# Patient Record
Sex: MALE | Race: WHITE | Employment: UNEMPLOYED | URBAN - METROPOLITAN AREA
[De-identification: names, ages, dates, MRNs, and addresses within clinical notes are randomized per-mention and may not be internally consistent; named-entity substitution may affect disease eponyms.]

---

## 2017-09-30 PROBLEM — F98.8 ATTENTION DEFICIT DISORDER: Status: ACTIVE | Noted: 2017-09-30

## 2017-09-30 PROBLEM — L01.00 IMPETIGO: Status: ACTIVE | Noted: 2017-09-30

## 2017-09-30 PROBLEM — J18.9 PNEUMONIA IN INFECTIOUS DISEASE: Status: ACTIVE | Noted: 2017-09-30

## 2017-11-21 PROBLEM — F10.931 ALCOHOL WITHDRAWAL DELIRIUM (HCC): Status: ACTIVE | Noted: 2017-11-21

## 2017-12-01 PROBLEM — R45.1 AGITATION: Status: ACTIVE | Noted: 2017-12-01

## 2017-12-01 PROBLEM — F33.3 SEVERE RECURRENT MAJOR DEPRESSIVE DISORDER WITH PSYCHOTIC FEATURES (HCC): Status: ACTIVE | Noted: 2017-12-01

## 2017-12-01 PROBLEM — F32.A DEPRESSIVE DISORDER: Status: ACTIVE | Noted: 2017-12-01

## 2017-12-02 PROBLEM — F10.931 ALCOHOL WITHDRAWAL DELIRIUM (HCC): Status: RESOLVED | Noted: 2017-11-21 | Resolved: 2017-12-02

## 2017-12-02 PROBLEM — F33.3 SEVERE RECURRENT MAJOR DEPRESSIVE DISORDER WITH PSYCHOTIC FEATURES (HCC): Status: RESOLVED | Noted: 2017-12-01 | Resolved: 2017-12-02

## 2017-12-02 PROBLEM — R45.1 AGITATION: Status: RESOLVED | Noted: 2017-12-01 | Resolved: 2017-12-02

## 2017-12-02 PROBLEM — F32.A DEPRESSIVE DISORDER: Status: RESOLVED | Noted: 2017-12-01 | Resolved: 2017-12-02

## 2017-12-02 PROBLEM — F98.8 ATTENTION DEFICIT DISORDER: Status: RESOLVED | Noted: 2017-09-30 | Resolved: 2017-12-02

## 2017-12-02 PROBLEM — J18.9 PNEUMONIA IN INFECTIOUS DISEASE: Status: RESOLVED | Noted: 2017-09-30 | Resolved: 2017-12-02

## 2017-12-02 PROBLEM — L01.00 IMPETIGO: Status: RESOLVED | Noted: 2017-09-30 | Resolved: 2017-12-02

## 2017-12-21 ENCOUNTER — TELEPHONE (OUTPATIENT)
Dept: PRIMARY CARE CLINIC | Age: 27
End: 2017-12-21

## 2018-01-07 PROBLEM — R44.3 HALLUCINATIONS: Status: ACTIVE | Noted: 2018-01-07

## 2018-01-07 PROBLEM — F10.931 DELIRIUM TREMENS (HCC): Status: ACTIVE | Noted: 2018-01-07

## 2018-01-07 PROBLEM — F10.10 ALCOHOL ABUSE, CONTINUOUS: Status: ACTIVE | Noted: 2018-01-07

## 2018-01-10 PROBLEM — F10.931 ALCOHOL WITHDRAWAL DELIRIUM (HCC): Status: ACTIVE | Noted: 2018-01-10

## 2018-01-14 PROBLEM — F10.932 ALCOHOL WITHDRAWAL HALLUCINOSIS (HCC): Status: ACTIVE | Noted: 2018-01-14

## 2018-01-15 PROBLEM — Z72.0 TOBACCO ABUSE: Status: ACTIVE | Noted: 2018-01-15

## 2018-01-28 PROBLEM — K85.20 ALCOHOL-INDUCED ACUTE PANCREATITIS: Status: ACTIVE | Noted: 2018-01-28

## 2018-01-28 PROBLEM — F10.930 ALCOHOL WITHDRAWAL WITHOUT PERCEPTUAL DISTURBANCES, UNCOMPLICATED (HCC): Status: ACTIVE | Noted: 2018-01-28

## 2018-01-28 PROBLEM — F10.10 ALCOHOL ABUSE: Status: ACTIVE | Noted: 2018-01-28

## 2018-01-30 PROBLEM — K85.20 ALCOHOL-INDUCED ACUTE PANCREATITIS WITHOUT INFECTION OR NECROSIS: Status: ACTIVE | Noted: 2018-01-30

## 2018-02-08 PROBLEM — F10.239 ALCOHOL-INDUCED DEPRESSIVE DISORDER WITH MODERATE OR SEVERE USE DISORDER WITH ONSET DURING WITHDRAWAL (HCC): Status: ACTIVE | Noted: 2017-12-20

## 2018-02-08 PROBLEM — F13.939 BENZODIAZEPINE WITHDRAWAL (HCC): Status: ACTIVE | Noted: 2017-12-21

## 2018-02-08 PROBLEM — F91.9 DISTURBANCE OF CONDUCT: Status: ACTIVE | Noted: 2018-02-08

## 2018-02-08 PROBLEM — J05.0 CROUP: Status: ACTIVE | Noted: 2018-02-08

## 2018-02-08 PROBLEM — Z00.129 ROUTINE INFANT OR CHILD HEALTH CHECK: Status: ACTIVE | Noted: 2018-02-08

## 2018-02-08 PROBLEM — F10.24 ALCOHOL-INDUCED DEPRESSIVE DISORDER WITH MODERATE OR SEVERE USE DISORDER WITH ONSET DURING WITHDRAWAL (HCC): Status: ACTIVE | Noted: 2017-12-20

## 2018-02-08 PROBLEM — F41.0 PANIC ATTACKS: Status: ACTIVE | Noted: 2017-09-21

## 2018-02-08 PROBLEM — F33.9 MAJOR DEPRESSION, RECURRENT (HCC): Status: ACTIVE | Noted: 2018-02-08

## 2018-02-08 PROBLEM — F13.10 BENZODIAZEPINE ABUSE (HCC): Status: ACTIVE | Noted: 2017-12-21

## 2018-02-08 PROBLEM — J16.8 PNEUMONIA DUE TO OTHER SPECIFIED ORGANISM(483.8): Status: ACTIVE | Noted: 2018-02-08

## 2018-02-12 PROBLEM — F33.0 MDD (MAJOR DEPRESSIVE DISORDER), RECURRENT EPISODE, MILD (HCC): Status: ACTIVE | Noted: 2018-02-12

## 2018-02-23 PROBLEM — F10.239 ALCOHOL DEPENDENCE WITH WITHDRAWAL (HCC): Status: ACTIVE | Noted: 2018-02-23

## 2018-03-06 ENCOUNTER — TELEPHONE (OUTPATIENT)
Dept: INPATIENT UNIT | Age: 28
End: 2018-03-06

## 2018-03-13 ENCOUNTER — TELEPHONE (OUTPATIENT)
Dept: INPATIENT UNIT | Age: 28
End: 2018-03-13

## 2018-03-15 PROBLEM — K85.20 ALCOHOL-INDUCED ACUTE PANCREATITIS WITHOUT INFECTION OR NECROSIS: Status: ACTIVE | Noted: 2018-03-15

## 2018-03-22 PROBLEM — K85.90 ACUTE ON CHRONIC PANCREATITIS (HCC): Status: ACTIVE | Noted: 2018-03-22

## 2018-03-22 PROBLEM — K86.1 ACUTE ON CHRONIC PANCREATITIS (HCC): Status: ACTIVE | Noted: 2018-03-22

## 2018-03-22 PROBLEM — K92.2 UPPER GI BLEED: Status: ACTIVE | Noted: 2018-03-22

## 2018-03-23 ENCOUNTER — TELEPHONE (OUTPATIENT)
Dept: PSYCHIATRY | Age: 28
End: 2018-03-23

## 2018-03-29 PROBLEM — F39 UNSPECIFIED MOOD (AFFECTIVE) DISORDER (HCC): Status: ACTIVE | Noted: 2018-03-29

## 2018-03-30 ENCOUNTER — TELEPHONE (OUTPATIENT)
Dept: PRIMARY CARE CLINIC | Age: 28
End: 2018-03-30

## 2018-03-30 PROBLEM — F32.A DEPRESSIVE DISORDER: Status: ACTIVE | Noted: 2018-03-30

## 2018-04-02 PROBLEM — F10.24 ALCOHOL DEPENDENCE WITH ALCOHOL-INDUCED MOOD DISORDER (HCC): Status: ACTIVE | Noted: 2018-02-23

## 2018-04-06 PROBLEM — F10.930 ALCOHOL WITHDRAWAL SYNDROME, UNCOMPLICATED (HCC): Status: ACTIVE | Noted: 2018-04-06

## 2018-04-11 PROBLEM — F10.932 ALCOHOL WITHDRAWAL WITH PERCEPTUAL DISTURBANCES (HCC): Status: ACTIVE | Noted: 2018-04-11

## 2018-04-11 PROBLEM — Z00.129 ROUTINE INFANT OR CHILD HEALTH CHECK: Status: RESOLVED | Noted: 2018-02-08 | Resolved: 2018-04-11

## 2018-04-21 PROBLEM — F10.10 ALCOHOL ABUSE: Status: RESOLVED | Noted: 2018-01-28 | Resolved: 2018-04-21

## 2018-04-21 PROBLEM — F13.939 BENZODIAZEPINE WITHDRAWAL (HCC): Status: RESOLVED | Noted: 2017-12-21 | Resolved: 2018-04-21

## 2018-04-21 PROBLEM — J16.8 PNEUMONIA DUE TO OTHER SPECIFIED ORGANISM(483.8): Status: RESOLVED | Noted: 2018-02-08 | Resolved: 2018-04-21

## 2018-04-21 PROBLEM — F10.24 ALCOHOL-INDUCED DEPRESSIVE DISORDER WITH MODERATE OR SEVERE USE DISORDER WITH ONSET DURING WITHDRAWAL (HCC): Status: RESOLVED | Noted: 2017-12-20 | Resolved: 2018-04-21

## 2018-04-21 PROBLEM — J05.0 CROUP: Status: RESOLVED | Noted: 2018-02-08 | Resolved: 2018-04-21

## 2018-04-21 PROBLEM — F10.931 DELIRIUM TREMENS (HCC): Status: RESOLVED | Noted: 2018-01-07 | Resolved: 2018-04-21

## 2018-04-21 PROBLEM — F32.A DEPRESSIVE DISORDER: Status: RESOLVED | Noted: 2018-03-30 | Resolved: 2018-04-21

## 2018-04-21 PROBLEM — F10.932 ALCOHOL WITHDRAWAL WITH PERCEPTUAL DISTURBANCES (HCC): Status: RESOLVED | Noted: 2018-04-11 | Resolved: 2018-04-21

## 2018-04-21 PROBLEM — F39 UNSPECIFIED MOOD (AFFECTIVE) DISORDER (HCC): Status: RESOLVED | Noted: 2018-03-29 | Resolved: 2018-04-21

## 2018-04-21 PROBLEM — R44.3 HALLUCINATIONS: Status: RESOLVED | Noted: 2018-01-07 | Resolved: 2018-04-21

## 2018-04-21 PROBLEM — K85.90 ACUTE ON CHRONIC PANCREATITIS (HCC): Status: RESOLVED | Noted: 2018-03-22 | Resolved: 2018-04-21

## 2018-04-21 PROBLEM — K85.20 ALCOHOL-INDUCED ACUTE PANCREATITIS WITHOUT INFECTION OR NECROSIS: Status: RESOLVED | Noted: 2018-03-15 | Resolved: 2018-04-21

## 2018-04-21 PROBLEM — F10.931 ALCOHOL WITHDRAWAL DELIRIUM (HCC): Status: RESOLVED | Noted: 2018-01-10 | Resolved: 2018-04-21

## 2018-04-21 PROBLEM — K86.1 ACUTE ON CHRONIC PANCREATITIS (HCC): Status: RESOLVED | Noted: 2018-03-22 | Resolved: 2018-04-21

## 2018-04-21 PROBLEM — F10.932 ALCOHOL WITHDRAWAL HALLUCINOSIS (HCC): Status: RESOLVED | Noted: 2018-01-14 | Resolved: 2018-04-21

## 2018-04-21 PROBLEM — F10.24 ALCOHOL DEPENDENCE WITH ALCOHOL-INDUCED MOOD DISORDER (HCC): Status: RESOLVED | Noted: 2018-02-23 | Resolved: 2018-04-21

## 2018-04-21 PROBLEM — F33.9 MAJOR DEPRESSION, RECURRENT (HCC): Status: RESOLVED | Noted: 2018-02-08 | Resolved: 2018-04-21

## 2018-04-21 PROBLEM — K85.20 ALCOHOL-INDUCED ACUTE PANCREATITIS WITHOUT INFECTION OR NECROSIS: Status: RESOLVED | Noted: 2018-01-30 | Resolved: 2018-04-21

## 2018-04-21 PROBLEM — F10.239 ALCOHOL-INDUCED DEPRESSIVE DISORDER WITH MODERATE OR SEVERE USE DISORDER WITH ONSET DURING WITHDRAWAL (HCC): Status: RESOLVED | Noted: 2017-12-20 | Resolved: 2018-04-21

## 2018-04-21 PROBLEM — F91.9 DISTURBANCE OF CONDUCT: Status: RESOLVED | Noted: 2018-02-08 | Resolved: 2018-04-21

## 2018-04-21 PROBLEM — F10.930 ALCOHOL WITHDRAWAL SYNDROME, UNCOMPLICATED (HCC): Status: RESOLVED | Noted: 2018-04-06 | Resolved: 2018-04-21

## 2019-01-19 ENCOUNTER — HOSPITAL ENCOUNTER (EMERGENCY)
Age: 29
Discharge: HOME | End: 2019-01-19
Payer: COMMERCIAL

## 2019-01-19 ENCOUNTER — APPOINTMENT (OUTPATIENT)
Dept: CT IMAGING | Age: 29
End: 2019-01-19
Payer: COMMERCIAL

## 2019-01-19 VITALS
DIASTOLIC BLOOD PRESSURE: 77 MMHG | HEIGHT: 68 IN | HEART RATE: 89 BPM | TEMPERATURE: 96.9 F | RESPIRATION RATE: 24 BRPM | SYSTOLIC BLOOD PRESSURE: 150 MMHG | OXYGEN SATURATION: 100 % | BODY MASS INDEX: 23.66 KG/M2 | WEIGHT: 156.09 LBS

## 2019-01-19 DIAGNOSIS — M62.838 SPASM OF MUSCLE: Primary | ICD-10-CM

## 2019-01-19 DIAGNOSIS — T50.905A ADVERSE EFFECT OF DRUG, INITIAL ENCOUNTER: ICD-10-CM

## 2019-01-19 LAB
AMPHETAMINE SCREEN, URINE: NORMAL
ANION GAP SERPL CALCULATED.3IONS-SCNC: 16 MMOL/L (ref 3–16)
BARBITURATE SCREEN URINE: NORMAL
BASOPHILS ABSOLUTE: 0 K/UL (ref 0–0.2)
BASOPHILS RELATIVE PERCENT: 0.5 %
BENZODIAZEPINE SCREEN, URINE: NORMAL
BUN BLDV-MCNC: 11 MG/DL (ref 7–20)
CALCIUM SERPL-MCNC: 9.7 MG/DL (ref 8.3–10.6)
CANNABINOID SCREEN URINE: NORMAL
CHLORIDE BLD-SCNC: 104 MMOL/L (ref 99–110)
CO2: 20 MMOL/L (ref 21–32)
COCAINE METABOLITE SCREEN URINE: NORMAL
CREAT SERPL-MCNC: 0.8 MG/DL (ref 0.9–1.3)
EOSINOPHILS ABSOLUTE: 0.1 K/UL (ref 0–0.6)
EOSINOPHILS RELATIVE PERCENT: 1.8 %
GFR AFRICAN AMERICAN: >60
GFR NON-AFRICAN AMERICAN: >60
GLUCOSE BLD-MCNC: 168 MG/DL (ref 70–99)
HCT VFR BLD CALC: 42.5 % (ref 40.5–52.5)
HEMOGLOBIN: 15.1 G/DL (ref 13.5–17.5)
LYMPHOCYTES ABSOLUTE: 2 K/UL (ref 1–5.1)
LYMPHOCYTES RELATIVE PERCENT: 28.9 %
Lab: NORMAL
MCH RBC QN AUTO: 31.9 PG (ref 26–34)
MCHC RBC AUTO-ENTMCNC: 35.5 G/DL (ref 31–36)
MCV RBC AUTO: 90.1 FL (ref 80–100)
METHADONE SCREEN, URINE: NORMAL
MONOCYTES ABSOLUTE: 0.4 K/UL (ref 0–1.3)
MONOCYTES RELATIVE PERCENT: 6 %
NEUTROPHILS ABSOLUTE: 4.4 K/UL (ref 1.7–7.7)
NEUTROPHILS RELATIVE PERCENT: 62.8 %
OPIATE SCREEN URINE: NORMAL
OXYCODONE URINE: NORMAL
PDW BLD-RTO: 11.8 % (ref 12.4–15.4)
PH UA: 7
PHENCYCLIDINE SCREEN URINE: NORMAL
PLATELET # BLD: 204 K/UL (ref 135–450)
PMV BLD AUTO: 8.9 FL (ref 5–10.5)
POTASSIUM SERPL-SCNC: 3.8 MMOL/L (ref 3.5–5.1)
PROPOXYPHENE SCREEN: NORMAL
RBC # BLD: 4.72 M/UL (ref 4.2–5.9)
SODIUM BLD-SCNC: 140 MMOL/L (ref 136–145)
WBC # BLD: 7 K/UL (ref 4–11)

## 2019-01-19 PROCEDURE — 70450 CT HEAD/BRAIN W/O DYE: CPT

## 2019-01-19 PROCEDURE — 6360000002 HC RX W HCPCS: Performed by: NURSE PRACTITIONER

## 2019-01-19 PROCEDURE — 99284 EMERGENCY DEPT VISIT MOD MDM: CPT

## 2019-01-19 PROCEDURE — 96376 TX/PRO/DX INJ SAME DRUG ADON: CPT

## 2019-01-19 PROCEDURE — 6370000000 HC RX 637 (ALT 250 FOR IP): Performed by: NURSE PRACTITIONER

## 2019-01-19 PROCEDURE — 85025 COMPLETE CBC W/AUTO DIFF WBC: CPT

## 2019-01-19 PROCEDURE — 96374 THER/PROPH/DIAG INJ IV PUSH: CPT

## 2019-01-19 PROCEDURE — 80048 BASIC METABOLIC PNL TOTAL CA: CPT

## 2019-01-19 PROCEDURE — 80307 DRUG TEST PRSMV CHEM ANLYZR: CPT

## 2019-01-19 PROCEDURE — 96375 TX/PRO/DX INJ NEW DRUG ADDON: CPT

## 2019-01-19 RX ORDER — DIPHENHYDRAMINE HCL 25 MG
25 TABLET ORAL ONCE
Status: COMPLETED | OUTPATIENT
Start: 2019-01-19 | End: 2019-01-19

## 2019-01-19 RX ORDER — ACETAMINOPHEN 500 MG
1000 TABLET ORAL ONCE
Status: COMPLETED | OUTPATIENT
Start: 2019-01-19 | End: 2019-01-19

## 2019-01-19 RX ORDER — LORAZEPAM 2 MG/ML
1 INJECTION INTRAMUSCULAR ONCE
Status: COMPLETED | OUTPATIENT
Start: 2019-01-19 | End: 2019-01-19

## 2019-01-19 RX ORDER — LORAZEPAM 2 MG/ML
0.5 INJECTION INTRAMUSCULAR ONCE
Status: COMPLETED | OUTPATIENT
Start: 2019-01-19 | End: 2019-01-19

## 2019-01-19 RX ORDER — BENZTROPINE MESYLATE 1 MG/ML
0.5 INJECTION INTRAMUSCULAR; INTRAVENOUS ONCE
Status: COMPLETED | OUTPATIENT
Start: 2019-01-19 | End: 2019-01-19

## 2019-01-19 RX ADMIN — LORAZEPAM 0.5 MG: 2 INJECTION INTRAMUSCULAR; INTRAVENOUS at 20:31

## 2019-01-19 RX ADMIN — DIPHENHYDRAMINE HCL 25 MG: 25 TABLET ORAL at 21:36

## 2019-01-19 RX ADMIN — LORAZEPAM 1 MG: 2 INJECTION, SOLUTION INTRAMUSCULAR; INTRAVENOUS at 19:26

## 2019-01-19 RX ADMIN — BENZTROPINE MESYLATE 0.5 MG: 1 INJECTION, SOLUTION INTRAMUSCULAR; INTRAVENOUS at 20:30

## 2019-01-19 RX ADMIN — ACETAMINOPHEN 1000 MG: 500 TABLET ORAL at 21:35

## 2019-01-19 ASSESSMENT — PAIN DESCRIPTION - LOCATION: LOCATION: GENERALIZED

## 2019-01-19 ASSESSMENT — PAIN SCALES - GENERAL
PAINLEVEL_OUTOF10: 2
PAINLEVEL_OUTOF10: 2

## 2019-01-19 ASSESSMENT — PAIN DESCRIPTION - DESCRIPTORS: DESCRIPTORS: SPASM

## 2019-01-19 ASSESSMENT — ENCOUNTER SYMPTOMS: RESPIRATORY NEGATIVE: 1

## 2019-01-19 ASSESSMENT — PAIN DESCRIPTION - PAIN TYPE: TYPE: ACUTE PAIN

## 2019-01-23 ENCOUNTER — HOSPITAL ENCOUNTER (EMERGENCY)
Age: 29
Discharge: HOME | End: 2019-01-23
Payer: COMMERCIAL

## 2019-01-23 VITALS
HEIGHT: 68 IN | HEART RATE: 99 BPM | TEMPERATURE: 97.6 F | OXYGEN SATURATION: 100 % | RESPIRATION RATE: 16 BRPM | SYSTOLIC BLOOD PRESSURE: 144 MMHG | DIASTOLIC BLOOD PRESSURE: 89 MMHG | WEIGHT: 150 LBS | BODY MASS INDEX: 22.73 KG/M2

## 2019-01-23 DIAGNOSIS — F41.0 PANIC ATTACKS: ICD-10-CM

## 2019-01-23 DIAGNOSIS — Z86.59 HISTORY OF BIPOLAR DISORDER: Primary | ICD-10-CM

## 2019-01-23 DIAGNOSIS — F41.1 ANXIETY STATE: ICD-10-CM

## 2019-01-23 DIAGNOSIS — R45.1 RESTLESS: ICD-10-CM

## 2019-01-23 PROCEDURE — 99283 EMERGENCY DEPT VISIT LOW MDM: CPT

## 2019-01-23 PROCEDURE — 96372 THER/PROPH/DIAG INJ SC/IM: CPT

## 2019-01-23 PROCEDURE — 6360000002 HC RX W HCPCS: Performed by: NURSE PRACTITIONER

## 2019-01-23 RX ORDER — BENZTROPINE MESYLATE 1 MG/ML
1 INJECTION INTRAMUSCULAR; INTRAVENOUS ONCE
Status: COMPLETED | OUTPATIENT
Start: 2019-01-23 | End: 2019-01-23

## 2019-01-23 RX ORDER — LORAZEPAM 0.5 MG/1
0.5 TABLET ORAL 2 TIMES DAILY PRN
Qty: 6 TABLET | Refills: 0 | Status: SHIPPED | OUTPATIENT
Start: 2019-01-23 | End: 2019-01-26

## 2019-01-23 RX ORDER — LORAZEPAM 2 MG/ML
1 INJECTION INTRAMUSCULAR ONCE
Status: COMPLETED | OUTPATIENT
Start: 2019-01-23 | End: 2019-01-23

## 2019-01-23 RX ADMIN — BENZTROPINE MESYLATE 1 MG: 1 INJECTION INTRAMUSCULAR; INTRAVENOUS at 17:14

## 2019-01-23 RX ADMIN — LORAZEPAM 1 MG: 2 INJECTION INTRAMUSCULAR; INTRAVENOUS at 17:14

## 2019-01-23 ASSESSMENT — PAIN SCALES - GENERAL: PAINLEVEL_OUTOF10: 2

## 2019-01-24 ASSESSMENT — ENCOUNTER SYMPTOMS
RESPIRATORY NEGATIVE: 1
GASTROINTESTINAL NEGATIVE: 1

## 2019-01-27 ENCOUNTER — HOSPITAL ENCOUNTER (EMERGENCY)
Age: 29
Discharge: HOME | End: 2019-01-27
Attending: EMERGENCY MEDICINE
Payer: COMMERCIAL

## 2019-01-27 ENCOUNTER — HOSPITAL ENCOUNTER (EMERGENCY)
Age: 29
Discharge: OTHER INSTITUTION | End: 2019-01-27
Attending: EMERGENCY MEDICINE
Payer: COMMERCIAL

## 2019-01-27 VITALS
RESPIRATION RATE: 16 BRPM | DIASTOLIC BLOOD PRESSURE: 79 MMHG | HEART RATE: 79 BPM | HEIGHT: 68 IN | WEIGHT: 150 LBS | BODY MASS INDEX: 22.73 KG/M2 | TEMPERATURE: 98 F | OXYGEN SATURATION: 98 % | SYSTOLIC BLOOD PRESSURE: 121 MMHG

## 2019-01-27 VITALS
SYSTOLIC BLOOD PRESSURE: 104 MMHG | DIASTOLIC BLOOD PRESSURE: 69 MMHG | TEMPERATURE: 97 F | RESPIRATION RATE: 14 BRPM | HEART RATE: 75 BPM | OXYGEN SATURATION: 98 %

## 2019-01-27 DIAGNOSIS — F31.9 BIPOLAR DISEASE, CHRONIC (HCC): Primary | ICD-10-CM

## 2019-01-27 DIAGNOSIS — F31.12 MODERATE BIPOLAR I DISORDER WITH MANIA AS CURRENT EPISODE (HCC): Primary | ICD-10-CM

## 2019-01-27 DIAGNOSIS — F33.0 MDD (MAJOR DEPRESSIVE DISORDER), RECURRENT EPISODE, MILD (HCC): ICD-10-CM

## 2019-01-27 LAB
A/G RATIO: 1.8 (ref 1.1–2.2)
ACETAMINOPHEN LEVEL: <5 UG/ML (ref 10–30)
ALBUMIN SERPL-MCNC: 4.8 G/DL (ref 3.4–5)
ALP BLD-CCNC: 52 U/L (ref 40–129)
ALT SERPL-CCNC: 8 U/L (ref 10–40)
ANION GAP SERPL CALCULATED.3IONS-SCNC: 13 MMOL/L (ref 3–16)
AST SERPL-CCNC: 14 U/L (ref 15–37)
BASOPHILS ABSOLUTE: 0 K/UL (ref 0–0.2)
BASOPHILS RELATIVE PERCENT: 0.4 %
BILIRUB SERPL-MCNC: 0.5 MG/DL (ref 0–1)
BUN BLDV-MCNC: 9 MG/DL (ref 7–20)
CALCIUM SERPL-MCNC: 9.3 MG/DL (ref 8.3–10.6)
CHLORIDE BLD-SCNC: 107 MMOL/L (ref 99–110)
CO2: 21 MMOL/L (ref 21–32)
CREAT SERPL-MCNC: 0.6 MG/DL (ref 0.9–1.3)
EOSINOPHILS ABSOLUTE: 0.2 K/UL (ref 0–0.6)
EOSINOPHILS RELATIVE PERCENT: 3.2 %
ETHANOL: NORMAL MG/DL (ref 0–0.08)
GFR AFRICAN AMERICAN: >60
GFR NON-AFRICAN AMERICAN: >60
GLOBULIN: 2.6 G/DL
GLUCOSE BLD-MCNC: 89 MG/DL (ref 70–99)
HCT VFR BLD CALC: 40.5 % (ref 40.5–52.5)
HEMOGLOBIN: 14.2 G/DL (ref 13.5–17.5)
LYMPHOCYTES ABSOLUTE: 1.8 K/UL (ref 1–5.1)
LYMPHOCYTES RELATIVE PERCENT: 32.3 %
MAGNESIUM: 2.3 MG/DL (ref 1.8–2.4)
MCH RBC QN AUTO: 31.6 PG (ref 26–34)
MCHC RBC AUTO-ENTMCNC: 35.1 G/DL (ref 31–36)
MCV RBC AUTO: 90 FL (ref 80–100)
MONOCYTES ABSOLUTE: 0.4 K/UL (ref 0–1.3)
MONOCYTES RELATIVE PERCENT: 7.7 %
NEUTROPHILS ABSOLUTE: 3.2 K/UL (ref 1.7–7.7)
NEUTROPHILS RELATIVE PERCENT: 56.4 %
PDW BLD-RTO: 12.1 % (ref 12.4–15.4)
PLATELET # BLD: 195 K/UL (ref 135–450)
PMV BLD AUTO: 8.6 FL (ref 5–10.5)
POTASSIUM SERPL-SCNC: 4.2 MMOL/L (ref 3.5–5.1)
RBC # BLD: 4.5 M/UL (ref 4.2–5.9)
SALICYLATE, SERUM: <0.3 MG/DL (ref 15–30)
SODIUM BLD-SCNC: 141 MMOL/L (ref 136–145)
TOTAL PROTEIN: 7.4 G/DL (ref 6.4–8.2)
WBC # BLD: 5.7 K/UL (ref 4–11)

## 2019-01-27 PROCEDURE — 6370000000 HC RX 637 (ALT 250 FOR IP): Performed by: EMERGENCY MEDICINE

## 2019-01-27 PROCEDURE — 80053 COMPREHEN METABOLIC PANEL: CPT

## 2019-01-27 PROCEDURE — 99285 EMERGENCY DEPT VISIT HI MDM: CPT

## 2019-01-27 PROCEDURE — 83735 ASSAY OF MAGNESIUM: CPT

## 2019-01-27 PROCEDURE — 96372 THER/PROPH/DIAG INJ SC/IM: CPT

## 2019-01-27 PROCEDURE — 2580000003 HC RX 258: Performed by: EMERGENCY MEDICINE

## 2019-01-27 PROCEDURE — G0480 DRUG TEST DEF 1-7 CLASSES: HCPCS

## 2019-01-27 PROCEDURE — 6360000002 HC RX W HCPCS: Performed by: EMERGENCY MEDICINE

## 2019-01-27 PROCEDURE — 36415 COLL VENOUS BLD VENIPUNCTURE: CPT

## 2019-01-27 PROCEDURE — 85025 COMPLETE CBC W/AUTO DIFF WBC: CPT

## 2019-01-27 RX ORDER — ZIPRASIDONE MESYLATE 20 MG/ML
20 INJECTION, POWDER, LYOPHILIZED, FOR SOLUTION INTRAMUSCULAR ONCE
Status: COMPLETED | OUTPATIENT
Start: 2019-01-27 | End: 2019-01-27

## 2019-01-27 RX ORDER — LORAZEPAM 1 MG/1
2 TABLET ORAL ONCE
Status: COMPLETED | OUTPATIENT
Start: 2019-01-27 | End: 2019-01-27

## 2019-01-27 RX ORDER — DIPHENHYDRAMINE HCL 25 MG
50 TABLET ORAL ONCE
Status: COMPLETED | OUTPATIENT
Start: 2019-01-27 | End: 2019-01-27

## 2019-01-27 RX ADMIN — ZIPRASIDONE MESYLATE 20 MG: 20 INJECTION, POWDER, LYOPHILIZED, FOR SOLUTION INTRAMUSCULAR at 02:44

## 2019-01-27 RX ADMIN — DIPHENHYDRAMINE HCL 50 MG: 25 TABLET ORAL at 01:02

## 2019-01-27 RX ADMIN — WATER 1.2 ML: 1 INJECTION INTRAMUSCULAR; INTRAVENOUS; SUBCUTANEOUS at 02:44

## 2019-01-27 RX ADMIN — LORAZEPAM 2 MG: 1 TABLET ORAL at 01:02

## 2019-01-27 ASSESSMENT — ENCOUNTER SYMPTOMS
ABDOMINAL PAIN: 0
SORE THROAT: 0
COUGH: 0
VOMITING: 0
EYE DISCHARGE: 0
DIARRHEA: 0
EYE PAIN: 0
NAUSEA: 0
SHORTNESS OF BREATH: 0
EYE REDNESS: 0
WHEEZING: 0
RHINORRHEA: 0
BACK PAIN: 0

## 2019-01-29 ENCOUNTER — HOSPITAL ENCOUNTER (EMERGENCY)
Age: 29
Discharge: HOME | End: 2019-01-29
Attending: EMERGENCY MEDICINE
Payer: COMMERCIAL

## 2019-01-29 ENCOUNTER — APPOINTMENT (OUTPATIENT)
Dept: CT IMAGING | Age: 29
End: 2019-01-29
Payer: COMMERCIAL

## 2019-01-29 VITALS
WEIGHT: 152.56 LBS | OXYGEN SATURATION: 99 % | HEIGHT: 68 IN | HEART RATE: 73 BPM | SYSTOLIC BLOOD PRESSURE: 120 MMHG | DIASTOLIC BLOOD PRESSURE: 68 MMHG | RESPIRATION RATE: 16 BRPM | BODY MASS INDEX: 23.12 KG/M2 | TEMPERATURE: 98 F

## 2019-01-29 DIAGNOSIS — R45.1 AGITATION: Primary | ICD-10-CM

## 2019-01-29 DIAGNOSIS — F41.1 ANXIETY STATE: ICD-10-CM

## 2019-01-29 LAB
ACETAMINOPHEN LEVEL: <5 UG/ML (ref 10–30)
ALBUMIN SERPL-MCNC: 4.5 G/DL (ref 3.4–5)
ALP BLD-CCNC: 53 U/L (ref 40–129)
ALT SERPL-CCNC: 9 U/L (ref 10–40)
AMPHETAMINE SCREEN, URINE: NORMAL
ANION GAP SERPL CALCULATED.3IONS-SCNC: 11 MMOL/L (ref 3–16)
AST SERPL-CCNC: 16 U/L (ref 15–37)
BARBITURATE SCREEN URINE: NORMAL
BASOPHILS ABSOLUTE: 0 K/UL (ref 0–0.2)
BASOPHILS RELATIVE PERCENT: 0.5 %
BENZODIAZEPINE SCREEN, URINE: NORMAL
BILIRUB SERPL-MCNC: 0.7 MG/DL (ref 0–1)
BILIRUBIN DIRECT: <0.2 MG/DL (ref 0–0.3)
BILIRUBIN, INDIRECT: ABNORMAL MG/DL (ref 0–1)
BUN BLDV-MCNC: 8 MG/DL (ref 7–20)
CALCIUM SERPL-MCNC: 9.1 MG/DL (ref 8.3–10.6)
CANNABINOID SCREEN URINE: NORMAL
CHLORIDE BLD-SCNC: 108 MMOL/L (ref 99–110)
CO2: 21 MMOL/L (ref 21–32)
COCAINE METABOLITE SCREEN URINE: NORMAL
CREAT SERPL-MCNC: 0.7 MG/DL (ref 0.9–1.3)
EOSINOPHILS ABSOLUTE: 0.2 K/UL (ref 0–0.6)
EOSINOPHILS RELATIVE PERCENT: 4.1 %
ETHANOL: NORMAL MG/DL (ref 0–0.08)
GFR AFRICAN AMERICAN: >60
GFR NON-AFRICAN AMERICAN: >60
GLUCOSE BLD-MCNC: 73 MG/DL (ref 70–99)
HCT VFR BLD CALC: 40.7 % (ref 40.5–52.5)
HEMOGLOBIN: 14.4 G/DL (ref 13.5–17.5)
LYMPHOCYTES ABSOLUTE: 1.3 K/UL (ref 1–5.1)
LYMPHOCYTES RELATIVE PERCENT: 27.8 %
Lab: NORMAL
MCH RBC QN AUTO: 31.6 PG (ref 26–34)
MCHC RBC AUTO-ENTMCNC: 35.3 G/DL (ref 31–36)
MCV RBC AUTO: 89.5 FL (ref 80–100)
METHADONE SCREEN, URINE: NORMAL
MONOCYTES ABSOLUTE: 0.4 K/UL (ref 0–1.3)
MONOCYTES RELATIVE PERCENT: 9.7 %
NEUTROPHILS ABSOLUTE: 2.6 K/UL (ref 1.7–7.7)
NEUTROPHILS RELATIVE PERCENT: 57.9 %
OPIATE SCREEN URINE: NORMAL
OXYCODONE URINE: NORMAL
PDW BLD-RTO: 12.2 % (ref 12.4–15.4)
PH UA: 5
PHENCYCLIDINE SCREEN URINE: NORMAL
PLATELET # BLD: 206 K/UL (ref 135–450)
PMV BLD AUTO: 8.3 FL (ref 5–10.5)
POTASSIUM REFLEX MAGNESIUM: 3.9 MMOL/L (ref 3.5–5.1)
PROPOXYPHENE SCREEN: NORMAL
RBC # BLD: 4.55 M/UL (ref 4.2–5.9)
SALICYLATE, SERUM: <0.3 MG/DL (ref 15–30)
SODIUM BLD-SCNC: 140 MMOL/L (ref 136–145)
TOTAL PROTEIN: 7.1 G/DL (ref 6.4–8.2)
WBC # BLD: 4.5 K/UL (ref 4–11)

## 2019-01-29 PROCEDURE — 85025 COMPLETE CBC W/AUTO DIFF WBC: CPT

## 2019-01-29 PROCEDURE — G0480 DRUG TEST DEF 1-7 CLASSES: HCPCS

## 2019-01-29 PROCEDURE — 80076 HEPATIC FUNCTION PANEL: CPT

## 2019-01-29 PROCEDURE — 80048 BASIC METABOLIC PNL TOTAL CA: CPT

## 2019-01-29 PROCEDURE — 6370000000 HC RX 637 (ALT 250 FOR IP): Performed by: EMERGENCY MEDICINE

## 2019-01-29 PROCEDURE — 99284 EMERGENCY DEPT VISIT MOD MDM: CPT

## 2019-01-29 PROCEDURE — 70450 CT HEAD/BRAIN W/O DYE: CPT

## 2019-01-29 PROCEDURE — 80307 DRUG TEST PRSMV CHEM ANLYZR: CPT

## 2019-01-29 RX ORDER — LORAZEPAM 0.5 MG/1
0.5 TABLET ORAL ONCE
Status: COMPLETED | OUTPATIENT
Start: 2019-01-29 | End: 2019-01-29

## 2019-01-29 RX ORDER — LORAZEPAM 1 MG/1
1 TABLET ORAL ONCE
Status: COMPLETED | OUTPATIENT
Start: 2019-01-29 | End: 2019-01-29

## 2019-01-29 RX ORDER — ONDANSETRON 4 MG/1
4 TABLET, ORALLY DISINTEGRATING ORAL ONCE
Status: COMPLETED | OUTPATIENT
Start: 2019-01-29 | End: 2019-01-29

## 2019-01-29 RX ADMIN — ONDANSETRON 4 MG: 4 TABLET, ORALLY DISINTEGRATING ORAL at 12:10

## 2019-01-29 RX ADMIN — LORAZEPAM 0.5 MG: 0.5 TABLET ORAL at 12:45

## 2019-01-29 RX ADMIN — LORAZEPAM 1 MG: 1 TABLET ORAL at 11:21

## 2019-01-29 ASSESSMENT — ENCOUNTER SYMPTOMS
CONSTIPATION: 0
SHORTNESS OF BREATH: 0
BACK PAIN: 0
COLOR CHANGE: 0
WHEEZING: 0
DIARRHEA: 0
PHOTOPHOBIA: 0
EYE DISCHARGE: 0
COUGH: 0
EYE ITCHING: 0
RECTAL PAIN: 0
ABDOMINAL DISTENTION: 0
NAUSEA: 0
CHOKING: 0
BLOOD IN STOOL: 0
EYE PAIN: 0
ANAL BLEEDING: 0
STRIDOR: 0
APNEA: 0
CHEST TIGHTNESS: 0
EYE REDNESS: 0
ABDOMINAL PAIN: 0
VOMITING: 0

## 2019-02-02 ENCOUNTER — HOSPITAL ENCOUNTER (EMERGENCY)
Age: 29
Discharge: OTHER INSTITUTION | End: 2019-02-03
Attending: EMERGENCY MEDICINE
Payer: COMMERCIAL

## 2019-02-02 DIAGNOSIS — R45.1 AGITATION: ICD-10-CM

## 2019-02-02 DIAGNOSIS — R45.851 SUICIDAL IDEATIONS: Primary | ICD-10-CM

## 2019-02-02 LAB
A/G RATIO: 1.8 (ref 1.1–2.2)
ACETAMINOPHEN LEVEL: <5 UG/ML (ref 10–30)
ALBUMIN SERPL-MCNC: 4.9 G/DL (ref 3.4–5)
ALP BLD-CCNC: 59 U/L (ref 40–129)
ALT SERPL-CCNC: 9 U/L (ref 10–40)
AMPHETAMINE SCREEN, URINE: NORMAL
ANION GAP SERPL CALCULATED.3IONS-SCNC: 14 MMOL/L (ref 3–16)
AST SERPL-CCNC: 15 U/L (ref 15–37)
BARBITURATE SCREEN URINE: NORMAL
BENZODIAZEPINE SCREEN, URINE: NORMAL
BILIRUB SERPL-MCNC: 0.6 MG/DL (ref 0–1)
BILIRUBIN URINE: NEGATIVE
BLOOD, URINE: NEGATIVE
BUN BLDV-MCNC: 9 MG/DL (ref 7–20)
CALCIUM SERPL-MCNC: 9.6 MG/DL (ref 8.3–10.6)
CANNABINOID SCREEN URINE: NORMAL
CHLORIDE BLD-SCNC: 106 MMOL/L (ref 99–110)
CLARITY: CLEAR
CO2: 21 MMOL/L (ref 21–32)
COCAINE METABOLITE SCREEN URINE: NORMAL
COLOR: YELLOW
CREAT SERPL-MCNC: 0.7 MG/DL (ref 0.9–1.3)
ETHANOL: NORMAL MG/DL (ref 0–0.08)
GFR AFRICAN AMERICAN: >60
GFR NON-AFRICAN AMERICAN: >60
GLOBULIN: 2.8 G/DL
GLUCOSE BLD-MCNC: 100 MG/DL (ref 70–99)
GLUCOSE URINE: 100 MG/DL
HCT VFR BLD CALC: 42.7 % (ref 40.5–52.5)
HEMOGLOBIN: 14.8 G/DL (ref 13.5–17.5)
KETONES, URINE: NEGATIVE MG/DL
LEUKOCYTE ESTERASE, URINE: NEGATIVE
Lab: NORMAL
MCH RBC QN AUTO: 31.4 PG (ref 26–34)
MCHC RBC AUTO-ENTMCNC: 34.8 G/DL (ref 31–36)
MCV RBC AUTO: 90.4 FL (ref 80–100)
METHADONE SCREEN, URINE: NORMAL
MICROSCOPIC EXAMINATION: ABNORMAL
NITRITE, URINE: NEGATIVE
OPIATE SCREEN URINE: NORMAL
OXYCODONE URINE: NORMAL
PDW BLD-RTO: 12.4 % (ref 12.4–15.4)
PH UA: 7
PH UA: 7
PHENCYCLIDINE SCREEN URINE: NORMAL
PLATELET # BLD: 215 K/UL (ref 135–450)
PMV BLD AUTO: 8.4 FL (ref 5–10.5)
POTASSIUM SERPL-SCNC: 3.6 MMOL/L (ref 3.5–5.1)
PROPOXYPHENE SCREEN: NORMAL
PROTEIN UA: NEGATIVE MG/DL
RBC # BLD: 4.72 M/UL (ref 4.2–5.9)
SALICYLATE, SERUM: <0.3 MG/DL (ref 15–30)
SODIUM BLD-SCNC: 141 MMOL/L (ref 136–145)
SPECIFIC GRAVITY UA: 1.02
TOTAL PROTEIN: 7.7 G/DL (ref 6.4–8.2)
URINE REFLEX TO CULTURE: ABNORMAL
URINE TYPE: ABNORMAL
UROBILINOGEN, URINE: 0.2 E.U./DL
WBC # BLD: 6.7 K/UL (ref 4–11)

## 2019-02-02 PROCEDURE — 80053 COMPREHEN METABOLIC PANEL: CPT

## 2019-02-02 PROCEDURE — G0480 DRUG TEST DEF 1-7 CLASSES: HCPCS

## 2019-02-02 PROCEDURE — 80307 DRUG TEST PRSMV CHEM ANLYZR: CPT

## 2019-02-02 PROCEDURE — 85027 COMPLETE CBC AUTOMATED: CPT

## 2019-02-02 PROCEDURE — 81003 URINALYSIS AUTO W/O SCOPE: CPT

## 2019-02-02 PROCEDURE — 99285 EMERGENCY DEPT VISIT HI MDM: CPT

## 2019-02-02 PROCEDURE — 6370000000 HC RX 637 (ALT 250 FOR IP): Performed by: PHYSICIAN ASSISTANT

## 2019-02-02 RX ORDER — PROPRANOLOL HYDROCHLORIDE 10 MG/1
10 TABLET ORAL ONCE
Status: DISCONTINUED | OUTPATIENT
Start: 2019-02-02 | End: 2019-02-03 | Stop reason: HOSPADM

## 2019-02-02 RX ORDER — BUSPIRONE HYDROCHLORIDE 10 MG/1
10 TABLET ORAL 3 TIMES DAILY
COMMUNITY
End: 2019-02-07 | Stop reason: ALTCHOICE

## 2019-02-02 RX ORDER — AMITRIPTYLINE HYDROCHLORIDE 10 MG/1
10 TABLET, FILM COATED ORAL NIGHTLY
COMMUNITY
End: 2019-02-07 | Stop reason: ALTCHOICE

## 2019-02-02 RX ORDER — ARIPIPRAZOLE 10 MG/1
10 TABLET ORAL DAILY
Status: ON HOLD | COMMUNITY
End: 2019-04-25 | Stop reason: HOSPADM

## 2019-02-02 RX ORDER — BENZTROPINE MESYLATE 0.5 MG/1
0.5 TABLET ORAL 2 TIMES DAILY
Status: ON HOLD | COMMUNITY
End: 2019-04-25 | Stop reason: HOSPADM

## 2019-02-02 RX ORDER — LORAZEPAM 1 MG/1
1 TABLET ORAL ONCE
Status: COMPLETED | OUTPATIENT
Start: 2019-02-02 | End: 2019-02-02

## 2019-02-02 RX ADMIN — LORAZEPAM 1 MG: 1 TABLET ORAL at 20:31

## 2019-02-02 ASSESSMENT — ENCOUNTER SYMPTOMS
COLOR CHANGE: 0
VOMITING: 0
SHORTNESS OF BREATH: 0

## 2019-02-03 ENCOUNTER — HOSPITAL ENCOUNTER (EMERGENCY)
Age: 29
Discharge: HOME | End: 2019-02-03
Attending: EMERGENCY MEDICINE
Payer: COMMERCIAL

## 2019-02-03 VITALS
HEART RATE: 60 BPM | RESPIRATION RATE: 16 BRPM | DIASTOLIC BLOOD PRESSURE: 70 MMHG | OXYGEN SATURATION: 97 % | SYSTOLIC BLOOD PRESSURE: 121 MMHG | TEMPERATURE: 98.2 F

## 2019-02-03 VITALS
WEIGHT: 150 LBS | HEART RATE: 69 BPM | BODY MASS INDEX: 22.73 KG/M2 | RESPIRATION RATE: 17 BRPM | OXYGEN SATURATION: 98 % | TEMPERATURE: 97.6 F | HEIGHT: 68 IN

## 2019-02-03 DIAGNOSIS — F41.1 ANXIETY STATE: Primary | ICD-10-CM

## 2019-02-03 PROCEDURE — 99285 EMERGENCY DEPT VISIT HI MDM: CPT

## 2019-02-03 ASSESSMENT — PAIN DESCRIPTION - PROGRESSION: CLINICAL_PROGRESSION: NOT CHANGED

## 2019-02-03 ASSESSMENT — PAIN DESCRIPTION - FREQUENCY: FREQUENCY: CONTINUOUS

## 2019-02-03 ASSESSMENT — PAIN DESCRIPTION - DESCRIPTORS: DESCRIPTORS: ACHING

## 2019-02-03 ASSESSMENT — PAIN SCALES - GENERAL: PAINLEVEL_OUTOF10: 3

## 2019-02-03 ASSESSMENT — PAIN DESCRIPTION - LOCATION: LOCATION: HEAD

## 2019-02-03 ASSESSMENT — PAIN DESCRIPTION - PAIN TYPE: TYPE: SUPERFICIAL SOMATIC PAIN

## 2019-02-03 ASSESSMENT — PAIN DESCRIPTION - ONSET: ONSET: GRADUAL

## 2019-02-04 ENCOUNTER — HOSPITAL ENCOUNTER (EMERGENCY)
Age: 29
Discharge: HOME | End: 2019-02-04
Attending: EMERGENCY MEDICINE
Payer: COMMERCIAL

## 2019-02-04 VITALS
HEART RATE: 85 BPM | SYSTOLIC BLOOD PRESSURE: 130 MMHG | WEIGHT: 152.12 LBS | RESPIRATION RATE: 16 BRPM | BODY MASS INDEX: 23.13 KG/M2 | TEMPERATURE: 96.7 F | OXYGEN SATURATION: 100 % | DIASTOLIC BLOOD PRESSURE: 78 MMHG

## 2019-02-04 DIAGNOSIS — R11.2 NAUSEA AND VOMITING, INTRACTABILITY OF VOMITING NOT SPECIFIED, UNSPECIFIED VOMITING TYPE: Primary | ICD-10-CM

## 2019-02-04 LAB
A/G RATIO: 2 (ref 1.1–2.2)
ALBUMIN SERPL-MCNC: 5.1 G/DL (ref 3.4–5)
ALP BLD-CCNC: 59 U/L (ref 40–129)
ALT SERPL-CCNC: 10 U/L (ref 10–40)
ANION GAP SERPL CALCULATED.3IONS-SCNC: 13 MMOL/L (ref 3–16)
AST SERPL-CCNC: 20 U/L (ref 15–37)
BASOPHILS ABSOLUTE: 0 K/UL (ref 0–0.2)
BASOPHILS RELATIVE PERCENT: 0.7 %
BILIRUB SERPL-MCNC: 0.6 MG/DL (ref 0–1)
BILIRUBIN URINE: NEGATIVE
BLOOD, URINE: NEGATIVE
BUN BLDV-MCNC: 10 MG/DL (ref 7–20)
CALCIUM SERPL-MCNC: 9.8 MG/DL (ref 8.3–10.6)
CHLORIDE BLD-SCNC: 106 MMOL/L (ref 99–110)
CLARITY: ABNORMAL
CO2: 24 MMOL/L (ref 21–32)
COLOR: YELLOW
CREAT SERPL-MCNC: 0.7 MG/DL (ref 0.9–1.3)
EOSINOPHILS ABSOLUTE: 0.1 K/UL (ref 0–0.6)
EOSINOPHILS RELATIVE PERCENT: 2.2 %
EPITHELIAL CELLS, UA: 0 /HPF (ref 0–5)
GFR AFRICAN AMERICAN: >60
GFR NON-AFRICAN AMERICAN: >60
GLOBULIN: 2.5 G/DL
GLUCOSE BLD-MCNC: 84 MG/DL (ref 70–99)
GLUCOSE URINE: NEGATIVE MG/DL
HCT VFR BLD CALC: 40.5 % (ref 40.5–52.5)
HEMOGLOBIN: 14.4 G/DL (ref 13.5–17.5)
HYALINE CASTS: 1 /LPF (ref 0–8)
KETONES, URINE: NEGATIVE MG/DL
LEUKOCYTE ESTERASE, URINE: NEGATIVE
LIPASE: 136 U/L (ref 13–60)
LYMPHOCYTES ABSOLUTE: 1.4 K/UL (ref 1–5.1)
LYMPHOCYTES RELATIVE PERCENT: 27.1 %
MCH RBC QN AUTO: 31.8 PG (ref 26–34)
MCHC RBC AUTO-ENTMCNC: 35.6 G/DL (ref 31–36)
MCV RBC AUTO: 89.2 FL (ref 80–100)
MICROSCOPIC EXAMINATION: YES
MONOCYTES ABSOLUTE: 0.4 K/UL (ref 0–1.3)
MONOCYTES RELATIVE PERCENT: 7.3 %
NEUTROPHILS ABSOLUTE: 3.2 K/UL (ref 1.7–7.7)
NEUTROPHILS RELATIVE PERCENT: 62.7 %
NITRITE, URINE: NEGATIVE
PDW BLD-RTO: 12.3 % (ref 12.4–15.4)
PH UA: 6.5
PLATELET # BLD: 197 K/UL (ref 135–450)
PMV BLD AUTO: 8.6 FL (ref 5–10.5)
POTASSIUM REFLEX MAGNESIUM: 3.9 MMOL/L (ref 3.5–5.1)
PROTEIN UA: NEGATIVE MG/DL
RBC # BLD: 4.54 M/UL (ref 4.2–5.9)
RBC UA: 0 /HPF (ref 0–4)
SODIUM BLD-SCNC: 143 MMOL/L (ref 136–145)
SPECIFIC GRAVITY UA: 1.02
TOTAL PROTEIN: 7.6 G/DL (ref 6.4–8.2)
URINE REFLEX TO CULTURE: ABNORMAL
URINE TYPE: ABNORMAL
UROBILINOGEN, URINE: 0.2 E.U./DL
WBC # BLD: 5.2 K/UL (ref 4–11)
WBC UA: 1 /HPF (ref 0–5)

## 2019-02-04 PROCEDURE — 96374 THER/PROPH/DIAG INJ IV PUSH: CPT

## 2019-02-04 PROCEDURE — 81001 URINALYSIS AUTO W/SCOPE: CPT

## 2019-02-04 PROCEDURE — 85025 COMPLETE CBC W/AUTO DIFF WBC: CPT

## 2019-02-04 PROCEDURE — 80053 COMPREHEN METABOLIC PANEL: CPT

## 2019-02-04 PROCEDURE — 2500000003 HC RX 250 WO HCPCS: Performed by: EMERGENCY MEDICINE

## 2019-02-04 PROCEDURE — 96361 HYDRATE IV INFUSION ADD-ON: CPT

## 2019-02-04 PROCEDURE — 83690 ASSAY OF LIPASE: CPT

## 2019-02-04 PROCEDURE — 96375 TX/PRO/DX INJ NEW DRUG ADDON: CPT

## 2019-02-04 PROCEDURE — 99283 EMERGENCY DEPT VISIT LOW MDM: CPT

## 2019-02-04 PROCEDURE — 2580000003 HC RX 258: Performed by: EMERGENCY MEDICINE

## 2019-02-04 PROCEDURE — 6360000002 HC RX W HCPCS: Performed by: EMERGENCY MEDICINE

## 2019-02-04 RX ORDER — ONDANSETRON 2 MG/ML
4 INJECTION INTRAMUSCULAR; INTRAVENOUS ONCE
Status: COMPLETED | OUTPATIENT
Start: 2019-02-04 | End: 2019-02-04

## 2019-02-04 RX ORDER — 0.9 % SODIUM CHLORIDE 0.9 %
1000 INTRAVENOUS SOLUTION INTRAVENOUS ONCE
Status: COMPLETED | OUTPATIENT
Start: 2019-02-04 | End: 2019-02-04

## 2019-02-04 RX ORDER — LORAZEPAM 2 MG/ML
1 INJECTION INTRAMUSCULAR ONCE
Status: COMPLETED | OUTPATIENT
Start: 2019-02-04 | End: 2019-02-04

## 2019-02-04 RX ORDER — FAMOTIDINE 20 MG/1
20 TABLET, FILM COATED ORAL 2 TIMES DAILY
Qty: 60 TABLET | Refills: 0 | Status: SHIPPED | OUTPATIENT
Start: 2019-02-04 | End: 2019-04-03 | Stop reason: ALTCHOICE

## 2019-02-04 RX ORDER — ONDANSETRON 4 MG/1
4 TABLET, ORALLY DISINTEGRATING ORAL EVERY 8 HOURS PRN
Qty: 20 TABLET | Refills: 0 | Status: SHIPPED | OUTPATIENT
Start: 2019-02-04 | End: 2019-02-07 | Stop reason: ALTCHOICE

## 2019-02-04 RX ADMIN — SODIUM CHLORIDE 1000 ML: 9 INJECTION, SOLUTION INTRAVENOUS at 20:10

## 2019-02-04 RX ADMIN — ONDANSETRON 4 MG: 2 INJECTION INTRAMUSCULAR; INTRAVENOUS at 20:10

## 2019-02-04 RX ADMIN — FAMOTIDINE 20 MG: 10 INJECTION, SOLUTION INTRAVENOUS at 20:10

## 2019-02-04 RX ADMIN — LORAZEPAM 1 MG: 2 INJECTION INTRAMUSCULAR; INTRAVENOUS at 21:39

## 2019-02-04 ASSESSMENT — PAIN SCALES - GENERAL
PAINLEVEL_OUTOF10: 5
PAINLEVEL_OUTOF10: 3

## 2019-02-04 ASSESSMENT — PAIN DESCRIPTION - LOCATION: LOCATION: ABDOMEN

## 2019-02-06 ENCOUNTER — HOSPITAL ENCOUNTER (EMERGENCY)
Age: 29
Discharge: HOME | DRG: 282 | End: 2019-02-06
Attending: EMERGENCY MEDICINE
Payer: COMMERCIAL

## 2019-02-06 VITALS
SYSTOLIC BLOOD PRESSURE: 134 MMHG | HEIGHT: 68 IN | OXYGEN SATURATION: 100 % | HEART RATE: 98 BPM | BODY MASS INDEX: 22.99 KG/M2 | RESPIRATION RATE: 19 BRPM | DIASTOLIC BLOOD PRESSURE: 80 MMHG | TEMPERATURE: 98 F | WEIGHT: 151.68 LBS

## 2019-02-06 DIAGNOSIS — R10.84 GENERALIZED ABDOMINAL PAIN: Primary | ICD-10-CM

## 2019-02-06 DIAGNOSIS — R11.2 NON-INTRACTABLE VOMITING WITH NAUSEA, UNSPECIFIED VOMITING TYPE: ICD-10-CM

## 2019-02-06 LAB
A/G RATIO: 1.8 (ref 1.1–2.2)
ALBUMIN SERPL-MCNC: 5.1 G/DL (ref 3.4–5)
ALP BLD-CCNC: 61 U/L (ref 40–129)
ALT SERPL-CCNC: 11 U/L (ref 10–40)
ANION GAP SERPL CALCULATED.3IONS-SCNC: 15 MMOL/L (ref 3–16)
AST SERPL-CCNC: 16 U/L (ref 15–37)
BASOPHILS ABSOLUTE: 0 K/UL (ref 0–0.2)
BASOPHILS RELATIVE PERCENT: 0.5 %
BILIRUB SERPL-MCNC: 0.8 MG/DL (ref 0–1)
BUN BLDV-MCNC: 9 MG/DL (ref 7–20)
CALCIUM SERPL-MCNC: 10.1 MG/DL (ref 8.3–10.6)
CHLORIDE BLD-SCNC: 107 MMOL/L (ref 99–110)
CO2: 21 MMOL/L (ref 21–32)
CREAT SERPL-MCNC: 0.8 MG/DL (ref 0.9–1.3)
EOSINOPHILS ABSOLUTE: 0.1 K/UL (ref 0–0.6)
EOSINOPHILS RELATIVE PERCENT: 2.5 %
GFR AFRICAN AMERICAN: >60
GFR NON-AFRICAN AMERICAN: >60
GLOBULIN: 2.9 G/DL
GLUCOSE BLD-MCNC: 77 MG/DL (ref 70–99)
HCT VFR BLD CALC: 45.7 % (ref 40.5–52.5)
HEMOGLOBIN: 15.8 G/DL (ref 13.5–17.5)
LYMPHOCYTES ABSOLUTE: 1.3 K/UL (ref 1–5.1)
LYMPHOCYTES RELATIVE PERCENT: 24.1 %
MCH RBC QN AUTO: 31.6 PG (ref 26–34)
MCHC RBC AUTO-ENTMCNC: 34.7 G/DL (ref 31–36)
MCV RBC AUTO: 91.1 FL (ref 80–100)
MONOCYTES ABSOLUTE: 0.4 K/UL (ref 0–1.3)
MONOCYTES RELATIVE PERCENT: 7.5 %
NEUTROPHILS ABSOLUTE: 3.6 K/UL (ref 1.7–7.7)
NEUTROPHILS RELATIVE PERCENT: 65.4 %
PDW BLD-RTO: 12.4 % (ref 12.4–15.4)
PLATELET # BLD: 232 K/UL (ref 135–450)
PMV BLD AUTO: 8.9 FL (ref 5–10.5)
POTASSIUM SERPL-SCNC: 4.1 MMOL/L (ref 3.5–5.1)
RBC # BLD: 5.01 M/UL (ref 4.2–5.9)
SODIUM BLD-SCNC: 143 MMOL/L (ref 136–145)
TOTAL CK: 123 U/L (ref 39–308)
TOTAL PROTEIN: 8 G/DL (ref 6.4–8.2)
WBC # BLD: 5.5 K/UL (ref 4–11)

## 2019-02-06 PROCEDURE — 80053 COMPREHEN METABOLIC PANEL: CPT

## 2019-02-06 PROCEDURE — 6370000000 HC RX 637 (ALT 250 FOR IP): Performed by: EMERGENCY MEDICINE

## 2019-02-06 PROCEDURE — 82550 ASSAY OF CK (CPK): CPT

## 2019-02-06 PROCEDURE — 99284 EMERGENCY DEPT VISIT MOD MDM: CPT

## 2019-02-06 PROCEDURE — 85025 COMPLETE CBC W/AUTO DIFF WBC: CPT

## 2019-02-06 RX ORDER — 0.9 % SODIUM CHLORIDE 0.9 %
1000 INTRAVENOUS SOLUTION INTRAVENOUS ONCE
Status: DISCONTINUED | OUTPATIENT
Start: 2019-02-06 | End: 2019-02-06

## 2019-02-06 RX ORDER — ONDANSETRON 4 MG/1
4 TABLET, ORALLY DISINTEGRATING ORAL ONCE
Status: COMPLETED | OUTPATIENT
Start: 2019-02-06 | End: 2019-02-06

## 2019-02-06 RX ORDER — ONDANSETRON 2 MG/ML
4 INJECTION INTRAMUSCULAR; INTRAVENOUS ONCE
Status: DISCONTINUED | OUTPATIENT
Start: 2019-02-06 | End: 2019-02-06

## 2019-02-06 RX ORDER — LORAZEPAM 1 MG/1
1 TABLET ORAL ONCE
Status: COMPLETED | OUTPATIENT
Start: 2019-02-06 | End: 2019-02-06

## 2019-02-06 RX ADMIN — ONDANSETRON 4 MG: 4 TABLET, ORALLY DISINTEGRATING ORAL at 16:55

## 2019-02-06 RX ADMIN — LORAZEPAM 1 MG: 1 TABLET ORAL at 16:55

## 2019-02-06 ASSESSMENT — ENCOUNTER SYMPTOMS
EYE DISCHARGE: 0
CHOKING: 0
COLOR CHANGE: 0
DIARRHEA: 0
APNEA: 0
VOMITING: 1
EYE REDNESS: 0
SHORTNESS OF BREATH: 0
NAUSEA: 1
STRIDOR: 0
CONSTIPATION: 0
RECTAL PAIN: 0
WHEEZING: 0
ABDOMINAL DISTENTION: 0
PHOTOPHOBIA: 0
CHEST TIGHTNESS: 0
ABDOMINAL PAIN: 0
BACK PAIN: 0
ANAL BLEEDING: 0
BLOOD IN STOOL: 0
EYE ITCHING: 0
COUGH: 0
EYE PAIN: 0

## 2019-02-06 ASSESSMENT — PAIN DESCRIPTION - LOCATION: LOCATION: ABDOMEN;GENERALIZED

## 2019-02-06 ASSESSMENT — PAIN SCALES - GENERAL
PAINLEVEL_OUTOF10: 5
PAINLEVEL_OUTOF10: 4

## 2019-02-06 ASSESSMENT — PAIN DESCRIPTION - PAIN TYPE: TYPE: ACUTE PAIN

## 2019-02-06 ASSESSMENT — PAIN DESCRIPTION - DESCRIPTORS: DESCRIPTORS: CRAMPING;TINGLING

## 2019-02-07 ENCOUNTER — HOSPITAL ENCOUNTER (INPATIENT)
Age: 29
LOS: 1 days | Discharge: OTHER INSTITUTION | DRG: 282 | End: 2019-02-09
Attending: INTERNAL MEDICINE | Admitting: INTERNAL MEDICINE
Payer: COMMERCIAL

## 2019-02-07 ENCOUNTER — APPOINTMENT (OUTPATIENT)
Dept: CT IMAGING | Age: 29
DRG: 282 | End: 2019-02-07
Payer: COMMERCIAL

## 2019-02-07 DIAGNOSIS — K85.90 ACUTE ON CHRONIC PANCREATITIS (HCC): Primary | ICD-10-CM

## 2019-02-07 DIAGNOSIS — K86.1 ACUTE ON CHRONIC PANCREATITIS (HCC): Primary | ICD-10-CM

## 2019-02-07 LAB
A/G RATIO: 2 (ref 1.1–2.2)
ALBUMIN SERPL-MCNC: 5.1 G/DL (ref 3.4–5)
ALP BLD-CCNC: 63 U/L (ref 40–129)
ALT SERPL-CCNC: 10 U/L (ref 10–40)
ANION GAP SERPL CALCULATED.3IONS-SCNC: 14 MMOL/L (ref 3–16)
AST SERPL-CCNC: 15 U/L (ref 15–37)
BASOPHILS ABSOLUTE: 0 K/UL (ref 0–0.2)
BASOPHILS RELATIVE PERCENT: 0.8 %
BILIRUB SERPL-MCNC: 0.6 MG/DL (ref 0–1)
BILIRUBIN URINE: NEGATIVE
BLOOD, URINE: NEGATIVE
BUN BLDV-MCNC: 13 MG/DL (ref 7–20)
CALCIUM SERPL-MCNC: 9.7 MG/DL (ref 8.3–10.6)
CHLORIDE BLD-SCNC: 105 MMOL/L (ref 99–110)
CLARITY: CLEAR
CO2: 21 MMOL/L (ref 21–32)
COLOR: YELLOW
CREAT SERPL-MCNC: 0.9 MG/DL (ref 0.9–1.3)
EOSINOPHILS ABSOLUTE: 0.1 K/UL (ref 0–0.6)
EOSINOPHILS RELATIVE PERCENT: 1.9 %
GFR AFRICAN AMERICAN: >60
GFR NON-AFRICAN AMERICAN: >60
GLOBULIN: 2.5 G/DL
GLUCOSE BLD-MCNC: 109 MG/DL (ref 70–99)
GLUCOSE URINE: NEGATIVE MG/DL
HCT VFR BLD CALC: 43.4 % (ref 40.5–52.5)
HEMOGLOBIN: 15 G/DL (ref 13.5–17.5)
KETONES, URINE: NEGATIVE MG/DL
LEUKOCYTE ESTERASE, URINE: NEGATIVE
LIPASE: 175 U/L (ref 13–60)
LYMPHOCYTES ABSOLUTE: 1.5 K/UL (ref 1–5.1)
LYMPHOCYTES RELATIVE PERCENT: 26.1 %
MCH RBC QN AUTO: 31.4 PG (ref 26–34)
MCHC RBC AUTO-ENTMCNC: 34.6 G/DL (ref 31–36)
MCV RBC AUTO: 90.8 FL (ref 80–100)
MICROSCOPIC EXAMINATION: NORMAL
MONOCYTES ABSOLUTE: 0.5 K/UL (ref 0–1.3)
MONOCYTES RELATIVE PERCENT: 9.3 %
NEUTROPHILS ABSOLUTE: 3.6 K/UL (ref 1.7–7.7)
NEUTROPHILS RELATIVE PERCENT: 61.9 %
NITRITE, URINE: NEGATIVE
PDW BLD-RTO: 12.5 % (ref 12.4–15.4)
PH UA: 7
PLATELET # BLD: 217 K/UL (ref 135–450)
PMV BLD AUTO: 8.5 FL (ref 5–10.5)
POTASSIUM REFLEX MAGNESIUM: 3.8 MMOL/L (ref 3.5–5.1)
PROTEIN UA: NEGATIVE MG/DL
RBC # BLD: 4.78 M/UL (ref 4.2–5.9)
SODIUM BLD-SCNC: 140 MMOL/L (ref 136–145)
SPECIFIC GRAVITY UA: 1.02
TOTAL PROTEIN: 7.6 G/DL (ref 6.4–8.2)
URINE REFLEX TO CULTURE: NORMAL
URINE TYPE: NORMAL
UROBILINOGEN, URINE: 0.2 E.U./DL
WBC # BLD: 5.8 K/UL (ref 4–11)

## 2019-02-07 PROCEDURE — 96361 HYDRATE IV INFUSION ADD-ON: CPT

## 2019-02-07 PROCEDURE — 83690 ASSAY OF LIPASE: CPT

## 2019-02-07 PROCEDURE — G0378 HOSPITAL OBSERVATION PER HR: HCPCS

## 2019-02-07 PROCEDURE — 2580000003 HC RX 258: Performed by: PHYSICIAN ASSISTANT

## 2019-02-07 PROCEDURE — 74177 CT ABD & PELVIS W/CONTRAST: CPT

## 2019-02-07 PROCEDURE — 36415 COLL VENOUS BLD VENIPUNCTURE: CPT

## 2019-02-07 PROCEDURE — 80053 COMPREHEN METABOLIC PANEL: CPT

## 2019-02-07 PROCEDURE — 85025 COMPLETE CBC W/AUTO DIFF WBC: CPT

## 2019-02-07 PROCEDURE — 6360000004 HC RX CONTRAST MEDICATION: Performed by: PHYSICIAN ASSISTANT

## 2019-02-07 PROCEDURE — 6360000002 HC RX W HCPCS: Performed by: INTERNAL MEDICINE

## 2019-02-07 PROCEDURE — 6370000000 HC RX 637 (ALT 250 FOR IP): Performed by: PHYSICIAN ASSISTANT

## 2019-02-07 PROCEDURE — 96375 TX/PRO/DX INJ NEW DRUG ADDON: CPT

## 2019-02-07 PROCEDURE — 96374 THER/PROPH/DIAG INJ IV PUSH: CPT

## 2019-02-07 PROCEDURE — 6360000002 HC RX W HCPCS: Performed by: PHYSICIAN ASSISTANT

## 2019-02-07 PROCEDURE — 2500000003 HC RX 250 WO HCPCS: Performed by: PHYSICIAN ASSISTANT

## 2019-02-07 PROCEDURE — 2580000003 HC RX 258: Performed by: INTERNAL MEDICINE

## 2019-02-07 PROCEDURE — 81003 URINALYSIS AUTO W/O SCOPE: CPT

## 2019-02-07 PROCEDURE — 99284 EMERGENCY DEPT VISIT MOD MDM: CPT

## 2019-02-07 RX ORDER — SODIUM CHLORIDE 9 MG/ML
INJECTION, SOLUTION INTRAVENOUS CONTINUOUS
Status: DISCONTINUED | OUTPATIENT
Start: 2019-02-07 | End: 2019-02-08

## 2019-02-07 RX ORDER — DICYCLOMINE HYDROCHLORIDE 10 MG/1
20 CAPSULE ORAL ONCE
Status: COMPLETED | OUTPATIENT
Start: 2019-02-07 | End: 2019-02-07

## 2019-02-07 RX ORDER — ARIPIPRAZOLE 10 MG/1
10 TABLET ORAL DAILY
Status: DISCONTINUED | OUTPATIENT
Start: 2019-02-08 | End: 2019-02-09 | Stop reason: HOSPADM

## 2019-02-07 RX ORDER — ONDANSETRON 2 MG/ML
4 INJECTION INTRAMUSCULAR; INTRAVENOUS EVERY 6 HOURS PRN
Status: DISCONTINUED | OUTPATIENT
Start: 2019-02-07 | End: 2019-02-09 | Stop reason: HOSPADM

## 2019-02-07 RX ORDER — LORAZEPAM 1 MG/1
1 TABLET ORAL ONCE
Status: COMPLETED | OUTPATIENT
Start: 2019-02-07 | End: 2019-02-07

## 2019-02-07 RX ORDER — ONDANSETRON 2 MG/ML
4 INJECTION INTRAMUSCULAR; INTRAVENOUS EVERY 30 MIN PRN
Status: DISCONTINUED | OUTPATIENT
Start: 2019-02-07 | End: 2019-02-07

## 2019-02-07 RX ORDER — SODIUM CHLORIDE 0.9 % (FLUSH) 0.9 %
10 SYRINGE (ML) INJECTION EVERY 12 HOURS SCHEDULED
Status: DISCONTINUED | OUTPATIENT
Start: 2019-02-07 | End: 2019-02-09 | Stop reason: HOSPADM

## 2019-02-07 RX ORDER — SODIUM CHLORIDE 0.9 % (FLUSH) 0.9 %
10 SYRINGE (ML) INJECTION PRN
Status: DISCONTINUED | OUTPATIENT
Start: 2019-02-07 | End: 2019-02-09 | Stop reason: HOSPADM

## 2019-02-07 RX ORDER — KETOROLAC TROMETHAMINE 30 MG/ML
15 INJECTION, SOLUTION INTRAMUSCULAR; INTRAVENOUS ONCE
Status: COMPLETED | OUTPATIENT
Start: 2019-02-07 | End: 2019-02-07

## 2019-02-07 RX ORDER — FAMOTIDINE 20 MG/1
20 TABLET, FILM COATED ORAL 2 TIMES DAILY
Status: DISCONTINUED | OUTPATIENT
Start: 2019-02-07 | End: 2019-02-09 | Stop reason: HOSPADM

## 2019-02-07 RX ORDER — SODIUM CHLORIDE, SODIUM LACTATE, POTASSIUM CHLORIDE, CALCIUM CHLORIDE 600; 310; 30; 20 MG/100ML; MG/100ML; MG/100ML; MG/100ML
1000 INJECTION, SOLUTION INTRAVENOUS ONCE
Status: COMPLETED | OUTPATIENT
Start: 2019-02-07 | End: 2019-02-07

## 2019-02-07 RX ORDER — BENZTROPINE MESYLATE 0.5 MG/1
0.5 TABLET ORAL 2 TIMES DAILY
Status: DISCONTINUED | OUTPATIENT
Start: 2019-02-08 | End: 2019-02-09 | Stop reason: HOSPADM

## 2019-02-07 RX ORDER — NICOTINE 21 MG/24HR
1 PATCH, TRANSDERMAL 24 HOURS TRANSDERMAL DAILY
Status: DISCONTINUED | OUTPATIENT
Start: 2019-02-08 | End: 2019-02-09 | Stop reason: HOSPADM

## 2019-02-07 RX ADMIN — HYDROMORPHONE HYDROCHLORIDE 1 MG: 1 INJECTION, SOLUTION INTRAMUSCULAR; INTRAVENOUS; SUBCUTANEOUS at 23:49

## 2019-02-07 RX ADMIN — IOPAMIDOL 75 ML: 755 INJECTION, SOLUTION INTRAVENOUS at 21:15

## 2019-02-07 RX ADMIN — SODIUM CHLORIDE: 9 INJECTION, SOLUTION INTRAVENOUS at 23:49

## 2019-02-07 RX ADMIN — SODIUM CHLORIDE, SODIUM LACTATE, POTASSIUM CHLORIDE, AND CALCIUM CHLORIDE 1000 ML: .6; .31; .03; .02 INJECTION, SOLUTION INTRAVENOUS at 20:35

## 2019-02-07 RX ADMIN — DICYCLOMINE HYDROCHLORIDE 20 MG: 10 CAPSULE ORAL at 20:31

## 2019-02-07 RX ADMIN — ONDANSETRON 4 MG: 2 INJECTION INTRAMUSCULAR; INTRAVENOUS at 20:30

## 2019-02-07 RX ADMIN — FAMOTIDINE 20 MG: 10 INJECTION, SOLUTION INTRAVENOUS at 20:31

## 2019-02-07 RX ADMIN — KETOROLAC TROMETHAMINE 15 MG: 30 INJECTION INTRAMUSCULAR; INTRAVENOUS at 20:31

## 2019-02-07 RX ADMIN — LORAZEPAM 1 MG: 1 TABLET ORAL at 20:31

## 2019-02-07 RX ADMIN — Medication 10 ML: at 23:50

## 2019-02-07 ASSESSMENT — PAIN DESCRIPTION - PAIN TYPE
TYPE: ACUTE PAIN

## 2019-02-07 ASSESSMENT — PAIN DESCRIPTION - LOCATION
LOCATION: ABDOMEN

## 2019-02-07 ASSESSMENT — PAIN SCALES - GENERAL
PAINLEVEL_OUTOF10: 4
PAINLEVEL_OUTOF10: 5
PAINLEVEL_OUTOF10: 5
PAINLEVEL_OUTOF10: 9
PAINLEVEL_OUTOF10: 9

## 2019-02-07 ASSESSMENT — PAIN DESCRIPTION - DESCRIPTORS: DESCRIPTORS: CONSTANT;SHARP

## 2019-02-07 ASSESSMENT — PAIN DESCRIPTION - ONSET: ONSET: ON-GOING

## 2019-02-07 ASSESSMENT — PAIN DESCRIPTION - FREQUENCY: FREQUENCY: CONTINUOUS

## 2019-02-07 ASSESSMENT — PAIN DESCRIPTION - DIRECTION: RADIATING_TOWARDS: ABD TO BACK

## 2019-02-08 LAB
A/G RATIO: 2.1 (ref 1.1–2.2)
ALBUMIN SERPL-MCNC: 4.4 G/DL (ref 3.4–5)
ALP BLD-CCNC: 50 U/L (ref 40–129)
ALT SERPL-CCNC: 8 U/L (ref 10–40)
AMPHETAMINE SCREEN, URINE: NORMAL
ANION GAP SERPL CALCULATED.3IONS-SCNC: 12 MMOL/L (ref 3–16)
AST SERPL-CCNC: 13 U/L (ref 15–37)
BARBITURATE SCREEN URINE: NORMAL
BENZODIAZEPINE SCREEN, URINE: NORMAL
BILIRUB SERPL-MCNC: 0.9 MG/DL (ref 0–1)
BUN BLDV-MCNC: 11 MG/DL (ref 7–20)
CALCIUM SERPL-MCNC: 9 MG/DL (ref 8.3–10.6)
CANNABINOID SCREEN URINE: NORMAL
CHLORIDE BLD-SCNC: 109 MMOL/L (ref 99–110)
CO2: 23 MMOL/L (ref 21–32)
COCAINE METABOLITE SCREEN URINE: NORMAL
CREAT SERPL-MCNC: 0.8 MG/DL (ref 0.9–1.3)
GFR AFRICAN AMERICAN: >60
GFR NON-AFRICAN AMERICAN: >60
GLOBULIN: 2.1 G/DL
GLUCOSE BLD-MCNC: 93 MG/DL (ref 70–99)
HCT VFR BLD CALC: 37.3 % (ref 40.5–52.5)
HEMOGLOBIN: 13.3 G/DL (ref 13.5–17.5)
LIPASE: 141 U/L (ref 13–60)
LIPASE: 56 U/L (ref 13–60)
Lab: NORMAL
MCH RBC QN AUTO: 31.9 PG (ref 26–34)
MCHC RBC AUTO-ENTMCNC: 35.6 G/DL (ref 31–36)
MCV RBC AUTO: 89.8 FL (ref 80–100)
METHADONE SCREEN, URINE: NORMAL
OPIATE SCREEN URINE: NORMAL
OXYCODONE URINE: NORMAL
PDW BLD-RTO: 12.4 % (ref 12.4–15.4)
PH UA: 6.5
PHENCYCLIDINE SCREEN URINE: NORMAL
PLATELET # BLD: 182 K/UL (ref 135–450)
PMV BLD AUTO: 8.7 FL (ref 5–10.5)
POTASSIUM REFLEX MAGNESIUM: 3.7 MMOL/L (ref 3.5–5.1)
PROPOXYPHENE SCREEN: NORMAL
RBC # BLD: 4.16 M/UL (ref 4.2–5.9)
SODIUM BLD-SCNC: 144 MMOL/L (ref 136–145)
TOTAL PROTEIN: 6.5 G/DL (ref 6.4–8.2)
TRIGL SERPL-MCNC: 110 MG/DL (ref 0–150)
WBC # BLD: 6.3 K/UL (ref 4–11)

## 2019-02-08 PROCEDURE — 84478 ASSAY OF TRIGLYCERIDES: CPT

## 2019-02-08 PROCEDURE — 6360000002 HC RX W HCPCS: Performed by: HOSPITALIST

## 2019-02-08 PROCEDURE — 36415 COLL VENOUS BLD VENIPUNCTURE: CPT

## 2019-02-08 PROCEDURE — 6370000000 HC RX 637 (ALT 250 FOR IP): Performed by: NURSE PRACTITIONER

## 2019-02-08 PROCEDURE — 2580000003 HC RX 258: Performed by: HOSPITALIST

## 2019-02-08 PROCEDURE — 80053 COMPREHEN METABOLIC PANEL: CPT

## 2019-02-08 PROCEDURE — 1200000000 HC SEMI PRIVATE

## 2019-02-08 PROCEDURE — 6370000000 HC RX 637 (ALT 250 FOR IP): Performed by: INTERNAL MEDICINE

## 2019-02-08 PROCEDURE — 6360000002 HC RX W HCPCS: Performed by: INTERNAL MEDICINE

## 2019-02-08 PROCEDURE — 85027 COMPLETE CBC AUTOMATED: CPT

## 2019-02-08 PROCEDURE — 96372 THER/PROPH/DIAG INJ SC/IM: CPT

## 2019-02-08 PROCEDURE — 96376 TX/PRO/DX INJ SAME DRUG ADON: CPT

## 2019-02-08 PROCEDURE — 6370000000 HC RX 637 (ALT 250 FOR IP): Performed by: HOSPITALIST

## 2019-02-08 PROCEDURE — G0378 HOSPITAL OBSERVATION PER HR: HCPCS

## 2019-02-08 PROCEDURE — 80307 DRUG TEST PRSMV CHEM ANLYZR: CPT

## 2019-02-08 PROCEDURE — 99221 1ST HOSP IP/OBS SF/LOW 40: CPT | Performed by: PSYCHIATRY & NEUROLOGY

## 2019-02-08 PROCEDURE — 83690 ASSAY OF LIPASE: CPT

## 2019-02-08 PROCEDURE — 94760 N-INVAS EAR/PLS OXIMETRY 1: CPT

## 2019-02-08 RX ORDER — HYDROXYZINE PAMOATE 25 MG/1
50 CAPSULE ORAL 3 TIMES DAILY PRN
Status: DISCONTINUED | OUTPATIENT
Start: 2019-02-08 | End: 2019-02-08

## 2019-02-08 RX ORDER — TRAMADOL HYDROCHLORIDE 50 MG/1
50 TABLET ORAL EVERY 4 HOURS PRN
Status: DISCONTINUED | OUTPATIENT
Start: 2019-02-08 | End: 2019-02-09 | Stop reason: HOSPADM

## 2019-02-08 RX ORDER — SODIUM CHLORIDE, SODIUM LACTATE, POTASSIUM CHLORIDE, CALCIUM CHLORIDE 600; 310; 30; 20 MG/100ML; MG/100ML; MG/100ML; MG/100ML
INJECTION, SOLUTION INTRAVENOUS CONTINUOUS
Status: DISCONTINUED | OUTPATIENT
Start: 2019-02-08 | End: 2019-02-09 | Stop reason: HOSPADM

## 2019-02-08 RX ORDER — LORAZEPAM 1 MG/1
1 TABLET ORAL ONCE
Status: COMPLETED | OUTPATIENT
Start: 2019-02-08 | End: 2019-02-08

## 2019-02-08 RX ORDER — ALPRAZOLAM 0.5 MG/1
0.5 TABLET ORAL ONCE
Status: COMPLETED | OUTPATIENT
Start: 2019-02-08 | End: 2019-02-08

## 2019-02-08 RX ORDER — KETOROLAC TROMETHAMINE 15 MG/ML
15 INJECTION, SOLUTION INTRAMUSCULAR; INTRAVENOUS EVERY 6 HOURS PRN
Status: DISCONTINUED | OUTPATIENT
Start: 2019-02-08 | End: 2019-02-09 | Stop reason: HOSPADM

## 2019-02-08 RX ADMIN — ENOXAPARIN SODIUM 40 MG: 40 INJECTION SUBCUTANEOUS at 08:18

## 2019-02-08 RX ADMIN — HYDROXYZINE PAMOATE 50 MG: 25 CAPSULE ORAL at 03:32

## 2019-02-08 RX ADMIN — HYDROXYZINE PAMOATE 50 MG: 25 CAPSULE ORAL at 13:19

## 2019-02-08 RX ADMIN — HYDROMORPHONE HYDROCHLORIDE 0.5 MG: 1 INJECTION, SOLUTION INTRAMUSCULAR; INTRAVENOUS; SUBCUTANEOUS at 13:19

## 2019-02-08 RX ADMIN — BENZTROPINE MESYLATE 0.5 MG: 0.5 TABLET ORAL at 08:17

## 2019-02-08 RX ADMIN — ARIPIPRAZOLE 10 MG: 10 TABLET ORAL at 08:17

## 2019-02-08 RX ADMIN — FAMOTIDINE 20 MG: 20 TABLET, FILM COATED ORAL at 08:17

## 2019-02-08 RX ADMIN — LORAZEPAM 1 MG: 1 TABLET ORAL at 17:53

## 2019-02-08 RX ADMIN — SODIUM CHLORIDE, POTASSIUM CHLORIDE, SODIUM LACTATE AND CALCIUM CHLORIDE: 600; 310; 30; 20 INJECTION, SOLUTION INTRAVENOUS at 16:08

## 2019-02-08 RX ADMIN — ALPRAZOLAM 0.5 MG: 0.5 TABLET ORAL at 09:23

## 2019-02-08 RX ADMIN — HYDROMORPHONE HYDROCHLORIDE 0.5 MG: 1 INJECTION, SOLUTION INTRAMUSCULAR; INTRAVENOUS; SUBCUTANEOUS at 08:18

## 2019-02-08 RX ADMIN — HYDROMORPHONE HYDROCHLORIDE 1 MG: 1 INJECTION, SOLUTION INTRAMUSCULAR; INTRAVENOUS; SUBCUTANEOUS at 03:31

## 2019-02-08 ASSESSMENT — PAIN SCALES - GENERAL
PAINLEVEL_OUTOF10: 8
PAINLEVEL_OUTOF10: 4
PAINLEVEL_OUTOF10: 8
PAINLEVEL_OUTOF10: 6
PAINLEVEL_OUTOF10: 8
PAINLEVEL_OUTOF10: 5
PAINLEVEL_OUTOF10: 6

## 2019-02-09 ENCOUNTER — HOSPITAL ENCOUNTER (EMERGENCY)
Age: 29
Discharge: HOME | End: 2019-02-10
Attending: EMERGENCY MEDICINE
Payer: COMMERCIAL

## 2019-02-09 VITALS
OXYGEN SATURATION: 97 % | HEIGHT: 68 IN | TEMPERATURE: 97.9 F | BODY MASS INDEX: 23.36 KG/M2 | WEIGHT: 154.1 LBS | DIASTOLIC BLOOD PRESSURE: 70 MMHG | HEART RATE: 77 BPM | SYSTOLIC BLOOD PRESSURE: 120 MMHG | RESPIRATION RATE: 18 BRPM

## 2019-02-09 DIAGNOSIS — R25.1 TREMORS OF NERVOUS SYSTEM: ICD-10-CM

## 2019-02-09 DIAGNOSIS — R51.9 ACUTE NONINTRACTABLE HEADACHE, UNSPECIFIED HEADACHE TYPE: ICD-10-CM

## 2019-02-09 DIAGNOSIS — K86.1 CHRONIC PANCREATITIS, UNSPECIFIED PANCREATITIS TYPE (HCC): ICD-10-CM

## 2019-02-09 DIAGNOSIS — F41.1 ANXIETY STATE: ICD-10-CM

## 2019-02-09 DIAGNOSIS — R10.13 ABDOMINAL PAIN, EPIGASTRIC: Primary | ICD-10-CM

## 2019-02-09 DIAGNOSIS — R11.2 NON-INTRACTABLE VOMITING WITH NAUSEA, UNSPECIFIED VOMITING TYPE: ICD-10-CM

## 2019-02-09 PROBLEM — K85.90 ACUTE ON CHRONIC PANCREATITIS (HCC): Status: RESOLVED | Noted: 2019-02-07 | Resolved: 2019-02-09

## 2019-02-09 LAB
A/G RATIO: 1.8 (ref 1.1–2.2)
A/G RATIO: 2 (ref 1.1–2.2)
ALBUMIN SERPL-MCNC: 4.4 G/DL (ref 3.4–5)
ALBUMIN SERPL-MCNC: 5 G/DL (ref 3.4–5)
ALP BLD-CCNC: 55 U/L (ref 40–129)
ALP BLD-CCNC: 65 U/L (ref 40–129)
ALT SERPL-CCNC: 8 U/L (ref 10–40)
ALT SERPL-CCNC: 9 U/L (ref 10–40)
AMPHETAMINE SCREEN, URINE: NORMAL
ANION GAP SERPL CALCULATED.3IONS-SCNC: 12 MMOL/L (ref 3–16)
ANION GAP SERPL CALCULATED.3IONS-SCNC: 14 MMOL/L (ref 3–16)
AST SERPL-CCNC: 14 U/L (ref 15–37)
AST SERPL-CCNC: 14 U/L (ref 15–37)
BARBITURATE SCREEN URINE: NORMAL
BASOPHILS ABSOLUTE: 0 K/UL (ref 0–0.2)
BASOPHILS ABSOLUTE: 0 K/UL (ref 0–0.2)
BASOPHILS RELATIVE PERCENT: 0.3 %
BASOPHILS RELATIVE PERCENT: 0.4 %
BENZODIAZEPINE SCREEN, URINE: NORMAL
BILIRUB SERPL-MCNC: 0.6 MG/DL (ref 0–1)
BILIRUB SERPL-MCNC: 1 MG/DL (ref 0–1)
BILIRUBIN URINE: NEGATIVE
BLOOD, URINE: NEGATIVE
BUN BLDV-MCNC: 10 MG/DL (ref 7–20)
BUN BLDV-MCNC: 8 MG/DL (ref 7–20)
CALCIUM SERPL-MCNC: 9.4 MG/DL (ref 8.3–10.6)
CALCIUM SERPL-MCNC: 9.9 MG/DL (ref 8.3–10.6)
CANNABINOID SCREEN URINE: NORMAL
CHLORIDE BLD-SCNC: 104 MMOL/L (ref 99–110)
CHLORIDE BLD-SCNC: 107 MMOL/L (ref 99–110)
CLARITY: ABNORMAL
CO2: 21 MMOL/L (ref 21–32)
CO2: 23 MMOL/L (ref 21–32)
COCAINE METABOLITE SCREEN URINE: NORMAL
COLOR: YELLOW
CREAT SERPL-MCNC: 0.6 MG/DL (ref 0.9–1.3)
CREAT SERPL-MCNC: 0.7 MG/DL (ref 0.9–1.3)
EOSINOPHILS ABSOLUTE: 0.1 K/UL (ref 0–0.6)
EOSINOPHILS ABSOLUTE: 0.2 K/UL (ref 0–0.6)
EOSINOPHILS RELATIVE PERCENT: 2.1 %
EOSINOPHILS RELATIVE PERCENT: 4.7 %
EPITHELIAL CELLS, UA: 1 /HPF (ref 0–5)
ETHANOL: NORMAL MG/DL (ref 0–0.08)
GFR AFRICAN AMERICAN: >60
GFR AFRICAN AMERICAN: >60
GFR NON-AFRICAN AMERICAN: >60
GFR NON-AFRICAN AMERICAN: >60
GLOBULIN: 2.2 G/DL
GLOBULIN: 2.8 G/DL
GLUCOSE BLD-MCNC: 110 MG/DL (ref 70–99)
GLUCOSE BLD-MCNC: 94 MG/DL (ref 70–99)
GLUCOSE URINE: NEGATIVE MG/DL
HCT VFR BLD CALC: 41.2 % (ref 40.5–52.5)
HCT VFR BLD CALC: 43.5 % (ref 40.5–52.5)
HEMOGLOBIN: 14.3 G/DL (ref 13.5–17.5)
HEMOGLOBIN: 15.1 G/DL (ref 13.5–17.5)
HYALINE CASTS: 2 /LPF (ref 0–8)
KETONES, URINE: NEGATIVE MG/DL
LEUKOCYTE ESTERASE, URINE: NEGATIVE
LIPASE: 184 U/L (ref 13–60)
LYMPHOCYTES ABSOLUTE: 1.5 K/UL (ref 1–5.1)
LYMPHOCYTES ABSOLUTE: 1.8 K/UL (ref 1–5.1)
LYMPHOCYTES RELATIVE PERCENT: 25.4 %
LYMPHOCYTES RELATIVE PERCENT: 35.4 %
Lab: NORMAL
MCH RBC QN AUTO: 31.2 PG (ref 26–34)
MCH RBC QN AUTO: 31.6 PG (ref 26–34)
MCHC RBC AUTO-ENTMCNC: 34.7 G/DL (ref 31–36)
MCHC RBC AUTO-ENTMCNC: 34.7 G/DL (ref 31–36)
MCV RBC AUTO: 89.8 FL (ref 80–100)
MCV RBC AUTO: 91 FL (ref 80–100)
METHADONE SCREEN, URINE: NORMAL
MICROSCOPIC EXAMINATION: YES
MONOCYTES ABSOLUTE: 0.5 K/UL (ref 0–1.3)
MONOCYTES ABSOLUTE: 0.5 K/UL (ref 0–1.3)
MONOCYTES RELATIVE PERCENT: 10 %
MONOCYTES RELATIVE PERCENT: 8.3 %
NEUTROPHILS ABSOLUTE: 2.5 K/UL (ref 1.7–7.7)
NEUTROPHILS ABSOLUTE: 3.8 K/UL (ref 1.7–7.7)
NEUTROPHILS RELATIVE PERCENT: 49.6 %
NEUTROPHILS RELATIVE PERCENT: 63.8 %
NITRITE, URINE: NEGATIVE
OPIATE SCREEN URINE: NORMAL
OXYCODONE URINE: NORMAL
PDW BLD-RTO: 12.2 % (ref 12.4–15.4)
PDW BLD-RTO: 12.5 % (ref 12.4–15.4)
PH UA: 6.5
PH UA: 6.5
PHENCYCLIDINE SCREEN URINE: NORMAL
PLATELET # BLD: 187 K/UL (ref 135–450)
PLATELET # BLD: 211 K/UL (ref 135–450)
PMV BLD AUTO: 8.8 FL (ref 5–10.5)
PMV BLD AUTO: 9.1 FL (ref 5–10.5)
POTASSIUM SERPL-SCNC: 4 MMOL/L (ref 3.5–5.1)
POTASSIUM SERPL-SCNC: 4.1 MMOL/L (ref 3.5–5.1)
PROPOXYPHENE SCREEN: NORMAL
PROTEIN UA: NEGATIVE MG/DL
RBC # BLD: 4.59 M/UL (ref 4.2–5.9)
RBC # BLD: 4.78 M/UL (ref 4.2–5.9)
RBC UA: 1 /HPF (ref 0–4)
SODIUM BLD-SCNC: 140 MMOL/L (ref 136–145)
SODIUM BLD-SCNC: 141 MMOL/L (ref 136–145)
SPECIFIC GRAVITY UA: 1.02
TOTAL PROTEIN: 6.6 G/DL (ref 6.4–8.2)
TOTAL PROTEIN: 7.8 G/DL (ref 6.4–8.2)
URINE REFLEX TO CULTURE: ABNORMAL
URINE TYPE: ABNORMAL
UROBILINOGEN, URINE: 1 E.U./DL
WBC # BLD: 5 K/UL (ref 4–11)
WBC # BLD: 5.9 K/UL (ref 4–11)
WBC UA: 1 /HPF (ref 0–5)

## 2019-02-09 PROCEDURE — 36415 COLL VENOUS BLD VENIPUNCTURE: CPT

## 2019-02-09 PROCEDURE — 80053 COMPREHEN METABOLIC PANEL: CPT

## 2019-02-09 PROCEDURE — 2580000003 HC RX 258: Performed by: PHYSICIAN ASSISTANT

## 2019-02-09 PROCEDURE — 96361 HYDRATE IV INFUSION ADD-ON: CPT

## 2019-02-09 PROCEDURE — 6360000002 HC RX W HCPCS: Performed by: PHYSICIAN ASSISTANT

## 2019-02-09 PROCEDURE — 2580000003 HC RX 258: Performed by: INTERNAL MEDICINE

## 2019-02-09 PROCEDURE — 96374 THER/PROPH/DIAG INJ IV PUSH: CPT

## 2019-02-09 PROCEDURE — 96376 TX/PRO/DX INJ SAME DRUG ADON: CPT

## 2019-02-09 PROCEDURE — 81001 URINALYSIS AUTO W/SCOPE: CPT

## 2019-02-09 PROCEDURE — 85025 COMPLETE CBC W/AUTO DIFF WBC: CPT

## 2019-02-09 PROCEDURE — 83690 ASSAY OF LIPASE: CPT

## 2019-02-09 PROCEDURE — 94760 N-INVAS EAR/PLS OXIMETRY 1: CPT

## 2019-02-09 PROCEDURE — 96375 TX/PRO/DX INJ NEW DRUG ADDON: CPT

## 2019-02-09 PROCEDURE — 6370000000 HC RX 637 (ALT 250 FOR IP): Performed by: INTERNAL MEDICINE

## 2019-02-09 PROCEDURE — 2500000003 HC RX 250 WO HCPCS: Performed by: PHYSICIAN ASSISTANT

## 2019-02-09 PROCEDURE — 99284 EMERGENCY DEPT VISIT MOD MDM: CPT

## 2019-02-09 PROCEDURE — 80307 DRUG TEST PRSMV CHEM ANLYZR: CPT

## 2019-02-09 PROCEDURE — 6360000002 HC RX W HCPCS: Performed by: INTERNAL MEDICINE

## 2019-02-09 PROCEDURE — G0480 DRUG TEST DEF 1-7 CLASSES: HCPCS

## 2019-02-09 RX ORDER — ONDANSETRON 4 MG/1
4 TABLET, FILM COATED ORAL EVERY 8 HOURS PRN
Qty: 20 TABLET | Refills: 0 | Status: SHIPPED | OUTPATIENT
Start: 2019-02-09 | End: 2019-03-25

## 2019-02-09 RX ORDER — 0.9 % SODIUM CHLORIDE 0.9 %
1000 INTRAVENOUS SOLUTION INTRAVENOUS ONCE
Status: COMPLETED | OUTPATIENT
Start: 2019-02-09 | End: 2019-02-09

## 2019-02-09 RX ORDER — LORAZEPAM 2 MG/ML
1 INJECTION INTRAMUSCULAR ONCE
Status: COMPLETED | OUTPATIENT
Start: 2019-02-09 | End: 2019-02-09

## 2019-02-09 RX ORDER — HYDROXYZINE PAMOATE 25 MG/1
25-50 CAPSULE ORAL 3 TIMES DAILY PRN
Qty: 30 CAPSULE | Refills: 0 | Status: SHIPPED | OUTPATIENT
Start: 2019-02-09 | End: 2019-02-23

## 2019-02-09 RX ORDER — ONDANSETRON 2 MG/ML
4 INJECTION INTRAMUSCULAR; INTRAVENOUS ONCE
Status: COMPLETED | OUTPATIENT
Start: 2019-02-09 | End: 2019-02-09

## 2019-02-09 RX ADMIN — ONDANSETRON 4 MG: 2 INJECTION INTRAMUSCULAR; INTRAVENOUS at 22:34

## 2019-02-09 RX ADMIN — LORAZEPAM 1 MG: 2 INJECTION, SOLUTION INTRAMUSCULAR; INTRAVENOUS at 22:34

## 2019-02-09 RX ADMIN — FAMOTIDINE 20 MG: 20 TABLET, FILM COATED ORAL at 08:53

## 2019-02-09 RX ADMIN — BENZTROPINE MESYLATE 0.5 MG: 0.5 TABLET ORAL at 08:52

## 2019-02-09 RX ADMIN — ARIPIPRAZOLE 10 MG: 10 TABLET ORAL at 08:52

## 2019-02-09 RX ADMIN — FAMOTIDINE 20 MG: 10 INJECTION, SOLUTION INTRAVENOUS at 22:34

## 2019-02-09 RX ADMIN — Medication 10 ML: at 08:53

## 2019-02-09 RX ADMIN — LORAZEPAM 1 MG: 2 INJECTION, SOLUTION INTRAMUSCULAR; INTRAVENOUS at 23:24

## 2019-02-09 RX ADMIN — ENOXAPARIN SODIUM 40 MG: 40 INJECTION SUBCUTANEOUS at 08:53

## 2019-02-09 RX ADMIN — SODIUM CHLORIDE 1000 ML: 9 INJECTION, SOLUTION INTRAVENOUS at 22:35

## 2019-02-09 ASSESSMENT — ENCOUNTER SYMPTOMS
ALLERGIC/IMMUNOLOGIC NEGATIVE: 1
ANAL BLEEDING: 0
COLOR CHANGE: 0
VOMITING: 1
WHEEZING: 0
RECTAL PAIN: 0
COUGH: 0
NAUSEA: 1
ABDOMINAL PAIN: 1
BLOOD IN STOOL: 0
CONSTIPATION: 0
ABDOMINAL DISTENTION: 0
BACK PAIN: 0
DIARRHEA: 0
STRIDOR: 0
SHORTNESS OF BREATH: 0

## 2019-02-09 ASSESSMENT — PAIN SCALES - GENERAL: PAINLEVEL_OUTOF10: 4

## 2019-02-10 VITALS
OXYGEN SATURATION: 96 % | SYSTOLIC BLOOD PRESSURE: 119 MMHG | HEART RATE: 75 BPM | WEIGHT: 150 LBS | TEMPERATURE: 96.8 F | DIASTOLIC BLOOD PRESSURE: 89 MMHG | RESPIRATION RATE: 21 BRPM | BODY MASS INDEX: 22.81 KG/M2

## 2019-02-10 VITALS
RESPIRATION RATE: 17 BRPM | HEIGHT: 68 IN | DIASTOLIC BLOOD PRESSURE: 86 MMHG | OXYGEN SATURATION: 99 % | HEART RATE: 96 BPM | BODY MASS INDEX: 23.49 KG/M2 | SYSTOLIC BLOOD PRESSURE: 132 MMHG | TEMPERATURE: 97.9 F | WEIGHT: 155 LBS

## 2019-02-10 DIAGNOSIS — R11.2 NAUSEA AND VOMITING IN ADULT: ICD-10-CM

## 2019-02-10 DIAGNOSIS — F41.1 ANXIETY STATE: Primary | ICD-10-CM

## 2019-02-10 LAB
A/G RATIO: 1.8 (ref 1.1–2.2)
ALBUMIN SERPL-MCNC: 4.8 G/DL (ref 3.4–5)
ALP BLD-CCNC: 61 U/L (ref 40–129)
ALT SERPL-CCNC: 10 U/L (ref 10–40)
ANION GAP SERPL CALCULATED.3IONS-SCNC: 14 MMOL/L (ref 3–16)
AST SERPL-CCNC: 15 U/L (ref 15–37)
BASOPHILS ABSOLUTE: 0 K/UL (ref 0–0.2)
BASOPHILS RELATIVE PERCENT: 0.5 %
BILIRUB SERPL-MCNC: 0.4 MG/DL (ref 0–1)
BUN BLDV-MCNC: 6 MG/DL (ref 7–20)
CALCIUM SERPL-MCNC: 9.3 MG/DL (ref 8.3–10.6)
CHLORIDE BLD-SCNC: 107 MMOL/L (ref 99–110)
CO2: 23 MMOL/L (ref 21–32)
CREAT SERPL-MCNC: 0.6 MG/DL (ref 0.9–1.3)
EOSINOPHILS ABSOLUTE: 0.1 K/UL (ref 0–0.6)
EOSINOPHILS RELATIVE PERCENT: 1.7 %
GFR AFRICAN AMERICAN: >60
GFR NON-AFRICAN AMERICAN: >60
GLOBULIN: 2.7 G/DL
GLUCOSE BLD-MCNC: 95 MG/DL (ref 70–99)
HCT VFR BLD CALC: 42.5 % (ref 40.5–52.5)
HEMOGLOBIN: 14.9 G/DL (ref 13.5–17.5)
LIPASE: 110 U/L (ref 13–60)
LYMPHOCYTES ABSOLUTE: 2 K/UL (ref 1–5.1)
LYMPHOCYTES RELATIVE PERCENT: 29.3 %
MCH RBC QN AUTO: 31.7 PG (ref 26–34)
MCHC RBC AUTO-ENTMCNC: 35.1 G/DL (ref 31–36)
MCV RBC AUTO: 90.3 FL (ref 80–100)
MONOCYTES ABSOLUTE: 0.6 K/UL (ref 0–1.3)
MONOCYTES RELATIVE PERCENT: 8.6 %
NEUTROPHILS ABSOLUTE: 4.1 K/UL (ref 1.7–7.7)
NEUTROPHILS RELATIVE PERCENT: 59.9 %
PDW BLD-RTO: 12.8 % (ref 12.4–15.4)
PLATELET # BLD: 201 K/UL (ref 135–450)
PLATELET SLIDE REVIEW: ADEQUATE
PMV BLD AUTO: 9.6 FL (ref 5–10.5)
POTASSIUM REFLEX MAGNESIUM: 4 MMOL/L (ref 3.5–5.1)
RBC # BLD: 4.7 M/UL (ref 4.2–5.9)
SLIDE REVIEW: NORMAL
SODIUM BLD-SCNC: 144 MMOL/L (ref 136–145)
TOTAL CK: 96 U/L (ref 39–308)
TOTAL PROTEIN: 7.5 G/DL (ref 6.4–8.2)
WBC # BLD: 6.8 K/UL (ref 4–11)

## 2019-02-10 PROCEDURE — 6360000002 HC RX W HCPCS: Performed by: PHYSICIAN ASSISTANT

## 2019-02-10 PROCEDURE — 85025 COMPLETE CBC W/AUTO DIFF WBC: CPT

## 2019-02-10 PROCEDURE — 96374 THER/PROPH/DIAG INJ IV PUSH: CPT

## 2019-02-10 PROCEDURE — 80053 COMPREHEN METABOLIC PANEL: CPT

## 2019-02-10 PROCEDURE — 99284 EMERGENCY DEPT VISIT MOD MDM: CPT

## 2019-02-10 PROCEDURE — 83690 ASSAY OF LIPASE: CPT

## 2019-02-10 PROCEDURE — 82550 ASSAY OF CK (CPK): CPT

## 2019-02-10 RX ORDER — LORAZEPAM 2 MG/ML
1 INJECTION INTRAMUSCULAR ONCE
Status: DISCONTINUED | OUTPATIENT
Start: 2019-02-10 | End: 2019-02-10

## 2019-02-10 RX ORDER — HYDROXYZINE HYDROCHLORIDE 50 MG/ML
50 INJECTION, SOLUTION INTRAMUSCULAR ONCE
Status: DISCONTINUED | OUTPATIENT
Start: 2019-02-10 | End: 2019-02-10 | Stop reason: HOSPADM

## 2019-02-10 RX ORDER — ONDANSETRON 2 MG/ML
4 INJECTION INTRAMUSCULAR; INTRAVENOUS ONCE
Status: COMPLETED | OUTPATIENT
Start: 2019-02-10 | End: 2019-02-10

## 2019-02-10 RX ADMIN — ONDANSETRON 4 MG: 2 INJECTION INTRAMUSCULAR; INTRAVENOUS at 18:09

## 2019-02-10 ASSESSMENT — ENCOUNTER SYMPTOMS
ABDOMINAL PAIN: 0
CONSTIPATION: 0
NAUSEA: 0
VOMITING: 0
SHORTNESS OF BREATH: 0
DIARRHEA: 0
COUGH: 0
BACK PAIN: 0

## 2019-02-18 ENCOUNTER — HOSPITAL ENCOUNTER (EMERGENCY)
Age: 29
Discharge: HOME | End: 2019-02-18
Payer: COMMERCIAL

## 2019-02-18 VITALS
TEMPERATURE: 97.5 F | WEIGHT: 153 LBS | DIASTOLIC BLOOD PRESSURE: 73 MMHG | SYSTOLIC BLOOD PRESSURE: 129 MMHG | HEIGHT: 68 IN | BODY MASS INDEX: 23.19 KG/M2 | RESPIRATION RATE: 16 BRPM | OXYGEN SATURATION: 99 % | HEART RATE: 97 BPM

## 2019-02-18 DIAGNOSIS — R11.0 NAUSEA: ICD-10-CM

## 2019-02-18 DIAGNOSIS — R10.13 EPIGASTRIC PAIN: Primary | ICD-10-CM

## 2019-02-18 LAB
A/G RATIO: 1.8 (ref 1.1–2.2)
ALBUMIN SERPL-MCNC: 4.6 G/DL (ref 3.4–5)
ALP BLD-CCNC: 58 U/L (ref 40–129)
ALT SERPL-CCNC: 10 U/L (ref 10–40)
ANION GAP SERPL CALCULATED.3IONS-SCNC: 12 MMOL/L (ref 3–16)
AST SERPL-CCNC: 17 U/L (ref 15–37)
BASOPHILS ABSOLUTE: 0 K/UL (ref 0–0.2)
BASOPHILS RELATIVE PERCENT: 0.5 %
BILIRUB SERPL-MCNC: 1 MG/DL (ref 0–1)
BUN BLDV-MCNC: 11 MG/DL (ref 7–20)
CALCIUM SERPL-MCNC: 9.3 MG/DL (ref 8.3–10.6)
CHLORIDE BLD-SCNC: 107 MMOL/L (ref 99–110)
CO2: 22 MMOL/L (ref 21–32)
CREAT SERPL-MCNC: 0.7 MG/DL (ref 0.9–1.3)
EKG ATRIAL RATE: 80 BPM
EKG DIAGNOSIS: NORMAL
EKG P AXIS: 67 DEGREES
EKG P-R INTERVAL: 164 MS
EKG Q-T INTERVAL: 362 MS
EKG QRS DURATION: 98 MS
EKG QTC CALCULATION (BAZETT): 417 MS
EKG R AXIS: 86 DEGREES
EKG T AXIS: 62 DEGREES
EKG VENTRICULAR RATE: 80 BPM
EOSINOPHILS ABSOLUTE: 0.1 K/UL (ref 0–0.6)
EOSINOPHILS RELATIVE PERCENT: 2.5 %
GFR AFRICAN AMERICAN: >60
GFR NON-AFRICAN AMERICAN: >60
GLOBULIN: 2.6 G/DL
GLUCOSE BLD-MCNC: 89 MG/DL (ref 70–99)
HCT VFR BLD CALC: 43 % (ref 40.5–52.5)
HEMOGLOBIN: 14.9 G/DL (ref 13.5–17.5)
LIPASE: 154 U/L (ref 13–60)
LYMPHOCYTES ABSOLUTE: 0.9 K/UL (ref 1–5.1)
LYMPHOCYTES RELATIVE PERCENT: 16.6 %
MCH RBC QN AUTO: 31.9 PG (ref 26–34)
MCHC RBC AUTO-ENTMCNC: 34.7 G/DL (ref 31–36)
MCV RBC AUTO: 92 FL (ref 80–100)
MONOCYTES ABSOLUTE: 0.4 K/UL (ref 0–1.3)
MONOCYTES RELATIVE PERCENT: 6.8 %
NEUTROPHILS ABSOLUTE: 4.1 K/UL (ref 1.7–7.7)
NEUTROPHILS RELATIVE PERCENT: 73.6 %
PDW BLD-RTO: 12.6 % (ref 12.4–15.4)
PLATELET # BLD: 185 K/UL (ref 135–450)
PMV BLD AUTO: 8.4 FL (ref 5–10.5)
POTASSIUM SERPL-SCNC: 3.7 MMOL/L (ref 3.5–5.1)
RBC # BLD: 4.68 M/UL (ref 4.2–5.9)
SODIUM BLD-SCNC: 141 MMOL/L (ref 136–145)
TOTAL PROTEIN: 7.2 G/DL (ref 6.4–8.2)
WBC # BLD: 5.5 K/UL (ref 4–11)

## 2019-02-18 PROCEDURE — 93010 ELECTROCARDIOGRAM REPORT: CPT | Performed by: INTERNAL MEDICINE

## 2019-02-18 PROCEDURE — 83690 ASSAY OF LIPASE: CPT

## 2019-02-18 PROCEDURE — 80053 COMPREHEN METABOLIC PANEL: CPT

## 2019-02-18 PROCEDURE — 93005 ELECTROCARDIOGRAM TRACING: CPT | Performed by: EMERGENCY MEDICINE

## 2019-02-18 PROCEDURE — 99284 EMERGENCY DEPT VISIT MOD MDM: CPT

## 2019-02-18 PROCEDURE — 85025 COMPLETE CBC W/AUTO DIFF WBC: CPT

## 2019-02-18 PROCEDURE — 6370000000 HC RX 637 (ALT 250 FOR IP): Performed by: PHYSICIAN ASSISTANT

## 2019-02-18 RX ORDER — HYDROCODONE BITARTRATE AND ACETAMINOPHEN 5; 325 MG/1; MG/1
1 TABLET ORAL ONCE
Status: COMPLETED | OUTPATIENT
Start: 2019-02-18 | End: 2019-02-18

## 2019-02-18 RX ORDER — ONDANSETRON 4 MG/1
4 TABLET, ORALLY DISINTEGRATING ORAL ONCE
Status: COMPLETED | OUTPATIENT
Start: 2019-02-18 | End: 2019-02-18

## 2019-02-18 RX ADMIN — ONDANSETRON 4 MG: 4 TABLET, ORALLY DISINTEGRATING ORAL at 15:38

## 2019-02-18 RX ADMIN — HYDROCODONE BITARTRATE AND ACETAMINOPHEN 1 TABLET: 5; 325 TABLET ORAL at 15:38

## 2019-02-18 ASSESSMENT — ENCOUNTER SYMPTOMS
VOMITING: 0
SHORTNESS OF BREATH: 0
ABDOMINAL PAIN: 1
NAUSEA: 1

## 2019-02-18 ASSESSMENT — PAIN DESCRIPTION - ORIENTATION: ORIENTATION: UPPER

## 2019-02-18 ASSESSMENT — PAIN DESCRIPTION - DESCRIPTORS: DESCRIPTORS: BURNING;PRESSURE;STABBING

## 2019-02-18 ASSESSMENT — PAIN SCALES - GENERAL
PAINLEVEL_OUTOF10: 8
PAINLEVEL_OUTOF10: 8

## 2019-02-18 ASSESSMENT — PAIN DESCRIPTION - PAIN TYPE: TYPE: ACUTE PAIN

## 2019-02-18 ASSESSMENT — PAIN DESCRIPTION - DIRECTION: RADIATING_TOWARDS: BACK

## 2019-02-18 ASSESSMENT — PAIN DESCRIPTION - LOCATION: LOCATION: ABDOMEN

## 2019-02-28 ENCOUNTER — TELEPHONE (OUTPATIENT)
Dept: OTHER | Age: 29
End: 2019-02-28

## 2019-03-23 ENCOUNTER — HOSPITAL ENCOUNTER (INPATIENT)
Age: 29
LOS: 2 days | Discharge: LEFT BEFORE DISCHARGE | DRG: 770 | End: 2019-03-25
Attending: EMERGENCY MEDICINE | Admitting: FAMILY MEDICINE
Payer: COMMERCIAL

## 2019-03-23 DIAGNOSIS — F10.930 ALCOHOL WITHDRAWAL SYNDROME WITHOUT COMPLICATION (HCC): Primary | ICD-10-CM

## 2019-03-23 PROBLEM — F10.931 ALCOHOL WITHDRAWAL DELIRIUM (HCC): Status: ACTIVE | Noted: 2019-03-23

## 2019-03-23 LAB
A/G RATIO: 1.6 (ref 1.1–2.2)
ACETAMINOPHEN LEVEL: <5 UG/ML (ref 10–30)
ALBUMIN SERPL-MCNC: 5.5 G/DL (ref 3.4–5)
ALP BLD-CCNC: 67 U/L (ref 40–129)
ALT SERPL-CCNC: 10 U/L (ref 10–40)
AMPHETAMINE SCREEN, URINE: ABNORMAL
ANION GAP SERPL CALCULATED.3IONS-SCNC: 15 MMOL/L (ref 3–16)
AST SERPL-CCNC: 17 U/L (ref 15–37)
BARBITURATE SCREEN URINE: ABNORMAL
BASOPHILS ABSOLUTE: 0 K/UL (ref 0–0.2)
BASOPHILS RELATIVE PERCENT: 0.6 %
BENZODIAZEPINE SCREEN, URINE: POSITIVE
BILIRUB SERPL-MCNC: 0.7 MG/DL (ref 0–1)
BILIRUBIN URINE: NEGATIVE
BLOOD, URINE: NEGATIVE
BUN BLDV-MCNC: 12 MG/DL (ref 7–20)
CALCIUM SERPL-MCNC: 9.9 MG/DL (ref 8.3–10.6)
CANNABINOID SCREEN URINE: ABNORMAL
CHLORIDE BLD-SCNC: 101 MMOL/L (ref 99–110)
CLARITY: CLEAR
CO2: 24 MMOL/L (ref 21–32)
COCAINE METABOLITE SCREEN URINE: ABNORMAL
COLOR: YELLOW
CREAT SERPL-MCNC: 0.7 MG/DL (ref 0.9–1.3)
EOSINOPHILS ABSOLUTE: 0 K/UL (ref 0–0.6)
EOSINOPHILS RELATIVE PERCENT: 0.6 %
ETHANOL: NORMAL MG/DL (ref 0–0.08)
GFR AFRICAN AMERICAN: >60
GFR NON-AFRICAN AMERICAN: >60
GLOBULIN: 3.4 G/DL
GLUCOSE BLD-MCNC: 103 MG/DL (ref 70–99)
GLUCOSE URINE: NEGATIVE MG/DL
HCT VFR BLD CALC: 47.8 % (ref 40.5–52.5)
HEMOGLOBIN: 16.7 G/DL (ref 13.5–17.5)
KETONES, URINE: NEGATIVE MG/DL
LEUKOCYTE ESTERASE, URINE: NEGATIVE
LIPASE: 177 U/L (ref 13–60)
LYMPHOCYTES ABSOLUTE: 1 K/UL (ref 1–5.1)
LYMPHOCYTES RELATIVE PERCENT: 14.9 %
Lab: ABNORMAL
MCH RBC QN AUTO: 31.5 PG (ref 26–34)
MCHC RBC AUTO-ENTMCNC: 34.9 G/DL (ref 31–36)
MCV RBC AUTO: 90.5 FL (ref 80–100)
METHADONE SCREEN, URINE: ABNORMAL
MICROSCOPIC EXAMINATION: NORMAL
MONOCYTES ABSOLUTE: 0.5 K/UL (ref 0–1.3)
MONOCYTES RELATIVE PERCENT: 6.7 %
NEUTROPHILS ABSOLUTE: 5.4 K/UL (ref 1.7–7.7)
NEUTROPHILS RELATIVE PERCENT: 77.2 %
NITRITE, URINE: NEGATIVE
OPIATE SCREEN URINE: ABNORMAL
OXYCODONE URINE: ABNORMAL
PDW BLD-RTO: 12.4 % (ref 12.4–15.4)
PH UA: 6.5
PH UA: 6.5 (ref 5–8)
PHENCYCLIDINE SCREEN URINE: ABNORMAL
PLATELET # BLD: 180 K/UL (ref 135–450)
PMV BLD AUTO: 9.1 FL (ref 5–10.5)
POTASSIUM SERPL-SCNC: 3.6 MMOL/L (ref 3.5–5.1)
PROPOXYPHENE SCREEN: ABNORMAL
PROTEIN UA: NEGATIVE MG/DL
RBC # BLD: 5.28 M/UL (ref 4.2–5.9)
SALICYLATE, SERUM: <0.3 MG/DL (ref 15–30)
SODIUM BLD-SCNC: 140 MMOL/L (ref 136–145)
SPECIFIC GRAVITY UA: 1.02 (ref 1–1.03)
TOTAL PROTEIN: 8.9 G/DL (ref 6.4–8.2)
URINE REFLEX TO CULTURE: NORMAL
URINE TYPE: NORMAL
UROBILINOGEN, URINE: 0.2 E.U./DL
WBC # BLD: 7 K/UL (ref 4–11)

## 2019-03-23 PROCEDURE — 6360000002 HC RX W HCPCS: Performed by: FAMILY MEDICINE

## 2019-03-23 PROCEDURE — 96376 TX/PRO/DX INJ SAME DRUG ADON: CPT

## 2019-03-23 PROCEDURE — 2580000003 HC RX 258: Performed by: PHYSICIAN ASSISTANT

## 2019-03-23 PROCEDURE — 81003 URINALYSIS AUTO W/O SCOPE: CPT

## 2019-03-23 PROCEDURE — 80307 DRUG TEST PRSMV CHEM ANLYZR: CPT

## 2019-03-23 PROCEDURE — 96361 HYDRATE IV INFUSION ADD-ON: CPT

## 2019-03-23 PROCEDURE — 96375 TX/PRO/DX INJ NEW DRUG ADDON: CPT

## 2019-03-23 PROCEDURE — G0480 DRUG TEST DEF 1-7 CLASSES: HCPCS

## 2019-03-23 PROCEDURE — 2580000003 HC RX 258: Performed by: FAMILY MEDICINE

## 2019-03-23 PROCEDURE — 1200000000 HC SEMI PRIVATE

## 2019-03-23 PROCEDURE — 85025 COMPLETE CBC W/AUTO DIFF WBC: CPT

## 2019-03-23 PROCEDURE — 99284 EMERGENCY DEPT VISIT MOD MDM: CPT

## 2019-03-23 PROCEDURE — 83690 ASSAY OF LIPASE: CPT

## 2019-03-23 PROCEDURE — 6360000002 HC RX W HCPCS: Performed by: PHYSICIAN ASSISTANT

## 2019-03-23 PROCEDURE — 96374 THER/PROPH/DIAG INJ IV PUSH: CPT

## 2019-03-23 PROCEDURE — 80053 COMPREHEN METABOLIC PANEL: CPT

## 2019-03-23 RX ORDER — LORAZEPAM 1 MG/1
2 TABLET ORAL
Status: DISCONTINUED | OUTPATIENT
Start: 2019-03-23 | End: 2019-03-24

## 2019-03-23 RX ORDER — ACETAMINOPHEN 325 MG/1
650 TABLET ORAL EVERY 4 HOURS PRN
Status: DISCONTINUED | OUTPATIENT
Start: 2019-03-23 | End: 2019-03-25 | Stop reason: HOSPADM

## 2019-03-23 RX ORDER — ONDANSETRON 2 MG/ML
4 INJECTION INTRAMUSCULAR; INTRAVENOUS ONCE
Status: COMPLETED | OUTPATIENT
Start: 2019-03-23 | End: 2019-03-23

## 2019-03-23 RX ORDER — KETOROLAC TROMETHAMINE 30 MG/ML
15 INJECTION, SOLUTION INTRAMUSCULAR; INTRAVENOUS EVERY 6 HOURS PRN
Status: DISCONTINUED | OUTPATIENT
Start: 2019-03-23 | End: 2019-03-25 | Stop reason: HOSPADM

## 2019-03-23 RX ORDER — LORAZEPAM 1 MG/1
2 TABLET ORAL
Status: DISCONTINUED | OUTPATIENT
Start: 2019-03-23 | End: 2019-03-23 | Stop reason: SDUPTHER

## 2019-03-23 RX ORDER — SODIUM CHLORIDE 0.9 % (FLUSH) 0.9 %
10 SYRINGE (ML) INJECTION PRN
Status: DISCONTINUED | OUTPATIENT
Start: 2019-03-23 | End: 2019-03-23 | Stop reason: SDUPTHER

## 2019-03-23 RX ORDER — LORAZEPAM 1 MG/1
3 TABLET ORAL
Status: DISCONTINUED | OUTPATIENT
Start: 2019-03-23 | End: 2019-03-23 | Stop reason: SDUPTHER

## 2019-03-23 RX ORDER — LORAZEPAM 2 MG/ML
4 INJECTION INTRAMUSCULAR
Status: DISCONTINUED | OUTPATIENT
Start: 2019-03-23 | End: 2019-03-24

## 2019-03-23 RX ORDER — LORAZEPAM 2 MG/ML
1 INJECTION INTRAMUSCULAR
Status: DISCONTINUED | OUTPATIENT
Start: 2019-03-23 | End: 2019-03-24

## 2019-03-23 RX ORDER — LORAZEPAM 2 MG/ML
1 INJECTION INTRAMUSCULAR
Status: DISCONTINUED | OUTPATIENT
Start: 2019-03-23 | End: 2019-03-23 | Stop reason: SDUPTHER

## 2019-03-23 RX ORDER — LORAZEPAM 1 MG/1
3 TABLET ORAL
Status: DISCONTINUED | OUTPATIENT
Start: 2019-03-23 | End: 2019-03-24

## 2019-03-23 RX ORDER — LORAZEPAM 2 MG/ML
2 INJECTION INTRAMUSCULAR
Status: DISCONTINUED | OUTPATIENT
Start: 2019-03-23 | End: 2019-03-24

## 2019-03-23 RX ORDER — SODIUM CHLORIDE 0.9 % (FLUSH) 0.9 %
10 SYRINGE (ML) INJECTION PRN
Status: DISCONTINUED | OUTPATIENT
Start: 2019-03-23 | End: 2019-03-24 | Stop reason: SDUPTHER

## 2019-03-23 RX ORDER — LORAZEPAM 1 MG/1
4 TABLET ORAL
Status: DISCONTINUED | OUTPATIENT
Start: 2019-03-23 | End: 2019-03-23 | Stop reason: SDUPTHER

## 2019-03-23 RX ORDER — LORAZEPAM 1 MG/1
1 TABLET ORAL
Status: DISCONTINUED | OUTPATIENT
Start: 2019-03-23 | End: 2019-03-24

## 2019-03-23 RX ORDER — LORAZEPAM 2 MG/ML
3 INJECTION INTRAMUSCULAR
Status: DISCONTINUED | OUTPATIENT
Start: 2019-03-23 | End: 2019-03-23 | Stop reason: SDUPTHER

## 2019-03-23 RX ORDER — LORAZEPAM 1 MG/1
1 TABLET ORAL
Status: DISCONTINUED | OUTPATIENT
Start: 2019-03-23 | End: 2019-03-23 | Stop reason: SDUPTHER

## 2019-03-23 RX ORDER — ONDANSETRON 2 MG/ML
4 INJECTION INTRAMUSCULAR; INTRAVENOUS EVERY 6 HOURS PRN
Status: DISCONTINUED | OUTPATIENT
Start: 2019-03-23 | End: 2019-03-25 | Stop reason: HOSPADM

## 2019-03-23 RX ORDER — SODIUM CHLORIDE 9 MG/ML
INJECTION, SOLUTION INTRAVENOUS CONTINUOUS
Status: DISCONTINUED | OUTPATIENT
Start: 2019-03-23 | End: 2019-03-25 | Stop reason: HOSPADM

## 2019-03-23 RX ORDER — LORAZEPAM 2 MG/ML
4 INJECTION INTRAMUSCULAR
Status: DISCONTINUED | OUTPATIENT
Start: 2019-03-23 | End: 2019-03-23 | Stop reason: SDUPTHER

## 2019-03-23 RX ORDER — SODIUM CHLORIDE 0.9 % (FLUSH) 0.9 %
10 SYRINGE (ML) INJECTION EVERY 12 HOURS SCHEDULED
Status: DISCONTINUED | OUTPATIENT
Start: 2019-03-23 | End: 2019-03-23 | Stop reason: SDUPTHER

## 2019-03-23 RX ORDER — LORAZEPAM 2 MG/ML
3 INJECTION INTRAMUSCULAR
Status: DISCONTINUED | OUTPATIENT
Start: 2019-03-23 | End: 2019-03-24

## 2019-03-23 RX ORDER — 0.9 % SODIUM CHLORIDE 0.9 %
1000 INTRAVENOUS SOLUTION INTRAVENOUS ONCE
Status: COMPLETED | OUTPATIENT
Start: 2019-03-23 | End: 2019-03-23

## 2019-03-23 RX ORDER — LORAZEPAM 1 MG/1
4 TABLET ORAL
Status: DISCONTINUED | OUTPATIENT
Start: 2019-03-23 | End: 2019-03-24

## 2019-03-23 RX ORDER — KETOROLAC TROMETHAMINE 30 MG/ML
15 INJECTION, SOLUTION INTRAMUSCULAR; INTRAVENOUS ONCE
Status: COMPLETED | OUTPATIENT
Start: 2019-03-23 | End: 2019-03-23

## 2019-03-23 RX ORDER — LORAZEPAM 2 MG/ML
2 INJECTION INTRAMUSCULAR
Status: DISCONTINUED | OUTPATIENT
Start: 2019-03-23 | End: 2019-03-23 | Stop reason: SDUPTHER

## 2019-03-23 RX ORDER — SODIUM CHLORIDE 0.9 % (FLUSH) 0.9 %
10 SYRINGE (ML) INJECTION EVERY 12 HOURS SCHEDULED
Status: DISCONTINUED | OUTPATIENT
Start: 2019-03-23 | End: 2019-03-24 | Stop reason: SDUPTHER

## 2019-03-23 RX ADMIN — ONDANSETRON 4 MG: 2 INJECTION INTRAMUSCULAR; INTRAVENOUS at 18:56

## 2019-03-23 RX ADMIN — SODIUM CHLORIDE: 9 INJECTION, SOLUTION INTRAVENOUS at 23:23

## 2019-03-23 RX ADMIN — SODIUM CHLORIDE 1000 ML: 9 INJECTION, SOLUTION INTRAVENOUS at 18:48

## 2019-03-23 RX ADMIN — LORAZEPAM 4 MG: 2 INJECTION INTRAMUSCULAR; INTRAVENOUS at 22:49

## 2019-03-23 RX ADMIN — LORAZEPAM 2 MG: 2 INJECTION INTRAMUSCULAR; INTRAVENOUS at 18:49

## 2019-03-23 RX ADMIN — LORAZEPAM 1 MG: 2 INJECTION INTRAMUSCULAR; INTRAVENOUS at 19:59

## 2019-03-23 RX ADMIN — KETOROLAC TROMETHAMINE 15 MG: 30 INJECTION, SOLUTION INTRAMUSCULAR at 22:49

## 2019-03-23 RX ADMIN — Medication 10 ML: at 22:53

## 2019-03-23 RX ADMIN — KETOROLAC TROMETHAMINE 15 MG: 30 INJECTION, SOLUTION INTRAMUSCULAR at 21:14

## 2019-03-23 ASSESSMENT — PAIN DESCRIPTION - PAIN TYPE: TYPE: ACUTE PAIN

## 2019-03-23 ASSESSMENT — PAIN DESCRIPTION - ONSET: ONSET: ON-GOING

## 2019-03-23 ASSESSMENT — PAIN - FUNCTIONAL ASSESSMENT: PAIN_FUNCTIONAL_ASSESSMENT: PREVENTS OR INTERFERES SOME ACTIVE ACTIVITIES AND ADLS

## 2019-03-23 ASSESSMENT — PAIN SCALES - GENERAL
PAINLEVEL_OUTOF10: 1
PAINLEVEL_OUTOF10: 6
PAINLEVEL_OUTOF10: 6

## 2019-03-23 ASSESSMENT — PAIN DESCRIPTION - DESCRIPTORS: DESCRIPTORS: SHARP

## 2019-03-23 ASSESSMENT — PAIN DESCRIPTION - PROGRESSION
CLINICAL_PROGRESSION: NOT CHANGED
CLINICAL_PROGRESSION: RAPIDLY IMPROVING

## 2019-03-23 ASSESSMENT — PAIN DESCRIPTION - ORIENTATION: ORIENTATION: ANTERIOR

## 2019-03-23 ASSESSMENT — PAIN DESCRIPTION - LOCATION: LOCATION: HEAD

## 2019-03-23 ASSESSMENT — PAIN DESCRIPTION - FREQUENCY: FREQUENCY: CONTINUOUS

## 2019-03-24 PROCEDURE — 6360000002 HC RX W HCPCS: Performed by: INTERNAL MEDICINE

## 2019-03-24 PROCEDURE — 2580000003 HC RX 258: Performed by: FAMILY MEDICINE

## 2019-03-24 PROCEDURE — 6360000002 HC RX W HCPCS: Performed by: HOSPITALIST

## 2019-03-24 PROCEDURE — 6370000000 HC RX 637 (ALT 250 FOR IP): Performed by: HOSPITALIST

## 2019-03-24 PROCEDURE — 6360000002 HC RX W HCPCS: Performed by: FAMILY MEDICINE

## 2019-03-24 PROCEDURE — 2500000003 HC RX 250 WO HCPCS: Performed by: FAMILY MEDICINE

## 2019-03-24 PROCEDURE — 2580000003 HC RX 258: Performed by: HOSPITALIST

## 2019-03-24 PROCEDURE — 1200000000 HC SEMI PRIVATE

## 2019-03-24 PROCEDURE — 94760 N-INVAS EAR/PLS OXIMETRY 1: CPT

## 2019-03-24 RX ORDER — NICOTINE 21 MG/24HR
1 PATCH, TRANSDERMAL 24 HOURS TRANSDERMAL DAILY
Status: DISCONTINUED | OUTPATIENT
Start: 2019-03-24 | End: 2019-03-25 | Stop reason: HOSPADM

## 2019-03-24 RX ORDER — MORPHINE SULFATE 10 MG/ML
8 INJECTION, SOLUTION INTRAMUSCULAR; INTRAVENOUS ONCE
Status: COMPLETED | OUTPATIENT
Start: 2019-03-24 | End: 2019-03-24

## 2019-03-24 RX ORDER — KETOROLAC TROMETHAMINE 30 MG/ML
30 INJECTION, SOLUTION INTRAMUSCULAR; INTRAVENOUS ONCE
Status: DISCONTINUED | OUTPATIENT
Start: 2019-03-24 | End: 2019-03-25 | Stop reason: HOSPADM

## 2019-03-24 RX ORDER — LORAZEPAM 1 MG/1
3 TABLET ORAL
Status: DISCONTINUED | OUTPATIENT
Start: 2019-03-24 | End: 2019-03-25 | Stop reason: HOSPADM

## 2019-03-24 RX ORDER — SODIUM CHLORIDE 0.9 % (FLUSH) 0.9 %
10 SYRINGE (ML) INJECTION EVERY 12 HOURS SCHEDULED
Status: DISCONTINUED | OUTPATIENT
Start: 2019-03-24 | End: 2019-03-25 | Stop reason: HOSPADM

## 2019-03-24 RX ORDER — LORAZEPAM 2 MG/ML
4 INJECTION INTRAMUSCULAR
Status: DISCONTINUED | OUTPATIENT
Start: 2019-03-24 | End: 2019-03-25 | Stop reason: HOSPADM

## 2019-03-24 RX ORDER — MORPHINE SULFATE 2 MG/ML
2 INJECTION, SOLUTION INTRAMUSCULAR; INTRAVENOUS
Status: DISCONTINUED | OUTPATIENT
Start: 2019-03-24 | End: 2019-03-25 | Stop reason: HOSPADM

## 2019-03-24 RX ORDER — LORAZEPAM 2 MG/ML
3 INJECTION INTRAMUSCULAR
Status: DISCONTINUED | OUTPATIENT
Start: 2019-03-24 | End: 2019-03-25 | Stop reason: HOSPADM

## 2019-03-24 RX ORDER — LORAZEPAM 2 MG/ML
1 INJECTION INTRAMUSCULAR
Status: DISCONTINUED | OUTPATIENT
Start: 2019-03-24 | End: 2019-03-25 | Stop reason: HOSPADM

## 2019-03-24 RX ORDER — SODIUM CHLORIDE 0.9 % (FLUSH) 0.9 %
10 SYRINGE (ML) INJECTION PRN
Status: DISCONTINUED | OUTPATIENT
Start: 2019-03-24 | End: 2019-03-25 | Stop reason: HOSPADM

## 2019-03-24 RX ORDER — LORAZEPAM 2 MG/ML
2 INJECTION INTRAMUSCULAR
Status: DISCONTINUED | OUTPATIENT
Start: 2019-03-24 | End: 2019-03-25 | Stop reason: HOSPADM

## 2019-03-24 RX ORDER — LORAZEPAM 1 MG/1
2 TABLET ORAL
Status: DISCONTINUED | OUTPATIENT
Start: 2019-03-24 | End: 2019-03-25 | Stop reason: HOSPADM

## 2019-03-24 RX ORDER — LORAZEPAM 1 MG/1
1 TABLET ORAL
Status: DISCONTINUED | OUTPATIENT
Start: 2019-03-24 | End: 2019-03-25 | Stop reason: HOSPADM

## 2019-03-24 RX ORDER — LORAZEPAM 1 MG/1
4 TABLET ORAL
Status: DISCONTINUED | OUTPATIENT
Start: 2019-03-24 | End: 2019-03-25 | Stop reason: HOSPADM

## 2019-03-24 RX ORDER — MORPHINE SULFATE 4 MG/ML
INJECTION, SOLUTION INTRAMUSCULAR; INTRAVENOUS
Status: DISPENSED
Start: 2019-03-24 | End: 2019-03-24

## 2019-03-24 RX ADMIN — LORAZEPAM 2 MG: 1 TABLET ORAL at 18:30

## 2019-03-24 RX ADMIN — THIAMINE HYDROCHLORIDE: 100 INJECTION, SOLUTION INTRAMUSCULAR; INTRAVENOUS at 10:50

## 2019-03-24 RX ADMIN — LORAZEPAM 3 MG: 2 INJECTION INTRAMUSCULAR; INTRAVENOUS at 08:17

## 2019-03-24 RX ADMIN — MORPHINE SULFATE 8 MG: 10 INJECTION, SOLUTION INTRAMUSCULAR; INTRAVENOUS at 00:51

## 2019-03-24 RX ADMIN — Medication 10 ML: at 19:55

## 2019-03-24 RX ADMIN — LORAZEPAM 4 MG: 2 INJECTION INTRAMUSCULAR; INTRAVENOUS at 19:55

## 2019-03-24 RX ADMIN — MORPHINE SULFATE 2 MG: 2 INJECTION, SOLUTION INTRAMUSCULAR; INTRAVENOUS at 18:29

## 2019-03-24 RX ADMIN — ONDANSETRON 4 MG: 2 INJECTION INTRAMUSCULAR; INTRAVENOUS at 05:59

## 2019-03-24 RX ADMIN — MORPHINE SULFATE 2 MG: 2 INJECTION, SOLUTION INTRAMUSCULAR; INTRAVENOUS at 12:04

## 2019-03-24 RX ADMIN — LORAZEPAM 2 MG: 1 TABLET ORAL at 14:59

## 2019-03-24 RX ADMIN — LORAZEPAM 4 MG: 2 INJECTION INTRAMUSCULAR; INTRAVENOUS at 23:16

## 2019-03-24 RX ADMIN — ONDANSETRON 4 MG: 2 INJECTION INTRAMUSCULAR; INTRAVENOUS at 00:01

## 2019-03-24 RX ADMIN — LORAZEPAM 3 MG: 1 TABLET ORAL at 09:30

## 2019-03-24 RX ADMIN — MORPHINE SULFATE 2 MG: 2 INJECTION, SOLUTION INTRAMUSCULAR; INTRAVENOUS at 15:01

## 2019-03-24 RX ADMIN — LORAZEPAM 2 MG: 1 TABLET ORAL at 12:04

## 2019-03-24 RX ADMIN — Medication 10 ML: at 08:20

## 2019-03-24 RX ADMIN — LORAZEPAM 4 MG: 2 INJECTION INTRAMUSCULAR; INTRAVENOUS at 00:01

## 2019-03-24 RX ADMIN — MORPHINE SULFATE 2 MG: 2 INJECTION, SOLUTION INTRAMUSCULAR; INTRAVENOUS at 09:04

## 2019-03-24 RX ADMIN — MORPHINE SULFATE 2 MG: 2 INJECTION, SOLUTION INTRAMUSCULAR; INTRAVENOUS at 03:08

## 2019-03-24 RX ADMIN — MORPHINE SULFATE 2 MG: 2 INJECTION, SOLUTION INTRAMUSCULAR; INTRAVENOUS at 21:29

## 2019-03-24 RX ADMIN — MORPHINE SULFATE 2 MG: 2 INJECTION, SOLUTION INTRAMUSCULAR; INTRAVENOUS at 05:59

## 2019-03-24 RX ADMIN — LORAZEPAM 2 MG: 2 INJECTION INTRAMUSCULAR; INTRAVENOUS at 16:51

## 2019-03-24 ASSESSMENT — PAIN SCALES - GENERAL
PAINLEVEL_OUTOF10: 8
PAINLEVEL_OUTOF10: 0
PAINLEVEL_OUTOF10: 0
PAINLEVEL_OUTOF10: 7
PAINLEVEL_OUTOF10: 8
PAINLEVEL_OUTOF10: 8
PAINLEVEL_OUTOF10: 0
PAINLEVEL_OUTOF10: 8
PAINLEVEL_OUTOF10: 10
PAINLEVEL_OUTOF10: 8
PAINLEVEL_OUTOF10: 8
PAINLEVEL_OUTOF10: 1
PAINLEVEL_OUTOF10: 0
PAINLEVEL_OUTOF10: 10
PAINLEVEL_OUTOF10: 0
PAINLEVEL_OUTOF10: 1

## 2019-03-24 ASSESSMENT — PAIN DESCRIPTION - PAIN TYPE
TYPE: ACUTE PAIN

## 2019-03-24 ASSESSMENT — PAIN DESCRIPTION - LOCATION
LOCATION: ABDOMEN
LOCATION: HEAD
LOCATION: ABDOMEN

## 2019-03-24 ASSESSMENT — PAIN DESCRIPTION - ORIENTATION
ORIENTATION: MID
ORIENTATION: ANTERIOR

## 2019-03-24 ASSESSMENT — PAIN DESCRIPTION - FREQUENCY
FREQUENCY: INTERMITTENT

## 2019-03-24 ASSESSMENT — PAIN DESCRIPTION - ONSET
ONSET: PROGRESSIVE

## 2019-03-24 ASSESSMENT — PAIN DESCRIPTION - PROGRESSION
CLINICAL_PROGRESSION: GRADUALLY WORSENING
CLINICAL_PROGRESSION: RAPIDLY IMPROVING
CLINICAL_PROGRESSION: GRADUALLY WORSENING
CLINICAL_PROGRESSION: RAPIDLY IMPROVING

## 2019-03-24 ASSESSMENT — PAIN DESCRIPTION - DESCRIPTORS
DESCRIPTORS: SHARP

## 2019-03-24 NOTE — PLAN OF CARE
Problem: Falls - Risk of:  Goal: Will remain free from falls  Description  Will remain free from falls  Note:   Fall precautions in place, bed alarm on, nonskid foot wear applied, bed in lowest position, and call light within reach. Camera in place for pt safety. Will continue to monitor. Problem: Pain:  Description  Pain management should include both nonpharmacologic and pharmacologic interventions. Goal: Control of acute pain  Description  Control of acute pain  Note:   Pt continues to c/o abdominal pain. Pain controlled on current regimen. Pt kept NPO while he is c/o abdominal pain. Problem: Discharge Planning:  Intervention: Assess knowledge level of healthcare  Note:   Pt wanting to detox off alcohol and get help. After much counseling, pt more compliant with care. Pt educated regarding seizure precautions and CIWA scale.

## 2019-03-24 NOTE — ED PROVIDER NOTES
I have personally performed a face to face diagnostic evaluation on this patient. I have fully participated in the care of this patient. I have reviewed and agree with all pertinent clinical informationincluding history, physical exam, diagnostic tests, and the plan. History and Physical as follows:  HPI    Patient presents with alcohol withdrawal symptoms. States he last drank 24 hours ago. He is planning 4 to go to a treatment center. Physical Exam    GENERAL: Patient appeared well-developed, mildly tremulous,  well-nourished, vital signs reviewed. MENTAL STATUS: Patient is awake and alert   HEAD AND FACE :Head is normalcephalic. Face normal appearance  EYES: eyes lids and conjunctiva appear normal  CV: Heart regular rate and rhythm without murmur,  LUNGS: Lungs are clear to auscultation   CHEST WALL: normal appearance. normal motion  ABDOMEN: Abdomen is soft to palpation. No tenderness to palpation. Bowel sounds arepresent in all 4 quadrants No masses palpable. No CV tenderness present. No Bruits present. NEUROLOGIC: no weakness or numbness noted   This is a computerized dictation.  I have made every effort to proofread this chart, however, transcription errors may exist.        Twilla Kanner, DO  03/23/19 559 Donna Mueller, DO  03/23/19 2023

## 2019-03-24 NOTE — PROGRESS NOTES
Reassessment complete. Patient resting in bed quietly. VS stable. See body assessment flowsheet. Received PRN medications for pain and nausea. Requires frequent overview of plan of care.  Call light within reach    Eh

## 2019-03-24 NOTE — ED NOTES
Report given to floor but pt unable to go to room at this time d/t a nurse not being available.       December MARINA Chavez  03/23/19 [id number]

## 2019-03-24 NOTE — PROGRESS NOTES
Pt noncompliant with rules of being in hospital gown and not leaving his room without assistance. Advised pt that for his safety he must be in hospital gown and camera placed in room. Pt also in seizure precautions,  Bed alarm engaged d/t noncompliance and for pt safety.

## 2019-03-24 NOTE — H&P
HOSPITALISTS HISTORY AND PHYSICAL    3/23/2019 10:14 PM    Patient Information:  Ho St is a 33 y.o. male [id number]  PCP:  No primary care provider on file. (Tel: None )    Chief complaint:    Chief Complaint   Patient presents with    Alcohol Problem     pt states he is waiting for a bed at the center for addiction and treatment, pt had 1 beer yesterday evening states he is an alcoholic who is trying to quit drink        History of Present Illness:  Chloé Pagan is a 33 y.o. male  Presents with abdominal pain nausea, tremors , and anxiety. He is an alocholic and now wishes to quit. His last drink was yesterday. He went to [facility] for rehab who advised to him to detox first. He has been drinking alcohol for over 10 years. Admits to drinking equivalent of 20 beers and hard liquor every day. Blood alcohol level is undetectable   Smokes cigarettes denies drug use  He allso has chronic pancreatitis    REVIEW OF SYSTEMS:   Constitutional: Negative for fever,chills or night sweats  ENT: Negative for rhinorrhea, epistaxis, hoarseness, sore throat. Respiratory: Negative for shortness of breath,wheezing  Cardiovascular: Negative for chest pain, palpitations   Gastrointestinal: Negative for nausea, vomiting, diarrhea  Genitourinary: Negative for polyuria, dysuria   Hematologic/Lymphatic: Negative for bleeding tendency, easy bruising  Musculoskeletal: Negative for myalgias and arthralgias  Neurologic: Negative for confusion,dysarthria. Skin: Negative for itching,rash  Psychiatric: Negative for depression,anxiety, agitation. Endocrine: Negative for polydipsia,polyuria,heat /cold intolerance. Past Medical History:   has a past medical history of Alcohol abuse, Anxiety, Herpes genitalis in men, Pancreatitis, Pneumonia, Portal vein thrombosis, Seizures ([facility] [address] and Tobacco abuse.      Past Surgical History:   has a past surgical history that includes ERCP equal  ENT: Moist mucus membranes, no thrush. Trachea midline. Cardiovascular: Regular rhythm, normal S1, S2. No murmur, gallop, rub. No edema in lower extremities  Respiratory: Clear to auscultation bilaterally, no wheeze, good inspiratory effort  Gastrointestinal: Abdomen soft, non-tender, not distended, normal bowel sounds  Musculoskeletal: No cyanosis in digits, neck supple  Neurology: Cranial nerves grossly intact. Alert and oriented in time, place and person. No speech or motor deficits  Psychiatry: Appropriate affect. Not agitated  Skin: Warm, dry, normal turgor, no rash    Labs:  CBC:   Lab Results   Component Value Date    WBC 7.0 03/23/2019    RBC 5.28 03/23/2019    HGB 16.7 03/23/2019    HCT 47.8 03/23/2019    MCV 90.5 03/23/2019    MCH 31.5 03/23/2019    MCHC 34.9 03/23/2019    RDW 12.4 03/23/2019     03/23/2019    MPV 9.1 03/23/2019     BMP:    Lab Results   Component Value Date     03/23/2019    K 3.6 03/23/2019    K 4.0 02/10/2019     03/23/2019    CO2 24 03/23/2019    BUN 12 03/23/2019    CREATININE 0.7 03/23/2019    CALCIUM 9.9 03/23/2019    GFRAA >60 03/23/2019    LABGLOM >60 03/23/2019    GLUCOSE 103 03/23/2019       Chest Xray:   EKG:         Problem List  Active Problems:    Alcohol withdrawal delirium (HCC)  Resolved Problems:    * No resolved hospital problems. *        Assessment/Plan:     Alcohol with drawal   Ativan per CIWA protocol  Multivitamin infusion  IV fluids  Clear liquid diet  Pain control with toradol    chronic pancreatitis mildly elevated  Lipase  Consider GI consult    Admit as inpatient. I anticipate hospitalization spanni more than two midnights for investigation and treatment of the above medically necessary diagnoses.       Ever Romero MD    3/23/2019 10:14 PM

## 2019-03-24 NOTE — PROGRESS NOTES
Pt very anxious, getting dressed and wanting to shower. Pt steady on feet. He has the hiccups and itching all over. Pt anxious about not receiving ativan and wants to smoke. Message sent to dr. Huyen Joseph. Waiting for response. Advised pt that he can ambulate in room but if he would like to walk outside of his room he needs to call me for safety. Pt advised that he needs to get out of his stress clothes and remain in a gown while in the hospital.  Pt verbalized understanding of all the above.

## 2019-03-24 NOTE — PROGRESS NOTES
Admission assessment complete. Patient resting in bed quietly. VS stable. See body assessment flowsheet. Abrasion noted to back of head; patient denies any falls and states he has \"been scratching a lot because I feel like I have things crawling on me. \" Oriented to room. Call light within reach. Bed alarm on. See [facility] flowsheet.      Albino Pennington

## 2019-03-24 NOTE — PROGRESS NOTES
[facility] PROGRESS NOTE    3/24/2019 11:49 AM        Name: Marybel Brown . Admitted: 3/23/2019  Primary Care Provider: No primary care provider on file. (Tel: None)                        Hospital course:    Presents with abdominal pain nausea, tremors , and anxiety. He is an alocholic and now wishes to quit. His last drink was yesterday. He went to [facility] for rehab who advised to him to detox first    Subjective:  . No acute events overnight. Resting well. Pain control. Diet ok. Labs reviewed  Denies any chest pain sob.      Reviewed interval ancillary notes    Current Medications    morphine (PF) injection 2 mg Q3H PRN   morphine 4 MG/ML injection    sodium chloride flush 0.9 % injection 10 mL 2 times per day   sodium chloride flush 0.9 % injection 10 mL PRN   nicotine (NICODERM CQ) 21 MG/24HR 1 patch Daily   LORazepam (ATIVAN) tablet 1 mg Q1H PRN   Or    LORazepam (ATIVAN) injection 1 mg Q1H PRN   Or    LORazepam (ATIVAN) tablet 2 mg Q1H PRN   Or    LORazepam (ATIVAN) injection 2 mg Q1H PRN   Or    LORazepam (ATIVAN) tablet 3 mg Q1H PRN   Or    LORazepam (ATIVAN) injection 3 mg Q1H PRN   Or    LORazepam (ATIVAN) tablet 4 mg Q1H PRN   Or    LORazepam (ATIVAN) injection 4 mg Q1H PRN   ondansetron (ZOFRAN) injection 4 mg Q6H PRN   enoxaparin (LOVENOX) injection 40 mg Daily   sodium chloride 0.9 % 0,743 mL with folic acid 1 mg, adult multi-vitamin with vitamin k 10 mL, thiamine 100 mg Daily   acetaminophen (TYLENOL) tablet 650 mg Q4H PRN   0.9 % sodium chloride infusion Continuous   ketorolac (TORADOL) injection 15 mg Q6H PRN       Objective:  /69   Pulse 75   Temp 97.2 °F (36.2 °C) (Temporal)   Resp 20   Ht 5' 8\" (1.727 m)   Wt 148 lb (67.1 kg)   SpO2 95%   BMI 22.50 kg/m²     Intake/Output Summary (Last 24 hours) at 3/24/2019 1149  Last data filed at 3/24/2019 0932  Gross per 24 hour   Intake 1770.8 ml   Output 600 ml   Net 1170.8 ml    Wt Readings from Last 3 Encounters:   03/23/19 148 lb (67.1 kg)   02/18/19 153 lb (69.4 kg)   02/10/19 150 lb (68 kg)       General appearance:  Appears comfortable  Eyes: Sclera clear. Pupils equal.  ENT: Moist oral mucosa. Trachea midline, no adenopathy. Cardiovascular: Regular rhythm, normal S1, S2. No murmur. No edema in lower extremities  Respiratory: Not using accessory muscles. Good inspiratory effort. Clear to auscultation bilaterally, no wheeze or crackles. GI: Abdomen soft, no tenderness, not distended, normal bowel sounds  Musculoskeletal: No cyanosis in digits, neck supple  Neurology: CN 2-12 grossly intact. No speech or motor deficits  Psych: Normal affect. Alert and oriented in time, place and person  Skin: Warm, dry, normal turgor    Labs and Tests:  CBC:   Recent Labs     03/23/19  1815   WBC 7.0   HGB 16.7        BMP:  Recent Labs     03/23/19  1815      K 3.6      CO2 24   BUN 12   CREATININE 0.7*   GLUCOSE 103*     Hepatic: Recent Labs     03/23/19  1815   AST 17   ALT 10   BILITOT 0.7   ALKPHOS 67         Problem List  Active Problems:    Alcohol withdrawal delirium ([facility] [address]  Resolved Problems:    * No resolved hospital problems. *       Assessment & Plan: 1. Admits to drinking equivalent of 20 beers and hard liquor every day, c/w WA protocol  2.elevated lipase, hx of chronic pancreatitis, repeat lipase tomorrow      Diet: DIET CLEAR LIQUID;  Code:Full Code  DVT PPX lovenox       Cyndy Lake MD   3/24/2019 11:49 AM

## 2019-03-24 NOTE — ED PROVIDER NOTES
Triage Chief Complaint:   Alcohol Problem (pt states he is waiting for a bed at the center for addiction and treatment, pt had 1 beer yesterday evening states he is an alcoholic who is trying to quit drink)    ALISHA:  Zee Bustos is a 33 y.o. male that presents for alcohol withdrawal.  Patient reports that he has been drinking alcohol for approximately 10 years. He reports that he drinks 6-824 ounce beers daily. He reports his last drink was last night. He does not drink any alcohol today. He reports that he is waiting to get into [facility] for alcohol detox. He reports that there are no beds available and he started having withdrawal symptoms today. Reports they've never gone through any type of alcohol withdrawal before. He reports that he's very anxious, jittery, feels like his skin is crawling, has been having nausea and vomiting and tremors. He reports that he did take an Ativan that a friend gave him earlier today but no other illicit drug use. Denies any suicidal or homicidal ideation at this time. Denies any pain at this time. Denies fever, chills, cough, shortness of breath, chest pain, abdominal pain, joint pain, numbness, tingling, focal weakness or other associated signs or symptoms. ROS:  At least 10 systems reviewed and otherwise negative except as in the [address]     PAST MEDICAL HISTORY/SURGICAL HISTORY    Past Medical History:   Diagnosis Date    Alcohol abuse     Anxiety     Herpes genitalis in men     Pancreatitis     Pneumonia     Portal vein thrombosis     pt denied    Seizures ([facility] [address]     per pt    Tobacco abuse      Past Surgical History:   Procedure Laterality Date    ENDOSCOPY, COLON, DIAGNOSTIC  10/28/2013    Endoscopic retrograde cholangiopancreatography with pancreatic stent placement    ENDOSCOPY, COLON, DIAGNOSTIC  03/23/2018    Esophagogastroduodenoscopy with biopsy    ERCP  1/10/14    with stent removal       CURRENT MEDICATIONS    Current Outpatient Rx Medication Sig Dispense Refill    ondansetron (ZOFRAN) 4 MG tablet Take 1 tablet by mouth every 8 hours as needed for Nausea 20 tablet 0    famotidine (PEPCID) 20 MG tablet Take 1 tablet by mouth 2 times daily 60 tablet 0    ARIPiprazole (ABILIFY) 10 MG tablet Take 10 mg by mouth daily      benztropine (COGENTIN) 0.5 MG tablet Take 0.5 mg by mouth 2 times daily         ALLERGIES    Allergies   Allergen Reactions    Amoxicillin Hives    Haldol [Haloperidol Lactate] Other (See Comments)     Muscle spasms    Phenergan [Promethazine Hcl] Other (See Comments)     Pt believes it caused muscle spasms    Glucosamine Itching      Itching of throat when eating shrimp. Pt states has had IV contrast for CT's without problem before.  Shellfish-Derived Products      Patient gets anxious around seafood       FAMILY HISTORY/SOCIAL HISTORY    Family History   Problem Relation Age of Onset    Hypertension Father     High Blood Pressure Father     Alcohol Abuse Father     Depression Mother     High Blood Pressure Paternal Grandfather     Alcohol Abuse Paternal Grandfather     Heart Disease Paternal Grandmother     Anemia Paternal Grandmother     Depression Maternal Aunt     Alcohol Abuse Paternal Aunt     Alcohol Abuse Paternal Uncle        Social History     Socioeconomic History    Marital status: Single     Spouse name: None    Number of children: 1    Years of education: 15    Highest education level: None   Occupational History    Occupation:    Social Needs    Financial resource strain: None    Food insecurity:     Worry: None     Inability: None    Transportation needs:     Medical: None     Non-medical: None   Tobacco Use    Smoking status: Current Every Day Smoker     Packs/day: 0.50     Years: 10.00     Pack years: 5.00     Types: Cigarettes     Start date: 11/21/2007    Smokeless tobacco: Never Used   Substance and Sexual Activity    Alcohol use:  Yes     Alcohol/week: 12.0 oz Types: 20 Cans of beer per week    Drug use: No    Sexual activity: Never   Lifestyle    Physical activity:     Days per week: None     Minutes per session: None    Stress: None   Relationships    Social connections:     Talks on phone: None     Gets together: None     Attends Jewish service: None     Active member of club or organization: None     Attends meetings of clubs or organizations: None     Relationship status: None    Intimate partner violence:     Fear of current or ex partner: None     Emotionally abused: None     Physically abused: None     Forced sexual activity: None   Other Topics Concern    None   Social History Narrative    None       Nursing Notes Reviewed    Physical Exam:  ED Triage Vitals [03/23/19 1706]   Enc Vitals Group      BP (!) 147/83      Pulse 104      Resp 16      Temp 98.6 °F (37 °C)      Temp src       SpO2 100 %      Weight 150 lb (68 kg)      Height 5' 8\" (1.727 m)      Head Circumference       Peak Flow       Pain Score       Pain Loc       Pain Edu? Excl. in [address]? GENERAL APPEARANCE: Awake and alert. Cooperative. Very anxious and fidgety, tremulous  HEAD: Normocephalic. Atraumatic. EYES: EOM's grossly intact. Sclera anicteric. PERRLA  ENT: Mucous membranes are moist. Tolerates saliva. No trismus. Normal nose, patent airway, nonerythematous TMs and EACs  NECK: Supple. No meningismus. Trachea midline. HEART: Tachycardic. Radial pulses 2+. LUNGS: Respirations unlabored. CTAB  ABDOMEN: Soft. Non-tender. No guarding or rebound. NBS  EXTREMITIES: No acute deformities. Full range of motion of upper and lower extremities  SKIN: Warm and dry. NEUROLOGICAL: No gross facial drooping. Moves all 4 extremities spontaneously.     I have reviewed and interpreted all of the currently available lab results from this visit (if applicable):  Results for orders placed or performed during the hospital encounter of 03/23/19   CBC Auto Differential   Result Value Ref Range Ref Range    Color, UA YELLOW Straw/Yellow    Clarity, UA Clear Clear    Glucose, Ur Negative Negative mg/dL    Bilirubin Urine Negative Negative    Ketones, Urine Negative Negative mg/dL    Specific Gravity, UA 1.025 1.005 - 1.030    Blood, Urine Negative Negative    pH, UA 6.5 5.0 - 8.0    Protein, UA Negative Negative mg/dL    Urobilinogen, Urine 0.2 <2.0 E.U./dL    Nitrite, Urine Negative Negative    Leukocyte Esterase, Urine Negative Negative    Microscopic Examination Not Indicated     Urine Reflex to Culture Not Indicated     Urine Type Not Specified    Acetaminophen Level   Result Value Ref Range    Acetaminophen Level <5 (L) 10 - 30 ug/mL   Salicylate   Result Value Ref Range    Salicylate, Serum <8.5 (L) 15.0 - 30.0 mg/dL          MDM/ED COURSE:    Supervising physician: Dr. Erickson Charles    My initial CIWA score was 19. Ativan orders were initiated as well as IV fluids and zofran. CBC and CMP are remarkable. Ethanol level negative. Salicylate and Tylenol levels are negative. UA is negative for infection or stone. Urine drug screen is positive for benzodiazepines. Given his high CIWA score and concern for worsening withdrawal symptoms, I believe patient shoulder be admitted for further evaluation and management. Discussed results with patient who is in agreement. Consulted the hospitalist. Patient's history, symptoms, and ED course were discussed in detail. They agree to accept the patient for admission. Clinical Impression:  1.  Alcohol withdrawal syndrome without complication ([facility] [address]        DISCHARGE MEDICATIONS:  New Prescriptions    No medications on file       (Please note that portions of this note may have been completed with a voice recognition program. Efforts were made to edit the dictations but occasionally words are mis-transcribed.)         Keisha Guardado PA-C  03/23/19 2097

## 2019-03-25 ENCOUNTER — APPOINTMENT (OUTPATIENT)
Dept: CT IMAGING | Age: 29
End: 2019-03-25
Payer: COMMERCIAL

## 2019-03-25 ENCOUNTER — HOSPITAL ENCOUNTER (EMERGENCY)
Age: 29
Discharge: HOME | End: 2019-03-25
Attending: EMERGENCY MEDICINE
Payer: COMMERCIAL

## 2019-03-25 VITALS
TEMPERATURE: 97.8 F | WEIGHT: 140 LBS | SYSTOLIC BLOOD PRESSURE: 117 MMHG | OXYGEN SATURATION: 95 % | RESPIRATION RATE: 16 BRPM | DIASTOLIC BLOOD PRESSURE: 74 MMHG | HEART RATE: 97 BPM | BODY MASS INDEX: 21.22 KG/M2 | HEIGHT: 68 IN

## 2019-03-25 VITALS
HEIGHT: 68 IN | TEMPERATURE: 97.6 F | OXYGEN SATURATION: 96 % | SYSTOLIC BLOOD PRESSURE: 105 MMHG | HEART RATE: 58 BPM | RESPIRATION RATE: 16 BRPM | BODY MASS INDEX: 20.85 KG/M2 | DIASTOLIC BLOOD PRESSURE: 70 MMHG | WEIGHT: 137.6 LBS

## 2019-03-25 DIAGNOSIS — R10.9 ABDOMINAL PAIN, UNSPECIFIED ABDOMINAL LOCATION: Primary | ICD-10-CM

## 2019-03-25 DIAGNOSIS — R11.2 NAUSEA AND VOMITING, INTRACTABILITY OF VOMITING NOT SPECIFIED, UNSPECIFIED VOMITING TYPE: ICD-10-CM

## 2019-03-25 LAB
A/G RATIO: 1.7 (ref 1.1–2.2)
ALBUMIN SERPL-MCNC: 4.8 G/DL (ref 3.4–5)
ALP BLD-CCNC: 57 U/L (ref 40–129)
ALT SERPL-CCNC: 9 U/L (ref 10–40)
ANION GAP SERPL CALCULATED.3IONS-SCNC: 11 MMOL/L (ref 3–16)
ANION GAP SERPL CALCULATED.3IONS-SCNC: 13 MMOL/L (ref 3–16)
AST SERPL-CCNC: 20 U/L (ref 15–37)
BASOPHILS ABSOLUTE: 0 K/UL (ref 0–0.2)
BASOPHILS RELATIVE PERCENT: 0.3 %
BILIRUB SERPL-MCNC: 1.1 MG/DL (ref 0–1)
BILIRUBIN URINE: NEGATIVE
BLOOD, URINE: NEGATIVE
BUN BLDV-MCNC: 7 MG/DL (ref 7–20)
BUN BLDV-MCNC: 8 MG/DL (ref 7–20)
CALCIUM SERPL-MCNC: 9 MG/DL (ref 8.3–10.6)
CALCIUM SERPL-MCNC: 9.3 MG/DL (ref 8.3–10.6)
CHLORIDE BLD-SCNC: 107 MMOL/L (ref 99–110)
CHLORIDE BLD-SCNC: 99 MMOL/L (ref 99–110)
CLARITY: CLEAR
CO2: 21 MMOL/L (ref 21–32)
CO2: 21 MMOL/L (ref 21–32)
COLOR: YELLOW
CREAT SERPL-MCNC: 0.7 MG/DL (ref 0.9–1.3)
CREAT SERPL-MCNC: 0.7 MG/DL (ref 0.9–1.3)
EOSINOPHILS ABSOLUTE: 0.1 K/UL (ref 0–0.6)
EOSINOPHILS RELATIVE PERCENT: 2.1 %
ETHANOL: NORMAL MG/DL (ref 0–0.08)
GFR AFRICAN AMERICAN: >60
GFR AFRICAN AMERICAN: >60
GFR NON-AFRICAN AMERICAN: >60
GFR NON-AFRICAN AMERICAN: >60
GLOBULIN: 2.9 G/DL
GLUCOSE BLD-MCNC: 106 MG/DL (ref 70–99)
GLUCOSE BLD-MCNC: 85 MG/DL (ref 70–99)
GLUCOSE URINE: NEGATIVE MG/DL
HCT VFR BLD CALC: 42.7 % (ref 40.5–52.5)
HCT VFR BLD CALC: 43.1 % (ref 40.5–52.5)
HEMOGLOBIN: 14.9 G/DL (ref 13.5–17.5)
HEMOGLOBIN: 15 G/DL (ref 13.5–17.5)
KETONES, URINE: NEGATIVE MG/DL
LEUKOCYTE ESTERASE, URINE: NEGATIVE
LIPASE: 18 U/L (ref 13–60)
LIPASE: 26 U/L (ref 13–60)
LYMPHOCYTES ABSOLUTE: 0.8 K/UL (ref 1–5.1)
LYMPHOCYTES RELATIVE PERCENT: 11.8 %
MAGNESIUM: 2.2 MG/DL (ref 1.8–2.4)
MCH RBC QN AUTO: 31.7 PG (ref 26–34)
MCH RBC QN AUTO: 32.1 PG (ref 26–34)
MCHC RBC AUTO-ENTMCNC: 34.5 G/DL (ref 31–36)
MCHC RBC AUTO-ENTMCNC: 35.1 G/DL (ref 31–36)
MCV RBC AUTO: 91.5 FL (ref 80–100)
MCV RBC AUTO: 91.8 FL (ref 80–100)
MICROSCOPIC EXAMINATION: NORMAL
MONOCYTES ABSOLUTE: 0.5 K/UL (ref 0–1.3)
MONOCYTES RELATIVE PERCENT: 7.2 %
NEUTROPHILS ABSOLUTE: 5.4 K/UL (ref 1.7–7.7)
NEUTROPHILS RELATIVE PERCENT: 78.6 %
NITRITE, URINE: NEGATIVE
PDW BLD-RTO: 12.3 % (ref 12.4–15.4)
PDW BLD-RTO: 12.3 % (ref 12.4–15.4)
PH UA: 5.5 (ref 5–8)
PLATELET # BLD: 161 K/UL (ref 135–450)
PLATELET # BLD: 188 K/UL (ref 135–450)
PMV BLD AUTO: 8.6 FL (ref 5–10.5)
PMV BLD AUTO: 8.9 FL (ref 5–10.5)
POTASSIUM REFLEX MAGNESIUM: 4.4 MMOL/L (ref 3.5–5.1)
POTASSIUM SERPL-SCNC: 4.2 MMOL/L (ref 3.5–5.1)
PROTEIN UA: NEGATIVE MG/DL
RBC # BLD: 4.66 M/UL (ref 4.2–5.9)
RBC # BLD: 4.7 M/UL (ref 4.2–5.9)
SODIUM BLD-SCNC: 133 MMOL/L (ref 136–145)
SODIUM BLD-SCNC: 139 MMOL/L (ref 136–145)
SPECIFIC GRAVITY UA: 1.01 (ref 1–1.03)
TOTAL PROTEIN: 7.7 G/DL (ref 6.4–8.2)
URINE REFLEX TO CULTURE: NORMAL
URINE TYPE: NORMAL
UROBILINOGEN, URINE: 0.2 E.U./DL
WBC # BLD: 4.5 K/UL (ref 4–11)
WBC # BLD: 6.9 K/UL (ref 4–11)

## 2019-03-25 PROCEDURE — 6360000002 HC RX W HCPCS: Performed by: HOSPITALIST

## 2019-03-25 PROCEDURE — 85025 COMPLETE CBC W/AUTO DIFF WBC: CPT

## 2019-03-25 PROCEDURE — 85027 COMPLETE CBC AUTOMATED: CPT

## 2019-03-25 PROCEDURE — 96361 HYDRATE IV INFUSION ADD-ON: CPT

## 2019-03-25 PROCEDURE — 96375 TX/PRO/DX INJ NEW DRUG ADDON: CPT

## 2019-03-25 PROCEDURE — G0480 DRUG TEST DEF 1-7 CLASSES: HCPCS

## 2019-03-25 PROCEDURE — 6360000002 HC RX W HCPCS: Performed by: INTERNAL MEDICINE

## 2019-03-25 PROCEDURE — 80053 COMPREHEN METABOLIC PANEL: CPT

## 2019-03-25 PROCEDURE — 6370000000 HC RX 637 (ALT 250 FOR IP): Performed by: FAMILY MEDICINE

## 2019-03-25 PROCEDURE — 6360000004 HC RX CONTRAST MEDICATION: Performed by: EMERGENCY MEDICINE

## 2019-03-25 PROCEDURE — 99284 EMERGENCY DEPT VISIT MOD MDM: CPT

## 2019-03-25 PROCEDURE — 83690 ASSAY OF LIPASE: CPT

## 2019-03-25 PROCEDURE — 83735 ASSAY OF MAGNESIUM: CPT

## 2019-03-25 PROCEDURE — 36415 COLL VENOUS BLD VENIPUNCTURE: CPT

## 2019-03-25 PROCEDURE — 74177 CT ABD & PELVIS W/CONTRAST: CPT

## 2019-03-25 PROCEDURE — 96374 THER/PROPH/DIAG INJ IV PUSH: CPT

## 2019-03-25 PROCEDURE — 6370000000 HC RX 637 (ALT 250 FOR IP): Performed by: HOSPITALIST

## 2019-03-25 PROCEDURE — 2580000003 HC RX 258: Performed by: EMERGENCY MEDICINE

## 2019-03-25 PROCEDURE — 6360000002 HC RX W HCPCS: Performed by: EMERGENCY MEDICINE

## 2019-03-25 PROCEDURE — 2580000003 HC RX 258: Performed by: FAMILY MEDICINE

## 2019-03-25 PROCEDURE — 81003 URINALYSIS AUTO W/O SCOPE: CPT

## 2019-03-25 RX ORDER — ONDANSETRON 4 MG/1
4 TABLET, FILM COATED ORAL EVERY 8 HOURS PRN
Qty: 10 TABLET | Refills: 0 | Status: SHIPPED | OUTPATIENT
Start: 2019-03-25 | End: 2019-04-03 | Stop reason: ALTCHOICE

## 2019-03-25 RX ORDER — ONDANSETRON 2 MG/ML
4 INJECTION INTRAMUSCULAR; INTRAVENOUS ONCE
Status: COMPLETED | OUTPATIENT
Start: 2019-03-25 | End: 2019-03-25

## 2019-03-25 RX ORDER — 0.9 % SODIUM CHLORIDE 0.9 %
1000 INTRAVENOUS SOLUTION INTRAVENOUS ONCE
Status: COMPLETED | OUTPATIENT
Start: 2019-03-25 | End: 2019-03-25

## 2019-03-25 RX ORDER — CHLORDIAZEPOXIDE HYDROCHLORIDE 25 MG/1
25 CAPSULE, GELATIN COATED ORAL 4 TIMES DAILY
Status: DISCONTINUED | OUTPATIENT
Start: 2019-03-25 | End: 2019-03-25 | Stop reason: HOSPADM

## 2019-03-25 RX ORDER — DICYCLOMINE HYDROCHLORIDE 10 MG/1
20 CAPSULE ORAL EVERY 6 HOURS PRN
Qty: 20 CAPSULE | Refills: 0 | Status: SHIPPED | OUTPATIENT
Start: 2019-03-25 | End: 2019-04-03 | Stop reason: ALTCHOICE

## 2019-03-25 RX ORDER — MORPHINE SULFATE 10 MG/ML
6 INJECTION, SOLUTION INTRAMUSCULAR; INTRAVENOUS ONCE
Status: COMPLETED | OUTPATIENT
Start: 2019-03-25 | End: 2019-03-25

## 2019-03-25 RX ORDER — 0.9 % SODIUM CHLORIDE 0.9 %
1000 INTRAVENOUS SOLUTION INTRAVENOUS ONCE
Status: DISCONTINUED | OUTPATIENT
Start: 2019-03-25 | End: 2019-03-25

## 2019-03-25 RX ADMIN — MORPHINE SULFATE 6 MG: 10 INJECTION INTRAVENOUS at 15:30

## 2019-03-25 RX ADMIN — LORAZEPAM 3 MG: 2 INJECTION INTRAMUSCULAR; INTRAVENOUS at 05:18

## 2019-03-25 RX ADMIN — MORPHINE SULFATE 2 MG: 2 INJECTION, SOLUTION INTRAMUSCULAR; INTRAVENOUS at 00:27

## 2019-03-25 RX ADMIN — ONDANSETRON 4 MG: 2 INJECTION INTRAMUSCULAR; INTRAVENOUS at 15:30

## 2019-03-25 RX ADMIN — CHLORDIAZEPOXIDE HYDROCHLORIDE 25 MG: 25 CAPSULE ORAL at 11:16

## 2019-03-25 RX ADMIN — SODIUM CHLORIDE: 9 INJECTION, SOLUTION INTRAVENOUS at 05:18

## 2019-03-25 RX ADMIN — LORAZEPAM 4 MG: 2 INJECTION INTRAMUSCULAR; INTRAVENOUS at 07:36

## 2019-03-25 RX ADMIN — MORPHINE SULFATE 2 MG: 2 INJECTION, SOLUTION INTRAMUSCULAR; INTRAVENOUS at 06:29

## 2019-03-25 RX ADMIN — IOPAMIDOL 75 ML: 755 INJECTION, SOLUTION INTRAVENOUS at 16:03

## 2019-03-25 RX ADMIN — LORAZEPAM 3 MG: 1 TABLET ORAL at 02:13

## 2019-03-25 RX ADMIN — SODIUM CHLORIDE 1000 ML: 9 INJECTION, SOLUTION INTRAVENOUS at 15:30

## 2019-03-25 RX ADMIN — MORPHINE SULFATE 2 MG: 2 INJECTION, SOLUTION INTRAMUSCULAR; INTRAVENOUS at 09:58

## 2019-03-25 RX ADMIN — MORPHINE SULFATE 2 MG: 2 INJECTION, SOLUTION INTRAMUSCULAR; INTRAVENOUS at 03:37

## 2019-03-25 RX ADMIN — LORAZEPAM 4 MG: 2 INJECTION INTRAMUSCULAR; INTRAVENOUS at 10:15

## 2019-03-25 ASSESSMENT — PAIN SCALES - GENERAL
PAINLEVEL_OUTOF10: 7
PAINLEVEL_OUTOF10: 8
PAINLEVEL_OUTOF10: 10
PAINLEVEL_OUTOF10: 10
PAINLEVEL_OUTOF10: 8
PAINLEVEL_OUTOF10: 7
PAINLEVEL_OUTOF10: 10

## 2019-03-25 ASSESSMENT — PAIN DESCRIPTION - LOCATION: LOCATION: ABDOMEN

## 2019-03-25 NOTE — PROGRESS NOTES
Message sent to Dr Jaimie Neri to come evaluate patient. CIWA scale not calming patient down.  4mg atican given Per CIWA

## 2019-03-25 NOTE — PROGRESS NOTES
Pt wandering halls, assisted back to room. Pt refusing to stay in his room, after he had been advised for safety as he had been given Ativan that he needed to stay in his bed. Pt advised he is under seizure precautions, due to the withdrawal from alcohol. Pt c/o pain in his abdomen, advised Pt that he could have toradol but his Morphine was recently given and isn't due again until 2130. Pt refusing to stay in bed, Pt not listening to this RN about why it is important he follow my instructions. Charge RN notifying MD for this RN as someone had to stay with Pt as he continued to try to wander and was refusing to get into bed.

## 2019-03-25 NOTE — PROGRESS NOTES
8:22 AM: Patient very anxious, putting on clothes. Stating he wants to eat or he will leave. Attempted to educate patient on the need to stay for medical care. Patient understands but does not agree with care. Offered AMA patient declined at this time. PRN ativan given per CIWA scale.

## 2019-03-25 NOTE — PROGRESS NOTES
Pt was previously told if he left the floor/room he would need to sign AMA papers. Pt verbalized understanding. Pt standing at doorway all morning, attempting to leave. Dr. Loi Bird in room states he will start librium and see how patient is feeling. Pt received dose. Notified by MT that he may have left room, went in to check and patient was gone. Notified Loi Bird, agrees with AMA. No code brown called. This RN and pts RN walked to [facility] d/t pt having IV in still. Pt came back to room during this time. IV removed, AMA papers signed. Pt escorted to [facility] with security and Ronen Underwood RN.

## 2019-03-27 ENCOUNTER — HOSPITAL ENCOUNTER (EMERGENCY)
Age: 29
Discharge: HOME | DRG: 770 | End: 2019-03-28
Attending: EMERGENCY MEDICINE
Payer: COMMERCIAL

## 2019-03-27 DIAGNOSIS — F10.930 ALCOHOL WITHDRAWAL SYNDROME WITHOUT COMPLICATION (HCC): Primary | ICD-10-CM

## 2019-03-27 LAB
A/G RATIO: 1.9 (ref 1.1–2.2)
ACETAMINOPHEN LEVEL: <5 UG/ML (ref 10–30)
ALBUMIN SERPL-MCNC: 4.5 G/DL (ref 3.4–5)
ALP BLD-CCNC: 52 U/L (ref 40–129)
ALT SERPL-CCNC: 9 U/L (ref 10–40)
ANION GAP SERPL CALCULATED.3IONS-SCNC: 11 MMOL/L (ref 3–16)
APTT: 31 SEC (ref 26–36)
AST SERPL-CCNC: 15 U/L (ref 15–37)
BASOPHILS ABSOLUTE: 0 K/UL (ref 0–0.2)
BASOPHILS RELATIVE PERCENT: 0.4 %
BILIRUB SERPL-MCNC: 0.4 MG/DL (ref 0–1)
BUN BLDV-MCNC: 12 MG/DL (ref 7–20)
CALCIUM SERPL-MCNC: 8.8 MG/DL (ref 8.3–10.6)
CHLORIDE BLD-SCNC: 106 MMOL/L (ref 99–110)
CO2: 22 MMOL/L (ref 21–32)
CREAT SERPL-MCNC: 0.6 MG/DL (ref 0.9–1.3)
EOSINOPHILS ABSOLUTE: 0.2 K/UL (ref 0–0.6)
EOSINOPHILS RELATIVE PERCENT: 4 %
ETHANOL: NORMAL MG/DL (ref 0–0.08)
GFR AFRICAN AMERICAN: >60
GFR NON-AFRICAN AMERICAN: >60
GLOBULIN: 2.4 G/DL
GLUCOSE BLD-MCNC: 96 MG/DL (ref 70–99)
HCT VFR BLD CALC: 39.3 % (ref 40.5–52.5)
HEMOGLOBIN: 13.8 G/DL (ref 13.5–17.5)
INR BLD: 1.1 (ref 0.86–1.14)
LIPASE: 31 U/L (ref 13–60)
LYMPHOCYTES ABSOLUTE: 1.4 K/UL (ref 1–5.1)
LYMPHOCYTES RELATIVE PERCENT: 27 %
MCH RBC QN AUTO: 31.8 PG (ref 26–34)
MCHC RBC AUTO-ENTMCNC: 35.2 G/DL (ref 31–36)
MCV RBC AUTO: 90.3 FL (ref 80–100)
MONOCYTES ABSOLUTE: 0.5 K/UL (ref 0–1.3)
MONOCYTES RELATIVE PERCENT: 10.1 %
NEUTROPHILS ABSOLUTE: 3 K/UL (ref 1.7–7.7)
NEUTROPHILS RELATIVE PERCENT: 58.5 %
PDW BLD-RTO: 12.3 % (ref 12.4–15.4)
PLATELET # BLD: 182 K/UL (ref 135–450)
PMV BLD AUTO: 8.2 FL (ref 5–10.5)
POTASSIUM SERPL-SCNC: 3.8 MMOL/L (ref 3.5–5.1)
PROTHROMBIN TIME: 12.5 SEC (ref 9.8–13)
RBC # BLD: 4.35 M/UL (ref 4.2–5.9)
SALICYLATE, SERUM: <0.3 MG/DL (ref 15–30)
SODIUM BLD-SCNC: 139 MMOL/L (ref 136–145)
TOTAL PROTEIN: 6.9 G/DL (ref 6.4–8.2)
WBC # BLD: 5.1 K/UL (ref 4–11)

## 2019-03-27 PROCEDURE — 85025 COMPLETE CBC W/AUTO DIFF WBC: CPT

## 2019-03-27 PROCEDURE — 6360000002 HC RX W HCPCS: Performed by: PHYSICIAN ASSISTANT

## 2019-03-27 PROCEDURE — 99285 EMERGENCY DEPT VISIT HI MDM: CPT

## 2019-03-27 PROCEDURE — 2500000003 HC RX 250 WO HCPCS: Performed by: PHYSICIAN ASSISTANT

## 2019-03-27 PROCEDURE — 85610 PROTHROMBIN TIME: CPT

## 2019-03-27 PROCEDURE — 96375 TX/PRO/DX INJ NEW DRUG ADDON: CPT

## 2019-03-27 PROCEDURE — 2580000003 HC RX 258: Performed by: PHYSICIAN ASSISTANT

## 2019-03-27 PROCEDURE — 85730 THROMBOPLASTIN TIME PARTIAL: CPT

## 2019-03-27 PROCEDURE — 83690 ASSAY OF LIPASE: CPT

## 2019-03-27 PROCEDURE — 96365 THER/PROPH/DIAG IV INF INIT: CPT

## 2019-03-27 PROCEDURE — G0480 DRUG TEST DEF 1-7 CLASSES: HCPCS

## 2019-03-27 PROCEDURE — 80053 COMPREHEN METABOLIC PANEL: CPT

## 2019-03-27 RX ORDER — SODIUM CHLORIDE 0.9 % (FLUSH) 0.9 %
10 SYRINGE (ML) INJECTION EVERY 12 HOURS SCHEDULED
Status: DISCONTINUED | OUTPATIENT
Start: 2019-03-27 | End: 2019-03-28 | Stop reason: HOSPADM

## 2019-03-27 RX ORDER — LORAZEPAM 1 MG/1
1 TABLET ORAL
Status: DISCONTINUED | OUTPATIENT
Start: 2019-03-27 | End: 2019-03-28 | Stop reason: HOSPADM

## 2019-03-27 RX ORDER — LORAZEPAM 2 MG/ML
1 INJECTION INTRAMUSCULAR
Status: DISCONTINUED | OUTPATIENT
Start: 2019-03-27 | End: 2019-03-28 | Stop reason: HOSPADM

## 2019-03-27 RX ORDER — LORAZEPAM 2 MG/ML
1 INJECTION INTRAMUSCULAR ONCE
Status: COMPLETED | OUTPATIENT
Start: 2019-03-27 | End: 2019-03-27

## 2019-03-27 RX ORDER — LORAZEPAM 1 MG/1
3 TABLET ORAL
Status: DISCONTINUED | OUTPATIENT
Start: 2019-03-27 | End: 2019-03-28 | Stop reason: HOSPADM

## 2019-03-27 RX ORDER — LORAZEPAM 1 MG/1
4 TABLET ORAL
Status: DISCONTINUED | OUTPATIENT
Start: 2019-03-27 | End: 2019-03-28 | Stop reason: HOSPADM

## 2019-03-27 RX ORDER — LORAZEPAM 1 MG/1
2 TABLET ORAL
Status: DISCONTINUED | OUTPATIENT
Start: 2019-03-27 | End: 2019-03-28 | Stop reason: HOSPADM

## 2019-03-27 RX ORDER — ONDANSETRON 2 MG/ML
4 INJECTION INTRAMUSCULAR; INTRAVENOUS ONCE
Status: COMPLETED | OUTPATIENT
Start: 2019-03-27 | End: 2019-03-27

## 2019-03-27 RX ORDER — LORAZEPAM 2 MG/ML
3 INJECTION INTRAMUSCULAR
Status: DISCONTINUED | OUTPATIENT
Start: 2019-03-27 | End: 2019-03-28 | Stop reason: HOSPADM

## 2019-03-27 RX ORDER — SODIUM CHLORIDE 0.9 % (FLUSH) 0.9 %
10 SYRINGE (ML) INJECTION PRN
Status: DISCONTINUED | OUTPATIENT
Start: 2019-03-27 | End: 2019-03-28 | Stop reason: HOSPADM

## 2019-03-27 RX ORDER — LORAZEPAM 2 MG/ML
2 INJECTION INTRAMUSCULAR
Status: DISCONTINUED | OUTPATIENT
Start: 2019-03-27 | End: 2019-03-28 | Stop reason: HOSPADM

## 2019-03-27 RX ORDER — LORAZEPAM 2 MG/ML
4 INJECTION INTRAMUSCULAR
Status: DISCONTINUED | OUTPATIENT
Start: 2019-03-27 | End: 2019-03-28 | Stop reason: HOSPADM

## 2019-03-27 RX ADMIN — FOLIC ACID: 5 INJECTION, SOLUTION INTRAMUSCULAR; INTRAVENOUS; SUBCUTANEOUS at 23:47

## 2019-03-27 RX ADMIN — LORAZEPAM 1 MG: 2 INJECTION INTRAMUSCULAR; INTRAVENOUS at 23:46

## 2019-03-27 RX ADMIN — ONDANSETRON 4 MG: 2 INJECTION INTRAMUSCULAR; INTRAVENOUS at 23:43

## 2019-03-27 ASSESSMENT — PAIN DESCRIPTION - ORIENTATION: ORIENTATION: MID

## 2019-03-27 ASSESSMENT — PAIN SCALES - GENERAL: PAINLEVEL_OUTOF10: 9

## 2019-03-27 ASSESSMENT — PAIN DESCRIPTION - PAIN TYPE: TYPE: ACUTE PAIN

## 2019-03-27 ASSESSMENT — PAIN DESCRIPTION - LOCATION: LOCATION: ABDOMEN

## 2019-03-28 VITALS
HEIGHT: 68 IN | HEART RATE: 60 BPM | SYSTOLIC BLOOD PRESSURE: 100 MMHG | WEIGHT: 150 LBS | DIASTOLIC BLOOD PRESSURE: 74 MMHG | BODY MASS INDEX: 22.73 KG/M2 | RESPIRATION RATE: 22 BRPM | OXYGEN SATURATION: 97 % | TEMPERATURE: 98 F

## 2019-03-28 LAB
EKG ATRIAL RATE: 60 BPM
EKG DIAGNOSIS: NORMAL
EKG P AXIS: 59 DEGREES
EKG P-R INTERVAL: 180 MS
EKG Q-T INTERVAL: 398 MS
EKG QRS DURATION: 96 MS
EKG QTC CALCULATION (BAZETT): 398 MS
EKG R AXIS: 84 DEGREES
EKG T AXIS: 59 DEGREES
EKG VENTRICULAR RATE: 60 BPM

## 2019-03-28 PROCEDURE — 93005 ELECTROCARDIOGRAM TRACING: CPT | Performed by: PHYSICIAN ASSISTANT

## 2019-03-28 PROCEDURE — 93010 ELECTROCARDIOGRAM REPORT: CPT | Performed by: INTERNAL MEDICINE

## 2019-03-28 PROCEDURE — 96366 THER/PROPH/DIAG IV INF ADDON: CPT

## 2019-03-28 PROCEDURE — 6370000000 HC RX 637 (ALT 250 FOR IP): Performed by: PHYSICIAN ASSISTANT

## 2019-03-28 RX ORDER — CHLORDIAZEPOXIDE HYDROCHLORIDE 25 MG/1
25 CAPSULE, GELATIN COATED ORAL 3 TIMES DAILY PRN
Qty: 21 CAPSULE | Refills: 0 | Status: ON HOLD | OUTPATIENT
Start: 2019-03-28 | End: 2019-04-05 | Stop reason: HOSPADM

## 2019-03-28 RX ADMIN — LORAZEPAM 2 MG: 1 TABLET ORAL at 01:03

## 2019-03-28 ASSESSMENT — ENCOUNTER SYMPTOMS
COUGH: 0
NAUSEA: 1
VOMITING: 0
ABDOMINAL PAIN: 0
RHINORRHEA: 0
SHORTNESS OF BREATH: 0
DIARRHEA: 0

## 2019-03-28 NOTE — ED PROVIDER NOTES
[address] [facility]  eMERGENCY dEPARTMENT eNCOUnter        Pt Name: Robbin Baeza  MRN: [id number]  [place] 1990  Date of evaluation: 3/27/2019  Provider: Fabiola De Leon PA-C  PCP: No primary care provider on file. This patient was seen and evaluated by the attending physician Rocio Napoles MD      [address] [facility]       Chief Complaint   Patient presents with    Delirium Tremens (DTS)     pt states he drinks up 20 beer a day and sometimes liquor, pt states a friend said he had a seizure today, pt is hallunciating and having tremors, palms sweaty. HISTORY OF PRESENT ILLNESS   (Location/Symptom, Timing/Onset, Context/Setting, Quality, Duration, Modifying Factors, Severity)  Note limiting factors. Robbin Baeza is a 33 y.o. male resents to the emergency room today for concerns of alcohol withdrawal.  The patient was actually seen in the emergency room on 3/23/2019 and was admitted for alcohol withdrawal.  The patient states that he left AMA on 3/25/2019 as \"I didn't think they could do it\". Patient states that he drinks 12-20 beers a day and he states that he will do shots of liquor. He tells me that he is last drink was yesterday. Patient states that he has had a tremor, and he states that his roommate today noticed that he was lying in bed and \"shaking all over\". Patient was awake and alert but his roommate was concerned that he may have had a seizure. Patient denies any history of seizures. No postictal state. No bladder incontinence. He did not bite his tongue. Patient is complaining of generalized abdominal pain which she has had in the past.  He states it is associated nausea but no vomiting or diarrhea. No fever or chills. No chest or shortness of breath. No urinary symptoms. He denies any illicit drug use.   He has no other complaints at this time    Nursing Notes were all reviewed and agreed with or any disagreements were addressed  in the ALLERGIES     Amoxicillin; Haldol [haloperidol lactate]; Phenergan [promethazine hcl]; Glucosamine; and Shellfish-derived products    FAMILYHISTORY       Family History   Problem Relation Age of Onset    Hypertension Father     High Blood Pressure Father     Alcohol Abuse Father     Depression Mother     High Blood Pressure Paternal Grandfather     Alcohol Abuse Paternal Grandfather     Heart Disease Paternal Grandmother     Anemia Paternal Grandmother     Depression Maternal Aunt     Alcohol Abuse Paternal Aunt     Alcohol Abuse Paternal Uncle           SOCIAL HISTORY       Social History     Socioeconomic History    Marital status: Single     Spouse name: None    Number of children: 1    Years of education: 15    Highest education level: None   Occupational History    Occupation:    Social Needs    Financial resource strain: None    Food insecurity:     Worry: None     Inability: None    Transportation needs:     Medical: None     Non-medical: None   Tobacco Use    Smoking status: Current Every Day Smoker     Packs/day: 0.50     Years: 10.00     Pack years: 5.00     Types: Cigarettes     Start date: 11/21/2007    Smokeless tobacco: Never Used   Substance and Sexual Activity    Alcohol use:  Yes     Alcohol/week: 12.0 oz     Types: 20 Cans of beer per week     Comment: every day    Drug use: No    Sexual activity: Never   Lifestyle    Physical activity:     Days per week: None     Minutes per session: None    Stress: None   Relationships    Social connections:     Talks on phone: None     Gets together: None     Attends Yazidi service: None     Active member of club or organization: None     Attends meetings of clubs or organizations: None     Relationship status: None    Intimate partner violence:     Fear of current or ex partner: None     Emotionally abused: None     Physically abused: None     Forced sexual activity: None   Other Topics Concern    None   Social History Narrative    None       SCREENINGS             PHYSICAL EXAM    (up to 7 for level 4, 8 or more for level 5)     ED Triage Vitals [03/27/19 2041]   BP Temp Temp Source Pulse Resp SpO2 Height Weight   (!) 134/55 98 °F (36.7 °C) Oral 99 14 99 % 5' 8\" (1.727 m) 150 lb (68 kg)       Physical Exam   Constitutional: He is oriented to person, place, and time. He appears well-developed and well-nourished. HENT:   Head: Normocephalic and atraumatic. Right Ear: External ear normal.   Left Ear: External ear normal.   Nose: Nose normal.   Eyes: Right eye exhibits no discharge. Left eye exhibits no discharge. Neck: Normal range of motion. Neck supple. No tracheal deviation present. Cardiovascular: Normal rate, regular rhythm and normal heart sounds. No murmur heard. Pulmonary/Chest: Effort normal and breath sounds normal. No stridor. No respiratory distress. He has no wheezes. Abdominal: Soft. Bowel sounds are normal. He exhibits no distension. There is no tenderness. There is no guarding. Musculoskeletal: Normal range of motion. Neurological: He is alert and oriented to person, place, and time. He displays tremor. GCS eye subscore is 4. GCS verbal subscore is 5. GCS motor subscore is 6. The lateral tremor noted throughout body however when I asked the patient  an object a tremor resolves. Skin: Skin is warm and dry. He is not diaphoretic. Psychiatric: He has a normal mood and affect. His behavior is normal.   Nursing note and vitals reviewed.       DIAGNOSTIC RESULTS   LABS:    Labs Reviewed   CBC WITH AUTO DIFFERENTIAL - Abnormal; Notable for the following components:       Result Value    Hematocrit 39.3 (*)     RDW 12.3 (*)     All other components within normal limits    Narrative:     Performed at:  [facility] [address], [facility], [address]   Phone [phone]   COMPREHENSIVE METABOLIC PANEL - Abnormal; Notable for the following components: CREATININE 0.6 (*)     ALT 9 (*)     All other components within normal limits    Narrative:     Performed at:  [facility] [address] [place]   Phone [phone]   ACETAMINOPHEN LEVEL - Abnormal; Notable for the following components:    Acetaminophen Level <5 (*)     All other components within normal limits    Narrative:     Performed at:  [facility] [address], [place]   Phone [phone]   SALICYLATE LEVEL - Abnormal; Notable for the following components:    Salicylate, Serum <9.2 (*)     All other components within normal limits    Narrative:     Performed at:  [facility] [address], [place]   Phone [phone]   LIPASE    Narrative:     Performed at:  [facility] [address]   Phone [phone]   ETHANOL    Narrative:     Performed at:  [facility] [address] [place]   Phone [phone]   APTT    Narrative:     Performed at:  [facility] [address]   Phone [phone]   PROTIME-INR    Narrative:     Performed at:  [facility] [address]   Phone [phone]   URINE RT REFLEX TO CULTURE   URINE DRUG SCREEN       All other labs were within normal range or not returned as of this dictation. EKG: All EKG's are interpreted by the Emergency Department Physician who either signs orCo-signs this chart in the absence of a cardiologist.  Please see their note for interpretation of EKG.       RADIOLOGY:   Non-plain film images such as CT, Ultrasound and MRI are read by the radiologist. Plain radiographic images are visualized andpreliminarily interpreted by the  ED Provider with the below findings:        Interpretation perthe Radiologist below, if available at the time of this note:    No orders to display     No results found.       PROCEDURES   Unless otherwise noted below, none     Procedures    CRITICAL CARE TIME   N/A    CONSULTS:  None      EMERGENCY DEPARTMENT COURSE and DIFFERENTIALDIAGNOSIS/MDM:   Vitals:    Vitals:    03/28/19 0115 03/28/19 0130 03/28/19 0145 03/28/19 0158   BP:  127/85 100/74 100/74   Pulse: 56 67 61 60   Resp: 22 19 22    Temp:       TempSrc:       SpO2:   97%    Weight:       Height:           Patient was given thefollowing medications:  Medications   sodium chloride flush 0.9 % injection 10 mL (has no administration in time range)   sodium chloride flush 0.9 % injection 10 mL (has no administration in time range)   LORazepam (ATIVAN) tablet 1 mg ( Oral See Alternative 3/28/19 0103)     Or   LORazepam (ATIVAN) injection 1 mg ( Intravenous See Alternative 3/28/19 0103)     Or   LORazepam (ATIVAN) tablet 2 mg (2 mg Oral Given 3/28/19 0103)     Or   LORazepam (ATIVAN) injection 2 mg ( Intravenous See Alternative 3/28/19 0103)     Or   LORazepam (ATIVAN) tablet 3 mg ( Oral See Alternative 3/28/19 0103)     Or   LORazepam (ATIVAN) injection 3 mg ( Intravenous See Alternative 3/28/19 0103)     Or   LORazepam (ATIVAN) tablet 4 mg ( Oral See Alternative 3/28/19 0103)     Or   LORazepam (ATIVAN) injection 4 mg ( Intravenous See Alternative 3/28/19 0103)   sodium chloride 0.9 % 9,481 mL with folic acid 1 mg, adult multi-vitamin with vitamin k 10 mL, thiamine 100 mg ( Intravenous Stopped 3/28/19 0203)   LORazepam (ATIVAN) injection 1 mg (1 mg Intravenous Given 3/27/19 2346)   ondansetron (ZOFRAN) injection 4 mg (4 mg Intravenous Given 3/27/19 2343)       The patient  resents to the emergency room today for concerns of alcohol withdrawal.  The patient was actually seen in the emergency room on 3/23/2019 and was admitted for alcohol withdrawal.  The patient states that he left AMA on

## 2019-03-28 NOTE — ED NOTES
Pt discharged in stable condition, VSS, no signs of distress, discharge instructions and meds reviewed. Pt verbalizes understanding or concerns unaddressed. Ride set up with SHIRLEY grissom 4 hours. Pt to wait in waiting room.      Jatinder Gomez RN  03/28/19 8638

## 2019-03-28 NOTE — ED PROVIDER NOTES
I independently performed a history and physical on Britta Hidalgo. All diagnostic, treatment, and disposition decisions were made by myself in conjunction with the advanced practice provider. Briefly, this is a 33 y.o. male here for possible alcohol withdrawal.  Patient states that he drinks about 20 beers a day and also drinks hard liquor. He has not had any alcohol since last night. Patient states that throughout the day he is been having tremors and he believes that he may have had a seizure. .    On exam, nontoxic-appearing male in no acute distress. No diaphoresis. Vital stable. Pupils equally round and reactive to light, no nystagmus. Moist mucous membranes heart and lungs are normal.  Nonlabored respirations. Patient has resting tremors however the tremors dissipates with intention. No signs of tongue biting no signs of incontinence of urine or stool. EKG  The Ekg interpreted by me in the absence of a cardiologist shows: Normal sinus rhythm at a rate of 60 bpm, VA interval QRS and QTC normal.  Normal axis. No acute ST segment changes. No significant change from prior EKG dated         Screenings            Critical Time  None       MDM  Patient with possible alcohol withdrawal symptoms initial CIWA score was 15 but this was reassessed and has improved after 1 mg of Ativan. Patient has stable vital signs. I do not believe that admission is necessary at this time. Patient will be discharged with medications for his symptoms. Counseling is provided and alcohol cessation. Patient states that he is actively seeking help with quitting alcohol.     Patient Referrals:  Kimberly Busby  [phone]  Schedule an appointment as soon as possible for a visit in 2 days      [facility] Emergency Department  [address] [facility]  [phone]    As needed, If symptoms worsen      Discharge Medications:  Discharge Medication List as of 3/28/2019  2:07 AM      START taking these medications    Details   chlordiazePOXIDE (LIBRIUM) 25 MG capsule Take 1 capsule by mouth 3 times daily as needed for Anxiety for up to 7 days. , Disp-21 capsule, R-0Print             FINAL IMPRESSION  1. Alcohol withdrawal syndrome without complication (HCC)        Blood pressure 100/74, pulse 60, temperature 98 °F (36.7 °C), temperature source Oral, resp. rate 22, height 5' 8\" (1.727 m), weight 150 lb (68 kg), SpO2 97 %. For further details of [facility] emergency department encounter, please see documentation by advanced practice provider.      Nelsy Juares MD  03/28/19 0563       Nelsy Juares MD  03/28/19 0702

## 2019-03-28 NOTE — ED NOTES
Manuela Moreno RN in box reports pt was fine in the waiting. Reported CIWA 18 to Dr. Waldemar Mccain, Per Dr. Whit Mesa bring pt back if he starts seizing.       Maximo Mcnally, [facility]  03/27/19 2049

## 2019-03-30 ENCOUNTER — HOSPITAL ENCOUNTER (INPATIENT)
Age: 29
LOS: 1 days | Discharge: LEFT BEFORE DISCHARGE | DRG: 770 | End: 2019-03-31
Attending: EMERGENCY MEDICINE | Admitting: HOSPITALIST
Payer: COMMERCIAL

## 2019-03-30 DIAGNOSIS — F10.939 ALCOHOL WITHDRAWAL SYNDROME WITH COMPLICATION (HCC): Primary | ICD-10-CM

## 2019-03-30 LAB
A/G RATIO: 1.7 (ref 1.1–2.2)
ACETAMINOPHEN LEVEL: <5 UG/ML (ref 10–30)
ALBUMIN SERPL-MCNC: 5 G/DL (ref 3.4–5)
ALP BLD-CCNC: 62 U/L (ref 40–129)
ALT SERPL-CCNC: 11 U/L (ref 10–40)
AMMONIA: 52 UMOL/L (ref 16–60)
ANION GAP SERPL CALCULATED.3IONS-SCNC: 16 MMOL/L (ref 3–16)
AST SERPL-CCNC: 15 U/L (ref 15–37)
BASOPHILS ABSOLUTE: 0.1 K/UL (ref 0–0.2)
BASOPHILS RELATIVE PERCENT: 0.9 %
BILIRUB SERPL-MCNC: 0.6 MG/DL (ref 0–1)
BUN BLDV-MCNC: 9 MG/DL (ref 7–20)
CALCIUM SERPL-MCNC: 9.3 MG/DL (ref 8.3–10.6)
CHLORIDE BLD-SCNC: 104 MMOL/L (ref 99–110)
CO2: 21 MMOL/L (ref 21–32)
CREAT SERPL-MCNC: 0.7 MG/DL (ref 0.9–1.3)
EOSINOPHILS ABSOLUTE: 0.1 K/UL (ref 0–0.6)
EOSINOPHILS RELATIVE PERCENT: 2.2 %
ETHANOL: 21 MG/DL (ref 0–0.08)
GFR AFRICAN AMERICAN: >60
GFR NON-AFRICAN AMERICAN: >60
GLOBULIN: 3 G/DL
GLUCOSE BLD-MCNC: 99 MG/DL (ref 70–99)
HCT VFR BLD CALC: 44.3 % (ref 40.5–52.5)
HEMOGLOBIN: 15.5 G/DL (ref 13.5–17.5)
INR BLD: 1.11 (ref 0.86–1.14)
LIPASE: 70 U/L (ref 13–60)
LYMPHOCYTES ABSOLUTE: 0.9 K/UL (ref 1–5.1)
LYMPHOCYTES RELATIVE PERCENT: 15.2 %
MCH RBC QN AUTO: 31.5 PG (ref 26–34)
MCHC RBC AUTO-ENTMCNC: 35.1 G/DL (ref 31–36)
MCV RBC AUTO: 89.7 FL (ref 80–100)
MONOCYTES ABSOLUTE: 0.3 K/UL (ref 0–1.3)
MONOCYTES RELATIVE PERCENT: 5.5 %
NEUTROPHILS ABSOLUTE: 4.6 K/UL (ref 1.7–7.7)
NEUTROPHILS RELATIVE PERCENT: 76.2 %
PDW BLD-RTO: 12.5 % (ref 12.4–15.4)
PLATELET # BLD: 227 K/UL (ref 135–450)
PMV BLD AUTO: 8.7 FL (ref 5–10.5)
POTASSIUM SERPL-SCNC: 3.8 MMOL/L (ref 3.5–5.1)
PROTHROMBIN TIME: 12.6 SEC (ref 9.8–13)
RBC # BLD: 4.93 M/UL (ref 4.2–5.9)
SALICYLATE, SERUM: <0.3 MG/DL (ref 15–30)
SODIUM BLD-SCNC: 141 MMOL/L (ref 136–145)
TOTAL PROTEIN: 8 G/DL (ref 6.4–8.2)
WBC # BLD: 6 K/UL (ref 4–11)

## 2019-03-30 PROCEDURE — 82140 ASSAY OF AMMONIA: CPT

## 2019-03-30 PROCEDURE — 2580000003 HC RX 258: Performed by: PHYSICIAN ASSISTANT

## 2019-03-30 PROCEDURE — 80053 COMPREHEN METABOLIC PANEL: CPT

## 2019-03-30 PROCEDURE — G0480 DRUG TEST DEF 1-7 CLASSES: HCPCS

## 2019-03-30 PROCEDURE — 6370000000 HC RX 637 (ALT 250 FOR IP): Performed by: HOSPITALIST

## 2019-03-30 PROCEDURE — 93005 ELECTROCARDIOGRAM TRACING: CPT | Performed by: PHYSICIAN ASSISTANT

## 2019-03-30 PROCEDURE — 96374 THER/PROPH/DIAG INJ IV PUSH: CPT

## 2019-03-30 PROCEDURE — 2500000003 HC RX 250 WO HCPCS: Performed by: PHYSICIAN ASSISTANT

## 2019-03-30 PROCEDURE — 1200000000 HC SEMI PRIVATE

## 2019-03-30 PROCEDURE — 83690 ASSAY OF LIPASE: CPT

## 2019-03-30 PROCEDURE — 6360000002 HC RX W HCPCS: Performed by: HOSPITALIST

## 2019-03-30 PROCEDURE — 6360000002 HC RX W HCPCS: Performed by: NURSE PRACTITIONER

## 2019-03-30 PROCEDURE — 6370000000 HC RX 637 (ALT 250 FOR IP): Performed by: PHYSICIAN ASSISTANT

## 2019-03-30 PROCEDURE — 85610 PROTHROMBIN TIME: CPT

## 2019-03-30 PROCEDURE — 85025 COMPLETE CBC W/AUTO DIFF WBC: CPT

## 2019-03-30 PROCEDURE — 2580000003 HC RX 258: Performed by: HOSPITALIST

## 2019-03-30 PROCEDURE — 99284 EMERGENCY DEPT VISIT MOD MDM: CPT

## 2019-03-30 PROCEDURE — 96375 TX/PRO/DX INJ NEW DRUG ADDON: CPT

## 2019-03-30 PROCEDURE — 6360000002 HC RX W HCPCS: Performed by: PHYSICIAN ASSISTANT

## 2019-03-30 RX ORDER — POTASSIUM CHLORIDE 20 MEQ/1
40 TABLET, EXTENDED RELEASE ORAL PRN
Status: DISCONTINUED | OUTPATIENT
Start: 2019-03-30 | End: 2019-04-01 | Stop reason: HOSPADM

## 2019-03-30 RX ORDER — LORAZEPAM 2 MG/ML
1 INJECTION INTRAMUSCULAR
Status: DISCONTINUED | OUTPATIENT
Start: 2019-03-30 | End: 2019-03-30 | Stop reason: SDUPTHER

## 2019-03-30 RX ORDER — SODIUM CHLORIDE 0.9 % (FLUSH) 0.9 %
10 SYRINGE (ML) INJECTION EVERY 12 HOURS SCHEDULED
Status: DISCONTINUED | OUTPATIENT
Start: 2019-03-30 | End: 2019-03-30 | Stop reason: SDUPTHER

## 2019-03-30 RX ORDER — LORAZEPAM 1 MG/1
4 TABLET ORAL
Status: DISCONTINUED | OUTPATIENT
Start: 2019-03-30 | End: 2019-03-31

## 2019-03-30 RX ORDER — THIAMINE MONONITRATE (VIT B1) 100 MG
100 TABLET ORAL DAILY
Status: DISCONTINUED | OUTPATIENT
Start: 2019-03-31 | End: 2019-04-01 | Stop reason: HOSPADM

## 2019-03-30 RX ORDER — LORAZEPAM 2 MG/ML
3 INJECTION INTRAMUSCULAR
Status: DISCONTINUED | OUTPATIENT
Start: 2019-03-30 | End: 2019-03-31

## 2019-03-30 RX ORDER — NICOTINE 21 MG/24HR
1 PATCH, TRANSDERMAL 24 HOURS TRANSDERMAL DAILY
Status: DISCONTINUED | OUTPATIENT
Start: 2019-03-30 | End: 2019-04-01 | Stop reason: HOSPADM

## 2019-03-30 RX ORDER — SODIUM CHLORIDE 0.9 % (FLUSH) 0.9 %
10 SYRINGE (ML) INJECTION PRN
Status: DISCONTINUED | OUTPATIENT
Start: 2019-03-30 | End: 2019-03-30 | Stop reason: SDUPTHER

## 2019-03-30 RX ORDER — LORAZEPAM 1 MG/1
2 TABLET ORAL
Status: DISCONTINUED | OUTPATIENT
Start: 2019-03-30 | End: 2019-03-30 | Stop reason: SDUPTHER

## 2019-03-30 RX ORDER — ARIPIPRAZOLE 5 MG/1
10 TABLET ORAL DAILY
Status: DISCONTINUED | OUTPATIENT
Start: 2019-03-31 | End: 2019-04-01 | Stop reason: HOSPADM

## 2019-03-30 RX ORDER — SODIUM CHLORIDE 0.9 % (FLUSH) 0.9 %
10 SYRINGE (ML) INJECTION PRN
Status: DISCONTINUED | OUTPATIENT
Start: 2019-03-30 | End: 2019-04-01 | Stop reason: HOSPADM

## 2019-03-30 RX ORDER — ONDANSETRON 2 MG/ML
4 INJECTION INTRAMUSCULAR; INTRAVENOUS EVERY 6 HOURS PRN
Status: DISCONTINUED | OUTPATIENT
Start: 2019-03-30 | End: 2019-04-01 | Stop reason: HOSPADM

## 2019-03-30 RX ORDER — SODIUM CHLORIDE 9 MG/ML
INJECTION, SOLUTION INTRAVENOUS CONTINUOUS
Status: DISCONTINUED | OUTPATIENT
Start: 2019-03-30 | End: 2019-03-31

## 2019-03-30 RX ORDER — ACETAMINOPHEN 325 MG/1
650 TABLET ORAL EVERY 4 HOURS PRN
Status: DISCONTINUED | OUTPATIENT
Start: 2019-03-30 | End: 2019-03-31 | Stop reason: SDUPTHER

## 2019-03-30 RX ORDER — ONDANSETRON 2 MG/ML
4 INJECTION INTRAMUSCULAR; INTRAVENOUS ONCE
Status: COMPLETED | OUTPATIENT
Start: 2019-03-30 | End: 2019-03-30

## 2019-03-30 RX ORDER — LORAZEPAM 2 MG/ML
4 INJECTION INTRAMUSCULAR
Status: DISCONTINUED | OUTPATIENT
Start: 2019-03-30 | End: 2019-03-30 | Stop reason: SDUPTHER

## 2019-03-30 RX ORDER — LORAZEPAM 1 MG/1
4 TABLET ORAL
Status: DISCONTINUED | OUTPATIENT
Start: 2019-03-30 | End: 2019-03-30 | Stop reason: SDUPTHER

## 2019-03-30 RX ORDER — LORAZEPAM 2 MG/ML
1 INJECTION INTRAMUSCULAR
Status: DISCONTINUED | OUTPATIENT
Start: 2019-03-30 | End: 2019-03-31

## 2019-03-30 RX ORDER — MULTIVITAMIN WITH FOLIC ACID 400 MCG
1 TABLET ORAL DAILY
Status: DISCONTINUED | OUTPATIENT
Start: 2019-03-31 | End: 2019-03-31

## 2019-03-30 RX ORDER — LORAZEPAM 2 MG/ML
2 INJECTION INTRAMUSCULAR
Status: DISCONTINUED | OUTPATIENT
Start: 2019-03-30 | End: 2019-03-30 | Stop reason: SDUPTHER

## 2019-03-30 RX ORDER — SODIUM CHLORIDE 0.9 % (FLUSH) 0.9 %
10 SYRINGE (ML) INJECTION EVERY 12 HOURS SCHEDULED
Status: DISCONTINUED | OUTPATIENT
Start: 2019-03-30 | End: 2019-04-01 | Stop reason: HOSPADM

## 2019-03-30 RX ORDER — LORAZEPAM 1 MG/1
3 TABLET ORAL
Status: DISCONTINUED | OUTPATIENT
Start: 2019-03-30 | End: 2019-03-31

## 2019-03-30 RX ORDER — POTASSIUM CHLORIDE 7.45 MG/ML
10 INJECTION INTRAVENOUS PRN
Status: DISCONTINUED | OUTPATIENT
Start: 2019-03-30 | End: 2019-04-01 | Stop reason: HOSPADM

## 2019-03-30 RX ORDER — HEPARIN SODIUM 5000 [USP'U]/ML
5000 INJECTION, SOLUTION INTRAVENOUS; SUBCUTANEOUS EVERY 8 HOURS SCHEDULED
Status: DISCONTINUED | OUTPATIENT
Start: 2019-03-30 | End: 2019-04-01 | Stop reason: HOSPADM

## 2019-03-30 RX ORDER — LORAZEPAM 1 MG/1
3 TABLET ORAL
Status: DISCONTINUED | OUTPATIENT
Start: 2019-03-30 | End: 2019-03-30 | Stop reason: SDUPTHER

## 2019-03-30 RX ORDER — LORAZEPAM 1 MG/1
2 TABLET ORAL
Status: DISCONTINUED | OUTPATIENT
Start: 2019-03-30 | End: 2019-03-31

## 2019-03-30 RX ORDER — KETOROLAC TROMETHAMINE 30 MG/ML
30 INJECTION, SOLUTION INTRAMUSCULAR; INTRAVENOUS ONCE
Status: COMPLETED | OUTPATIENT
Start: 2019-03-30 | End: 2019-03-30

## 2019-03-30 RX ORDER — LORAZEPAM 2 MG/ML
2 INJECTION INTRAMUSCULAR
Status: DISCONTINUED | OUTPATIENT
Start: 2019-03-30 | End: 2019-03-31

## 2019-03-30 RX ORDER — MAGNESIUM SULFATE 1 G/100ML
1 INJECTION INTRAVENOUS PRN
Status: DISCONTINUED | OUTPATIENT
Start: 2019-03-30 | End: 2019-04-01 | Stop reason: HOSPADM

## 2019-03-30 RX ORDER — FAMOTIDINE 20 MG/1
20 TABLET, FILM COATED ORAL 2 TIMES DAILY
Status: DISCONTINUED | OUTPATIENT
Start: 2019-03-30 | End: 2019-03-31

## 2019-03-30 RX ORDER — BENZTROPINE MESYLATE 1 MG/1
0.5 TABLET ORAL 2 TIMES DAILY
Status: DISCONTINUED | OUTPATIENT
Start: 2019-03-30 | End: 2019-04-01 | Stop reason: HOSPADM

## 2019-03-30 RX ORDER — LORAZEPAM 1 MG/1
1 TABLET ORAL
Status: DISCONTINUED | OUTPATIENT
Start: 2019-03-30 | End: 2019-03-30 | Stop reason: SDUPTHER

## 2019-03-30 RX ORDER — LORAZEPAM 2 MG/ML
4 INJECTION INTRAMUSCULAR
Status: DISCONTINUED | OUTPATIENT
Start: 2019-03-30 | End: 2019-03-31

## 2019-03-30 RX ORDER — LORAZEPAM 2 MG/ML
3 INJECTION INTRAMUSCULAR
Status: DISCONTINUED | OUTPATIENT
Start: 2019-03-30 | End: 2019-03-30 | Stop reason: SDUPTHER

## 2019-03-30 RX ORDER — FOLIC ACID 1 MG/1
1 TABLET ORAL DAILY
Status: DISCONTINUED | OUTPATIENT
Start: 2019-03-31 | End: 2019-04-01 | Stop reason: HOSPADM

## 2019-03-30 RX ORDER — LORAZEPAM 1 MG/1
1 TABLET ORAL
Status: DISCONTINUED | OUTPATIENT
Start: 2019-03-30 | End: 2019-03-31

## 2019-03-30 RX ADMIN — KETOROLAC TROMETHAMINE 30 MG: 30 INJECTION, SOLUTION INTRAMUSCULAR at 23:33

## 2019-03-30 RX ADMIN — ONDANSETRON 4 MG: 2 INJECTION INTRAMUSCULAR; INTRAVENOUS at 19:43

## 2019-03-30 RX ADMIN — LORAZEPAM 3 MG: 1 TABLET ORAL at 21:11

## 2019-03-30 RX ADMIN — SODIUM CHLORIDE: 9 INJECTION, SOLUTION INTRAVENOUS at 22:19

## 2019-03-30 RX ADMIN — LORAZEPAM 4 MG: 2 INJECTION INTRAMUSCULAR; INTRAVENOUS at 19:44

## 2019-03-30 RX ADMIN — LORAZEPAM 3 MG: 2 INJECTION INTRAMUSCULAR; INTRAVENOUS at 22:22

## 2019-03-30 RX ADMIN — LORAZEPAM 2 MG: 2 INJECTION INTRAMUSCULAR; INTRAVENOUS at 23:29

## 2019-03-30 RX ADMIN — FOLIC ACID: 5 INJECTION, SOLUTION INTRAMUSCULAR; INTRAVENOUS; SUBCUTANEOUS at 19:48

## 2019-03-30 RX ADMIN — Medication 10 ML: at 22:15

## 2019-03-30 ASSESSMENT — ENCOUNTER SYMPTOMS
COLOR CHANGE: 0
WHEEZING: 0
ABDOMINAL PAIN: 1
CONSTIPATION: 0
ABDOMINAL DISTENTION: 0
SHORTNESS OF BREATH: 0
ALLERGIC/IMMUNOLOGIC NEGATIVE: 1
VOMITING: 1
DIARRHEA: 0
STRIDOR: 0
BACK PAIN: 0
COUGH: 0
NAUSEA: 1

## 2019-03-30 ASSESSMENT — PAIN DESCRIPTION - ORIENTATION: ORIENTATION: LEFT

## 2019-03-30 ASSESSMENT — PAIN SCALES - GENERAL
PAINLEVEL_OUTOF10: 8
PAINLEVEL_OUTOF10: 6

## 2019-03-30 ASSESSMENT — PAIN DESCRIPTION - PAIN TYPE: TYPE: ACUTE PAIN

## 2019-03-30 ASSESSMENT — PAIN DESCRIPTION - LOCATION: LOCATION: ABDOMEN

## 2019-03-30 NOTE — ED NOTES
Bed: 07  Expected date:   Expected time:   Means of arrival:   Comments:  MARINA Adams  03/30/19 9115

## 2019-03-30 NOTE — ED PROVIDER NOTES
[address] [facility]  eMERGENCY dEPARTMENT eNCOUnter        Pt Name: Aruna Espinoza  MRN: [id number]  [place] 1990  Date of evaluation: 3/30/2019  Provider: Joel Gonzalez PA-C  PCP: No primary care provider on file. This patient was seen and evaluated by the attending physician Dr. Jay Siddiqui   Patient presents with    Alcohol Intoxication     patient is an alcoholic and is waiting to get into treatment center, patient is shaky at triage, patient has severe anxiety as well        HISTORY OF PRESENT ILLNESS   (Location/Symptom, Timing/Onset, Context/Setting, Quality, Duration, Modifying Factors, Severity)  Note limiting factors. Aruna Espinoza is a 33 y.o. male with history of pancreatitis, pneumonia, anxiety, alcohol abuse who presents to the emergency department complaining of nausea, vomiting, upper abdominal discomfort when he drinks alcohol,  tactile hallucinations, high anxiety, headache, tremors. He states that he had one beer this afternoon around noon to get rid of his anxiety and tremors. However, this made his stomach pain worse. He hasn't drank heavily since yesterday. He drinks beer and liquor daily. He has gone through alcohol withdrawal syndrome in the past.  He was seen here a few days ago for the same issue but signed out against medical advice because became too anxious. A rehab facility by the name of [address] [place] [address] has accepted him to the facility but they do not have a bed available for him yet according to the patient. Nursing Notes were all reviewed and agreed with or any disagreements were addressed  in the HPI. REVIEW OF SYSTEMS    (2-9 systems for level 4, 10 or more for level 5)     Review of Systems   Constitutional: Negative for chills and fever. HENT: Negative. Eyes: Negative for visual disturbance. Respiratory: Negative for cough, shortness of breath, wheezing and stridor. Cardiovascular: Negative for chest pain, palpitations and leg swelling. Gastrointestinal: Positive for abdominal pain, nausea and vomiting. Negative for abdominal distention, constipation and diarrhea. Endocrine: Negative. Genitourinary: Negative. Musculoskeletal: Negative for back pain, neck pain and neck stiffness. Skin: Negative for color change, pallor, rash and wound. Allergic/Immunologic: Negative. Neurological: Positive for headaches. Negative for dizziness, tremors, seizures, syncope, facial asymmetry, speech difficulty, weakness, light-headedness and numbness. Hematological: Negative. Psychiatric/Behavioral: Positive for agitation, hallucinations and sleep disturbance. Negative for confusion, self-injury and suicidal ideas. The patient is nervous/anxious and is hyperactive. All other systems reviewed and are negative. Positives and Pertinent negatives as per HPI. Except as noted abovein the ROS, all other systems were reviewed and negative. PAST MEDICAL HISTORY     Past Medical History:   Diagnosis Date    Alcohol abuse     Anxiety     Herpes genitalis in men     Pancreatitis     Pneumonia     Portal vein thrombosis     pt denied    Seizures ([facility] [address]     per pt    Tobacco abuse          SURGICAL HISTORY     Past Surgical History:   Procedure Laterality Date    ENDOSCOPY, COLON, DIAGNOSTIC  10/28/2013    Endoscopic retrograde cholangiopancreatography with pancreatic stent placement    ENDOSCOPY, COLON, DIAGNOSTIC  03/23/2018    Esophagogastroduodenoscopy with biopsy    ERCP  1/10/14    with stent removal         CURRENTMEDICATIONS       Previous Medications    ARIPIPRAZOLE (ABILIFY) 10 MG TABLET    Take 10 mg by mouth daily    BENZTROPINE (COGENTIN) 0.5 MG TABLET    Take 0.5 mg by mouth 2 times daily    CHLORDIAZEPOXIDE (LIBRIUM) 25 MG CAPSULE    Take 1 capsule by mouth 3 times daily as needed for Anxiety for up to 7 days.     DICYCLOMINE (BENTYL) 10 MG CAPSULE Take 2 capsules by mouth every 6 hours as needed (cramps)    FAMOTIDINE (PEPCID) 20 MG TABLET    Take 1 tablet by mouth 2 times daily    ONDANSETRON (ZOFRAN) 4 MG TABLET    Take 1 tablet by mouth every 8 hours as needed for Nausea         ALLERGIES     Amoxicillin; Haldol [haloperidol lactate]; Phenergan [promethazine hcl]; Glucosamine; and Shellfish-derived products    FAMILYHISTORY       Family History   Problem Relation Age of Onset    Hypertension Father     High Blood Pressure Father     Alcohol Abuse Father     Depression Mother     High Blood Pressure Paternal Grandfather     Alcohol Abuse Paternal Grandfather     Heart Disease Paternal Grandmother     Anemia Paternal Grandmother     Depression Maternal Aunt     Alcohol Abuse Paternal Aunt     Alcohol Abuse Paternal Uncle           SOCIAL HISTORY       Social History     Socioeconomic History    Marital status: Single     Spouse name: None    Number of children: 1    Years of education: 15    Highest education level: None   Occupational History    Occupation:    Social Needs    Financial resource strain: None    Food insecurity:     Worry: None     Inability: None    Transportation needs:     Medical: None     Non-medical: None   Tobacco Use    Smoking status: Current Every Day Smoker     Packs/day: 0.50     Years: 10.00     Pack years: 5.00     Types: Cigarettes     Start date: 11/21/2007    Smokeless tobacco: Never Used   Substance and Sexual Activity    Alcohol use:  Yes     Alcohol/week: 12.0 oz     Types: 20 Cans of beer per week     Comment: every day    Drug use: No    Sexual activity: Never   Lifestyle    Physical activity:     Days per week: None     Minutes per session: None    Stress: None   Relationships    Social connections:     Talks on phone: None     Gets together: None     Attends Yarsanism service: None     Active member of club or organization: None     Attends meetings of clubs or organizations: None Relationship status: None    Intimate partner violence:     Fear of current or ex partner: None     Emotionally abused: None     Physically abused: None     Forced sexual activity: None   Other Topics Concern    None   Social History Narrative    None       SCREENINGS             PHYSICAL EXAM    (up to 7 for level 4, 8 or more for level 5)     ED Triage Vitals [03/30/19 1718]   BP Temp Temp Source Pulse Resp SpO2 Height Weight   114/88 98.7 °F (37.1 °C) Oral 123 20 100 % -- --       Physical Exam   Constitutional: He is oriented to person, place, and time. He appears well-developed and well-nourished. No distress. HENT:   Head: Normocephalic and atraumatic. Right Ear: External ear normal.   Left Ear: External ear normal.   Nose: Nose normal.   Mouth/Throat: Oropharynx is clear and moist.   Eyes: Pupils are equal, round, and reactive to light. Conjunctivae and EOM are normal. Right eye exhibits no discharge. Left eye exhibits no discharge. No scleral icterus. Neck: Normal range of motion. No JVD present. Cardiovascular: Regular rhythm. Tachycardia present. Exam reveals no gallop and no friction rub. No murmur heard. Pulmonary/Chest: Effort normal and breath sounds normal.   Abdominal: Soft. Bowel sounds are normal. He exhibits no distension, no abdominal bruit and no pulsatile midline mass. There is tenderness in the epigastric area. There is no rigidity, no rebound, no guarding, no CVA tenderness, no tenderness at McBurney's point and negative Miller's sign. Musculoskeletal: Normal range of motion. Lymphadenopathy:     He has no cervical adenopathy. Neurological: He is alert and oriented to person, place, and time. He has normal strength. He displays tremor. He displays normal reflexes. No cranial nerve deficit or sensory deficit. GCS eye subscore is 4. GCS verbal subscore is 5. GCS motor subscore is 6. Skin: Skin is warm and dry. Capillary refill takes less than 2 seconds. No rash noted. He is not diaphoretic. No erythema. No pallor. Psychiatric: His behavior is normal. Judgment and thought content normal. His mood appears anxious. His speech is rapid and/or pressured. He is actively hallucinating. Cognition and memory are normal.   Nursing note and vitals reviewed.       DIAGNOSTIC RESULTS   LABS:    Labs Reviewed   CBC WITH AUTO DIFFERENTIAL - Abnormal; Notable for the following components:       Result Value    Lymphocytes # 0.9 (*)     All other components within normal limits    Narrative:     Performed at:  [facility] [address] [place]   Phone [phone]   COMPREHENSIVE METABOLIC PANEL - Abnormal; Notable for the following components:    CREATININE 0.7 (*)     All other components within normal limits    Narrative:     Performed at:  [facility] [address] [facility]   Phone [phone]   LIPASE - Abnormal; Notable for the following components:    Lipase 70.0 (*)     All other components within normal limits    Narrative:     Performed at:  [facility] [address] [place]   Phone [phone]   ACETAMINOPHEN LEVEL - Abnormal; Notable for the following components:    Acetaminophen Level <5 (*)     All other components within normal limits    Narrative:     Performed at:  [facility] [address] [facility] [place]   Phone [phone]   SALICYLATE LEVEL - Abnormal; Notable for the following components:    Salicylate, Serum <3.7 (*)     All other components within normal limits    Narrative:     Performed at:  [facility] [address] [place]   Phone [phone]   PROTIME-INR    Narrative:     Performed at:  [facility] [address] [place]   Phone [phone]   AMMONIA    Narrative: sodium chloride flush 0.9 % injection 10 mL (has no administration in time range)   sodium chloride flush 0.9 % injection 10 mL (has no administration in time range)   LORazepam (ATIVAN) tablet 1 mg ( Oral See Alternative 3/30/19 1944)     Or   LORazepam (ATIVAN) injection 1 mg ( Intravenous See Alternative 3/30/19 1944)     Or   LORazepam (ATIVAN) tablet 2 mg ( Oral See Alternative 3/30/19 1944)     Or   LORazepam (ATIVAN) injection 2 mg ( Intravenous See Alternative 3/30/19 1944)     Or   LORazepam (ATIVAN) tablet 3 mg ( Oral See Alternative 3/30/19 1944)     Or   LORazepam (ATIVAN) injection 3 mg ( Intravenous See Alternative 3/30/19 1944)     Or   LORazepam (ATIVAN) tablet 4 mg ( Oral See Alternative 3/30/19 1944)     Or   LORazepam (ATIVAN) injection 4 mg (4 mg Intravenous Given 3/30/19 1944)   sodium chloride 0.9 % 5,394 mL with folic acid 1 mg, adult multi-vitamin with vitamin k 10 mL, thiamine 100 mg ( Intravenous New Bag 3/30/19 1948)   ondansetron (ZOFRAN) injection 4 mg (4 mg Intravenous Given 3/30/19 1943)     This patient presents to the emergency department with alcohol withdrawal syndrome. We did initiate CIWA protocol. Did not sustain any seizures throughout stay here. Lab work stable. abdomen is soft and nontender at this time. He is starting to feel much better. Hospitalist will admit for further management. Vital stable at this time. My suspicion is low for acute surgical abdomen, obstruction, perforation, abscess, mesenteric ischemia, AAA, dissection, cholecystitis, cholangitis, pancreatitis, appendicitis, C. diff colitis, diverticulitis, volvulus, incarcerated hernia, necrotizing fasciitis,  testicular torsion, epididymitis, varicocele, hydrocele, orchitis, incarcerated hernia, Ashley gangrene, pyelonephritis, perinephric abscess, kidney stone, urosepsis, fistula or other concerning pathology. FINAL IMPRESSION      1.  Alcohol withdrawal syndrome with complication ([facility] [address] DISPOSITION/PLAN   DISPOSITION Decision To Admit 03/30/2019 08:07:05 PM      PATIENT REFERREDTO:  No follow-up provider specified.     DISCHARGE MEDICATIONS:  New Prescriptions    No medications on file       DISCONTINUED MEDICATIONS:  Discontinued Medications    No medications on file              (Please note that portions ofthis note were completed with a voice recognition program.  Efforts were made to edit the dictations but occasionally words are mis-transcribed.)    Jo Ann Bacon PA-C (electronically signed)           Jo Ann Bacon PA-C  03/30/19 [id number]

## 2019-03-31 VITALS
SYSTOLIC BLOOD PRESSURE: 125 MMHG | HEART RATE: 90 BPM | DIASTOLIC BLOOD PRESSURE: 73 MMHG | HEIGHT: 68 IN | RESPIRATION RATE: 20 BRPM | TEMPERATURE: 97.9 F | BODY MASS INDEX: 23.45 KG/M2 | WEIGHT: 154.7 LBS | OXYGEN SATURATION: 97 %

## 2019-03-31 LAB
A/G RATIO: 1.8 (ref 1.1–2.2)
ALBUMIN SERPL-MCNC: 4 G/DL (ref 3.4–5)
ALP BLD-CCNC: 52 U/L (ref 40–129)
ALT SERPL-CCNC: 9 U/L (ref 10–40)
AMYLASE: 37 U/L (ref 25–115)
ANION GAP SERPL CALCULATED.3IONS-SCNC: 12 MMOL/L (ref 3–16)
APTT: 21.4 SEC (ref 26–36)
AST SERPL-CCNC: 13 U/L (ref 15–37)
BASOPHILS ABSOLUTE: 0 K/UL (ref 0–0.2)
BASOPHILS RELATIVE PERCENT: 0.7 %
BILIRUB SERPL-MCNC: 0.9 MG/DL (ref 0–1)
BUN BLDV-MCNC: 10 MG/DL (ref 7–20)
CALCIUM IONIZED: 1.11 MMOL/L (ref 1.12–1.32)
CALCIUM SERPL-MCNC: 8.5 MG/DL (ref 8.3–10.6)
CHLORIDE BLD-SCNC: 108 MMOL/L (ref 99–110)
CO2: 22 MMOL/L (ref 21–32)
CREAT SERPL-MCNC: 0.6 MG/DL (ref 0.9–1.3)
EKG ATRIAL RATE: 92 BPM
EKG DIAGNOSIS: NORMAL
EKG P AXIS: 72 DEGREES
EKG P-R INTERVAL: 160 MS
EKG Q-T INTERVAL: 342 MS
EKG QRS DURATION: 86 MS
EKG QTC CALCULATION (BAZETT): 422 MS
EKG R AXIS: 92 DEGREES
EKG T AXIS: 63 DEGREES
EKG VENTRICULAR RATE: 92 BPM
EOSINOPHILS ABSOLUTE: 0.3 K/UL (ref 0–0.6)
EOSINOPHILS RELATIVE PERCENT: 6.2 %
GFR AFRICAN AMERICAN: >60
GFR NON-AFRICAN AMERICAN: >60
GLOBULIN: 2.2 G/DL
GLUCOSE BLD-MCNC: 89 MG/DL (ref 70–99)
HCT VFR BLD CALC: 39.9 % (ref 40.5–52.5)
HEMOGLOBIN: 14.1 G/DL (ref 13.5–17.5)
INR BLD: 1.1 (ref 0.86–1.14)
LIPASE: 27 U/L (ref 13–60)
LYMPHOCYTES ABSOLUTE: 1.9 K/UL (ref 1–5.1)
LYMPHOCYTES RELATIVE PERCENT: 38 %
MAGNESIUM: 2 MG/DL (ref 1.8–2.4)
MCH RBC QN AUTO: 31.9 PG (ref 26–34)
MCHC RBC AUTO-ENTMCNC: 35.5 G/DL (ref 31–36)
MCV RBC AUTO: 89.8 FL (ref 80–100)
MONOCYTES ABSOLUTE: 0.5 K/UL (ref 0–1.3)
MONOCYTES RELATIVE PERCENT: 9 %
NEUTROPHILS ABSOLUTE: 2.4 K/UL (ref 1.7–7.7)
NEUTROPHILS RELATIVE PERCENT: 46.1 %
PDW BLD-RTO: 12.2 % (ref 12.4–15.4)
PH VENOUS: 7.39 (ref 7.35–7.45)
PHOSPHORUS: 3.7 MG/DL (ref 2.5–4.9)
PLATELET # BLD: 162 K/UL (ref 135–450)
PMV BLD AUTO: 8.6 FL (ref 5–10.5)
POTASSIUM REFLEX MAGNESIUM: 3.6 MMOL/L (ref 3.5–5.1)
PROTHROMBIN TIME: 12.5 SEC (ref 9.8–13)
RBC # BLD: 4.44 M/UL (ref 4.2–5.9)
SODIUM BLD-SCNC: 142 MMOL/L (ref 136–145)
TOTAL PROTEIN: 6.2 G/DL (ref 6.4–8.2)
VITAMIN B-12: 258 PG/ML (ref 211–911)
WBC # BLD: 5.1 K/UL (ref 4–11)

## 2019-03-31 PROCEDURE — 84443 ASSAY THYROID STIM HORMONE: CPT

## 2019-03-31 PROCEDURE — 85025 COMPLETE CBC W/AUTO DIFF WBC: CPT

## 2019-03-31 PROCEDURE — 6370000000 HC RX 637 (ALT 250 FOR IP): Performed by: HOSPITALIST

## 2019-03-31 PROCEDURE — 93010 ELECTROCARDIOGRAM REPORT: CPT | Performed by: INTERNAL MEDICINE

## 2019-03-31 PROCEDURE — 6370000000 HC RX 637 (ALT 250 FOR IP): Performed by: INTERNAL MEDICINE

## 2019-03-31 PROCEDURE — 83735 ASSAY OF MAGNESIUM: CPT

## 2019-03-31 PROCEDURE — 82150 ASSAY OF AMYLASE: CPT

## 2019-03-31 PROCEDURE — 36415 COLL VENOUS BLD VENIPUNCTURE: CPT

## 2019-03-31 PROCEDURE — 85730 THROMBOPLASTIN TIME PARTIAL: CPT

## 2019-03-31 PROCEDURE — 85610 PROTHROMBIN TIME: CPT

## 2019-03-31 PROCEDURE — 80053 COMPREHEN METABOLIC PANEL: CPT

## 2019-03-31 PROCEDURE — C9113 INJ PANTOPRAZOLE SODIUM, VIA: HCPCS | Performed by: INTERNAL MEDICINE

## 2019-03-31 PROCEDURE — 82330 ASSAY OF CALCIUM: CPT

## 2019-03-31 PROCEDURE — 2500000003 HC RX 250 WO HCPCS: Performed by: HOSPITALIST

## 2019-03-31 PROCEDURE — 6360000002 HC RX W HCPCS: Performed by: INTERNAL MEDICINE

## 2019-03-31 PROCEDURE — 82607 VITAMIN B-12: CPT

## 2019-03-31 PROCEDURE — 6360000002 HC RX W HCPCS: Performed by: HOSPITALIST

## 2019-03-31 PROCEDURE — 83690 ASSAY OF LIPASE: CPT

## 2019-03-31 PROCEDURE — 2580000003 HC RX 258: Performed by: HOSPITALIST

## 2019-03-31 PROCEDURE — 84100 ASSAY OF PHOSPHORUS: CPT

## 2019-03-31 RX ORDER — DIAZEPAM 5 MG/1
5 TABLET ORAL EVERY 6 HOURS
Status: DISCONTINUED | OUTPATIENT
Start: 2019-03-31 | End: 2019-03-31

## 2019-03-31 RX ORDER — PANTOPRAZOLE SODIUM 40 MG/10ML
40 INJECTION, POWDER, LYOPHILIZED, FOR SOLUTION INTRAVENOUS DAILY
Status: DISCONTINUED | OUTPATIENT
Start: 2019-03-31 | End: 2019-04-01 | Stop reason: HOSPADM

## 2019-03-31 RX ORDER — ACETAMINOPHEN 325 MG/1
650 TABLET ORAL EVERY 4 HOURS PRN
Status: DISCONTINUED | OUTPATIENT
Start: 2019-03-31 | End: 2019-04-01 | Stop reason: HOSPADM

## 2019-03-31 RX ORDER — IBUPROFEN 400 MG/1
400 TABLET ORAL EVERY 6 HOURS PRN
Status: DISCONTINUED | OUTPATIENT
Start: 2019-03-31 | End: 2019-04-01 | Stop reason: HOSPADM

## 2019-03-31 RX ORDER — MULTIVITAMIN WITH FOLIC ACID 400 MCG
1 TABLET ORAL DAILY
Status: DISCONTINUED | OUTPATIENT
Start: 2019-04-01 | End: 2019-04-01 | Stop reason: HOSPADM

## 2019-03-31 RX ADMIN — Medication 10 ML: at 10:20

## 2019-03-31 RX ADMIN — Medication 10 ML: at 09:23

## 2019-03-31 RX ADMIN — DIAZEPAM 5 MG: 5 TABLET ORAL at 14:49

## 2019-03-31 RX ADMIN — LORAZEPAM 3 MG: 2 INJECTION INTRAMUSCULAR; INTRAVENOUS at 18:59

## 2019-03-31 RX ADMIN — PANTOPRAZOLE SODIUM 40 MG: 40 INJECTION, POWDER, FOR SOLUTION INTRAVENOUS at 14:48

## 2019-03-31 RX ADMIN — ACETAMINOPHEN 650 MG: 325 TABLET ORAL at 01:30

## 2019-03-31 RX ADMIN — BENZTROPINE MESYLATE 0.5 MG: 1 TABLET ORAL at 09:22

## 2019-03-31 RX ADMIN — SODIUM CHLORIDE: 9 INJECTION, SOLUTION INTRAVENOUS at 06:50

## 2019-03-31 RX ADMIN — THIAMINE HCL TAB 100 MG 100 MG: 100 TAB at 09:28

## 2019-03-31 RX ADMIN — Medication 10 ML: at 08:11

## 2019-03-31 RX ADMIN — THERA TABS 1 TABLET: TAB at 09:22

## 2019-03-31 RX ADMIN — LORAZEPAM 3 MG: 2 INJECTION INTRAMUSCULAR; INTRAVENOUS at 11:58

## 2019-03-31 RX ADMIN — ACETAMINOPHEN 650 MG: 325 TABLET, FILM COATED ORAL at 14:49

## 2019-03-31 RX ADMIN — LORAZEPAM 3 MG: 2 INJECTION INTRAMUSCULAR; INTRAVENOUS at 02:00

## 2019-03-31 RX ADMIN — LORAZEPAM 4 MG: 2 INJECTION INTRAMUSCULAR; INTRAVENOUS at 13:28

## 2019-03-31 RX ADMIN — LORAZEPAM 3 MG: 2 INJECTION INTRAMUSCULAR; INTRAVENOUS at 07:01

## 2019-03-31 RX ADMIN — HEPARIN SODIUM 5000 UNITS: 5000 INJECTION INTRAVENOUS; SUBCUTANEOUS at 06:50

## 2019-03-31 RX ADMIN — ARIPIPRAZOLE 10 MG: 5 TABLET ORAL at 09:22

## 2019-03-31 RX ADMIN — LORAZEPAM 4 MG: 2 INJECTION INTRAMUSCULAR; INTRAVENOUS at 10:20

## 2019-03-31 RX ADMIN — LORAZEPAM 4 MG: 2 INJECTION INTRAMUSCULAR; INTRAVENOUS at 14:48

## 2019-03-31 RX ADMIN — LORAZEPAM 4 MG: 2 INJECTION INTRAMUSCULAR; INTRAVENOUS at 20:10

## 2019-03-31 RX ADMIN — LORAZEPAM 3 MG: 2 INJECTION INTRAMUSCULAR; INTRAVENOUS at 00:42

## 2019-03-31 RX ADMIN — LORAZEPAM 3 MG: 2 INJECTION INTRAMUSCULAR; INTRAVENOUS at 09:22

## 2019-03-31 RX ADMIN — FAMOTIDINE 20 MG: 20 TABLET, FILM COATED ORAL at 09:22

## 2019-03-31 RX ADMIN — FOLIC ACID: 5 INJECTION, SOLUTION INTRAMUSCULAR; INTRAVENOUS; SUBCUTANEOUS at 10:28

## 2019-03-31 RX ADMIN — FOLIC ACID 1 MG: 1 TABLET ORAL at 09:22

## 2019-03-31 RX ADMIN — LORAZEPAM 3 MG: 1 TABLET ORAL at 17:53

## 2019-03-31 RX ADMIN — HEPARIN SODIUM 5000 UNITS: 5000 INJECTION INTRAVENOUS; SUBCUTANEOUS at 14:48

## 2019-03-31 RX ADMIN — LORAZEPAM 3 MG: 2 INJECTION INTRAMUSCULAR; INTRAVENOUS at 08:11

## 2019-03-31 ASSESSMENT — PAIN SCALES - GENERAL
PAINLEVEL_OUTOF10: 0
PAINLEVEL_OUTOF10: 7
PAINLEVEL_OUTOF10: 9
PAINLEVEL_OUTOF10: 0

## 2019-03-31 ASSESSMENT — PAIN DESCRIPTION - LOCATION: LOCATION: ABDOMEN

## 2019-03-31 ASSESSMENT — PAIN DESCRIPTION - PAIN TYPE: TYPE: ACUTE PAIN

## 2019-03-31 NOTE — PROGRESS NOTES
Dr Army Ambriz paged about this patient earlier and never heard back. Patient now yelling, will not stay in bed, has been given multiple doses of IV Ativan and will not calm down. Pt becoming very verbal. MD paged regarding transferring patient.

## 2019-03-31 NOTE — PROGRESS NOTES
Hospitalist Progress Note      PCP: No primary care provider on file. Date of Admission: 3/30/2019    Chief Complaint: anxiety and alcohol withdrawal     Hospital Course: admitted with alcohol withdrawal tremors and anxiety     Subjective: anxious ; stomach hurting - burning pain. Medications:  Reviewed    Infusion Medications    sodium chloride 100 mL/hr at 03/31/19 2890     Scheduled Medications    ARIPiprazole  10 mg Oral Daily    benztropine  0.5 mg Oral BID    sodium chloride flush  10 mL Intravenous 2 times per day    folic acid, thiamine, multi-vitamin with vitamin K infusion   Intravenous Daily    thiamine  100 mg Oral Daily    folic acid  1 mg Oral Daily    multivitamin  1 tablet Oral Daily    famotidine  20 mg Oral BID    heparin (porcine)  5,000 Units Subcutaneous 3 times per day    nicotine  1 patch Transdermal Daily     PRN Meds: sodium chloride flush, ondansetron, potassium chloride **OR** potassium alternative oral replacement **OR** potassium chloride, magnesium sulfate, acetaminophen, LORazepam **OR** LORazepam **OR** LORazepam **OR** LORazepam **OR** LORazepam **OR** LORazepam **OR** LORazepam **OR** LORazepam      Intake/Output Summary (Last 24 hours) at 3/31/2019 1410  Last data filed at 3/31/2019 0015  Gross per 24 hour   Intake --   Output 525 ml   Net -525 ml       Physical Exam Performed:    /69   Pulse 89   Temp 98 °F (36.7 °C) (Temporal)   Resp 16   Ht 5' 8\" (1.727 m)   Wt 154 lb 11.2 oz (70.2 kg) Comment: Bed with seizure pads  SpO2 99%   BMI 23.52 kg/m²     General appearance: anxious  HEENT: Pupils equal, round, and reactive to light. Conjunctivae/corneas clear. Neck: Supple, with full range of motion. No jugular venous distention. Trachea midline. Respiratory:  Normal respiratory effort. Clear to auscultation, bilaterally without Rales/Wheezes/Rhonchi.   Cardiovascular: Regular rate and rhythm with normal S1/S2 without murmurs, rubs or gallops. Abdomen: Soft, mild to moderate epigastric tenderness,  non-distended with normal bowel sounds. Musculoskeletal: No clubbing, cyanosis or edema bilaterally. Full range of motion without deformity. Skin: Skin color, texture, turgor normal.  No rashes or lesions. Neurologic:  Neurovascularly intact without any focal sensory/motor deficits. Cranial nerves: II-XII intact, grossly non-focal.  Psychiatric: Alert and oriented, anxious with tremors  Capillary Refill: Brisk,< 3 seconds   Peripheral Pulses: +2 palpable, equal bilaterally       Labs:   Recent Labs     03/30/19 1931 03/31/19  0650   WBC 6.0 5.1   HGB 15.5 14.1   HCT 44.3 39.9*    162     Recent Labs     03/30/19 1931 03/31/19  0650    142   K 3.8 3.6    108   CO2 21 22   BUN 9 10   CREATININE 0.7* 0.6*   CALCIUM 9.3 8.5   PHOS  --  3.7     Recent Labs     03/30/19 1931 03/31/19  0650   AST 15 13*   ALT 11 9*   BILITOT 0.6 0.9   ALKPHOS 62 52     Recent Labs     03/30/19 1931 03/31/19  0650   INR 1.11 1.10     No results for input(s): Jaylon Relic in the last 72 hours.     Urinalysis:      Lab Results   Component Value Date    NITRU Negative 03/25/2019    WBCUA 1 02/09/2019    RBCUA 1 02/09/2019    BLOODU Negative 03/25/2019    SPECGRAV 1.010 03/25/2019    GLUCOSEU Negative 03/25/2019       Radiology:  No orders to display           Assessment/Plan:    Active Hospital Problems    Diagnosis Date Noted    [facility] [F10.231] 03/23/2019    Bipolar disease, chronic ([facility] [address] [F31.9] 01/27/2019    MDD (major depressive disorder), recurrent episode, mild ([facility] [address] [F33.0] 02/12/2018    Alcohol withdrawal, uncomplicated ([facility] [address] [Y75.880] 01/28/2018    Tobacco abuse [Z72.0] 01/15/2018    Alcohol abuse, continuous [F10.10] 01/07/2018    Alcohol use disorder, severe, dependence ([facility] [F10.20]     Panic attacks [F41.0] 09/21/2017     · Alcohol abuse and  withdrawal   : [facility] hospital visits and non compliance. Start on valium . Cont ciwa with ativan. Stop ivf. Start on mvi. Needs to f/u with CAT  House on discharge   · Gastritis: PPI iv  · Chronic smoker : smoking cessation counseling done.  Nicotine patch provided             DVT Prophylaxis: heparin   Diet: DIET GENERAL;  Code Status: Full Code    PT/OT Eval Status: not needed    Dispo - inpt 2 days     Pilo Villalobos MD

## 2019-03-31 NOTE — H&P
_    [facility] HOSPITALIST HISTORY AND PHYSICAL    3/30/2019 8:24 PM        Patient Information:  Sanjiv Rashid is a 33 y.o. male [id number]    PCP:  No primary care provider on file. (Tel: None )        Date of Service:   Pt seen/examined on 3/30/2019   Admitted to Inpatient with expected LOS greater than two midnights due to medical therapy. Chief complaint:    Chief Complaint   Patient presents with    Alcohol Intoxication     patient is an alcoholic and is waiting to get into treatment center, patient is shaky at triage, patient has severe anxiety as well        History of Present Illness:  Leandra Lopez is a 33 y.o. male who presented with   · Tremulousness reported + Shakiness + reported at home . He endorses that he is trying to cut down on his alcohol intake @ home. Last drink today AM . He normally drinks 12-24 beers each day . · Alcohol abuser   · Recently d/gloria @ 3/25/19 for similar problems . He never filled up the scripts . · Endorses that he has been accepted into [facility] but they have no beds and also they wish that he detoxifies from Alcohol first . He has not heard or followed up with them in last 2-4 weeks. History obtained from   · Patient      · Prior chart  As well       So far, ED Findings/Workup/Labs shows :   · BP. Stable     · HR. Tachycardia    · Saturations. Stable    · Temperature. Afebrile       · Pertinent Abnormal Labs and assessment as below. While in ED, patient care and medications given :    · Banana bag +   · zofran       Currently, on my evaluation, patient is :   · Since arrival, post above Rx, patient reports being the same   · Tremulous   · AO X 3   · No hallucinations   · No anxiety issues   · Can have a normal conversation   · Having belching occ       REVIEW OF SYSTEMS:       Constitutional:  Negative for fever, chills or night sweats  ENT:   Negative for rhinorrhea, epistaxis, hoarseness, sore throat.   Respiratory:   Negative for shortness of breath, wheezing  Cardiovascular:   Negative for  chest pain, palpitations   Gastrointestinal:   Negative for nausea, vomiting, diarrhea  Genitourinary:   Negative for polyuria, dysuria   Hematologic/Lymphatic:   Negative for  bleeding tendency, easy bruising  Musculoskeletal:   Negative for myalgias and arthralgias  Neurologic: as above   Skin:   Negative for itching,rash  Psychiatric:  Negative for depression, anxiety, agitation. Endocrine:  Negative for polydipsia, polyuria,heat /cold intolerance. Past Medical History:         has a past medical history of Alcohol abuse, Anxiety, Herpes genitalis in men, Pancreatitis, Pneumonia, Portal vein thrombosis, Seizures ([facility] [address] and Tobacco abuse. Past Surgical History:         has a past surgical history that includes ERCP (1/10/14); Endoscopy, colon, diagnostic (10/28/2013); and Endoscopy, colon, diagnostic (03/23/2018). Medications:          Current Outpatient Medications on File Prior to Encounter   Medication Sig Dispense Refill    chlordiazePOXIDE (LIBRIUM) 25 MG capsule Take 1 capsule by mouth 3 times daily as needed for Anxiety for up to 7 days. 21 capsule 0    dicyclomine (BENTYL) 10 MG capsule Take 2 capsules by mouth every 6 hours as needed (cramps) 20 capsule 0    ondansetron (ZOFRAN) 4 MG tablet Take 1 tablet by mouth every 8 hours as needed for Nausea 10 tablet 0    famotidine (PEPCID) 20 MG tablet Take 1 tablet by mouth 2 times daily 60 tablet 0    ARIPiprazole (ABILIFY) 10 MG tablet Take 10 mg by mouth daily      benztropine (COGENTIN) 0.5 MG tablet Take 0.5 mg by mouth 2 times daily           Allergies: Allergies   Allergen Reactions    Amoxicillin Hives    Haldol [Haloperidol Lactate] Other (See Comments)     Muscle spasms    Phenergan [Promethazine Hcl] Other (See Comments)     Pt believes it caused muscle spasms    Glucosamine Itching      Itching of throat when eating shrimp.   Pt states has had IV contrast for CT's without problem before.  Shellfish-Derived Products      Patient gets anxious around seafood          Social History:   reports that he has been smoking cigarettes. He started smoking about 11 years ago. He has a 5.00 pack-year smoking history. He has never used smokeless tobacco. He reports that he drinks about 12.0 oz of alcohol per week. He reports that he does not use drugs. Family History:            Problem Relation Age of Onset    Hypertension Father     High Blood Pressure Father     Alcohol Abuse Father     Depression Mother     High Blood Pressure Paternal Grandfather     Alcohol Abuse Paternal Grandfather     Heart Disease Paternal Grandmother     Anemia Paternal Grandmother     Depression Maternal Aunt     Alcohol Abuse Paternal Aunt     Alcohol Abuse Paternal Uncle            Physical Exam:  /74   Pulse 86   Temp 98.7 °F (37.1 °C) (Oral)   Resp 21   Ht 5' 8\" (1.727 m)   Wt 145 lb (65.8 kg)   SpO2 97%   BMI 22.05 kg/m²     General appearance: Appears comfortable. Smells of alcohol    Eyes:  Sclera clear, pupils equal  ENT:  Dry   mucus membranes, Trachea midline. Cardiovascular: Regular rhythm, normal S1, S2. No murmur, gallop, rub. No edema in lower extremities   Respiratory:   Noted Clear to auscultation bilaterally,  No wheeze, good inspiratory effort   Gastrointestinal:  Abdomen soft,  non-tender,  not distended,  normal bowel sounds   Musculoskeletal:  No cyanosis in digits, neck supple  Neurology:  Cranial nerves grossly intact. Alert and oriented in time, place and person. No speech or motor deficits   Psychiatry:  Appropriate affect.  Not agitated  Skin:  Warm, dry, normal turgor, no rash       Labs:     Recent Labs     03/27/19 2329 03/30/19 1931   WBC 5.1 6.0   HGB 13.8 15.5   HCT 39.3* 44.3    227     Recent Labs     03/27/19 2329 03/30/19 1931    141   K 3.8 3.8    104   CO2 22 21   BUN 12 9   CREATININE 0.6* 0.7*   CALCIUM 8.8 9.3     Recent Labs     03/27/19 2329 03/30/19 1931   AST 15 15   ALT 9* 11   BILITOT 0.4 0.6   ALKPHOS 52 62     Recent Labs     03/27/19 2329 03/30/19 1931   INR 1.10 1.11     No results for input(s): Gamaliel Viera in the last 72 hours. Urinalysis:      Lab Results   Component Value Date    NITRU Negative 03/25/2019    WBCUA 1 02/09/2019    RBCUA 1 02/09/2019    BLOODU Negative 03/25/2019    SPECGRAV 1.010 03/25/2019    GLUCOSEU Negative 03/25/2019         Radiology:       EKG/Telemonitor :    I have reviewed the EKG  NSR     IMAGING :     No orders to display         ASSESSMENT / Dwight Cabrera 169 Problems    Diagnosis Date Noted    Alcohol withdrawal delirium ([facility] [address] [F10.231] 03/23/2019    Bipolar disease, chronic ([address] [F31.9] 01/27/2019    MDD (major depressive disorder), recurrent episode, mild ([address] [F33.0] 02/12/2018    Alcohol withdrawal, uncomplicated ([address] [S29.895] 01/28/2018    Tobacco abuse [Z72.0] 01/15/2018    Alcohol abuse, continuous [F10.10] 01/07/2018    Alcohol use disorder, severe, dependence ([address] [F10.20]     Panic attacks [F41.0] 09/21/2017         · Alcohol abuse and ? mild withdrawal ?? [ Presenting complaint/Reason for admission ] : Counseled. Alcohol level 21 on admission >> Monitor CIWAs. BDZ Prn. Banana bag and thiamine and FA and MVI per orders. Monitor lytes while IP . SS/ICM to see in AM regarding getting into Rehab program ??   · Chronic smoker : smoking cessation counseling done. Nicotine patch provided   · Mild lipase elevation ? D/t chronic alcohol pancreatitis : Will f/u trend during inpatient stay and monitor closely. · Home medications for Chronic medical problems reviewed. · Home medications Held/Resumed, and Pertinent changes made in home medications on admission, as needed, according to current medical status, as mentioned above.  Please see EPIC orders for detailed recommendations of plan and medications       · DVT Prophylaxis :

## 2019-03-31 NOTE — ED PROVIDER NOTES
[facility] Emergency Department      Pt Name: Seven Gibbs  MRN: [id number]  [place] 1990  Date of evaluation: 3/30/2019  Provider: Danilo Perdomo MD  I independently performed a history and physical on Seven Gibbs. All diagnostic, treatment, and disposition decisions were made by myself in conjunction with the advanced practice provider. HPI: Seven Gibbs presented with   Chief Complaint   Patient presents with    Alcohol Intoxication     patient is an alcoholic and is waiting to get into treatment center, patient is shaky at triage, patient has severe anxiety as well      Seven Gibbs has a past medical history of Alcohol abuse, Anxiety, Herpes genitalis in men, Pancreatitis, Pneumonia, Portal vein thrombosis, Seizures ([facility] [address] and Tobacco abuse. He has a past surgical history that includes ERCP (1/10/14); Endoscopy, colon, diagnostic (10/28/2013); and Endoscopy, colon, diagnostic (03/23/2018). No current facility-administered medications on file prior to encounter. Current Outpatient Medications on File Prior to Encounter   Medication Sig Dispense Refill    chlordiazePOXIDE (LIBRIUM) 25 MG capsule Take 1 capsule by mouth 3 times daily as needed for Anxiety for up to 7 days.  21 capsule 0    dicyclomine (BENTYL) 10 MG capsule Take 2 capsules by mouth every 6 hours as needed (cramps) 20 capsule 0    ondansetron (ZOFRAN) 4 MG tablet Take 1 tablet by mouth every 8 hours as needed for Nausea 10 tablet 0    famotidine (PEPCID) 20 MG tablet Take 1 tablet by mouth 2 times daily 60 tablet 0    ARIPiprazole (ABILIFY) 10 MG tablet Take 10 mg by mouth daily      benztropine (COGENTIN) 0.5 MG tablet Take 0.5 mg by mouth 2 times daily       PHYSICAL EXAM  Vitals: /87   Pulse 87   Temp 98.8 °F (37.1 °C) (Temporal)   Resp 21   Ht 5' 8\" (1.727 m)   Wt 144 lb 11.2 oz (65.6 kg)   SpO2 96%   BMI 22.00 kg/m²   Constitutional:  33 y.o. male alert, disheveled  HENT:  Atraumatic, oral mucosa moist  Neck:  No visible JVD, supple  Chest/Lungs:  Respiratory effort normal  Abdomen:  Non-distended  Back:  No gross deformity  Extremities:  Normal tone  Neurologic:  Alert, speech normal, mentation normal, pupils equal, normal coordination of extremities, no facial asymmetry     Medical Decision Making and Plan: Briefly, this is an 33 y.o.male who presented with alcohol withdrawal.  Similar episodes in the past.  Benzodiazepine treatment initiated. Plan is to admit for further care. For further details of [facility] encounter, please see documentation by advanced practice provider TITO Davidson.      Labs Reviewed   CBC WITH AUTO DIFFERENTIAL - Abnormal; Notable for the following components:       Result Value    Lymphocytes # 0.9 (*)     All other components within normal limits    Narrative:     Performed at:  [facility] [address]   Phone [phone]   COMPREHENSIVE METABOLIC PANEL - Abnormal; Notable for the following components:    CREATININE 0.7 (*)     All other components within normal limits    Narrative:     Performed at:  [facility] [address]   Phone [phone]   LIPASE - Abnormal; Notable for the following components:    Lipase 70.0 (*)     All other components within normal limits    Narrative:     Performed at:  [facility] [address]   Phone [phone]   ACETAMINOPHEN LEVEL - Abnormal; Notable for the following components:    Acetaminophen Level <5 (*)     All other components within normal limits    Narrative:     Performed at:  [facility] [address]   Phone [phone]   SALICYLATE LEVEL - Abnormal; Notable for the following components:    Salicylate, Serum <8.1 (*)     All other components within normal limits Narrative:     Performed at:  [facility]  [address] [place],  [place], [address]   Phone [phone]   PROTIME-INR    Narrative:     Performed at:  [facility]  [address] [place],  [place], [address]   Phone [phone]   AMMONIA    Narrative:     Performed at:  [facility]  [address],  [place], [address]   Phone [phone]   ETHANOL    Narrative:     Performed at:  [facility]  [address] [facility],  [place], [address]   Phone [phone]   URINE DRUG SCREEN   URINE RT REFLEX TO CULTURE   COMPREHENSIVE METABOLIC PANEL W/ REFLEX TO MG FOR LOW K   MAGNESIUM   CALCIUM, IONIZED   CBC WITH AUTO DIFFERENTIAL   PROTIME-INR   APTT   AMYLASE   LIPASE   TSH WITHOUT REFLEX   VITAMIN B12   PHOSPHORUS     EKG:  Read by me in the absence of a cardiologist shows Sinus rhythm, normal rate, normal conduction intervals, normal axis, no acute injury pattern, no major change from prior study      Medications administered:  Medications   ARIPiprazole (ABILIFY) tablet 10 mg (has no administration in time range)   benztropine (COGENTIN) tablet 0.5 mg (has no administration in time range)   sodium chloride flush 0.9 % injection 10 mL (10 mLs Intravenous Given 3/30/19 2215)   sodium chloride flush 0.9 % injection 10 mL (has no administration in time range)   ondansetron (ZOFRAN) injection 4 mg (has no administration in time range)   sodium chloride 0.9 % 9,879 mL with folic acid 1 mg, adult multi-vitamin with vitamin k 10 mL, thiamine 100 mg (has no administration in time range)   vitamin B-1 (THIAMINE) tablet 100 mg (has no administration in time range)   folic acid (FOLVITE) tablet 1 mg (has no administration in time range)   multivitamin 1 tablet (has no administration in time range)   potassium chloride (KLOR-CON M) extended release tablet 40 mEq (has no administration in time range)     Or potassium bicarb-citric acid (EFFER-K) effervescent tablet 40 mEq (has no administration in time range)     Or   potassium chloride 10 mEq/100 mL IVPB (Peripheral Line) (has no administration in time range)   magnesium sulfate 1 g in dextrose 5% 100 mL IVPB (has no administration in time range)   famotidine (PEPCID) tablet 20 mg (has no administration in time range)   heparin (porcine) injection 5,000 Units (has no administration in time range)   acetaminophen (TYLENOL) tablet 650 mg (has no administration in time range)   LORazepam (ATIVAN) tablet 1 mg ( Oral See Alternative 3/30/19 2222)     Or   LORazepam (ATIVAN) injection 1 mg ( Intravenous See Alternative 3/30/19 2222)     Or   LORazepam (ATIVAN) tablet 2 mg ( Oral See Alternative 3/30/19 2222)     Or   LORazepam (ATIVAN) injection 2 mg ( Intravenous See Alternative 3/30/19 2222)     Or   LORazepam (ATIVAN) tablet 3 mg ( Oral See Alternative 3/30/19 2222)     Or   LORazepam (ATIVAN) injection 3 mg (3 mg Intravenous Given 3/30/19 2222)     Or   LORazepam (ATIVAN) tablet 4 mg ( Oral See Alternative 3/30/19 2222)     Or   LORazepam (ATIVAN) injection 4 mg ( Intravenous See Alternative 3/30/19 2222)   0.9 % sodium chloride infusion ( Intravenous New Bag 3/30/19 2219)   nicotine (NICODERM CQ) 14 MG/24HR 1 patch (has no administration in time range)   sodium chloride 0.9 % 5,778 mL with folic acid 1 mg, adult multi-vitamin with vitamin k 10 mL, thiamine 100 mg ( Intravenous New Bag 3/30/19 1948)   ondansetron (ZOFRAN) injection 4 mg (4 mg Intravenous Given 3/30/19 1943)     FINAL IMPRESSION:    1.  Alcohol withdrawal syndrome with complication [facility])       DISPOSITION Admitted 03/30/2019 08:21:11 PM        Areli Walters MD  03/30/19 7942

## 2019-03-31 NOTE — ED NOTES
Report given to [facility] over phone. Pt alert and oriented and shows no signs of distress at time of transfer to  579. Pt taken to room by ED Tech in bed. Fluids infusing at time of transfer.        Blanco Expose, RN  03/30/19 [id number]

## 2019-03-31 NOTE — PROGRESS NOTES
Message sent to hospitalist:      Thanks for the toradol order. Gave to pt and he is still c/o pain 8-9/10. He said it helped his headache a little. He is moaning and rocking in bed with pain. He is basically asking for 1 time dose of 2mg morphine (yes, he asked for this). I told him I would ask (I understand this make not be possible). But he is not settling down and he claims it is the pain. Thanks    . Electronically signed by Nimco Jauregui RN on 3/31/2019 at 12:07 AM

## 2019-03-31 NOTE — PROGRESS NOTES
Patient out at nurses station and pacing halls. Patient states \"I can't just wait for the doctor to come by. I need to know what's going on\". Patient worried about if MD will discharge patient due to hoarding medications or if they will allow patient to stay. Patient has reported twice that he will leave AMA, but when presented with AMA form, patient refuses to leave and states he will \"stay for my meds\". Patient informed that if he chooses to stay at hospital, he needs to respect other patients on the floor and remain in his room due to his outbursts and behavior. Patient calm at this time and is agreeable to behave. CIWA scored and Ativan given. No response from MD about patient hoarding Ativan.

## 2019-03-31 NOTE — PROGRESS NOTES
Primary nurse called this RN to desk. Primary RN concerned that patient did not swallow oral medication that was given for CIWA (3mg PO Ativan). Patient on camera for seizure precautions. Patient witnessed by this RN take pills out from underneath tissue box. This RN at bedside and removed medications from patient. This RN asked patient why he did not take medication when it was given. Patient states \"I need my withdraw meds\". MD paged about situation.

## 2019-03-31 NOTE — PROGRESS NOTES
New pt admitted 3/30 @ 2300 for alcohol withdrawal.  Pt was recently admitted for the same thing 3/23. Pt at Ed 3/25 and 3/27 also for Etoh w/d. Pt in seizure precautions. Pt 33 year's old and [facility]. Camera in room. Pt with c/o pain early in shift and asking for one time dose of morphine; received one time dose Toradol and prn tylenol. Pt slept remainder of the night. CIWA scores as high as 20. Pt has been sleeping comfortably since 3am. .For Patient Safety Visual Surveillance in place, reviewed with patient/family . Electronically signed by Kasandra Carmichael RN on 3/31/2019 at 6:48 AM

## 2019-03-31 NOTE — PROGRESS NOTES
Pt c/o pain 8/10 to left upper quadrant and says it is due to his pancreatitis. Pt requesting prn pain med. Pt nauseas and dry heaving; unable to take po meds.  Message sent to hospitalist.  .Electronically signed by Vidal Gerardo RN on 3/30/2019 at 11:00 PM

## 2019-04-01 LAB — TSH SERPL DL<=0.05 MIU/L-ACNC: 1.74 UIU/ML (ref 0.27–4.2)

## 2019-04-01 NOTE — PROGRESS NOTES
Patient has been talking loudly to self in room during shift change. Holding head in hands, moaning and crying out. States no one is doing anything for him, he doesn't know what to do. Gets up out of bed, takes off tele monitor, sets off bed alarm. Walks/paces in room. At present in bed and medicated with iv Ativan per CIWA score. Call light in reach.

## 2019-04-03 ENCOUNTER — HOSPITAL ENCOUNTER (EMERGENCY)
Age: 29
Discharge: HOME | End: 2019-04-03
Attending: EMERGENCY MEDICINE
Payer: COMMERCIAL

## 2019-04-03 ENCOUNTER — HOSPITAL ENCOUNTER (INPATIENT)
Age: 29
LOS: 2 days | Discharge: HOME | End: 2019-04-05
Attending: EMERGENCY MEDICINE | Admitting: HOSPITALIST
Payer: COMMERCIAL

## 2019-04-03 VITALS
WEIGHT: 151.01 LBS | BODY MASS INDEX: 22.89 KG/M2 | HEART RATE: 78 BPM | RESPIRATION RATE: 14 BRPM | TEMPERATURE: 97.2 F | DIASTOLIC BLOOD PRESSURE: 77 MMHG | OXYGEN SATURATION: 100 % | SYSTOLIC BLOOD PRESSURE: 116 MMHG | HEIGHT: 68 IN

## 2019-04-03 DIAGNOSIS — R11.0 NAUSEA: Primary | ICD-10-CM

## 2019-04-03 DIAGNOSIS — R74.8 ELEVATED LIPASE: ICD-10-CM

## 2019-04-03 DIAGNOSIS — F10.939 ALCOHOL WITHDRAWAL SYNDROME WITH COMPLICATION (HCC): Primary | ICD-10-CM

## 2019-04-03 LAB
A/G RATIO: 1.8 (ref 1.1–2.2)
ALBUMIN SERPL-MCNC: 4.3 G/DL (ref 3.4–5)
ALBUMIN SERPL-MCNC: 4.6 G/DL (ref 3.4–5)
ALP BLD-CCNC: 56 U/L (ref 40–129)
ALP BLD-CCNC: 58 U/L (ref 40–129)
ALT SERPL-CCNC: 11 U/L (ref 10–40)
ALT SERPL-CCNC: 12 U/L (ref 10–40)
ANION GAP SERPL CALCULATED.3IONS-SCNC: 10 MMOL/L (ref 3–16)
ANION GAP SERPL CALCULATED.3IONS-SCNC: 12 MMOL/L (ref 3–16)
AST SERPL-CCNC: 17 U/L (ref 15–37)
AST SERPL-CCNC: 19 U/L (ref 15–37)
BASOPHILS ABSOLUTE: 0 K/UL (ref 0–0.2)
BASOPHILS ABSOLUTE: 0 K/UL (ref 0–0.2)
BASOPHILS RELATIVE PERCENT: 0.5 %
BASOPHILS RELATIVE PERCENT: 0.7 %
BILIRUB SERPL-MCNC: 0.4 MG/DL (ref 0–1)
BILIRUB SERPL-MCNC: 0.6 MG/DL (ref 0–1)
BILIRUBIN DIRECT: <0.2 MG/DL (ref 0–0.3)
BILIRUBIN, INDIRECT: NORMAL MG/DL (ref 0–1)
BUN BLDV-MCNC: 10 MG/DL (ref 7–20)
BUN BLDV-MCNC: 9 MG/DL (ref 7–20)
CALCIUM SERPL-MCNC: 9.1 MG/DL (ref 8.3–10.6)
CALCIUM SERPL-MCNC: 9.2 MG/DL (ref 8.3–10.6)
CHLORIDE BLD-SCNC: 105 MMOL/L (ref 99–110)
CHLORIDE BLD-SCNC: 106 MMOL/L (ref 99–110)
CO2: 24 MMOL/L (ref 21–32)
CO2: 24 MMOL/L (ref 21–32)
CREAT SERPL-MCNC: 0.6 MG/DL (ref 0.9–1.3)
CREAT SERPL-MCNC: 0.7 MG/DL (ref 0.9–1.3)
EOSINOPHILS ABSOLUTE: 0.2 K/UL (ref 0–0.6)
EOSINOPHILS ABSOLUTE: 0.3 K/UL (ref 0–0.6)
EOSINOPHILS RELATIVE PERCENT: 3.7 %
EOSINOPHILS RELATIVE PERCENT: 4.1 %
GFR AFRICAN AMERICAN: >60
GFR AFRICAN AMERICAN: >60
GFR NON-AFRICAN AMERICAN: >60
GFR NON-AFRICAN AMERICAN: >60
GLOBULIN: 2.6 G/DL
GLUCOSE BLD-MCNC: 139 MG/DL (ref 70–99)
GLUCOSE BLD-MCNC: 93 MG/DL (ref 70–99)
HCT VFR BLD CALC: 39.1 % (ref 40.5–52.5)
HCT VFR BLD CALC: 41.4 % (ref 40.5–52.5)
HEMOGLOBIN: 13.9 G/DL (ref 13.5–17.5)
HEMOGLOBIN: 14.8 G/DL (ref 13.5–17.5)
LIPASE: 114 U/L (ref 13–60)
LIPASE: 144 U/L (ref 13–60)
LYMPHOCYTES ABSOLUTE: 1.3 K/UL (ref 1–5.1)
LYMPHOCYTES ABSOLUTE: 1.8 K/UL (ref 1–5.1)
LYMPHOCYTES RELATIVE PERCENT: 21.2 %
LYMPHOCYTES RELATIVE PERCENT: 27.6 %
MAGNESIUM: 1.8 MG/DL (ref 1.8–2.4)
MCH RBC QN AUTO: 31.9 PG (ref 26–34)
MCH RBC QN AUTO: 32 PG (ref 26–34)
MCHC RBC AUTO-ENTMCNC: 35.6 G/DL (ref 31–36)
MCHC RBC AUTO-ENTMCNC: 35.6 G/DL (ref 31–36)
MCV RBC AUTO: 89.4 FL (ref 80–100)
MCV RBC AUTO: 89.9 FL (ref 80–100)
MONOCYTES ABSOLUTE: 0.4 K/UL (ref 0–1.3)
MONOCYTES ABSOLUTE: 0.5 K/UL (ref 0–1.3)
MONOCYTES RELATIVE PERCENT: 6.6 %
MONOCYTES RELATIVE PERCENT: 7.8 %
NEUTROPHILS ABSOLUTE: 4.1 K/UL (ref 1.7–7.7)
NEUTROPHILS ABSOLUTE: 4.1 K/UL (ref 1.7–7.7)
NEUTROPHILS RELATIVE PERCENT: 61.2 %
NEUTROPHILS RELATIVE PERCENT: 66.6 %
OCCULT BLOOD DIAGNOSTIC: NORMAL
PDW BLD-RTO: 12.1 % (ref 12.4–15.4)
PDW BLD-RTO: 12.2 % (ref 12.4–15.4)
PLATELET # BLD: 193 K/UL (ref 135–450)
PLATELET # BLD: 199 K/UL (ref 135–450)
PMV BLD AUTO: 7.7 FL (ref 5–10.5)
PMV BLD AUTO: 8.1 FL (ref 5–10.5)
POTASSIUM REFLEX MAGNESIUM: 3.5 MMOL/L (ref 3.5–5.1)
POTASSIUM REFLEX MAGNESIUM: 3.9 MMOL/L (ref 3.5–5.1)
RBC # BLD: 4.35 M/UL (ref 4.2–5.9)
RBC # BLD: 4.63 M/UL (ref 4.2–5.9)
SODIUM BLD-SCNC: 140 MMOL/L (ref 136–145)
SODIUM BLD-SCNC: 141 MMOL/L (ref 136–145)
TOTAL PROTEIN: 7.1 G/DL (ref 6.4–8.2)
TOTAL PROTEIN: 7.2 G/DL (ref 6.4–8.2)
WBC # BLD: 6.1 K/UL (ref 4–11)
WBC # BLD: 6.7 K/UL (ref 4–11)

## 2019-04-03 PROCEDURE — 99285 EMERGENCY DEPT VISIT HI MDM: CPT

## 2019-04-03 PROCEDURE — 83735 ASSAY OF MAGNESIUM: CPT

## 2019-04-03 PROCEDURE — 2580000003 HC RX 258: Performed by: EMERGENCY MEDICINE

## 2019-04-03 PROCEDURE — G0328 FECAL BLOOD SCRN IMMUNOASSAY: HCPCS

## 2019-04-03 PROCEDURE — 6370000000 HC RX 637 (ALT 250 FOR IP): Performed by: EMERGENCY MEDICINE

## 2019-04-03 PROCEDURE — 96375 TX/PRO/DX INJ NEW DRUG ADDON: CPT

## 2019-04-03 PROCEDURE — 85025 COMPLETE CBC W/AUTO DIFF WBC: CPT

## 2019-04-03 PROCEDURE — 99284 EMERGENCY DEPT VISIT MOD MDM: CPT

## 2019-04-03 PROCEDURE — 2580000003 HC RX 258: Performed by: PHYSICIAN ASSISTANT

## 2019-04-03 PROCEDURE — 96360 HYDRATION IV INFUSION INIT: CPT

## 2019-04-03 PROCEDURE — 83690 ASSAY OF LIPASE: CPT

## 2019-04-03 PROCEDURE — 80053 COMPREHEN METABOLIC PANEL: CPT

## 2019-04-03 PROCEDURE — 96376 TX/PRO/DX INJ SAME DRUG ADON: CPT

## 2019-04-03 PROCEDURE — 6370000000 HC RX 637 (ALT 250 FOR IP): Performed by: PHYSICIAN ASSISTANT

## 2019-04-03 PROCEDURE — 1200000000 HC SEMI PRIVATE

## 2019-04-03 PROCEDURE — 6360000002 HC RX W HCPCS: Performed by: PHYSICIAN ASSISTANT

## 2019-04-03 PROCEDURE — 96365 THER/PROPH/DIAG IV INF INIT: CPT

## 2019-04-03 RX ORDER — LORAZEPAM 2 MG/ML
1 INJECTION INTRAMUSCULAR ONCE
Status: COMPLETED | OUTPATIENT
Start: 2019-04-03 | End: 2019-04-03

## 2019-04-03 RX ORDER — OMEPRAZOLE 40 MG/1
40 CAPSULE, DELAYED RELEASE ORAL DAILY
COMMUNITY
Start: 2019-04-01 | End: 2019-04-14

## 2019-04-03 RX ORDER — FOLIC ACID 1 MG/1
1 TABLET ORAL ONCE
Status: COMPLETED | OUTPATIENT
Start: 2019-04-03 | End: 2019-04-03

## 2019-04-03 RX ORDER — 0.9 % SODIUM CHLORIDE 0.9 %
1000 INTRAVENOUS SOLUTION INTRAVENOUS ONCE
Status: COMPLETED | OUTPATIENT
Start: 2019-04-03 | End: 2019-04-03

## 2019-04-03 RX ORDER — SODIUM CHLORIDE, SODIUM LACTATE, POTASSIUM CHLORIDE, AND CALCIUM CHLORIDE .6; .31; .03; .02 G/100ML; G/100ML; G/100ML; G/100ML
1000 INJECTION, SOLUTION INTRAVENOUS ONCE
Status: COMPLETED | OUTPATIENT
Start: 2019-04-03 | End: 2019-04-03

## 2019-04-03 RX ORDER — ONDANSETRON 2 MG/ML
4 INJECTION INTRAMUSCULAR; INTRAVENOUS EVERY 30 MIN PRN
Status: DISCONTINUED | OUTPATIENT
Start: 2019-04-03 | End: 2019-04-04

## 2019-04-03 RX ORDER — ONDANSETRON 4 MG/1
4 TABLET, ORALLY DISINTEGRATING ORAL ONCE
Status: COMPLETED | OUTPATIENT
Start: 2019-04-03 | End: 2019-04-03

## 2019-04-03 RX ORDER — LORAZEPAM 1 MG/1
1 TABLET ORAL ONCE
Status: COMPLETED | OUTPATIENT
Start: 2019-04-03 | End: 2019-04-03

## 2019-04-03 RX ORDER — DICYCLOMINE HYDROCHLORIDE 10 MG/1
10 CAPSULE ORAL ONCE
Status: COMPLETED | OUTPATIENT
Start: 2019-04-03 | End: 2019-04-03

## 2019-04-03 RX ORDER — NICOTINE POLACRILEX 4 MG/1
20 GUM, CHEWING ORAL DAILY
COMMUNITY
Start: 2019-03-13 | End: 2019-04-03

## 2019-04-03 RX ORDER — ONDANSETRON 4 MG/1
4 TABLET, ORALLY DISINTEGRATING ORAL EVERY 12 HOURS PRN
Qty: 6 TABLET | Refills: 0 | Status: SHIPPED | OUTPATIENT
Start: 2019-04-03 | End: 2019-04-14

## 2019-04-03 RX ORDER — THIAMINE MONONITRATE (VIT B1) 100 MG
100 TABLET ORAL DAILY
Status: DISCONTINUED | OUTPATIENT
Start: 2019-04-03 | End: 2019-04-04

## 2019-04-03 RX ORDER — DICYCLOMINE HYDROCHLORIDE 10 MG/1
10 CAPSULE ORAL 2 TIMES DAILY PRN
Qty: 10 CAPSULE | Refills: 0 | Status: SHIPPED | OUTPATIENT
Start: 2019-04-03 | End: 2019-04-14

## 2019-04-03 RX ADMIN — DICYCLOMINE HYDROCHLORIDE 10 MG: 10 CAPSULE ORAL at 16:36

## 2019-04-03 RX ADMIN — LORAZEPAM 1 MG: 2 INJECTION INTRAMUSCULAR; INTRAVENOUS at 20:52

## 2019-04-03 RX ADMIN — SODIUM CHLORIDE 1000 ML: 9 INJECTION, SOLUTION INTRAVENOUS at 16:39

## 2019-04-03 RX ADMIN — Medication 100 MG: at 22:47

## 2019-04-03 RX ADMIN — ONDANSETRON 4 MG: 4 TABLET, ORALLY DISINTEGRATING ORAL at 16:37

## 2019-04-03 RX ADMIN — SODIUM CHLORIDE, POTASSIUM CHLORIDE, SODIUM LACTATE AND CALCIUM CHLORIDE 1000 ML: 600; 310; 30; 20 INJECTION, SOLUTION INTRAVENOUS at 21:04

## 2019-04-03 RX ADMIN — ONDANSETRON 4 MG: 2 INJECTION INTRAMUSCULAR; INTRAVENOUS at 20:50

## 2019-04-03 RX ADMIN — LORAZEPAM 1 MG: 1 TABLET ORAL at 15:56

## 2019-04-03 RX ADMIN — LORAZEPAM 1 MG: 2 INJECTION INTRAMUSCULAR; INTRAVENOUS at 22:48

## 2019-04-03 RX ADMIN — FOLIC ACID 1 MG: 1 TABLET ORAL at 22:48

## 2019-04-03 ASSESSMENT — ENCOUNTER SYMPTOMS
COUGH: 0
EYE ITCHING: 0
RECTAL PAIN: 0
WHEEZING: 0
CHOKING: 0
EYE REDNESS: 0
PHOTOPHOBIA: 0
ABDOMINAL DISTENTION: 0
STRIDOR: 0
BLOOD IN STOOL: 0
ABDOMINAL PAIN: 0
APNEA: 0
NAUSEA: 1
DIARRHEA: 0
CONSTIPATION: 0
EYE DISCHARGE: 0
ANAL BLEEDING: 0
SHORTNESS OF BREATH: 0
EYE PAIN: 0
VOMITING: 1
BACK PAIN: 0
CHEST TIGHTNESS: 0
COLOR CHANGE: 0

## 2019-04-03 ASSESSMENT — PAIN DESCRIPTION - DESCRIPTORS
DESCRIPTORS: ACHING

## 2019-04-03 ASSESSMENT — PAIN DESCRIPTION - PAIN TYPE
TYPE: ACUTE PAIN

## 2019-04-03 ASSESSMENT — PAIN DESCRIPTION - LOCATION
LOCATION: HEAD
LOCATION: ABDOMEN
LOCATION: HEAD
LOCATION: ABDOMEN

## 2019-04-03 ASSESSMENT — PAIN DESCRIPTION - PROGRESSION
CLINICAL_PROGRESSION: GRADUALLY WORSENING
CLINICAL_PROGRESSION: GRADUALLY IMPROVING

## 2019-04-03 ASSESSMENT — PAIN SCALES - GENERAL
PAINLEVEL_OUTOF10: 2
PAINLEVEL_OUTOF10: 8
PAINLEVEL_OUTOF10: 2
PAINLEVEL_OUTOF10: 2
PAINLEVEL_OUTOF10: 8
PAINLEVEL_OUTOF10: 5
PAINLEVEL_OUTOF10: 2

## 2019-04-03 ASSESSMENT — PAIN DESCRIPTION - ONSET
ONSET: GRADUAL
ONSET: PROGRESSIVE
ONSET: ON-GOING

## 2019-04-03 ASSESSMENT — PAIN DESCRIPTION - FREQUENCY
FREQUENCY: INTERMITTENT
FREQUENCY: CONTINUOUS
FREQUENCY: CONTINUOUS
FREQUENCY: INTERMITTENT

## 2019-04-03 ASSESSMENT — PAIN - FUNCTIONAL ASSESSMENT
PAIN_FUNCTIONAL_ASSESSMENT: PREVENTS OR INTERFERES SOME ACTIVE ACTIVITIES AND ADLS
PAIN_FUNCTIONAL_ASSESSMENT: PREVENTS OR INTERFERES WITH MANY ACTIVE NOT PASSIVE ACTIVITIES

## 2019-04-03 NOTE — ED PROVIDER NOTES
[address]  [address] [facility] [address]  Dept: [phone]  Loc: [address] [facility] COMPLAINT   Chief Complaint   Patient presents with    Withdrawal     Patient dizzy, tingly and shaky, states feels short of breath. Believes he is in withdrawal. States last drink was yesterday. ADALBERTO Blair is a 33 y.o. male who presents with concern for alcohol withdrawal with associated tremors and dizziness. Onset was this AM.  Duration has been constant since the onset. Patient usually drinks 15-20 beers a day. States last drink was last night. No aggravating or alleviating factors. REVIEW OF SYSTEMS   Cardiac: No chest pain  Pulmonary: No shortness of breath  Neurological: No headache or focal extremity weakness  Psychiatric: No suicidal ideations, No homicidal ideations, No hallucinations  All other systems reviewed and are negative. PAST MEDICAL & SURGICAL HISTORY   Past Medical History:   Diagnosis Date    Alcohol abuse     PATIENT HAS EXTREME MANIPULITIVE & DRUG SEEKING BEHAVIOR. HE POCKETS HIS BENZODIZAPINES.  Anxiety     Drug-seeking behavior     Herpes genitalis in men     Manipulative behavior     Pancreatitis     Pneumonia     Portal vein thrombosis     pt denied    Seizures (HCC)     PER PATIENT ONLY.  DURING MULTIPLE HOSPITALIZATIONS HE HAS NEVER ONCE    Tobacco abuse      Past Surgical History:   Procedure Laterality Date    ENDOSCOPY, COLON, DIAGNOSTIC  10/28/2013    Endoscopic retrograde cholangiopancreatography with pancreatic stent placement    ENDOSCOPY, COLON, DIAGNOSTIC  03/23/2018    Esophagogastroduodenoscopy with biopsy    ERCP  1/10/14    with stent removal        CURRENT MEDICATIONS   Current Outpatient Rx   Medication Sig Dispense Refill    dicyclomine (BENTYL) 10 MG capsule Take 1 capsule by mouth 2 times daily as needed (Pain) 10 capsule 0    ondansetron (ZOFRAN ODT) 4 MG disintegrating tablet Take 1 tablet by mouth every 12 hours as needed for Nausea May Sub regular tablet (non-ODT) if insurance does not cover ODT. 6 tablet 0    chlordiazePOXIDE (LIBRIUM) 25 MG capsule Take 1 capsule by mouth 3 times daily as needed for Anxiety for up to 7 days. 21 capsule 0    ARIPiprazole (ABILIFY) 10 MG tablet Take 10 mg by mouth daily      benztropine (COGENTIN) 0.5 MG tablet Take 0.5 mg by mouth 2 times daily          ALLERGIES   Allergies   Allergen Reactions    Amoxicillin Hives    Haldol [Haloperidol Lactate] Other (See Comments)     Muscle spasms    Phenergan [Promethazine Hcl] Other (See Comments)     Pt believes it caused muscle spasms    Glucosamine Itching      Itching of throat when eating shrimp. Pt states has had IV contrast for CT's without problem before.  Shellfish-Derived Products      Patient gets anxious around seafood        SOCIAL & FAMILY HISTORY   Social History     Socioeconomic History    Marital status: Single     Spouse name: Not on file    Number of children: 1    Years of education: 15    Highest education level: Not on file   Occupational History    Occupation:    Social Needs    Financial resource strain: Not on file    Food insecurity:     Worry: Not on file     Inability: Not on file    Transportation needs:     Medical: Not on file     Non-medical: Not on file   Tobacco Use    Smoking status: Current Every Day Smoker     Packs/day: 0.50     Years: 10.00     Pack years: 5.00     Types: Cigarettes     Start date: 11/21/2007    Smokeless tobacco: Never Used   Substance and Sexual Activity    Alcohol use:  Yes     Alcohol/week: 12.0 oz     Types: 20 Cans of beer per week     Comment: every day    Drug use: No    Sexual activity: Never   Lifestyle    Physical activity:     Days per week: Not on file     Minutes per session: Not on file    Stress: Not on file   Relationships    Social connections:     Talks on phone: Not on file     Gets together: Not on file     Attends Hindu service: Not on file     Active member of club or organization: Not on file     Attends meetings of clubs or organizations: Not on file     Relationship status: Not on file    Intimate partner violence:     Fear of current or ex partner: Not on file     Emotionally abused: Not on file     Physically abused: Not on file     Forced sexual activity: Not on file   Other Topics Concern    Not on file   Social History Narrative    Not on file     Family History   Problem Relation Age of Onset    Hypertension Father     High Blood Pressure Father     Alcohol Abuse Father     Depression Mother     High Blood Pressure Paternal Grandfather     Alcohol Abuse Paternal Grandfather     Heart Disease Paternal Grandmother     Anemia Paternal Grandmother     Depression Maternal Aunt     Alcohol Abuse Paternal Aunt     Alcohol Abuse Paternal Uncle         PHYSICAL EXAM   VITAL SIGNS: /63   Pulse 89   Temp 97.4 °F (36.3 °C) (Oral)   Resp 26   Wt 150 lb 12.7 oz (68.4 kg)   SpO2 100%   BMI 22.93 kg/m²    Constitutional: Well developed, well nourished  EYES:  PERRL, nonicteric  HENT: Atraumatic, moist mucus membranes  Neck: No massess, no JVD   Respiratory: Lungs clear to auscultation bilaterally, no retractions   Cardiovascular: regular rate, no murmurs  GI: Soft, nontender, normal bowel sounds  Musculoskeletal: No edema, no acute deformities  Integument: Warm and dry skin  Neurologic: Awake, alert, oriented, no aphasia, no slurred speech, CN II-XII intact, normal finger to nose test bilaterally, 5/5 strength in all 4 extremities, sensation to light touch intact bilaterally, patellar reflexes 2+ and equal bilaterally  Vascular: Radial pulses 2+ equal bilaterally   Psychiatric: Anxious appearing, cooperative, has good insight, has an appropriate affect     ED COURSE & MEDICAL DECISION MAKING   Pertinent Labs & Imaging studies reviewed and interpreted. (See chart for details)     Vitals:    04/03/19 2202   BP: 121/63   Pulse: 89   Resp: 26   Temp:    SpO2: 100%       Differential Diagnosis: Aspiration pneumonia, concurrent traumatic brain injury, Sepsis or other infection, Encephalopathy, Seizure, Metabolic derangement, Drug-Induced cardiac arrhythmia, other     CRITICAL CARE NOTE:  There was a high probability of clinically significant life-threatening deterioration of the patient's condition requiring my urgent intervention. Total critical care time was at least 15 minutes. This includes vital sign monitoring, pulse oximetry monitoring, telemetry monitoring, clinical response to the IV medications, reviewing the nursing notes, consultation time, dictation/documentation time, and interpretation of the labwork. This excludes any separately billable procedures performed. Patient is afebrile and nontoxic in appearance. Labs reveal no leukocytosis or anemia. Metabolic panel unremarkable. Lipase 114, elevated. Urinalysis pending. CIWA score 12. Ativan was ordered. Thiamine and folate were ordered. Reevaluation at 10:15 PM: Patient still has tremor and is agitated, another milligram of Ativan was ordered. Consultation with hospitalist at 10:25 PM: I contacted Dr. Danni Dougherty via [employer], and the patient was accepted for admission. The patient was also seen and evaluated by the ED attending, Dr. Cornelious Dance. Please see the attending note for further details. FINAL IMPRESSION   1.  Alcohol withdrawal syndrome with complication [facility])        PLAN  Admission to the hospital    (Please note that this note was completed with a voice recognition program.  Every attempt was made to edit the dictations, but inevitably there remain words that are mis-transcribed.)        Duglas Johansenma  04/03/19 6369

## 2019-04-03 NOTE — ED TRIAGE NOTES
Patient vomiting at triage. States he is in withdrawal. Usually drinks about 15 beers/day. Yesterday he didn't have enough and drank mouthwash. Nothing since then. C/O body numbness and tingling, headache, visual disturbances and shaking.

## 2019-04-03 NOTE — ED PROVIDER NOTES
[facility]  eMERGENCY dEPARTMENT eNCOUnter      Pt Name: Amaris Denny  MRN: [id number]  [place] 1990  Date of evaluation: 4/3/2019  Provider: Fidencio Henson MD    [address] [place] [address]       Chief Complaint   Patient presents with    Dizziness     feeling shaking, no feeling right     Nausea     started 1 hr ago       [facility]    mAaris Denny is a 33 y.o. male Hx pancreatitis, PNA, Anxiety, depression, ETOH abuse who presents to the emergency department with dizziness/N/V/crampy belly pain. States he drank ETOH last night and since drinking has had N/V. States he is throwing up blood, red color in trash can he brought (tested for blood in ER heme negative). A rehab facility by the name of [address] [place] [address] has accepted him to the facility but they do not have a bed available for him yet according to the patient. Requesting ativan for anxiety. Denies SI, HI, Hallucinations. No abdominal pain (+ for cramps). No C/D. Denies hallucinations or seizures or tremors. Been to [facility] ER 13 times in last 2 months. Nursing Notes were reviewed. REVIEW OF SYSTEMS       Review of Systems   Constitutional: Negative for activity change, appetite change, chills, diaphoresis, fatigue, fever and unexpected weight change. HENT: Negative for congestion, dental problem, drooling, ear discharge and ear pain. Eyes: Negative for photophobia, pain, discharge, redness, itching and visual disturbance. Respiratory: Negative for apnea, cough, choking, chest tightness, shortness of breath, wheezing and stridor. Cardiovascular: Negative for chest pain, palpitations and leg swelling. Gastrointestinal: Positive for nausea and vomiting. Negative for abdominal distention, abdominal pain, anal bleeding, blood in stool, constipation, diarrhea and rectal pain. Endocrine: Negative for cold intolerance and heat intolerance.    Genitourinary: Negative for decreased urine volume and Shellfish-derived products    FAMILY HISTORY        Family History   Problem Relation Age of Onset    Hypertension Father     High Blood Pressure Father     Alcohol Abuse Father     Depression Mother     High Blood Pressure Paternal Grandfather     Alcohol Abuse Paternal Grandfather     Heart Disease Paternal Grandmother     Anemia Paternal Grandmother     Depression Maternal Aunt     Alcohol Abuse Paternal Aunt     Alcohol Abuse Paternal Uncle        SOCIAL HISTORY       Social History     Socioeconomic History    Marital status: Single     Spouse name: Not on file    Number of children: 1    Years of education: 15    Highest education level: Not on file   Occupational History    Occupation:    Social Needs    Financial resource strain: Not on file    Food insecurity:     Worry: Not on file     Inability: Not on file    Transportation needs:     Medical: Not on file     Non-medical: Not on file   Tobacco Use    Smoking status: Current Every Day Smoker     Packs/day: 0.50     Years: 10.00     Pack years: 5.00     Types: Cigarettes     Start date: 11/21/2007    Smokeless tobacco: Never Used   Substance and Sexual Activity    Alcohol use:  Yes     Alcohol/week: 12.0 oz     Types: 20 Cans of beer per week     Comment: every day    Drug use: No    Sexual activity: Never   Lifestyle    Physical activity:     Days per week: Not on file     Minutes per session: Not on file    Stress: Not on file   Relationships    Social connections:     Talks on phone: Not on file     Gets together: Not on file     Attends Quaker service: Not on file     Active member of club or organization: Not on file     Attends meetings of clubs or organizations: Not on file     Relationship status: Not on file    Intimate partner violence:     Fear of current or ex partner: Not on file     Emotionally abused: Not on file     Physically abused: Not on file     Forced sexual activity: Not on file   Other Topics Concern    Not on file   Social History Narrative    Not on file       PHYSICAL EXAM       ED Triage Vitals [04/03/19 1342]   BP Temp Temp Source Pulse Resp SpO2 Height Weight   (!) 146/84 96.9 °F (36.1 °C) Oral 92 16 99 % 5' 8\" (1.727 m) 151 lb 0.2 oz (68.5 kg)       Physical Exam   Constitutional: He appears well-developed. No distress. HENT:   Head: Normocephalic and atraumatic. Eyes: Pupils are equal, round, and reactive to light. Right eye exhibits no discharge. Left eye exhibits no discharge. Neck: Normal range of motion. No tracheal deviation present. No thyromegaly present. Cardiovascular: Normal rate and regular rhythm. No murmur heard. Pulmonary/Chest: He has no wheezes. He has no rales. He exhibits no tenderness. Abdominal: Soft. He exhibits no distension and no mass. There is no tenderness. There is no rebound and no guarding. Musculoskeletal: Normal range of motion. He exhibits no edema, tenderness or deformity. Neurological: He is alert. He has normal strength. He is not disoriented. He displays no atrophy and no tremor. No cranial nerve deficit. He exhibits normal muscle tone. He displays seizure activity. Coordination and gait normal. GCS eye subscore is 4. GCS verbal subscore is 5. GCS motor subscore is 6. Skin: Skin is warm. No rash noted. He is not diaphoretic. No erythema. No pallor.        DIAGNOSTIC RESULTS     EKG: All EKG's are interpreted by the Emergency Department Physician who either signs or Co-signs this chart in the absence of acardiologist.    None    RADIOLOGY:   Non-plain film images such as CT, Ultrasoundand MRI are read by the radiologist. Plain radiographic images are visualized and preliminarily interpreted by the emergency physician with the below findings:    None    ED BEDSIDE ULTRASOUND:   Performed by ED Physician - none    LABS:  Labs Reviewed   CBC WITH AUTO DIFFERENTIAL - Abnormal; Notable for the following components:       Result Value    Hematocrit patient the reasons which may require a return visit and the importance of follow-up care. The patient is well-appearing, nontoxic, and improved at the time of discharge. Patient agrees to call to arrange follow-up care as directed. Patient understands to return immediately for worsening/change in symptoms. CRITICAL CARE TIME   Total Critical Caretime was 0 minutes, excluding separately reportable procedures. There was a high probability of clinically significant/life threatening deterioration in the patient's condition which required my urgent intervention. PROCEDURES:  Unlessotherwise noted below, none    FINAL IMPRESSION      1. Nausea    2.  Elevated lipase          DISPOSITION/PLAN   DISPOSITION      PATIENT REFERRED TO:  [facility]) Pre-Services  [phone]            (Please note that portions ofthis note were completed with a voice recognition program.  Efforts were made to edit the dictations but occasionally words are mis-transcribed.)    Kiet Montilla MD(electronically signed)  Attending Emergency Physician        Kiet Montilla MD  04/03/19 2920

## 2019-04-04 LAB
ANION GAP SERPL CALCULATED.3IONS-SCNC: 10 MMOL/L (ref 3–16)
BASOPHILS ABSOLUTE: 0 K/UL (ref 0–0.2)
BASOPHILS RELATIVE PERCENT: 0.5 %
BILIRUBIN URINE: NEGATIVE
BLOOD, URINE: NEGATIVE
BUN BLDV-MCNC: 8 MG/DL (ref 7–20)
CALCIUM SERPL-MCNC: 8.2 MG/DL (ref 8.3–10.6)
CHLORIDE BLD-SCNC: 109 MMOL/L (ref 99–110)
CLARITY: CLEAR
CO2: 23 MMOL/L (ref 21–32)
COLOR: YELLOW
CREAT SERPL-MCNC: 0.6 MG/DL (ref 0.9–1.3)
EOSINOPHILS ABSOLUTE: 0.3 K/UL (ref 0–0.6)
EOSINOPHILS RELATIVE PERCENT: 6.2 %
GFR AFRICAN AMERICAN: >60
GFR NON-AFRICAN AMERICAN: >60
GLUCOSE BLD-MCNC: 102 MG/DL (ref 70–99)
GLUCOSE BLD-MCNC: 90 MG/DL (ref 70–99)
GLUCOSE BLD-MCNC: 93 MG/DL (ref 70–99)
GLUCOSE BLD-MCNC: 93 MG/DL (ref 70–99)
GLUCOSE URINE: NEGATIVE MG/DL
HCT VFR BLD CALC: 35 % (ref 40.5–52.5)
HEMOGLOBIN: 12.6 G/DL (ref 13.5–17.5)
KETONES, URINE: NEGATIVE MG/DL
LEUKOCYTE ESTERASE, URINE: NEGATIVE
LIPASE: 58 U/L (ref 13–60)
LYMPHOCYTES ABSOLUTE: 1.8 K/UL (ref 1–5.1)
LYMPHOCYTES RELATIVE PERCENT: 36.6 %
MCH RBC QN AUTO: 32.5 PG (ref 26–34)
MCHC RBC AUTO-ENTMCNC: 36 G/DL (ref 31–36)
MCV RBC AUTO: 90.5 FL (ref 80–100)
MICROSCOPIC EXAMINATION: NORMAL
MONOCYTES ABSOLUTE: 0.4 K/UL (ref 0–1.3)
MONOCYTES RELATIVE PERCENT: 8.2 %
NEUTROPHILS ABSOLUTE: 2.4 K/UL (ref 1.7–7.7)
NEUTROPHILS RELATIVE PERCENT: 48.5 %
NITRITE, URINE: NEGATIVE
PDW BLD-RTO: 11.9 % (ref 12.4–15.4)
PERFORMED ON: ABNORMAL
PERFORMED ON: NORMAL
PERFORMED ON: NORMAL
PH UA: 6.5 (ref 5–8)
PHOSPHORUS: 3.8 MG/DL (ref 2.5–4.9)
PLATELET # BLD: 154 K/UL (ref 135–450)
PMV BLD AUTO: 7.9 FL (ref 5–10.5)
POTASSIUM REFLEX MAGNESIUM: 3.6 MMOL/L (ref 3.5–5.1)
PROTEIN UA: NEGATIVE MG/DL
RBC # BLD: 3.87 M/UL (ref 4.2–5.9)
SODIUM BLD-SCNC: 142 MMOL/L (ref 136–145)
SPECIFIC GRAVITY UA: 1.01 (ref 1–1.03)
URINE REFLEX TO CULTURE: NORMAL
URINE TYPE: NORMAL
UROBILINOGEN, URINE: 0.2 E.U./DL
WBC # BLD: 5 K/UL (ref 4–11)

## 2019-04-04 PROCEDURE — 80048 BASIC METABOLIC PNL TOTAL CA: CPT

## 2019-04-04 PROCEDURE — 6360000002 HC RX W HCPCS: Performed by: HOSPITALIST

## 2019-04-04 PROCEDURE — 36415 COLL VENOUS BLD VENIPUNCTURE: CPT

## 2019-04-04 PROCEDURE — 84100 ASSAY OF PHOSPHORUS: CPT

## 2019-04-04 PROCEDURE — 2500000003 HC RX 250 WO HCPCS: Performed by: HOSPITALIST

## 2019-04-04 PROCEDURE — 6370000000 HC RX 637 (ALT 250 FOR IP): Performed by: HOSPITALIST

## 2019-04-04 PROCEDURE — 6370000000 HC RX 637 (ALT 250 FOR IP): Performed by: INTERNAL MEDICINE

## 2019-04-04 PROCEDURE — 81003 URINALYSIS AUTO W/O SCOPE: CPT

## 2019-04-04 PROCEDURE — 85025 COMPLETE CBC W/AUTO DIFF WBC: CPT

## 2019-04-04 PROCEDURE — 94760 N-INVAS EAR/PLS OXIMETRY 1: CPT

## 2019-04-04 PROCEDURE — C9113 INJ PANTOPRAZOLE SODIUM, VIA: HCPCS | Performed by: HOSPITALIST

## 2019-04-04 PROCEDURE — 6360000002 HC RX W HCPCS: Performed by: INTERNAL MEDICINE

## 2019-04-04 PROCEDURE — 1200000000 HC SEMI PRIVATE

## 2019-04-04 PROCEDURE — 2580000003 HC RX 258: Performed by: HOSPITALIST

## 2019-04-04 PROCEDURE — 83690 ASSAY OF LIPASE: CPT

## 2019-04-04 RX ORDER — MORPHINE SULFATE 2 MG/ML
2 INJECTION, SOLUTION INTRAMUSCULAR; INTRAVENOUS EVERY 4 HOURS PRN
Status: DISCONTINUED | OUTPATIENT
Start: 2019-04-04 | End: 2019-04-04

## 2019-04-04 RX ORDER — ONDANSETRON 2 MG/ML
4 INJECTION INTRAMUSCULAR; INTRAVENOUS EVERY 6 HOURS PRN
Status: DISCONTINUED | OUTPATIENT
Start: 2019-04-04 | End: 2019-04-05 | Stop reason: HOSPADM

## 2019-04-04 RX ORDER — SODIUM CHLORIDE 0.9 % (FLUSH) 0.9 %
10 SYRINGE (ML) INJECTION PRN
Status: DISCONTINUED | OUTPATIENT
Start: 2019-04-04 | End: 2019-04-04

## 2019-04-04 RX ORDER — MAGNESIUM SULFATE 1 G/100ML
1 INJECTION INTRAVENOUS PRN
Status: DISCONTINUED | OUTPATIENT
Start: 2019-04-04 | End: 2019-04-05 | Stop reason: HOSPADM

## 2019-04-04 RX ORDER — POTASSIUM CHLORIDE 7.45 MG/ML
10 INJECTION INTRAVENOUS PRN
Status: DISCONTINUED | OUTPATIENT
Start: 2019-04-04 | End: 2019-04-05 | Stop reason: HOSPADM

## 2019-04-04 RX ORDER — LORAZEPAM 2 MG/ML
1 INJECTION INTRAMUSCULAR
Status: DISCONTINUED | OUTPATIENT
Start: 2019-04-04 | End: 2019-04-05 | Stop reason: HOSPADM

## 2019-04-04 RX ORDER — LORAZEPAM 2 MG/ML
3 INJECTION INTRAMUSCULAR
Status: DISCONTINUED | OUTPATIENT
Start: 2019-04-04 | End: 2019-04-05 | Stop reason: HOSPADM

## 2019-04-04 RX ORDER — NICOTINE 21 MG/24HR
1 PATCH, TRANSDERMAL 24 HOURS TRANSDERMAL DAILY
Status: DISCONTINUED | OUTPATIENT
Start: 2019-04-04 | End: 2019-04-05 | Stop reason: HOSPADM

## 2019-04-04 RX ORDER — LORAZEPAM 1 MG/1
1 TABLET ORAL
Status: DISCONTINUED | OUTPATIENT
Start: 2019-04-04 | End: 2019-04-05 | Stop reason: HOSPADM

## 2019-04-04 RX ORDER — DIAZEPAM 5 MG/1
10 TABLET ORAL EVERY 6 HOURS SCHEDULED
Status: DISCONTINUED | OUTPATIENT
Start: 2019-04-04 | End: 2019-04-05

## 2019-04-04 RX ORDER — POTASSIUM CHLORIDE 20 MEQ/1
40 TABLET, EXTENDED RELEASE ORAL PRN
Status: DISCONTINUED | OUTPATIENT
Start: 2019-04-04 | End: 2019-04-05 | Stop reason: HOSPADM

## 2019-04-04 RX ORDER — LORAZEPAM 2 MG/ML
2 INJECTION INTRAMUSCULAR
Status: DISCONTINUED | OUTPATIENT
Start: 2019-04-04 | End: 2019-04-05 | Stop reason: HOSPADM

## 2019-04-04 RX ORDER — SODIUM CHLORIDE 9 MG/ML
INJECTION, SOLUTION INTRAVENOUS CONTINUOUS
Status: DISCONTINUED | OUTPATIENT
Start: 2019-04-04 | End: 2019-04-05

## 2019-04-04 RX ORDER — ACETAMINOPHEN 325 MG/1
650 TABLET ORAL EVERY 4 HOURS PRN
Status: DISCONTINUED | OUTPATIENT
Start: 2019-04-04 | End: 2019-04-05 | Stop reason: HOSPADM

## 2019-04-04 RX ORDER — MORPHINE SULFATE 4 MG/ML
4 INJECTION, SOLUTION INTRAMUSCULAR; INTRAVENOUS EVERY 4 HOURS PRN
Status: DISCONTINUED | OUTPATIENT
Start: 2019-04-04 | End: 2019-04-05 | Stop reason: HOSPADM

## 2019-04-04 RX ORDER — LORAZEPAM 2 MG/ML
4 INJECTION INTRAMUSCULAR
Status: DISCONTINUED | OUTPATIENT
Start: 2019-04-04 | End: 2019-04-05 | Stop reason: HOSPADM

## 2019-04-04 RX ORDER — SODIUM CHLORIDE 0.9 % (FLUSH) 0.9 %
10 SYRINGE (ML) INJECTION EVERY 12 HOURS SCHEDULED
Status: DISCONTINUED | OUTPATIENT
Start: 2019-04-04 | End: 2019-04-05 | Stop reason: HOSPADM

## 2019-04-04 RX ORDER — LORAZEPAM 1 MG/1
3 TABLET ORAL
Status: DISCONTINUED | OUTPATIENT
Start: 2019-04-04 | End: 2019-04-05 | Stop reason: HOSPADM

## 2019-04-04 RX ORDER — LORAZEPAM 1 MG/1
4 TABLET ORAL
Status: DISCONTINUED | OUTPATIENT
Start: 2019-04-04 | End: 2019-04-05 | Stop reason: HOSPADM

## 2019-04-04 RX ORDER — PANTOPRAZOLE SODIUM 40 MG/10ML
40 INJECTION, POWDER, LYOPHILIZED, FOR SOLUTION INTRAVENOUS DAILY
Status: DISCONTINUED | OUTPATIENT
Start: 2019-04-04 | End: 2019-04-05 | Stop reason: HOSPADM

## 2019-04-04 RX ORDER — SODIUM CHLORIDE 0.9 % (FLUSH) 0.9 %
10 SYRINGE (ML) INJECTION EVERY 12 HOURS SCHEDULED
Status: DISCONTINUED | OUTPATIENT
Start: 2019-04-04 | End: 2019-04-04

## 2019-04-04 RX ORDER — SODIUM CHLORIDE 0.9 % (FLUSH) 0.9 %
10 SYRINGE (ML) INJECTION PRN
Status: DISCONTINUED | OUTPATIENT
Start: 2019-04-04 | End: 2019-04-05 | Stop reason: HOSPADM

## 2019-04-04 RX ORDER — LORAZEPAM 1 MG/1
2 TABLET ORAL
Status: DISCONTINUED | OUTPATIENT
Start: 2019-04-04 | End: 2019-04-05 | Stop reason: HOSPADM

## 2019-04-04 RX ADMIN — ONDANSETRON 4 MG: 2 INJECTION INTRAMUSCULAR; INTRAVENOUS at 01:30

## 2019-04-04 RX ADMIN — DIAZEPAM 10 MG: 5 TABLET ORAL at 23:35

## 2019-04-04 RX ADMIN — MORPHINE SULFATE 4 MG: 4 INJECTION INTRAVENOUS at 23:37

## 2019-04-04 RX ADMIN — SODIUM CHLORIDE: 9 INJECTION, SOLUTION INTRAVENOUS at 01:27

## 2019-04-04 RX ADMIN — MORPHINE SULFATE 2 MG: 2 INJECTION, SOLUTION INTRAMUSCULAR; INTRAVENOUS at 01:31

## 2019-04-04 RX ADMIN — MORPHINE SULFATE 2 MG: 2 INJECTION, SOLUTION INTRAMUSCULAR; INTRAVENOUS at 06:03

## 2019-04-04 RX ADMIN — MORPHINE SULFATE 4 MG: 4 INJECTION INTRAVENOUS at 12:33

## 2019-04-04 RX ADMIN — MORPHINE SULFATE 4 MG: 4 INJECTION INTRAVENOUS at 20:32

## 2019-04-04 RX ADMIN — PANTOPRAZOLE SODIUM 40 MG: 40 INJECTION, POWDER, FOR SOLUTION INTRAVENOUS at 01:30

## 2019-04-04 RX ADMIN — LORAZEPAM 4 MG: 2 INJECTION INTRAMUSCULAR; INTRAVENOUS at 08:14

## 2019-04-04 RX ADMIN — LORAZEPAM 3 MG: 1 TABLET ORAL at 21:56

## 2019-04-04 RX ADMIN — LORAZEPAM 3 MG: 1 TABLET ORAL at 18:41

## 2019-04-04 RX ADMIN — DIAZEPAM 10 MG: 5 TABLET ORAL at 12:33

## 2019-04-04 RX ADMIN — MORPHINE SULFATE 2 MG: 2 INJECTION, SOLUTION INTRAMUSCULAR; INTRAVENOUS at 09:57

## 2019-04-04 RX ADMIN — LORAZEPAM 2 MG: 2 INJECTION INTRAMUSCULAR; INTRAVENOUS at 04:55

## 2019-04-04 RX ADMIN — LORAZEPAM 4 MG: 2 INJECTION INTRAMUSCULAR; INTRAVENOUS at 01:31

## 2019-04-04 RX ADMIN — Medication 10 ML: at 08:14

## 2019-04-04 RX ADMIN — ENOXAPARIN SODIUM 40 MG: 40 INJECTION SUBCUTANEOUS at 08:14

## 2019-04-04 RX ADMIN — DIAZEPAM 10 MG: 5 TABLET ORAL at 17:18

## 2019-04-04 RX ADMIN — SODIUM CHLORIDE: 9 INJECTION, SOLUTION INTRAVENOUS at 23:37

## 2019-04-04 RX ADMIN — MORPHINE SULFATE 4 MG: 4 INJECTION INTRAVENOUS at 16:41

## 2019-04-04 RX ADMIN — FOLIC ACID: 5 INJECTION, SOLUTION INTRAMUSCULAR; INTRAVENOUS; SUBCUTANEOUS at 09:10

## 2019-04-04 RX ADMIN — ONDANSETRON 4 MG: 2 INJECTION INTRAMUSCULAR; INTRAVENOUS at 13:27

## 2019-04-04 RX ADMIN — ONDANSETRON 4 MG: 2 INJECTION INTRAMUSCULAR; INTRAVENOUS at 20:32

## 2019-04-04 RX ADMIN — LORAZEPAM 3 MG: 1 TABLET ORAL at 20:32

## 2019-04-04 RX ADMIN — LORAZEPAM 3 MG: 1 TABLET ORAL at 14:47

## 2019-04-04 ASSESSMENT — PAIN DESCRIPTION - DESCRIPTORS
DESCRIPTORS: ACHING;STABBING
DESCRIPTORS: CONSTANT;STABBING
DESCRIPTORS: CONSTANT;DISCOMFORT
DESCRIPTORS: ACHING;STABBING
DESCRIPTORS: CONSTANT;STABBING
DESCRIPTORS: ACHING
DESCRIPTORS: DISCOMFORT

## 2019-04-04 ASSESSMENT — PAIN SCALES - GENERAL
PAINLEVEL_OUTOF10: 8
PAINLEVEL_OUTOF10: 5
PAINLEVEL_OUTOF10: 9
PAINLEVEL_OUTOF10: 9
PAINLEVEL_OUTOF10: 10
PAINLEVEL_OUTOF10: 8
PAINLEVEL_OUTOF10: 5
PAINLEVEL_OUTOF10: 2
PAINLEVEL_OUTOF10: 10
PAINLEVEL_OUTOF10: 8
PAINLEVEL_OUTOF10: 8

## 2019-04-04 ASSESSMENT — PAIN DESCRIPTION - LOCATION
LOCATION: ABDOMEN
LOCATION: ABDOMEN;HEAD
LOCATION: ABDOMEN
LOCATION: HEAD
LOCATION: ABDOMEN;HEAD

## 2019-04-04 ASSESSMENT — PAIN DESCRIPTION - PROGRESSION
CLINICAL_PROGRESSION: NOT CHANGED
CLINICAL_PROGRESSION: NOT CHANGED
CLINICAL_PROGRESSION: GRADUALLY IMPROVING

## 2019-04-04 ASSESSMENT — PAIN DESCRIPTION - ONSET
ONSET: GRADUAL
ONSET: ON-GOING
ONSET: ON-GOING

## 2019-04-04 ASSESSMENT — PAIN DESCRIPTION - PAIN TYPE
TYPE: ACUTE PAIN
TYPE: CHRONIC PAIN
TYPE: ACUTE PAIN

## 2019-04-04 ASSESSMENT — PAIN DESCRIPTION - FREQUENCY
FREQUENCY: CONTINUOUS
FREQUENCY: INTERMITTENT
FREQUENCY: INTERMITTENT

## 2019-04-04 ASSESSMENT — PAIN DESCRIPTION - ORIENTATION
ORIENTATION: RIGHT
ORIENTATION: RIGHT;UPPER

## 2019-04-04 ASSESSMENT — PAIN - FUNCTIONAL ASSESSMENT
PAIN_FUNCTIONAL_ASSESSMENT: ACTIVITIES ARE NOT PREVENTED

## 2019-04-04 NOTE — CARE COORDINATION
INITIAL CASE MANAGEMENT ASSESSMENT    Reviewed chart, met with patient to assess possible discharge needs. Explained Case Management role/services. Living Situation: lives with a friend    ADLs: independent     DME: none    PT/OT Recs: none     Active Services: none- Pt is seeking an inpatient alcohol rehab program. Has used the [facility] in recent past. And is in the list to be re-entered into the program.       Transportation: friends     Medications:     PCP: does not have one- will need PCP list      HD/PD: none     PLAN/COMMENTS: Pt is planning on going to the [facility] at d/c for a 28 day program. Is on the wait list. Pt understands he may be d/c prior to getting into the program.     SW/CM provided contact information for patient or family to call with any questions. SW/CM will follow and assist as needed.

## 2019-04-04 NOTE — PROGRESS NOTES
Patient resting at this present time. Patient disconnected PIV from tubing stating that he was getting to many vitamins. Patient encouraged not to operate equipment and to call the RN with concerns. Patient administered pain medications as needed. Assessment completed. Will continue to monitor.

## 2019-04-04 NOTE — ED NOTES
Pt requesting more ativan to help with the shakes.    EDPA updated      Tatiana Amato RN  04/03/19 5321

## 2019-04-04 NOTE — FLOWSHEET NOTE
Admitted to room 4130 from ED for ETOH withdraw. Oriented to room and call light. Assessment completed. Medicated for pain and withdraw symptoms. NPO instructed. Instructed to call for assist to BR due to medications. Instructed that urine needed. Patient stated that he is not taking any medications at home.

## 2019-04-04 NOTE — PLAN OF CARE
Problem: Falls - Risk of:  Goal: Will remain free from falls  Description  Will remain free from falls  Outcome: Ongoing  Goal: Absence of physical injury  Description  Absence of physical injury  Outcome: Ongoing     Problem: Pain:  Goal: Pain level will decrease  Description  Pain level will decrease  Outcome: Ongoing  Goal: Control of acute pain  Description  Control of acute pain  Outcome: Ongoing  Goal: Control of chronic pain  Description  Control of chronic pain  Outcome: Ongoing     Problem: Cardiovascular  Goal: No DVT, peripheral vascular complications  Outcome: Ongoing     Problem: Fluid Volume - Deficit:  Goal: Absence of fluid volume deficit signs and symptoms  Description  Absence of fluid volume deficit signs and symptoms  Outcome: Ongoing     Problem: Sleep Pattern Disturbance:  Goal: Appears well-rested  Description  Appears well-rested  Outcome: Ongoing

## 2019-04-04 NOTE — H&P
Hospital Medicine History & Physical      PCP: No primary care provider on file. Date of Admission: 4/3/2019    Date of Service: Pt seen/examined on 4/3/2019 and Admitted to Inpatient with expected LOS greater than two midnights due to medical therapy. Chief Complaint:   Tremulousness, nausea, vomiting and abdominal pain      History Of Present Illness:      33 y.o. male with chronic alcoholism complicated by chronic calcific pancreatitis presented to emergency room with 1 day's history of tremulousness, nausea, vomiting and abdominal pain. .      The patient has been drinking alcohol heavily for the last 10 years, drinks up to 20 beers per day. Geovani Reyna He has been accepted to rehab facility but is still awaiting on a bed. .. He was just recently admitted to this facility from 3/23-3/25/19 alcohol detox before he could get into the rehab program but left AMA after 2 days. Geovani Reyna His last alcoholic drink was about 24 hours ago, over the last 24 hours is experienced increased shakiness, visual hallucinations as well as acute onset of epigastric pain that radiates to the back, associated with nausea and vomiting. . No fevers, chills or rigors. serum lipase is 114. . Prior CTs and been reviewed and show evidence of chronic calcific pancreatitis    Past Medical History:          Diagnosis Date    Alcohol abuse     PATIENT HAS EXTREME MANIPULITIVE & DRUG SEEKING BEHAVIOR. HE POCKETS HIS BENZODIZAPINES.  Anxiety     Drug-seeking behavior     Herpes genitalis in men     Manipulative behavior     Pancreatitis     Pneumonia     Portal vein thrombosis     pt denied    Seizures (HCC)     PER PATIENT ONLY.  DURING MULTIPLE HOSPITALIZATIONS HE HAS NEVER ONCE    Tobacco abuse        Past Surgical History:          Procedure Laterality Date    ENDOSCOPY, COLON, DIAGNOSTIC  10/28/2013    Endoscopic retrograde cholangiopancreatography with pancreatic stent placement    ENDOSCOPY, COLON, DIAGNOSTIC  03/23/2018 Esophagogastroduodenoscopy with biopsy    ERCP  1/10/14    with stent removal       Medications Prior to Admission:      Prior to Admission medications    Medication Sig Start Date End Date Taking? Authorizing Provider   omeprazole (PRILOSEC) 40 MG delayed release capsule Take 40 mg by mouth daily 4/1/19  Yes Historical Provider, MD   dicyclomine (BENTYL) 10 MG capsule Take 1 capsule by mouth 2 times daily as needed (Pain) 4/3/19 4/2/20  Manny Barnes MD   ondansetron (ZOFRAN ODT) 4 MG disintegrating tablet Take 1 tablet by mouth every 12 hours as needed for Nausea May Sub regular tablet (non-ODT) if insurance does not cover ODT. 4/3/19   Manny Barnes MD   chlordiazePOXIDE (LIBRIUM) 25 MG capsule Take 1 capsule by mouth 3 times daily as needed for Anxiety for up to 7 days. 3/28/19 4/4/19  Frankey Pica, PA-C   ARIPiprazole (ABILIFY) 10 MG tablet Take 10 mg by mouth daily    Historical Provider, MD   benztropine (COGENTIN) 0.5 MG tablet Take 0.5 mg by mouth 2 times daily    Historical Provider, MD       Allergies:  Amoxicillin; Haldol [haloperidol lactate]; Phenergan [promethazine hcl]; Glucosamine; and Shellfish-derived products    Social History:      The patient currently lives     TOBACCO:   reports that he has been smoking cigarettes. He started smoking about 11 years ago. He has a 5.00 pack-year smoking history. He has never used smokeless tobacco.  ETOH:   reports that he drinks about 12.0 oz of alcohol per week. Family History:      Reviewed in detail and negative for DM, CAD, Cancer, CVA.  Positive as follows:        Problem Relation Age of Onset    Hypertension Father     High Blood Pressure Father     Alcohol Abuse Father     Depression Mother     High Blood Pressure Paternal Grandfather     Alcohol Abuse Paternal Grandfather     Heart Disease Paternal Grandmother     Anemia Paternal Grandmother     Depression Maternal Aunt     Alcohol Abuse Paternal Aunt     Alcohol Abuse Paternal Uncle results for input(s): Evern [place] in the last 72 hours. Urinalysis:      Lab Results   Component Value Date    NITRU Negative 03/25/2019    WBCUA 1 02/09/2019    RBCUA 1 02/09/2019    BLOODU Negative 03/25/2019    SPECGRAV 1.010 03/25/2019    GLUCOSEU Negative 03/25/2019         ASSESSMENT:     -alcohol withdrawal syndrome with hallucinosis   -chronic alcoholic pancreatitis with acute exacerbation   - tobacco abuse    PLAN:    alcohol withdrawal protocol  seizure precautions  IV fluids/ banana bag  Analgesics  IV PPI empricially  Alcohol rehab after detox  Start creol prior to dc  provide nicotine patches, smoking cessation counseling    DVT Prophylaxis:  Lovenox  Diet:  NPO  Code Status:  Full code         Pippa Temple MD    Thank you No primary care provider on file. for the opportunity to be involved in this patient's care. If you have any questions or concerns please feel free to contact me at [phone].

## 2019-04-04 NOTE — PROGRESS NOTES
Hospitalist Progress Note      PCP: No primary care provider on file. Date of Admission: 4/3/2019    Chief Complaint: tremulousness, nausea, vomiting, abdominal pain    Subjective: Pt seen and examined. Still having lots of tremors today. Denies fever, chills, chest pain, shortness of breath, abdominal pain, nausea, vomiting, constipation, diarrhea, and dysuria. Medications:  Reviewed    Infusion Medications    sodium chloride 150 mL/hr at 04/04/19 0127     Scheduled Medications    sodium chloride flush  10 mL Intravenous 2 times per day    enoxaparin  40 mg Subcutaneous Daily    folic acid, thiamine, multi-vitamin with vitamin K infusion   Intravenous Daily    nicotine  1 patch Transdermal Daily    pantoprazole  40 mg Intravenous Daily    diazepam  10 mg Oral 4 times per day     PRN Meds: sodium chloride flush, acetaminophen, ondansetron, potassium chloride **OR** potassium alternative oral replacement **OR** potassium chloride, magnesium sulfate, LORazepam **OR** LORazepam **OR** LORazepam **OR** LORazepam **OR** LORazepam **OR** LORazepam **OR** LORazepam **OR** LORazepam, morphine      Intake/Output Summary (Last 24 hours) at 4/4/2019 1333  Last data filed at 4/4/2019 1259  Gross per 24 hour   Intake 860 ml   Output 1400 ml   Net -540 ml       Exam:    /69   Pulse 78   Temp 97.8 °F (36.6 °C) (Oral)   Resp 16   Ht 5' 8\" (1.727 m)   Wt 147 lb 0.8 oz (66.7 kg)   SpO2 98%   BMI 22.36 kg/m²     General appearance: No apparent distress, appears stated age and cooperative. HEENT: Pupils equal, round, and reactive to light. Conjunctivae/corneas clear. Neck: Supple, with full range of motion. No jugular venous distention. Trachea midline. Respiratory:  Normal respiratory effort. Clear to auscultation, bilaterally without Rales/Wheezes/Rhonchi. Cardiovascular: Regular rate and rhythm with normal S1/S2 without murmurs, rubs or gallops.   Abdomen: Soft, non-tender, non-distended with abuse  -nicotine patches  -smoking cessation counseling      DVT Prophylaxis: Lovenox  Diet: DIET CLEAR LIQUID;  Code Status: Full Code    PT/OT Eval Status: defer    Dispo - continue care, discharge in 1-2 days    Shukri Cee MD

## 2019-04-04 NOTE — PLAN OF CARE
Problem: Falls - Risk of:  Goal: Will remain free from falls  Description  Will remain free from falls  Outcome: Ongoing   Patient uses call light appropriately to express needs. Bed to lowest position with door open and call light in reach. All fall precautions implemented at this time. Bed alarm on for safety. Siderails up x2. Non skid footwear in place. Patient has had no falls this shift. Will continue to monitor. Problem: Pain:  Goal: Pain level will decrease  Description  Pain level will decrease  Outcome: Ongoing   Pain/discomfort being managed with PRN analgesics per MD orders. Pt able to express presence and absence of pain and rate pain appropriately using numerical scale. Problem: Cardiovascular  Goal: No DVT, peripheral vascular complications  Outcome: Ongoing  Lovenox as ordered  Problem: Fluid Volume - Deficit:  Goal: Absence of fluid volume deficit signs and symptoms  Description  Absence of fluid volume deficit signs and symptoms  Outcome: Ongoing   IVF's as ordered. Zofran PRN. Problem: Sleep Pattern Disturbance:  Goal: Appears well-rested  Description  Appears well-rested  Outcome: Ongoing   CIWA as ordered.

## 2019-04-05 VITALS
RESPIRATION RATE: 16 BRPM | DIASTOLIC BLOOD PRESSURE: 81 MMHG | HEIGHT: 68 IN | OXYGEN SATURATION: 96 % | BODY MASS INDEX: 22.42 KG/M2 | HEART RATE: 81 BPM | WEIGHT: 147.93 LBS | SYSTOLIC BLOOD PRESSURE: 127 MMHG | TEMPERATURE: 97.8 F

## 2019-04-05 PROBLEM — F10.939 ALCOHOL WITHDRAWAL SYNDROME WITH COMPLICATION (HCC): Status: RESOLVED | Noted: 2019-04-03 | Resolved: 2019-04-05

## 2019-04-05 LAB
ANION GAP SERPL CALCULATED.3IONS-SCNC: 12 MMOL/L (ref 3–16)
BUN BLDV-MCNC: 5 MG/DL (ref 7–20)
CALCIUM SERPL-MCNC: 9.3 MG/DL (ref 8.3–10.6)
CHLORIDE BLD-SCNC: 103 MMOL/L (ref 99–110)
CO2: 25 MMOL/L (ref 21–32)
CREAT SERPL-MCNC: 0.7 MG/DL (ref 0.9–1.3)
GFR AFRICAN AMERICAN: >60
GFR NON-AFRICAN AMERICAN: >60
GLUCOSE BLD-MCNC: 115 MG/DL (ref 70–99)
MAGNESIUM: 2 MG/DL (ref 1.8–2.4)
PHOSPHORUS: 3.4 MG/DL (ref 2.5–4.9)
POTASSIUM SERPL-SCNC: 3.7 MMOL/L (ref 3.5–5.1)
SODIUM BLD-SCNC: 140 MMOL/L (ref 136–145)

## 2019-04-05 PROCEDURE — 6360000002 HC RX W HCPCS: Performed by: INTERNAL MEDICINE

## 2019-04-05 PROCEDURE — 2580000003 HC RX 258: Performed by: HOSPITALIST

## 2019-04-05 PROCEDURE — 80048 BASIC METABOLIC PNL TOTAL CA: CPT

## 2019-04-05 PROCEDURE — 83735 ASSAY OF MAGNESIUM: CPT

## 2019-04-05 PROCEDURE — 6370000000 HC RX 637 (ALT 250 FOR IP): Performed by: HOSPITALIST

## 2019-04-05 PROCEDURE — 84100 ASSAY OF PHOSPHORUS: CPT

## 2019-04-05 PROCEDURE — 94760 N-INVAS EAR/PLS OXIMETRY 1: CPT

## 2019-04-05 PROCEDURE — 6370000000 HC RX 637 (ALT 250 FOR IP): Performed by: INTERNAL MEDICINE

## 2019-04-05 PROCEDURE — 36415 COLL VENOUS BLD VENIPUNCTURE: CPT

## 2019-04-05 RX ORDER — DIAZEPAM 5 MG/1
5 TABLET ORAL EVERY 8 HOURS SCHEDULED
Status: DISCONTINUED | OUTPATIENT
Start: 2019-04-05 | End: 2019-04-05 | Stop reason: HOSPADM

## 2019-04-05 RX ADMIN — LORAZEPAM 2 MG: 1 TABLET ORAL at 11:40

## 2019-04-05 RX ADMIN — DIAZEPAM 5 MG: 5 TABLET ORAL at 13:43

## 2019-04-05 RX ADMIN — LORAZEPAM 3 MG: 1 TABLET ORAL at 07:24

## 2019-04-05 RX ADMIN — SODIUM CHLORIDE: 9 INJECTION, SOLUTION INTRAVENOUS at 07:24

## 2019-04-05 RX ADMIN — MORPHINE SULFATE 4 MG: 4 INJECTION INTRAVENOUS at 07:24

## 2019-04-05 RX ADMIN — LORAZEPAM 3 MG: 1 TABLET ORAL at 01:47

## 2019-04-05 RX ADMIN — DIAZEPAM 10 MG: 5 TABLET ORAL at 05:41

## 2019-04-05 RX ADMIN — LORAZEPAM 3 MG: 1 TABLET ORAL at 03:34

## 2019-04-05 RX ADMIN — MORPHINE SULFATE 4 MG: 4 INJECTION INTRAVENOUS at 03:34

## 2019-04-05 ASSESSMENT — PAIN DESCRIPTION - DESCRIPTORS: DESCRIPTORS: TENDER

## 2019-04-05 ASSESSMENT — PAIN DESCRIPTION - ORIENTATION: ORIENTATION: RIGHT

## 2019-04-05 ASSESSMENT — PAIN SCALES - GENERAL
PAINLEVEL_OUTOF10: 8
PAINLEVEL_OUTOF10: 6
PAINLEVEL_OUTOF10: 7
PAINLEVEL_OUTOF10: 8

## 2019-04-05 ASSESSMENT — PAIN DESCRIPTION - PROGRESSION: CLINICAL_PROGRESSION: NOT CHANGED

## 2019-04-05 ASSESSMENT — PAIN DESCRIPTION - PAIN TYPE
TYPE: ACUTE PAIN
TYPE: ACUTE PAIN

## 2019-04-05 ASSESSMENT — PAIN DESCRIPTION - ONSET: ONSET: PROGRESSIVE

## 2019-04-05 ASSESSMENT — PAIN DESCRIPTION - LOCATION
LOCATION: ABDOMEN
LOCATION: ABDOMEN

## 2019-04-05 ASSESSMENT — PAIN DESCRIPTION - FREQUENCY: FREQUENCY: INTERMITTENT

## 2019-04-05 ASSESSMENT — PAIN - FUNCTIONAL ASSESSMENT: PAIN_FUNCTIONAL_ASSESSMENT: ACTIVITIES ARE NOT PREVENTED

## 2019-04-05 NOTE — FLOWSHEET NOTE
Requesting IV ativan. Instructed that PO should be given unless unable to tolerate PO. Tolerating clear liquid diet and other PO medications. Stated that the IV works \"faster\". Instructed that the PO would last longer. Will reevaluate in 1 hour.

## 2019-04-05 NOTE — CARE COORDINATION
Pt is back in his room. Informed pt that [facility] will be calling him in his hospital room to finish part 3 assessment and to be available to answer his phone. Pt is aware and agreeable for [facility] in pt treatement.     Electronically signed by SHAUNA Eli on 4/5/2019 at 10:29 AM

## 2019-04-05 NOTE — DISCHARGE SUMMARY
Hospital Medicine Discharge Summary    Patient ID: Leandra Lopez      Patient's PCP: No primary care provider on file. Admit Date: 4/3/2019     Discharge Date:   4/5/2019    Admitting Physician: Steven Marrero MD     Discharge Physician: Katherine Colbert MD     Discharge Diagnoses: There are no active hospital problems to display for this patient. The patient was seen and examined on day of discharge and this discharge summary is in conjunction with any daily progress note from day of discharge. Hospital Course: 33 y.o. male with chronic alcoholism complicated by chronic calcific pancreatitis presented to emergency room with 1 day's history of tremulousness, nausea, vomiting and abdominal pain. .      The patient has been drinking alcohol heavily for the last 10 years, drinks up to 20 beers per day. [place] He has been accepted to rehab facility but is still awaiting on a bed. .. He was just recently admitted to this facility from 3/23-3/25/19 alcohol detox before he could get into the rehab program but left AMA after 2 days. Paddy Fernandez His last alcoholic drink was about 24 hours ago, over the last 24 hours is experienced increased shakiness, visual hallucinations as well as acute onset of epigastric pain that radiates to the back, associated with nausea and vomiting. . No fevers, chills or rigors.      serum lipase is 114. . Prior CTs and been reviewed and show evidence of chronic calcific pancreatitis    Alcohol withdrawal syndrome without complication  -start scheduled Valium x 2 days with good response; given frequent relapses will not prescribe Valium at discharge  -Ativan PRN  -CIWA protocol  -Rally Pack x 3 days  -IVF  -folic acid   -SW consulted, he has been accepted to [facility] on Monday and will be discharged home with instructions to go there on Monday  -seizure precautions     Chronic alcoholic pancreatitis  -lipase WNL  -CT without any acute changes  -continue to monitor  -offered Creon, but HOSPITALIST    Disposition:  home     Condition at Discharge: Stable    [facility]    Code Status:  Full Code     Activity: activity as tolerated    Diet: regular diet      Discharge Medications:     Current Discharge Medication List           Details   omeprazole (PRILOSEC) 40 MG delayed release capsule Take 40 mg by mouth daily      dicyclomine (BENTYL) 10 MG capsule Take 1 capsule by mouth 2 times daily as needed (Pain)  Qty: 10 capsule, Refills: 0      ondansetron (ZOFRAN ODT) 4 MG disintegrating tablet Take 1 tablet by mouth every 12 hours as needed for Nausea May Sub regular tablet (non-ODT) if insurance does not cover ODT. Qty: 6 tablet, Refills: 0      ARIPiprazole (ABILIFY) 10 MG tablet Take 10 mg by mouth daily      benztropine (COGENTIN) 0.5 MG tablet Take 0.5 mg by mouth 2 times daily             Time Spent on discharge is more than 30 minutes in the examination, evaluation, counseling and review of medications and discharge plan. Signed:    Marin Bonds MD   4/5/2019      Thank you No primary care provider on file. for the opportunity to be involved in this patient's care. If you have any questions or concerns please feel free to contact me at [phone].

## 2019-04-05 NOTE — CARE COORDINATION
Received call from [facility] bed is available for pt on Monday 4/8/2019 at 12:15 pt is aware and agreeable to above. [facility] to transport pt home today to [address] [place] [address]. Confirmation number is [id number]. Pt states he is willing to go to [facility] on Monday. He is aware that he needs to  Bring ID, $20.00 and mail with address on it. [facility] to provide transportation home and will call bedside rn at [phone] with pickup time. Pt denies any other dc needs.      Electronically signed by SHAUNA Dempsey on 4/5/2019 at 2:13 PM

## 2019-04-05 NOTE — PROGRESS NOTES
Patient alert and oriented. No complaints at this present time. Patient states that his stomach feels way better and that he is prepared to have a general diet. Patient states that he does not want morning medications. Patient ambulating in the hallway. Will continue to monitor.

## 2019-04-05 NOTE — CARE COORDINATION
All info has been faxed to [facility] 21 pages total,   md notes, labs, meds, History and Physical to . Will await [facility] decision.    Electronically signed by SHAUNA Mcadams on 4/5/2019 at 12:45 PM

## 2019-04-05 NOTE — CARE COORDINATION
Received notice of dc today. I contacted [facility] at [phone]. They have pt info from many prior admissions and they state that pt needs a level 3 evaluation and they will contact pt in his room to finish level 3 for admission. Went to see pt in his room , pt currently not in room and gown is on bed. rn not aware where pt is at this time.     Electronically signed by SHAUNA Maurice on 4/5/2019 at 10:19 AM

## 2019-04-05 NOTE — PROGRESS NOTES
Upon entering patient's room, RN noticed that patient was completely dressed and ready to leave without discharge information. PIV removed, patient states that he was waiting to speak with the . Patient left the floor, and came back and was requesting to see the MD and the . He states that he needs discharge information and mail. Patient states that he needs a ride and some information.  states that she will speak to the patient. Discharge information initiated.

## 2019-04-05 NOTE — ED PROVIDER NOTES
Attending Supervising Physicians Attestation Statement  I was present with the Mid Level Provider during the history and exam. I discussed the findings and plans with the Mid Level Provider and agree as documented in his note     Vitals:    04/04/19 0444 04/04/19 0838 04/04/19 1410 04/04/19 2050   BP: 110/69  110/70 125/85   Pulse: 78  69 76   Resp: 16  18 16   Temp: 97.8 °F (36.6 °C)  98.1 °F (36.7 °C) 98.2 °F (36.8 °C)   TempSrc: Oral  Oral Oral   SpO2: 97% 98% 98% 95%   Weight: 147 lb 0.8 oz (66.7 kg)      Height:         Impression  Alcohol withdrawal    Total Critical Care time was 38 minutes, excluding separately reportable procedures. There was a high probability of clinically significant/life threatening deterioration in the patient's condition which required my urgent intervention.         Electronically signed by Wes Sanders MD on 4/4/19 at 9:49 PM            Wes Sanders MD  04/04/19 6169

## 2019-04-05 NOTE — PROGRESS NOTES
Patient alert and oriented. Walking around the unit and agitated. Patient administered medications as available. Discharge information completed. [facility] Patient states that he will go home at this present time. Patient transportation arrived, patient has no complaints. Understands plan of care.

## 2019-04-12 ENCOUNTER — HOSPITAL ENCOUNTER (INPATIENT)
Age: 29
LOS: 2 days | Discharge: LEFT BEFORE DISCHARGE | DRG: 282 | End: 2019-04-14
Attending: EMERGENCY MEDICINE | Admitting: INTERNAL MEDICINE
Payer: COMMERCIAL

## 2019-04-12 ENCOUNTER — APPOINTMENT (OUTPATIENT)
Dept: CT IMAGING | Age: 29
DRG: 282 | End: 2019-04-12
Payer: COMMERCIAL

## 2019-04-12 DIAGNOSIS — F10.930 ALCOHOL WITHDRAWAL SYNDROME WITHOUT COMPLICATION (HCC): ICD-10-CM

## 2019-04-12 DIAGNOSIS — K85.20 ALCOHOL-INDUCED ACUTE PANCREATITIS WITHOUT INFECTION OR NECROSIS: Primary | ICD-10-CM

## 2019-04-12 PROBLEM — K85.90 ACUTE ON CHRONIC PANCREATITIS (HCC): Status: ACTIVE | Noted: 2019-04-12

## 2019-04-12 PROBLEM — K86.1 ACUTE ON CHRONIC PANCREATITIS (HCC): Status: ACTIVE | Noted: 2019-04-12

## 2019-04-12 LAB
ALBUMIN SERPL-MCNC: 5 G/DL (ref 3.4–5)
ALP BLD-CCNC: 64 U/L (ref 40–129)
ALT SERPL-CCNC: 25 U/L (ref 10–40)
AMYLASE: 120 U/L (ref 25–115)
ANION GAP SERPL CALCULATED.3IONS-SCNC: 19 MMOL/L (ref 3–16)
AST SERPL-CCNC: 51 U/L (ref 15–37)
BASOPHILS ABSOLUTE: 0 K/UL (ref 0–0.2)
BASOPHILS RELATIVE PERCENT: 0.3 %
BILIRUB SERPL-MCNC: 0.5 MG/DL (ref 0–1)
BILIRUBIN DIRECT: <0.2 MG/DL (ref 0–0.3)
BILIRUBIN URINE: NEGATIVE
BILIRUBIN, INDIRECT: ABNORMAL MG/DL (ref 0–1)
BLOOD, URINE: NEGATIVE
BUN BLDV-MCNC: 12 MG/DL (ref 7–20)
CALCIUM SERPL-MCNC: 9.7 MG/DL (ref 8.3–10.6)
CHLORIDE BLD-SCNC: 104 MMOL/L (ref 99–110)
CLARITY: CLEAR
CO2: 20 MMOL/L (ref 21–32)
COLOR: YELLOW
CREAT SERPL-MCNC: 0.7 MG/DL (ref 0.9–1.3)
EOSINOPHILS ABSOLUTE: 0.2 K/UL (ref 0–0.6)
EOSINOPHILS RELATIVE PERCENT: 2.3 %
ETHANOL: NORMAL MG/DL (ref 0–0.08)
GFR AFRICAN AMERICAN: >60
GFR NON-AFRICAN AMERICAN: >60
GLUCOSE BLD-MCNC: 107 MG/DL (ref 70–99)
GLUCOSE URINE: NEGATIVE MG/DL
HCT VFR BLD CALC: 44.8 % (ref 40.5–52.5)
HEMOGLOBIN: 15.7 G/DL (ref 13.5–17.5)
KETONES, URINE: NEGATIVE MG/DL
LEUKOCYTE ESTERASE, URINE: NEGATIVE
LIPASE: 255 U/L (ref 13–60)
LYMPHOCYTES ABSOLUTE: 1.5 K/UL (ref 1–5.1)
LYMPHOCYTES RELATIVE PERCENT: 18.2 %
MCH RBC QN AUTO: 31.6 PG (ref 26–34)
MCHC RBC AUTO-ENTMCNC: 35 G/DL (ref 31–36)
MCV RBC AUTO: 90.1 FL (ref 80–100)
MICROSCOPIC EXAMINATION: NORMAL
MONOCYTES ABSOLUTE: 0.5 K/UL (ref 0–1.3)
MONOCYTES RELATIVE PERCENT: 6 %
NEUTROPHILS ABSOLUTE: 6 K/UL (ref 1.7–7.7)
NEUTROPHILS RELATIVE PERCENT: 73.2 %
NITRITE, URINE: NEGATIVE
PDW BLD-RTO: 12.2 % (ref 12.4–15.4)
PH UA: 6.5 (ref 5–8)
PLATELET # BLD: 245 K/UL (ref 135–450)
PMV BLD AUTO: 8.4 FL (ref 5–10.5)
POTASSIUM SERPL-SCNC: 3.9 MMOL/L (ref 3.5–5.1)
PROTEIN UA: NEGATIVE MG/DL
RBC # BLD: 4.98 M/UL (ref 4.2–5.9)
SODIUM BLD-SCNC: 143 MMOL/L (ref 136–145)
SPECIFIC GRAVITY UA: 1.02 (ref 1–1.03)
TOTAL PROTEIN: 8.3 G/DL (ref 6.4–8.2)
URINE TYPE: NORMAL
UROBILINOGEN, URINE: 0.2 E.U./DL
WBC # BLD: 8.2 K/UL (ref 4–11)

## 2019-04-12 PROCEDURE — 6360000002 HC RX W HCPCS: Performed by: INTERNAL MEDICINE

## 2019-04-12 PROCEDURE — 96374 THER/PROPH/DIAG INJ IV PUSH: CPT

## 2019-04-12 PROCEDURE — 96375 TX/PRO/DX INJ NEW DRUG ADDON: CPT

## 2019-04-12 PROCEDURE — 82150 ASSAY OF AMYLASE: CPT

## 2019-04-12 PROCEDURE — 81003 URINALYSIS AUTO W/O SCOPE: CPT

## 2019-04-12 PROCEDURE — HZ2ZZZZ DETOXIFICATION SERVICES FOR SUBSTANCE ABUSE TREATMENT: ICD-10-PCS | Performed by: INTERNAL MEDICINE

## 2019-04-12 PROCEDURE — 2060000000 HC ICU INTERMEDIATE R&B

## 2019-04-12 PROCEDURE — 2580000003 HC RX 258: Performed by: EMERGENCY MEDICINE

## 2019-04-12 PROCEDURE — 99285 EMERGENCY DEPT VISIT HI MDM: CPT

## 2019-04-12 PROCEDURE — 74176 CT ABD & PELVIS W/O CONTRAST: CPT

## 2019-04-12 PROCEDURE — 80048 BASIC METABOLIC PNL TOTAL CA: CPT

## 2019-04-12 PROCEDURE — 83690 ASSAY OF LIPASE: CPT

## 2019-04-12 PROCEDURE — G0480 DRUG TEST DEF 1-7 CLASSES: HCPCS

## 2019-04-12 PROCEDURE — 85025 COMPLETE CBC W/AUTO DIFF WBC: CPT

## 2019-04-12 PROCEDURE — 96361 HYDRATE IV INFUSION ADD-ON: CPT

## 2019-04-12 PROCEDURE — 96376 TX/PRO/DX INJ SAME DRUG ADON: CPT

## 2019-04-12 PROCEDURE — 80076 HEPATIC FUNCTION PANEL: CPT

## 2019-04-12 PROCEDURE — 6360000002 HC RX W HCPCS: Performed by: EMERGENCY MEDICINE

## 2019-04-12 RX ORDER — HYDROMORPHONE HYDROCHLORIDE 1 MG/ML
1 INJECTION, SOLUTION INTRAMUSCULAR; INTRAVENOUS; SUBCUTANEOUS
Status: CANCELLED | OUTPATIENT
Start: 2019-04-12

## 2019-04-12 RX ORDER — MORPHINE SULFATE 4 MG/ML
4 INJECTION, SOLUTION INTRAMUSCULAR; INTRAVENOUS ONCE
Status: COMPLETED | OUTPATIENT
Start: 2019-04-12 | End: 2019-04-12

## 2019-04-12 RX ORDER — LORAZEPAM 2 MG/ML
1 INJECTION INTRAMUSCULAR EVERY 6 HOURS PRN
Status: DISCONTINUED | OUTPATIENT
Start: 2019-04-12 | End: 2019-04-13

## 2019-04-12 RX ORDER — 0.9 % SODIUM CHLORIDE 0.9 %
1000 INTRAVENOUS SOLUTION INTRAVENOUS ONCE
Status: COMPLETED | OUTPATIENT
Start: 2019-04-12 | End: 2019-04-12

## 2019-04-12 RX ORDER — ONDANSETRON 2 MG/ML
4 INJECTION INTRAMUSCULAR; INTRAVENOUS EVERY 6 HOURS PRN
Status: DISCONTINUED | OUTPATIENT
Start: 2019-04-12 | End: 2019-04-14 | Stop reason: HOSPADM

## 2019-04-12 RX ORDER — MORPHINE SULFATE 2 MG/ML
2 INJECTION, SOLUTION INTRAMUSCULAR; INTRAVENOUS EVERY 4 HOURS PRN
Status: DISCONTINUED | OUTPATIENT
Start: 2019-04-12 | End: 2019-04-13

## 2019-04-12 RX ORDER — HYDROMORPHONE HYDROCHLORIDE 1 MG/ML
0.5 INJECTION, SOLUTION INTRAMUSCULAR; INTRAVENOUS; SUBCUTANEOUS
Status: CANCELLED | OUTPATIENT
Start: 2019-04-12

## 2019-04-12 RX ADMIN — SODIUM CHLORIDE 1000 ML: 9 INJECTION, SOLUTION INTRAVENOUS at 18:55

## 2019-04-12 RX ADMIN — LORAZEPAM 1 MG: 2 INJECTION INTRAMUSCULAR; INTRAVENOUS at 18:55

## 2019-04-12 RX ADMIN — MORPHINE SULFATE 4 MG: 4 INJECTION INTRAVENOUS at 18:55

## 2019-04-12 RX ADMIN — MORPHINE SULFATE 2 MG: 2 INJECTION, SOLUTION INTRAMUSCULAR; INTRAVENOUS at 23:44

## 2019-04-12 RX ADMIN — ONDANSETRON 4 MG: 2 INJECTION INTRAMUSCULAR; INTRAVENOUS at 23:42

## 2019-04-12 ASSESSMENT — PAIN DESCRIPTION - LOCATION: LOCATION: ABDOMEN

## 2019-04-12 ASSESSMENT — PAIN DESCRIPTION - PAIN TYPE: TYPE: ACUTE PAIN

## 2019-04-12 ASSESSMENT — PAIN SCALES - GENERAL
PAINLEVEL_OUTOF10: 8
PAINLEVEL_OUTOF10: 9
PAINLEVEL_OUTOF10: 9

## 2019-04-12 NOTE — ED NOTES
Pt not longer has hand tremors and rates his pain 4/10. Pt resting in bed with no further complains. Pt is on a continuous pulse oximetry and telemetry monitoring. Pt continued on cycling blood pressure. Fall risk precautions in place, call light in reach, bed side table within reach, , will continue to monitor.          Andreea Davey, [address] [place] [address]  04/12/19 1738

## 2019-04-12 NOTE — ED PROVIDER NOTES
[address] [facility]  eMERGENCY dEPARTMENTeNCOUnter      Pt Name: Madeline Duval  MRN: [id number]  [place] 1990  Date ofevaluation: 4/12/2019  Provider: Carol Clemente MD    CHIEF COMPLAINT       Chief Complaint   Patient presents with    Abdominal Pain     pt has had mid abd pain with n/v since this morning. denies fever. Pt states he is concerned it could be due to his daily drinking         HISTORY OF PRESENT ILLNESS   (Location/Symptom, Timing/Onset,Context/Setting, Quality, Duration, Modifying Factors, Severity)  Note limiting factors. Madeline Duval is a 33 y.o. male who presents to the emergency department with abdominal pain. The patient presents with abdominal pain has had for the last day. He points to his epigastric and left upper quadrant area. He agrees to his left shoulder. The patient states he is a heavy drinker and drinks over 20 beers or other alcoholic drinks a day. He has not drank since yesterday. He complains of nausea and vomiting. HPI    NursingNotes were reviewed. REVIEW OF SYSTEMS    (2-9 systems for level 4, 10 or more for level 5)     Review of Systems  Negative for GI  musculoskeletal neurological pulmonary and cardiac complaints all others reviewed and negative  General Appearance:  Alert, cooperative, tremulous, appears stated age. Head:  Normocephalic, without obvious abnormality, atraumatic. Eyes:  conjunctiva/corneas clear, EOM's intact. Sclera anicteric. ENT: Mucous membranes moist.   Neck: Supple, symmetrical, trachea midline, no adenopathy. No jugular venous distention. Lungs:   No Respiratory Distress. Chest Wall:     Heart:  Tachycardic. Regular rhythm. No murmurs rubs or gallops    Abdomen:   Pain in the mid abdomen. No guarding or rebound. Decreased bowel sounds. Extremities:  Full range of motion. Pulses: Good throughout    Skin:  No rashes or lesions to exposed skin.    Neurologic: Alert and oriented X 3.   Motor grossly normal.  Speech clear. Except as noted above the remainder of the review of systems was reviewed and negative. PAST MEDICAL HISTORY     Past Medical History:   Diagnosis Date    Alcohol abuse     PATIENT HAS EXTREME MANIPULITIVE & DRUG SEEKING BEHAVIOR. HE POCKETS HIS BENZODIZAPINES.  Anxiety     Drug-seeking behavior     Herpes genitalis in men     Manipulative behavior     Pancreatitis     Pneumonia     Portal vein thrombosis     pt denied    Seizures (HCC)     PER PATIENT ONLY. DURING MULTIPLE HOSPITALIZATIONS HE HAS NEVER ONCE    Tobacco abuse          SURGICALHISTORY       Past Surgical History:   Procedure Laterality Date    ENDOSCOPY, COLON, DIAGNOSTIC  10/28/2013    Endoscopic retrograde cholangiopancreatography with pancreatic stent placement    ENDOSCOPY, COLON, DIAGNOSTIC  03/23/2018    Esophagogastroduodenoscopy with biopsy    ERCP  1/10/14    with stent removal         CURRENT MEDICATIONS       Previous Medications    ARIPIPRAZOLE (ABILIFY) 10 MG TABLET    Take 10 mg by mouth daily    BENZTROPINE (COGENTIN) 0.5 MG TABLET    Take 0.5 mg by mouth 2 times daily    DICYCLOMINE (BENTYL) 10 MG CAPSULE    Take 1 capsule by mouth 2 times daily as needed (Pain)    OMEPRAZOLE (PRILOSEC) 40 MG DELAYED RELEASE CAPSULE    Take 40 mg by mouth daily    ONDANSETRON (ZOFRAN ODT) 4 MG DISINTEGRATING TABLET    Take 1 tablet by mouth every 12 hours as needed for Nausea May Sub regular tablet (non-ODT) if insurance does not cover ODT. ALLERGIES     Amoxicillin; Haldol [haloperidol lactate]; Phenergan [promethazine hcl];  Glucosamine; and Shellfish-derived products    FAMILY HISTORY       Family History   Problem Relation Age of Onset    Hypertension Father     High Blood Pressure Father     Alcohol Abuse Father     Depression Mother     High Blood Pressure Paternal Grandfather     Alcohol Abuse Paternal Grandfather     Heart Disease Paternal Grandmother    Yoel Anemia Paternal Grandmother     Depression Maternal Aunt     Alcohol Abuse Paternal Aunt     Alcohol Abuse Paternal Uncle           SOCIAL HISTORY       Social History     Socioeconomic History    Marital status: Single     Spouse name: Not on file    Number of children: 1    Years of education: 15    Highest education level: Not on file   Occupational History    Occupation:    Social Needs    Financial resource strain: Not on file    Food insecurity:     Worry: Not on file     Inability: Not on file    Transportation needs:     Medical: Not on file     Non-medical: Not on file   Tobacco Use    Smoking status: Current Every Day Smoker     Packs/day: 0.50     Years: 10.00     Pack years: 5.00     Types: Cigarettes     Start date: 11/21/2007    Smokeless tobacco: Never Used   Substance and Sexual Activity    Alcohol use:  Yes     Alcohol/week: 12.0 oz     Types: 20 Cans of beer per week     Comment: every day    Drug use: No    Sexual activity: Never   Lifestyle    Physical activity:     Days per week: Not on file     Minutes per session: Not on file    Stress: Not on file   Relationships    Social connections:     Talks on phone: Not on file     Gets together: Not on file     Attends Holiness service: Not on file     Active member of club or organization: Not on file     Attends meetings of clubs or organizations: Not on file     Relationship status: Not on file    Intimate partner violence:     Fear of current or ex partner: Not on file     Emotionally abused: Not on file     Physically abused: Not on file     Forced sexual activity: Not on file   Other Topics Concern    Not on file   Social History Narrative    Not on file       SCREENINGS             PHYSICAL EXAM    (up to 7 for level 4, 8 or more for level 5)     ED Triage Vitals [04/12/19 1702]   BP Temp Temp Source Pulse Resp SpO2 Height Weight   111/86 98 °F (36.7 °C) Oral 110 24 97 % 5' 8\" (1.727 m) 140 lb (63.5 kg) PANEL - Abnormal; Notable for the following components:    CO2 20 (*)     Anion Gap 19 (*)     Glucose 107 (*)     CREATININE 0.7 (*)     All other components within normal limits    Narrative:     Performed at:  [facility]  [address] [place]   Phone [phone]   URINALYSIS    Narrative:     Performed at:  [facility] [address],  [place], [place]   Phone [phone]   ETHANOL    Narrative:     Performed at:  [facility] [address],  [place], [place]   Phone [phone]       All other labs were within normal range or not returned as of this dictation. EMERGENCY DEPARTMENT COURSE and DIFFERENTIAL DIAGNOSIS/MDM:   Vitals:    Vitals:    04/12/19 2030 04/12/19 2045 04/12/19 2100 04/12/19 2115   BP: 119/73 122/78 117/80 122/75   Pulse: 78 68 77 86   Resp: 12 18 14 21   Temp:       TempSrc:       SpO2: 98% 98% 99% 99%   Weight:       Height:         MDM:    The patient has remained stable throughout his hospital course. He was initially tachycardic and tremulous and was given a dose of Ativan with good results. His workup revealed a CT that shows some chronic pancreatitis and calcifications. In addition, a lipase was obtained came back elevated at around 250. This is consistent with his pancreatitis. Given his pancreatitis and withdrawal symptoms I have recommended admission. He will be admitted to a general medical bed in stable condition. REASSESSMENT              CONSULTS:  IP CONSULT TO HOSPITALIST    PROCEDURES:  Unless otherwise noted below, none     Procedures    FINAL IMPRESSION      1. Alcohol-induced acute pancreatitis without infection or necrosis    2.  Alcohol withdrawal syndrome without complication [facility])          DISPOSITION/PLAN   DISPOSITION Decision To Admit 04/12/2019 10:41:18 PM      PATIENT REFERREDTO:  No follow-up provider specified. DISCHARGEMEDICATIONS:  New Prescriptions    No medications on file     Controlled Substances Monitoring:     RX Monitoring 2/18/2019   Attestation The Prescription Monitoring Report for this patient was reviewed today. Chronic Pain Routine Monitoring No signs of potential drug abuse or diversion identified.        (Please note that portions of this note were completed with a voice recognition program.  Efforts were made to edit the dictations but occasionally words are mis-transcribed.)    Becca Powell MD (electronically signed)  Attending Emergency Physician         Becca Powell MD  04/12/19 2623

## 2019-04-13 LAB
A/G RATIO: 1.7 (ref 1.1–2.2)
ALBUMIN SERPL-MCNC: 4.2 G/DL (ref 3.4–5)
ALP BLD-CCNC: 54 U/L (ref 40–129)
ALT SERPL-CCNC: 21 U/L (ref 10–40)
ANION GAP SERPL CALCULATED.3IONS-SCNC: 11 MMOL/L (ref 3–16)
AST SERPL-CCNC: 39 U/L (ref 15–37)
BASOPHILS ABSOLUTE: 0 K/UL (ref 0–0.2)
BASOPHILS RELATIVE PERCENT: 0.4 %
BILIRUB SERPL-MCNC: 0.5 MG/DL (ref 0–1)
BUN BLDV-MCNC: 12 MG/DL (ref 7–20)
CALCIUM SERPL-MCNC: 8.8 MG/DL (ref 8.3–10.6)
CHLORIDE BLD-SCNC: 105 MMOL/L (ref 99–110)
CO2: 23 MMOL/L (ref 21–32)
CREAT SERPL-MCNC: 0.7 MG/DL (ref 0.9–1.3)
EOSINOPHILS ABSOLUTE: 0.5 K/UL (ref 0–0.6)
EOSINOPHILS RELATIVE PERCENT: 6.9 %
GFR AFRICAN AMERICAN: >60
GFR NON-AFRICAN AMERICAN: >60
GLOBULIN: 2.5 G/DL
GLUCOSE BLD-MCNC: 91 MG/DL (ref 70–99)
HCT VFR BLD CALC: 39.4 % (ref 40.5–52.5)
HEMOGLOBIN: 13.8 G/DL (ref 13.5–17.5)
LIPASE: 84 U/L (ref 13–60)
LYMPHOCYTES ABSOLUTE: 2.7 K/UL (ref 1–5.1)
LYMPHOCYTES RELATIVE PERCENT: 36.4 %
MCH RBC QN AUTO: 32 PG (ref 26–34)
MCHC RBC AUTO-ENTMCNC: 35.1 G/DL (ref 31–36)
MCV RBC AUTO: 91.1 FL (ref 80–100)
MONOCYTES ABSOLUTE: 0.5 K/UL (ref 0–1.3)
MONOCYTES RELATIVE PERCENT: 6.8 %
NEUTROPHILS ABSOLUTE: 3.6 K/UL (ref 1.7–7.7)
NEUTROPHILS RELATIVE PERCENT: 49.5 %
PDW BLD-RTO: 12 % (ref 12.4–15.4)
PLATELET # BLD: 216 K/UL (ref 135–450)
PMV BLD AUTO: 8.6 FL (ref 5–10.5)
POTASSIUM REFLEX MAGNESIUM: 3.9 MMOL/L (ref 3.5–5.1)
RBC # BLD: 4.33 M/UL (ref 4.2–5.9)
SODIUM BLD-SCNC: 139 MMOL/L (ref 136–145)
TOTAL PROTEIN: 6.7 G/DL (ref 6.4–8.2)
WBC # BLD: 7.4 K/UL (ref 4–11)

## 2019-04-13 PROCEDURE — 83690 ASSAY OF LIPASE: CPT

## 2019-04-13 PROCEDURE — 6360000002 HC RX W HCPCS: Performed by: INTERNAL MEDICINE

## 2019-04-13 PROCEDURE — 2580000003 HC RX 258: Performed by: INTERNAL MEDICINE

## 2019-04-13 PROCEDURE — 6370000000 HC RX 637 (ALT 250 FOR IP): Performed by: INTERNAL MEDICINE

## 2019-04-13 PROCEDURE — 2060000000 HC ICU INTERMEDIATE R&B

## 2019-04-13 PROCEDURE — 36415 COLL VENOUS BLD VENIPUNCTURE: CPT

## 2019-04-13 PROCEDURE — 80053 COMPREHEN METABOLIC PANEL: CPT

## 2019-04-13 PROCEDURE — 85025 COMPLETE CBC W/AUTO DIFF WBC: CPT

## 2019-04-13 RX ORDER — LORAZEPAM 2 MG/ML
1 INJECTION INTRAMUSCULAR
Status: DISCONTINUED | OUTPATIENT
Start: 2019-04-13 | End: 2019-04-13

## 2019-04-13 RX ORDER — LORAZEPAM 1 MG/1
3 TABLET ORAL
Status: DISCONTINUED | OUTPATIENT
Start: 2019-04-13 | End: 2019-04-13

## 2019-04-13 RX ORDER — PANTOPRAZOLE SODIUM 40 MG/1
40 TABLET, DELAYED RELEASE ORAL
Status: DISCONTINUED | OUTPATIENT
Start: 2019-04-13 | End: 2019-04-14 | Stop reason: HOSPADM

## 2019-04-13 RX ORDER — LORAZEPAM 2 MG/ML
4 INJECTION INTRAMUSCULAR
Status: DISCONTINUED | OUTPATIENT
Start: 2019-04-13 | End: 2019-04-13

## 2019-04-13 RX ORDER — SODIUM CHLORIDE, SODIUM LACTATE, POTASSIUM CHLORIDE, CALCIUM CHLORIDE 600; 310; 30; 20 MG/100ML; MG/100ML; MG/100ML; MG/100ML
150 INJECTION, SOLUTION INTRAVENOUS CONTINUOUS
Status: DISCONTINUED | OUTPATIENT
Start: 2019-04-13 | End: 2019-04-14 | Stop reason: HOSPADM

## 2019-04-13 RX ORDER — LORAZEPAM 2 MG/ML
3 INJECTION INTRAMUSCULAR
Status: DISCONTINUED | OUTPATIENT
Start: 2019-04-13 | End: 2019-04-13

## 2019-04-13 RX ORDER — BENZTROPINE MESYLATE 1 MG/1
0.5 TABLET ORAL 2 TIMES DAILY
Status: DISCONTINUED | OUTPATIENT
Start: 2019-04-13 | End: 2019-04-14 | Stop reason: HOSPADM

## 2019-04-13 RX ORDER — SODIUM CHLORIDE 0.9 % (FLUSH) 0.9 %
10 SYRINGE (ML) INJECTION PRN
Status: DISCONTINUED | OUTPATIENT
Start: 2019-04-13 | End: 2019-04-14 | Stop reason: HOSPADM

## 2019-04-13 RX ORDER — NICOTINE 21 MG/24HR
1 PATCH, TRANSDERMAL 24 HOURS TRANSDERMAL DAILY
Status: DISCONTINUED | OUTPATIENT
Start: 2019-04-13 | End: 2019-04-14 | Stop reason: HOSPADM

## 2019-04-13 RX ORDER — LORAZEPAM 0.5 MG/1
2 TABLET ORAL
Status: DISCONTINUED | OUTPATIENT
Start: 2019-04-13 | End: 2019-04-13

## 2019-04-13 RX ORDER — ONDANSETRON 2 MG/ML
4 INJECTION INTRAMUSCULAR; INTRAVENOUS EVERY 6 HOURS PRN
Status: DISCONTINUED | OUTPATIENT
Start: 2019-04-13 | End: 2019-04-14 | Stop reason: HOSPADM

## 2019-04-13 RX ORDER — OXYCODONE AND ACETAMINOPHEN 7.5; 325 MG/1; MG/1
1 TABLET ORAL EVERY 4 HOURS PRN
Status: DISCONTINUED | OUTPATIENT
Start: 2019-04-13 | End: 2019-04-13

## 2019-04-13 RX ORDER — ARIPIPRAZOLE 5 MG/1
10 TABLET ORAL DAILY
Status: DISCONTINUED | OUTPATIENT
Start: 2019-04-13 | End: 2019-04-14 | Stop reason: HOSPADM

## 2019-04-13 RX ORDER — THIAMINE HCL 100 MG
100 TABLET ORAL DAILY
Status: DISCONTINUED | OUTPATIENT
Start: 2019-04-13 | End: 2019-04-14 | Stop reason: HOSPADM

## 2019-04-13 RX ORDER — LORAZEPAM 2 MG/ML
2 INJECTION INTRAMUSCULAR
Status: DISCONTINUED | OUTPATIENT
Start: 2019-04-13 | End: 2019-04-13

## 2019-04-13 RX ORDER — LORAZEPAM 0.5 MG/1
1 TABLET ORAL
Status: DISCONTINUED | OUTPATIENT
Start: 2019-04-13 | End: 2019-04-13

## 2019-04-13 RX ORDER — POTASSIUM CHLORIDE 20 MEQ/1
40 TABLET, EXTENDED RELEASE ORAL PRN
Status: DISCONTINUED | OUTPATIENT
Start: 2019-04-13 | End: 2019-04-14 | Stop reason: HOSPADM

## 2019-04-13 RX ORDER — ACETAMINOPHEN 80 MG
TABLET,CHEWABLE ORAL
Status: COMPLETED
Start: 2019-04-13 | End: 2019-04-13

## 2019-04-13 RX ORDER — OXYCODONE HYDROCHLORIDE AND ACETAMINOPHEN 5; 325 MG/1; MG/1
1 TABLET ORAL EVERY 6 HOURS PRN
Status: DISCONTINUED | OUTPATIENT
Start: 2019-04-13 | End: 2019-04-13

## 2019-04-13 RX ORDER — SODIUM CHLORIDE 0.9 % (FLUSH) 0.9 %
10 SYRINGE (ML) INJECTION EVERY 12 HOURS SCHEDULED
Status: DISCONTINUED | OUTPATIENT
Start: 2019-04-13 | End: 2019-04-14 | Stop reason: HOSPADM

## 2019-04-13 RX ORDER — OXYCODONE HYDROCHLORIDE AND ACETAMINOPHEN 5; 325 MG/1; MG/1
1 TABLET ORAL EVERY 4 HOURS PRN
Status: DISCONTINUED | OUTPATIENT
Start: 2019-04-13 | End: 2019-04-13

## 2019-04-13 RX ORDER — ACETAMINOPHEN 325 MG/1
650 TABLET ORAL EVERY 4 HOURS PRN
Status: DISCONTINUED | OUTPATIENT
Start: 2019-04-13 | End: 2019-04-14 | Stop reason: HOSPADM

## 2019-04-13 RX ORDER — LORAZEPAM 1 MG/1
4 TABLET ORAL
Status: DISCONTINUED | OUTPATIENT
Start: 2019-04-13 | End: 2019-04-13

## 2019-04-13 RX ORDER — OMEPRAZOLE 20 MG/1
40 CAPSULE, DELAYED RELEASE ORAL DAILY
Status: DISCONTINUED | OUTPATIENT
Start: 2019-04-13 | End: 2019-04-13 | Stop reason: CLARIF

## 2019-04-13 RX ORDER — KETOROLAC TROMETHAMINE 30 MG/ML
30 INJECTION, SOLUTION INTRAMUSCULAR; INTRAVENOUS EVERY 6 HOURS PRN
Status: DISCONTINUED | OUTPATIENT
Start: 2019-04-13 | End: 2019-04-14 | Stop reason: HOSPADM

## 2019-04-13 RX ORDER — THIAMINE MONONITRATE (VIT B1) 100 MG
100 TABLET ORAL DAILY
Status: DISCONTINUED | OUTPATIENT
Start: 2019-04-13 | End: 2019-04-13 | Stop reason: SDUPTHER

## 2019-04-13 RX ORDER — POTASSIUM CHLORIDE 7.45 MG/ML
10 INJECTION INTRAVENOUS PRN
Status: DISCONTINUED | OUTPATIENT
Start: 2019-04-13 | End: 2019-04-14 | Stop reason: HOSPADM

## 2019-04-13 RX ORDER — FOLIC ACID 1 MG/1
1 TABLET ORAL DAILY
Status: DISCONTINUED | OUTPATIENT
Start: 2019-04-13 | End: 2019-04-14 | Stop reason: HOSPADM

## 2019-04-13 RX ORDER — MULTIVITAMIN WITH FOLIC ACID 400 MCG
1 TABLET ORAL DAILY
Status: DISCONTINUED | OUTPATIENT
Start: 2019-04-13 | End: 2019-04-14 | Stop reason: HOSPADM

## 2019-04-13 RX ORDER — CHLORDIAZEPOXIDE HYDROCHLORIDE 10 MG/1
10 CAPSULE, GELATIN COATED ORAL 4 TIMES DAILY
Status: DISCONTINUED | OUTPATIENT
Start: 2019-04-13 | End: 2019-04-14 | Stop reason: HOSPADM

## 2019-04-13 RX ADMIN — Medication 10 ML: at 20:57

## 2019-04-13 RX ADMIN — LORAZEPAM 2 MG: 2 INJECTION INTRAMUSCULAR; INTRAVENOUS at 16:35

## 2019-04-13 RX ADMIN — OXYCODONE AND ACETAMINOPHEN 1 TABLET: 5; 325 TABLET ORAL at 17:41

## 2019-04-13 RX ADMIN — SODIUM CHLORIDE, POTASSIUM CHLORIDE, SODIUM LACTATE AND CALCIUM CHLORIDE 150 ML/HR: 600; 310; 30; 20 INJECTION, SOLUTION INTRAVENOUS at 17:44

## 2019-04-13 RX ADMIN — LORAZEPAM 3 MG: 2 INJECTION INTRAMUSCULAR; INTRAVENOUS at 08:42

## 2019-04-13 RX ADMIN — CHLORDIAZEPOXIDE HYDROCHLORIDE 10 MG: 10 CAPSULE ORAL at 16:27

## 2019-04-13 RX ADMIN — Medication 10 ML: at 03:32

## 2019-04-13 RX ADMIN — BENZTROPINE MESYLATE 0.5 MG: 1 TABLET ORAL at 00:55

## 2019-04-13 RX ADMIN — OXYCODONE HYDROCHLORIDE AND ACETAMINOPHEN 1 TABLET: 7.5; 325 TABLET ORAL at 03:31

## 2019-04-13 RX ADMIN — BENZTROPINE MESYLATE 0.5 MG: 1 TABLET ORAL at 21:49

## 2019-04-13 RX ADMIN — THERA TABS 1 TABLET: TAB at 08:31

## 2019-04-13 RX ADMIN — ARIPIPRAZOLE 10 MG: 5 TABLET ORAL at 09:49

## 2019-04-13 RX ADMIN — ONDANSETRON 4 MG: 2 INJECTION INTRAMUSCULAR; INTRAVENOUS at 14:57

## 2019-04-13 RX ADMIN — Medication 100 MG: at 09:49

## 2019-04-13 RX ADMIN — OXYCODONE HYDROCHLORIDE AND ACETAMINOPHEN 1 TABLET: 7.5; 325 TABLET ORAL at 08:31

## 2019-04-13 RX ADMIN — BENZTROPINE MESYLATE 0.5 MG: 1 TABLET ORAL at 08:31

## 2019-04-13 RX ADMIN — OXYCODONE AND ACETAMINOPHEN 1 TABLET: 5; 325 TABLET ORAL at 21:49

## 2019-04-13 RX ADMIN — ENOXAPARIN SODIUM 40 MG: 40 INJECTION SUBCUTANEOUS at 08:31

## 2019-04-13 RX ADMIN — PANTOPRAZOLE SODIUM 40 MG: 40 TABLET, DELAYED RELEASE ORAL at 05:45

## 2019-04-13 RX ADMIN — FOLIC ACID 1 MG: 1 TABLET ORAL at 08:31

## 2019-04-13 RX ADMIN — Medication 10 ML: at 08:42

## 2019-04-13 RX ADMIN — CHLORDIAZEPOXIDE HYDROCHLORIDE 10 MG: 10 CAPSULE ORAL at 08:31

## 2019-04-13 RX ADMIN — KETOROLAC TROMETHAMINE 30 MG: 30 INJECTION, SOLUTION INTRAMUSCULAR at 03:41

## 2019-04-13 RX ADMIN — MORPHINE SULFATE 2 MG: 2 INJECTION, SOLUTION INTRAMUSCULAR; INTRAVENOUS at 05:45

## 2019-04-13 RX ADMIN — LORAZEPAM 4 MG: 2 INJECTION INTRAMUSCULAR; INTRAVENOUS at 14:57

## 2019-04-13 RX ADMIN — LORAZEPAM 4 MG: 2 INJECTION INTRAMUSCULAR; INTRAVENOUS at 11:47

## 2019-04-13 RX ADMIN — CHLORDIAZEPOXIDE HYDROCHLORIDE 10 MG: 10 CAPSULE ORAL at 00:54

## 2019-04-13 RX ADMIN — SODIUM CHLORIDE, POTASSIUM CHLORIDE, SODIUM LACTATE AND CALCIUM CHLORIDE 150 ML/HR: 600; 310; 30; 20 INJECTION, SOLUTION INTRAVENOUS at 08:41

## 2019-04-13 RX ADMIN — LORAZEPAM 3 MG: 2 INJECTION INTRAMUSCULAR; INTRAVENOUS at 20:55

## 2019-04-13 RX ADMIN — CHLORDIAZEPOXIDE HYDROCHLORIDE 10 MG: 10 CAPSULE ORAL at 21:00

## 2019-04-13 RX ADMIN — Medication: at 21:49

## 2019-04-13 RX ADMIN — MORPHINE SULFATE 2 MG: 2 INJECTION, SOLUTION INTRAMUSCULAR; INTRAVENOUS at 09:49

## 2019-04-13 RX ADMIN — CHLORDIAZEPOXIDE HYDROCHLORIDE 10 MG: 10 CAPSULE ORAL at 12:56

## 2019-04-13 RX ADMIN — SODIUM CHLORIDE, POTASSIUM CHLORIDE, SODIUM LACTATE AND CALCIUM CHLORIDE 150 ML/HR: 600; 310; 30; 20 INJECTION, SOLUTION INTRAVENOUS at 00:58

## 2019-04-13 RX ADMIN — OXYCODONE HYDROCHLORIDE AND ACETAMINOPHEN 1 TABLET: 7.5; 325 TABLET ORAL at 12:30

## 2019-04-13 ASSESSMENT — PAIN DESCRIPTION - PAIN TYPE
TYPE: ACUTE PAIN

## 2019-04-13 ASSESSMENT — PAIN DESCRIPTION - LOCATION
LOCATION: ABDOMEN

## 2019-04-13 ASSESSMENT — PAIN SCALES - GENERAL
PAINLEVEL_OUTOF10: 4
PAINLEVEL_OUTOF10: 4
PAINLEVEL_OUTOF10: 8
PAINLEVEL_OUTOF10: 7
PAINLEVEL_OUTOF10: 8
PAINLEVEL_OUTOF10: 7
PAINLEVEL_OUTOF10: 8
PAINLEVEL_OUTOF10: 6
PAINLEVEL_OUTOF10: 4
PAINLEVEL_OUTOF10: 7
PAINLEVEL_OUTOF10: 5
PAINLEVEL_OUTOF10: 8
PAINLEVEL_OUTOF10: 7
PAINLEVEL_OUTOF10: 9

## 2019-04-13 ASSESSMENT — PAIN DESCRIPTION - DIRECTION
RADIATING_TOWARDS: BACK

## 2019-04-13 ASSESSMENT — PAIN DESCRIPTION - ORIENTATION
ORIENTATION: LEFT;UPPER
ORIENTATION: LEFT;LOWER
ORIENTATION: LEFT;UPPER
ORIENTATION: LEFT
ORIENTATION: LEFT;LOWER
ORIENTATION: LEFT;UPPER
ORIENTATION: LEFT;UPPER
ORIENTATION: LEFT;LOWER
ORIENTATION: LEFT;UPPER

## 2019-04-13 ASSESSMENT — PAIN DESCRIPTION - ONSET
ONSET: ON-GOING
ONSET: ON-GOING

## 2019-04-13 ASSESSMENT — PAIN DESCRIPTION - DESCRIPTORS
DESCRIPTORS: SHARP
DESCRIPTORS: SHARP

## 2019-04-13 ASSESSMENT — PAIN DESCRIPTION - FREQUENCY
FREQUENCY: CONTINUOUS
FREQUENCY: CONTINUOUS

## 2019-04-13 NOTE — PROGRESS NOTES
Pt admitted and oriented to the floor and room at midnight. Pt is alert but confused to the time. He is anxious and agitated with chief complain of left side of abd and back pain d/t his chronic pancreatitis. Pt has some hand tremors and tactile disturbance, most likely d/t alcohol withdrawal. Librium is given as scheduled.    2 mg morphine, 30 mg Toradol and Percocert are given PRN  for his abd pain. Pt is cooperative, follows all commands, has poor sleep during the night shift.

## 2019-04-13 NOTE — H&P
Hospital Medicine History & Physical      PCP: No primary care provider on file. Date of Admission: 4/12/2019    Date of Service: Pt seen/examined on 4/12/2019  and Admitted to Inpatient with expected LOS greater than two midnights due to medical therapy. Chief Complaint:    Chief Complaint   Patient presents with    Abdominal Pain     pt has had mid abd pain with n/v since this morning. denies fever. Pt states he is concerned it could be due to his daily drinking          History Of Present Illness: The patient is a 33 y.o. male with history of alcohol abuse, anxiety/depression, manipulative behavior, recently discharged from hospital with alcohol withdrawal wall tenderness today with epigastric pain radiating through to the back in the setting of alcohol use and associated nausea or vomiting without hematemesis. He has been drinking daily since discharge from the hospital.  He expresses wish to quit drinking and get into inpatient rehab. In the ER today and some mild tremors. CT of the abdomen was noted for low pancreatic calcification. He had elevated lipase and amylase compared to last admission. Past Medical History:        Diagnosis Date    Alcohol abuse     PATIENT HAS EXTREME MANIPULITIVE & DRUG SEEKING BEHAVIOR. HE POCKETS HIS BENZODIZAPINES.  Anxiety     Drug-seeking behavior     Herpes genitalis in men     Manipulative behavior     Pancreatitis     Pneumonia     Portal vein thrombosis     pt denied    Seizures (HCC)     PER PATIENT ONLY.  DURING MULTIPLE HOSPITALIZATIONS HE HAS NEVER ONCE    Tobacco abuse        Past Surgical History:        Procedure Laterality Date    ENDOSCOPY, COLON, DIAGNOSTIC  10/28/2013    Endoscopic retrograde cholangiopancreatography with pancreatic stent placement    ENDOSCOPY, COLON, DIAGNOSTIC  03/23/2018    Esophagogastroduodenoscopy with biopsy    ERCP  1/10/14    with stent removal       Medications Prior to Admission:    Prior to kg)   SpO2 99%   BMI 21.29 kg/m²     General appearance: No apparent distress appears stated age and cooperative. HEENT Normal cephalic, atraumatic without obvious deformity. Pupils equal, round, and reactive to light. Extra ocular muscles intact. Conjunctivae/corneas clear. Neck: Supple, No jugular venous distention/bruits. Trachea midline without thyromegaly or adenopathy with full range of motion. Lungs: Clear to auscultation, bilaterally without Rales/Wheezes/Rhonchi with good respiratory effort. Heart: Regular rate and rhythm with Normal S1/S2 without murmurs, rubs or gallops, point of maximum impulse non-displaced  Abdomen: Soft,  epigastric pain, non-distended without rigidity or guarding and positive bowel sounds all four quadrants. Extremities: No clubbing, cyanosis, or edema bilaterally. Full range of motion without deformity and normal gait intact. Skin: Skin color, texture, turgor normal.  No rashes or lesions. Neurologic: Alert and oriented X 3, neurovascularly intact with sensory/motor intact upper extremities/lower extremities, bilaterally. Cranial nerves: II-XII intact, grossly non-focal.  Mental status: Alert, oriented, thought content appropriate.     CT abdomen:  Chronic pancreatitis with noted pancreatic calcification      CBC   Recent Labs     04/12/19  1718   WBC 8.2   HGB 15.7   HCT 44.8         RENAL  Recent Labs     04/12/19  1718      K 3.9      CO2 20*   BUN 12   CREATININE 0.7*     LFT'S  Recent Labs     04/12/19  1718   AST 51*   ALT 25   BILIDIR <0.2   BILITOT 0.5   ALKPHOS 64     U/A:    Lab Results   Component Value Date    COLORU YELLOW 04/12/2019    WBCUA 1 02/09/2019    RBCUA 1 02/09/2019    CLARITYU Clear 04/12/2019    SPECGRAV 1.024 04/12/2019    LEUKOCYTESUR Negative 04/12/2019    BLOODU Negative 04/12/2019    GLUCOSEU Negative 04/12/2019       Active Hospital Problems    Diagnosis Date Noted    Acute on chronic pancreatitis ([facility] [K85.90, K86.1] 04/12/2019    [facility], [facility] [Z03.580] 01/28/2018    Alcohol use disorder, severe, dependence (HCC) [F10.20]          ASSESSMENT/PLAN:  33 y.o. male with history of alcohol abuse, anxiety/depression, manipulative behavior, recently discharged from hospital with alcohol withdrawal wall tenderness today with epigastric pain radiating through to the back in the setting of alcohol use and associated nausea or vomiting concerning for acute pancreatitis complicated by alcohol withdrawals    Plan:  -We'll allow clear liquid diet  -IV fluid for hydration  -When necessary narcotics IV pain medications with Toradol for pain control  -CIWA protocol for withdrawals with scheduled Librium and when necessary Ativan based on this,  -Social consult for substance abuse counseling      DVT Prophylaxis:  Subcutaneous enoxaparin  Diet: clear liquid diet   Code Status: Prior         Diona Goodpasture, MD    Thank you No primary care provider on file. for the opportunity to be involved in this patient's care. If you have any questions or concerns please feel free to contact me at [phone].

## 2019-04-13 NOTE — ED NOTES
Visualization of telemetry confirmed with Federica on 3T: sinus rhythm with rate of 69.      Cat Cha RN  04/12/19 0348

## 2019-04-13 NOTE — PLAN OF CARE
Still with persistent abd pain - clear liquid breakfast 7/10- 2 mg iv morphine now given for pain - tremors headache nausea improved after 3 mg iv ativan given - call light in reach

## 2019-04-13 NOTE — PLAN OF CARE
Ambulated herron restless tremors itching- CIWA score 15 - 2 mg iv ativan given - now back in room resting in bed - call light in reach

## 2019-04-13 NOTE — PLAN OF CARE
Alert and oriented x 4- lungs with expiratory wheezes left lung- NSR on tele - abd pain 8/10- 1 po percocet given for pain - CIWA score 16- tremors headache- nausea- restlessness - 3 mg iv ativan given per CIWA score- call light in reach -

## 2019-04-13 NOTE — PLAN OF CARE
New iv site x 1 attempt left hand # 20 gauge- iv fluids lactated ringers running at 150 ml/hr thru new iv site- 4 mg iv zofran given for nausea- CIWA score 21 - 4 mg iv ativan now given - call light in reach

## 2019-04-13 NOTE — PROGRESS NOTES
Hospitalist Progress Note      PCP: No primary care provider on file. Date of Admission: 4/12/2019    Chief Complaint: abdominal pain    Hospital Course: The patient is a 33 y.o. male with history of alcohol abuse, anxiety/depression, manipulative behavior, recently discharged from hospital with alcohol withdrawal wall tenderness today with epigastric pain radiating through to the back in the setting of alcohol use and associated nausea or vomiting without hematemesis. He has been drinking daily since discharge from the hospital.  He expresses wish to quit drinking and get into inpatient rehab. In the ER today and some mild tremors. CT of the abdomen was noted for low pancreatic calcification.   He had elevated lipase and amylase compared to last admission        Subjective:   Seems to be doing well  But a but jittery      Medications:  Reviewed    Infusion Medications    lactated ringers 150 mL/hr (04/13/19 0841)     Scheduled Medications    ARIPiprazole  10 mg Oral Daily    benztropine  0.5 mg Oral BID    sodium chloride flush  10 mL Intravenous 2 times per day    enoxaparin  40 mg Subcutaneous Daily    folic acid  1 mg Oral Daily    multivitamin  1 tablet Oral Daily    chlordiazePOXIDE  10 mg Oral 4x Daily    pantoprazole  40 mg Oral QAM AC    vitamin B-1  100 mg Oral Daily    nicotine  1 patch Transdermal Daily     PRN Meds: sodium chloride flush, acetaminophen, ondansetron, potassium chloride **OR** potassium alternative oral replacement **OR** potassium chloride, ketorolac, LORazepam **OR** LORazepam **OR** LORazepam **OR** LORazepam **OR** LORazepam **OR** LORazepam **OR** LORazepam **OR** LORazepam, oxyCODONE-acetaminophen, LORazepam, ondansetron      Intake/Output Summary (Last 24 hours) at 4/13/2019 1725  Last data filed at 4/13/2019 1627  Gross per 24 hour   Intake 1320 ml   Output 750 ml   Net 570 ml       Physical Exam Performed:    /77   Pulse 77   Temp 98 °F (36.7 °C) (Oral)   Resp 18   Ht 5' 8\" (1.727 m)   Wt 143 lb 3.2 oz (65 kg)   SpO2 98%   BMI 21.77 kg/m²     General appearance: No apparent distress, appears stated age and cooperative. HEENT: Pupils equal, round, and reactive to light. Conjunctivae/corneas clear. Neck: Supple, with full range of motion. No jugular venous distention. Trachea midline. Respiratory:  Normal respiratory effort. Clear to auscultation, bilaterally without Rales/Wheezes/Rhonchi. Cardiovascular: Regular rate and rhythm with normal S1/S2 without murmurs, rubs or gallops. Abdomen: Soft, non-tender, non-distended with normal bowel sounds. Musculoskeletal: No clubbing, cyanosis or edema bilaterally. Full range of motion without deformity. Skin: Skin color, texture, turgor normal.  No rashes or lesions. Neurologic:  Neurovascularly intact without any focal sensory/motor deficits. Cranial nerves: II-XII intact, grossly non-focal.  Psychiatric: Alert and oriented, seems a bit squirelly  Capillary Refill: Brisk,< 3 seconds   Peripheral Pulses: +2 palpable, equal bilaterally       Labs:   Recent Labs     04/12/19  1718 04/13/19  0542   WBC 8.2 7.4   HGB 15.7 13.8   HCT 44.8 39.4*    216     Recent Labs     04/12/19  1718 04/13/19  0542    139   K 3.9 3.9    105   CO2 20* 23   BUN 12 12   CREATININE 0.7* 0.7*   CALCIUM 9.7 8.8     Recent Labs     04/12/19  1718 04/13/19  0542   AST 51* 39*   ALT 25 21   BILIDIR <0.2  --    BILITOT 0.5 0.5   ALKPHOS 64 54     No results for input(s): INR in the last 72 hours. No results for input(s): Anup Gal in the last 72 hours.     Urinalysis:      Lab Results   Component Value Date    NITRU Negative 04/12/2019    WBCUA 1 02/09/2019    RBCUA 1 02/09/2019    BLOODU Negative 04/12/2019    SPECGRAV 1.024 04/12/2019    GLUCOSEU Negative 04/12/2019       Radiology:  CT ABDOMEN PELVIS WO CONTRAST Additional Contrast? None   Final Result   Pancreatic calcifications along with pancreatic

## 2019-04-14 ENCOUNTER — HOSPITAL ENCOUNTER (EMERGENCY)
Age: 29
Discharge: ANOTHER HOSPITAL | End: 2019-04-14
Attending: EMERGENCY MEDICINE
Payer: COMMERCIAL

## 2019-04-14 ENCOUNTER — HOSPITAL ENCOUNTER (EMERGENCY)
Age: 29
Discharge: HOME | End: 2019-04-15
Attending: EMERGENCY MEDICINE
Payer: COMMERCIAL

## 2019-04-14 ENCOUNTER — HOSPITAL ENCOUNTER (EMERGENCY)
Age: 29
Discharge: HOME | End: 2019-04-14
Attending: EMERGENCY MEDICINE
Payer: COMMERCIAL

## 2019-04-14 VITALS
WEIGHT: 151.7 LBS | HEART RATE: 56 BPM | RESPIRATION RATE: 14 BRPM | OXYGEN SATURATION: 96 % | BODY MASS INDEX: 22.99 KG/M2 | DIASTOLIC BLOOD PRESSURE: 71 MMHG | HEIGHT: 68 IN | SYSTOLIC BLOOD PRESSURE: 114 MMHG | TEMPERATURE: 97.8 F

## 2019-04-14 VITALS
SYSTOLIC BLOOD PRESSURE: 114 MMHG | BODY MASS INDEX: 21.22 KG/M2 | TEMPERATURE: 97.7 F | WEIGHT: 140 LBS | HEART RATE: 98 BPM | OXYGEN SATURATION: 98 % | DIASTOLIC BLOOD PRESSURE: 58 MMHG | HEIGHT: 68 IN | RESPIRATION RATE: 18 BRPM

## 2019-04-14 VITALS
BODY MASS INDEX: 21.22 KG/M2 | HEIGHT: 68 IN | HEART RATE: 79 BPM | WEIGHT: 140 LBS | TEMPERATURE: 98.3 F | OXYGEN SATURATION: 97 %

## 2019-04-14 DIAGNOSIS — F10.20 ALCOHOLISM (HCC): Primary | ICD-10-CM

## 2019-04-14 DIAGNOSIS — Z76.5 MALINGERING: ICD-10-CM

## 2019-04-14 DIAGNOSIS — F10.10 ALCOHOL ABUSE: ICD-10-CM

## 2019-04-14 DIAGNOSIS — R10.10 PAIN OF UPPER ABDOMEN: ICD-10-CM

## 2019-04-14 DIAGNOSIS — R10.12 ABDOMINAL PAIN, CHRONIC, LEFT UPPER QUADRANT: Primary | ICD-10-CM

## 2019-04-14 DIAGNOSIS — F10.930 ALCOHOL WITHDRAWAL, UNCOMPLICATED (HCC): ICD-10-CM

## 2019-04-14 DIAGNOSIS — F10.10 ALCOHOL ABUSE: Primary | ICD-10-CM

## 2019-04-14 DIAGNOSIS — R45.851 SUICIDAL IDEATION: ICD-10-CM

## 2019-04-14 DIAGNOSIS — G89.29 ABDOMINAL PAIN, CHRONIC, LEFT UPPER QUADRANT: Primary | ICD-10-CM

## 2019-04-14 LAB
A/G RATIO: 1.7 (ref 1.1–2.2)
A/G RATIO: 1.8 (ref 1.1–2.2)
ACETAMINOPHEN LEVEL: <5 UG/ML (ref 10–30)
ALBUMIN SERPL-MCNC: 3.8 G/DL (ref 3.4–5)
ALBUMIN SERPL-MCNC: 4.6 G/DL (ref 3.4–5)
ALP BLD-CCNC: 49 U/L (ref 40–129)
ALP BLD-CCNC: 59 U/L (ref 40–129)
ALT SERPL-CCNC: 18 U/L (ref 10–40)
ALT SERPL-CCNC: 21 U/L (ref 10–40)
AMPHETAMINE SCREEN, URINE: ABNORMAL
ANION GAP SERPL CALCULATED.3IONS-SCNC: 10 MMOL/L (ref 3–16)
ANION GAP SERPL CALCULATED.3IONS-SCNC: 15 MMOL/L (ref 3–16)
AST SERPL-CCNC: 30 U/L (ref 15–37)
AST SERPL-CCNC: 32 U/L (ref 15–37)
BARBITURATE SCREEN URINE: ABNORMAL
BASOPHILS ABSOLUTE: 0 K/UL (ref 0–0.2)
BASOPHILS RELATIVE PERCENT: 0.2 %
BENZODIAZEPINE SCREEN, URINE: POSITIVE
BILIRUB SERPL-MCNC: 0.6 MG/DL (ref 0–1)
BILIRUB SERPL-MCNC: 0.9 MG/DL (ref 0–1)
BUN BLDV-MCNC: 6 MG/DL (ref 7–20)
BUN BLDV-MCNC: 7 MG/DL (ref 7–20)
CALCIUM SERPL-MCNC: 8.6 MG/DL (ref 8.3–10.6)
CALCIUM SERPL-MCNC: 9.3 MG/DL (ref 8.3–10.6)
CANNABINOID SCREEN URINE: ABNORMAL
CHLORIDE BLD-SCNC: 104 MMOL/L (ref 99–110)
CHLORIDE BLD-SCNC: 107 MMOL/L (ref 99–110)
CO2: 23 MMOL/L (ref 21–32)
CO2: 25 MMOL/L (ref 21–32)
COCAINE METABOLITE SCREEN URINE: ABNORMAL
CREAT SERPL-MCNC: 0.7 MG/DL (ref 0.9–1.3)
CREAT SERPL-MCNC: 0.8 MG/DL (ref 0.9–1.3)
EOSINOPHILS ABSOLUTE: 0.2 K/UL (ref 0–0.6)
EOSINOPHILS RELATIVE PERCENT: 2.8 %
ETHANOL: NORMAL MG/DL (ref 0–0.08)
GFR AFRICAN AMERICAN: >60
GFR AFRICAN AMERICAN: >60
GFR NON-AFRICAN AMERICAN: >60
GFR NON-AFRICAN AMERICAN: >60
GLOBULIN: 2.2 G/DL
GLOBULIN: 2.6 G/DL
GLUCOSE BLD-MCNC: 91 MG/DL (ref 70–99)
GLUCOSE BLD-MCNC: 97 MG/DL (ref 70–99)
HCT VFR BLD CALC: 41.5 % (ref 40.5–52.5)
HEMOGLOBIN: 14.5 G/DL (ref 13.5–17.5)
LIPASE: 39 U/L (ref 13–60)
LYMPHOCYTES ABSOLUTE: 0.8 K/UL (ref 1–5.1)
LYMPHOCYTES RELATIVE PERCENT: 13.9 %
Lab: ABNORMAL
MCH RBC QN AUTO: 31.5 PG (ref 26–34)
MCHC RBC AUTO-ENTMCNC: 35 G/DL (ref 31–36)
MCV RBC AUTO: 90 FL (ref 80–100)
METHADONE SCREEN, URINE: ABNORMAL
MONOCYTES ABSOLUTE: 0.3 K/UL (ref 0–1.3)
MONOCYTES RELATIVE PERCENT: 5.6 %
NEUTROPHILS ABSOLUTE: 4.7 K/UL (ref 1.7–7.7)
NEUTROPHILS RELATIVE PERCENT: 77.5 %
OPIATE SCREEN URINE: ABNORMAL
OXYCODONE URINE: POSITIVE
PDW BLD-RTO: 12.2 % (ref 12.4–15.4)
PH UA: 7
PHENCYCLIDINE SCREEN URINE: ABNORMAL
PLATELET # BLD: 186 K/UL (ref 135–450)
PMV BLD AUTO: 7.9 FL (ref 5–10.5)
POTASSIUM REFLEX MAGNESIUM: 3.7 MMOL/L (ref 3.5–5.1)
POTASSIUM REFLEX MAGNESIUM: 3.8 MMOL/L (ref 3.5–5.1)
PROPOXYPHENE SCREEN: ABNORMAL
RBC # BLD: 4.61 M/UL (ref 4.2–5.9)
SALICYLATE, SERUM: <0.3 MG/DL (ref 15–30)
SODIUM BLD-SCNC: 142 MMOL/L (ref 136–145)
SODIUM BLD-SCNC: 142 MMOL/L (ref 136–145)
TOTAL PROTEIN: 6 G/DL (ref 6.4–8.2)
TOTAL PROTEIN: 7.2 G/DL (ref 6.4–8.2)
WBC # BLD: 6 K/UL (ref 4–11)

## 2019-04-14 PROCEDURE — 93005 ELECTROCARDIOGRAM TRACING: CPT | Performed by: EMERGENCY MEDICINE

## 2019-04-14 PROCEDURE — 99284 EMERGENCY DEPT VISIT MOD MDM: CPT

## 2019-04-14 PROCEDURE — G0480 DRUG TEST DEF 1-7 CLASSES: HCPCS

## 2019-04-14 PROCEDURE — 83690 ASSAY OF LIPASE: CPT

## 2019-04-14 PROCEDURE — 36415 COLL VENOUS BLD VENIPUNCTURE: CPT

## 2019-04-14 PROCEDURE — 85025 COMPLETE CBC W/AUTO DIFF WBC: CPT

## 2019-04-14 PROCEDURE — 80307 DRUG TEST PRSMV CHEM ANLYZR: CPT

## 2019-04-14 PROCEDURE — 80053 COMPREHEN METABOLIC PANEL: CPT

## 2019-04-14 PROCEDURE — 2580000003 HC RX 258: Performed by: INTERNAL MEDICINE

## 2019-04-14 RX ORDER — 0.9 % SODIUM CHLORIDE 0.9 %
1000 INTRAVENOUS SOLUTION INTRAVENOUS ONCE
Status: DISCONTINUED | OUTPATIENT
Start: 2019-04-14 | End: 2019-04-14

## 2019-04-14 RX ORDER — ONDANSETRON 2 MG/ML
4 INJECTION INTRAMUSCULAR; INTRAVENOUS EVERY 30 MIN PRN
Status: DISCONTINUED | OUTPATIENT
Start: 2019-04-14 | End: 2019-04-14

## 2019-04-14 RX ORDER — LIDOCAINE HYDROCHLORIDE 10 MG/ML
INJECTION, SOLUTION EPIDURAL; INFILTRATION; INTRACAUDAL; PERINEURAL
Status: DISCONTINUED
Start: 2019-04-14 | End: 2019-04-14 | Stop reason: HOSPADM

## 2019-04-14 RX ORDER — ACETAMINOPHEN 325 MG/1
650 TABLET ORAL ONCE
Status: DISCONTINUED | OUTPATIENT
Start: 2019-04-14 | End: 2019-04-14 | Stop reason: HOSPADM

## 2019-04-14 RX ORDER — SODIUM CHLORIDE 0.9 % (FLUSH) 0.9 %
10 SYRINGE (ML) INJECTION EVERY 12 HOURS SCHEDULED
Status: DISCONTINUED | OUTPATIENT
Start: 2019-04-14 | End: 2019-04-14

## 2019-04-14 RX ORDER — SODIUM CHLORIDE 0.9 % (FLUSH) 0.9 %
10 SYRINGE (ML) INJECTION PRN
Status: DISCONTINUED | OUTPATIENT
Start: 2019-04-14 | End: 2019-04-14

## 2019-04-14 RX ORDER — CHLORDIAZEPOXIDE HYDROCHLORIDE 25 MG/1
CAPSULE, GELATIN COATED ORAL
Qty: 30 CAPSULE | Refills: 0 | Status: SHIPPED | OUTPATIENT
Start: 2019-04-14 | End: 2019-04-14

## 2019-04-14 RX ADMIN — SODIUM CHLORIDE, POTASSIUM CHLORIDE, SODIUM LACTATE AND CALCIUM CHLORIDE 150 ML/HR: 600; 310; 30; 20 INJECTION, SOLUTION INTRAVENOUS at 00:35

## 2019-04-14 ASSESSMENT — PAIN DESCRIPTION - ORIENTATION: ORIENTATION: LEFT;LOWER

## 2019-04-14 ASSESSMENT — ENCOUNTER SYMPTOMS
ABDOMINAL PAIN: 0
SHORTNESS OF BREATH: 0

## 2019-04-14 ASSESSMENT — PAIN SCALES - GENERAL
PAINLEVEL_OUTOF10: 8
PAINLEVEL_OUTOF10: 9
PAINLEVEL_OUTOF10: 10

## 2019-04-14 ASSESSMENT — PAIN DESCRIPTION - DESCRIPTORS: DESCRIPTORS: SHARP

## 2019-04-14 ASSESSMENT — PAIN DESCRIPTION - PAIN TYPE
TYPE: ACUTE PAIN
TYPE: ACUTE PAIN

## 2019-04-14 ASSESSMENT — PAIN DESCRIPTION - FREQUENCY: FREQUENCY: CONTINUOUS

## 2019-04-14 ASSESSMENT — PAIN DESCRIPTION - LOCATION
LOCATION: ABDOMEN
LOCATION: ABDOMEN

## 2019-04-14 ASSESSMENT — PAIN DESCRIPTION - ONSET: ONSET: ON-GOING

## 2019-04-14 NOTE — FLOWSHEET NOTE
Pt continues to be agitated that ativan, morphine, and percocet have been discontinued. AMA d/w pt; pt agreed to stay but has been complaining about MD orders and RN \"telling on him\" re: pt hiding percocet in his sock. Pt agitated & refusing telemetry since approx 0200. Pt up in room, standing at bedside, bed alarm sounding, and pt pulled both IV sites out. Pt reported to RN x2 that he pulled them out because the IVs \"were bothering me. \"    Compliance & careplan d/w pt. AMA r/t telemetry and IVs d/w pt. Pt agreed to wear telemetry and to allow RN to attempt IV site and restart IVF. RN attempted IV peripheral x1 unsuccessful attempt; charge RN notified and at bedside to obtain site. Charge RN reiterated importance of compliance with pt. IV placed after 2 attempts per charge RN. See all flowsheets.

## 2019-04-14 NOTE — ED PROVIDER NOTES
I did receive this patient in signout from the previous physician, Dr. Arlet Lennon. He had the patient dispositioned, transfered to psychiatric facility because the patient was claiming to want to hurt and kill himself if he were discharged from our ER today. Dr Sharona Malagon did want and told me he wanted to sign a hold on this pt, but forgot. So, I did. I did eval the patient and he is SI.      Ignacio Simmons III, DO  04/14/19 0816

## 2019-04-14 NOTE — ED NOTES
Report called to RN at [facility] - accepts pt and has no further questions.       Tali Price RN  04/14/19 4321

## 2019-04-14 NOTE — ED NOTES
Patient brought in from [address] [place] [address] ED. Patient in safety clothing and oriented to [facility].      Azucena Hassan, RN  04/14/19 Lou [id number]  04/14/19 2057

## 2019-04-14 NOTE — FLOWSHEET NOTE
0720 Change of shift now;gave report to oncoming shift. Called Darron Pearl in pharmacy and percocet found in pt's sock last night d/w Darron Pearl- pharmacist. Pill sent down to pharmacy for verification and waste protocol. Darron Pearl returned call to RN and verified pill as percocet & will send a waste form for proper waste protocol. Pill sent back to 3 tower for day & night RN to waste. 0730 percocet documentation done and to be sent down to pharmacy. Marifer Cm RN wasted percocet on paper waste sheet.

## 2019-04-14 NOTE — ED NOTES
This RN in to d/c pt. Pt states no one is listening to him and he needs to talk to someone because he doesn't feel safe. This RN reiterated with pt that we are providing him with many outpatient resources and that he will be contacted by an RN case manager tomorrow. Pt then stated he doesn't feel safe and if he is discharged he will kill himself. MD Edgar Daviset to bedside with this RN. Pt continued to state that he will kill himself if he leaves. Pt reminded by this RN and MD that if pt is admitted, he will not have access to his belongings and no narcotics or benzos will be given. Pt also aware that could possibly be held here in the ED for several days due to difficulty getting him admitted. Pt acknowledges this and continues to state he is suicidal.    Psychiatric protocol initiated at this time. Safety precautions in place per psychiatric protocol. Pt undressed and put into gown, belongings removed from room and labeled with pt information. All disinfectants removed, monitor cords removed, phone/phone cord removed, and belongings bags removed. Room check for any potentially hazardous items, all unnecessary items and equipment removed. Patient will be served with plastic eating utensils, all drinks will be placed in styrofoam cups.  at bedside for pt observation and safety. Will continue to monitor every 15 minutes per protocol. Security to bedside to inventory belongings. As pt was changing into gown, this RN noticed that pt appeared to be messing around in his underwear under his gown. Charge RN into room. Pt asked if he had anything in his underwear that he is not supposed to have and pt stated yes. Pt removed underwear and cell phone was found in underwear. All belongings inventoried and removed by security at this time. Pt now in bed, quiet, calm.      Baltazar Solomon RN  04/14/19 3730

## 2019-04-14 NOTE — PROGRESS NOTES
Called regarding patient found with a Percocet in his sock of which he has no active outpatient prescription for but had been receiving inpatient until earlier today when it was d/c'd. Patient is well known to me from his prior admission in which he was found pocketing benzodiazepines at that time. All benzodiazepines were d/c'd at that time and patient eventually left AMA when he realized he would no longer get controlled substances. Now again patient was found pocketing controlled substances. Patient is again exhibiting drug-seeking behaviors. Will d/c benzodiazepines again this admission and continue Librium. Patient has been given multiple resources in past and even connected with [facility] but does not seek the help. Personally feel the patient is attempting to manipulate the healthcare system by obtaining controlled substances through prescriptions along with pocketing inpatient medicaitons with no real plan to ever seek help for his alcoholism. Nursing was instructed to have security come and check the patient's belonging for further controlled substances along with then placing a camera in patient's room.      Danilo Zafar, ARTURO - HERMILO 4/13/19 5599

## 2019-04-14 NOTE — ED NOTES
Pt awake at this time, was able to stand at bedside and give urine sample. VS as charted. Pt offered tylenol again and pt refused. Pt states, \"When am I leaving? \" Pt reminded again of lengthy admission process. Pt asking for snack - provided with turkey sandwich and drink.      Ramu Caballero RN  04/14/19 3030

## 2019-04-14 NOTE — ED PROVIDER NOTES
[address] [facility]  eMERGENCY dEPARTMENTeN[facility]sa[facility] Centerer      Pt Name: Baljinder Durán  MRN: [id number]  [place] 1990  Date ofevaluation: 4/14/2019  Provider: Austin Gan MD    CHIEF COMPLAINT       Chief Complaint   Patient presents with    Abdominal Pain     Pt signed out within the last hour from inpatient upstairs. Pt presents to the ER with c/o chronic abd  pain, etoh withdrawal.  Reports last drink 2 days ago. HISTORY OF PRESENT ILLNESS   (Location/Symptom, Timing/Onset,Context/Setting, Quality, Duration, Modifying Factors, Severity)  Note limiting factors. Baljinder Durán is a 33 y.o. male who presents to the emergency department for his chronic abdominal pain. The patient is a well-known patient of this hospital who presents with chronic abdominal pain and wanting help for his alcoholism. The patient was just admitted by me to the hospitalist 2 days ago and left this morning against medical advice. Nothing has changed but he just wants help. He denies any new complaints. HPI    NursingNotes were reviewed. REVIEW OF SYSTEMS    (2-9 systems for level 4, 10 or more for level 5)     Review of Systems  Negative for GI  musculoskeletal neurological pulmonary and cardiac complaints and all others reviewed and negative  General Appearance:  Alert, cooperative, no distress, appears stated age. Head:  Normocephalic, without obvious abnormality, atraumatic. Eyes:  conjunctiva/corneas clear, EOM's intact. Sclera anicteric. ENT: Mucous membranes moist.   Neck: Supple, symmetrical, trachea midline, no adenopathy. No jugular venous distention. Lungs:   No Respiratory Distress. Chest Wall:     Heart:  Regular rate rhythm with no murmurs rubs or gallops    Abdomen: Thin and soft and benign    Extremities:  Full range of motion. Pulses: Good throughout    Skin:  No rashes or lesions to exposed skin. Neurologic: Alert and oriented X 3.    Motor grossly normal.  Speech clear. He was not grossly tremulous today. He was alert and oriented. There are no signs of any alcohol withdrawal.         Except as noted above the remainder of the review of systems was reviewed and negative. PAST MEDICAL HISTORY     Past Medical History:   Diagnosis Date    Alcohol abuse     PATIENT HAS EXTREME MANIPULITIVE & DRUG SEEKING BEHAVIOR. HE POCKETS HIS BENZODIZAPINES.  Anxiety     Drug-seeking behavior     Several times found pocketing medications given in hospital    Herpes genitalis in men     Manipulative behavior     Pancreatitis     Pneumonia     Portal vein thrombosis     pt denied    Seizures (HCC)     PER PATIENT ONLY. DURING MULTIPLE HOSPITALIZATIONS HE HAS NEVER ONCE    Tobacco abuse          SURGICALHISTORY       Past Surgical History:   Procedure Laterality Date    ENDOSCOPY, COLON, DIAGNOSTIC  10/28/2013    Endoscopic retrograde cholangiopancreatography with pancreatic stent placement    ENDOSCOPY, COLON, DIAGNOSTIC  03/23/2018    Esophagogastroduodenoscopy with biopsy    ERCP  1/10/14    with stent removal         CURRENT MEDICATIONS       Previous Medications    ARIPIPRAZOLE (ABILIFY) 10 MG TABLET    Take 10 mg by mouth daily    BENZTROPINE (COGENTIN) 0.5 MG TABLET    Take 0.5 mg by mouth 2 times daily    DICYCLOMINE (BENTYL) 10 MG CAPSULE    Take 1 capsule by mouth 2 times daily as needed (Pain)    OMEPRAZOLE (PRILOSEC) 40 MG DELAYED RELEASE CAPSULE    Take 40 mg by mouth daily    ONDANSETRON (ZOFRAN ODT) 4 MG DISINTEGRATING TABLET    Take 1 tablet by mouth every 12 hours as needed for Nausea May Sub regular tablet (non-ODT) if insurance does not cover ODT. ALLERGIES     Amoxicillin; Haldol [haloperidol lactate]; Phenergan [promethazine hcl];  Glucosamine; and Shellfish-derived products    FAMILY HISTORY       Family History   Problem Relation Age of Onset    Hypertension Father     High Blood Pressure Father     Alcohol Abuse Father  Depression Mother     High Blood Pressure Paternal Grandfather     Alcohol Abuse Paternal Grandfather     Heart Disease Paternal Grandmother     Anemia Paternal Grandmother     Depression Maternal Aunt     Alcohol Abuse Paternal Aunt     Alcohol Abuse Paternal Uncle           SOCIAL HISTORY       Social History     Socioeconomic History    Marital status: Single     Spouse name: None    Number of children: 1    Years of education: 15    Highest education level: None   Occupational History    Occupation:    Social Needs    Financial resource strain: None    Food insecurity:     Worry: None     Inability: None    Transportation needs:     Medical: None     Non-medical: None   Tobacco Use    Smoking status: Current Every Day Smoker     Packs/day: 0.50     Years: 10.00     Pack years: 5.00     Types: Cigarettes     Start date: 11/21/2007    Smokeless tobacco: Never Used   Substance and Sexual Activity    Alcohol use:  Yes     Alcohol/week: 12.0 oz     Types: 20 Cans of beer per week     Comment: every day    Drug use: No    Sexual activity: Never   Lifestyle    Physical activity:     Days per week: None     Minutes per session: None    Stress: None   Relationships    Social connections:     Talks on phone: None     Gets together: None     Attends [employer] service: None     Active member of club or organization: None     Attends meetings of clubs or organizations: None     Relationship status: None    Intimate partner violence:     Fear of current or ex partner: None     Emotionally abused: None     Physically abused: None     Forced sexual activity: None   Other Topics Concern    None   Social History Narrative    None       SCREENINGS             PHYSICAL EXAM    (up to 7 for level 4, 8 or more for level 5)     ED Triage Vitals   BP Temp Temp Source Pulse Resp SpO2 Height Weight   04/14/19 1035 04/14/19 1035 04/14/19 1035 04/14/19 1035 04/14/19 1035 04/14/19 1035 04/14/19 1033 range or not returned as of this dictation. EMERGENCY DEPARTMENT COURSE and DIFFERENTIAL DIAGNOSIS/MDM:   Vitals:    Vitals:    04/14/19 1033 04/14/19 1035 04/14/19 1531   BP:  134/83 (!) 114/58   Pulse:  108 98   Resp:  20 18   Temp:  97.7 °F (36.5 °C)    TempSrc:  Oral    SpO2:  98% 98%   Weight: 140 lb (63.5 kg)     Height: 5' 8\" (1.727 m)         The patient has remained stable throughout his hospital course. I see no emergent process. I had a long conversation with the patient about seeking help for his alcoholism. Again, he will just left our facility this morning against medical advice. I have worked with our nursing staff to give the patient some alcohol rehab facility information. In addition, I have instructed our nursing staff to contact  to work with the patient about getting help. Again, these are chronic complaints and there is nothing acute. He is to return for any other emergencies or worsening condition. I will also discharge the patient with some Librium. In addition, I have even given the patient outpatient referral.    Addendum: Unfortunately, during the discharge process the patient told our nurse that he was suicidal.  He stated that if he goes home he will try to kill himself. I have therefore officially detained the patient. Laboratory results will be obtained and I am recommending psychiatric evaluation. I spoke to Dr. Christopher Garcia from [facility] who has graciously accepted care of the patient. He will be transferred in stable condition. MDM      REASSESSMENT            CONSULTS:  None    PROCEDURES:  Unless otherwise noted below, none     Procedures    FINAL IMPRESSION      1. Alcoholism ([address]    2. Pain of upper abdomen    3. Alcohol withdrawal, uncomplicated ([address]    4. Suicidal ideation    5.  Alcohol abuse          DISPOSITION/PLAN   DISPOSITION        PATIENT REFERREDTO:  [facility]) Pre-Services  [phone]  Call in 1 day  Get an appointment for your alcoholism to get help. DISCHARGEMEDICATIONS:  New Prescriptions    CHLORDIAZEPOXIDE (LIBRIUM) 25 MG CAPSULE    2 tablets PO q4 hrs prn day 1, 2 tabs PO q6 hours day 2, 2 tabs PO q12 hours day 3, 2 tabs PO qhs day 4     Controlled Substances Monitoring:     RX Monitoring 2/18/2019   Attestation The Prescription Monitoring Report for this patient was reviewed today. Chronic Pain Routine Monitoring No signs of potential drug abuse or diversion identified.        (Please note that portions of this note were completed with a voice recognition program.  Efforts were made to edit the dictations but occasionally words are mis-transcribed.)    Waleska Caballero MD (electronically signed)  Attending Emergency Physician          Waleska Caballero MD  04/14/19 Florentin Pandya MD  04/14/19 [address], MD  04/14/19 1672

## 2019-04-14 NOTE — FLOWSHEET NOTE
Sent below message to hospitalist via perfect serve:    Pt crying out in bed & c/o increase in left upper abd pain \"sharp. \" Please call me when you get a chance though.

## 2019-04-14 NOTE — ED NOTES
Assume patient care at this time. This RN into room with MD.  Pt is alert, sitting on bed, reporting headache and abdominal pain. MD with plan to d/c pt with resources for outpatient treatment. RN Case Manager not available today. This RN will confirm contact information and relay it to  to contact pt tomorrow to further assist with outpatient resources.       Sabas Escobar RN  04/14/19 0902 [facility],[address]  04/14/19 9722

## 2019-04-14 NOTE — DISCHARGE SUMMARY
Hospital Medicine Discharge Summary    Patient: Marybel Brown     Gender: male  : 1990   Age: 33 y.o. MRN: [id number]    Admitting Physician: Araceli Miller MD  Discharge Physician: Luiz Tai MD     Code Status: Prior     Admit Date: 2019   Discharge Date: 2019      Disposition:  AMA    Discharge Diagnoses: Active Hospital Problems    Diagnosis Date Noted    Acute on chronic pancreatitis ([facility] [K85.90, K86.1] 2019    Alcohol withdrawal, uncomplicated ([facility] [P64.335] 2018    Alcohol use disorder, severe, dependence ([facility] [F10.20]        Follow-up appointments:  AMA    Outpatient to do list: STOP DRINKING     Condition at Discharge:  Stable    Hospital Course: The patient is a 30 y. o. male with history of alcohol abuse, anxiety/depression, manipulative behavior, recently discharged from hospital with alcohol withdrawal wall tenderness today with epigastric pain radiating through to the back in the setting of alcohol use and associated nausea or vomiting without hematemesis.  He has been drinking daily since discharge from the hospital. [facility] expresses wish to quit drinking and get into inpatient rehab. In the ER today and some mild tremors.    CT of the abdomen was noted for low pancreatic calcification. He had elevated lipase and amylase compared to last admission. He was admitted and asked to detox. His enzymes corrected rapidly and he remained on the CIWA protocol. He was caught with percocet in his sock. Unclear if this was medicine that was given to him here that he \"pocketed\" or if this was something he brought from outside. During his last stay he pulled something similar with benzodiazepines.    He meds were stopped by the night NP and this morning the patient signed out [place]  He is a known drug seeker.           Discharge Medications:   Discharge Medication List as of 2019  9:59 AM        Discharge Medication List as of 2019  9:59 AM Discharge Medication List as of 4/14/2019  9:59 AM      CONTINUE these medications which have NOT CHANGED    Details   dicyclomine (BENTYL) 10 MG capsule Take 1 capsule by mouth 2 times daily as needed (Pain), Disp-10 capsule, R-0Print      ondansetron (ZOFRAN ODT) 4 MG disintegrating tablet Take 1 tablet by mouth every 12 hours as needed for Nausea May Sub regular tablet (non-ODT) if insurance does not cover ODT., Disp-6 tablet, R-0Print      omeprazole (PRILOSEC) 40 MG delayed release capsule Take 40 mg by mouth dailyHistorical Med      ARIPiprazole (ABILIFY) 10 MG tablet Take 10 mg by mouth dailyHistorical Med      benztropine (COGENTIN) 0.5 MG tablet Take 0.5 mg by mouth 2 times dailyHistorical Med           Discharge Medication List as of 4/14/2019  9:59 AM          Discharge ROS:  A complete review of systems was asked and negative except for wanting pain medications. Discharge Exam:    /71 Comment: pt anxious   Pulse 56   Temp 97.8 °F (36.6 °C) (Oral)   Resp 14   Ht 5' 8\" (1.727 m)   Wt 151 lb 11.2 oz (68.8 kg)   SpO2 96%   BMI 23.07 kg/m²   He left AMA and I was not able to examine him. Labs:  For convenience and continuity at follow-up the following most recent labs are provided:    Lab Results   Component Value Date    WBC 7.4 04/13/2019    HGB 13.8 04/13/2019    HCT 39.4 04/13/2019    MCV 91.1 04/13/2019     04/13/2019     04/14/2019    K 3.8 04/14/2019     04/14/2019    CO2 25 04/14/2019    BUN 7 04/14/2019    CREATININE 0.7 04/14/2019    CALCIUM 8.6 04/14/2019    PHOS 3.4 04/05/2019    ALKPHOS 49 04/14/2019    ALT 18 04/14/2019    AST 30 04/14/2019    BILITOT 0.6 04/14/2019    BILIDIR <0.2 04/12/2019    LABALBU 3.8 04/14/2019    LDLCALC 30 03/17/2018    TRIG 110 02/08/2019     Lab Results   Component Value Date    INR 1.10 03/31/2019    INR 1.11 03/30/2019    INR 1.10 03/27/2019       Radiology:  Ct Abdomen Pelvis Wo Contrast Additional Contrast? None    Result pancreatitis. No focal peripancreatic fluid collections are identified. Ct Abdomen Pelvis W Iv Contrast Additional Contrast? None    Result Date: 3/26/2019  EXAMINATION: CT OF THE ABDOMEN AND PELVIS WITH CONTRAST, 3/25/2019 3:59 pm TECHNIQUE: CT of the abdomen and pelvis was performed with the administration of intravenous contrast. Multiplanar reformatted images are provided for review. Dose modulation, iterative reconstruction, and/or weight based adjustment of the mA/kV was utilized to reduce the radiation dose to as low as reasonably achievable. COMPARISON: 02/07/2019, 10/26/2013 HISTORY: ORDERING SYSTEM PROVIDED HISTORY: Abdominal pain TECHNOLOGIST PROVIDED HISTORY: Additional Contrast?->None FINDINGS: Lower Chest: There is minimal bibasilar atelectasis. Organs: There are findings consistent with chronic calcific pancreatitis, including multiple pancreatic parenchymal calcifications as well as chronic pancreatic ductal dilatation. There is somewhat nodular low-attenuation in the region of the pancreatic head, which is similar to prior studies and likely reflects the sequela of prior pancreatitis with partial chronic necrosis. There is no acute inflammatory change of the pancreas. No peripancreatic fluid collection or inflammation is seen. The liver, spleen, gallbladder, adrenal glands, and kidneys are stable and unremarkable. GI/Bowel: Evaluation of the hollow GI tract demonstrates no evidence of abnormal bowel wall thickening, dilatation, or obstruction. The appendix is normal. Pelvis: The urinary bladder and prostate are unremarkable. There is no free pelvic fluid or pathologic pelvic lymphadenopathy. Peritoneum/Retroperitoneum: No intraperitoneal free air or free fluid is identified. No pathologic lymphadenopathy is seen. The abdominal aorta is unremarkable. No significant abdominal hernia is identified. Bones/Soft Tissues: No acute findings.      1. No acute process within the abdomen or

## 2019-04-14 NOTE — FLOWSHEET NOTE
NP called RN & notified of finding percocet in pt's sock. NP reports that pt's chart was flagged and that he was not to receive narcotics. NP reports to have room searched per security, place camera in room for patient safety, & to notify pt that [address] have been discontinued. Security called and coming to do a room search. Camera in room now & d/w pt with Candie Taylor at bedside. Security here now x2 and performed room search with charge RN. No medications or illegal substances found. Will continue to monitor.

## 2019-04-14 NOTE — ED NOTES
Security brought pt belongings from storage - transferred to transport crew. Report to transport crew - pt transferred at this time.       Nabil Mcmanus RN  04/14/19 2301

## 2019-04-15 VITALS
OXYGEN SATURATION: 96 % | BODY MASS INDEX: 21.98 KG/M2 | WEIGHT: 145 LBS | HEART RATE: 78 BPM | RESPIRATION RATE: 16 BRPM | SYSTOLIC BLOOD PRESSURE: 116 MMHG | DIASTOLIC BLOOD PRESSURE: 81 MMHG | HEIGHT: 68 IN | TEMPERATURE: 97.6 F

## 2019-04-15 LAB
EKG ATRIAL RATE: 72 BPM
EKG DIAGNOSIS: NORMAL
EKG P AXIS: 67 DEGREES
EKG P-R INTERVAL: 182 MS
EKG Q-T INTERVAL: 376 MS
EKG QRS DURATION: 92 MS
EKG QTC CALCULATION (BAZETT): 411 MS
EKG R AXIS: 78 DEGREES
EKG T AXIS: 46 DEGREES
EKG VENTRICULAR RATE: 72 BPM

## 2019-04-15 PROCEDURE — 6370000000 HC RX 637 (ALT 250 FOR IP): Performed by: EMERGENCY MEDICINE

## 2019-04-15 PROCEDURE — 93010 ELECTROCARDIOGRAM REPORT: CPT | Performed by: INTERNAL MEDICINE

## 2019-04-15 RX ORDER — ONDANSETRON 4 MG/1
4 TABLET, ORALLY DISINTEGRATING ORAL ONCE
Status: COMPLETED | OUTPATIENT
Start: 2019-04-15 | End: 2019-04-15

## 2019-04-15 RX ORDER — ACETAMINOPHEN 500 MG
1000 TABLET ORAL ONCE
Status: COMPLETED | OUTPATIENT
Start: 2019-04-15 | End: 2019-04-15

## 2019-04-15 RX ADMIN — ONDANSETRON 4 MG: 4 TABLET, ORALLY DISINTEGRATING ORAL at 01:13

## 2019-04-15 RX ADMIN — ACETAMINOPHEN 1000 MG: 500 TABLET ORAL at 01:13

## 2019-04-15 NOTE — ED NOTES
Level of Care Disposition: Discharge    Patient was seen by ED provider and [facility] AN AFFILI[facility] OF [facility] staff. The case was presented to psychiatric provider on-call, Dr. Piotr Echols. Based on the ED evaluation and information presented to the provider by writer, the decision was made to discharge patient with the following referrals: Outpatient referrals. RATIONALE FOR NON-ADMISSION:  The patient does not meet criteria for an involuntary psychiatric admission because he is not homicidal nor suicidal.  Patient has demonstrated a reasonable safety plan to follow up with [facility].               Ewa James RN  04/14/19 7327

## 2019-04-15 NOTE — ED NOTES
Presenting Problem: \"I want to stop drinking. \". Appearance/Hygiene:  hospital attire, in chair, poor grooming and poor hygiene   Motor Behavior: WNL   Attitude: cooperative  Affect: normal affect   Speech: normal pitch and normal volume  Mood: within normal limits  Thought Processes: Goal directed  Perceptions: Absent   Thought content: States that he is unsure why he was sent to this hospital as he had told the staff at [place] he was seeking treatment for his alcohol addiction. Denies SI, HI. States that he at times becomes anxious in regards to his drinking, but he has no thoughts of harming self. Suicidal ideation:  no specific plan to harm self   Homicidal ideation:  none  Orientation: A&Ox4 Able to state needs, make requests, express details of events. Memory: intact  Concentration: Good    Insight/ judgement: impaired insight, impaired judgement      Psychosocial and contextual factors: Currently lives with parents, however, he is in the process of moving in with a friend. States he works full time as a . Has 2 sisters that he does not have contact with. States his parents, \"fuss\" at him routinely due to his alcoholism. States father is a recovering alcoholic and so he catches the most, \"heat\" from his father. C-SSRS Summary (including current and past suicidal ideation, plan, intent, and attempts) : Denies SI    Psychiatric History: States he has had multiple inpatient stays for depression. Patient reported diagnosis Depression. Outpatient services/ Provider: None    Previous Inpatient Admissions( including location and dates if known): Has been in [facility] in the past.  He is unable to state how many times or when.     Self-injurious/ Self-harm behavior: Denies    History of violence: Denies    Current Substance use: Alcohol    Trauma identified: Denies    Access to Firearms: Denies    ASSESSMENT FOR IMMINENT FUTURE DANGER:      RISK FACTORS:    []  Age <25 or >49   [x]  Male

## 2019-04-15 NOTE — ED NOTES
When attempting to discharge patient, patient states \"Arent they going to give me something for my withdrawals? \" pt. Informed that his alcohol assessment did not score high enough to get medication. Pt. States \"Well you must have missed something then\". Patient informed that writer did not miss anything. Patient informed that his subjective assessment that he answers does not necessarily score patient high enough on the withdrawal scale. Patient informed that he scores nothing for his vitals signs because they are within normal limits. Patient informed that he is not sweating, and not vomiting and has no tremors. Pt. States \"I just threw up in this bag\". Pt. Informed that writer visualized patient spitting into emesis bag however no emesis was present. Patient states \"Well I have to go to work tomorrow and I don't want to feel like shit\". Pt. Informed that he can take more tylenol for his pain and writer would ask the physician for hydroxyzine and Zofran for the patient to go home with. Patient states \"Hydroxizine doesn't work. \" Writer offered patient to speak with provider about other non-narcotic anxiety medications. Patient states \"I don't feel comfortable taking stuff I haven't taken before\". Patient requesting Ativan at this time. Patient informed that he has a long history of substance abuse problems and the provider will not prescribe him Ativan nor does the patient score high enough on the alcohol withdrawal assessment to get medication here. Patient states to writer again \"Well you missed something then. \" patient informed that writer did not miss anything and he has been assessed by multiple nurses today for withdrawal and scored 0 during his visit a few hours prior. Patient states \"She didn't do it, she didn't even ask me questions! \" patient informed that writer was not present for the patients assessment and can not speak to that.  However, patient is not in acute alcohol withdrawal and will not be

## 2019-04-15 NOTE — ED PROVIDER NOTES
Emergency Physician Note    Chief Complaint  Abdominal Pain (with N/V x 1 day Last drink was yesterday )       History of Present Illness  Aruna Espinoza is a 33 y.o. male who presents to the ED for chronic abdominal pain and concerns about acute alcohol withdrawal.  Patient was admitted at [facility] 2 days ago, yesterday he signed out against medical advice. Patient was admitted for acute alcohol intoxication and alcohol withdrawal.  2 hours later the patient return to the ER for the same chronic pain that he's been dealing with for the past several months. He states the pain is left upper quadrant and radiates around his left back and up to his chest.  Patient had a full workup for the abdominal pain including labs and a CAT scan during his admission. He states the pain has not changed since his admission. When the patient was in the ER after leaving AMA when he was informed that he was given a be discharged home patient expressed suicidal ideation and therefore ended up being transferred to [address] [place] [address] for psychiatric evaluation. Just an hour and a half prior to checking himself in for this current visit patient was discharged from the [address] after complete psychological evaluation and was found to not warrant inpatient admission. he reports that he does drink daily and he understands that the drinking is what causing his chronic abdominal pain due to chronic pancreatitis. Denies fever, chills, malaise, chest pain, shortness of breath, cough, diarrhea, headache, sore throat, dysuria, back pain, rash. No palliative/provocative factors. Positives and pertinent negatives as per HPI. All other of the 10 systems were reviewed and are negative. I have reviewed the following from the nursing documentation:      Prior to Admission medications    Medication Sig Start Date End Date Taking?  Authorizing Provider   ARIPiprazole (ABILIFY) 10 MG tablet Take 10 mg by mouth daily   Yes Historical Provider, MD   benztropine (COGENTIN) 0.5 MG tablet Take 0.5 mg by mouth 2 times daily   Yes Historical Provider, MD       Allergies as of 04/14/2019 - Review Complete 04/14/2019   Allergen Reaction Noted    Amoxicillin Hives 04/15/2013    Haldol [haloperidol lactate] Other (See Comments) 08/16/2016    Phenergan [promethazine hcl] Other (See Comments) 06/04/2015    Glucosamine Itching 08/26/2017    Shellfish-derived products         Past Medical History:   Diagnosis Date    Alcohol abuse     PATIENT HAS EXTREME MANIPULITIVE & DRUG SEEKING BEHAVIOR. HE POCKETS HIS BENZODIZAPINES.  Anxiety     Drug-seeking behavior     Several times found pocketing medications given in hospital    Herpes genitalis in men     Manipulative behavior     Pancreatitis     Pneumonia     Portal vein thrombosis     pt denied    Seizures (HCC)     PER PATIENT ONLY.  DURING MULTIPLE HOSPITALIZATIONS HE HAS NEVER ONCE    Tobacco abuse         Surgical History:   Past Surgical History:   Procedure Laterality Date    ENDOSCOPY, COLON, DIAGNOSTIC  10/28/2013    Endoscopic retrograde cholangiopancreatography with pancreatic stent placement    ENDOSCOPY, COLON, DIAGNOSTIC  03/23/2018    Esophagogastroduodenoscopy with biopsy    ERCP  1/10/14    with stent removal        Family History:    Family History   Problem Relation Age of Onset    Hypertension Father     High Blood Pressure Father     Alcohol Abuse Father     Depression Mother     High Blood Pressure Paternal Grandfather     Alcohol Abuse Paternal Grandfather     Heart Disease Paternal Grandmother     Anemia Paternal Grandmother     Depression Maternal Aunt     Alcohol Abuse Paternal Aunt     Alcohol Abuse Paternal Uncle        Social History     Socioeconomic History    Marital status: Single     Spouse name: Not on file    Number of children: 1    Years of education: 12    Highest education level: Not on file   Occupational History    Occupation:    Social Needs    Financial resource strain: Not on file    Food insecurity:     Worry: Not on file     Inability: Not on file    Transportation needs:     Medical: Not on file     Non-medical: Not on file   Tobacco Use    Smoking status: Current Every Day Smoker     Packs/day: 0.50     Years: 10.00     Pack years: 5.00     Types: Cigarettes     Start date: 11/21/2007    Smokeless tobacco: Never Used   Substance and Sexual Activity    Alcohol use: Yes     Alcohol/week: 3.6 oz     Types: 6 Cans of beer per week     Comment: every day    Drug use: No    Sexual activity: Never   Lifestyle    Physical activity:     Days per week: Not on file     Minutes per session: Not on file    Stress: Not on file   Relationships    Social connections:     Talks on phone: Not on file     Gets together: Not on file     Attends Restorationism service: Not on file     Active member of club or organization: Not on file     Attends meetings of clubs or organizations: Not on file     Relationship status: Not on file    Intimate partner violence:     Fear of current or ex partner: Not on file     Emotionally abused: Not on file     Physically abused: Not on file     Forced sexual activity: Not on file   Other Topics Concern    Not on file   Social History Narrative    Not on file       Nursing notes reviewed. ED Triage Vitals [04/14/19 2232]   Enc Vitals Group      /84      Pulse 97      Resp 15      Temp 96.8 °F (36 °C)      Temp Source Oral      SpO2 97 %      Weight 145 lb (65.8 kg)      Height 5' 8\" (1.727 m)      Head Circumference       Peak Flow       Pain Score       Pain Loc       Pain Edu? Excl. in [address]? GENERAL:  Awake, alert. Well developed, well nourished with no apparent distress. HENT:  Normocephalic, Atraumatic, no lacerations.   Tongue extension did not produce tremors  EYES:  Conjunctiva normal, Pupils equal round and reactive to light, extraocular movements normal.  NECK:  Trachea is midline. No stridor. CHEST:  Regular rate and regular rhythm, no murmurs/rubs/gallops, normal S1/S2, chest wall non-tender. LUNGS:  No respiratory distress. No abdominal retractions, no sternal retractions. Clear to auscultation bilaterally, no wheezing, no rhochi, no rales  ABDOMEN:  Soft, non-tender, non-distended. No guarding and no rebound. No costovertebral angle tenderness to palpation. Normal BS, no organomegaly, no abdominal masses  EXTREMITIES:  Normal range of motion, no edema, no tenderness, no deformity, distal pulses present and equal bilaterally. Moves extremities x4 with purpose. SKIN: Warm, dry and intact. NEUROLOGIC: Normal mental status. Moving all extremities to command. Alert and oriented x4 without focal deficit or gross sensory deficit. Normal speech. Strength 5/5 in bilateral upper and lower extremities. PSYCHIATRIC: anxious, normal mood and affect, thoughts are linear and organized, without delusions/hallucinations, responds appropriately to questions    MARINA Fitzgerald was in the room with me during my entire history and physical exam of the patient. LABS and DIAGNOSTIC RESULTS    MEDICAL DECISION MAKING    Procedures/interventions/images ordered for this visit  No orders of the defined types were placed in this encounter. Medications ordered for this visit  Orders Placed This Encounter   Medications    DISCONTD: 0.9 % sodium chloride bolus    DISCONTD: famotidine (PEPCID) injection 20 mg    DISCONTD: ondansetron (ZOFRAN) injection 4 mg    ondansetron (ZOFRAN-ODT) disintegrating tablet 4 mg    acetaminophen (TYLENOL) tablet 1,000 mg       Given that the patient thought he had multiple workups for the same abdominal pain over the last several days and the patient has had close to 15 and admissions for his acute alcohol withdrawal symptoms but had never reported to be in delirium tremens and never reported to have a seizure.   I feel the

## 2019-04-15 NOTE — ED PROVIDER NOTES
[address] ED  eMERGENCY dEPARTMENT eNCOUnter        Pt Name: Robbin Baeza  MRN: [id number]  [place] 1990  Dateof evaluation: 4/14/2019  Provider: ARTURO Enriquez CNP  PCP: No primary care provider on file. ED Attending: No att. providers found    [facility]       Chief Complaint   Patient presents with   [address] Problem     wants to stop drinking, awaiting rehab, claimed when seen at [facility] he would kill himself if he was discharged home per hold       HISTORY OF PRESENTILLNESS   (Location/Symptom, Timing/Onset, Context/Setting, Quality, Duration, Modifying Factors, Severity)  Note limiting factors. Robbin Baeza is a 33 y.o. male for assistance in stopping drinking. Onset was today. Duration has been since the onset. Context includes pt states he would like help in stopping to drink. He denies any si/hi. Alleviating factors include nothing. Aggravating factors include nothing. Pain is 0/10. nothing has been used for pain today. Pt was transferred here for another facility to be evaluated for alcohol addiction. He reports he drinks 20-30 beers a day. His last drink was yesterday. Nursing Notes were all reviewed and agreed with or any disagreements were addressed  in the HPI. REVIEW OF SYSTEMS    (2-9 systems for level 4, 10 or more for level 5)     Review of Systems   Constitutional: Negative for fever. Needs help to stop drinking alcohol   Respiratory: Negative for shortness of breath. Cardiovascular: Negative for chest pain. Gastrointestinal: Negative for abdominal pain. Genitourinary: Negative for difficulty urinating. Psychiatric/Behavioral: Negative for suicidal ideas. All other systems reviewed and are negative. Positives and Pertinent negatives as per HPI. Except as noted above in the ROS, all other systems were reviewed and negative.        PAST MEDICAL HISTORY     Past Medical History:   Diagnosis Date    Alcohol  Financial resource strain: None    Food insecurity:     Worry: None     Inability: None    Transportation needs:     Medical: None     Non-medical: None   Tobacco Use    Smoking status: Current Every Day Smoker     Packs/day: 0.50     Years: 10.00     Pack years: 5.00     Types: Cigarettes     Start date: 11/21/2007    Smokeless tobacco: Never Used   Substance and Sexual Activity    Alcohol use: Yes     Alcohol/week: 3.6 oz     Types: 6 Cans of beer per week     Comment: every day    Drug use: No    Sexual activity: Never   Lifestyle    Physical activity:     Days per week: None     Minutes per session: None    Stress: None   Relationships    Social connections:     Talks on phone: None     Gets together: None     Attends Anabaptism service: None     Active member of club or organization: None     Attends meetings of clubs or organizations: None     Relationship status: None    Intimate partner violence:     Fear of current or ex partner: None     Emotionally abused: None     Physically abused: None     Forced sexual activity: None   Other Topics Concern    None   Social History Narrative    None       SCREENINGS             PHYSICAL EXAM  (up to 7 for level 4, 8 or more for level 5)     ED Triage Vitals   BP Temp Temp Source Pulse Resp SpO2 Height Weight   -- 04/14/19 1953 04/14/19 1953 04/14/19 1953 -- 04/14/19 1953 04/14/19 2013 04/14/19 2013    98.3 °F (36.8 °C) Oral 79  97 % 5' 8\" (1.727 m) 140 lb (63.5 kg)       Physical Exam   Constitutional: He is oriented to person, place, and time. He appears well-developed and well-nourished. HENT:   Head: Normocephalic and atraumatic. Neck: Normal range of motion. Cardiovascular: Normal rate. Pulmonary/Chest: Effort normal. No respiratory distress. Abdominal: Soft. He exhibits no distension. Musculoskeletal: Normal range of motion. Neurological: He is alert and oriented to person, place, and time. Skin: Skin is warm and dry.    Psychiatric:

## 2019-04-15 NOTE — ED NOTES
Pt initially brought to room 5 and charge nurse to bedside, pt was just released and is wanting re-evaluation states he is having alcohol withdrawal.  Is holding an emesis bag. Has not been observed actively vomiting. Charge nurse attempted to discuss needs with patient. MD aware of situation.      Sergio Edwards RN  04/15/19 9134 [facility] Heavenly Zimmerman RN  04/15/19 9862

## 2019-04-15 NOTE — ED PROVIDER NOTES
I independently evaluated and obtained a history and physical on [facility]. All diagnostic, treatment, and disposition assistants were made to myself in conjunction the advanced practice provider. For further details of this patient's emergency department encounter, please see the advanced practice provider's documentation. History: Pt presents with etoh abuse. Told docs at [facility] that he was going to kill himself if he wasn't admitted. Physician Exam: Pt pacing, anxious, not endorsing SI/HI right now    MDM: Discussed medical decision making with RANDA and agree with plan. Please see their note for further detail.          Syed De Los Santos MD  04/14/19 2484

## 2019-04-19 ENCOUNTER — HOSPITAL ENCOUNTER (EMERGENCY)
Age: 29
Discharge: HOME | End: 2019-04-19
Attending: EMERGENCY MEDICINE
Payer: COMMERCIAL

## 2019-04-19 VITALS
WEIGHT: 146.83 LBS | TEMPERATURE: 98.2 F | HEART RATE: 78 BPM | SYSTOLIC BLOOD PRESSURE: 118 MMHG | RESPIRATION RATE: 16 BRPM | OXYGEN SATURATION: 99 % | HEIGHT: 68 IN | BODY MASS INDEX: 22.25 KG/M2 | DIASTOLIC BLOOD PRESSURE: 72 MMHG

## 2019-04-19 DIAGNOSIS — K29.20 ACUTE ALCOHOLIC GASTRITIS WITHOUT HEMORRHAGE: Primary | ICD-10-CM

## 2019-04-19 DIAGNOSIS — R10.13 ABDOMINAL PAIN, EPIGASTRIC: ICD-10-CM

## 2019-04-19 LAB
A/G RATIO: 1.3 (ref 1.1–2.2)
ALBUMIN SERPL-MCNC: 4.8 G/DL (ref 3.4–5)
ALP BLD-CCNC: 61 U/L (ref 40–129)
ALT SERPL-CCNC: 14 U/L (ref 10–40)
ANION GAP SERPL CALCULATED.3IONS-SCNC: 13 MMOL/L (ref 3–16)
AST SERPL-CCNC: 18 U/L (ref 15–37)
BASOPHILS ABSOLUTE: 0 K/UL (ref 0–0.2)
BASOPHILS RELATIVE PERCENT: 0.3 %
BILIRUB SERPL-MCNC: 0.7 MG/DL (ref 0–1)
BILIRUBIN URINE: NEGATIVE
BLOOD, URINE: NEGATIVE
BUN BLDV-MCNC: 8 MG/DL (ref 7–20)
CALCIUM SERPL-MCNC: 9.8 MG/DL (ref 8.3–10.6)
CHLORIDE BLD-SCNC: 102 MMOL/L (ref 99–110)
CLARITY: CLEAR
CO2: 22 MMOL/L (ref 21–32)
COLOR: YELLOW
CREAT SERPL-MCNC: 0.7 MG/DL (ref 0.9–1.3)
EOSINOPHILS ABSOLUTE: 0.5 K/UL (ref 0–0.6)
EOSINOPHILS RELATIVE PERCENT: 5.8 %
GFR AFRICAN AMERICAN: >60
GFR NON-AFRICAN AMERICAN: >60
GLOBULIN: 3.6 G/DL
GLUCOSE BLD-MCNC: 96 MG/DL (ref 70–99)
GLUCOSE URINE: NEGATIVE MG/DL
HCT VFR BLD CALC: 40.5 % (ref 40.5–52.5)
HEMOGLOBIN: 14.4 G/DL (ref 13.5–17.5)
KETONES, URINE: NEGATIVE MG/DL
LEUKOCYTE ESTERASE, URINE: NEGATIVE
LIPASE: 14 U/L (ref 13–60)
LYMPHOCYTES ABSOLUTE: 0.8 K/UL (ref 1–5.1)
LYMPHOCYTES RELATIVE PERCENT: 9.8 %
MCH RBC QN AUTO: 31.7 PG (ref 26–34)
MCHC RBC AUTO-ENTMCNC: 35.5 G/DL (ref 31–36)
MCV RBC AUTO: 89.4 FL (ref 80–100)
MICROSCOPIC EXAMINATION: NORMAL
MONOCYTES ABSOLUTE: 0.4 K/UL (ref 0–1.3)
MONOCYTES RELATIVE PERCENT: 5.3 %
NEUTROPHILS ABSOLUTE: 6.6 K/UL (ref 1.7–7.7)
NEUTROPHILS RELATIVE PERCENT: 78.8 %
NITRITE, URINE: NEGATIVE
PDW BLD-RTO: 12 % (ref 12.4–15.4)
PH UA: 7.5 (ref 5–8)
PLATELET # BLD: 233 K/UL (ref 135–450)
PMV BLD AUTO: 8.2 FL (ref 5–10.5)
POTASSIUM SERPL-SCNC: 4 MMOL/L (ref 3.5–5.1)
PROTEIN UA: NEGATIVE MG/DL
RBC # BLD: 4.53 M/UL (ref 4.2–5.9)
SODIUM BLD-SCNC: 137 MMOL/L (ref 136–145)
SPECIFIC GRAVITY UA: 1.01 (ref 1–1.03)
TOTAL PROTEIN: 8.4 G/DL (ref 6.4–8.2)
URINE REFLEX TO CULTURE: NORMAL
URINE TYPE: NORMAL
UROBILINOGEN, URINE: 0.2 E.U./DL
WBC # BLD: 8.4 K/UL (ref 4–11)

## 2019-04-19 PROCEDURE — 6370000000 HC RX 637 (ALT 250 FOR IP): Performed by: EMERGENCY MEDICINE

## 2019-04-19 PROCEDURE — 85025 COMPLETE CBC W/AUTO DIFF WBC: CPT

## 2019-04-19 PROCEDURE — 99284 EMERGENCY DEPT VISIT MOD MDM: CPT

## 2019-04-19 PROCEDURE — 83690 ASSAY OF LIPASE: CPT

## 2019-04-19 PROCEDURE — 80053 COMPREHEN METABOLIC PANEL: CPT

## 2019-04-19 PROCEDURE — 81003 URINALYSIS AUTO W/O SCOPE: CPT

## 2019-04-19 RX ORDER — ONDANSETRON 4 MG/1
4 TABLET, ORALLY DISINTEGRATING ORAL 3 TIMES DAILY PRN
Qty: 21 TABLET | Refills: 0 | Status: SHIPPED | OUTPATIENT
Start: 2019-04-19 | End: 2019-04-20 | Stop reason: ALTCHOICE

## 2019-04-19 RX ORDER — 0.9 % SODIUM CHLORIDE 0.9 %
1000 INTRAVENOUS SOLUTION INTRAVENOUS ONCE
Status: DISCONTINUED | OUTPATIENT
Start: 2019-04-19 | End: 2019-04-19

## 2019-04-19 RX ORDER — FAMOTIDINE 40 MG/1
40 TABLET, FILM COATED ORAL DAILY
Qty: 30 TABLET | Refills: 0 | Status: SHIPPED | OUTPATIENT
Start: 2019-04-19 | End: 2019-04-20 | Stop reason: ALTCHOICE

## 2019-04-19 RX ORDER — METOCLOPRAMIDE HYDROCHLORIDE 5 MG/ML
10 INJECTION INTRAMUSCULAR; INTRAVENOUS ONCE
Status: DISCONTINUED | OUTPATIENT
Start: 2019-04-19 | End: 2019-04-19

## 2019-04-19 RX ORDER — ONDANSETRON 4 MG/1
8 TABLET, ORALLY DISINTEGRATING ORAL ONCE
Status: COMPLETED | OUTPATIENT
Start: 2019-04-19 | End: 2019-04-19

## 2019-04-19 RX ADMIN — ONDANSETRON 8 MG: 4 TABLET, ORALLY DISINTEGRATING ORAL at 18:28

## 2019-04-19 ASSESSMENT — PAIN DESCRIPTION - DESCRIPTORS
DESCRIPTORS: SHARP;DULL
DESCRIPTORS: BURNING;THROBBING;SHARP

## 2019-04-19 ASSESSMENT — PAIN DESCRIPTION - LOCATION
LOCATION: ABDOMEN

## 2019-04-19 ASSESSMENT — PAIN DESCRIPTION - PAIN TYPE
TYPE: ACUTE PAIN

## 2019-04-19 ASSESSMENT — PAIN SCALES - GENERAL
PAINLEVEL_OUTOF10: 9
PAINLEVEL_OUTOF10: 8
PAINLEVEL_OUTOF10: 8

## 2019-04-19 ASSESSMENT — PAIN DESCRIPTION - FREQUENCY
FREQUENCY: CONTINUOUS

## 2019-04-19 ASSESSMENT — PAIN DESCRIPTION - ORIENTATION
ORIENTATION: UPPER
ORIENTATION: MID;UPPER

## 2019-04-19 NOTE — ED NOTES
IV attempted x 3. Patient states he does not want an IV. Patient states he would like zofran and then would like to go home. Dr Chris Hill notified.      Geovanna Jose, RN  04/19/19 30 [facility], MARINA  04/19/19 6123

## 2019-04-19 NOTE — ED PROVIDER NOTES
[address] [facility]      Pt Name: Amilcar Garcia  MRN: [id number]  [place] 1990  Date of evaluation: 4/19/2019  Provider: Dariela Sen, [address] [place] [address]  Chief Complaint   Patient presents with    Abdominal Pain     epigastric abd pain radiating to back, emesis x5 in 24 hrs        HPI  Amilcar Garcia is a 33 y.o. male who presents with epigastric abdominal pain is present for 24-48 hours. Patient into multiple ERs for similar complaints. However, he states he stopped drinking alcohol but relapsed a couple days ago. He then started with epigastric abdominal pain is described as pancreatitis. However, on review of his charts he's been to multiple ED's on multiple occasions since January for similar complaints. He states he currently is binging off and on. He denies any other complaints at this time. He has been nauseated and vomiting however, there was no vomitus in his emesis bag. He describes his pain as severe. REVIEW OF SYSTEMS    All systems negative except as noted in the HPI. Reviewed Nurses' notes and concur. No LMP for male patient. PAST MEDICAL HISTORY  Past Medical History:   Diagnosis Date    Alcohol abuse     PATIENT HAS EXTREME MANIPULITIVE & DRUG SEEKING BEHAVIOR. HE POCKETS HIS BENZODIZAPINES.  Anxiety     Drug-seeking behavior     Several times found pocketing medications given in hospital    Herpes genitalis in men     Manipulative behavior     Pancreatitis     Pneumonia     Portal vein thrombosis     pt denied    Seizures (HCC)     PER PATIENT ONLY.  DURING MULTIPLE HOSPITALIZATIONS HE HAS NEVER ONCE    Tobacco abuse        FAMILY HISTORY  Family History   Problem Relation Age of Onset    Hypertension Father     High Blood Pressure Father     Alcohol Abuse Father     Depression Mother     High Blood Pressure Paternal Grandfather     Alcohol Abuse Paternal Grandfather     Heart Disease Paternal Grandmother     Anemia Paternal Grandmother     Depression Maternal Aunt     Alcohol Abuse Paternal Aunt     Alcohol Abuse Paternal Uncle        SOCIAL HISTORY   reports that he has been smoking cigarettes. He started smoking about 11 years ago. He has a 5.00 pack-year smoking history. He has never used smokeless tobacco. He reports that he drinks about 3.6 oz of alcohol per week. He reports that he does not use drugs. SURGICAL HISTORY  Past Surgical History:   Procedure Laterality Date    ENDOSCOPY, COLON, DIAGNOSTIC  10/28/2013    Endoscopic retrograde cholangiopancreatography with pancreatic stent placement    ENDOSCOPY, COLON, DIAGNOSTIC  03/23/2018    Esophagogastroduodenoscopy with biopsy    ERCP  1/10/14    with stent removal       CURRENT MEDICATIONS  Current Outpatient Rx   Medication Sig Dispense Refill    ARIPiprazole (ABILIFY) 10 MG tablet Take 10 mg by mouth daily      benztropine (COGENTIN) 0.5 MG tablet Take 0.5 mg by mouth 2 times daily      sucralfate (CARAFATE) 1 GM tablet Take 1 tablet by mouth 4 times daily for 10 days 40 tablet 0       ALLERGIES  Allergies   Allergen Reactions    Amoxicillin Hives    Haldol [Haloperidol Lactate] Other (See Comments)     Muscle spasms    Phenergan [Promethazine Hcl] Other (See Comments)     Pt believes it caused muscle spasms    Glucosamine Itching      Itching of throat when eating shrimp. Pt states has had IV contrast for CT's without problem before.  Shellfish-Derived Products      Patient gets anxious around seafood         PHYSICAL EXAM  VITAL SIGNS: /72   Pulse 78   Temp 98.2 °F (36.8 °C) (Oral)   Resp 16   Ht 5' 8\" (1.727 m)   Wt 146 lb 13.2 oz (66.6 kg)   SpO2 99%   BMI 22.32 kg/m²   Constitutional: Well-developed, well-nourished, appears in mild pain, doubled up on the cart, nontoxic, activity: Non-ill-appearing.   HEENT: Normocephalic, Atraumatic, Bilateral ears are normal, Oropharynx moist, No oral exudates, Nose 118/72   Pulse:  98 78   Resp:  18 16   Temp:  98.1 °F (36.7 °C) 98.2 °F (36.8 °C)   TempSrc:  Oral Oral   SpO2:  96% 99%   Weight: 146 lb 13.2 oz (66.6 kg)     Height: 5' 8\" (1.727 m)         [unfilled]    Medications   ondansetron (ZOFRAN-ODT) disintegrating tablet 8 mg (8 mg Oral Given 4/19/19 1828)       Discharge Medication List as of 4/19/2019  6:32 PM      START taking these medications    Details   ondansetron (ZOFRAN-ODT) 4 MG disintegrating tablet Take 1 tablet by mouth 3 times daily as needed for Nausea or Vomiting, Disp-21 tablet, R-0Print      famotidine (PEPCID) 40 MG tablet Take 1 tablet by mouth daily, Disp-30 tablet, R-0Print             Patient remained stable in the ED. he was given Reglan and IV fluids in the emergency department. He did not improve with this. I am not going to give him narcotics for his abdominal pain. Patient remained stable in the ED. his lipase and liver functions were normal. The remainder of his lab work was negative. 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 On review of his records is noted that he was at . [facility] numerous permanent today has been to multiple emergency departments throughout the area on multiple occasions approximately 15 times in the past 2 months for similar complaints indicating that he lied about not drinking and just starting to drink again. He states that multiple programs. I don't feel admission is necessary at this time. Patient was given instructions to follow-up with his doctor in 3-5 days and stop drinking alcohol. He was instructed to return to the emergency Department for any problems. The patient's blood pressure was not found to be elevated according to CMS/Medicare and the Affordable Care Act/ObamaCare criteria. See discharge instructions for specific medications, discharge information, and treatments. They were verbally instructed to return to emergency if any problems.     I reviewed old records (This chart has been completed using [address]. Although attempts have been made to ensure accuracy, words and/or phrases may not be transcribed as intended.)    Patient requested pain medicines at the time of his exam.    IMPRESSION(S):  1. Acute alcoholic gastritis without hemorrhage    2. Abdominal pain, epigastric      ?   Recheck Times: [facility],   04/21/19 3137

## 2019-04-20 ENCOUNTER — HOSPITAL ENCOUNTER (EMERGENCY)
Age: 29
Discharge: HOME | End: 2019-04-20
Attending: EMERGENCY MEDICINE
Payer: COMMERCIAL

## 2019-04-20 VITALS
DIASTOLIC BLOOD PRESSURE: 75 MMHG | SYSTOLIC BLOOD PRESSURE: 118 MMHG | OXYGEN SATURATION: 98 % | BODY MASS INDEX: 22.2 KG/M2 | HEART RATE: 65 BPM | TEMPERATURE: 97.7 F | WEIGHT: 146 LBS | RESPIRATION RATE: 16 BRPM

## 2019-04-20 DIAGNOSIS — R11.15 NON-INTRACTABLE CYCLICAL VOMITING WITH NAUSEA: ICD-10-CM

## 2019-04-20 DIAGNOSIS — R10.13 ABDOMINAL PAIN, EPIGASTRIC: Primary | ICD-10-CM

## 2019-04-20 LAB
A/G RATIO: 1.4 (ref 1.1–2.2)
ALBUMIN SERPL-MCNC: 4.7 G/DL (ref 3.4–5)
ALP BLD-CCNC: 65 U/L (ref 40–129)
ALT SERPL-CCNC: 15 U/L (ref 10–40)
ANION GAP SERPL CALCULATED.3IONS-SCNC: 14 MMOL/L (ref 3–16)
AST SERPL-CCNC: 17 U/L (ref 15–37)
BASOPHILS ABSOLUTE: 0.1 K/UL (ref 0–0.2)
BASOPHILS RELATIVE PERCENT: 0.6 %
BILIRUB SERPL-MCNC: 0.6 MG/DL (ref 0–1)
BUN BLDV-MCNC: 9 MG/DL (ref 7–20)
CALCIUM SERPL-MCNC: 9.9 MG/DL (ref 8.3–10.6)
CHLORIDE BLD-SCNC: 102 MMOL/L (ref 99–110)
CO2: 22 MMOL/L (ref 21–32)
CREAT SERPL-MCNC: 0.8 MG/DL (ref 0.9–1.3)
EOSINOPHILS ABSOLUTE: 1.1 K/UL (ref 0–0.6)
EOSINOPHILS RELATIVE PERCENT: 12.7 %
GFR AFRICAN AMERICAN: >60
GFR NON-AFRICAN AMERICAN: >60
GLOBULIN: 3.4 G/DL
GLUCOSE BLD-MCNC: 96 MG/DL (ref 70–99)
HCT VFR BLD CALC: 41.1 % (ref 40.5–52.5)
HEMOGLOBIN: 14.6 G/DL (ref 13.5–17.5)
LIPASE: 15 U/L (ref 13–60)
LYMPHOCYTES ABSOLUTE: 2.1 K/UL (ref 1–5.1)
LYMPHOCYTES RELATIVE PERCENT: 23.4 %
MCH RBC QN AUTO: 31.7 PG (ref 26–34)
MCHC RBC AUTO-ENTMCNC: 35.5 G/DL (ref 31–36)
MCV RBC AUTO: 89.4 FL (ref 80–100)
MONOCYTES ABSOLUTE: 0.6 K/UL (ref 0–1.3)
MONOCYTES RELATIVE PERCENT: 6.8 %
NEUTROPHILS ABSOLUTE: 5.1 K/UL (ref 1.7–7.7)
NEUTROPHILS RELATIVE PERCENT: 56.5 %
PDW BLD-RTO: 12.1 % (ref 12.4–15.4)
PLATELET # BLD: 239 K/UL (ref 135–450)
PMV BLD AUTO: 8.5 FL (ref 5–10.5)
POTASSIUM REFLEX MAGNESIUM: 3.8 MMOL/L (ref 3.5–5.1)
RBC # BLD: 4.59 M/UL (ref 4.2–5.9)
SODIUM BLD-SCNC: 138 MMOL/L (ref 136–145)
TOTAL PROTEIN: 8.1 G/DL (ref 6.4–8.2)
WBC # BLD: 9 K/UL (ref 4–11)

## 2019-04-20 PROCEDURE — 96375 TX/PRO/DX INJ NEW DRUG ADDON: CPT

## 2019-04-20 PROCEDURE — 2580000003 HC RX 258: Performed by: NURSE PRACTITIONER

## 2019-04-20 PROCEDURE — 85025 COMPLETE CBC W/AUTO DIFF WBC: CPT

## 2019-04-20 PROCEDURE — 99284 EMERGENCY DEPT VISIT MOD MDM: CPT

## 2019-04-20 PROCEDURE — 96374 THER/PROPH/DIAG INJ IV PUSH: CPT

## 2019-04-20 PROCEDURE — 80053 COMPREHEN METABOLIC PANEL: CPT

## 2019-04-20 PROCEDURE — 96361 HYDRATE IV INFUSION ADD-ON: CPT

## 2019-04-20 PROCEDURE — 83690 ASSAY OF LIPASE: CPT

## 2019-04-20 PROCEDURE — 6360000002 HC RX W HCPCS: Performed by: NURSE PRACTITIONER

## 2019-04-20 RX ORDER — METOCLOPRAMIDE HYDROCHLORIDE 5 MG/ML
10 INJECTION INTRAMUSCULAR; INTRAVENOUS ONCE
Status: COMPLETED | OUTPATIENT
Start: 2019-04-20 | End: 2019-04-20

## 2019-04-20 RX ORDER — ONDANSETRON 2 MG/ML
4 INJECTION INTRAMUSCULAR; INTRAVENOUS ONCE
Status: COMPLETED | OUTPATIENT
Start: 2019-04-20 | End: 2019-04-20

## 2019-04-20 RX ORDER — SUCRALFATE 1 G/1
1 TABLET ORAL ONCE
Status: DISCONTINUED | OUTPATIENT
Start: 2019-04-20 | End: 2019-04-20 | Stop reason: HOSPADM

## 2019-04-20 RX ORDER — 0.9 % SODIUM CHLORIDE 0.9 %
1000 INTRAVENOUS SOLUTION INTRAVENOUS ONCE
Status: COMPLETED | OUTPATIENT
Start: 2019-04-20 | End: 2019-04-20

## 2019-04-20 RX ORDER — SUCRALFATE 1 G/1
1 TABLET ORAL 4 TIMES DAILY
Qty: 40 TABLET | Refills: 0 | Status: ON HOLD | OUTPATIENT
Start: 2019-04-20 | End: 2019-04-25 | Stop reason: SDUPTHER

## 2019-04-20 RX ORDER — DIPHENHYDRAMINE HYDROCHLORIDE 50 MG/ML
50 INJECTION INTRAMUSCULAR; INTRAVENOUS ONCE
Status: COMPLETED | OUTPATIENT
Start: 2019-04-20 | End: 2019-04-20

## 2019-04-20 RX ADMIN — ONDANSETRON 4 MG: 2 INJECTION INTRAMUSCULAR; INTRAVENOUS at 02:21

## 2019-04-20 RX ADMIN — METOCLOPRAMIDE 10 MG: 5 INJECTION, SOLUTION INTRAMUSCULAR; INTRAVENOUS at 02:24

## 2019-04-20 RX ADMIN — DIPHENHYDRAMINE HYDROCHLORIDE 50 MG: 50 INJECTION, SOLUTION INTRAMUSCULAR; INTRAVENOUS at 02:22

## 2019-04-20 RX ADMIN — SODIUM CHLORIDE 1000 ML: 9 INJECTION, SOLUTION INTRAVENOUS at 02:19

## 2019-04-20 ASSESSMENT — PAIN DESCRIPTION - DESCRIPTORS
DESCRIPTORS: SHARP
DESCRIPTORS: SHARP

## 2019-04-20 ASSESSMENT — PAIN DESCRIPTION - LOCATION
LOCATION: ABDOMEN;HEAD
LOCATION: ABDOMEN;HEAD
LOCATION: ABDOMEN
LOCATION: HEAD
LOCATION: ABDOMEN;HEAD

## 2019-04-20 ASSESSMENT — PAIN DESCRIPTION - PROGRESSION
CLINICAL_PROGRESSION: GRADUALLY WORSENING
CLINICAL_PROGRESSION: NOT CHANGED
CLINICAL_PROGRESSION: NOT CHANGED

## 2019-04-20 ASSESSMENT — PAIN SCALES - GENERAL
PAINLEVEL_OUTOF10: 9
PAINLEVEL_OUTOF10: 9
PAINLEVEL_OUTOF10: 8
PAINLEVEL_OUTOF10: 7
PAINLEVEL_OUTOF10: 8

## 2019-04-20 ASSESSMENT — PAIN DESCRIPTION - ORIENTATION
ORIENTATION: UPPER
ORIENTATION: UPPER

## 2019-04-20 ASSESSMENT — PAIN DESCRIPTION - PAIN TYPE
TYPE: ACUTE PAIN
TYPE: ACUTE PAIN;CHRONIC PAIN
TYPE: ACUTE PAIN
TYPE: ACUTE PAIN

## 2019-04-20 ASSESSMENT — PAIN - FUNCTIONAL ASSESSMENT
PAIN_FUNCTIONAL_ASSESSMENT: ACTIVITIES ARE NOT PREVENTED
PAIN_FUNCTIONAL_ASSESSMENT: 0-10
PAIN_FUNCTIONAL_ASSESSMENT: ACTIVITIES ARE NOT PREVENTED
PAIN_FUNCTIONAL_ASSESSMENT: ACTIVITIES ARE NOT PREVENTED

## 2019-04-20 ASSESSMENT — PAIN DESCRIPTION - ONSET
ONSET: ON-GOING
ONSET: ON-GOING

## 2019-04-20 ASSESSMENT — PAIN DESCRIPTION - FREQUENCY: FREQUENCY: CONTINUOUS

## 2019-04-20 NOTE — ED PROVIDER NOTES
[address]  [address] [facility] [address]  Dept: [phone]  Loc: [address] [facility]        CHIEF COMPLAINT    Chief Complaint   Patient presents with    Abdominal Pain     was just d/c from this er, states no relief        HPI    Yasmany Salinas is a 33 y.o. male who presents with epigastric pain over the last couple of days with nausea, vomiting and belching. The patient has a history of alcoholic pancreatitis. He drinks almost on a daily basis. He states his last alcohol intake was on Thursday. He was seen and evaluated in this emergency department yesterday afternoon for the same symptoms. Patient states he vomited prior to ED arrival.  He denies body aches, fevers, chills, chest pain or shortness of breath. He denies marijuana use. REVIEW OF SYSTEMS    GI: see HPI, + vomiting, denies diarrhea, denies hematochezia  Cardiac: No chest pain, shortness of breath, palpitations or syncope  Pulmonary: No difficulty breathing or new cough  General: No fevers  : No dysuria, No hematuria  See HPI for further details. All other systems reviewed and are negative. PAST MEDICAL & SURGICAL HISTORY    Past Medical History:   Diagnosis Date    Alcohol abuse     PATIENT HAS EXTREME MANIPULITIVE & DRUG SEEKING BEHAVIOR. HE POCKETS HIS BENZODIZAPINES.  Anxiety     Drug-seeking behavior     Several times found pocketing medications given in hospital    Herpes genitalis in men     Manipulative behavior     Pancreatitis     Pneumonia     Portal vein thrombosis     pt denied    Seizures (HCC)     PER PATIENT ONLY.  DURING MULTIPLE HOSPITALIZATIONS HE HAS NEVER ONCE    Tobacco abuse      Past Surgical History:   Procedure Laterality Date    ENDOSCOPY, COLON, DIAGNOSTIC  10/28/2013    Endoscopic retrograde cholangiopancreatography with pancreatic stent placement    ENDOSCOPY, COLON, DIAGNOSTIC 03/23/2018    Esophagogastroduodenoscopy with biopsy    ERCP  1/10/14    with stent removal       CURRENT MEDICATIONS  (may include discharge medications prescribed in the ED)  Current Outpatient Rx   Medication Sig Dispense Refill    sucralfate (CARAFATE) 1 GM tablet Take 1 tablet by mouth 4 times daily for 10 days 40 tablet 0    ARIPiprazole (ABILIFY) 10 MG tablet Take 10 mg by mouth daily      benztropine (COGENTIN) 0.5 MG tablet Take 0.5 mg by mouth 2 times daily         ALLERGIES    Allergies   Allergen Reactions    Amoxicillin Hives    Haldol [Haloperidol Lactate] Other (See Comments)     Muscle spasms    Phenergan [Promethazine Hcl] Other (See Comments)     Pt believes it caused muscle spasms    Glucosamine Itching      Itching of throat when eating shrimp. Pt states has had IV contrast for CT's without problem before.  Shellfish-Derived Products      Patient gets anxious around seafood       SOCIAL AND FAMILY HISTORY    Social History     Socioeconomic History    Marital status: Single     Spouse name: None    Number of children: 1    Years of education: 15    Highest education level: None   Occupational History    Occupation:    Social Needs    Financial resource strain: None    Food insecurity:     Worry: None     Inability: None    Transportation needs:     Medical: None     Non-medical: None   Tobacco Use    Smoking status: Current Every Day Smoker     Packs/day: 0.50     Years: 10.00     Pack years: 5.00     Types: Cigarettes     Start date: 11/21/2007    Smokeless tobacco: Never Used   Substance and Sexual Activity    Alcohol use:  Yes     Alcohol/week: 3.6 oz     Types: 6 Cans of beer per week     Comment: every day    Drug use: No    Sexual activity: Never   Lifestyle    Physical activity:     Days per week: None     Minutes per session: None    Stress: None   Relationships    Social connections:     Talks on phone: None     Gets together: None     Attends [employer] service: None     Active member of club or organization: None     Attends meetings of clubs or organizations: None     Relationship status: None    Intimate partner violence:     Fear of current or ex partner: None     Emotionally abused: None     Physically abused: None     Forced sexual activity: None   Other Topics Concern    None   Social History Narrative    None     Family History   Problem Relation Age of Onset    Hypertension Father     High Blood Pressure Father     Alcohol Abuse Father     Depression Mother     High Blood Pressure Paternal Grandfather     Alcohol Abuse Paternal Grandfather     Heart Disease Paternal Grandmother     Anemia Paternal Grandmother     Depression Maternal Aunt     Alcohol Abuse Paternal Aunt     Alcohol Abuse Paternal Uncle        PHYSICAL EXAM    VITAL SIGNS: /84   Pulse 67   Temp 97.7 °F (36.5 °C) (Oral)   Resp 17   Wt 146 lb (66.2 kg)   SpO2 98%   BMI 22.20 kg/m²   Constitutional:  Well developed, well nourished, no acute distress, poor hygiene with body odor  Head: Atraumatic normocephalic  Eyes:  Sclera nonicteric without injection or exudates. Conjunctiva moist.  ENT:  Atraumatic, nose normal  Neck: Supple, no JVD  Respiratory:  No retractions, no accessory muscle use, normal breath sounds   Cardiovascular:  Regular rhythm and rate, S1 and S2. No murmurs  GI: Mild tenderness over the epigastrium area. No rebound or guarding. No distention. Abdomen is soft. Normoactive bowel sounds throughout auscultation. No audible bruits or palpable pulsatile masses. No hepatosplenomegaly.    Back: No CVA flank tenderness on exam  Musculoskeletal:  No edema, no acute deformities  Integument: No rashes, skin dry  Neurologic:  Alert & oriented x4, no slurred speech  Psychiatric: Cooperative, pleasant affect     RADIOLOGY/PROCEDURES    No orders to display     Labs Reviewed   CBC WITH AUTO DIFFERENTIAL - Abnormal; Notable for the following components:       Result Value    RDW 12.1 (*)     Eosinophils # 1.1 (*)     All other components within normal limits    Narrative:     Performed at:  [facility]  [address] [place], [place] [address]   Phone [phone]   COMPREHENSIVE METABOLIC PANEL W/ REFLEX TO MG FOR LOW K - Abnormal; Notable for the following components:    CREATININE 0.8 (*)     All other components within normal limits    Narrative:     Performed at:  [facility]  [address] [place], [place] [address]   Phone [phone]   LIPASE    Narrative:     Performed at:  [facility]  [address] [place], [place] [address]   Phone [phone]   URINE RT REFLEX TO CULTURE       ED COURSE & MEDICAL DECISION MAKING    Pertinent Labs & Imaging studies reviewed and interpreted. (See chart for details)     See chart for details of medications given during the ED stay. Vitals:    04/19/19 2020 04/20/19 0143 04/20/19 0152 04/20/19 0215   BP: 126/79 (!) 122/53 123/84 123/84   Pulse: 107 82 67 67   Resp: 16 16  17   Temp: 98.6 °F (37 °C) 97.7 °F (36.5 °C)     TempSrc: Oral Oral     SpO2: 97% 100%  98%   Weight: 146 lb (66.2 kg)        Medications   0.9 % sodium chloride bolus (1,000 mLs Intravenous New Bag 4/20/19 0219)   sucralfate (CARAFATE) tablet 1 g (has no administration in time range)   ondansetron (ZOFRAN) injection 4 mg (4 mg Intravenous Given 4/20/19 0221)   metoclopramide (REGLAN) injection 10 mg (10 mg Intravenous Given 4/20/19 0224)   diphenhydrAMINE (BENADRYL) injection 50 mg (50 mg Intravenous Given 4/20/19 0222)     This patient was seen and evaluated by myself and my attending physician Dr. Diamond Disla. Differential diagnosis includes but is not limited to peptic ulcer disease, GERD, acute cholecystitis, pancreatitis, hepatitis, acute liver failure, gastritis, gastric ulcer, other.   He is nontoxic in appearance and hemodynamically stable. Ongoing epigastric pain. States he's vomited several times today. The patient was monitored in the department for over 7 hours. He did not vomit. He was taking sips of water. On chart review the patient has had multiple ED visits for the same complaints. He was recently discharged from [facility] with valium for 3 days which he should be out on April 21 however the patient states he's been out since yesterday. Patient states he is an alcoholic but is slowing down. He states his last alcohol intake was Thursday. CIWA score is 2. He has no leukocytosis or anemia on CBC. CMP is unremarkable. Lipase is normal at 15. He was given fluids, Zofran, Reglan, Benadryl and Carafate. He was encouraged to stop drinking alcohol. Rehab resources were provided to the patient. He'll be discharged with instructions to return to the emergency department for worsening symptoms. Controlled Substances Monitoring:     RX Monitoring 2/18/2019   Attestation The Prescription Monitoring Report for this patient was reviewed today. Chronic Pain Routine Monitoring No signs of potential drug abuse or diversion identified. FINAL IMPRESSION    1. Abdominal pain, epigastric    2.  Non-intractable cyclical vomiting with nausea        PLAN  Discharge with outpatient follow-up    (Please note that this note was completed with a voice recognition program.  Every attempt was made to edit the dictations, but inevitably there remain words that are mis-transcribed.)       ARTURO Kauffman - CNP  04/20/19 0257

## 2019-04-21 ENCOUNTER — HOSPITAL ENCOUNTER (EMERGENCY)
Age: 29
Discharge: HOME | End: 2019-04-21
Attending: EMERGENCY MEDICINE
Payer: COMMERCIAL

## 2019-04-21 VITALS
HEART RATE: 103 BPM | RESPIRATION RATE: 20 BRPM | OXYGEN SATURATION: 97 % | HEIGHT: 68 IN | SYSTOLIC BLOOD PRESSURE: 121 MMHG | WEIGHT: 145 LBS | BODY MASS INDEX: 21.98 KG/M2 | DIASTOLIC BLOOD PRESSURE: 81 MMHG | TEMPERATURE: 98.9 F

## 2019-04-21 DIAGNOSIS — R10.10 PAIN OF UPPER ABDOMEN: Primary | ICD-10-CM

## 2019-04-21 LAB
A/G RATIO: 1.5 (ref 1.1–2.2)
ALBUMIN SERPL-MCNC: 4.6 G/DL (ref 3.4–5)
ALP BLD-CCNC: 56 U/L (ref 40–129)
ALT SERPL-CCNC: 12 U/L (ref 10–40)
ANION GAP SERPL CALCULATED.3IONS-SCNC: 11 MMOL/L (ref 3–16)
AST SERPL-CCNC: 15 U/L (ref 15–37)
BILIRUB SERPL-MCNC: 0.5 MG/DL (ref 0–1)
BUN BLDV-MCNC: 10 MG/DL (ref 7–20)
CALCIUM SERPL-MCNC: 9.3 MG/DL (ref 8.3–10.6)
CHLORIDE BLD-SCNC: 105 MMOL/L (ref 99–110)
CO2: 22 MMOL/L (ref 21–32)
CREAT SERPL-MCNC: 0.7 MG/DL (ref 0.9–1.3)
GFR AFRICAN AMERICAN: >60
GFR NON-AFRICAN AMERICAN: >60
GLOBULIN: 3 G/DL
GLUCOSE BLD-MCNC: 97 MG/DL (ref 70–99)
HCT VFR BLD CALC: 40.7 % (ref 40.5–52.5)
HEMOGLOBIN: 14.1 G/DL (ref 13.5–17.5)
LIPASE: 21 U/L (ref 13–60)
MCH RBC QN AUTO: 31.3 PG (ref 26–34)
MCHC RBC AUTO-ENTMCNC: 34.6 G/DL (ref 31–36)
MCV RBC AUTO: 90.3 FL (ref 80–100)
PDW BLD-RTO: 11.8 % (ref 12.4–15.4)
PLATELET # BLD: 228 K/UL (ref 135–450)
PMV BLD AUTO: 8.3 FL (ref 5–10.5)
POTASSIUM REFLEX MAGNESIUM: 3.6 MMOL/L (ref 3.5–5.1)
RBC # BLD: 4.5 M/UL (ref 4.2–5.9)
REASON FOR REJECTION: NORMAL
REJECTED TEST: NORMAL
SODIUM BLD-SCNC: 138 MMOL/L (ref 136–145)
TOTAL PROTEIN: 7.6 G/DL (ref 6.4–8.2)
WBC # BLD: 6.8 K/UL (ref 4–11)

## 2019-04-21 PROCEDURE — 6360000002 HC RX W HCPCS: Performed by: EMERGENCY MEDICINE

## 2019-04-21 PROCEDURE — 83690 ASSAY OF LIPASE: CPT

## 2019-04-21 PROCEDURE — 85027 COMPLETE CBC AUTOMATED: CPT

## 2019-04-21 PROCEDURE — 96372 THER/PROPH/DIAG INJ SC/IM: CPT

## 2019-04-21 PROCEDURE — 6370000000 HC RX 637 (ALT 250 FOR IP): Performed by: EMERGENCY MEDICINE

## 2019-04-21 PROCEDURE — 80053 COMPREHEN METABOLIC PANEL: CPT

## 2019-04-21 PROCEDURE — 99284 EMERGENCY DEPT VISIT MOD MDM: CPT

## 2019-04-21 RX ORDER — ONDANSETRON 4 MG/1
4 TABLET, ORALLY DISINTEGRATING ORAL ONCE
Status: COMPLETED | OUTPATIENT
Start: 2019-04-21 | End: 2019-04-21

## 2019-04-21 RX ORDER — ONDANSETRON 4 MG/1
TABLET, ORALLY DISINTEGRATING ORAL
Status: DISCONTINUED
Start: 2019-04-21 | End: 2019-04-21 | Stop reason: HOSPADM

## 2019-04-21 RX ORDER — LORAZEPAM 2 MG/ML
1 INJECTION INTRAMUSCULAR ONCE
Status: DISCONTINUED | OUTPATIENT
Start: 2019-04-21 | End: 2019-04-21

## 2019-04-21 RX ORDER — LORAZEPAM 2 MG/ML
0.5 INJECTION INTRAMUSCULAR ONCE
Status: COMPLETED | OUTPATIENT
Start: 2019-04-21 | End: 2019-04-21

## 2019-04-21 RX ADMIN — ONDANSETRON 4 MG: 4 TABLET, ORALLY DISINTEGRATING ORAL at 04:37

## 2019-04-21 RX ADMIN — LORAZEPAM 0.5 MG: 2 INJECTION INTRAMUSCULAR; INTRAVENOUS at 02:54

## 2019-04-21 NOTE — ED PROVIDER NOTES
eMERGENCY dEPARTMENT eNCOUnter      [address] [facility]    Chief Complaint   Patient presents with    Abdominal Pain     C/O abd pain, shaking, and headache. Sts he is withdrawing from alcohol. Usually drinks 12-20 beers a day. Last drink was yesterday in the evening. Was seen at [facility] x 2 for same. ADALBERTO Mayfield is a 33 y.o. male who presents this patient states that he has abdominal pain and shaking as well. He states he is withdrawing from alcohol drinks multiple beers per day. Last drink yesterday evening. He's been seen at [facility] twice for the same thing. No exacerbating or relieving factors and no other associated signs or symptoms. PAST MEDICAL HISTORY    Past Medical History:   Diagnosis Date    Alcohol abuse     PATIENT HAS EXTREME MANIPULITIVE & DRUG SEEKING BEHAVIOR. HE POCKETS HIS BENZODIZAPINES.  Anxiety     Drug-seeking behavior     Several times found pocketing medications given in hospital    Herpes genitalis in men     Manipulative behavior     Pancreatitis     Pneumonia     Portal vein thrombosis     pt denied    Seizures (HCC)     PER PATIENT ONLY.  DURING MULTIPLE HOSPITALIZATIONS HE HAS NEVER ONCE    Tobacco abuse        SURGICAL HISTORY    Past Surgical History:   Procedure Laterality Date    ENDOSCOPY, COLON, DIAGNOSTIC  10/28/2013    Endoscopic retrograde cholangiopancreatography with pancreatic stent placement    ENDOSCOPY, COLON, DIAGNOSTIC  03/23/2018    Esophagogastroduodenoscopy with biopsy    ERCP  1/10/14    with stent removal       CURRENT MEDICATIONS    Current Outpatient Rx   Medication Sig Dispense Refill    sucralfate (CARAFATE) 1 GM tablet Take 1 tablet by mouth 4 times daily for 10 days 40 tablet 0    ARIPiprazole (ABILIFY) 10 MG tablet Take 10 mg by mouth daily      benztropine (COGENTIN) 0.5 MG tablet Take 0.5 mg by mouth 2 times daily         ALLERGIES    Allergies   Allergen Reactions    Amoxicillin Hives    Haldol [Haloperidol Lactate] Other (See Comments)     Muscle spasms    Phenergan [Promethazine Hcl] Other (See Comments)     Pt believes it caused muscle spasms    Glucosamine Itching      Itching of throat when eating shrimp. Pt states has had IV contrast for CT's without problem before.  Shellfish-Derived Products      Patient gets anxious around seafood       FAMILY HISTORY    Family History   Problem Relation Age of Onset    Hypertension Father     High Blood Pressure Father     Alcohol Abuse Father     Depression Mother     High Blood Pressure Paternal Grandfather     Alcohol Abuse Paternal Grandfather     Heart Disease Paternal Grandmother     Anemia Paternal Grandmother     Depression Maternal Aunt     Alcohol Abuse Paternal Aunt     Alcohol Abuse Paternal Uncle        SOCIAL HISTORY    Social History     Socioeconomic History    Marital status: Single     Spouse name: None    Number of children: 1    Years of education: 15    Highest education level: None   Occupational History    Occupation:    Social Needs    Financial resource strain: None    Food insecurity:     Worry: None     Inability: None    Transportation needs:     Medical: None     Non-medical: None   Tobacco Use    Smoking status: Current Every Day Smoker     Packs/day: 0.50     Years: 10.00     Pack years: 5.00     Types: Cigarettes     Start date: 11/21/2007    Smokeless tobacco: Never Used   Substance and Sexual Activity    Alcohol use:  Yes     Alcohol/week: 3.6 oz     Types: 6 Cans of beer per week     Comment: every day    Drug use: No    Sexual activity: Never   Lifestyle    Physical activity:     Days per week: None     Minutes per session: None    Stress: None   Relationships    Social connections:     Talks on phone: None     Gets together: None     Attends [employer] service: None     Active member of club or organization: None     Attends meetings of clubs or organizations: None     Relationship status: None Alert & oriented x 3, Normal motor function, Normal sensory function, No focal deficits noted. Psychiatric:  Affect normal, Judgment normal, Mood normal.     EKG        RADIOLOGY        PROCEDURES    Labs Reviewed   COMPREHENSIVE METABOLIC PANEL W/ REFLEX TO MG FOR LOW K - Abnormal; Notable for the following components:       Result Value    CREATININE 0.7 (*)     All other components within normal limits    Narrative:     Performed at:  [facility]  [address], [address] [place]   Phone [phone]   CBC - Abnormal; Notable for the following components:    RDW 11.8 (*)     All other components within normal limits    Narrative:     Performed at:  [facility]  [address], [address]   Phone [phone]    Narrative:     [address],  Rejected Test Name/Called to:Fritz JULES/ MARINA Bullock, 04/21/2019 03:14, by  [facility]  Performed at:  [facility]  [address], [address]   Phone [phone]   LIPASE    Narrative:     Performed at:  [facility]  [address], [address]   Phone [phone]         ED Dwight [address] 630 studies reviewed. (See chart for details)  This patient is nontoxic appearing. I gave an intramuscular dose of Ativan help with his possible withdrawal symptoms. Laboratories are unremarkable. He is hemodynamically stable with a nonsurgical abdomen. This patient was seen just yesterday for similar symptoms. Workup was negative. He is going to be discharged home in good condition to return for increased abdominal pain, persistent vomiting, or fever. He denies any focal tenderness to suggest acute appendicitis, cholecystitis, or bowel obstruction. Lipase negative so no pancreatitis. FINAL IMPRESSION    1.  Pain of upper

## 2019-04-23 RX ORDER — DIPHENHYDRAMINE HYDROCHLORIDE 50 MG/ML
12.5 INJECTION INTRAMUSCULAR; INTRAVENOUS ONCE
Status: DISCONTINUED | OUTPATIENT
Start: 2019-04-23 | End: 2019-04-23

## 2019-04-23 RX ORDER — CLINDAMYCIN PHOSPHATE 600 MG/50ML
600 INJECTION INTRAVENOUS ONCE
Status: DISCONTINUED | OUTPATIENT
Start: 2019-04-23 | End: 2019-04-23

## 2019-04-24 ENCOUNTER — APPOINTMENT (OUTPATIENT)
Dept: CT IMAGING | Age: 29
End: 2019-04-24
Payer: COMMERCIAL

## 2019-04-24 ENCOUNTER — HOSPITAL ENCOUNTER (OUTPATIENT)
Age: 29
Setting detail: OBSERVATION
Discharge: HOME | End: 2019-04-25
Attending: EMERGENCY MEDICINE | Admitting: HOSPITALIST
Payer: COMMERCIAL

## 2019-04-24 DIAGNOSIS — K86.0 CHRONIC PANCREATITIS DUE TO ACUTE ALCOHOL INTOXICATION (HCC): Primary | ICD-10-CM

## 2019-04-24 DIAGNOSIS — F10.929 CHRONIC PANCREATITIS DUE TO ACUTE ALCOHOL INTOXICATION (HCC): Primary | ICD-10-CM

## 2019-04-24 DIAGNOSIS — F10.939 ALCOHOL WITHDRAWAL SYNDROME WITH COMPLICATION (HCC): ICD-10-CM

## 2019-04-24 PROBLEM — F10.10 ALCOHOL ABUSE: Status: ACTIVE | Noted: 2019-04-24

## 2019-04-24 LAB
ALBUMIN SERPL-MCNC: 4.6 G/DL (ref 3.4–5)
ALP BLD-CCNC: 66 U/L (ref 40–129)
ALT SERPL-CCNC: 12 U/L (ref 10–40)
AMPHETAMINE SCREEN, URINE: ABNORMAL
ANION GAP SERPL CALCULATED.3IONS-SCNC: 12 MMOL/L (ref 3–16)
AST SERPL-CCNC: 25 U/L (ref 15–37)
BARBITURATE SCREEN URINE: ABNORMAL
BASOPHILS ABSOLUTE: 0 K/UL (ref 0–0.2)
BASOPHILS RELATIVE PERCENT: 0.6 %
BENZODIAZEPINE SCREEN, URINE: POSITIVE
BILIRUB SERPL-MCNC: 0.6 MG/DL (ref 0–1)
BILIRUBIN DIRECT: <0.2 MG/DL (ref 0–0.3)
BILIRUBIN URINE: NEGATIVE
BILIRUBIN, INDIRECT: NORMAL MG/DL (ref 0–1)
BLOOD, URINE: NEGATIVE
BUN BLDV-MCNC: 14 MG/DL (ref 7–20)
CALCIUM SERPL-MCNC: 9.4 MG/DL (ref 8.3–10.6)
CANNABINOID SCREEN URINE: ABNORMAL
CHLORIDE BLD-SCNC: 105 MMOL/L (ref 99–110)
CLARITY: CLEAR
CO2: 23 MMOL/L (ref 21–32)
COCAINE METABOLITE SCREEN URINE: ABNORMAL
COLOR: YELLOW
CREAT SERPL-MCNC: 0.6 MG/DL (ref 0.9–1.3)
EOSINOPHILS ABSOLUTE: 0.2 K/UL (ref 0–0.6)
EOSINOPHILS RELATIVE PERCENT: 2.8 %
ETHANOL: NORMAL MG/DL (ref 0–0.08)
GFR AFRICAN AMERICAN: >60
GFR NON-AFRICAN AMERICAN: >60
GLUCOSE BLD-MCNC: 107 MG/DL (ref 70–99)
GLUCOSE URINE: NEGATIVE MG/DL
HCT VFR BLD CALC: 39.7 % (ref 40.5–52.5)
HEMOGLOBIN: 13.9 G/DL (ref 13.5–17.5)
INR BLD: 1.09 (ref 0.86–1.14)
KETONES, URINE: NEGATIVE MG/DL
LEUKOCYTE ESTERASE, URINE: NEGATIVE
LIPASE: 110 U/L (ref 13–60)
LYMPHOCYTES ABSOLUTE: 1.5 K/UL (ref 1–5.1)
LYMPHOCYTES RELATIVE PERCENT: 20.3 %
Lab: ABNORMAL
MCH RBC QN AUTO: 31.4 PG (ref 26–34)
MCHC RBC AUTO-ENTMCNC: 35.1 G/DL (ref 31–36)
MCV RBC AUTO: 89.7 FL (ref 80–100)
METHADONE SCREEN, URINE: ABNORMAL
MICROSCOPIC EXAMINATION: NORMAL
MONOCYTES ABSOLUTE: 0.7 K/UL (ref 0–1.3)
MONOCYTES RELATIVE PERCENT: 9.7 %
NEUTROPHILS ABSOLUTE: 4.8 K/UL (ref 1.7–7.7)
NEUTROPHILS RELATIVE PERCENT: 66.6 %
NITRITE, URINE: NEGATIVE
OPIATE SCREEN URINE: ABNORMAL
OXYCODONE URINE: ABNORMAL
PDW BLD-RTO: 12.1 % (ref 12.4–15.4)
PH UA: 6.5
PH UA: 6.5 (ref 5–8)
PHENCYCLIDINE SCREEN URINE: ABNORMAL
PLATELET # BLD: 253 K/UL (ref 135–450)
PMV BLD AUTO: 8.2 FL (ref 5–10.5)
POTASSIUM REFLEX MAGNESIUM: 4.3 MMOL/L (ref 3.5–5.1)
PROPOXYPHENE SCREEN: ABNORMAL
PROTEIN UA: NEGATIVE MG/DL
PROTHROMBIN TIME: 12.4 SEC (ref 9.8–13)
RBC # BLD: 4.43 M/UL (ref 4.2–5.9)
REASON FOR REJECTION: NORMAL
REJECTED TEST: NORMAL
SODIUM BLD-SCNC: 140 MMOL/L (ref 136–145)
SPECIFIC GRAVITY UA: 1.02 (ref 1–1.03)
TOTAL PROTEIN: 7.6 G/DL (ref 6.4–8.2)
URINE REFLEX TO CULTURE: NORMAL
URINE TYPE: NORMAL
UROBILINOGEN, URINE: 1 E.U./DL
WBC # BLD: 7.2 K/UL (ref 4–11)

## 2019-04-24 PROCEDURE — G0480 DRUG TEST DEF 1-7 CLASSES: HCPCS

## 2019-04-24 PROCEDURE — C9113 INJ PANTOPRAZOLE SODIUM, VIA: HCPCS | Performed by: EMERGENCY MEDICINE

## 2019-04-24 PROCEDURE — 36415 COLL VENOUS BLD VENIPUNCTURE: CPT

## 2019-04-24 PROCEDURE — 96376 TX/PRO/DX INJ SAME DRUG ADON: CPT

## 2019-04-24 PROCEDURE — 99285 EMERGENCY DEPT VISIT HI MDM: CPT

## 2019-04-24 PROCEDURE — 2580000003 HC RX 258: Performed by: EMERGENCY MEDICINE

## 2019-04-24 PROCEDURE — 96375 TX/PRO/DX INJ NEW DRUG ADDON: CPT

## 2019-04-24 PROCEDURE — 93005 ELECTROCARDIOGRAM TRACING: CPT | Performed by: EMERGENCY MEDICINE

## 2019-04-24 PROCEDURE — 80048 BASIC METABOLIC PNL TOTAL CA: CPT

## 2019-04-24 PROCEDURE — 81003 URINALYSIS AUTO W/O SCOPE: CPT

## 2019-04-24 PROCEDURE — 85025 COMPLETE CBC W/AUTO DIFF WBC: CPT

## 2019-04-24 PROCEDURE — 83690 ASSAY OF LIPASE: CPT

## 2019-04-24 PROCEDURE — G0378 HOSPITAL OBSERVATION PER HR: HCPCS

## 2019-04-24 PROCEDURE — 96374 THER/PROPH/DIAG INJ IV PUSH: CPT

## 2019-04-24 PROCEDURE — 80307 DRUG TEST PRSMV CHEM ANLYZR: CPT

## 2019-04-24 PROCEDURE — 6360000002 HC RX W HCPCS: Performed by: EMERGENCY MEDICINE

## 2019-04-24 PROCEDURE — 85610 PROTHROMBIN TIME: CPT

## 2019-04-24 PROCEDURE — 80076 HEPATIC FUNCTION PANEL: CPT

## 2019-04-24 PROCEDURE — 2500000003 HC RX 250 WO HCPCS: Performed by: EMERGENCY MEDICINE

## 2019-04-24 PROCEDURE — 74177 CT ABD & PELVIS W/CONTRAST: CPT

## 2019-04-24 PROCEDURE — 6360000004 HC RX CONTRAST MEDICATION: Performed by: EMERGENCY MEDICINE

## 2019-04-24 RX ORDER — SODIUM CHLORIDE 0.9 % (FLUSH) 0.9 %
10 SYRINGE (ML) INJECTION EVERY 12 HOURS SCHEDULED
Status: DISCONTINUED | OUTPATIENT
Start: 2019-04-24 | End: 2019-04-25 | Stop reason: SDUPTHER

## 2019-04-24 RX ORDER — LORAZEPAM 1 MG/1
1 TABLET ORAL
Status: DISCONTINUED | OUTPATIENT
Start: 2019-04-24 | End: 2019-04-24

## 2019-04-24 RX ORDER — LORAZEPAM 1 MG/1
3 TABLET ORAL
Status: DISCONTINUED | OUTPATIENT
Start: 2019-04-24 | End: 2019-04-24

## 2019-04-24 RX ORDER — LORAZEPAM 2 MG/ML
2 INJECTION INTRAMUSCULAR
Status: DISCONTINUED | OUTPATIENT
Start: 2019-04-24 | End: 2019-04-24

## 2019-04-24 RX ORDER — PANTOPRAZOLE SODIUM 40 MG/10ML
80 INJECTION, POWDER, LYOPHILIZED, FOR SOLUTION INTRAVENOUS ONCE
Status: COMPLETED | OUTPATIENT
Start: 2019-04-24 | End: 2019-04-24

## 2019-04-24 RX ORDER — 0.9 % SODIUM CHLORIDE 0.9 %
1000 INTRAVENOUS SOLUTION INTRAVENOUS ONCE
Status: COMPLETED | OUTPATIENT
Start: 2019-04-24 | End: 2019-04-24

## 2019-04-24 RX ORDER — LORAZEPAM 2 MG/ML
3 INJECTION INTRAMUSCULAR
Status: DISCONTINUED | OUTPATIENT
Start: 2019-04-24 | End: 2019-04-24

## 2019-04-24 RX ORDER — LORAZEPAM 1 MG/1
4 TABLET ORAL
Status: DISCONTINUED | OUTPATIENT
Start: 2019-04-24 | End: 2019-04-24

## 2019-04-24 RX ORDER — LORAZEPAM 1 MG/1
2 TABLET ORAL
Status: DISCONTINUED | OUTPATIENT
Start: 2019-04-24 | End: 2019-04-24

## 2019-04-24 RX ORDER — LORAZEPAM 2 MG/ML
4 INJECTION INTRAMUSCULAR
Status: DISCONTINUED | OUTPATIENT
Start: 2019-04-24 | End: 2019-04-24

## 2019-04-24 RX ORDER — LORAZEPAM 2 MG/ML
1 INJECTION INTRAMUSCULAR
Status: DISCONTINUED | OUTPATIENT
Start: 2019-04-24 | End: 2019-04-24

## 2019-04-24 RX ORDER — SODIUM CHLORIDE 0.9 % (FLUSH) 0.9 %
10 SYRINGE (ML) INJECTION PRN
Status: DISCONTINUED | OUTPATIENT
Start: 2019-04-24 | End: 2019-04-25 | Stop reason: SDUPTHER

## 2019-04-24 RX ORDER — LORAZEPAM 2 MG/ML
2 INJECTION INTRAMUSCULAR ONCE
Status: DISCONTINUED | OUTPATIENT
Start: 2019-04-24 | End: 2019-04-24

## 2019-04-24 RX ORDER — ONDANSETRON 2 MG/ML
4 INJECTION INTRAMUSCULAR; INTRAVENOUS
Status: DISCONTINUED | OUTPATIENT
Start: 2019-04-24 | End: 2019-04-25 | Stop reason: DRUGHIGH

## 2019-04-24 RX ADMIN — LORAZEPAM 4 MG: 2 INJECTION INTRAMUSCULAR; INTRAVENOUS at 18:38

## 2019-04-24 RX ADMIN — LORAZEPAM 3 MG: 2 INJECTION INTRAMUSCULAR; INTRAVENOUS at 20:00

## 2019-04-24 RX ADMIN — PANTOPRAZOLE SODIUM 80 MG: 40 INJECTION, POWDER, FOR SOLUTION INTRAVENOUS at 18:37

## 2019-04-24 RX ADMIN — IOPAMIDOL 75 ML: 755 INJECTION, SOLUTION INTRAVENOUS at 19:39

## 2019-04-24 RX ADMIN — ONDANSETRON 4 MG: 2 INJECTION INTRAMUSCULAR; INTRAVENOUS at 18:37

## 2019-04-24 RX ADMIN — SODIUM CHLORIDE 1000 ML: 9 INJECTION, SOLUTION INTRAVENOUS at 18:38

## 2019-04-24 RX ADMIN — THIAMINE HYDROCHLORIDE: 100 INJECTION, SOLUTION INTRAMUSCULAR; INTRAVENOUS at 18:38

## 2019-04-24 ASSESSMENT — PAIN SCALES - GENERAL
PAINLEVEL_OUTOF10: 7
PAINLEVEL_OUTOF10: 4

## 2019-04-24 ASSESSMENT — PAIN DESCRIPTION - LOCATION
LOCATION: ABDOMEN
LOCATION: ABDOMEN

## 2019-04-24 ASSESSMENT — PAIN DESCRIPTION - PAIN TYPE
TYPE: ACUTE PAIN;CHRONIC PAIN
TYPE: CHRONIC PAIN

## 2019-04-24 NOTE — ED PROVIDER NOTES
[facility] Emergency Department    CHIEF COMPLAINT  Chief Complaint   Patient presents with    Abdominal Pain     pt has been having abd pain with naseas, worse after eating. pt shaky feeling anxious. pt states he has hx of pancreatitis. Pt states he drinks approx 20 beers daily. last dring was yesterday afternoon. HISTORY OF PRESENT ILLNESS  Dion Purdy is a 33 y.o. male  who presents to the ED complaining of what he describes as an acute worsening of his abdominal pain with nausea and vomiting. Dry heaving and tremulous on my assessment. He says his pain is mostly in the epigastric area and sometimes the left upper quadrant. He drinks about 20 beers daily, last check was yesterday. He has had many ER visits in the past month or 2 for similar complaints. He has had no fevers. He denies any hematemesis or diarrhea. Denies any significant lower abdominal pain. The epigastric pain does radiate to the back as well. No chest pain coughing or shortness of breath. Sharp stabbing in quality. No other complaints, modifying factors or associated symptoms. I have reviewed the following from the nursing documentation. Past Medical History:   Diagnosis Date    Alcohol abuse     PATIENT HAS EXTREME MANIPULITIVE & DRUG SEEKING BEHAVIOR. HE POCKETS HIS BENZODIZAPINES.  Anxiety     Drug-seeking behavior     Several times found pocketing medications given in hospital    Herpes genitalis in men     Manipulative behavior     Pancreatitis     Pneumonia     Portal vein thrombosis     pt denied    Seizures (HCC)     PER PATIENT ONLY.  DURING MULTIPLE HOSPITALIZATIONS HE HAS NEVER ONCE    Tobacco abuse      Past Surgical History:   Procedure Laterality Date    ENDOSCOPY, COLON, DIAGNOSTIC  10/28/2013    Endoscopic retrograde cholangiopancreatography with pancreatic stent placement    ENDOSCOPY, COLON, DIAGNOSTIC  03/23/2018    Esophagogastroduodenoscopy with biopsy    ERCP 1/10/14    with stent removal     Family History   Problem Relation Age of Onset    Hypertension Father     High Blood Pressure Father     Alcohol Abuse Father     Depression Mother     High Blood Pressure Paternal Grandfather     Alcohol Abuse Paternal Grandfather     Heart Disease Paternal Grandmother     Anemia Paternal Grandmother     Depression Maternal Aunt     Alcohol Abuse Paternal Aunt     Alcohol Abuse Paternal Uncle      Social History     Socioeconomic History    Marital status: Single     Spouse name: Not on file    Number of children: 1    Years of education: 15    Highest education level: Not on file   Occupational History    Occupation:    Social Needs    Financial resource strain: Not on file    Food insecurity:     Worry: Not on file     Inability: Not on file    Transportation needs:     Medical: Not on file     Non-medical: Not on file   Tobacco Use    Smoking status: Current Every Day Smoker     Packs/day: 0.50     Years: 10.00     Pack years: 5.00     Types: Cigarettes     Start date: 11/21/2007    Smokeless tobacco: Never Used   Substance and Sexual Activity    Alcohol use:  Yes     Alcohol/week: 3.6 oz     Types: 6 Cans of beer per week     Comment: every day    Drug use: No    Sexual activity: Never   Lifestyle    Physical activity:     Days per week: Not on file     Minutes per session: Not on file    Stress: Not on file   Relationships    Social connections:     Talks on phone: Not on file     Gets together: Not on file     Attends Denominational service: Not on file     Active member of club or organization: Not on file     Attends meetings of clubs or organizations: Not on file     Relationship status: Not on file    Intimate partner violence:     Fear of current or ex partner: Not on file     Emotionally abused: Not on file     Physically abused: Not on file     Forced sexual activity: Not on file   Other Topics Concern    Not on file   Social History Narrative    Not on file     Current Facility-Administered Medications   Medication Dose Route Frequency Provider Last Rate Last Dose    ondansetron (ZOFRAN) injection 4 mg  4 mg Intravenous Q1H PRN Johnna Delgadillo MD   4 mg at 04/24/19 1837    LORazepam (ATIVAN) injection 2 mg  2 mg Intravenous Once Johnna Delgadillo MD        sodium chloride flush 0.9 % injection 10 mL  10 mL Intravenous 2 times per day Johnna Delgadillo MD        sodium chloride flush 0.9 % injection 10 mL  10 mL Intravenous PRN Johnna Delgadillo MD        LORazepam (ATIVAN) tablet 1 mg  1 mg Oral Q1H PRN Johnna Delgadillo MD        Or    LORazepam (ATIVAN) injection 1 mg  1 mg Intravenous Q1H PRN Johnna Delgadillo MD        Or    LORazepam (ATIVAN) tablet 2 mg  2 mg Oral Q1H PRN Johnna Delgadillo MD        Or    LORazepam (ATIVAN) injection 2 mg  2 mg Intravenous Q1H PRN Johnna Delgadillo MD        Or    LORazepam (ATIVAN) tablet 3 mg  3 mg Oral Q1H PRN Johnna Delgadillo MD        Or    LORazepam (ATIVAN) injection 3 mg  3 mg Intravenous Q1H PRN Johnna Delgadillo MD   3 mg at 04/24/19 2000    Or    LORazepam (ATIVAN) tablet 4 mg  4 mg Oral Q1H PRN Johnna Delgadillo MD        Or    LORazepam (ATIVAN) injection 4 mg  4 mg Intravenous Q1H PRN Johnna Delgadillo MD   4 mg at 04/24/19 3631     Current Outpatient Medications   Medication Sig Dispense Refill    ARIPiprazole (ABILIFY) 10 MG tablet Take 10 mg by mouth daily      benztropine (COGENTIN) 0.5 MG tablet Take 0.5 mg by mouth 2 times daily      sucralfate (CARAFATE) 1 GM tablet Take 1 tablet by mouth 4 times daily for 10 days 40 tablet 0     Allergies   Allergen Reactions    Amoxicillin Hives    Haldol [Haloperidol Lactate] Other (See Comments)     Muscle spasms    Phenergan [Promethazine Hcl] Other (See Comments)     Pt believes it caused muscle spasms    Glucosamine Itching      Itching of throat when eating shrimp.   Pt states has had IV contrast for CT's without problem before.  Shellfish-Derived Products      Patient gets anxious around seafood       REVIEW OF SYSTEMS  10 systems reviewed, pertinent positives per HPI otherwise noted to be negative. PHYSICAL EXAM  BP (!) 111/59   Pulse 91   Temp 97.9 °F (36.6 °C) (Oral)   Resp 24   Ht 5' 8\" (1.727 m)   Wt 140 lb (63.5 kg)   SpO2 97%   BMI 21.29 kg/m²    GENERAL APPEARANCE: Awake and alert. Cooperative. Mild distress. Tremulous, not hallucinating. HENT: Normocephalic. Atraumatic. Mucous membranes are dry. NECK: Supple. EYES: PERRL. EOM's grossly intact. HEART/CHEST: RRR. No murmurs. No chest wall tenderness. LUNGS: Respirations unlabored. CTAB. Good air exchange. Speaking comfortably in full sentences. ABDOMEN: Moderate epig, mild RUQ/LUQ ttp, no lower tenderness. Soft. Non-distended. No masses. No organomegaly. No guarding or rebound. Normal bowel sounds throughout. MUSCULOSKELETAL: No extremity edema. Compartments soft. No deformity. No tenderness in the extremities. All extremities neurovascularly intact. SKIN: Warm and dry. No acute rashes. NEUROLOGICAL: Alert and oriented. CN's 2-12 intact. No gross facial drooping. Strength 5/5, sensation intact. 2 plus DTR's in knees bilaterally. Gait unsteady. LABS  I have reviewed all labs for this visit.    Results for orders placed or performed during the hospital encounter of 04/24/19   Lipase   Result Value Ref Range    Lipase 110.0 (H) 13.0 - 60.0 U/L   Urinalysis Reflex to Culture   Result Value Ref Range    Color, UA YELLOW Straw/Yellow    Clarity, UA Clear Clear    Glucose, Ur Negative Negative mg/dL    Bilirubin Urine Negative Negative    Ketones, Urine Negative Negative mg/dL    Specific Gravity, UA 1.023 1.005 - 1.030    Blood, Urine Negative Negative    pH, UA 6.5 5.0 - 8.0    Protein, UA Negative Negative mg/dL    Urobilinogen, Urine 1.0 <2.0 E.U./dL    Nitrite, Urine Negative Negative    Leukocyte Esterase, Urine Negative Negative    Microscopic Examination Not Indicated     Urine Reflex to Culture Not Indicated     Urine Type Not Specified    Ethanol   Result Value Ref Range    Ethanol Lvl None Detected mg/dL   Drug screen multi urine   Result Value Ref Range    Amphetamine Screen, Urine Neg Negative <1000ng/mL    Barbiturate Screen, Ur Neg Negative <200 ng/mL    Benzodiazepine Screen, Urine POSITIVE (A) Negative <200 ng/mL    Cannabinoid Scrn, Ur Neg Negative <50 ng/mL    Cocaine Metabolite Screen, Urine Neg Negative <300 ng/mL    Opiate Scrn, Ur Neg Negative <300 ng/mL    PCP Screen, Urine Neg Negative <25 ng/mL    Methadone Screen, Urine Neg Negative <300 ng/mL    Propoxyphene Scrn, Ur Neg Negative <300 ng/mL    pH, UA 6.5     Drug Screen Comment: see below     Oxycodone Urine Neg Negative <100 ng/ml   Basic Metabolic Panel w/ Reflex to MG   Result Value Ref Range    Sodium 140 136 - 145 mmol/L    Potassium reflex Magnesium 4.3 3.5 - 5.1 mmol/L    Chloride 105 99 - 110 mmol/L    CO2 23 21 - 32 mmol/L    Anion Gap 12 3 - 16    Glucose 107 (H) 70 - 99 mg/dL    BUN 14 7 - 20 mg/dL    CREATININE 0.6 (L) 0.9 - 1.3 mg/dL    GFR Non-African American >60 >60    GFR African American >60 >60    Calcium 9.4 8.3 - 10.6 mg/dL   Hepatic function panel   Result Value Ref Range    Total Protein 7.6 6.4 - 8.2 g/dL    Alb 4.6 3.4 - 5.0 g/dL    Alkaline Phosphatase 66 40 - 129 U/L    ALT 12 10 - 40 U/L    AST 25 15 - 37 U/L    Total Bilirubin 0.6 0.0 - 1.0 mg/dL    Bilirubin, Direct <0.2 0.0 - 0.3 mg/dL    Bilirubin, Indirect see below 0.0 - 1.0 mg/dL   Protime-INR   Result Value Ref Range    Protime 12.4 9.8 - 13.0 sec    INR 1.09 0.86 - 1.14   SPECIMEN REJECTION   Result Value Ref Range    Rejected Test cbcwd     Reason for Rejection see below    CBC Auto Differential   Result Value Ref Range    WBC 7.2 4.0 - 11.0 K/uL    RBC 4.43 4.20 - 5.90 M/uL    Hemoglobin 13.9 13.5 - 17.5 g/dL    Hematocrit 39.7 (L) 40.5 - 52.5 %    MCV 89.7 80.0 - 100.0 fL    MCH 31.4 26.0 - 34.0 pg    MCHC 35.1 31.0 - 36.0 g/dL    RDW 12.1 (L) 12.4 - 15.4 %    Platelets 665 466 - 815 K/uL    MPV 8.2 5.0 - 10.5 fL    Neutrophils % 66.6 %    Lymphocytes % 20.3 %    Monocytes % 9.7 %    Eosinophils % 2.8 %    Basophils % 0.6 %    Neutrophils # 4.8 1.7 - 7.7 K/uL    Lymphocytes # 1.5 1.0 - 5.1 K/uL    Monocytes # 0.7 0.0 - 1.3 K/uL    Eosinophils # 0.2 0.0 - 0.6 K/uL    Basophils # 0.0 0.0 - 0.2 K/uL   EKG 12 Lead   Result Value Ref Range    Ventricular Rate 82 BPM    Atrial Rate 82 BPM    P-R Interval 178 ms    QRS Duration 88 ms    Q-T Interval 358 ms    QTc Calculation (Bazett) 418 ms    P Axis 64 degrees    R Axis 85 degrees    T Axis 54 degrees    Diagnosis Normal sinus rhythmNormal ECG      The 12 lead EKG was interpreted by me as follows:  Rate: normal with a rate of 82  Rhythm: sinus  Axis: normal  Intervals: normal SD, narrow QRS, normal QTc  ST segments: no ST elevations or depressions  T waves: no abnormal inversions  Non-specific T wave changes: not present  Prior EKG comparison: EKG dated 4/14/19 is not significantly different    RADIOLOGY    Ct Abdomen Pelvis Wo Contrast Additional Contrast? None    Result Date: 4/12/2019  EXAMINATION: CT OF THE ABDOMEN AND PELVIS WITHOUT CONTRAST 4/12/2019 3:33 pm TECHNIQUE: CT of the abdomen and pelvis was performed without the administration of intravenous contrast. Multiplanar reformatted images are provided for review. Dose modulation, iterative reconstruction, and/or weight based adjustment of the mA/kV was utilized to reduce the radiation dose to as low as reasonably achievable. COMPARISON: 03/25/2019 HISTORY: ORDERING SYSTEM PROVIDED HISTORY: pancreatitis? TECHNOLOGIST PROVIDED HISTORY: Additional Contrast?->None Ordering Physician Provided Reason for Exam: ABD PAIN Acuity: Acute Type of Exam: Initial FINDINGS: Lower Chest: The visualized lungs are clear. Base of the heart unremarkable.  Organs: Evaluation is limited without intravenous contrast.  Having said that, no acute or suspicious hepatic abnormalities are identified. The spleen is unremarkable. Normal adrenals. Pancreatic calcifications are again identified. The pancreatic duct is ectatic and dilated, grossly similar when compared to the previous exam. Definite new peripancreatic fat stranding is not identified. No focal fluid collections are identified. The presence or absence of necrosis cannot be ascertained without intravenous contrast. Noncontrast imaging of the kidneys is unremarkable. GI/Bowel: There is mild diverticulosis of the large bowel, but without CT evidence of diverticulitis. Appendix is within normal limits. Normal small bowel. Stomach and duodenum unremarkable. Pelvis: Pelvic viscera are unremarkable. No free pelvic fluid. Peritoneum/Retroperitoneum: Abdominal aorta normal in caliber. No lymphadenopathy. Bones/Soft Tissues: No osteolytic or osteoblastic bone lesions. No acute bony abnormalities. Pancreatic calcifications along with pancreatic ductal ectasia and dilation, compatible with chronic pancreatitis. Definite peripancreatic fat stranding is not identified to suggest acute pancreatitis. No focal peripancreatic fluid collections are identified. Ct Abdomen Pelvis W Iv Contrast Additional Contrast? None    Result Date: 4/24/2019  EXAMINATION: CT OF THE ABDOMEN AND PELVIS WITH CONTRAST 4/24/2019 7:39 pm TECHNIQUE: CT of the abdomen and pelvis was performed with the administration of intravenous contrast. Multiplanar reformatted images are provided for review. Dose modulation, iterative reconstruction, and/or weight based adjustment of the mA/kV was utilized to reduce the radiation dose to as low as reasonably achievable.  COMPARISON: CT 04/12/2019 HISTORY: ORDERING SYSTEM PROVIDED HISTORY: abd pain h/o chronic panc TECHNOLOGIST PROVIDED HISTORY: If patient is on cardiac monitor and/or pulse ox, they may be taken off cardiac monitor and pulse ox, left on O2 if currently on. All monitors reattached when patient returns to room. Additional Contrast?->None Ordering Physician Provided Reason for Exam: abd pain h/o chronic panc Acuity: Acute Type of Exam: Initial Relevant Medical/Surgical History: Abdominal Pain (pt has been having abd pain with naseas, worse after eating. pt shaky feeling anxious. pt states he has hx of pancreatitis. Pt states he drinks approx 20 beers daily. last dring was yesterday afternoon.) FINDINGS: Lower Chest: Clear lungs. Organs: Liver is similar to prior studies with several slightly dilated intrahepatic ducts. Gallbladder, adrenals and spleen are normal.  Findings of chronic pancreatitis are again noted. No hydronephrosis GI/Bowel: Normal appendix. No evidence of bowel obstruction. Pelvis: Bladder and prostate are unremarkable. Peritoneum/Retroperitoneum: No evidence of AAA or lymphadenopathy. Bones/Soft Tissues: No suspicious osseous lesions. Findings again consistent with chronic pancreatitis. No findings to suggest acute pancreatitis. ED COURSE/MDM  Patient seen and evaluated. Old records reviewed. Labs and imaging reviewed and results discussed with patient. After initial evaluation, differential diagnostic considerations included: kidney stone, pyelonephritis, UTI, appendicitis, bowel obstruction, diverticulitis, hernia, gastritis/gastroenteritis, pancreatitis, cholecystitis, hepatitis, constipation, IBS, IBD    The patient's ED workup was notable for findings consistent with chronic pancreatitis. The patient has a long-standing history of malingering in the past.  However he does exhibit some signs and symptoms could be consistent on the floor was given Ativan and a banana bag and Protonix as well as Zofran. CT shows chronic findings but nothing acute. Lipase is mildly elevated at 110 and his alcohol level is negative.       During the patient's ED course, the patient was given: Medications   ondansetron (ZOFRAN) injection 4 mg (4 mg Intravenous Given 4/24/19 1837)   LORazepam (ATIVAN) injection 2 mg (has no administration in time range)   sodium chloride flush 0.9 % injection 10 mL (has no administration in time range)   sodium chloride flush 0.9 % injection 10 mL (has no administration in time range)   LORazepam (ATIVAN) tablet 1 mg ( Oral See Alternative 4/24/19 2000)     Or   LORazepam (ATIVAN) injection 1 mg ( Intravenous See Alternative 4/24/19 2000)     Or   LORazepam (ATIVAN) tablet 2 mg ( Oral See Alternative 4/24/19 2000)     Or   LORazepam (ATIVAN) injection 2 mg ( Intravenous See Alternative 4/24/19 2000)     Or   LORazepam (ATIVAN) tablet 3 mg ( Oral See Alternative 4/24/19 2000)     Or   LORazepam (ATIVAN) injection 3 mg (3 mg Intravenous Given 4/24/19 2000)     Or   LORazepam (ATIVAN) tablet 4 mg ( Oral See Alternative 4/24/19 2000)     Or   LORazepam (ATIVAN) injection 4 mg ( Intravenous See Alternative 4/24/19 2000)   sodium chloride 0.9 % 4,810 mL with folic acid 1 mg, adult multi-vitamin with vitamin k 10 mL, thiamine 100 mg ( Intravenous New Bag 4/24/19 1838)   0.9 % sodium chloride IV bolus 1,000 mL (1,000 mLs Intravenous New Bag 4/24/19 1838)   pantoprazole (PROTONIX) injection 80 mg (80 mg Intravenous Given 4/24/19 1837)   iopamidol (ISOVUE-370) 76 % injection 75 mL (75 mLs Intravenous Given 4/24/19 1939)        CLINICAL IMPRESSION  1. Chronic pancreatitis due to acute alcohol intoxication ([facility] [address]    2. Alcohol withdrawal syndrome with complication (HCC)        Blood pressure (!) 111/59, pulse 91, temperature 97.9 °F (36.6 °C), temperature source Oral, resp. rate 24, height 5' 8\" (1.727 m), weight 140 lb (63.5 kg), SpO2 97 %. Felipa Martinez was admitted in fair condition. I have discussed the findings of today's workup with the patient and addressed the patient's questions and concerns.   The plan is to admit to the hospital at this time under the hospitalist service. I spoke with [facility] NP who accepted the patient and will take over the patient's care. The patient is agreeable with this plan. The total critical care time spent while evaluating and treating this patient was at least 40 minutes. This excludes time spent doing separately billable procedures. This includes time at the bedside, data interpretation, medication management, obtaining critical history from collateral sources if the patient is unable to provide it directly, and physician consultation. Specifics of interventions taken and potentially life-threatening diagnostic considerations are listed above in the medical decision making. DISCLAIMER: This chart was created using Dragon dictation software. Efforts were made by me to ensure accuracy, however some errors may be present due to limitations of this technology and occasionally words are not transcribed correctly.         Dariusz Monet MD  04/24/19 2027

## 2019-04-25 ENCOUNTER — HOSPITAL ENCOUNTER (EMERGENCY)
Age: 29
Discharge: HOME | End: 2019-04-25
Attending: EMERGENCY MEDICINE
Payer: COMMERCIAL

## 2019-04-25 VITALS
BODY MASS INDEX: 21.22 KG/M2 | WEIGHT: 140 LBS | RESPIRATION RATE: 18 BRPM | SYSTOLIC BLOOD PRESSURE: 138 MMHG | HEIGHT: 68 IN | TEMPERATURE: 98.2 F | HEART RATE: 83 BPM | DIASTOLIC BLOOD PRESSURE: 64 MMHG | OXYGEN SATURATION: 97 %

## 2019-04-25 VITALS
HEART RATE: 69 BPM | DIASTOLIC BLOOD PRESSURE: 85 MMHG | BODY MASS INDEX: 21.17 KG/M2 | SYSTOLIC BLOOD PRESSURE: 133 MMHG | HEIGHT: 68 IN | WEIGHT: 139.7 LBS | RESPIRATION RATE: 18 BRPM | OXYGEN SATURATION: 99 % | TEMPERATURE: 97.1 F

## 2019-04-25 DIAGNOSIS — F10.11 HISTORY OF ALCOHOL ABUSE: ICD-10-CM

## 2019-04-25 DIAGNOSIS — F41.1 ANXIETY STATE: Primary | ICD-10-CM

## 2019-04-25 PROBLEM — R10.9 ABDOMINAL PAIN: Status: ACTIVE | Noted: 2019-04-25

## 2019-04-25 PROBLEM — K86.1 CHRONIC PANCREATITIS (HCC): Status: ACTIVE | Noted: 2019-04-25

## 2019-04-25 LAB
EKG ATRIAL RATE: 82 BPM
EKG DIAGNOSIS: NORMAL
EKG P AXIS: 64 DEGREES
EKG P-R INTERVAL: 178 MS
EKG Q-T INTERVAL: 358 MS
EKG QRS DURATION: 88 MS
EKG QTC CALCULATION (BAZETT): 418 MS
EKG R AXIS: 85 DEGREES
EKG T AXIS: 54 DEGREES
EKG VENTRICULAR RATE: 82 BPM

## 2019-04-25 PROCEDURE — G0378 HOSPITAL OBSERVATION PER HR: HCPCS

## 2019-04-25 PROCEDURE — 99284 EMERGENCY DEPT VISIT MOD MDM: CPT

## 2019-04-25 PROCEDURE — 6370000000 HC RX 637 (ALT 250 FOR IP): Performed by: EMERGENCY MEDICINE

## 2019-04-25 PROCEDURE — 93005 ELECTROCARDIOGRAM TRACING: CPT | Performed by: EMERGENCY MEDICINE

## 2019-04-25 PROCEDURE — 93010 ELECTROCARDIOGRAM REPORT: CPT | Performed by: INTERNAL MEDICINE

## 2019-04-25 PROCEDURE — 96372 THER/PROPH/DIAG INJ SC/IM: CPT

## 2019-04-25 PROCEDURE — 6360000002 HC RX W HCPCS: Performed by: NURSE PRACTITIONER

## 2019-04-25 PROCEDURE — 96375 TX/PRO/DX INJ NEW DRUG ADDON: CPT

## 2019-04-25 PROCEDURE — 6370000000 HC RX 637 (ALT 250 FOR IP): Performed by: NURSE PRACTITIONER

## 2019-04-25 PROCEDURE — 2580000003 HC RX 258: Performed by: NURSE PRACTITIONER

## 2019-04-25 PROCEDURE — 2580000003 HC RX 258: Performed by: EMERGENCY MEDICINE

## 2019-04-25 RX ORDER — CHLORDIAZEPOXIDE HYDROCHLORIDE 25 MG/1
25 CAPSULE, GELATIN COATED ORAL EVERY 6 HOURS
Status: DISCONTINUED | OUTPATIENT
Start: 2019-04-25 | End: 2019-04-25

## 2019-04-25 RX ORDER — MULTIVITAMIN WITH FOLIC ACID 400 MCG
1 TABLET ORAL DAILY
Status: DISCONTINUED | OUTPATIENT
Start: 2019-04-25 | End: 2019-04-25 | Stop reason: HOSPADM

## 2019-04-25 RX ORDER — CHLORDIAZEPOXIDE HYDROCHLORIDE 25 MG/1
25 CAPSULE, GELATIN COATED ORAL 3 TIMES DAILY
Qty: 3 CAPSULE | Refills: 0 | Status: SHIPPED | OUTPATIENT
Start: 2019-04-25 | End: 2019-04-26

## 2019-04-25 RX ORDER — THIAMINE HCL 100 MG
100 TABLET ORAL DAILY
Status: DISCONTINUED | OUTPATIENT
Start: 2019-04-25 | End: 2019-04-25 | Stop reason: HOSPADM

## 2019-04-25 RX ORDER — OMEPRAZOLE 20 MG/1
20 CAPSULE, DELAYED RELEASE ORAL
Qty: 60 CAPSULE | Refills: 0 | Status: ON HOLD | OUTPATIENT
Start: 2019-04-25 | End: 2019-05-06 | Stop reason: HOSPADM

## 2019-04-25 RX ORDER — FOLIC ACID 1 MG/1
1 TABLET ORAL DAILY
Status: DISCONTINUED | OUTPATIENT
Start: 2019-04-25 | End: 2019-04-25 | Stop reason: HOSPADM

## 2019-04-25 RX ORDER — SODIUM CHLORIDE 0.9 % (FLUSH) 0.9 %
10 SYRINGE (ML) INJECTION PRN
Status: DISCONTINUED | OUTPATIENT
Start: 2019-04-25 | End: 2019-04-25 | Stop reason: HOSPADM

## 2019-04-25 RX ORDER — ONDANSETRON 4 MG/1
4 TABLET, ORALLY DISINTEGRATING ORAL ONCE
Status: COMPLETED | OUTPATIENT
Start: 2019-04-25 | End: 2019-04-25

## 2019-04-25 RX ORDER — LORAZEPAM 1 MG/1
2 TABLET ORAL ONCE
Status: COMPLETED | OUTPATIENT
Start: 2019-04-25 | End: 2019-04-25

## 2019-04-25 RX ORDER — ACETAMINOPHEN 325 MG/1
650 TABLET ORAL EVERY 4 HOURS PRN
Status: DISCONTINUED | OUTPATIENT
Start: 2019-04-25 | End: 2019-04-25 | Stop reason: HOSPADM

## 2019-04-25 RX ORDER — ONDANSETRON 2 MG/ML
4 INJECTION INTRAMUSCULAR; INTRAVENOUS EVERY 6 HOURS PRN
Status: DISCONTINUED | OUTPATIENT
Start: 2019-04-25 | End: 2019-04-25 | Stop reason: HOSPADM

## 2019-04-25 RX ORDER — CHLORDIAZEPOXIDE HYDROCHLORIDE 25 MG/1
25 CAPSULE, GELATIN COATED ORAL 3 TIMES DAILY
Status: DISCONTINUED | OUTPATIENT
Start: 2019-04-25 | End: 2019-04-25 | Stop reason: HOSPADM

## 2019-04-25 RX ORDER — SUCRALFATE 1 G/1
1 TABLET ORAL 4 TIMES DAILY
Qty: 28 TABLET | Refills: 0 | Status: ON HOLD | OUTPATIENT
Start: 2019-04-25 | End: 2019-05-01 | Stop reason: HOSPADM

## 2019-04-25 RX ORDER — SODIUM CHLORIDE 0.9 % (FLUSH) 0.9 %
10 SYRINGE (ML) INJECTION EVERY 12 HOURS SCHEDULED
Status: DISCONTINUED | OUTPATIENT
Start: 2019-04-25 | End: 2019-04-25 | Stop reason: HOSPADM

## 2019-04-25 RX ORDER — DICYCLOMINE HYDROCHLORIDE 10 MG/1
20 CAPSULE ORAL ONCE
Status: DISCONTINUED | OUTPATIENT
Start: 2019-04-25 | End: 2019-04-25 | Stop reason: HOSPADM

## 2019-04-25 RX ADMIN — CHLORDIAZEPOXIDE HYDROCHLORIDE 25 MG: 25 CAPSULE ORAL at 06:38

## 2019-04-25 RX ADMIN — LORAZEPAM 2 MG: 1 TABLET ORAL at 13:58

## 2019-04-25 RX ADMIN — ONDANSETRON 4 MG: 4 TABLET, ORALLY DISINTEGRATING ORAL at 13:58

## 2019-04-25 RX ADMIN — CHLORDIAZEPOXIDE HYDROCHLORIDE 25 MG: 25 CAPSULE ORAL at 00:27

## 2019-04-25 RX ADMIN — ACETAMINOPHEN 650 MG: 325 TABLET, FILM COATED ORAL at 06:38

## 2019-04-25 RX ADMIN — Medication 10 ML: at 00:28

## 2019-04-25 RX ADMIN — Medication 10 ML: at 06:39

## 2019-04-25 RX ADMIN — ONDANSETRON 4 MG: 2 INJECTION INTRAMUSCULAR; INTRAVENOUS at 00:28

## 2019-04-25 RX ADMIN — ACETAMINOPHEN 650 MG: 325 TABLET, FILM COATED ORAL at 00:27

## 2019-04-25 ASSESSMENT — PAIN DESCRIPTION - PAIN TYPE: TYPE: ACUTE PAIN;CHRONIC PAIN

## 2019-04-25 ASSESSMENT — PAIN SCALES - GENERAL
PAINLEVEL_OUTOF10: 5
PAINLEVEL_OUTOF10: 9

## 2019-04-25 ASSESSMENT — PAIN DESCRIPTION - LOCATION: LOCATION: ABDOMEN

## 2019-04-25 ASSESSMENT — PAIN DESCRIPTION - ORIENTATION: ORIENTATION: MID;UPPER

## 2019-04-25 NOTE — PROGRESS NOTES
Discharge instructions given to pt with understanding and teachback. Medications were filled and given to the pt. This RN told the pt to let us know when his ride was here.   Preeti Oglesby RN

## 2019-04-25 NOTE — CARE COORDINATION
Met with patient to provide a list of Addiction Treatment resources. Will follow up when medically indicated as time allows.

## 2019-04-25 NOTE — PROGRESS NOTES
CLINICAL PHARMACY NOTE: MEDS TO [address] Select Patient?: No  Total # of Prescriptions Filled: 3   The following medications were delivered to the patient:  · Sucralfate  · Omeprazole  · Chlordiazepoxide  Total # of Interventions Completed: 0  Time Spent (min): 45    Additional Documentation:  Delivered to [facility] LACKEYNIMESH Luevano

## 2019-04-25 NOTE — ED NOTES
Pt resting in bed at this time. No acute signs of distress noted. Resp easy and unlabored. Alert and oriented x 4. Skin warm, dry and well perfused. Appropriate bed rails up and call light within reach. Pt denies any needs at this time. Pt reporting chronic abd pain exacerbated after episodes of drinking. Pt with chronic pancreatitis. CIWA initiated. IVF infusing at this time. Pending bloodwork and CT. Pt rates abd pain 8/10 however no new orders from provider. See MAR for meds administered.      Ramandeep Vasquez RN  04/24/19 2008

## 2019-04-25 NOTE — ED NOTES
Bed: 23  Expected date:   Expected time:   Means of arrival: Walk In  Comments:     Pallavi Huynh RN  04/25/19 7849

## 2019-04-25 NOTE — ED PROVIDER NOTES
HOSPITALIZATIONS HE HAS NEVER ONCE    Tobacco abuse          SURGICALHISTORY       Past Surgical History:   Procedure Laterality Date    ENDOSCOPY, COLON, DIAGNOSTIC  10/28/2013    Endoscopic retrograde cholangiopancreatography with pancreatic stent placement    ENDOSCOPY, COLON, DIAGNOSTIC  03/23/2018    Esophagogastroduodenoscopy with biopsy    ERCP  1/10/14    with stent removal         CURRENT MEDICATIONS       Previous Medications    CHLORDIAZEPOXIDE (LIBRIUM) 25 MG CAPSULE    Take 1 capsule by mouth 3 times daily for 1 day. OMEPRAZOLE (PRILOSEC) 20 MG DELAYED RELEASE CAPSULE    Take 1 capsule by mouth 2 times daily (before meals)    SUCRALFATE (CARAFATE) 1 GM TABLET    Take 1 tablet by mouth 4 times daily for 7 days       ALLERGIES     Amoxicillin; Haldol [haloperidol lactate]; Phenergan [promethazine hcl];  Glucosamine; and Shellfish-derived products    FAMILY HISTORY       Family History   Problem Relation Age of Onset    Hypertension Father     High Blood Pressure Father     Alcohol Abuse Father     Depression Mother     High Blood Pressure Paternal Grandfather     Alcohol Abuse Paternal Grandfather     Heart Disease Paternal Grandmother     Anemia Paternal Grandmother     Depression Maternal Aunt     Alcohol Abuse Paternal Aunt     Alcohol Abuse Paternal Uncle           SOCIAL HISTORY       Social History     Socioeconomic History    Marital status: Single     Spouse name: None    Number of children: 1    Years of education: 15    Highest education level: None   Occupational History    Occupation:    Social Needs    Financial resource strain: None    Food insecurity:     Worry: None     Inability: None    Transportation needs:     Medical: None     Non-medical: None   Tobacco Use    Smoking status: Current Every Day Smoker     Packs/day: 0.50     Years: 10.00     Pack years: 5.00     Types: Cigarettes     Start date: 11/21/2007    Smokeless tobacco: Never Used Substance and Sexual Activity    Alcohol use: Yes     Alcohol/week: 3.6 oz     Types: 6 Cans of beer per week     Comment: every day    Drug use: No    Sexual activity: Never   Lifestyle    Physical activity:     Days per week: None     Minutes per session: None    Stress: None   Relationships    Social connections:     Talks on phone: None     Gets together: None     Attends Alevism service: None     Active member of club or organization: None     Attends meetings of clubs or organizations: None     Relationship status: None    Intimate partner violence:     Fear of current or ex partner: None     Emotionally abused: None     Physically abused: None     Forced sexual activity: None   Other Topics Concern    None   Social History Narrative    None       SCREENINGS      @FLOW([id number])@      PHYSICAL EXAM    (up to 7 for level 4, 8 or more for level 5)     ED Triage Vitals [04/25/19 1259]   BP Temp Temp Source Pulse Resp SpO2 Height Weight   139/81 98.2 °F (36.8 °C) Infrared 106 20 97 % 5' 8\" (1.727 m) 140 lb (63.5 kg)       Physical Exam      Constitutional:  Well developed, well nourished, non-toxic appearance   Eyes:  PERRL, conjunctiva normal   HENT:  Atraumatic, external ears normal, nosenormal, oropharynx moist,  Respiratory:  No respiratory distress, normal breath sounds, no rales, no wheezing   Cardiovascular:  Normal rate, normal rhythm, no murmurs,   GI:  Soft, nondistended, nontender, no obvious organomegaly, no mass, no rebound, no guarding   Musculoskeletal:  No tenderness, no deformities. Integument:  no rashes on exposed surfaces  Neurologic:  Alert & oriented x 3,  normalmotor function, normal sensory function, no focal deficits noted   Psychiatric:  Speech and behavior appropriate. Mildly anxious.     DIAGNOSTICRESULTS     EKG: All EKG's are interpreted by the Emergency Department Physician who either signs or Co-signs this chartin the absence of a cardiologist.    EKG 12 Lead (Preliminary result)    Component (Lab Inquiry)   Collection Time Result Time Ventricular Rate Atrial Rate P-R Interval QRS Duration Q-T Interval   04/25/19 13:08:34 04/25/19 13:26:03 103 103 158 88 328       Collection Time Result Time QTc Calculation (Bazett) P Axis R Axis T Axis Diagnosis   04/25/19 13:08:34 04/25/19 13:26:03 429 66 85 40 Sinus tachycardiaOtherwise normal ECG             RADIOLOGY:   Non-plain film images such as CT, Ultrasound and MRI are read by theradiologist. Plain radiographic images are visualized and preliminarily interpreted by the emergency physician with the below findings:                  LABS:  Labs Reviewed - No data to display    All other labs were within normal range or not returned as ofthis dictation. EMERGENCYDEPARTMENT COURSE and DIFFERENTIAL DIAGNOSIS/MDM:   Vitals:    Vitals:    04/25/19 1259   BP: 139/81   Pulse: 106   Resp: 20   Temp: 98.2 °F (36.8 °C)   TempSrc: Infrared   SpO2: 97%   Weight: 140 lb (63.5 kg)   Height: 5' 8\" (1.727 m)       MDM:   Patient's inpatient provider from earlier today Dr. Sylvia Lynch did consult with me regarding this case and the plan discussed with the patient. The patient was hemodynamically stable here in the ER today. We'll dose with medications now the discharge home. He really needs to follow his prior directions which are the oral benzos home. For which he has an rx already at home for. CONSULTS:  None    PROCEDURES:  Unless otherwise noted below, none       Procedures    FINAL IMPRESSION      1. Anxiety state    2.  History of alcohol abuse          DISPOSITION/PLAN   DISPOSITION Discharge - Pending Orders Complete 04/25/2019 01:11:27 PM      PATIENT REFERRED TO:  [facility]) Pre-Services  [phone]  Call in 1 day        DISCHARGE MEDICATIONS:  New Prescriptions    No medications on file          (Please note that portions of this note were completed with a voice recognition program.  Efforts weremade to edit the dictations but occasionally words are mis-transcribed.)    Dexter Corrales III, DO (electronically signed)  Attending Emergency Physician          Darylene Simmering III, DO  04/25/19 [id number]

## 2019-04-25 NOTE — H&P
diagnostic (03/23/2018). Medications Prior to Admission:  Prior to Admission medications    Medication Sig Start Date End Date Taking? Authorizing Provider   ARIPiprazole (ABILIFY) 10 MG tablet Take 10 mg by mouth daily   Yes Historical Provider, MD   benztropine (COGENTIN) 0.5 MG tablet Take 0.5 mg by mouth 2 times daily   Yes Historical Provider, MD   sucralfate (CARAFATE) 1 GM tablet Take 1 tablet by mouth 4 times daily for 10 days 4/20/19 4/30/19  Unk Service, APRN - CNP       Allergies: Allergies   Allergen Reactions    Amoxicillin Hives    Haldol [Haloperidol Lactate] Other (See Comments)     Muscle spasms    Phenergan [Promethazine Hcl] Other (See Comments)     Pt believes it caused muscle spasms    Glucosamine Itching      Itching of throat when eating shrimp. Pt states has had IV contrast for CT's without problem before.  Shellfish-Derived Products      Patient gets anxious around seafood        Social History:  The patient currently lives at home. Tobacco:  reports that he has been smoking cigarettes. He started smoking about 11 years ago. He has a 5.00 pack-year smoking history. He has never used smokeless tobacco.  ETOH:  reports that he drinks about 3.6 oz of alcohol per week. Family History: Reviewed in detail and positive as follows:  family history includes Alcohol Abuse in his father, paternal aunt, paternal grandfather, and paternal uncle; Anemia in his paternal grandmother; Depression in his maternal aunt and mother; Heart Disease in his paternal grandmother; High Blood Pressure in his father and paternal grandfather; Hypertension in his father. PHYSICAL EXAM:  /66   Pulse 74   Temp 97.9 °F (36.6 °C) (Oral)   Resp 24   Ht 5' 8\" (1.727 m)   Wt 140 lb (63.5 kg)   SpO2 97%   BMI 21.29 kg/m²   General appearance: No apparent distress appears stated age and cooperative. HEENT Normal cephalic, atraumatic without obvious deformity.   Pupils equal, round, and substances in his personal belongings after admission that were thought to be due to patient diverting inpatient medications. Patient has been given multiple outpatient resources and now reports that he has a bed opening up at [facility] on this coming Friday or Monday, however he has stated this in the past and did not go. Will admit overnight under observation on librium only along with multivitamin, folic acid and thiamine. Discussed with patient that he will have no ordered benzodiazepines or narcotics. Also discussed that patient will need to give permission to have his personal belonging checked by security, safety camera placed in his room and that after oral medications are administered while admitted will need to give permission to have nurses check that patient swallowed pills. Patient is agreeable to this plan and this was witnessed by ER nurse. Will consult  for discharge planning. Patient should be discharged from hospital in am.   · Chronic pancreatitis. Abdominal CT with no acute findings. Non-narcotic pain control. Antiemetics as needed. DVT Prophylaxis: Lovenox  Diet: General  Code Status: Full code     Admit as Observation. I anticipate hospitalization for less than two midnights based on the above assessment and plan.      ARTURO Hernandez - CNP    4/24/2019 9:07 PM

## 2019-04-25 NOTE — ED NOTES
This RN and Jason Alberts NP to bedside for patient assessment. Discussion with patient about staff and security examining pt belongings due to past hx of patient bringing unknown meds into hospital and hiding them in his belongings. Pt agreeable to this. Pt also agrees to have nurse present for all medication administration and to allow nurse to check mouth to assure pills were taken. Finally, pt agreeable to security camera in room on floor.  Pt understands that he will not be given narcs or benzos while admitted to the hospital.     Jaime Purvis RN  04/24/19 [id number]

## 2019-04-25 NOTE — DISCHARGE SUMMARY
Hospital Medicine Discharge Summary    Patient: Diana Scott     Gender: male  : 1990   Age: 33 y.o. MRN: [id number]    Admitting Physician: Cierra Elizondo MD  Discharge Physician: Janeth Melgar MD     Code Status: Full Code     Admit Date: 2019   Discharge Date:   19    Disposition:  Home    Discharge Diagnoses: Active Hospital Problems    Diagnosis Date Noted    Chronic pancreatitis ([address] [K86.1] 2019    Abdominal pain [R10.9] 2019    Alcohol abuse [F10.10] 2019    Drug-seeking behavior [Z76.5] 2019    MDD (major depressive disorder), recurrent episode, mild ([address] [F33.0] 2018    Benzodiazepine abuse ([address] [address] [F13.10] 2017    Panic attacks [F41.0] 2017       Follow-up appointments:  one week    Outpatient to do list: Stop drinking alcohol. Go to alcohol rehab. Condition at Discharge:  Stable    Hospital Course:   34 yo M with continuous alcohol abuse, chronic pancreatitis, well documented drug seeking and manipulative behavior who came to ER with abdominal pain. Patient had CT Ab/P with evidence of chronic (NOT acute) pancreatitis. Placed in observation. This patient is known to demand benzodiazepine and narcotics during hospital stays. He has no evidence of active alcohol withdrawal (no tremors (he voluntarily shakes, not present on distraction or when sleeping), no HTN, no tachycardia, no diaphoresis, no confusion, no hallucinations). He claims he has appointments at [facility]. He has been written for 1 day of PO Librium until he decides to go to alcohol rehab program of his choice (he has repeatedly been given resources). He is not a candidate for narcotic therapy due to drug seeking behavior. He can use Tylenol PRN abdominal pain. Discussed all of the above frankly with patient.       Discharge Medications:   Current Discharge Medication List      START taking these medications    Details   chlordiazePOXIDE (LIBRIUM) 25 MG capsule Take 1 capsule by mouth 3 times daily for 1 day. Qty: 3 capsule, Refills: 0    Associated Diagnoses: Alcohol withdrawal syndrome with complication (HCC)      omeprazole (PRILOSEC) 20 MG delayed release capsule Take 1 capsule by mouth 2 times daily (before meals)  Qty: 60 capsule, Refills: 0           Current Discharge Medication List      CONTINUE these medications which have CHANGED    Details   sucralfate (CARAFATE) 1 GM tablet Take 1 tablet by mouth 4 times daily for 7 days  Qty: 28 tablet, Refills: 0           Current Discharge Medication List        Current Discharge Medication List      STOP taking these medications       ARIPiprazole (ABILIFY) 10 MG tablet Comments:   Reason for Stopping:         benztropine (COGENTIN) 0.5 MG tablet Comments:   Reason for Stopping:             Discharge Exam:    /85   Pulse 69   Temp 97.1 °F (36.2 °C) (Temporal)   Resp 18   Ht 5' 8\" (1.727 m)   Wt 139 lb 11.2 oz (63.4 kg)   SpO2 99%   BMI 21.24 kg/m²   General appearance:  NAD, laying in bed  HEENT:   Normal cephalic, atraumatic, moist mucous membranes, no oropharyngeal erythema or exudate  Neck: Supple, trachea midline, no anterior cervical or SC LAD  Heart[de-identified] Normal s1/s2, RRR, no murmurs, gallops, or rubs. No leg edema  Lungs:  No use of accessory musclesNormal respiratory effort. Clear to auscultation, bilaterally without Rales/Wheezes/Rhonchi. Abdomen: Soft, non-tender, non-distended, bowel sounds present, no masses, VOLUNTARY guarding  Musculoskeletal:  No clubbing, no cyanosis, no edema  Skin: No lesion or masses  Neurologic:  Neurovascularly intact without any focal sensory/motor deficits. Grossly non-focal.  No tremors  Psychiatric:  A & O x3, no SI/HI/Hallucinations  Neuro: Grossly intact, moves all four extremities     Labs:  For convenience and continuity at follow-up the following most recent labs are provided:    Lab Results   Component Value Date    WBC 7.2 04/24/2019    HGB 13.9 04/24/2019 HCT 39.7 04/24/2019    MCV 89.7 04/24/2019     04/24/2019     04/24/2019    K 4.3 04/24/2019     04/24/2019    CO2 23 04/24/2019    BUN 14 04/24/2019    CREATININE 0.6 04/24/2019    CALCIUM 9.4 04/24/2019    PHOS 3.4 04/05/2019    ALKPHOS 66 04/24/2019    ALT 12 04/24/2019    AST 25 04/24/2019    BILITOT 0.6 04/24/2019    BILIDIR <0.2 04/24/2019    LABALBU 4.6 04/24/2019    LDLCALC 30 03/17/2018    TRIG 110 02/08/2019     Lab Results   Component Value Date    INR 1.09 04/24/2019    INR 1.10 03/31/2019    INR 1.11 03/30/2019       Radiology:  Ct Abdomen Pelvis Wo Contrast Additional Contrast? None    Result Date: 4/12/2019  EXAMINATION: CT OF THE ABDOMEN AND PELVIS WITHOUT CONTRAST 4/12/2019 3:33 pm TECHNIQUE: CT of the abdomen and pelvis was performed without the administration of intravenous contrast. Multiplanar reformatted images are provided for review. Dose modulation, iterative reconstruction, and/or weight based adjustment of the mA/kV was utilized to reduce the radiation dose to as low as reasonably achievable. COMPARISON: 03/25/2019 HISTORY: ORDERING SYSTEM PROVIDED HISTORY: pancreatitis? TECHNOLOGIST PROVIDED HISTORY: Additional Contrast?->None Ordering Physician Provided Reason for Exam: ABD PAIN Acuity: Acute Type of Exam: Initial FINDINGS: Lower Chest: The visualized lungs are clear. Base of the heart unremarkable. Organs: Evaluation is limited without intravenous contrast.  Having said that, no acute or suspicious hepatic abnormalities are identified. The spleen is unremarkable. Normal adrenals. Pancreatic calcifications are again identified. The pancreatic duct is ectatic and dilated, grossly similar when compared to the previous exam. Definite new peripancreatic fat stranding is not identified. No focal fluid collections are identified.   The presence or absence of necrosis cannot be ascertained without intravenous contrast. Noncontrast imaging of the kidneys is unremarkable. GI/Bowel: There is mild diverticulosis of the large bowel, but without CT evidence of diverticulitis. Appendix is within normal limits. Normal small bowel. Stomach and duodenum unremarkable. Pelvis: Pelvic viscera are unremarkable. No free pelvic fluid. Peritoneum/Retroperitoneum: Abdominal aorta normal in caliber. No lymphadenopathy. Bones/Soft Tissues: No osteolytic or osteoblastic bone lesions. No acute bony abnormalities. Pancreatic calcifications along with pancreatic ductal ectasia and dilation, compatible with chronic pancreatitis. Definite peripancreatic fat stranding is not identified to suggest acute pancreatitis. No focal peripancreatic fluid collections are identified. Ct Abdomen Pelvis W Iv Contrast Additional Contrast? None    Result Date: 4/24/2019  EXAMINATION: CT OF THE ABDOMEN AND PELVIS WITH CONTRAST 4/24/2019 7:39 pm TECHNIQUE: CT of the abdomen and pelvis was performed with the administration of intravenous contrast. Multiplanar reformatted images are provided for review. Dose modulation, iterative reconstruction, and/or weight based adjustment of the mA/kV was utilized to reduce the radiation dose to as low as reasonably achievable. COMPARISON: CT 04/12/2019 HISTORY: ORDERING SYSTEM PROVIDED HISTORY: abd pain h/o chronic panc TECHNOLOGIST PROVIDED HISTORY: If patient is on cardiac monitor and/or pulse ox, they may be taken off cardiac monitor and pulse ox, left on O2 if currently on. All monitors reattached when patient returns to room. Additional Contrast?->None Ordering Physician Provided Reason for Exam: abd pain h/o chronic panc Acuity: Acute Type of Exam: Initial Relevant Medical/Surgical History: Abdominal Pain (pt has been having abd pain with naseas, worse after eating. pt shaky feeling anxious. pt states he has hx of pancreatitis. Pt states he drinks approx 20 beers daily. last dring was yesterday afternoon.) FINDINGS: Lower Chest: Clear lungs.  Organs:

## 2019-04-25 NOTE — CARE COORDINATION
Discharge Planning:  SW met with pt and introduced self. Pt's hands and legs were shaking and pt reported has not had a drink in 2 days. SW asked if pt has gotten involved with an inpatient or outpatient alcohol rehab program yet. Pt stated no. Pt reported he had been to [facility] before, and his dad got help from there a year ago and has been sober since. SW encouraged pt to go directly to [facility] today after discharge from ER. SW stated they don't accept pt's medicaid for inpatient but could provide outpatient. SW informed if pt wants inpatient detox, he needs to go [facility]. SW provided address and phone number to [facility] per pt's request.  Pt reported his roommate is picking him up and could take him there. Pt also showed paperwork for [facility] he received yesterday. SW stated this was also a good outpatient program and encouraged to call. SW asked if there were any other barriers that hinder pt from receiving alcohol treatment. Pt reported he has anxiety and depression, fights with his ex-girlfriend a lot which causes stress. SW stated that these rehab facilities can help with mental health issues at the same time they treat the alcohol problem. SW provided pt with a lot of encouragement. Pt reported no other needs.     Electronically signed by SHANAE Lowery, JANW on 4/25/2019 at 1:58 PM

## 2019-04-26 LAB
EKG ATRIAL RATE: 103 BPM
EKG DIAGNOSIS: NORMAL
EKG P AXIS: 66 DEGREES
EKG P-R INTERVAL: 158 MS
EKG Q-T INTERVAL: 328 MS
EKG QRS DURATION: 88 MS
EKG QTC CALCULATION (BAZETT): 429 MS
EKG R AXIS: 85 DEGREES
EKG T AXIS: 40 DEGREES
EKG VENTRICULAR RATE: 103 BPM

## 2019-04-26 PROCEDURE — 93010 ELECTROCARDIOGRAM REPORT: CPT | Performed by: INTERNAL MEDICINE

## 2019-04-29 ENCOUNTER — APPOINTMENT (OUTPATIENT)
Dept: CT IMAGING | Age: 29
End: 2019-04-29
Payer: COMMERCIAL

## 2019-04-29 ENCOUNTER — HOSPITAL ENCOUNTER (OUTPATIENT)
Age: 29
Setting detail: OBSERVATION
Discharge: HOME | End: 2019-05-01
Attending: EMERGENCY MEDICINE | Admitting: INTERNAL MEDICINE
Payer: COMMERCIAL

## 2019-04-29 ENCOUNTER — APPOINTMENT (OUTPATIENT)
Dept: ULTRASOUND IMAGING | Age: 29
End: 2019-04-29
Payer: COMMERCIAL

## 2019-04-29 DIAGNOSIS — K86.1 CHRONIC PANCREATITIS, UNSPECIFIED PANCREATITIS TYPE (HCC): ICD-10-CM

## 2019-04-29 DIAGNOSIS — F10.939 ALCOHOL WITHDRAWAL SYNDROME WITH COMPLICATION (HCC): Primary | ICD-10-CM

## 2019-04-29 LAB
A/G RATIO: 1.6 (ref 1.1–2.2)
ACETAMINOPHEN LEVEL: <5 UG/ML (ref 10–30)
ALBUMIN SERPL-MCNC: 4.9 G/DL (ref 3.4–5)
ALP BLD-CCNC: 66 U/L (ref 40–129)
ALT SERPL-CCNC: 10 U/L (ref 10–40)
AMPHETAMINE SCREEN, URINE: ABNORMAL
ANION GAP SERPL CALCULATED.3IONS-SCNC: 13 MMOL/L (ref 3–16)
AST SERPL-CCNC: 18 U/L (ref 15–37)
BARBITURATE SCREEN URINE: ABNORMAL
BASOPHILS ABSOLUTE: 0 K/UL (ref 0–0.2)
BASOPHILS RELATIVE PERCENT: 0.4 %
BENZODIAZEPINE SCREEN, URINE: POSITIVE
BILIRUB SERPL-MCNC: 0.4 MG/DL (ref 0–1)
BUN BLDV-MCNC: 14 MG/DL (ref 7–20)
CALCIUM SERPL-MCNC: 9.6 MG/DL (ref 8.3–10.6)
CANNABINOID SCREEN URINE: ABNORMAL
CHLORIDE BLD-SCNC: 108 MMOL/L (ref 99–110)
CO2: 20 MMOL/L (ref 21–32)
COCAINE METABOLITE SCREEN URINE: ABNORMAL
CREAT SERPL-MCNC: 0.6 MG/DL (ref 0.9–1.3)
EOSINOPHILS ABSOLUTE: 0.4 K/UL (ref 0–0.6)
EOSINOPHILS RELATIVE PERCENT: 5.5 %
ETHANOL: NORMAL MG/DL (ref 0–0.08)
GFR AFRICAN AMERICAN: >60
GFR NON-AFRICAN AMERICAN: >60
GLOBULIN: 3 G/DL
GLUCOSE BLD-MCNC: 94 MG/DL (ref 70–99)
HCT VFR BLD CALC: 42.9 % (ref 40.5–52.5)
HEMOGLOBIN: 14.9 G/DL (ref 13.5–17.5)
LIPASE: 113 U/L (ref 13–60)
LYMPHOCYTES ABSOLUTE: 1.3 K/UL (ref 1–5.1)
LYMPHOCYTES RELATIVE PERCENT: 18.5 %
Lab: ABNORMAL
MCH RBC QN AUTO: 31.1 PG (ref 26–34)
MCHC RBC AUTO-ENTMCNC: 34.7 G/DL (ref 31–36)
MCV RBC AUTO: 89.8 FL (ref 80–100)
METHADONE SCREEN, URINE: ABNORMAL
MONOCYTES ABSOLUTE: 0.5 K/UL (ref 0–1.3)
MONOCYTES RELATIVE PERCENT: 7.8 %
NEUTROPHILS ABSOLUTE: 4.6 K/UL (ref 1.7–7.7)
NEUTROPHILS RELATIVE PERCENT: 67.8 %
OPIATE SCREEN URINE: POSITIVE
OXYCODONE URINE: ABNORMAL
PDW BLD-RTO: 11.8 % (ref 12.4–15.4)
PH UA: 5.5
PHENCYCLIDINE SCREEN URINE: ABNORMAL
PLATELET # BLD: 260 K/UL (ref 135–450)
PMV BLD AUTO: 8 FL (ref 5–10.5)
POTASSIUM SERPL-SCNC: 4.2 MMOL/L (ref 3.5–5.1)
PROPOXYPHENE SCREEN: ABNORMAL
RBC # BLD: 4.77 M/UL (ref 4.2–5.9)
SALICYLATE, SERUM: <0.3 MG/DL (ref 15–30)
SODIUM BLD-SCNC: 141 MMOL/L (ref 136–145)
TOTAL PROTEIN: 7.9 G/DL (ref 6.4–8.2)
WBC # BLD: 6.9 K/UL (ref 4–11)

## 2019-04-29 PROCEDURE — 6360000004 HC RX CONTRAST MEDICATION: Performed by: EMERGENCY MEDICINE

## 2019-04-29 PROCEDURE — 6360000002 HC RX W HCPCS: Performed by: EMERGENCY MEDICINE

## 2019-04-29 PROCEDURE — 2580000003 HC RX 258: Performed by: EMERGENCY MEDICINE

## 2019-04-29 PROCEDURE — 99285 EMERGENCY DEPT VISIT HI MDM: CPT

## 2019-04-29 PROCEDURE — 6370000000 HC RX 637 (ALT 250 FOR IP): Performed by: EMERGENCY MEDICINE

## 2019-04-29 PROCEDURE — G0480 DRUG TEST DEF 1-7 CLASSES: HCPCS

## 2019-04-29 PROCEDURE — 74177 CT ABD & PELVIS W/CONTRAST: CPT

## 2019-04-29 PROCEDURE — 96375 TX/PRO/DX INJ NEW DRUG ADDON: CPT

## 2019-04-29 PROCEDURE — 96376 TX/PRO/DX INJ SAME DRUG ADON: CPT

## 2019-04-29 PROCEDURE — 85025 COMPLETE CBC W/AUTO DIFF WBC: CPT

## 2019-04-29 PROCEDURE — 96374 THER/PROPH/DIAG INJ IV PUSH: CPT

## 2019-04-29 PROCEDURE — 80053 COMPREHEN METABOLIC PANEL: CPT

## 2019-04-29 PROCEDURE — 93005 ELECTROCARDIOGRAM TRACING: CPT | Performed by: EMERGENCY MEDICINE

## 2019-04-29 PROCEDURE — 83690 ASSAY OF LIPASE: CPT

## 2019-04-29 PROCEDURE — 96361 HYDRATE IV INFUSION ADD-ON: CPT

## 2019-04-29 PROCEDURE — 2500000003 HC RX 250 WO HCPCS: Performed by: EMERGENCY MEDICINE

## 2019-04-29 PROCEDURE — 80307 DRUG TEST PRSMV CHEM ANLYZR: CPT

## 2019-04-29 RX ORDER — LORAZEPAM 2 MG/ML
2 INJECTION INTRAMUSCULAR ONCE
Status: COMPLETED | OUTPATIENT
Start: 2019-04-29 | End: 2019-04-29

## 2019-04-29 RX ORDER — ONDANSETRON 2 MG/ML
8 INJECTION INTRAMUSCULAR; INTRAVENOUS ONCE
Status: COMPLETED | OUTPATIENT
Start: 2019-04-29 | End: 2019-04-29

## 2019-04-29 RX ORDER — METOCLOPRAMIDE HYDROCHLORIDE 5 MG/ML
10 INJECTION INTRAMUSCULAR; INTRAVENOUS ONCE
Status: COMPLETED | OUTPATIENT
Start: 2019-04-29 | End: 2019-04-29

## 2019-04-29 RX ORDER — 0.9 % SODIUM CHLORIDE 0.9 %
1000 INTRAVENOUS SOLUTION INTRAVENOUS ONCE
Status: COMPLETED | OUTPATIENT
Start: 2019-04-29 | End: 2019-04-29

## 2019-04-29 RX ORDER — MORPHINE SULFATE 4 MG/ML
4 INJECTION, SOLUTION INTRAMUSCULAR; INTRAVENOUS
Status: COMPLETED | OUTPATIENT
Start: 2019-04-29 | End: 2019-04-30

## 2019-04-29 RX ADMIN — LORAZEPAM 2 MG: 2 INJECTION INTRAMUSCULAR; INTRAVENOUS at 20:38

## 2019-04-29 RX ADMIN — SODIUM CHLORIDE 1000 ML: 9 INJECTION, SOLUTION INTRAVENOUS at 20:02

## 2019-04-29 RX ADMIN — LORAZEPAM 2 MG: 2 INJECTION INTRAMUSCULAR; INTRAVENOUS at 22:43

## 2019-04-29 RX ADMIN — IOPAMIDOL 75 ML: 755 INJECTION, SOLUTION INTRAVENOUS at 20:30

## 2019-04-29 RX ADMIN — MORPHINE SULFATE 4 MG: 4 INJECTION INTRAVENOUS at 20:02

## 2019-04-29 RX ADMIN — METOCLOPRAMIDE 10 MG: 5 INJECTION, SOLUTION INTRAMUSCULAR; INTRAVENOUS at 20:38

## 2019-04-29 RX ADMIN — FAMOTIDINE 20 MG: 10 INJECTION, SOLUTION INTRAVENOUS at 18:55

## 2019-04-29 RX ADMIN — MORPHINE SULFATE 4 MG: 4 INJECTION INTRAVENOUS at 21:59

## 2019-04-29 RX ADMIN — SODIUM CHLORIDE 1000 ML: 9 INJECTION, SOLUTION INTRAVENOUS at 18:50

## 2019-04-29 RX ADMIN — LIDOCAINE HYDROCHLORIDE: 20 SOLUTION ORAL; TOPICAL at 18:53

## 2019-04-29 RX ADMIN — LORAZEPAM 2 MG: 2 INJECTION INTRAMUSCULAR; INTRAVENOUS at 18:55

## 2019-04-29 RX ADMIN — ONDANSETRON 8 MG: 2 INJECTION INTRAMUSCULAR; INTRAVENOUS at 18:55

## 2019-04-29 ASSESSMENT — PAIN SCALES - GENERAL
PAINLEVEL_OUTOF10: 8
PAINLEVEL_OUTOF10: 7
PAINLEVEL_OUTOF10: 8
PAINLEVEL_OUTOF10: 8

## 2019-04-29 NOTE — ED NOTES
Pt in bed, respirations easy, even, and unlabored. No s/s of distress, alert and orientated. No tremors or shakes seen, pt is anxious.       Gena Brothers RN  04/29/19 0930

## 2019-04-30 ENCOUNTER — APPOINTMENT (OUTPATIENT)
Dept: MRI IMAGING | Age: 29
End: 2019-04-30
Payer: COMMERCIAL

## 2019-04-30 PROBLEM — R10.13 EPIGASTRIC PAIN: Status: ACTIVE | Noted: 2019-04-30

## 2019-04-30 PROCEDURE — 74181 MRI ABDOMEN W/O CONTRAST: CPT

## 2019-04-30 PROCEDURE — 2580000003 HC RX 258: Performed by: INTERNAL MEDICINE

## 2019-04-30 PROCEDURE — 6360000002 HC RX W HCPCS: Performed by: INTERNAL MEDICINE

## 2019-04-30 PROCEDURE — G0378 HOSPITAL OBSERVATION PER HR: HCPCS

## 2019-04-30 PROCEDURE — 6370000000 HC RX 637 (ALT 250 FOR IP): Performed by: INTERNAL MEDICINE

## 2019-04-30 PROCEDURE — 96376 TX/PRO/DX INJ SAME DRUG ADON: CPT

## 2019-04-30 PROCEDURE — 6360000002 HC RX W HCPCS: Performed by: EMERGENCY MEDICINE

## 2019-04-30 PROCEDURE — 6370000000 HC RX 637 (ALT 250 FOR IP): Performed by: PHYSICIAN ASSISTANT

## 2019-04-30 RX ORDER — SODIUM CHLORIDE 0.9 % (FLUSH) 0.9 %
10 SYRINGE (ML) INJECTION PRN
Status: DISCONTINUED | OUTPATIENT
Start: 2019-04-30 | End: 2019-05-01 | Stop reason: HOSPADM

## 2019-04-30 RX ORDER — CHLORDIAZEPOXIDE HYDROCHLORIDE 25 MG/1
25 CAPSULE, GELATIN COATED ORAL 4 TIMES DAILY
Status: DISCONTINUED | OUTPATIENT
Start: 2019-04-30 | End: 2019-05-01 | Stop reason: HOSPADM

## 2019-04-30 RX ORDER — ACETAMINOPHEN 325 MG/1
650 TABLET ORAL EVERY 4 HOURS PRN
Status: DISCONTINUED | OUTPATIENT
Start: 2019-04-30 | End: 2019-05-01 | Stop reason: HOSPADM

## 2019-04-30 RX ORDER — SODIUM CHLORIDE 9 MG/ML
INJECTION, SOLUTION INTRAVENOUS CONTINUOUS
Status: DISCONTINUED | OUTPATIENT
Start: 2019-04-30 | End: 2019-05-01 | Stop reason: HOSPADM

## 2019-04-30 RX ORDER — OXYCODONE HYDROCHLORIDE AND ACETAMINOPHEN 5; 325 MG/1; MG/1
2 TABLET ORAL EVERY 6 HOURS PRN
Status: DISCONTINUED | OUTPATIENT
Start: 2019-04-30 | End: 2019-04-30

## 2019-04-30 RX ORDER — KETOROLAC TROMETHAMINE 30 MG/ML
30 INJECTION, SOLUTION INTRAMUSCULAR; INTRAVENOUS EVERY 6 HOURS
Status: DISCONTINUED | OUTPATIENT
Start: 2019-04-30 | End: 2019-04-30

## 2019-04-30 RX ORDER — ONDANSETRON 2 MG/ML
4 INJECTION INTRAMUSCULAR; INTRAVENOUS EVERY 6 HOURS PRN
Status: DISCONTINUED | OUTPATIENT
Start: 2019-04-30 | End: 2019-05-01 | Stop reason: HOSPADM

## 2019-04-30 RX ORDER — SODIUM CHLORIDE 0.9 % (FLUSH) 0.9 %
10 SYRINGE (ML) INJECTION EVERY 12 HOURS SCHEDULED
Status: DISCONTINUED | OUTPATIENT
Start: 2019-04-30 | End: 2019-05-01 | Stop reason: HOSPADM

## 2019-04-30 RX ORDER — KETOROLAC TROMETHAMINE 30 MG/ML
30 INJECTION, SOLUTION INTRAMUSCULAR; INTRAVENOUS EVERY 6 HOURS PRN
Status: DISCONTINUED | OUTPATIENT
Start: 2019-04-30 | End: 2019-05-01 | Stop reason: HOSPADM

## 2019-04-30 RX ADMIN — CHLORDIAZEPOXIDE HYDROCHLORIDE 25 MG: 25 CAPSULE ORAL at 16:08

## 2019-04-30 RX ADMIN — PANCRELIPASE 2 CAPSULE: 24000; 76000; 120000 CAPSULE, DELAYED RELEASE PELLETS ORAL at 16:08

## 2019-04-30 RX ADMIN — CHLORDIAZEPOXIDE HYDROCHLORIDE 25 MG: 25 CAPSULE ORAL at 01:54

## 2019-04-30 RX ADMIN — Medication 10 ML: at 22:18

## 2019-04-30 RX ADMIN — CHLORDIAZEPOXIDE HYDROCHLORIDE 25 MG: 25 CAPSULE ORAL at 22:17

## 2019-04-30 RX ADMIN — ONDANSETRON 4 MG: 2 INJECTION INTRAMUSCULAR; INTRAVENOUS at 01:54

## 2019-04-30 RX ADMIN — MORPHINE SULFATE 4 MG: 4 INJECTION INTRAVENOUS at 00:24

## 2019-04-30 RX ADMIN — CHLORDIAZEPOXIDE HYDROCHLORIDE 25 MG: 25 CAPSULE ORAL at 08:08

## 2019-04-30 RX ADMIN — SODIUM CHLORIDE: 9 INJECTION, SOLUTION INTRAVENOUS at 01:53

## 2019-04-30 ASSESSMENT — PAIN DESCRIPTION - ORIENTATION: ORIENTATION: MID

## 2019-04-30 ASSESSMENT — PAIN DESCRIPTION - PAIN TYPE: TYPE: ACUTE PAIN

## 2019-04-30 ASSESSMENT — PAIN SCALES - GENERAL
PAINLEVEL_OUTOF10: 3
PAINLEVEL_OUTOF10: 4
PAINLEVEL_OUTOF10: 9
PAINLEVEL_OUTOF10: 3

## 2019-04-30 ASSESSMENT — PAIN DESCRIPTION - LOCATION: LOCATION: ABDOMEN

## 2019-04-30 ASSESSMENT — PAIN DESCRIPTION - PROGRESSION
CLINICAL_PROGRESSION: GRADUALLY IMPROVING
CLINICAL_PROGRESSION: GRADUALLY IMPROVING

## 2019-04-30 ASSESSMENT — PAIN DESCRIPTION - DESCRIPTORS: DESCRIPTORS: ACHING

## 2019-04-30 ASSESSMENT — PAIN DESCRIPTION - ONSET: ONSET: GRADUAL

## 2019-04-30 ASSESSMENT — PAIN DESCRIPTION - FREQUENCY: FREQUENCY: INTERMITTENT

## 2019-04-30 NOTE — ED NOTES
Pt in bed, respirations easy, even, and unlabored. No s/s of distress, alert and orientated. No tremors or shakes seen.       Justin Leader, MARINA  04/29/19 [id number]

## 2019-04-30 NOTE — H&P
DIAGNOSTIC  10/28/2013    Endoscopic retrograde cholangiopancreatography with pancreatic stent placement    ENDOSCOPY, COLON, DIAGNOSTIC  03/23/2018    Esophagogastroduodenoscopy with biopsy    ERCP  1/10/14    with stent removal       Medications Prior to Admission:    Prior to Admission medications    Medication Sig Start Date End Date Taking? Authorizing Provider   sucralfate (CARAFATE) 1 GM tablet Take 1 tablet by mouth 4 times daily for 7 days 4/25/19 5/2/19  Marcine Fleischer, MD   omeprazole (PRILOSEC) 20 MG delayed release capsule Take 1 capsule by mouth 2 times daily (before meals) 4/25/19   Marcine Fleischer, MD       Allergies:  Amoxicillin; Haldol [haloperidol lactate]; Phenergan [promethazine hcl]; Glucosamine; and Shellfish-derived products    Social History:  The patient currently lives with roommate    TOBACCO:   reports that he has been smoking cigarettes. He started smoking about 11 years ago. He has a 5.00 pack-year smoking history. He has never used smokeless tobacco.  ETOH:   reports that he drinks alcohol. Family History:  Reviewed in detail and negative for DM, Early CAD, Cancer, CVA. Positive as follows:        Problem Relation Age of Onset    Hypertension Father     High Blood Pressure Father     Alcohol Abuse Father     Depression Mother     High Blood Pressure Paternal Grandfather     Alcohol Abuse Paternal Grandfather     Heart Disease Paternal Grandmother     Anemia Paternal Grandmother     Depression Maternal Aunt     Alcohol Abuse Paternal Aunt     Alcohol Abuse Paternal Uncle        REVIEW OF SYSTEMS:   Positive for epigastric pain, nausea, tremors and as noted in the HPI. All other systems reviewed and negative. PHYSICAL EXAM:    /71   Pulse 74   Temp 97.7 °F (36.5 °C) (Oral)   Resp 18   Ht 5' 8\" (1.727 m)   Wt 140 lb (63.5 kg)   SpO2 98%   BMI 21.29 kg/m²     General appearance: No apparent distress appears stated age and cooperative.   HEENT Normal cephalic, atraumatic without obvious deformity. Pupils equal, round, and reactive to light. Extra ocular muscles intact. Conjunctivae/corneas clear. Neck: Supple, No jugular venous distention/bruits. Lungs: Clear to auscultation, bilaterally without Rales/Wheezes/Rhonchi with good respiratory effort. Heart: Regular rate and rhythm with Normal S1/S2 without murmurs, rubs or gallops  Abdomen: Soft, epigastric tenderness, non-distended without rigidity or guarding and positive bowel sounds all four quadrants. Extremities: No clubbing, cyanosis, or edema bilaterally. Skin: Skin color, texture, turgor normal.  No rashes or lesions. Neurologic: Alert and oriented X 3, neurovascularly intact with sensory/motor intact upper extremities/lower extremities, bilaterally. Cranial nerves: II-XII intact, grossly non-focal.  Mental status: Alert, oriented, thought content appropriate. CBC   Recent Labs     04/29/19  1848   WBC 6.9   HGB 14.9   HCT 42.9         RENAL  Recent Labs     04/29/19  1848      K 4.2      CO2 20*   BUN 14   CREATININE 0.6*     LFT'S  Recent Labs     04/29/19  1848   AST 18   ALT 10   BILITOT 0.4   ALKPHOS 66     U/A:    Lab Results   Component Value Date    COLORU YELLOW 04/24/2019    WBCUA 1 02/09/2019    RBCUA 1 02/09/2019    CLARITYU Clear 04/24/2019    SPECGRAV 1.023 04/24/2019    LEUKOCYTESUR Negative 04/24/2019    BLOODU Negative 04/24/2019    GLUCOSEU Negative 04/24/2019       Active Hospital Problems    Diagnosis Date Noted    Epigastric pain [R10.13] 04/30/2019    Chronic pancreatitis ([facility] [address] [K86.1] 04/25/2019    Drug-seeking behavior [Z76.5] 04/14/2019    Alcohol withdrawal, uncomplicated ([facility] [address] [O23.680] 01/28/2018         ASSESSMENT/PLAN:  33 y.o. male with a history of alcoholic abuse, chronic pancreatitis, drug-seeking behavior recently discharged from hospital and was due for rehab with continued drinking over the weekend admitted with epigastric discomfort. It's noted that the patient has drug-seeking behavior and especially for controlled substances. He does not have tongue fasciculation/tremor, no tachycardia, no hypertension no diaphoresis daily noted alcohol withdrawal and even though he has tremors they seem to be more of intentional tremors. Will observe overnight and treat with NSAID and Librium  Will arrange for discharge to substance abuse rehab facility during the day  IV fluids for hydration overnight      DVT Prophylaxis: Subcut enoxaparin  Diet: DIET CLEAR LIQUID;  Code Status: Full Code         Nafisa Kirby MD    Thank you No primary care provider on file. for the opportunity to be involved in this patient's care. If you have any questions or concerns please feel free to contact me at [phone].

## 2019-04-30 NOTE — PROGRESS NOTES
Pt up from ED around [address], admitted for chronic pancreatitis and alcohol withdraw/abuse, epigastric pain, nauseas. Pt recently d/c last week from 3T with same complaints, hx of alcohol and drug seeking behavior. GI consult The care plan and education has been reviewed and mutually agreed upon with the patient, call light within reach, will continue to monitor.

## 2019-04-30 NOTE — PROGRESS NOTES
MRCP completed. [place] [facility] for further order. Patient requesting diet order.

## 2019-04-30 NOTE — PROGRESS NOTES
[facility] PROGRESS NOTE    4/30/2019 7:32 AM        Name: Monica Simmons Admitted: 4/29/2019  Primary Care Provider: No primary care provider on file. (Tel: None)      Subjective: Dewane Newness Pt seen this am  He reports some abdominal pain and is requesting IV dilaudid. We had detailed discussion regarding his plan of care which will NOT include any narcotics or benzo's. We also had detailed conversation regarding the need for complete alcohol cessation. Pt reports he is planning on attending outpatient tx at [facility]     Reviewed interval ancillary notes    Current Medications    chlordiazePOXIDE (LIBRIUM) capsule 25 mg 4x Daily   sodium chloride flush 0.9 % injection 10 mL 2 times per day   sodium chloride flush 0.9 % injection 10 mL PRN   magnesium hydroxide (MILK OF MAGNESIA) 400 MG/5ML suspension 30 mL Daily PRN   ondansetron (ZOFRAN) injection 4 mg Q6H PRN   enoxaparin (LOVENOX) injection 40 mg Daily   0.9 % sodium chloride infusion Continuous   ketorolac (TORADOL) injection 30 mg Q6H PRN       Objective:  /71   Pulse 74   Temp 97.7 °F (36.5 °C) (Oral)   Resp 18   Ht 5' 8\" (1.727 m)   Wt 140 lb (63.5 kg)   SpO2 98%   BMI 21.29 kg/m²     Intake/Output Summary (Last 24 hours) at 4/30/2019 0732  Last data filed at 4/29/2019 2222  Gross per 24 hour   Intake 2000 ml   Output --   Net 2000 ml    Wt Readings from Last 3 Encounters:   04/29/19 140 lb (63.5 kg)   04/25/19 140 lb (63.5 kg)   04/25/19 139 lb 11.2 oz (63.4 kg)       General appearance:  Appears comfortable but becomes easily anxious. He is alert and oriented   Eyes: Sclera clear. Pupils equal.  ENT: Moist oral mucosa. Trachea midline, no adenopathy. Cardiovascular: Regular rhythm, normal S1, S2. No murmur. No edema in lower extremities  Respiratory: Not using accessory muscles. Good inspiratory effort.  Clear to auscultation bilaterally, no wheeze or crackles. GI: Abdomen soft with bowel sounds present. Reports pain over the epigastric region   Musculoskeletal: No cyanosis in digits, neck supple  Neurology: CN 2-12 grossly intact. No speech or motor deficits  Psych: Normal affect. Alert and oriented in time, place and person  Skin: Warm, dry, normal turgor    Labs and Tests:  CBC:   Recent Labs     04/29/19  1848   WBC 6.9   HGB 14.9        BMP:  Recent Labs     04/29/19  1848      K 4.2      CO2 20*   BUN 14   CREATININE 0.6*   GLUCOSE 94     Hepatic: Recent Labs     04/29/19  1848   AST 18   ALT 10   BILITOT 0.4   ALKPHOS 66     2 filling defects within the distal pancreatic duct which may represent   intraductal stones. 2. New nonspecific mild gallbladder wall and periportal edema which can be   seen in setting of hepatitis or pancreatitis           Problem List  Principal Problem:    Epigastric pain  Active Problems:    Alcohol withdrawal, uncomplicated (HCC)    Drug-seeking behavior    Chronic pancreatitis ([facility] [address]  Resolved Problems:    * No resolved hospital problems. *       Assessment & Plan:   1. Long standing history of ETOH abuse: On librium,  Was just d/c from the hospital yesterday, 4/29/19. He is aware of the need for complete cessation and has resources in the community  2. Chronic pancreatitis due to ETOH abuse with possible intra ductal stones:  He has been seen by GI and they recommend creon therapy.       Will start low fat diet and anticipate probable d/c home in the am.    No narcotics or benzo's,  Will treat his pain with tylenol or NSAIDS       Diet: DIET LOW FAT;  Code:Full Code  DVT PPX      ARTURO Kemp CNP   4/30/2019 7:32 AM

## 2019-04-30 NOTE — ED PROVIDER NOTES
[address] PROVIDER NOTE    Patient Identification  Pt Name: Rocío Atkins  MRN: [id number]  Beckigfspenser 1990  Date of evaluation: 4/29/2019  Provider: Juan Rudolph MD  PCP: No primary care provider on file. Chief Complaint  Withdrawal (Pt states pain in upper abdomen and is withdrawing from alcohol. Last drank last night. )      HPI  (History provided by patient)  This is a 33 y.o. male who was brought in by self for epigastric abdominal pain, vomiting, and possible alcohol withdrawal.  The patient states he has not had a drink since last night. He states he is having tremors. He also states he saw spots today when he woke up. He has a history of chronic pancreatitis and states this feels similar. He has not had blood in his vomit or stools. He is not having chest pain, shortness of breath, fever, chills, or other symptoms. His pain is severe and localized to the epigastrium with radiation to the right upper quadrant. It is rated as 10/10. ROS  10 systems reviewed, pertinent positives/negatives per HPI otherwise noted to be negative. I have reviewed the following nursing documentation:  Allergies: Amoxicillin; Haldol [haloperidol lactate]; Phenergan [promethazine hcl]; Glucosamine; and Shellfish-derived products    Past medical history:   Past Medical History:   Diagnosis Date    Alcohol abuse     PATIENT HAS EXTREME MANIPULITIVE & DRUG SEEKING BEHAVIOR. HE POCKETS HIS BENZODIZAPINES.  Anxiety     Drug-seeking behavior     Several times found pocketing medications given in hospital    Herpes genitalis in men     Manipulative behavior     Pancreatitis     Pneumonia     Portal vein thrombosis     pt denied    Seizures (HCC)     PER PATIENT ONLY.  DURING MULTIPLE HOSPITALIZATIONS HE HAS NEVER ONCE    Tobacco abuse      Past surgical history:   Past Surgical History:   Procedure Laterality Date    ENDOSCOPY, COLON, DIAGNOSTIC  10/28/2013    Endoscopic retrograde cholangiopancreatography with pancreatic stent placement    ENDOSCOPY, COLON, DIAGNOSTIC  03/23/2018    Esophagogastroduodenoscopy with biopsy    ERCP  1/10/14    with stent removal       Home medications:   Previous Medications    OMEPRAZOLE (PRILOSEC) 20 MG DELAYED RELEASE CAPSULE    Take 1 capsule by mouth 2 times daily (before meals)    SUCRALFATE (CARAFATE) 1 GM TABLET    Take 1 tablet by mouth 4 times daily for 7 days       Social history:  reports that he has been smoking cigarettes. He started smoking about 11 years ago. He has a 5.00 pack-year smoking history. He has never used smokeless tobacco. He reports that he drinks alcohol. He reports that he does not use drugs. Family history:    Family History   Problem Relation Age of Onset    Hypertension Father     High Blood Pressure Father     Alcohol Abuse Father     Depression Mother     High Blood Pressure Paternal Grandfather     Alcohol Abuse Paternal Grandfather     Heart Disease Paternal Grandmother     Anemia Paternal Grandmother     Depression Maternal Aunt     Alcohol Abuse Paternal Aunt     Alcohol Abuse Paternal Uncle        Exam  ED Triage Vitals [04/29/19 1714]   BP Temp Temp Source Pulse Resp SpO2 Height Weight   (!) 144/88 97.3 °F (36.3 °C) Infrared 116 18 95 % 5' 8\" (1.727 m) 140 lb (63.5 kg)     Nursing note and vitals reviewed. Constitutional: Ill-appearing and tremulous. HENT:      Head: Normocephalic and atraumatic. Ears: External ears normal.      Nose: Nose normal.     Mouth: Membrane mucosa moist and pink. Eyes: Anicteric sclera. No discharge. Neck: Supple. Trachea midline. Cardiovascular: Tachycardic; no murmurs, rubs, or gallops. Pulmonary/Chest: Effort normal. No respiratory distress. CTAB. No stridor. No wheezes. No rales. Abdominal: Soft. No distension. Tender in the epigastrium and RUQ with guarding, but no rebound. Musculoskeletal: Moves all extremities.  No gross deformity. Neurological: Alert and oriented. Face symmetric. Speech is clear. Restless and anxious. Tremulous  Skin: Warm and dry. No rash. Psychiatric:Anxious. EKG  The Ekg interpreted by me in the absence of a cardiologist shows. normal sinus rhythm with a rate of 80  Axis is   Right axis deviation  QTc is  normal  Intervals and Durations are unremarkable. No specific ST-T wave changes appreciated. No evidence of acute ischemia. No significant change from prior EKG dated 4/25/2019      Radiology  CT ABDOMEN PELVIS W IV CONTRAST Additional Contrast? None   Final Result   Findings are those of chronic calcific pancreatitis. No definite   superimposed acute pancreatitis. Pancreatic duct is dilated. Several calculi are suspected within the   pancreatic duct in the head of the pancreas.              Labs  Results for orders placed or performed during the hospital encounter of 04/29/19   CBC Auto Differential   Result Value Ref Range    WBC 6.9 4.0 - 11.0 K/uL    RBC 4.77 4.20 - 5.90 M/uL    Hemoglobin 14.9 13.5 - 17.5 g/dL    Hematocrit 42.9 40.5 - 52.5 %    MCV 89.8 80.0 - 100.0 fL    MCH 31.1 26.0 - 34.0 pg    MCHC 34.7 31.0 - 36.0 g/dL    RDW 11.8 (L) 12.4 - 15.4 %    Platelets 855 305 - 279 K/uL    MPV 8.0 5.0 - 10.5 fL    Neutrophils % 67.8 %    Lymphocytes % 18.5 %    Monocytes % 7.8 %    Eosinophils % 5.5 %    Basophils % 0.4 %    Neutrophils # 4.6 1.7 - 7.7 K/uL    Lymphocytes # 1.3 1.0 - 5.1 K/uL    Monocytes # 0.5 0.0 - 1.3 K/uL    Eosinophils # 0.4 0.0 - 0.6 K/uL    Basophils # 0.0 0.0 - 0.2 K/uL   Comprehensive Metabolic Panel   Result Value Ref Range    Sodium 141 136 - 145 mmol/L    Potassium 4.2 3.5 - 5.1 mmol/L    Chloride 108 99 - 110 mmol/L    CO2 20 (L) 21 - 32 mmol/L    Anion Gap 13 3 - 16    Glucose 94 70 - 99 mg/dL    BUN 14 7 - 20 mg/dL    CREATININE 0.6 (L) 0.9 - 1.3 mg/dL    GFR Non-African American >60 >60    GFR African American >60 >60    Calcium 9.6 8.3 - 10.6 mg/dL Total Protein 7.9 6.4 - 8.2 g/dL    Alb 4.9 3.4 - 5.0 g/dL    Albumin/Globulin Ratio 1.6 1.1 - 2.2    Total Bilirubin 0.4 0.0 - 1.0 mg/dL    Alkaline Phosphatase 66 40 - 129 U/L    ALT 10 10 - 40 U/L    AST 18 15 - 37 U/L    Globulin 3.0 g/dL   Lipase   Result Value Ref Range    Lipase 113.0 (H) 13.0 - 60.0 U/L   Acetaminophen Level   Result Value Ref Range    Acetaminophen Level <5 (L) 10 - 30 ug/mL   Salicylate   Result Value Ref Range    Salicylate, Serum <6.8 (L) 15.0 - 30.0 mg/dL   Urine Drug Screen   Result Value Ref Range    Amphetamine Screen, Urine Neg Negative <1000ng/mL    Barbiturate Screen, Ur Neg Negative <200 ng/mL    Benzodiazepine Screen, Urine POSITIVE (A) Negative <200 ng/mL    Cannabinoid Scrn, Ur Neg Negative <50 ng/mL    Cocaine Metabolite Screen, Urine Neg Negative <300 ng/mL    Opiate Scrn, Ur POSITIVE (A) Negative <300 ng/mL    PCP Screen, Urine Neg Negative <25 ng/mL    Methadone Screen, Urine Neg Negative <300 ng/mL    Propoxyphene Scrn, Ur Neg Negative <300 ng/mL    pH, UA 5.5     Drug Screen Comment: see below     Oxycodone Urine Neg Negative <100 ng/ml   Ethanol   Result Value Ref Range    Ethanol Lvl None Detected mg/dL      MDM and ED Course  I reviewed the patient's records. He has had episodes of drug-seeking behavior in the past.  However, the patient also has objective findings of both alcohol withdrawal and pancreatitis. His lipase is worse and was on his last admission here, but improved since his more recent admission to [facility]. His CT shows no evidence of acute inflammation around the pancreas, but this is not a high sensitivity finding. He does have calcifications, possible stones, and the pancreatic duct, which was not seen on his most recent CT scan. However, something similar was seen on CTs before that. The patient has required multiple doses of Ativan to prevent his tremors and tachycardia.   In addition to his alcohol withdrawal, patient would be

## 2019-04-30 NOTE — CONSULTS
Gastroenterology Consult Note      Patient: Baljinder Durán  : 1990  Acct#:      Date:  2019    Subjective:       History of Present Illness  Patient is a 33 y.o.  male admitted with Epigastric pain [R10.13]  Epigastric pain [R10.13] who is seen in consult for chronic pancreatitis. H/o alcohol abuse, chronic pancreatitis, PVT, oppositional defiant disorder, drug seeking behavior. S/p ERCP in  for pancreatic pseudocyst and PD disruption s/p stenting. Repeat ERCP  with resolution of duct disruption. He has had multiple admissions for abdominal pain and reports of alcohol withdrawal. He reports last using alcohol 2 days ago. Yesterday began having sharp, constant nonradiating epigastric pain along with nausea and vomiting. Reports diarrhea. Past Medical History:   Diagnosis Date    Alcohol abuse     PATIENT HAS EXTREME MANIPULITIVE & DRUG SEEKING BEHAVIOR. HE POCKETS HIS BENZODIZAPINES.  Anxiety     Drug-seeking behavior     Several times found pocketing medications given in hospital    Herpes genitalis in men     Manipulative behavior     Pancreatitis     Pneumonia     Portal vein thrombosis     pt denied    Seizures (HCC)     PER PATIENT ONLY. DURING MULTIPLE HOSPITALIZATIONS HE HAS NEVER ONCE    Tobacco abuse       Past Surgical History:   Procedure Laterality Date    ENDOSCOPY, COLON, DIAGNOSTIC  10/28/2013    Endoscopic retrograde cholangiopancreatography with pancreatic stent placement    ENDOSCOPY, COLON, DIAGNOSTIC  2018    Esophagogastroduodenoscopy with biopsy    ERCP  1/10/14    with stent removal      Past Endoscopic History:    Prior Endoscopic Evaluations:  EGD 3/23/18 with Dr. Lula Snow  1) The esophagus was normal without evidence of esophagitis, Albrecht's esophagus, strictures, rings, furrowing, masses, or nodules. No varices were noted.  2) No significant hiatal hernia was noted.  No gastric varices were noted.  3) Mild erythema of the antrum.  Biopsies were obtained from the antrum and body of the stomach to rule out active gastritis and H.pylori gastritis.  4) Moderate reactive duodenitis around the duodenal sweep.  There was mucosal edema in this location.  No ulcer was visualized.  This was likely secondary inflammation from recent pancreatitis.  5) The ampulla was normal.      ERCP 1/10/14 with Dr. Sydni Ramirez  1) Pancreatic duct disruption resolved. 2) [place] II chronic pancreatitis  3) Pancreatic stent removal     ERCP 10/23/13 with Dr. Sydni Ramirez  1) Normal cholangiogram  2) Minimal evidence of chronic pancreatitis. 3) Pancreatic duct disruption at body/tail junction. 4) Successful pancreatic stent placement        Admission Meds  No current facility-administered medications on file prior to encounter. Current Outpatient Medications on File Prior to Encounter   Medication Sig Dispense Refill    sucralfate (CARAFATE) 1 GM tablet Take 1 tablet by mouth 4 times daily for 7 days 28 tablet 0    omeprazole (PRILOSEC) 20 MG delayed release capsule Take 1 capsule by mouth 2 times daily (before meals) 60 capsule 0            Allergies  Allergies   Allergen Reactions    Amoxicillin Hives    Haldol [Haloperidol Lactate] Other (See Comments)     Muscle spasms    Phenergan [Promethazine Hcl] Other (See Comments)     Pt believes it caused muscle spasms    Glucosamine Itching      Itching of throat when eating shrimp. Pt states has had IV contrast for CT's without problem before.       Shellfish-Derived Products      Patient gets anxious around seafood      Social   Social History     Tobacco Use    Smoking status: Current Every Day Smoker     Packs/day: 0.50     Years: 10.00     Pack years: 5.00     Types: Cigarettes     Start date: 11/21/2007    Smokeless tobacco: Never Used   Substance Use Topics    Alcohol use: Yes     Comment: every day, up to 20 beers per day        Family History   Problem Relation Age of Onset    Hypertension 113.0*     No results for input(s): PROTIME, INR in the last 72 hours. No results for input(s): PTT in the last 72 hours. No results for input(s): OCCULTBLD in the last 72 hours. Imaging Studies:                 CT-scan of abdomen and pelvis w iv contrast: 4/29/19  Impression   Findings are those of chronic calcific pancreatitis.  No definite   superimposed acute pancreatitis.       Pancreatic duct is dilated.  Several calculi are suspected within the   pancreatic duct in the head of the pancreas.                        Assessment:     Principal Problem:    Epigastric pain  Active Problems:    Alcohol withdrawal, uncomplicated (HCC)    Drug-seeking behavior    Chronic pancreatitis ([facility] [address]  Resolved Problems:    * No resolved hospital problems. *    Chronic pancreatitis - due to alcohol abuse. lipase 113. No CT evidence of acute pancreatitis. He reports chronic diarrhea. Alcohol abuse    Recommendations:   - IVF  - Creon and tylenol for pain control for chronic pancreatitis    Discussed with Dr. Chen Congress, [address] [facility]    I have personally performed a face to face diagnostic evaluation on this patient. I have interviewed and examined the patient and I agree with the findings and recommended plan of care. In summary, my findings and plan are the following:  Pt with chronic pancreatitis with calcifications and PD stones with some dilation. Pt has pain seeking behavior. Continues to drink. Will try creon. etoh cessation. Pt can f/u in 6 wks in office. Will sign off.         Emiliano Burks MD  [address]

## 2019-04-30 NOTE — ED NOTES
Pt is in bed, respirations easy, even, and unlabored. No s/s of distress, alert and orientated, pt hands have slight tremors to them, pt is anxious.       Gabriel Garcia RN  04/29/19 [id number]

## 2019-05-01 ENCOUNTER — HOSPITAL ENCOUNTER (EMERGENCY)
Age: 29
Discharge: PSYCHIATRIC OR SUBSTANCE USE FACILITY | End: 2019-05-02
Attending: EMERGENCY MEDICINE
Payer: COMMERCIAL

## 2019-05-01 VITALS
TEMPERATURE: 97.7 F | HEART RATE: 73 BPM | SYSTOLIC BLOOD PRESSURE: 105 MMHG | WEIGHT: 140 LBS | RESPIRATION RATE: 16 BRPM | HEIGHT: 68 IN | DIASTOLIC BLOOD PRESSURE: 63 MMHG | BODY MASS INDEX: 21.22 KG/M2 | OXYGEN SATURATION: 98 %

## 2019-05-01 VITALS
WEIGHT: 140.2 LBS | DIASTOLIC BLOOD PRESSURE: 83 MMHG | RESPIRATION RATE: 16 BRPM | HEART RATE: 99 BPM | TEMPERATURE: 98.2 F | SYSTOLIC BLOOD PRESSURE: 127 MMHG | BODY MASS INDEX: 21.32 KG/M2 | OXYGEN SATURATION: 99 %

## 2019-05-01 DIAGNOSIS — R45.851 SUICIDAL IDEATION: Primary | ICD-10-CM

## 2019-05-01 PROBLEM — F39 MOOD DISORDER (HCC): Status: ACTIVE | Noted: 2019-05-01

## 2019-05-01 LAB
A/G RATIO: 1.6 (ref 1.1–2.2)
A/G RATIO: 1.7 (ref 1.1–2.2)
ACETAMINOPHEN LEVEL: <5 UG/ML (ref 10–30)
ALBUMIN SERPL-MCNC: 4.2 G/DL (ref 3.4–5)
ALBUMIN SERPL-MCNC: 4.6 G/DL (ref 3.4–5)
ALP BLD-CCNC: 59 U/L (ref 40–129)
ALP BLD-CCNC: 62 U/L (ref 40–129)
ALT SERPL-CCNC: 10 U/L (ref 10–40)
ALT SERPL-CCNC: 10 U/L (ref 10–40)
AMPHETAMINE SCREEN, URINE: ABNORMAL
ANION GAP SERPL CALCULATED.3IONS-SCNC: 11 MMOL/L (ref 3–16)
ANION GAP SERPL CALCULATED.3IONS-SCNC: 11 MMOL/L (ref 3–16)
AST SERPL-CCNC: 15 U/L (ref 15–37)
AST SERPL-CCNC: 16 U/L (ref 15–37)
BARBITURATE SCREEN URINE: ABNORMAL
BASOPHILS ABSOLUTE: 0 K/UL (ref 0–0.2)
BASOPHILS ABSOLUTE: 0 K/UL (ref 0–0.2)
BASOPHILS RELATIVE PERCENT: 0.4 %
BASOPHILS RELATIVE PERCENT: 0.6 %
BENZODIAZEPINE SCREEN, URINE: POSITIVE
BILIRUB SERPL-MCNC: 0.3 MG/DL (ref 0–1)
BILIRUB SERPL-MCNC: <0.2 MG/DL (ref 0–1)
BUN BLDV-MCNC: 9 MG/DL (ref 7–20)
BUN BLDV-MCNC: 9 MG/DL (ref 7–20)
CALCIUM SERPL-MCNC: 9 MG/DL (ref 8.3–10.6)
CALCIUM SERPL-MCNC: 9.4 MG/DL (ref 8.3–10.6)
CANNABINOID SCREEN URINE: ABNORMAL
CHLORIDE BLD-SCNC: 103 MMOL/L (ref 99–110)
CHLORIDE BLD-SCNC: 104 MMOL/L (ref 99–110)
CO2: 23 MMOL/L (ref 21–32)
CO2: 25 MMOL/L (ref 21–32)
COCAINE METABOLITE SCREEN URINE: ABNORMAL
CREAT SERPL-MCNC: 0.6 MG/DL (ref 0.9–1.3)
CREAT SERPL-MCNC: 0.7 MG/DL (ref 0.9–1.3)
EKG ATRIAL RATE: 80 BPM
EKG DIAGNOSIS: NORMAL
EKG P AXIS: 71 DEGREES
EKG P-R INTERVAL: 176 MS
EKG Q-T INTERVAL: 370 MS
EKG QRS DURATION: 92 MS
EKG QTC CALCULATION (BAZETT): 426 MS
EKG R AXIS: 96 DEGREES
EKG T AXIS: 56 DEGREES
EKG VENTRICULAR RATE: 80 BPM
EOSINOPHILS ABSOLUTE: 0.3 K/UL (ref 0–0.6)
EOSINOPHILS ABSOLUTE: 0.7 K/UL (ref 0–0.6)
EOSINOPHILS RELATIVE PERCENT: 11 %
EOSINOPHILS RELATIVE PERCENT: 6.9 %
ETHANOL: NORMAL MG/DL (ref 0–0.08)
GFR AFRICAN AMERICAN: >60
GFR AFRICAN AMERICAN: >60
GFR NON-AFRICAN AMERICAN: >60
GFR NON-AFRICAN AMERICAN: >60
GLOBULIN: 2.5 G/DL
GLOBULIN: 2.8 G/DL
GLUCOSE BLD-MCNC: 103 MG/DL (ref 70–99)
GLUCOSE BLD-MCNC: 98 MG/DL (ref 70–99)
HCT VFR BLD CALC: 41.9 % (ref 40.5–52.5)
HCT VFR BLD CALC: 45.7 % (ref 40.5–52.5)
HEMOGLOBIN: 14.6 G/DL (ref 13.5–17.5)
HEMOGLOBIN: 15.7 G/DL (ref 13.5–17.5)
LYMPHOCYTES ABSOLUTE: 1 K/UL (ref 1–5.1)
LYMPHOCYTES ABSOLUTE: 2.2 K/UL (ref 1–5.1)
LYMPHOCYTES RELATIVE PERCENT: 19.2 %
LYMPHOCYTES RELATIVE PERCENT: 34.6 %
Lab: ABNORMAL
MCH RBC QN AUTO: 31.2 PG (ref 26–34)
MCH RBC QN AUTO: 31.4 PG (ref 26–34)
MCHC RBC AUTO-ENTMCNC: 34.5 G/DL (ref 31–36)
MCHC RBC AUTO-ENTMCNC: 34.7 G/DL (ref 31–36)
MCV RBC AUTO: 90.4 FL (ref 80–100)
MCV RBC AUTO: 90.5 FL (ref 80–100)
METHADONE SCREEN, URINE: ABNORMAL
MONOCYTES ABSOLUTE: 0.3 K/UL (ref 0–1.3)
MONOCYTES ABSOLUTE: 0.4 K/UL (ref 0–1.3)
MONOCYTES RELATIVE PERCENT: 6.6 %
MONOCYTES RELATIVE PERCENT: 6.8 %
NEUTROPHILS ABSOLUTE: 3 K/UL (ref 1.7–7.7)
NEUTROPHILS ABSOLUTE: 3.3 K/UL (ref 1.7–7.7)
NEUTROPHILS RELATIVE PERCENT: 47.2 %
NEUTROPHILS RELATIVE PERCENT: 66.7 %
OPIATE SCREEN URINE: ABNORMAL
OXYCODONE URINE: ABNORMAL
PDW BLD-RTO: 12.2 % (ref 12.4–15.4)
PDW BLD-RTO: 12.2 % (ref 12.4–15.4)
PH UA: 6
PHENCYCLIDINE SCREEN URINE: ABNORMAL
PLATELET # BLD: 223 K/UL (ref 135–450)
PLATELET # BLD: 249 K/UL (ref 135–450)
PMV BLD AUTO: 8.4 FL (ref 5–10.5)
PMV BLD AUTO: 8.5 FL (ref 5–10.5)
POTASSIUM REFLEX MAGNESIUM: 4 MMOL/L (ref 3.5–5.1)
POTASSIUM REFLEX MAGNESIUM: 4.2 MMOL/L (ref 3.5–5.1)
PROPOXYPHENE SCREEN: ABNORMAL
RBC # BLD: 4.63 M/UL (ref 4.2–5.9)
RBC # BLD: 5.05 M/UL (ref 4.2–5.9)
SALICYLATE, SERUM: <0.3 MG/DL (ref 15–30)
SODIUM BLD-SCNC: 138 MMOL/L (ref 136–145)
SODIUM BLD-SCNC: 139 MMOL/L (ref 136–145)
TOTAL PROTEIN: 6.7 G/DL (ref 6.4–8.2)
TOTAL PROTEIN: 7.4 G/DL (ref 6.4–8.2)
WBC # BLD: 5 K/UL (ref 4–11)
WBC # BLD: 6.3 K/UL (ref 4–11)

## 2019-05-01 PROCEDURE — 80307 DRUG TEST PRSMV CHEM ANLYZR: CPT

## 2019-05-01 PROCEDURE — G0480 DRUG TEST DEF 1-7 CLASSES: HCPCS

## 2019-05-01 PROCEDURE — 6370000000 HC RX 637 (ALT 250 FOR IP): Performed by: PHYSICIAN ASSISTANT

## 2019-05-01 PROCEDURE — G0378 HOSPITAL OBSERVATION PER HR: HCPCS

## 2019-05-01 PROCEDURE — 99284 EMERGENCY DEPT VISIT MOD MDM: CPT

## 2019-05-01 PROCEDURE — 85025 COMPLETE CBC W/AUTO DIFF WBC: CPT

## 2019-05-01 PROCEDURE — 80053 COMPREHEN METABOLIC PANEL: CPT

## 2019-05-01 PROCEDURE — 93010 ELECTROCARDIOGRAM REPORT: CPT | Performed by: INTERNAL MEDICINE

## 2019-05-01 PROCEDURE — 6370000000 HC RX 637 (ALT 250 FOR IP): Performed by: INTERNAL MEDICINE

## 2019-05-01 PROCEDURE — 36415 COLL VENOUS BLD VENIPUNCTURE: CPT

## 2019-05-01 RX ADMIN — PANCRELIPASE 2 CAPSULE: 24000; 76000; 120000 CAPSULE, DELAYED RELEASE PELLETS ORAL at 08:20

## 2019-05-01 RX ADMIN — CHLORDIAZEPOXIDE HYDROCHLORIDE 25 MG: 25 CAPSULE ORAL at 13:48

## 2019-05-01 RX ADMIN — PANCRELIPASE 2 CAPSULE: 24000; 76000; 120000 CAPSULE, DELAYED RELEASE PELLETS ORAL at 11:32

## 2019-05-01 RX ADMIN — CHLORDIAZEPOXIDE HYDROCHLORIDE 25 MG: 25 CAPSULE ORAL at 08:20

## 2019-05-01 ASSESSMENT — PAIN DESCRIPTION - PAIN TYPE: TYPE: ACUTE PAIN

## 2019-05-01 ASSESSMENT — PAIN SCALES - GENERAL
PAINLEVEL_OUTOF10: 0
PAINLEVEL_OUTOF10: 5
PAINLEVEL_OUTOF10: 8

## 2019-05-01 ASSESSMENT — PAIN DESCRIPTION - FREQUENCY: FREQUENCY: INTERMITTENT

## 2019-05-01 ASSESSMENT — PAIN DESCRIPTION - LOCATION: LOCATION: ABDOMEN

## 2019-05-01 ASSESSMENT — PAIN DESCRIPTION - ORIENTATION: ORIENTATION: MID

## 2019-05-01 ASSESSMENT — PAIN DESCRIPTION - DESCRIPTORS: DESCRIPTORS: ACHING

## 2019-05-01 NOTE — ED NOTES
Bed: 07  Expected date:   Expected time:   Means of arrival: Walk In  Comments:     Ranulfo Clemente RN  05/01/19 [id number]

## 2019-05-01 NOTE — DISCHARGE SUMMARY
during his brief stay. He is aware of many resources in the community. He was give only tordol or tylenol for his epigastric discomfort during his stay. He was tolerating a diet. He has had MULTIPLE ED visits and hospitalizations throughout the city in the past few months. Consults. IP CONSULT TO HOSPITALIST  IP CONSULT TO GI    Physical examination on discharge day. /67   Pulse 61   Temp 98 °F (36.7 °C) (Oral)   Resp 16   Ht 5' 8\" (1.727 m)   Wt 140 lb (63.5 kg)   SpO2 100%   BMI 21.29 kg/m²   General appearance. Alert. Looks comfortable. HEENT. Sclera clear. Moist mucus membranes. Cardiovascular. Regular rate and rhythm, normal S1, S2. No murmur. Respiratory. Not using accessory muscles. Clear to auscultation bilaterally, no wheeze. Gastrointestinal. Abdomen soft, non-tender, not distended, normal bowel sounds  Neurology. Facial symmetry. No speech deficits. Moving all extremities equally. Extremities. No edema in lower extremities. Skin. Warm, dry, normal turgor    Condition at time of discharge stable     Medication instructions provided to patient at discharge. Medication List      ASK your doctor about these medications    omeprazole 20 MG delayed release capsule  Commonly known as:  PRILOSEC  Take 1 capsule by mouth 2 times daily (before meals)     sucralfate 1 GM tablet  Commonly known as:  CARAFATE  Take 1 tablet by mouth 4 times daily for 7 days            Discharge recommendations given to patient. Follow Up. in 1 week   Disposition. home  Activity. as tolerated   Diet: DIET LOW FAT;      Spent > 30 minutes in discharge process.     Signed:  ARTURO Foreman CNP     5/1/2019 6:53 AM

## 2019-05-01 NOTE — PLAN OF CARE
Problem: Pain:  Goal: Pain level will decrease  Description  Pain level will decrease  5/1/2019 0321 by Clementina Gottlieb RN  Outcome: Met This Shift  5/1/2019 0321 by Clementina Gottlieb RN  Outcome: Ongoing  4/30/2019 1735 by Saima Winters RN  Outcome: Ongoing  Goal: Control of acute pain  Description  Control of acute pain  5/1/2019 0321 by Clementina Gottlieb RN  Outcome: Met This Shift  5/1/2019 0321 by Clementina Gottlieb RN  Outcome: Ongoing  4/30/2019 1735 by Saima Winters RN  Outcome: Ongoing  Goal: Control of chronic pain  Description  Control of chronic pain  5/1/2019 0321 by Clementina Gottlieb RN  Outcome: Met This Shift  5/1/2019 0321 by Clementina Gottlieb RN  Outcome: Ongoing  4/30/2019 1735 by Saima Winters RN  Outcome: Ongoing

## 2019-05-01 NOTE — ED PROVIDER NOTES
[facility]  eMERGENCY dEPARTMENTeNCOUnter      Pt Name: Chloé Pagan  MRN: [id number]  Armsfrankogfspenser 1990  Date ofevaluation: 5/1/2019  Provider: Chiquita Hill MD    CHIEF COMPLAINT       Chief Complaint   Patient presents with    Suicidal     Pt reports that he was just discharged from [facility] and feels like he wants to harm himself but doesn't have a plan. States that this wasn't addressed with admission. Feels \"mentally unstable\" at this time         HISTORY OF PRESENT ILLNESS   (Location/Symptom, Timing/Onset,Context/Setting, Quality, Duration, Modifying Factors, Severity)  Note limiting factors. Chloé Pagan is a 33 y.o. male who presents to the emergency department stating that he needs help. The patient presents to the emergency department after leaving his inpatient bed at this hospital.  The patient presents here stating that his psychiatric issues were addressed on the floor. The patient states he has not drank in the last week and a half. He has chronic abdominal pain. He denies any actual plan to hurt himself but does state that he thinks about it some times. The patient is a very difficult patient with an unbelievable amount of admissions to various [facility] hospitals around the [facility]. I have personally taking care of the patient at least 3 times. The last time I saw the patient he was transferred to a psychiatric facility where he was discharged and deemed that low risk of hurting himself. In addition, the patient has a history of pocketing his medications from the hospital and \"malingering\". I have personally given the patient detailed referral for alcohol rehab in the past that he has disregarded. He denies any new complaints today. He denies actual attempt to hurt himself. HPI    NursingNotes were reviewed.     REVIEW OF SYSTEMS    (2-9 systems for level 4, 10 or more for level 5)     Review of Systems    General Appearance:  Alert, cooperative, no distress, appears stated age. Head:  Normocephalic, without obvious abnormality, atraumatic. Eyes:  conjunctiva/corneas clear, EOM's intact. Sclera anicteric. ENT: Mucous membranes moist.   Neck: Supple, symmetrical, trachea midline, no adenopathy. No jugular venous distention. Lungs:   No Respiratory Distress. Chest Wall:     Heart:  Regular rhythm with no murmurs rubs or gallops    Abdomen:   Soft and benign    Extremities:  Full range of motion. Pulses: Good throughout    Skin:  No rashes or lesions to exposed skin. Neurologic: Alert and oriented X 3. Motor grossly normal.  Speech clear. No signs of withdrawal.         Except as noted above the remainder of the review of systems was reviewed and negative. PAST MEDICAL HISTORY     Past Medical History:   Diagnosis Date    Alcohol abuse     PATIENT HAS EXTREME MANIPULITIVE & DRUG SEEKING BEHAVIOR. HE POCKETS HIS BENZODIZAPINES.  Anxiety     Drug-seeking behavior     Several times found pocketing medications given in hospital    Herpes genitalis in men     Manipulative behavior     Pancreatitis     Pneumonia     Portal vein thrombosis     pt denied    Seizures (HCC)     PER PATIENT ONLY.  DURING MULTIPLE HOSPITALIZATIONS HE HAS NEVER ONCE    Tobacco abuse          SURGICALHISTORY       Past Surgical History:   Procedure Laterality Date    ENDOSCOPY, COLON, DIAGNOSTIC  10/28/2013    Endoscopic retrograde cholangiopancreatography with pancreatic stent placement    ENDOSCOPY, COLON, DIAGNOSTIC  03/23/2018    Esophagogastroduodenoscopy with biopsy    ERCP  1/10/14    with stent removal         CURRENT MEDICATIONS       Previous Medications    LIPASE-PROTEASE-AMYLASE (CREON) 53658-81614 UNITS DELAYED RELEASE CAPSULE    Take 2 capsules by mouth 3 times daily (with meals) This medication was just filled and picked up on April 18 at [address]    OMEPRAZOLE (PRILOSEC) 20 MG DELAYED RELEASE CAPSULE    Take 1 capsule by  None   Social History Narrative    None       SCREENINGS             PHYSICAL EXAM    (up to 7 for level 4, 8 or more for level 5)     ED Triage Vitals [05/01/19 1637]   BP Temp Temp src Pulse Resp SpO2 Height Weight   128/82 98.5 °F (36.9 °C) -- 93 16 98 % -- 140 lb 3.2 oz (63.6 kg)       Physical Exam    DIAGNOSTIC RESULTS           Interpretation per the Radiologist below, if available at the time ofthis note:    No orders to display         ED BEDSIDE ULTRASOUND:   Performed by ED Physician - none    LABS:  Labs Reviewed   CBC WITH AUTO DIFFERENTIAL - Abnormal; Notable for the following components:       Result Value    RDW 12.2 (*)     All other components within normal limits    Narrative:     Performed at:  [facility] [address] [employer]   Phone [phone]   COMPREHENSIVE METABOLIC PANEL W/ REFLEX TO MG FOR LOW K - Abnormal; Notable for the following components:    Glucose 103 (*)     CREATININE 0.7 (*)     All other components within normal limits    Narrative:     Performed at:  [facility] [address],  [employer]   Phone [phone]   ACETAMINOPHEN LEVEL - Abnormal; Notable for the following components:    Acetaminophen Level <5 (*)     All other components within normal limits    Narrative:     Performed at:  [facility] [address],  [employer]   Phone [phone]   SALICYLATE LEVEL - Abnormal; Notable for the following components:    Salicylate, Serum <6.0 (*)     All other components within normal limits    Narrative:     Performed at:  [facility] [address],  [employer]   Phone [phone]   [address] - Abnormal; Notable for the following components:    Benzodiazepine Screen, Urine POSITIVE (*)     All other components within normal limits    Narrative:     Performed at:  [facility]) - Andrés Weber MD  05/01/19 2029       Uvaldo Mckee MD  05/05/19 6720

## 2019-05-01 NOTE — PROGRESS NOTES
Shift assessment completed and documented at this time. Pt resting quietly in bed this morning, resps easy and unlabored. Rates abdominal pain as a \"5\" this morning but denies need for pain medication. The care plan and education has been reviewed and mutually agreed upon with the patient. Call light in reach. Will continue to monitor.

## 2019-05-02 ENCOUNTER — HOSPITAL ENCOUNTER (INPATIENT)
Age: 29
LOS: 4 days | Discharge: HOME | End: 2019-05-06
Attending: PSYCHIATRY & NEUROLOGY | Admitting: PSYCHIATRY & NEUROLOGY
Payer: COMMERCIAL

## 2019-05-02 PROCEDURE — 1240000000 HC EMOTIONAL WELLNESS R&B

## 2019-05-02 PROCEDURE — 97535 SELF CARE MNGMENT TRAINING: CPT

## 2019-05-02 PROCEDURE — 6370000000 HC RX 637 (ALT 250 FOR IP): Performed by: PHYSICIAN ASSISTANT

## 2019-05-02 PROCEDURE — 97165 OT EVAL LOW COMPLEX 30 MIN: CPT

## 2019-05-02 PROCEDURE — 6370000000 HC RX 637 (ALT 250 FOR IP): Performed by: NURSE PRACTITIONER

## 2019-05-02 PROCEDURE — 99222 1ST HOSP IP/OBS MODERATE 55: CPT | Performed by: PHYSICIAN ASSISTANT

## 2019-05-02 PROCEDURE — 97530 THERAPEUTIC ACTIVITIES: CPT

## 2019-05-02 PROCEDURE — 6370000000 HC RX 637 (ALT 250 FOR IP): Performed by: PSYCHIATRY & NEUROLOGY

## 2019-05-02 RX ORDER — HYDROXYZINE PAMOATE 50 MG/1
50 CAPSULE ORAL 3 TIMES DAILY PRN
Status: DISCONTINUED | OUTPATIENT
Start: 2019-05-02 | End: 2019-05-04

## 2019-05-02 RX ORDER — TRAZODONE HYDROCHLORIDE 50 MG/1
50 TABLET ORAL NIGHTLY PRN
Status: DISCONTINUED | OUTPATIENT
Start: 2019-05-02 | End: 2019-05-04

## 2019-05-02 RX ORDER — PROPRANOLOL HYDROCHLORIDE 10 MG/1
10 TABLET ORAL 3 TIMES DAILY
Status: DISCONTINUED | OUTPATIENT
Start: 2019-05-02 | End: 2019-05-04

## 2019-05-02 RX ORDER — LAMOTRIGINE 25 MG/1
25 TABLET ORAL DAILY
Status: DISCONTINUED | OUTPATIENT
Start: 2019-05-02 | End: 2019-05-04

## 2019-05-02 RX ORDER — GABAPENTIN 300 MG/1
300 CAPSULE ORAL EVERY 4 HOURS PRN
Status: DISCONTINUED | OUTPATIENT
Start: 2019-05-02 | End: 2019-05-02

## 2019-05-02 RX ORDER — FOLIC ACID 1 MG/1
1 TABLET ORAL DAILY
Status: DISCONTINUED | OUTPATIENT
Start: 2019-05-02 | End: 2019-05-06 | Stop reason: HOSPADM

## 2019-05-02 RX ORDER — MAGNESIUM HYDROXIDE/ALUMINUM HYDROXICE/SIMETHICONE 120; 1200; 1200 MG/30ML; MG/30ML; MG/30ML
30 SUSPENSION ORAL EVERY 6 HOURS PRN
Status: DISCONTINUED | OUTPATIENT
Start: 2019-05-02 | End: 2019-05-04

## 2019-05-02 RX ORDER — ACAMPROSATE CALCIUM 333 MG/1
666 TABLET, DELAYED RELEASE ORAL EVERY 8 HOURS
Status: DISCONTINUED | OUTPATIENT
Start: 2019-05-02 | End: 2019-05-06

## 2019-05-02 RX ORDER — ACETAMINOPHEN 325 MG/1
650 TABLET ORAL EVERY 4 HOURS PRN
Status: DISCONTINUED | OUTPATIENT
Start: 2019-05-02 | End: 2019-05-06 | Stop reason: HOSPADM

## 2019-05-02 RX ORDER — THIAMINE MONONITRATE (VIT B1) 100 MG
100 TABLET ORAL DAILY
Status: DISCONTINUED | OUTPATIENT
Start: 2019-05-02 | End: 2019-05-06 | Stop reason: HOSPADM

## 2019-05-02 RX ORDER — GABAPENTIN 300 MG/1
300 CAPSULE ORAL
Status: DISCONTINUED | OUTPATIENT
Start: 2019-05-02 | End: 2019-05-03 | Stop reason: DRUGHIGH

## 2019-05-02 RX ORDER — OLANZAPINE 5 MG/1
5 TABLET ORAL 2 TIMES DAILY PRN
Status: DISCONTINUED | OUTPATIENT
Start: 2019-05-02 | End: 2019-05-04

## 2019-05-02 RX ORDER — NICOTINE 21 MG/24HR
1 PATCH, TRANSDERMAL 24 HOURS TRANSDERMAL DAILY
Status: DISCONTINUED | OUTPATIENT
Start: 2019-05-02 | End: 2019-05-06 | Stop reason: HOSPADM

## 2019-05-02 RX ADMIN — PANCRELIPASE 2 CAPSULE: 24000; 76000; 120000 CAPSULE, DELAYED RELEASE PELLETS ORAL at 16:57

## 2019-05-02 RX ADMIN — ACETAMINOPHEN 650 MG: 325 TABLET ORAL at 01:40

## 2019-05-02 RX ADMIN — PROPRANOLOL HYDROCHLORIDE 10 MG: 10 TABLET ORAL at 13:58

## 2019-05-02 RX ADMIN — LAMOTRIGINE 25 MG: 25 TABLET ORAL at 16:57

## 2019-05-02 RX ADMIN — GABAPENTIN 300 MG: 300 CAPSULE ORAL at 18:48

## 2019-05-02 RX ADMIN — PROPRANOLOL HYDROCHLORIDE 10 MG: 10 TABLET ORAL at 21:56

## 2019-05-02 RX ADMIN — GABAPENTIN 300 MG: 300 CAPSULE ORAL at 22:58

## 2019-05-02 RX ADMIN — FOLIC ACID 1 MG: 1 TABLET ORAL at 10:31

## 2019-05-02 RX ADMIN — ACETAMINOPHEN 650 MG: 325 TABLET ORAL at 16:30

## 2019-05-02 RX ADMIN — ACAMPROSATE CALCIUM 666 MG: 333 TABLET, DELAYED RELEASE ORAL at 12:05

## 2019-05-02 RX ADMIN — ACAMPROSATE CALCIUM 666 MG: 333 TABLET, DELAYED RELEASE ORAL at 18:48

## 2019-05-02 RX ADMIN — Medication 100 MG: at 10:31

## 2019-05-02 ASSESSMENT — SLEEP AND FATIGUE QUESTIONNAIRES
RESTFUL SLEEP: NO
DO YOU USE A SLEEP AID: NO
DIFFICULTY STAYING ASLEEP: YES
DIFFICULTY FALLING ASLEEP: NO
DO YOU HAVE DIFFICULTY SLEEPING: NO
RESTFUL SLEEP: YES
DIFFICULTY STAYING ASLEEP: NO
DO YOU HAVE DIFFICULTY SLEEPING: YES
DIFFICULTY FALLING ASLEEP: YES
SLEEP PATTERN: NORMAL
SLEEP PATTERN: DIFFICULTY FALLING ASLEEP;DISTURBED/INTERRUPTED SLEEP;EARLY AWAKENING;RESTLESSNESS
DIFFICULTY ARISING: NO
DO YOU USE A SLEEP AID: YES

## 2019-05-02 ASSESSMENT — PAIN DESCRIPTION - PROGRESSION
CLINICAL_PROGRESSION: GRADUALLY WORSENING
CLINICAL_PROGRESSION: GRADUALLY IMPROVING

## 2019-05-02 ASSESSMENT — PAIN DESCRIPTION - PAIN TYPE
TYPE: ACUTE PAIN
TYPE: ACUTE PAIN

## 2019-05-02 ASSESSMENT — PAIN DESCRIPTION - ONSET
ONSET: GRADUAL
ONSET: GRADUAL

## 2019-05-02 ASSESSMENT — PAIN SCALES - GENERAL
PAINLEVEL_OUTOF10: 4
PAINLEVEL_OUTOF10: 0
PAINLEVEL_OUTOF10: 4

## 2019-05-02 ASSESSMENT — PAIN DESCRIPTION - FREQUENCY
FREQUENCY: INTERMITTENT
FREQUENCY: INTERMITTENT

## 2019-05-02 ASSESSMENT — PAIN DESCRIPTION - LOCATION
LOCATION: HEAD
LOCATION: HEAD

## 2019-05-02 ASSESSMENT — PAIN DESCRIPTION - DESCRIPTORS
DESCRIPTORS: ACHING
DESCRIPTORS: ACHING

## 2019-05-02 ASSESSMENT — PAIN - FUNCTIONAL ASSESSMENT
PAIN_FUNCTIONAL_ASSESSMENT: ACTIVITIES ARE NOT PREVENTED
PAIN_FUNCTIONAL_ASSESSMENT: ACTIVITIES ARE NOT PREVENTED

## 2019-05-02 ASSESSMENT — LIFESTYLE VARIABLES
HISTORY_ALCOHOL_USE: YES
HISTORY_ALCOHOL_USE: YES

## 2019-05-02 ASSESSMENT — PATIENT HEALTH QUESTIONNAIRE - PHQ9: SUM OF ALL RESPONSES TO PHQ QUESTIONS 1-9: 0

## 2019-05-02 NOTE — PROGRESS NOTES
...   `[facility]  Admission Note     Admission Type:   Admission Type: Involuntary    Reason for admission:  Reason for Admission: \"I thought I was coming here to talk to someone\" patient said that he is having depression and that he wanted to get help for that as well,. \"(patient will see someone in the morning.)    PATIENT STRENGTHS:  Strengths: Communication, No significant Physical Illness    Patient Strengths and Limitations:  Limitations: Tendency to isolate self, Difficult relationships / poor social skills, External locus of control, Demonstrates discomfort with /lack of social skills, Lacks leisure interests, Inappropriate/potentially harmful leisure interests, Difficulty problem solving/relies on others to help solve problems    Addictive Behavior:   Addictive Behavior  In the past 3 months, have you felt or has someone told you that you have a problem with:  : None  Do you have a history of Chemical Use?: Yes  Do you have a history of Alcohol Use?: Yes  Do you have a history of Street Drug Abuse?: Yes  Histroy of Prescripton Drug Abuse?: Yes    Medical Problems:   Past Medical History:   Diagnosis Date    Alcohol abuse     PATIENT HAS EXTREME MANIPULITIVE & DRUG SEEKING BEHAVIOR. HE POCKETS HIS BENZODIZAPINES.  Anxiety     Drug-seeking behavior     Several times found pocketing medications given in hospital    Herpes genitalis in men     Manipulative behavior     Pancreatitis     Pneumonia     Portal vein thrombosis     pt denied    Seizures (HCC)     PER PATIENT ONLY.  DURING MULTIPLE HOSPITALIZATIONS HE HAS NEVER ONCE    Tobacco abuse        Status EXAM:  Status and Exam  Normal: No  Facial Expression: Flat, Expressionless  Affect: Blunt  Level of Consciousness: Alert  Mood:Normal: No  Mood: Anxious, Ambivalent, Helpless, Worthless, low self-esteem  Motor Activity:Normal: Yes  Interview Behavior: Evasive, Cooperative, Irritable  Preception: [place] to Person, [place] to Time, [place] to Place, [place] to Situation  Attention:Normal: No  Attention: Distractible  Thought Processes: Circumstantial  Thought Content:Normal: Yes  Hallucinations: None, Other (Comment)(patient denied all hallucinations.)  Delusions: No  Memory:Normal: No  Memory: Confabulation, Poor Recent  Insight and Judgment: No  Insight and Judgment: Poor Judgment, Poor Insight, Unmotivated  Present Suicidal Ideation: No  Present Homicidal Ideation: No    Tobacco Screening:  Practical Counseling, on admission, cecilia X, if applicable and completed (first 3 are required if patient doesn't refuse):            ( )  Recognizing danger situations (included triggers and roadblocks)                    ( )  Coping skills (new ways to manage stress, exercise, relaxation techniques, changing routine, distraction)                                                           (X )  Basic information about quitting (benefits of quitting, techniques in how to quit, available resources  ( ) Referral for counseling faxed to [facility]                                           ( ) Patient refused counseling  ( ) Patient has not smoked in the last 30 days    Metabolic Screening:    Lab Results   Component Value Date    LABA1C 4.6 11/22/2017       Lab Results   Component Value Date    CHOL 139 03/17/2018    CHOL 68 10/29/2013     Lab Results   Component Value Date    TRIG 110 02/08/2019    TRIG 242 (H) 03/17/2018    TRIG 47 10/29/2013     Lab Results   Component Value Date    HDL 61 (H) 03/17/2018    HDL 40 10/29/2013     No components found for: LDLCAL  Lab Results   Component Value Date    LABVLDL 48 03/17/2018    LABVLDL 9 10/29/2013         Body mass index is 20.72 kg/m².     BP Readings from Last 2 Encounters:   05/02/19 120/82   05/01/19 127/83           Pt admitted with followings belongings:  Dentures: None  Vision - Corrective Lenses: None  Hearing Aid: None  Jewelry: None  Body Piercings Removed: N/A  Clothing: Pants, Shirt  Were All Patient Medications Collected?: Not Applicable  Other Valuables: Wallet     No medications or valuables were brought with the patient to the hospital.  Patient oriented to surroundings and program expectations and copy of patient rights given. Received admission packet:  YES. Consents reviewed, signed YES. Refused did not sign in  voluntary . Patient verbalize understanding:  YES. Patient education on precautions: Joseph Davison RN       Patient is 33year old ,male with numerous hospitalizations to various hospitals in the [facility]. He is usually admitted for abdominal pain as a result of elevated lipase. He has a history of diverting medications and stockpiling them to use at a later time. He attempted to get his phone through and was wanded when he arrived at the unit. Phone is now in his locker. He said he is currently unemployed, He has an eleventh grade education and works in various jobs such as [employer] and [address] work. His goal for hospitalization is \"to talk to someone about his situation\" Patient cooperative with admission assessment.

## 2019-05-02 NOTE — BH NOTE
[facility]  Initial Interdisciplinary Treatment Plan NOTE    Review Date & Time: 5/2/19 6372    Patient was not in treatment team    Admission Type:   Admission Type: Involuntary    Reason for admission:  Reason for Admission: \"I thought I was coming here to talk to someone\" patient said that he is having depression and that he wanted to get help for that as well,. \"(patient will see someone in the morning.)      Estimated Length of Stay Update:  3 to 5 days  Estimated Discharge Date Update: 5/5/19 to 5/7/19    PATIENT STRENGTHS:  Patient Strengths Strengths: Communication, Employment, Motivated, Social Skills, Positive Support, No significant Physical Illness  Patient Strengths and Limitations:Limitations: Inappropriate/potentially harmful leisure interests, External locus of control(limited insight)  Addictive Behavior:Addictive Behavior  In the past 3 months, have you felt or has someone told you that you have a problem with:  : None  Do you have a history of Chemical Use?: No  Do you have a history of Alcohol Use?: Yes  Do you have a history of Street Drug Abuse?: No  Histroy of Prescripton Drug Abuse?: No  Medical Problems:  Past Medical History:   Diagnosis Date    Alcohol abuse     PATIENT HAS EXTREME MANIPULITIVE & DRUG SEEKING BEHAVIOR. HE POCKETS HIS BENZODIZAPINES.  Anxiety     Drug-seeking behavior     Several times found pocketing medications given in hospital    Herpes genitalis in men     Manipulative behavior     Pancreatitis     Pneumonia     Portal vein thrombosis     pt denied    Seizures (HCC)     PER PATIENT ONLY. DURING MULTIPLE HOSPITALIZATIONS HE HAS NEVER ONCE    Tobacco abuse        EDUCATION:   Learner Progress Toward Treatment Goals: Reviewed goals and plan of care    Method: Individual    Outcome: Verbalized understanding    PATIENT GOALS: To attend group    PLAN/TREATMENT RECOMMENDATIONS UPDATE:Evaluate for treatment and medication management.     GOALS UPDATE:   Time frame for Short-Term Goals: 2 days    Andres Charles RN

## 2019-05-02 NOTE — PLAN OF CARE
Problem: Anxiety:  Goal: Level of anxiety will decrease  Description  Level of anxiety will decrease  Outcome: Ongoing  Note:   Patient was invited to attend 10:00 am Art Therapy / Psycho-Education group by therapist and patient declined.

## 2019-05-02 NOTE — PLAN OF CARE
Problem: Pain:  Goal: Control of acute pain  Description  Control of acute pain  Outcome: Ongoing   Patient denies any pain at this time. No s/s of injury or distress. Will continue to monitor.

## 2019-05-02 NOTE — ED NOTES
Pt accepted to [facility]. Accepting physician Dr. Zainab Angulo.      Karo Atkins  05/01/19 6782

## 2019-05-02 NOTE — PROGRESS NOTES
`[facility]  Admission Note     Admission Type:   Admission Type: Involuntary    Reason for admission:      PATIENT STRENGTHS:  Strengths: Communication, No significant Physical Illness    Patient Strengths and Limitations:  Limitations: Tendency to isolate self, Difficult relationships / poor social skills, External locus of control, Demonstrates discomfort with /lack of social skills, Lacks leisure interests, Inappropriate/potentially harmful leisure interests, Difficulty problem solving/relies on others to help solve problems    Addictive Behavior:   Addictive Behavior  In the past 3 months, have you felt or has someone told you that you have a problem with:  : None  Do you have a history of Chemical Use?: Yes  Do you have a history of Alcohol Use?: Yes  Do you have a history of Street Drug Abuse?: Yes  Histroy of Prescripton Drug Abuse?: Yes    Medical Problems:   Past Medical History:   Diagnosis Date    Alcohol abuse     PATIENT HAS EXTREME MANIPULITIVE & DRUG SEEKING BEHAVIOR. HE POCKETS HIS BENZODIZAPINES.  Anxiety     Drug-seeking behavior     Several times found pocketing medications given in hospital    Herpes genitalis in men     Manipulative behavior     Pancreatitis     Pneumonia     Portal vein thrombosis     pt denied    Seizures (HCC)     PER PATIENT ONLY.  DURING MULTIPLE HOSPITALIZATIONS HE HAS NEVER ONCE    Tobacco abuse        Status EXAM:  Status and Exam  Normal: No  Facial Expression: Flat, Expressionless  Affect: Blunt  Level of Consciousness: Alert  Mood:Normal: No  Mood: Anxious, Ambivalent, Helpless, Worthless, low self-esteem  Motor Activity:Normal: Yes  Interview Behavior: Evasive, Cooperative, Irritable  Preception: [place] to Person, [place] to Time, [place] to Place, [place] to Situation  Attention:Normal: No  Attention: Distractible  Thought Processes: Circumstantial  Thought Content:Normal: Yes  Hallucinations: None, Other (Comment)(patient denied all expectations and copy of patient rights given. Received admission packet:  yes. Consents reviewed, signed YES. Refused NO. Patient verbalize understanding:  YES.     Patient education on precautions: Diana Robert RN

## 2019-05-02 NOTE — PROGRESS NOTES
Patient was one Creon [id number] units while at the hospital that he was discharged from April 30th. His last reyes of this medication was 4/30.

## 2019-05-02 NOTE — BH NOTE
Writer completed psychosocial assessment and C-SSRS. Pt was cooperative and pleasant during assessment. Pt did not present with any hallucinations/delusions. Pt did not present with any homicidal/ suicidal ideations.        Alfonso Zavala MSW,LSW

## 2019-05-02 NOTE — PRE-CERTIFICATION NOTE
Pt reported that has [facility]-insurance card in wallet. Form completed and faxed to [address] [place] [address] for precert. Nga Jin () informed.

## 2019-05-02 NOTE — H&P
Hospital Medicine History & Physical      PCP: No primary care provider on file. Date of Admission: 5/2/2019    Date of Service: Pt seen/examined on  5/2/2019     Chief Complaint:  SI    History Of Present Illness: The patient is a 33 y.o. male with alcohol abuse and chronic pancreatitis who presented to [facility] ED for SI. Patient was seen and evaluated in the ED by the ED medical provider, patient was medically cleared for admission to [facility]. This note serves as an admission medical H&P. Tobacco use: yes   ETOH use: 12 beers per day   Illicit drug use: denies. . UDS+ opiates and benzos     Patient denies any medical complaints     Past Medical History:        Diagnosis Date    Alcohol abuse     PATIENT HAS EXTREME MANIPULITIVE & DRUG SEEKING BEHAVIOR. HE POCKETS HIS BENZODIZAPINES.  Anxiety     Drug-seeking behavior     Several times found pocketing medications given in hospital    Herpes genitalis in men     Manipulative behavior     Pancreatitis     Pneumonia     Portal vein thrombosis     pt denied    Seizures (HCC)     PER PATIENT ONLY. DURING MULTIPLE HOSPITALIZATIONS HE HAS NEVER ONCE    Tobacco abuse        Past Surgical History:        Procedure Laterality Date    ENDOSCOPY, COLON, DIAGNOSTIC  10/28/2013    Endoscopic retrograde cholangiopancreatography with pancreatic stent placement    ENDOSCOPY, COLON, DIAGNOSTIC  03/23/2018    Esophagogastroduodenoscopy with biopsy    ERCP  1/10/14    with stent removal       Medications Prior to Admission:    Prior to Admission medications    Medication Sig Start Date End Date Taking?  Authorizing Provider   lipase-protease-amylase [id number] units delayed release capsule Take 2 capsules by mouth 3 times daily (with meals) This medication was just filled and picked up on April 18 at [address] 5/1/19  Yes ARTURO Dueñas - CNP   omeprazole (PRILOSEC) 20 MG delayed release capsule Take 1 capsule by mouth 2 times daily (before meals) 4/25/19  Yes Carly Lr MD       Allergies:  Amoxicillin; Haldol [haloperidol lactate]; Phenergan [promethazine hcl]; Shrimp (diagnostic); Glucosamine; and Shellfish-derived products    Social History:  The patient currently lives at home with a roommate     TOBACCO:   reports that he has been smoking cigarettes. He started smoking about 11 years ago. He has a 5.00 pack-year smoking history. He has never used smokeless tobacco.  ETOH:   reports that he drinks alcohol. Family History:   Positive as follows:        Problem Relation Age of Onset    Hypertension Father     High Blood Pressure Father     Alcohol Abuse Father     Depression Mother     High Blood Pressure Paternal Grandfather     Alcohol Abuse Paternal Grandfather     Heart Disease Paternal Grandmother     Anemia Paternal Grandmother     Depression Maternal Aunt     Alcohol Abuse Paternal Aunt     Alcohol Abuse Paternal Uncle        REVIEW OF SYSTEMS:       Constitutional: Negative for fever   HENT: Negative for sore throat   Eyes: Negative for redness   Respiratory: Negative  for dyspnea, cough   Cardiovascular: Negative for chest pain   Gastrointestinal: Negative for vomiting, diarrhea   Genitourinary: Negative for hematuria   Musculoskeletal: Negative for arthralgias   Skin: Negative for rash   Neurological: Negative for syncope    Hematological: Negative for easy bruising/bleeding   Psychiatric/Behavorial: Per psychiatry team evaluation     PHYSICAL EXAM:    /76   Pulse 65   Temp 97.8 °F (36.6 °C) (Oral)   Resp 15   Ht 5' 9\" (1.753 m)   Wt 140 lb 5.1 oz (63.6 kg)   SpO2 99%   BMI 20.72 kg/m²     Gen: No distress. Alert. Eyes: PERRL. No sclera icterus. No conjunctival injection. ENT: No discharge. Pharynx clear. Neck: No JVD. No Carotid Bruit. Trachea midline. Resp: No accessory muscle use. No crackles. No wheezes. No rhonchi. CV: Regular rate. Regular rhythm. No murmur. No rub.  No edema.   GI: Non-tender. Non-distended. Normal bowel sounds. Skin: Warm and dry. No nodule on exposed extremities. No rash on exposed extremities. M/S: No cyanosis. No joint deformity. No clubbing. Neuro: Awake. No focal neurologic deficit on exam.  Cranial nerves II through XII intact. Patient is able to ambulate without difficulty. Psych: Per psychiatry team evaluation     CBC:   Recent Labs     04/29/19 1848 05/01/19 0705 05/01/19  1721   WBC 6.9 6.3 5.0   HGB 14.9 14.6 15.7   HCT 42.9 41.9 45.7   MCV 89.8 90.5 90.4    249 223     BMP:   Recent Labs     04/29/19 1848 05/01/19 0705 05/01/19  1721    139 138   K 4.2 4.2 4.0    103 104   CO2 20* 25 23   BUN 14 9 9   CREATININE 0.6* 0.6* 0.7*     LIVER PROFILE:   Recent Labs     04/29/19 1848 05/01/19 0705 05/01/19  1721   AST 18 15 16   ALT 10 10 10   LIPASE 113.0*  --   --    BILITOT 0.4 <0.2 0.3   ALKPHOS 66 59 62     PT/INR: No results for input(s): PROTIME, INR in the last 72 hours. APTT: No results for input(s): APTT in the last 72 hours.   UA:  Recent Labs     05/01/19  1836   PHUR 6.0          U/A:    Lab Results   Component Value Date    COLORU YELLOW 04/24/2019    WBCUA 1 02/09/2019    RBCUA 1 02/09/2019    CLARITYU Clear 04/24/2019    SPECGRAV 1.023 04/24/2019    LEUKOCYTESUR Negative 04/24/2019    BLOODU Negative 04/24/2019    GLUCOSEU Negative 04/24/2019       ASSESSMENT/PLAN:  Mood DO  - per psychiatry team    Chronic pancreatitis  - creon rx written from [address] will be continued     Alcohol abuse  - without evidence of acute withdrawal- monitor   - oral vitamins ordered    Tobacco Dependence  -Recommended cessation  - nicotine patch ordered     Yary Clayton PA-C  5/2/2019 1:43 PM

## 2019-05-02 NOTE — BH NOTE
Writer invited pt to psychotherapy the morning, pt declined. Pt did not attend group.       Gordon Preston MSW,LSW

## 2019-05-02 NOTE — ED NOTES
Patient cooperative and calm, no signs of distress,  at the bedside      Ava Burris RN  05/01/19 [id number]

## 2019-05-02 NOTE — H&P
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 K/uL Final    RBC 05/01/2019 4.63  4.20 - 5.90 M/uL Final    Hemoglobin 05/01/2019 14.6  13.5 - 17.5 g/dL Final    Hematocrit 05/01/2019 41.9  40.5 - 52.5 % Final    MCV 05/01/2019 90.5  80.0 - 100.0 fL Final    MCH 05/01/2019 31.4  26.0 - 34.0 pg Final    MCHC 05/01/2019 34.7  31.0 - 36.0 g/dL Final    RDW 05/01/2019 12.2* 12.4 - 15.4 % Final    Platelets 03/82/2813 249  135 - 450 K/uL Final    MPV 05/01/2019 8.4  5.0 - 10.5 fL Final    Neutrophils % 05/01/2019 47.2  % Final    Lymphocytes % 05/01/2019 34.6  % Final    Monocytes % 05/01/2019 6.6  % Final    Eosinophils % 05/01/2019 11.0  % Final    Basophils % 05/01/2019 0.6  % Final    Neutrophils # 05/01/2019 3.0  1.7 - 7.7 K/uL Final    Lymphocytes # 05/01/2019 2.2  1.0 - 5.1 K/uL Final    Monocytes # 05/01/2019 0.4  0.0 - 1.3 K/uL Final    Eosinophils # 05/01/2019 0.7* 0.0 - 0.6 K/uL Final    Basophils # 05/01/2019 0.0  0.0 - 0.2 K/uL Final       PSYCHIATRIC EXAMINATION / MENTAL STATUS EXAM:   General appearance: disheveled, appears lost and frightened  Activity: restless, vs \"deer frozen in headlights\" look , withdrawn and wary   Relatedness: blank stare and says nothing more then absolutely required, , NO intiatiion of interaction  Speech: almost mute, then with reluctance answers questions   Mood: highly anxious  Affect: blunted  Thought process: concrete, blocking, not delusional however,   Thought content: anxiety, liquor, leaving already as phobic about being here  Delusions: none, but anxiety to that proportion almost  Hallucination reported: none  Observed RTIS: none     Attention and concentration: very poor, and blocking  Orientation: good   Memory: Remote selective,  Recent  fair    SAFETY ASSESSMENT  Suicidal ideation: none but severely self destructive and risking his life   Homicidal ideation: none  Non-suicidal self-injurious behaviors: passively so  Able to care for self: not able to fu with treatment due to massive social anxiety  Insight: none  Judgment: self defeating to an extreme degree. Dx: axis I:  Alcohol addiction       With extensive complication (see medical)  PTSD/complicated bereavement       With panic attacks       With Social anxiety       With Performance anxiety certainly       With Phobias-roller coasters and heights. With Claustrophobia                                                                                                                                                   With Psychophysiologic denied, other than headache.         With OCD       With trichotillomania       With possible metabolic factors,  E.g low magnesium  Bipolar and related disorder       Depressed, major recurrent episodes, moderate       With hypomanic episodes, brief       With dysthymia       With bereavement features       With rapid cycling       With possible metabolic factors e.g low vit D       ADD       With some possible MCI  Nicotine addiction 1/2  Ppd   Axis 2:        Avoidant/schizoid/self defeating features, severe Melissa 3:          Chronic and acute  Recurrent pancreatitis         Hx withdrawal seizures         Hx portal vein thrombosis      Tx plan: prevent self injury, stabilize affect, restore sleep, treat depression, establish/maintain aftercare. All conditions present on admission are being treated while pt is hospitalized. Discussed PHP after discharge as part of transition back to the community.      Medications  Will add lamotrigine now 25mg daily X 2 wk and double q other week to 200mg daily  Current Facility-Administered Medications   Medication Dose Route Frequency Provider Last Rate Last Dose    acetaminophen (TYLENOL) tablet 650 mg  650 mg Oral Q4H PRN [place] Dolphin, APRN - CNP   650 mg at 05/02/19 0140    hydrOXYzine (VISTARIL) capsule 50 mg  50 mg Oral TID PRN [place] Dolphin, APRN - CNP        OLANZapine (ZYPREXA) tablet 5 mg  5 mg Oral BID PRN [place] Dolphin, APRN - CNP        traZODone (DESYREL) tablet 50 mg  50 mg Oral Nightly PRN [place] Dolphin, APRN - CNP        magnesium hydroxide (MILK OF MAGNESIA) 400 MG/5ML suspension 30 mL  30 mL Oral Daily PRN [place] Dolphin, APRN - CNP        aluminum & magnesium hydroxide-simethicone (MAALOX) 200-200-20 MG/5ML suspension 30 mL  30 mL Oral Q6H PRN [place] Dolphin, APRN - CNP        nicotine polacrilex (NICORETTE) gum 2 mg  2 mg Oral Q2H PRN [place] Dolphin, APRN - CNP        nicotine (NICODERM CQ) 14 MG/24HR 1 patch  1 patch Transdermal Daily [place] Dolphin, APRN - CNP   1 patch at 05/02/19 1031    gabapentin (NEURONTIN) capsule 300 mg  300 mg Oral Q4H PRN Teresita Kraft MD        acamprosate (CAMPRAL) tablet 666 mg  666 mg Oral Q8H Teresita Kraft MD   666 mg at 05/02/19 1205    vitamin B-1 (THIAMINE) tablet 100 mg  100 mg Oral Daily Teresita Kraft MD   100 mg at 50/40/47 5599    folic acid (FOLVITE) tablet 1 mg  1 mg Oral Daily Teresita Kraft MD   1 mg at 05/02/19 1031    propranolol (INDERAL) tablet 10 mg  10 mg Oral TID Arnoldo LAW Tj Borja MD   10 mg at 05/02/19 1358      nicotine  1 patch Transdermal Daily    acamprosate  666 mg Oral Q8H    thiamine  100 mg Oral Daily    folic acid  1 mg Oral Daily    propranolol  10 mg Oral TID      PRN Meds: acetaminophen, hydrOXYzine, OLANZapine, traZODone, magnesium hydroxide, aluminum & magnesium hydroxide-simethicone, nicotine polacrilex, gabapentin   Estimated length of stay: 4 days  Prognosis:  fair  Criteria for Discharge: sleeping well, detoxed, less anxiety so as to be able to tolerate aftercare at AOD facility,  affect stable, depression improving, eating well, aftercare arranged.        Spent at least 190 minutes with evaluation process and more than 50% of the time was spent obtaining history and planning treatment with the patient

## 2019-05-02 NOTE — ED NOTES
Report called to Nina Blair RN verbalized understanding and denied any need for further information, report also given to [place] transport team, belongs retrieved from [facility]  05/02/19 0003

## 2019-05-02 NOTE — PROGRESS NOTES
Patient arrived to the [facility] via stretcher by ambulance transport. All belongings were transferred from the stretcher to behind the station immediately upon patient's arrival to the [facility]. He had two pair of socks on and I removed his socks, searched for contraband and replaced these socks with skid resistant [facility] issued socks. Patient was asked to change from blue scrub pants with string in them to unit paper blue pants. He was cooperative with this. Patient was noted to have blue boxer briefs on and I didn't detect any concealed items in his briefs, he then put the pants on. At this point I informed him that I would be wanding him and with the initiation of this procedure, a cell phone was detected. I informed patient that the phone would be kept behind the nurses station and that he would have access to it for phone numbers when the phones are available. Patient acknowledged this.

## 2019-05-02 NOTE — ED NOTES
Patient resting, no signs of distress, patient cooperative and calm at this time,  at the bedside      Som Rodriguez RN  05/01/19 4385

## 2019-05-03 PROCEDURE — 1240000000 HC EMOTIONAL WELLNESS R&B

## 2019-05-03 PROCEDURE — 97530 THERAPEUTIC ACTIVITIES: CPT

## 2019-05-03 PROCEDURE — 6370000000 HC RX 637 (ALT 250 FOR IP): Performed by: PSYCHIATRY & NEUROLOGY

## 2019-05-03 PROCEDURE — 6370000000 HC RX 637 (ALT 250 FOR IP): Performed by: PHYSICIAN ASSISTANT

## 2019-05-03 PROCEDURE — 6370000000 HC RX 637 (ALT 250 FOR IP): Performed by: NURSE PRACTITIONER

## 2019-05-03 RX ORDER — LORAZEPAM 2 MG/ML
1 CONCENTRATE ORAL ONCE
Status: COMPLETED | OUTPATIENT
Start: 2019-05-03 | End: 2019-05-03

## 2019-05-03 RX ORDER — GABAPENTIN 250 MG/5ML
500 SOLUTION ORAL
Status: DISCONTINUED | OUTPATIENT
Start: 2019-05-03 | End: 2019-05-04

## 2019-05-03 RX ADMIN — PROPRANOLOL HYDROCHLORIDE 10 MG: 10 TABLET ORAL at 22:27

## 2019-05-03 RX ADMIN — ACAMPROSATE CALCIUM 666 MG: 333 TABLET, DELAYED RELEASE ORAL at 22:27

## 2019-05-03 RX ADMIN — ACAMPROSATE CALCIUM 666 MG: 333 TABLET, DELAYED RELEASE ORAL at 00:35

## 2019-05-03 RX ADMIN — GABAPENTIN 300 MG: 300 CAPSULE ORAL at 06:33

## 2019-05-03 RX ADMIN — GABAPENTIN 300 MG: 300 CAPSULE ORAL at 10:25

## 2019-05-03 RX ADMIN — Medication 100 MG: at 10:13

## 2019-05-03 RX ADMIN — Medication 1 MG: at 11:23

## 2019-05-03 RX ADMIN — PROPRANOLOL HYDROCHLORIDE 10 MG: 10 TABLET ORAL at 15:32

## 2019-05-03 RX ADMIN — PANCRELIPASE 2 CAPSULE: 24000; 76000; 120000 CAPSULE, DELAYED RELEASE PELLETS ORAL at 17:22

## 2019-05-03 RX ADMIN — ACAMPROSATE CALCIUM 666 MG: 333 TABLET, DELAYED RELEASE ORAL at 06:33

## 2019-05-03 RX ADMIN — ACAMPROSATE CALCIUM 666 MG: 333 TABLET, DELAYED RELEASE ORAL at 15:32

## 2019-05-03 RX ADMIN — Medication 500 MG: at 18:59

## 2019-05-03 RX ADMIN — Medication 500 MG: at 15:33

## 2019-05-03 RX ADMIN — ACETAMINOPHEN 650 MG: 325 TABLET ORAL at 02:37

## 2019-05-03 RX ADMIN — FOLIC ACID 1 MG: 1 TABLET ORAL at 10:13

## 2019-05-03 RX ADMIN — Medication 500 MG: at 22:26

## 2019-05-03 RX ADMIN — LAMOTRIGINE 25 MG: 25 TABLET ORAL at 10:12

## 2019-05-03 RX ADMIN — PANCRELIPASE 2 CAPSULE: 24000; 76000; 120000 CAPSULE, DELAYED RELEASE PELLETS ORAL at 12:35

## 2019-05-03 ASSESSMENT — PAIN SCALES - GENERAL: PAINLEVEL_OUTOF10: 5

## 2019-05-03 NOTE — PLAN OF CARE
Problem: Anxiety:  Goal: Level of anxiety will decrease  Description  Level of anxiety will decrease  Outcome: Ongoing   Patient pacing unit, demanding to see the doctor. Patient refusing Zyprexa because he states, \" Antipsychotics make me restless. \" Dr. Donis Lennox ordered one time dose of liquid Ativan 1 mg. Medication was effective. Will continue to monitor.

## 2019-05-03 NOTE — GROUP NOTE
Group Therapy Note    Date: May 2    Group Start Time: 2000  Group End Time: 2015  Group Topic: [facility]        Group Therapy Note    Attendees: goals and importance of goal setting discussed.   Night time milieu activities discussed         Patient's Goal:  To get a plan started    Notes:  Talked to dr    Status After Intervention:  Improved    Participation Level: Minimal    Participation Quality: Attentive      Speech:  normal      Thought Process/Content: Logical      Affective Functioning: Congruent      Mood: depressed      Level of consciousness:  Alert and Oriented x4      Response to Learning: Progressing to goal      Endings: None Reported    Modes of Intervention: Support      Discipline Responsible: [address], [facility] [employer]      Signature:  Marques Winkler

## 2019-05-03 NOTE — BH NOTE
Discussed with Dr. Iron Mancuso that patient is asleep and refusing to wake up to take medication. BP low and propranolol held. Will continue to monitor.

## 2019-05-03 NOTE — GROUP NOTE
Group Therapy Note    Date: May 3    Group Start Time: 1600  Group End Time: 1700  Group Topic: Healthy Living/Wellness    [address] MARINA Parisi        Group Therapy Note    Attendees: 11         Patient's Goal:   Patient's will receive information, have an opportunity to ask question and learn about  [facility] and their services. Status After Intervention:  Improved    Participation Level:  Active Listener    Participation Quality: Appropriate      Speech:  normal      Thought Process/Content: Logical  Linear      Affective Functioning: Congruent      Mood: anxious and depressed      Level of consciousness:  Alert and Oriented x4      Response to Learning: Progressing to goal      Endings: None Reported    Modes of Intervention: Education and Support      Discipline Responsible: Registered Nurse      Signature:  Zeny Guallpa RN

## 2019-05-04 PROCEDURE — 6370000000 HC RX 637 (ALT 250 FOR IP): Performed by: PSYCHIATRY & NEUROLOGY

## 2019-05-04 PROCEDURE — 6370000000 HC RX 637 (ALT 250 FOR IP): Performed by: NURSE PRACTITIONER

## 2019-05-04 PROCEDURE — 6370000000 HC RX 637 (ALT 250 FOR IP): Performed by: PHYSICIAN ASSISTANT

## 2019-05-04 PROCEDURE — 1240000000 HC EMOTIONAL WELLNESS R&B

## 2019-05-04 RX ORDER — GABAPENTIN 250 MG/5ML
600 SOLUTION ORAL 3 TIMES DAILY
Status: DISCONTINUED | OUTPATIENT
Start: 2019-05-05 | End: 2019-05-05

## 2019-05-04 RX ORDER — ERGOCALCIFEROL 1.25 MG/1
50000 CAPSULE ORAL WEEKLY
Status: DISCONTINUED | OUTPATIENT
Start: 2019-05-06 | End: 2019-05-06 | Stop reason: HOSPADM

## 2019-05-04 RX ORDER — LAMOTRIGINE 25 MG/1
50 TABLET ORAL DAILY
Status: DISCONTINUED | OUTPATIENT
Start: 2019-05-05 | End: 2019-05-06

## 2019-05-04 RX ORDER — PROPRANOLOL HYDROCHLORIDE 10 MG/1
20 TABLET ORAL 3 TIMES DAILY
Status: DISCONTINUED | OUTPATIENT
Start: 2019-05-05 | End: 2019-05-06 | Stop reason: HOSPADM

## 2019-05-04 RX ADMIN — Medication 500 MG: at 14:20

## 2019-05-04 RX ADMIN — PANCRELIPASE 2 CAPSULE: 24000; 76000; 120000 CAPSULE, DELAYED RELEASE PELLETS ORAL at 11:03

## 2019-05-04 RX ADMIN — PANCRELIPASE 2 CAPSULE: 24000; 76000; 120000 CAPSULE, DELAYED RELEASE PELLETS ORAL at 17:32

## 2019-05-04 RX ADMIN — Medication 500 MG: at 07:38

## 2019-05-04 RX ADMIN — Medication 500 MG: at 11:02

## 2019-05-04 RX ADMIN — ACETAMINOPHEN 650 MG: 325 TABLET ORAL at 22:41

## 2019-05-04 RX ADMIN — PROPRANOLOL HYDROCHLORIDE 10 MG: 10 TABLET ORAL at 14:20

## 2019-05-04 RX ADMIN — LAMOTRIGINE 25 MG: 25 TABLET ORAL at 08:15

## 2019-05-04 RX ADMIN — Medication 500 MG: at 17:32

## 2019-05-04 RX ADMIN — NICOTINE POLACRILEX 2 MG: 2 GUM, CHEWING BUCCAL at 22:41

## 2019-05-04 RX ADMIN — Medication 100 MG: at 08:15

## 2019-05-04 RX ADMIN — MAGNESIUM OXIDE TAB 400 MG (241.3 MG ELEMENTAL MG) 400 MG: 400 (241.3 MG) TAB at 22:15

## 2019-05-04 RX ADMIN — FOLIC ACID 1 MG: 1 TABLET ORAL at 08:15

## 2019-05-04 RX ADMIN — ACAMPROSATE CALCIUM 666 MG: 333 TABLET, DELAYED RELEASE ORAL at 07:38

## 2019-05-04 RX ADMIN — PROPRANOLOL HYDROCHLORIDE 10 MG: 10 TABLET ORAL at 08:15

## 2019-05-04 RX ADMIN — ACAMPROSATE CALCIUM 666 MG: 333 TABLET, DELAYED RELEASE ORAL at 22:15

## 2019-05-04 RX ADMIN — ACAMPROSATE CALCIUM 666 MG: 333 TABLET, DELAYED RELEASE ORAL at 14:20

## 2019-05-04 RX ADMIN — PANCRELIPASE 2 CAPSULE: 24000; 76000; 120000 CAPSULE, DELAYED RELEASE PELLETS ORAL at 08:14

## 2019-05-04 ASSESSMENT — PAIN DESCRIPTION - LOCATION
LOCATION: HEAD
LOCATION: HEAD

## 2019-05-04 ASSESSMENT — PAIN DESCRIPTION - FREQUENCY
FREQUENCY: INTERMITTENT
FREQUENCY: INTERMITTENT

## 2019-05-04 ASSESSMENT — PAIN DESCRIPTION - PAIN TYPE
TYPE: ACUTE PAIN
TYPE: ACUTE PAIN

## 2019-05-04 ASSESSMENT — PAIN SCALES - GENERAL
PAINLEVEL_OUTOF10: 0
PAINLEVEL_OUTOF10: 4

## 2019-05-04 ASSESSMENT — PAIN DESCRIPTION - PROGRESSION
CLINICAL_PROGRESSION: GRADUALLY WORSENING
CLINICAL_PROGRESSION: GRADUALLY IMPROVING

## 2019-05-04 ASSESSMENT — PAIN DESCRIPTION - DESCRIPTORS
DESCRIPTORS: ACHING
DESCRIPTORS: ACHING

## 2019-05-04 ASSESSMENT — PAIN DESCRIPTION - ONSET
ONSET: ON-GOING
ONSET: ON-GOING

## 2019-05-04 NOTE — PLAN OF CARE
Problem: Anxiety:  Goal: Level of anxiety will decrease  Description  Level of anxiety will decrease  5/3/2019 2128 by Kandis Carrizales RN  Outcome: Ongoing     Client is irritable during assessment. Isolated to room. Sleeps. No group participation.  Denies SI/HI

## 2019-05-04 NOTE — BH NOTE
Writer invited and encouraged pt to attend group. Pt declined. Pot did not attend group.      Elroy Hatch MSW,LSW

## 2019-05-05 LAB
A/G RATIO: 1.7 (ref 1.1–2.2)
ALBUMIN SERPL-MCNC: 4.8 G/DL (ref 3.4–5)
ALP BLD-CCNC: 69 U/L (ref 40–129)
ALT SERPL-CCNC: 16 U/L (ref 10–40)
AMPHETAMINE SCREEN, URINE: ABNORMAL
AMYLASE: 88 U/L (ref 25–115)
ANION GAP SERPL CALCULATED.3IONS-SCNC: 9 MMOL/L (ref 3–16)
AST SERPL-CCNC: 18 U/L (ref 15–37)
BARBITURATE SCREEN URINE: ABNORMAL
BENZODIAZEPINE SCREEN, URINE: POSITIVE
BILIRUB SERPL-MCNC: 0.4 MG/DL (ref 0–1)
BUN BLDV-MCNC: 10 MG/DL (ref 7–20)
CALCIUM SERPL-MCNC: 9.4 MG/DL (ref 8.3–10.6)
CANNABINOID SCREEN URINE: ABNORMAL
CHLORIDE BLD-SCNC: 105 MMOL/L (ref 99–110)
CO2: 25 MMOL/L (ref 21–32)
COCAINE METABOLITE SCREEN URINE: ABNORMAL
CREAT SERPL-MCNC: 0.7 MG/DL (ref 0.9–1.3)
GFR AFRICAN AMERICAN: >60
GFR NON-AFRICAN AMERICAN: >60
GLOBULIN: 2.8 G/DL
GLUCOSE BLD-MCNC: 102 MG/DL (ref 70–99)
LIPASE: 82 U/L (ref 13–60)
Lab: ABNORMAL
METHADONE SCREEN, URINE: ABNORMAL
OPIATE SCREEN URINE: ABNORMAL
OXYCODONE URINE: ABNORMAL
PH UA: 7
PHENCYCLIDINE SCREEN URINE: ABNORMAL
POTASSIUM SERPL-SCNC: 4.2 MMOL/L (ref 3.5–5.1)
PROPOXYPHENE SCREEN: ABNORMAL
SODIUM BLD-SCNC: 139 MMOL/L (ref 136–145)
TOTAL PROTEIN: 7.6 G/DL (ref 6.4–8.2)

## 2019-05-05 PROCEDURE — 6370000000 HC RX 637 (ALT 250 FOR IP): Performed by: PSYCHIATRY & NEUROLOGY

## 2019-05-05 PROCEDURE — 36415 COLL VENOUS BLD VENIPUNCTURE: CPT

## 2019-05-05 PROCEDURE — 80053 COMPREHEN METABOLIC PANEL: CPT

## 2019-05-05 PROCEDURE — 80307 DRUG TEST PRSMV CHEM ANLYZR: CPT

## 2019-05-05 PROCEDURE — 83690 ASSAY OF LIPASE: CPT

## 2019-05-05 PROCEDURE — 1240000000 HC EMOTIONAL WELLNESS R&B

## 2019-05-05 PROCEDURE — 6370000000 HC RX 637 (ALT 250 FOR IP): Performed by: PHYSICIAN ASSISTANT

## 2019-05-05 PROCEDURE — 82150 ASSAY OF AMYLASE: CPT

## 2019-05-05 PROCEDURE — 6370000000 HC RX 637 (ALT 250 FOR IP): Performed by: NURSE PRACTITIONER

## 2019-05-05 RX ORDER — GABAPENTIN 250 MG/5ML
300 SOLUTION ORAL 3 TIMES DAILY
Status: DISCONTINUED | OUTPATIENT
Start: 2019-05-05 | End: 2019-05-06

## 2019-05-05 RX ORDER — LORAZEPAM 2 MG/ML
2 INJECTION INTRAMUSCULAR
Status: DISCONTINUED | OUTPATIENT
Start: 2019-05-05 | End: 2019-05-06 | Stop reason: HOSPADM

## 2019-05-05 RX ORDER — LORAZEPAM 2 MG/ML
4 INJECTION INTRAMUSCULAR
Status: DISCONTINUED | OUTPATIENT
Start: 2019-05-05 | End: 2019-05-06 | Stop reason: HOSPADM

## 2019-05-05 RX ORDER — LORAZEPAM 2 MG/1
4 TABLET ORAL
Status: DISCONTINUED | OUTPATIENT
Start: 2019-05-05 | End: 2019-05-06 | Stop reason: HOSPADM

## 2019-05-05 RX ORDER — LORAZEPAM 1 MG/1
1 TABLET ORAL
Status: DISCONTINUED | OUTPATIENT
Start: 2019-05-05 | End: 2019-05-06 | Stop reason: HOSPADM

## 2019-05-05 RX ORDER — LORAZEPAM 2 MG/1
2 TABLET ORAL
Status: DISCONTINUED | OUTPATIENT
Start: 2019-05-05 | End: 2019-05-06 | Stop reason: HOSPADM

## 2019-05-05 RX ORDER — LORAZEPAM 2 MG/ML
1 INJECTION INTRAMUSCULAR
Status: DISCONTINUED | OUTPATIENT
Start: 2019-05-05 | End: 2019-05-06 | Stop reason: HOSPADM

## 2019-05-05 RX ORDER — SODIUM CHLORIDE 0.9 % (FLUSH) 0.9 %
10 SYRINGE (ML) INJECTION EVERY 12 HOURS SCHEDULED
Status: DISCONTINUED | OUTPATIENT
Start: 2019-05-05 | End: 2019-05-06 | Stop reason: HOSPADM

## 2019-05-05 RX ORDER — LORAZEPAM 2 MG/ML
3 INJECTION INTRAMUSCULAR
Status: DISCONTINUED | OUTPATIENT
Start: 2019-05-05 | End: 2019-05-06 | Stop reason: HOSPADM

## 2019-05-05 RX ORDER — SODIUM CHLORIDE 0.9 % (FLUSH) 0.9 %
10 SYRINGE (ML) INJECTION PRN
Status: DISCONTINUED | OUTPATIENT
Start: 2019-05-05 | End: 2019-05-06 | Stop reason: HOSPADM

## 2019-05-05 RX ADMIN — PANCRELIPASE 2 CAPSULE: 24000; 76000; 120000 CAPSULE, DELAYED RELEASE PELLETS ORAL at 18:51

## 2019-05-05 RX ADMIN — ACAMPROSATE CALCIUM 666 MG: 333 TABLET, DELAYED RELEASE ORAL at 22:20

## 2019-05-05 RX ADMIN — LAMOTRIGINE 50 MG: 25 TABLET ORAL at 08:30

## 2019-05-05 RX ADMIN — PANCRELIPASE 2 CAPSULE: 24000; 76000; 120000 CAPSULE, DELAYED RELEASE PELLETS ORAL at 11:39

## 2019-05-05 RX ADMIN — PROPRANOLOL HYDROCHLORIDE 20 MG: 10 TABLET ORAL at 20:55

## 2019-05-05 RX ADMIN — PROPRANOLOL HYDROCHLORIDE 20 MG: 10 TABLET ORAL at 08:30

## 2019-05-05 RX ADMIN — Medication 600 MG: at 08:31

## 2019-05-05 RX ADMIN — PANCRELIPASE 2 CAPSULE: 24000; 76000; 120000 CAPSULE, DELAYED RELEASE PELLETS ORAL at 08:32

## 2019-05-05 RX ADMIN — ACAMPROSATE CALCIUM 666 MG: 333 TABLET, DELAYED RELEASE ORAL at 06:15

## 2019-05-05 RX ADMIN — Medication 100 MG: at 08:30

## 2019-05-05 RX ADMIN — PROPRANOLOL HYDROCHLORIDE 20 MG: 10 TABLET ORAL at 15:10

## 2019-05-05 RX ADMIN — FOLIC ACID 1 MG: 1 TABLET ORAL at 08:31

## 2019-05-05 RX ADMIN — MAGNESIUM OXIDE TAB 400 MG (241.3 MG ELEMENTAL MG) 400 MG: 400 (241.3 MG) TAB at 08:30

## 2019-05-05 RX ADMIN — LORAZEPAM 2 MG: 2 TABLET ORAL at 18:51

## 2019-05-05 RX ADMIN — ACETAMINOPHEN 650 MG: 325 TABLET ORAL at 15:10

## 2019-05-05 ASSESSMENT — PAIN SCALES - GENERAL: PAINLEVEL_OUTOF10: 4

## 2019-05-05 NOTE — BH NOTE
Client continues to report not feeling well. When writer observes client without his awareness, twitching is noted. Client remains visibly sweaty and is itching. Client reports a headache. Had clinical [place] come up and evaluate the patient. Client continues to endorse feeling pain in abdomen, \" I always jasiel hurt. \" client also reports feeling \" yucky. \"     New orders noted for [facility] protocol to monitor overnight.

## 2019-05-05 NOTE — PLAN OF CARE
Problem: Anxiety:  Goal: Level of anxiety will decrease  Description  Level of anxiety will decrease  5/4/2019 2308 by Germán Soto RN  Outcome: Ongoing  5/4/2019 2304 by Germán Soto RN  Outcome: Ongoing  Patient denied any anxiety issues, although he is isolating to his room and sleeping a good amount of time while here. Problem: Tobacco Use:  Goal: Inpatient tobacco use cessation counseling participation  Description  Inpatient tobacco use cessation counseling participation  5/4/2019 2308 by Germán Soto RN  Outcome: Ongoing  Discussed options to prevent nicotine withdraw. Patient had removed his patch earlier and was educated and provided with nicorette gum for break through cravings.

## 2019-05-05 NOTE — PLAN OF CARE
Problem: Anxiety:  Goal: Level of anxiety will decrease  Description  Level of anxiety will decrease  5/5/2019 0955 by Jana Gonzalez, RN    Client is isolated to room. Comes out for meals and returns to bed. No group participation. Appears unmotivated, flat.  Denies SI/HI

## 2019-05-05 NOTE — PLAN OF CARE
Problem: Anxiety:  Goal: Level of anxiety will decrease  Description  Level of anxiety will decrease  5/5/2019 1225 by Marta Gardner  Outcome: Ongoing  Note:   Patient was invited to participate in 10:15 am Art Therapy / Psycho-Education group by therapist and patient declined.

## 2019-05-05 NOTE — PROGRESS NOTES
With Psychophysiologic denied, other than headache. With OCD       With trichotillomania       With possible metabolic factors,  E.g low magnesium  Bipolar and related disorder       Depressed, major recurrent episodes, moderate       With hypomanic episodes, brief       With dysthymia       With bereavement features       With rapid cycling       With possible metabolic factors e.g low vit D       ADD       With some possible MCI  Nicotine addiction 1/2  Ppd   Axis 2:        Avoidant/schizoid/self defeating features, severe   Duarte 3:          Chronic and acute  Recurrent pancreatitis         Hx withdrawal seizures         Hx portal vein thrombosis       Tx plan: prevent self injury, stabilize affect, restore sleep, treat depression, establish/maintain aftercare. All conditions present on admission are being treated while pt is hospitalized.    Discussed PHP after discharge as part of transition back to the community.      Medications  Will add lamotrigine now 25mg daily X 2 wk and double q other week to 200mg daily  Current Facility-Administered Medications             Current Facility-Administered Medications   Medication Dose Route Frequency Provider Last Rate Last Dose    acetaminophen (TYLENOL) tablet 650 mg  650 mg Oral Q4H PRN ARTURO Raymundo CNP   650 mg at 05/02/19 0140    hydrOXYzine (VISTARIL) capsule 50 mg  50 mg Oral TID PRN Shabana Kothari APRN - CNP        OLANZapine (ZYPREXA) tablet 5 mg  5 mg Oral BID PRN ARTURO Raymundo - CNP        traZODone (DESYREL) tablet 50 mg  50 mg Oral Nightly PRN ARTURO Raymundo - CNP        magnesium hydroxide (MILK OF MAGNESIA) 400 MG/5ML suspension 30 mL  30 mL Oral Daily PRN ARTURO Raymundo - CNP        aluminum & magnesium hydroxide-simethicone (MAALOX) 200-200-20 MG/5ML suspension 30 mL  30 mL Oral Q6H PRN Ebony Maldonado ARTURO Hernandez - HERMILO        nicotine polacrilex (NICORETTE) gum 2 mg  2 mg Oral Q2H PRN ARTURO Clements CNP        nicotine (NICODERM CQ) 14 MG/24HR 1 patch  1 patch Transdermal Daily ARTURO Clements CNP   1 patch at 05/02/19 1031    gabapentin (NEURONTIN) capsule 300 mg  300 mg Oral Q4H PRN Mark Anthony Parham MD        acamprosate (CAMPRAL) tablet 666 mg  666 mg Oral Q8H Mark Anthony Parham MD   666 mg at 05/02/19 1205    vitamin B-1 (THIAMINE) tablet 100 mg  100 mg Oral Daily Mark Anthony Parham MD   100 mg at 36/56/79 5814    folic acid (FOLVITE) tablet 1 mg  1 mg Oral Daily Mark Anthony Parham MD   1 mg at 05/02/19 1031    propranolol (INDERAL) tablet 10 mg  10 mg Oral TID Mark Anthony Parham MD   10 mg at 05/02/19 1358         Scheduled Medications    nicotine  1 patch Transdermal Daily    acamprosate  666 mg Oral Q8H    thiamine  100 mg Oral Daily    folic acid  1 mg Oral Daily    propranolol  10 mg Oral TID         PRN Meds:   PRN Medications   acetaminophen, hydrOXYzine, OLANZapine, traZODone, magnesium hydroxide, aluminum & magnesium hydroxide-simethicone, nicotine polacrilex, gabapentin      Estimated length of stay: Tuesday next  Prognosis:  fair  Criteria for Discharge: sleeping well, detoxed, less anxiety so as to be able to tolerate aftercare at AOD facility,  affect stable, depression improving, eating well, aftercare arranged.         Spent at least 25 minutes with evaluation and management and psychotherpy  more than 50% of the time was spent w patient

## 2019-05-05 NOTE — BH NOTE
PRN ativan 2gm given. Client continues to have facial twitches and anxiety.  Will continue to assess

## 2019-05-05 NOTE — PROGRESS NOTES
Alcohol addiction'it gets better and better, Relieved, can hardly believe it    No pancreatitis pain either, thrilled     With extensive complication (see medical)-    PTSD/complicated bereavement, learning to consider how to do griefwork e.g. Letter writing, talkig    One mild panic time, not over,     The Social anxiety was less triggered today     Still Performance anxiety but risking more     His OCD is not being treated, but less anxiuos anyway     THe trichotillomania, not biting nials today  Re: possible metabolic factors,  E.g low magnesium-adding that and Vit D    Bipolar and related disorder -not cycling and not manic  The Major Depression  Aspect is less        The dysthymia chronic, and still  Has started dealling in session more about  bereavement features    No rapid cycling these two days      PSYCHIATRIC EXAMINATION / MENTAL STATUS EXAM:   General appearance: disheveled, appears stil lost  But less frightened  Activity: restless, vs LESS of the \"deer frozen in headlights\" look not as withdrawn/ Bryan Bowling stare but twinkle and thoguh says nothing more then absolutely required is not quite as shut down., Still NO intiatiion but just a little more responsive  Speech: paucity but does answer some questions         Mood: highly anxious, but less extreme  Affect: blunted yet, but one smile seen out there in great room  Thought process: concrete, less blocking, not delusional  Thought content: someday giving up liquor, shocked to not bbe so scared and not withdrawing as severely  Delusions: none,  Hallucination reported: none  Observed RTIS: none     Attention and concentration: very poor, yet  Orientation: good        Memory: Remote selective,  Recent  fair     SAFETY ASSESSMENT  Suicidal ideation: none but severely self destructive and risking his life   Homicidal ideation: none  Non-suicidal self-injurious behaviors: passively so  Able to care for self: not able to fu with treatment due

## 2019-05-06 VITALS
HEIGHT: 69 IN | SYSTOLIC BLOOD PRESSURE: 90 MMHG | RESPIRATION RATE: 14 BRPM | WEIGHT: 140.32 LBS | HEART RATE: 68 BPM | OXYGEN SATURATION: 99 % | TEMPERATURE: 98 F | DIASTOLIC BLOOD PRESSURE: 50 MMHG | BODY MASS INDEX: 20.78 KG/M2

## 2019-05-06 PROCEDURE — 99239 HOSP IP/OBS DSCHRG MGMT >30: CPT | Performed by: PSYCHIATRY & NEUROLOGY

## 2019-05-06 PROCEDURE — 6370000000 HC RX 637 (ALT 250 FOR IP): Performed by: NURSE PRACTITIONER

## 2019-05-06 PROCEDURE — 6370000000 HC RX 637 (ALT 250 FOR IP): Performed by: PSYCHIATRY & NEUROLOGY

## 2019-05-06 PROCEDURE — 6370000000 HC RX 637 (ALT 250 FOR IP): Performed by: PHYSICIAN ASSISTANT

## 2019-05-06 PROCEDURE — 5130000000 HC BRIDGE APPOINTMENT

## 2019-05-06 RX ORDER — PROPRANOLOL HYDROCHLORIDE 20 MG/1
20 TABLET ORAL 3 TIMES DAILY
Qty: 90 TABLET | Refills: 0 | Status: SHIPPED | OUTPATIENT
Start: 2019-05-06 | End: 2019-11-29

## 2019-05-06 RX ORDER — RISPERIDONE 2 MG/1
2 TABLET, FILM COATED ORAL NIGHTLY PRN
Status: DISCONTINUED | OUTPATIENT
Start: 2019-05-06 | End: 2019-05-06 | Stop reason: HOSPADM

## 2019-05-06 RX ORDER — LAMOTRIGINE 25 MG/1
50 TABLET ORAL DAILY
Status: DISCONTINUED | OUTPATIENT
Start: 2019-05-06 | End: 2019-05-06 | Stop reason: HOSPADM

## 2019-05-06 RX ORDER — LANOLIN ALCOHOL/MO/W.PET/CERES
100 CREAM (GRAM) TOPICAL DAILY
Qty: 30 TABLET | Refills: 0 | Status: SHIPPED | OUTPATIENT
Start: 2019-05-07 | End: 2019-11-29

## 2019-05-06 RX ORDER — ACAMPROSATE CALCIUM 333 MG/1
666 TABLET, DELAYED RELEASE ORAL 3 TIMES DAILY
Status: DISCONTINUED | OUTPATIENT
Start: 2019-05-06 | End: 2019-05-06 | Stop reason: HOSPADM

## 2019-05-06 RX ORDER — ACAMPROSATE CALCIUM 333 MG/1
666 TABLET, DELAYED RELEASE ORAL 3 TIMES DAILY
Qty: 180 TABLET | Refills: 0 | Status: SHIPPED | OUTPATIENT
Start: 2019-05-06 | End: 2019-11-29

## 2019-05-06 RX ORDER — LAMOTRIGINE 25 MG/1
50 TABLET ORAL DAILY
Qty: 60 TABLET | Refills: 0 | Status: SHIPPED | OUTPATIENT
Start: 2019-05-07 | End: 2019-11-29

## 2019-05-06 RX ORDER — LAMOTRIGINE 25 MG/1
25 TABLET ORAL DAILY
Status: DISCONTINUED | OUTPATIENT
Start: 2019-05-06 | End: 2019-05-06

## 2019-05-06 RX ORDER — QUETIAPINE FUMARATE 100 MG/1
100 TABLET, FILM COATED ORAL NIGHTLY
Status: DISCONTINUED | OUTPATIENT
Start: 2019-05-06 | End: 2019-05-06

## 2019-05-06 RX ORDER — ERGOCALCIFEROL (VITAMIN D2) 1250 MCG
50000 CAPSULE ORAL WEEKLY
Qty: 5 CAPSULE | Refills: 0 | Status: SHIPPED | OUTPATIENT
Start: 2019-05-13 | End: 2019-11-29

## 2019-05-06 RX ORDER — CYPROHEPTADINE HYDROCHLORIDE 4 MG/1
4 TABLET ORAL 3 TIMES DAILY
Status: DISCONTINUED | OUTPATIENT
Start: 2019-05-06 | End: 2019-05-06

## 2019-05-06 RX ADMIN — ACETAMINOPHEN 650 MG: 325 TABLET ORAL at 01:56

## 2019-05-06 RX ADMIN — RISPERIDONE 2 MG: 2 TABLET ORAL at 01:56

## 2019-05-06 RX ADMIN — NICOTINE POLACRILEX 2 MG: 2 GUM, CHEWING BUCCAL at 00:01

## 2019-05-06 RX ADMIN — ACAMPROSATE CALCIUM 666 MG: 333 TABLET, DELAYED RELEASE ORAL at 06:12

## 2019-05-06 RX ADMIN — CYPROHEPTADINE HYDROCHLORIDE 4 MG: 4 TABLET ORAL at 01:45

## 2019-05-06 ASSESSMENT — PAIN DESCRIPTION - ONSET: ONSET: GRADUAL

## 2019-05-06 ASSESSMENT — PAIN SCALES - GENERAL
PAINLEVEL_OUTOF10: 0
PAINLEVEL_OUTOF10: 8

## 2019-05-06 ASSESSMENT — PAIN DESCRIPTION - PAIN TYPE
TYPE: ACUTE PAIN
TYPE: ACUTE PAIN

## 2019-05-06 ASSESSMENT — PAIN DESCRIPTION - DESCRIPTORS
DESCRIPTORS: ACHING
DESCRIPTORS: ACHING

## 2019-05-06 ASSESSMENT — PAIN - FUNCTIONAL ASSESSMENT: PAIN_FUNCTIONAL_ASSESSMENT: ACTIVITIES ARE NOT PREVENTED

## 2019-05-06 ASSESSMENT — PAIN DESCRIPTION - PROGRESSION: CLINICAL_PROGRESSION: GRADUALLY WORSENING

## 2019-05-06 ASSESSMENT — PAIN DESCRIPTION - LOCATION
LOCATION: HEAD
LOCATION: HEAD

## 2019-05-06 ASSESSMENT — PAIN DESCRIPTION - FREQUENCY: FREQUENCY: INTERMITTENT

## 2019-05-06 NOTE — BH NOTE
Reported he felt the prn Risperidone as made him tired and sleepy. Refused scheduled am medications.

## 2019-05-06 NOTE — PLAN OF CARE
Patient rates Depression 0/10 and Anxiety 5/10. Patient visible and social with peers on the milieu. Patient attended and participated in wrap up group. Patient took HS medications. Patient watching T.V. @ present. No c/o's voiced @ present.

## 2019-05-06 NOTE — GROUP NOTE
Group Therapy Note    Date: May 5    Group Start Time: 2020  Group End Time: 2045  Group Topic: [facility]        Group Therapy Note    Attendees: goals and importance of goal setting discussed. Night time milieu activities discussed. Patient's Goal:  Focus on what I need to do when I leave    Notes:  Trying to get into a rehab    Status After Intervention:  Improved    Participation Level:  Active Listener    Participation Quality: Attentive      Speech:  normal      Thought Process/Content: Logical      Affective Functioning: Congruent      Mood: anxious      Level of consciousness:  Alert and Oriented x4      Response to Learning: Progressing to goal      Endings: None Reported    Modes of Intervention: Support      Discipline Responsible: [address], [employer]      Signature:  Shelia Pineda

## 2019-05-06 NOTE — PROGRESS NOTES
Patient continues to stay at the nurses station  Requesting medication. He has been explained that he has had all the medication that he is ordered this evening. Patient said, \"I really don't know why I am here\" It was explained to the patient that he was admitted to the [facility] for symptoms of depression He had that he said that he wanted help for his depression since he stopped drinking alcohol that he was feeling depressed. Again he said that he thought he was going to see someone the night that he was admitted to the [facility].

## 2019-05-06 NOTE — BH NOTE
Bridge Appointment completed: Reviewed Discharge Instructions with patient. Patient verbalizes understanding and agreement with the discharge plan using the teachback method.      Referral for Outpatient Tobacco Cessation Counseling, upon discharge (cecilia X if applicable and completed):    ( )  Hospital staff assisted patient to call Quit Line or faxed referral                                   during hospitalization                  ( x )  Recognizing danger situations (included triggers and roadblocks), if not completed on admission                    ( x )  Coping skills (new ways to manage stress, exercise, relaxation techniques, changing routine, distraction), if not completed on admission                                                           ( x )  Basic information about quitting (benefits of quitting, techniques in how to quit, available resources, if not completed on admission  ( ) Referral for counseling faxed to [facility]   ( ) Patient refused referral  ( ) Patient refused counseling  ( x ) Patient refused smoking cessation medication upon discharge

## 2019-05-06 NOTE — BH NOTE
[address] [facility]    Pt discharged with followings belongings:   Dentures: None  Vision - Corrective Lenses: None  Hearing Aid: None  Jewelry: None  Body Piercings Removed: N/A  Clothing: Pants, Shirt  Were All Patient Medications Collected?: Not Applicable  Other Valuables: [address] sent home with yes. Valuables retrieved from safe, Security envelope number:  none and returned to patient. Patient left department with Departure Mode: By self, In cab via Mobility at Departure: Ambulatory, discharged to Discharged to: Private Residence. Patient education on aftercare instructions: yes Information faxed to yes by  Patient verbalize understanding of AVS:  yes.     Status EXAM upon discharge:  Status and Exam  Normal: No  Facial Expression: Brightened  Affect: Appropriate  Level of Consciousness: Alert  Mood:Normal: Yes  Mood: Anxious  Motor Activity:Normal: Yes  Motor Activity: Decreased  Interview Behavior: Cooperative  Preception: [place] to Person, Jodell Punter to Time, [place] to Place, [place] to Situation  Attention:Normal: No  Attention: Distractible  Thought Processes: Circumstantial  Thought Content:Normal: No  Thought Content: Poverty of Content  Hallucinations: None  Delusions: No  Memory:Normal: Yes  Memory: Poor Remote  Insight and Judgment: No  Insight and Judgment: Poor Judgment, Poor Insight  Present Suicidal Ideation: No  Present Homicidal Ideation: No    Rain Fisher LPN

## 2019-05-06 NOTE — PROGRESS NOTES
Patient noted to go into the [address] [place] [address] Road the door and then shut the lights off. I went into [employer] and turned the lights on. I sat with patient and asked him to discuss his current feelings. He would sigh heavily and said to me, \"Well you took my medications away! \" I reviewed with patient that I didn't take his medication away. He said \"You all are against me! \" patient was reassured that staff are here to help him get through this difficult time as he is dealing with raw emotions that he hasn't felt in a while. Patient said, \"The doctor said that I am nervous! \" I discussed with patient that he is sitting still, expressing his needs to me and that he is not noted to have any tremors. Patient was to let staff obtain vital signs but he refused. CIWA score noted to be 2.

## 2019-05-06 NOTE — PROGRESS NOTES
Patient continued to pace the unit, sigh heavily then asked for vitals to be retaken. Asked patient to sit in chair for a few minutes to calm down and he sat in the chair, but continued to breathe and sigh heavily. Will monitor patient for sitting still for a length of time and then will reattempt to get his blood pressure.

## 2019-05-06 NOTE — PROGRESS NOTES
Patient remains awake and and is sitting at a phone area, staring at this staff person. Will continue to monitor patient.

## 2019-05-06 NOTE — PROGRESS NOTES
SUBJ   Agitated, withdrawing, and pacing and acting oddly. RN's thought he was acting  Ill today  Indeed he c/o diarrhea huge discomfort , is groaning, and sighing greatly  Due to twitching and 'little electric feelings\" I worried he may be getting a reaction I have seen in three remote cases of Gabitril 15  Years ago, and though they were each connected with Effexor, and though I have never seen serotonin like syndrome with gabapentin or lamtrigine, it was worth a review    Very uncomfortable and feeling trapped likely \"\"I don't know  \"to much repetitiveness     \"Headadaches, head is acting up, like headache dn lightheadedness or arm around the head  Hyprreflexia, and observed twitching also when no staff there  Description subjectively :\"It's like a panic attack\"  Heart racing and pounding, short of breath, Feel like I'm going to catch my breath and tingly and numb around head nad chest.   Irritability and variagle apetite with nausea  Dilated pupils, \"electric feelings\" nausea. I have this click and feel like nervous and scared. and a 'tight feeling  Though all this is sounding like panic we have seen him have,  And though withdrawal could still be going on for another day --as he last drank Wednesday, the CIWA's have been in the 2-3 range  We cut his gabapentin down and held the nighttime dose. And aded cyproheptadine in case of serotonin synd  He was given a dose of Ativan earlier and it helped all symtos, staff said. Pt had not endorsed relief from ativan and may have been drug seeking    Furthermore repeat Lipase  Is 83, going down    PTSD/complicated bereavement, learning to consider how to do griefwork e.g. Letter writing, talkig  Acutely exacerbated by another patient throwing a chair this wm on unit.  That danger has odalis eliminated    Social anxiety massive and he  Has been attending most groups, quietly     Bipolar and related disorder -not cycling and not manic  The Major Depression  Aspect is less      The dysthymia chronic, and still  Has started dealling in session more about  bereavement features     No rapid cycling these two days      PSYCHIATRIC EXAMINATION / MENTAL STATUS EXAM:   General appearance: disheveled, appears again lost, after doing better the last day or 1.5 d  Again frightened and confused feeling l  Activity: restless, vs LESS of the \"deer frozen in headlights\" look not as withdrawn/ wary   Relatedness: blank stare but janna and zi says nothing more then absolutely required is not quite as shut down. , Still NO intiatiion but just a little more responsive  Speech: paucity but does answer some questions         Mood: highly anxious, but less extreme  Affect: blunted yet, but one smile seen out there in great room  Thought process: concrete, less blocking, not delusional  Thought content: giving up alc.  His 'weird symptoms  Delusions: none,  Hallucination reported: none  Observed RTIS: none     Attention and concentration: very poor, yet  Orientation: good        Memory: Remote selective,  Recent  fair     SAFETY ASSESSMENT  Suicidal ideation: none but severely self destructive and risking his life   Homicidal ideation: none  Non-suicidal self-injurious behaviors: passively so  Able to care for self: not able to fu with treatment due to massive social anxiety  Insight: none, but just a tiny glmpse of some  Judgment: self defeating to an extreme degree.      Dx: axis I:  Alcohol addiction       With extensive complication (see medical)  PTSD/complicated bereavement       With panic attacks       With Social anxiety       With Performance anxiety certainly       With Phobias-roller coasters and heights.       With Claustrophobia                                                                                                                                                   With Psychophysiologic denied, other than headache.        With OCD       With trichotillomania       With possible metabolic factors,  E.g low magnesium  Bipolar and related disorder       Depressed, major recurrent episodes, moderate       With hypomanic episodes, brief       With dysthymia       With bereavement features       With rapid cycling       With possible metabolic factors e.g low vit D       ADD       With some possible MCI  Nicotine addiction 1/2  Ppd   Axis 2:        Avoidant/schizoid/self defeating features, severe   Huger 3:          Chronic and acute  Recurrent pancreatitis         Hx withdrawal seizures         Hx portal vein thrombosis       Tx plan: prevent self injury, stabilize affect, restore sleep, treat depression, establish/maintain aftercare.  All conditions present on admission are being treated while pt is hospitalized.    Discussed PHP after discharge as part of transition back to the community.      Medications  Trial of holding  HS dose gabapentin, expect to return to 400mg tid and 800-1200mg h.s.  Give trial of cyproheptadine'  Use ativan with CIWAs and observe    lamotrigine now 25mg daily X 2 wk and double q other week to 200mg daily  Current Facility-Administered Medications                     Current Facility-Administered Medications   Medication Dose Route Frequency Provider Last Rate Last Dose    acetaminophen (TYLENOL) tablet 650 mg  650 mg Oral Q4H PRN Latia Escalona APRN - CNP   650 mg at 05/02/19 0140    hydrOXYzine (VISTARIL) capsule 50 mg  50 mg Oral TID PRN Latia Escalona APRN - CNP        OLANZapine (ZYPREXA) tablet 5 mg  5 mg Oral BID PRN Latia Escalona APRN - CNP        traZODone (DESYREL) tablet 50 mg  50 mg Oral Nightly PRN Latia Escalona APRN - CNP        magnesium hydroxide (MILK OF MAGNESIA) 400 MG/5ML suspension 30 mL  30 mL Oral Daily PRN Latia Escalona APRN - CNP        aluminum & magnesium hydroxide-simethicone (MAALOX) 200-200-20 MG/5ML suspension 30 mL  30 mL Oral Q6H PRN Latia Escalona APRN - CNP        nicotine polacrilex (NICORETTE) gum 2 mg  2

## 2019-05-06 NOTE — BH NOTE
[facility]  Discharge Note    Pt discharged with followings belongings:   Dentures: None  Vision - Corrective Lenses: None  Hearing Aid: None  Jewelry: None  Body Piercings Removed: N/A  Clothing: Pants, Shirt  Were All Patient Medications Collected?: Not Applicable  Other Valuables: [address] sent home with patient. Valuables retrieved from safe, Security envelope number:  NA and returned to patient. Patient left department with Departure Mode: With spouse via Mobility at Departure: Ambulatory, discharged to Discharged to: Private Residence. Patient education on aftercare instructions: yes  Information faxed to NA by WENDI Patient verbalize understanding of AVS:  yes.     Status EXAM upon discharge:  Status and Exam  Normal: No  Facial Expression: Brightened  Affect: Appropriate  Level of Consciousness: Alert  Mood:Normal: Yes  Mood: Anxious  Motor Activity:Normal: Yes  Motor Activity: Decreased  Interview Behavior: Cooperative  Preception: [place] to Person, [place] to Time, [place] to Place, [place] to Situation  Attention:Normal: No  Attention: Distractible  Thought Processes: Circumstantial  Thought Content:Normal: No  Thought Content: [place] of Content  Hallucinations: None  Delusions: No  Memory:Normal: Yes  Memory: Poor Remote  Insight and Judgment: No  Insight and Judgment: Poor Judgment, Poor Insight  Present Suicidal Ideation: No  Present Homicidal Ideation: No    Jorgito Parisi RN

## 2019-05-22 ENCOUNTER — HOSPITAL ENCOUNTER (EMERGENCY)
Age: 29
Discharge: HOME | End: 2019-05-22
Attending: EMERGENCY MEDICINE
Payer: COMMERCIAL

## 2019-05-22 VITALS
OXYGEN SATURATION: 99 % | SYSTOLIC BLOOD PRESSURE: 132 MMHG | HEART RATE: 58 BPM | BODY MASS INDEX: 22.05 KG/M2 | HEIGHT: 68 IN | RESPIRATION RATE: 22 BRPM | TEMPERATURE: 96.8 F | DIASTOLIC BLOOD PRESSURE: 82 MMHG | WEIGHT: 145.5 LBS

## 2019-05-22 DIAGNOSIS — F10.930 ALCOHOL WITHDRAWAL SYNDROME WITHOUT COMPLICATION (HCC): Primary | ICD-10-CM

## 2019-05-22 DIAGNOSIS — K86.0 ALCOHOL-INDUCED CHRONIC PANCREATITIS (HCC): ICD-10-CM

## 2019-05-22 DIAGNOSIS — R11.2 NON-INTRACTABLE VOMITING WITH NAUSEA, UNSPECIFIED VOMITING TYPE: ICD-10-CM

## 2019-05-22 LAB
A/G RATIO: 1.7 (ref 1.1–2.2)
ALBUMIN SERPL-MCNC: 4.7 G/DL (ref 3.4–5)
ALP BLD-CCNC: 59 U/L (ref 40–129)
ALT SERPL-CCNC: 8 U/L (ref 10–40)
ANION GAP SERPL CALCULATED.3IONS-SCNC: 11 MMOL/L (ref 3–16)
AST SERPL-CCNC: 16 U/L (ref 15–37)
BASOPHILS ABSOLUTE: 0 K/UL (ref 0–0.2)
BASOPHILS RELATIVE PERCENT: 0.6 %
BILIRUB SERPL-MCNC: 0.5 MG/DL (ref 0–1)
BUN BLDV-MCNC: 9 MG/DL (ref 7–20)
CALCIUM SERPL-MCNC: 10 MG/DL (ref 8.3–10.6)
CHLORIDE BLD-SCNC: 107 MMOL/L (ref 99–110)
CO2: 22 MMOL/L (ref 21–32)
CREAT SERPL-MCNC: 0.7 MG/DL (ref 0.9–1.3)
EOSINOPHILS ABSOLUTE: 0.1 K/UL (ref 0–0.6)
EOSINOPHILS RELATIVE PERCENT: 1.7 %
GFR AFRICAN AMERICAN: >60
GFR NON-AFRICAN AMERICAN: >60
GLOBULIN: 2.7 G/DL
GLUCOSE BLD-MCNC: 124 MG/DL (ref 70–99)
HCT VFR BLD CALC: 41.7 % (ref 40.5–52.5)
HEMOGLOBIN: 14.7 G/DL (ref 13.5–17.5)
LIPASE: 78 U/L (ref 13–60)
LYMPHOCYTES ABSOLUTE: 0.9 K/UL (ref 1–5.1)
LYMPHOCYTES RELATIVE PERCENT: 17.2 %
MCH RBC QN AUTO: 31.8 PG (ref 26–34)
MCHC RBC AUTO-ENTMCNC: 35.3 G/DL (ref 31–36)
MCV RBC AUTO: 90.1 FL (ref 80–100)
MONOCYTES ABSOLUTE: 0.4 K/UL (ref 0–1.3)
MONOCYTES RELATIVE PERCENT: 7.5 %
NEUTROPHILS ABSOLUTE: 3.8 K/UL (ref 1.7–7.7)
NEUTROPHILS RELATIVE PERCENT: 73 %
PDW BLD-RTO: 12 % (ref 12.4–15.4)
PLATELET # BLD: 195 K/UL (ref 135–450)
PMV BLD AUTO: 8.3 FL (ref 5–10.5)
POTASSIUM REFLEX MAGNESIUM: 4.2 MMOL/L (ref 3.5–5.1)
RBC # BLD: 4.63 M/UL (ref 4.2–5.9)
SODIUM BLD-SCNC: 140 MMOL/L (ref 136–145)
TOTAL PROTEIN: 7.4 G/DL (ref 6.4–8.2)
WBC # BLD: 5.2 K/UL (ref 4–11)

## 2019-05-22 PROCEDURE — 85025 COMPLETE CBC W/AUTO DIFF WBC: CPT

## 2019-05-22 PROCEDURE — 83690 ASSAY OF LIPASE: CPT

## 2019-05-22 PROCEDURE — 6370000000 HC RX 637 (ALT 250 FOR IP): Performed by: EMERGENCY MEDICINE

## 2019-05-22 PROCEDURE — 99285 EMERGENCY DEPT VISIT HI MDM: CPT

## 2019-05-22 PROCEDURE — 80053 COMPREHEN METABOLIC PANEL: CPT

## 2019-05-22 RX ORDER — ONDANSETRON 4 MG/1
4 TABLET, FILM COATED ORAL EVERY 8 HOURS PRN
Qty: 20 TABLET | Refills: 0 | Status: SHIPPED | OUTPATIENT
Start: 2019-05-22 | End: 2019-11-29

## 2019-05-22 RX ORDER — ONDANSETRON 4 MG/1
4 TABLET, FILM COATED ORAL ONCE
Status: COMPLETED | OUTPATIENT
Start: 2019-05-22 | End: 2019-05-22

## 2019-05-22 RX ORDER — LORAZEPAM 1 MG/1
2 TABLET ORAL ONCE
Status: COMPLETED | OUTPATIENT
Start: 2019-05-22 | End: 2019-05-22

## 2019-05-22 RX ORDER — CHLORDIAZEPOXIDE HYDROCHLORIDE 10 MG/1
10 CAPSULE, GELATIN COATED ORAL 3 TIMES DAILY PRN
Qty: 15 CAPSULE | Refills: 0 | Status: SHIPPED | OUTPATIENT
Start: 2019-05-22 | End: 2019-05-27

## 2019-05-22 RX ADMIN — LORAZEPAM 2 MG: 1 TABLET ORAL at 17:07

## 2019-05-22 RX ADMIN — ONDANSETRON HYDROCHLORIDE 4 MG: 4 TABLET, FILM COATED ORAL at 17:07

## 2019-05-22 ASSESSMENT — PAIN DESCRIPTION - PAIN TYPE
TYPE: ACUTE PAIN

## 2019-05-22 ASSESSMENT — PAIN DESCRIPTION - DESCRIPTORS: DESCRIPTORS: CRAMPING

## 2019-05-22 ASSESSMENT — PAIN SCALES - WONG BAKER: WONGBAKER_NUMERICALRESPONSE: 8

## 2019-05-22 ASSESSMENT — PAIN SCALES - GENERAL
PAINLEVEL_OUTOF10: 7
PAINLEVEL_OUTOF10: 8
PAINLEVEL_OUTOF10: 2

## 2019-05-22 ASSESSMENT — PAIN DESCRIPTION - LOCATION
LOCATION: ABDOMEN
LOCATION: ABDOMEN

## 2019-05-22 NOTE — ED PROVIDER NOTES
CHIEF COMPLAINT  Abdominal Pain (etoh withdrawl, pts last drink was 24 hours ago, pt usually drinks 12/day)      HISTORY OF PRESENT ILLNESS  Becky Lazo is a 33 y.o. male who presents to the ED complaining of epigastric abdominal pain with associated nausea throughout the day today. Patient states he has history of alcohol abuse until approximately 12 beers a day and has not had a drink since last night. Patient states he normally drinks at night to help with his nerves. States he's been feeling very anxious today. Has had intermittent jitteriness. Denies any associated chest pain or shortness of breath. States he is concerned he may be going through alcohol withdrawal.  No fevers or chills. Denies any diarrhea or change in bowel movements or change in urinary habits. Denies any other drug use except for alcohol. Denies any sinusitis or homicidal ideation    On chart review, patient does have multiple emergency room visits with 9 visits already within the month of May all with the same complaint of abdominal pain and alcohol withdrawal symptoms. Patient states he is trying to quit during each visit but continues to drink alcohol. There is also concern for drug-seeking behavior. No other complaints, modifying factors or associated symptoms. Nursing notes reviewed. Past Medical History:   Diagnosis Date    Alcohol abuse     PATIENT HAS EXTREME MANIPULITIVE & DRUG SEEKING BEHAVIOR. HE POCKETS HIS BENZODIZAPINES.  Anxiety     Drug-seeking behavior     Several times found pocketing medications given in hospital    Herpes genitalis in men     Manipulative behavior     Pancreatitis     Pneumonia     Portal vein thrombosis     pt denied    Seizures (HCC)     PER PATIENT ONLY.  DURING MULTIPLE HOSPITALIZATIONS HE HAS NEVER ONCE    Tobacco abuse      Past Surgical History:   Procedure Laterality Date    ENDOSCOPY, COLON, DIAGNOSTIC  10/28/2013    Endoscopic retrograde cholangiopancreatography with pancreatic stent placement    ENDOSCOPY, COLON, DIAGNOSTIC  03/23/2018    Esophagogastroduodenoscopy with biopsy    ERCP  1/10/14    with stent removal     Family History   Problem Relation Age of Onset    Hypertension Father     High Blood Pressure Father     Alcohol Abuse Father     Depression Mother     High Blood Pressure Paternal Grandfather     Alcohol Abuse Paternal Grandfather     Heart Disease Paternal Grandmother     Anemia Paternal Grandmother     Depression Maternal Aunt     Alcohol Abuse Paternal Aunt     Alcohol Abuse Paternal Uncle      Social History     Socioeconomic History    Marital status: Single     Spouse name: Not on file    Number of children: 1    Years of education: 15    Highest education level: Not on file   Occupational History    Occupation:    Social Needs    Financial resource strain: Not on file    Food insecurity:     Worry: Not on file     Inability: Not on file    Transportation needs:     Medical: Not on file     Non-medical: Not on file   Tobacco Use    Smoking status: Current Every Day Smoker     Packs/day: 0.50     Years: 10.00     Pack years: 5.00     Types: Cigarettes     Start date: 11/21/2007    Smokeless tobacco: Never Used   Substance and Sexual Activity    Alcohol use: Yes     Comment: every day, up to 20 beers per day    Drug use: No    Sexual activity: Never   Lifestyle    Physical activity:     Days per week: Not on file     Minutes per session: Not on file    Stress: Not on file   Relationships    Social connections:     Talks on phone: Not on file     Gets together: Not on file     Attends Restorationism service: Not on file     Active member of club or organization: Not on file     Attends meetings of clubs or organizations: Not on file     Relationship status: Not on file    Intimate partner violence:     Fear of current or ex partner: Not on file     Emotionally abused: Not on file     Physically abused: Not on file Forced sexual activity: Not on file   Other Topics Concern    Not on file   Social History Narrative    Not on file     No current facility-administered medications for this encounter. Current Outpatient Medications   Medication Sig Dispense Refill    chlordiazePOXIDE (LIBRIUM) 10 MG capsule Take 1 capsule by mouth 3 times daily as needed for Anxiety (withdrawal symptoms) for up to 5 days. 15 capsule 0    ondansetron (ZOFRAN) 4 MG tablet Take 1 tablet by mouth every 8 hours as needed for Nausea 20 tablet 0    lamoTRIgine (LAMICTAL) 25 MG tablet Take 2 tablets by mouth daily 60 tablet 0    propranolol (INDERAL) 20 MG tablet Take 1 tablet by mouth 3 times daily 90 tablet 0    lipase-protease-amylase (CREON) 53969-73268 units delayed release capsule Take 2 capsules by mouth 3 times daily (with meals) 180 capsule 0    acamprosate (CAMPRAL) 333 MG tablet Take 2 tablets by mouth 3 times daily 180 tablet 0    vitamin B-1 100 MG tablet Take 1 tablet by mouth daily 30 tablet 0    vitamin D (ERGOCALCIFEROL) 45537 units capsule Take 1 capsule by mouth once a week 5 capsule 0     Allergies   Allergen Reactions    Amoxicillin Hives    Haldol [Haloperidol Lactate] Other (See Comments)     Muscle spasms    Phenergan [Promethazine Hcl] Other (See Comments)     Pt believes it caused muscle spasms    Shrimp (Diagnostic) Itching    Glucosamine Itching      Itching of throat when eating shrimp. Pt states has had IV contrast for CT's without problem before.       Shellfish-Derived Products      Patient gets anxious around seafood         REVIEW OF SYSTEMS  10 systems reviewed, pertinent positives per HPI otherwise noted to be negative    PHYSICAL EXAM  /87   Pulse 72   Temp 96.8 °F (36 °C) (Oral)   Resp 23   Ht 5' 8\" (1.727 m)   Wt 145 lb 8.1 oz (66 kg)   SpO2 99%   BMI 22.12 kg/m²      CONSTITUTIONAL: AOx4, appears anxious, cooperative with exam, afebrile   HEAD: normocephalic, atraumatic   EYES: PERRL, EOMI, anicteric sclera   ENT: Moist mucous membranes, uvula midline   NECK: Supple, symmetric, trachea midline   LUNGS: Bilateral breath sounds, CTAB, no rales/ronchi/wheezes   CARDIOVASCULAR: RRR, normal S1/S2, no m/r/g, 2+ pulses throughout   ABDOMEN: Soft, epigastric tenderness to palpation, no rebound tenderness or guarding, non-distended, +BS   NEUROLOGIC:  MAEx4, 5/5 strength throughout; fine touch sensation intact throughout; normal gait; GCS 15, cranial nerves II through XII intact    MUSCULOSKELETAL: No clubbing, cyanosis or edema   SKIN: No rash, pallor or wounds         RADIOLOGY  X-RAYS:  I have reviewed radiologic plain film image(s). ALL OTHER NON-PLAIN FILM IMAGES SUCH AS CT, ULTRASOUND AND MRI HAVE BEEN READ BY THE RADIOLOGIST. No orders to display          EKG INTERPRETATION  None    PROCEDURES    ED COURSE/MDM  Withdrawal, pancreatitis, gastroenteritis, gastritis, PUD, low suspicion for perforation or ischemic bowel without peritoneal signs    [id number] chronic alcoholic presenting with complaints of abdominal pain, nausea and vomiting. Patient likely does have a component of withdrawal symptoms although patient has had no vomiting during his emergency room stay. Lab work shows normal white count of 5.2, hemoglobin 14.7. Platelets 010. Flexion is within normal limits. Creatinine 0.7. She was treated with a dose of Ativan in the emergency room. Patient has no tachycardia, diaphoresis or hallucinations. Has had no seizure activity. I feel patient can be discharged with outpatient follow-up. PCP referral provided. Also discussed the dangers of stopping alcohol abruptly. Patient states understanding. All questions answered prior to discharge. I estimate there is LOW risk for ACUTE APPENDICITIS, BOWEL OBSTRUCTION, CHOLECYSTITIS, DIVERTICULITIS, INCARCERATED HERNIA, PANCREATITIS, or PERFORATED BOWEL or ULCER, thus I consider the discharge disposition reasonable.  Also, there is no evidence or peritonitis, sepsis, or toxicity. Charity Rubalcava and I have discussed the diagnosis and risks, and we agree with discharging home to follow-up with their primary doctor. We also discussed returning to the Emergency Department immediately if new or worsening symptoms occur. We have discussed the symptoms which are most concerning (e.g., bloody stool, fever, changing or worsening pain, vomiting) that necessitate immediate return. Patient was given scripts for the following medications. I counseled patient how to take these medications. New Prescriptions    CHLORDIAZEPOXIDE (LIBRIUM) 10 MG CAPSULE    Take 1 capsule by mouth 3 times daily as needed for Anxiety (withdrawal symptoms) for up to 5 days. ONDANSETRON (ZOFRAN) 4 MG TABLET    Take 1 tablet by mouth every 8 hours as needed for Nausea       CLINICAL IMPRESSION  1. Alcohol withdrawal syndrome without complication ([facility] [address]    2. Non-intractable vomiting with nausea, unspecified vomiting type    3. Alcohol-induced chronic pancreatitis (HCC)        Blood pressure 124/87, pulse 72, temperature 96.8 °F (36 °C), temperature source Oral, resp. rate 23, height 5' 8\" (1.727 m), weight 145 lb 8.1 oz (66 kg), SpO2 99 %. DISPOSITION  Patient was discharged to home in good condition. [facility]  [phone]  Call   For a follow up appointment. Disclaimer: All medical record entries made by [address] [place] [address] [address] [facility].       (Please note that this note was completed with a voice recognition program. Every attempt was made to edit the dictations, but inevitably there remain words that are mis-transcribed.)            Kleber Allred MD  05/22/19 4886

## 2019-05-22 NOTE — ED NOTES
Bed: S-48  Expected date:   Expected time:   Means of arrival:   Comments:  [address] Dawson Weinberg RN  05/22/19 5929

## 2019-06-05 ENCOUNTER — HOSPITAL ENCOUNTER (EMERGENCY)
Age: 29
Discharge: HOME | End: 2019-06-06
Attending: EMERGENCY MEDICINE
Payer: COMMERCIAL

## 2019-06-05 DIAGNOSIS — K86.0 ALCOHOL-INDUCED CHRONIC PANCREATITIS (HCC): Primary | ICD-10-CM

## 2019-06-05 DIAGNOSIS — F10.10 ALCOHOL ABUSE: ICD-10-CM

## 2019-06-05 PROCEDURE — 99284 EMERGENCY DEPT VISIT MOD MDM: CPT

## 2019-06-05 ASSESSMENT — PAIN SCALES - GENERAL: PAINLEVEL_OUTOF10: 8

## 2019-06-06 ENCOUNTER — APPOINTMENT (OUTPATIENT)
Dept: GENERAL RADIOLOGY | Age: 29
End: 2019-06-06
Payer: COMMERCIAL

## 2019-06-06 VITALS
SYSTOLIC BLOOD PRESSURE: 117 MMHG | WEIGHT: 140 LBS | RESPIRATION RATE: 16 BRPM | HEART RATE: 86 BPM | DIASTOLIC BLOOD PRESSURE: 79 MMHG | TEMPERATURE: 98.5 F | BODY MASS INDEX: 21.29 KG/M2 | OXYGEN SATURATION: 99 %

## 2019-06-06 LAB
A/G RATIO: 1.7 (ref 1.1–2.2)
ALBUMIN SERPL-MCNC: 5 G/DL (ref 3.4–5)
ALP BLD-CCNC: 71 U/L (ref 40–129)
ALT SERPL-CCNC: 10 U/L (ref 10–40)
ANION GAP SERPL CALCULATED.3IONS-SCNC: 15 MMOL/L (ref 3–16)
AST SERPL-CCNC: 22 U/L (ref 15–37)
BASOPHILS ABSOLUTE: 0 K/UL (ref 0–0.2)
BASOPHILS RELATIVE PERCENT: 0.4 %
BILIRUB SERPL-MCNC: 0.6 MG/DL (ref 0–1)
BILIRUBIN URINE: NEGATIVE
BLOOD, URINE: NEGATIVE
BUN BLDV-MCNC: 10 MG/DL (ref 7–20)
CALCIUM SERPL-MCNC: 10 MG/DL (ref 8.3–10.6)
CHLORIDE BLD-SCNC: 106 MMOL/L (ref 99–110)
CLARITY: CLEAR
CO2: 20 MMOL/L (ref 21–32)
COLOR: YELLOW
CREAT SERPL-MCNC: 0.8 MG/DL (ref 0.9–1.3)
EOSINOPHILS ABSOLUTE: 0.1 K/UL (ref 0–0.6)
EOSINOPHILS RELATIVE PERCENT: 1.7 %
GFR AFRICAN AMERICAN: >60
GFR NON-AFRICAN AMERICAN: >60
GLOBULIN: 3 G/DL
GLUCOSE BLD-MCNC: 99 MG/DL (ref 70–99)
GLUCOSE URINE: NEGATIVE MG/DL
HCT VFR BLD CALC: 44.2 % (ref 40.5–52.5)
HEMOGLOBIN: 15.3 G/DL (ref 13.5–17.5)
KETONES, URINE: NEGATIVE MG/DL
LEUKOCYTE ESTERASE, URINE: NEGATIVE
LIPASE: 91 U/L (ref 13–60)
LYMPHOCYTES ABSOLUTE: 1.5 K/UL (ref 1–5.1)
LYMPHOCYTES RELATIVE PERCENT: 23.5 %
MCH RBC QN AUTO: 31.3 PG (ref 26–34)
MCHC RBC AUTO-ENTMCNC: 34.6 G/DL (ref 31–36)
MCV RBC AUTO: 90.6 FL (ref 80–100)
MICROSCOPIC EXAMINATION: NORMAL
MONOCYTES ABSOLUTE: 0.4 K/UL (ref 0–1.3)
MONOCYTES RELATIVE PERCENT: 6.3 %
NEUTROPHILS ABSOLUTE: 4.5 K/UL (ref 1.7–7.7)
NEUTROPHILS RELATIVE PERCENT: 68.1 %
NITRITE, URINE: NEGATIVE
PDW BLD-RTO: 12.7 % (ref 12.4–15.4)
PH UA: 7 (ref 5–8)
PLATELET # BLD: 186 K/UL (ref 135–450)
PMV BLD AUTO: 9.1 FL (ref 5–10.5)
POTASSIUM SERPL-SCNC: 4.1 MMOL/L (ref 3.5–5.1)
PROTEIN UA: NEGATIVE MG/DL
RBC # BLD: 4.88 M/UL (ref 4.2–5.9)
SODIUM BLD-SCNC: 141 MMOL/L (ref 136–145)
SPECIFIC GRAVITY UA: 1.02 (ref 1–1.03)
TOTAL PROTEIN: 8 G/DL (ref 6.4–8.2)
URINE REFLEX TO CULTURE: NORMAL
URINE TYPE: NORMAL
UROBILINOGEN, URINE: 0.2 E.U./DL
WBC # BLD: 6.6 K/UL (ref 4–11)

## 2019-06-06 PROCEDURE — 6360000002 HC RX W HCPCS: Performed by: EMERGENCY MEDICINE

## 2019-06-06 PROCEDURE — 81003 URINALYSIS AUTO W/O SCOPE: CPT

## 2019-06-06 PROCEDURE — 96375 TX/PRO/DX INJ NEW DRUG ADDON: CPT

## 2019-06-06 PROCEDURE — 2580000003 HC RX 258: Performed by: EMERGENCY MEDICINE

## 2019-06-06 PROCEDURE — 74022 RADEX COMPL AQT ABD SERIES: CPT

## 2019-06-06 PROCEDURE — 2500000003 HC RX 250 WO HCPCS: Performed by: EMERGENCY MEDICINE

## 2019-06-06 PROCEDURE — 96361 HYDRATE IV INFUSION ADD-ON: CPT

## 2019-06-06 PROCEDURE — 83690 ASSAY OF LIPASE: CPT

## 2019-06-06 PROCEDURE — 80053 COMPREHEN METABOLIC PANEL: CPT

## 2019-06-06 PROCEDURE — 85025 COMPLETE CBC W/AUTO DIFF WBC: CPT

## 2019-06-06 PROCEDURE — 96374 THER/PROPH/DIAG INJ IV PUSH: CPT

## 2019-06-06 RX ORDER — HYDROMORPHONE HYDROCHLORIDE 1 MG/ML
0.5 INJECTION, SOLUTION INTRAMUSCULAR; INTRAVENOUS; SUBCUTANEOUS ONCE
Status: COMPLETED | OUTPATIENT
Start: 2019-06-06 | End: 2019-06-06

## 2019-06-06 RX ORDER — DIPHENHYDRAMINE HYDROCHLORIDE 50 MG/ML
25 INJECTION INTRAMUSCULAR; INTRAVENOUS ONCE
Status: COMPLETED | OUTPATIENT
Start: 2019-06-06 | End: 2019-06-06

## 2019-06-06 RX ORDER — ONDANSETRON 2 MG/ML
4 INJECTION INTRAMUSCULAR; INTRAVENOUS ONCE
Status: COMPLETED | OUTPATIENT
Start: 2019-06-06 | End: 2019-06-06

## 2019-06-06 RX ORDER — ONDANSETRON 4 MG/1
4 TABLET, ORALLY DISINTEGRATING ORAL EVERY 4 HOURS PRN
Qty: 10 TABLET | Refills: 0 | Status: SHIPPED | OUTPATIENT
Start: 2019-06-06 | End: 2019-11-29

## 2019-06-06 RX ORDER — FAMOTIDINE 20 MG/1
20 TABLET, FILM COATED ORAL 2 TIMES DAILY
Qty: 60 TABLET | Refills: 0 | Status: SHIPPED | OUTPATIENT
Start: 2019-06-06 | End: 2019-11-29

## 2019-06-06 RX ORDER — 0.9 % SODIUM CHLORIDE 0.9 %
1000 INTRAVENOUS SOLUTION INTRAVENOUS ONCE
Status: COMPLETED | OUTPATIENT
Start: 2019-06-06 | End: 2019-06-06

## 2019-06-06 RX ORDER — LORAZEPAM 2 MG/ML
1 INJECTION INTRAMUSCULAR ONCE
Status: COMPLETED | OUTPATIENT
Start: 2019-06-06 | End: 2019-06-06

## 2019-06-06 RX ADMIN — HYDROMORPHONE HYDROCHLORIDE 0.5 MG: 1 INJECTION, SOLUTION INTRAMUSCULAR; INTRAVENOUS; SUBCUTANEOUS at 00:51

## 2019-06-06 RX ADMIN — LORAZEPAM 1 MG: 2 INJECTION INTRAMUSCULAR; INTRAVENOUS at 02:26

## 2019-06-06 RX ADMIN — ONDANSETRON 4 MG: 2 INJECTION INTRAMUSCULAR; INTRAVENOUS at 00:48

## 2019-06-06 RX ADMIN — SODIUM CHLORIDE 1000 ML: 9 INJECTION, SOLUTION INTRAVENOUS at 00:48

## 2019-06-06 RX ADMIN — FAMOTIDINE 20 MG: 10 INJECTION, SOLUTION INTRAVENOUS at 00:50

## 2019-06-06 RX ADMIN — DIPHENHYDRAMINE HYDROCHLORIDE 25 MG: 50 INJECTION, SOLUTION INTRAMUSCULAR; INTRAVENOUS at 00:49

## 2019-06-06 ASSESSMENT — PAIN SCALES - GENERAL
PAINLEVEL_OUTOF10: 8
PAINLEVEL_OUTOF10: 8

## 2019-06-06 NOTE — ED NOTES
Pt resting comfortably upon writer's initial entrance into room. After talking with patient, he begins to shake more, now c/o increased pain, 8/10. Patient appears more anxious the longer this RN is in the room. Mari Garzon MD notified.      Onofre Valentin RN  06/06/19 0224

## 2019-06-06 NOTE — DISCHARGE SUMMARY
[address]                 [address]                               DISCHARGE SUMMARY    PATIENT NAME: Anjel Burden                        :        1990  MED REC NO:   [id number]                          ROOM:       2306  ACCOUNT NO:   [de-identified]                           ADMIT DATE: 2019  PROVIDER:     Ximena Garcia. Art Kerr MD                  DISCHARGE DATE:  2019    DISPOSITION:  The patient will be discharged home to follow up as an  outpatient through [facility] for outpatient rehab program.    DISCHARGE MEDICATIONS:  Campral 666 mg 3 times a day, vitamin D 50,000  units weekly, Lamictal 50 mg daily, Propranolol 20 mg 3 times a day,  thiamine 100 mg daily. He is also on Creon 2 capsules by mouth 3 times  a day. CONDITION ON DISCHARGE:  Stable. MENTAL STATUS EXAMINATION:  The patient is alert and oriented to person,  place, and time. No threats to harm self or others. No psychotic  symptoms. Insight and judgment improved. Mood was good. Affect was  fairly bright. DISCHARGE DIAGNOSES:  AXIS I:  1. Major depressive episode, recurrent, nonpsychotic, severe. 2.  Oppositional defiant disorder. 3.  Benzodiazepine abuse. 4.  Alcohol abuse. AXIS II:  Deferred. AXIS III:  Pancreatitis, chronic. AXIS IV:  Moderate. AXIS V:  50. CHIEF COMPLAINT:  Depression. HISTORY OF PRESENT ILLNESS:  The patient is a 58-year-old male who was  initially seen by Dr. Rex Acevedo on 2019. He was in the [facility] with his withdrawal pancreatitis type pain. He came for a  detox the previous week, which was done for 3 days. He then returned  again with pain. He has been having tremors and was monitored with CIWA  precautions. HOSPITAL COURSE:  He had been on Abilify and Cogentin in the past.  He  was agitated at times pacing and acting oddly.   He was having headaches  as well as having social anxiety, PTSD symptoms. Most of his stay was  with Dr. César Poe and I saw him only towards the end of his stay. He was  somewhat aggressive at times. He used to slam the door and then shut  the lights off. He became angry about losing his medications as well. He was eventually discharged to [facility] for rehab, which is what  he needed. He was discharged there without any incident, had no further  suicidal ideation.         Mahsa Dooley MD    D: 06/05/2019 18:09:12       T: 06/06/2019 10:25:51     [email or web address]  Job#: [id number]     Doc#: [id number]    CC:

## 2019-06-06 NOTE — ED PROVIDER NOTES
 Anemia Paternal Grandmother     Depression Maternal Aunt     Alcohol Abuse Paternal Aunt     Alcohol Abuse Paternal Uncle           SOCIAL HISTORY       Social History     Socioeconomic History    Marital status: Single     Spouse name: None    Number of children: 1    Years of education: 15    Highest education level: None   Occupational History    Occupation:    Social Needs    Financial resource strain: None    Food insecurity:     Worry: None     Inability: None    Transportation needs:     Medical: None     Non-medical: None   Tobacco Use    Smoking status: Current Every Day Smoker     Packs/day: 0.50     Years: 10.00     Pack years: 5.00     Types: Cigarettes     Start date: 11/21/2007    Smokeless tobacco: Never Used   Substance and Sexual Activity    Alcohol use: Yes     Comment: every day, up to 20 beers per day    Drug use: No    Sexual activity: Never   Lifestyle    Physical activity:     Days per week: None     Minutes per session: None    Stress: None   Relationships    Social connections:     Talks on phone: None     Gets together: None     Attends Jew service: None     Active member of club or organization: None     Attends meetings of clubs or organizations: None     Relationship status: None    Intimate partner violence:     Fear of current or ex partner: None     Emotionally abused: None     Physically abused: None     Forced sexual activity: None   Other Topics Concern    None   Social History Narrative    None       SCREENINGS             PHYSICAL EXAM    (up to 7 for level 4, 8 or more for level 5)     ED Triage Vitals [06/05/19 2255]   BP Temp Temp src Pulse Resp SpO2 Height Weight   (!) 165/98 98.5 °F (36.9 °C) -- 84 16 99 % -- 140 lb (63.5 kg)       Physical Exam  GENERAL: Patient appeared well-developed, well-nourished, vital signs reviewed. MENTAL STATUS: Patient is awake and alert oriented ×3  HEAD AND FACE :Head is normal cephalic. Face normal managed as an outpatient with follow up. Recommend no alcohol and staying clear liquids only until abdominal pain completely gone. Follow-up primary care doctor in one to 2 days. REASSESSMENT        CONSULTS:  None    Procedures:  Unless otherwise noted below, none     Procedures    FINAL IMPRESSION    Abdominal pain, alcohol abuse, chronic pancreatitis    No diagnosis found. DISPOSITION/PLAN   DISPOSITION        PATIENT REFERRED TO:  No follow-up provider specified.     DISCHARGE MEDICATIONS:  New Prescriptions    No medications on file          (Please note that portions of this note were completed with a voice recognition program.  Efforts were made to edit thedictations but occasionally words are mis-transcribed.)    Juan Francisco Rondon DO (electronically signed)Emergency Physician       Juan Francisco Rondon DO  06/06/19 3584

## 2019-06-06 NOTE — ED NOTES
Pt discharged in stable condition, VSS, no signs of distress, discharge instructions and meds reviewed. Pt verbalizes understanding or concerns unaddressed.         Antonio Sultana RN  06/06/19 7896

## 2019-06-11 ENCOUNTER — TELEPHONE (OUTPATIENT)
Dept: OTHER | Age: 29
End: 2019-06-11

## 2019-06-11 NOTE — TELEPHONE ENCOUNTER
[facility] called patient in follow up from ED visit. No answer, message left offering to assist in locating a PCP.

## 2019-11-29 ENCOUNTER — HOSPITAL ENCOUNTER (INPATIENT)
Age: 29
LOS: 2 days | Discharge: HOME | End: 2019-12-01
Attending: INTERNAL MEDICINE | Admitting: INTERNAL MEDICINE
Payer: COMMERCIAL

## 2019-11-29 DIAGNOSIS — F10.930 ALCOHOL WITHDRAWAL SYNDROME, UNCOMPLICATED (HCC): ICD-10-CM

## 2019-11-29 DIAGNOSIS — F10.930 ALCOHOL WITHDRAWAL SYNDROME WITHOUT COMPLICATION (HCC): Primary | ICD-10-CM

## 2019-11-29 LAB
A/G RATIO: 1.8 (ref 1.1–2.2)
ALBUMIN SERPL-MCNC: 4.9 G/DL (ref 3.4–5)
ALP BLD-CCNC: 59 U/L (ref 40–129)
ALT SERPL-CCNC: 14 U/L (ref 10–40)
AMPHETAMINE SCREEN, URINE: NORMAL
ANION GAP SERPL CALCULATED.3IONS-SCNC: 16 MMOL/L (ref 3–16)
AST SERPL-CCNC: 16 U/L (ref 15–37)
BARBITURATE SCREEN URINE: NORMAL
BASOPHILS ABSOLUTE: 0 K/UL (ref 0–0.2)
BASOPHILS RELATIVE PERCENT: 0.3 %
BENZODIAZEPINE SCREEN, URINE: NORMAL
BILIRUB SERPL-MCNC: 0.6 MG/DL (ref 0–1)
BILIRUBIN URINE: NEGATIVE
BLOOD, URINE: NEGATIVE
BUN BLDV-MCNC: 15 MG/DL (ref 7–20)
CALCIUM SERPL-MCNC: 9.7 MG/DL (ref 8.3–10.6)
CANNABINOID SCREEN URINE: NORMAL
CHLORIDE BLD-SCNC: 102 MMOL/L (ref 99–110)
CLARITY: CLEAR
CO2: 22 MMOL/L (ref 21–32)
COCAINE METABOLITE SCREEN URINE: NORMAL
COLOR: YELLOW
CREAT SERPL-MCNC: 0.8 MG/DL (ref 0.9–1.3)
EOSINOPHILS ABSOLUTE: 0.1 K/UL (ref 0–0.6)
EOSINOPHILS RELATIVE PERCENT: 1.1 %
EPITHELIAL CELLS, UA: 1 /HPF (ref 0–5)
ETHANOL: NORMAL MG/DL (ref 0–0.08)
GFR AFRICAN AMERICAN: >60
GFR NON-AFRICAN AMERICAN: >60
GLOBULIN: 2.7 G/DL
GLUCOSE BLD-MCNC: 109 MG/DL (ref 70–99)
GLUCOSE BLD-MCNC: 125 MG/DL
GLUCOSE BLD-MCNC: 125 MG/DL (ref 70–99)
GLUCOSE URINE: NEGATIVE MG/DL
HCT VFR BLD CALC: 44.8 % (ref 40.5–52.5)
HEMOGLOBIN: 15.9 G/DL (ref 13.5–17.5)
HYALINE CASTS: 4 /LPF (ref 0–8)
KETONES, URINE: NEGATIVE MG/DL
LEUKOCYTE ESTERASE, URINE: NEGATIVE
LIPASE: 112 U/L (ref 13–60)
LYMPHOCYTES ABSOLUTE: 1.7 K/UL (ref 1–5.1)
LYMPHOCYTES RELATIVE PERCENT: 18.9 %
Lab: NORMAL
MCH RBC QN AUTO: 32.3 PG (ref 26–34)
MCHC RBC AUTO-ENTMCNC: 35.6 G/DL (ref 31–36)
MCV RBC AUTO: 90.9 FL (ref 80–100)
METHADONE SCREEN, URINE: NORMAL
MICROSCOPIC EXAMINATION: YES
MONOCYTES ABSOLUTE: 0.6 K/UL (ref 0–1.3)
MONOCYTES RELATIVE PERCENT: 6.7 %
NEUTROPHILS ABSOLUTE: 6.6 K/UL (ref 1.7–7.7)
NEUTROPHILS RELATIVE PERCENT: 73 %
NITRITE, URINE: NEGATIVE
OPIATE SCREEN URINE: NORMAL
OXYCODONE URINE: NORMAL
PDW BLD-RTO: 11.9 % (ref 12.4–15.4)
PERFORMED ON: ABNORMAL
PH UA: 7.5
PH UA: 7.5 (ref 5–8)
PHENCYCLIDINE SCREEN URINE: NORMAL
PLATELET # BLD: 184 K/UL (ref 135–450)
PMV BLD AUTO: 9 FL (ref 5–10.5)
POTASSIUM REFLEX MAGNESIUM: 3.9 MMOL/L (ref 3.5–5.1)
PROPOXYPHENE SCREEN: NORMAL
PROTEIN UA: 30 MG/DL
RBC # BLD: 4.93 M/UL (ref 4.2–5.9)
RBC UA: 1 /HPF (ref 0–4)
SODIUM BLD-SCNC: 140 MMOL/L (ref 136–145)
SPECIFIC GRAVITY UA: 1.02 (ref 1–1.03)
TOTAL PROTEIN: 7.6 G/DL (ref 6.4–8.2)
URINE REFLEX TO CULTURE: ABNORMAL
URINE TYPE: ABNORMAL
UROBILINOGEN, URINE: 1 E.U./DL
WBC # BLD: 9.1 K/UL (ref 4–11)
WBC UA: 2 /HPF (ref 0–5)

## 2019-11-29 PROCEDURE — 96376 TX/PRO/DX INJ SAME DRUG ADON: CPT

## 2019-11-29 PROCEDURE — 1200000000 HC SEMI PRIVATE

## 2019-11-29 PROCEDURE — 6360000002 HC RX W HCPCS: Performed by: NURSE PRACTITIONER

## 2019-11-29 PROCEDURE — 96375 TX/PRO/DX INJ NEW DRUG ADDON: CPT

## 2019-11-29 PROCEDURE — 80053 COMPREHEN METABOLIC PANEL: CPT

## 2019-11-29 PROCEDURE — G0480 DRUG TEST DEF 1-7 CLASSES: HCPCS

## 2019-11-29 PROCEDURE — 83690 ASSAY OF LIPASE: CPT

## 2019-11-29 PROCEDURE — 99285 EMERGENCY DEPT VISIT HI MDM: CPT

## 2019-11-29 PROCEDURE — 6370000000 HC RX 637 (ALT 250 FOR IP): Performed by: NURSE PRACTITIONER

## 2019-11-29 PROCEDURE — 2580000003 HC RX 258: Performed by: NURSE PRACTITIONER

## 2019-11-29 PROCEDURE — 85025 COMPLETE CBC W/AUTO DIFF WBC: CPT

## 2019-11-29 PROCEDURE — 2580000003 HC RX 258: Performed by: INTERNAL MEDICINE

## 2019-11-29 PROCEDURE — 96366 THER/PROPH/DIAG IV INF ADDON: CPT

## 2019-11-29 PROCEDURE — 2500000003 HC RX 250 WO HCPCS: Performed by: NURSE PRACTITIONER

## 2019-11-29 PROCEDURE — 81001 URINALYSIS AUTO W/SCOPE: CPT

## 2019-11-29 PROCEDURE — 96365 THER/PROPH/DIAG IV INF INIT: CPT

## 2019-11-29 PROCEDURE — 80307 DRUG TEST PRSMV CHEM ANLYZR: CPT

## 2019-11-29 PROCEDURE — 6360000002 HC RX W HCPCS: Performed by: INTERNAL MEDICINE

## 2019-11-29 RX ORDER — LAMOTRIGINE 25 MG/1
50 TABLET ORAL DAILY
Status: CANCELLED | OUTPATIENT
Start: 2019-11-29

## 2019-11-29 RX ORDER — LORAZEPAM 2 MG/ML
1 INJECTION INTRAMUSCULAR PRN
Status: DISCONTINUED | OUTPATIENT
Start: 2019-11-29 | End: 2019-11-29 | Stop reason: ALTCHOICE

## 2019-11-29 RX ORDER — ONDANSETRON 2 MG/ML
4 INJECTION INTRAMUSCULAR; INTRAVENOUS EVERY 6 HOURS PRN
Status: DISCONTINUED | OUTPATIENT
Start: 2019-11-29 | End: 2019-11-29

## 2019-11-29 RX ORDER — ARIPIPRAZOLE 10 MG/1
10 TABLET ORAL DAILY
COMMUNITY
Start: 2019-10-08 | End: 2021-06-02

## 2019-11-29 RX ORDER — SODIUM CHLORIDE 0.9 % (FLUSH) 0.9 %
10 SYRINGE (ML) INJECTION EVERY 12 HOURS SCHEDULED
Status: DISCONTINUED | OUTPATIENT
Start: 2019-11-29 | End: 2019-12-01 | Stop reason: HOSPADM

## 2019-11-29 RX ORDER — SODIUM CHLORIDE 0.9 % (FLUSH) 0.9 %
10 SYRINGE (ML) INJECTION PRN
Status: DISCONTINUED | OUTPATIENT
Start: 2019-11-29 | End: 2019-12-01 | Stop reason: HOSPADM

## 2019-11-29 RX ORDER — PROPRANOLOL HYDROCHLORIDE 10 MG/1
20 TABLET ORAL 3 TIMES DAILY
Status: CANCELLED | OUTPATIENT
Start: 2019-11-29

## 2019-11-29 RX ORDER — LORAZEPAM 2 MG/ML
4 INJECTION INTRAMUSCULAR
Status: DISCONTINUED | OUTPATIENT
Start: 2019-11-29 | End: 2019-12-01 | Stop reason: HOSPADM

## 2019-11-29 RX ORDER — ONDANSETRON 2 MG/ML
4 INJECTION INTRAMUSCULAR; INTRAVENOUS ONCE
Status: COMPLETED | OUTPATIENT
Start: 2019-11-29 | End: 2019-11-29

## 2019-11-29 RX ORDER — MAGNESIUM SULFATE 1 G/100ML
1 INJECTION INTRAVENOUS PRN
Status: DISCONTINUED | OUTPATIENT
Start: 2019-11-29 | End: 2019-12-01 | Stop reason: HOSPADM

## 2019-11-29 RX ORDER — LORAZEPAM 2 MG/ML
2 INJECTION INTRAMUSCULAR
Status: DISCONTINUED | OUTPATIENT
Start: 2019-11-29 | End: 2019-12-01 | Stop reason: HOSPADM

## 2019-11-29 RX ORDER — BENZTROPINE MESYLATE 1 MG/1
1 TABLET ORAL 2 TIMES DAILY
COMMUNITY
Start: 2019-11-11 | End: 2021-06-02

## 2019-11-29 RX ORDER — KETOROLAC TROMETHAMINE 15 MG/ML
15 INJECTION, SOLUTION INTRAMUSCULAR; INTRAVENOUS ONCE
Status: COMPLETED | OUTPATIENT
Start: 2019-11-29 | End: 2019-11-29

## 2019-11-29 RX ORDER — SODIUM CHLORIDE, SODIUM LACTATE, POTASSIUM CHLORIDE, CALCIUM CHLORIDE 600; 310; 30; 20 MG/100ML; MG/100ML; MG/100ML; MG/100ML
1000 INJECTION, SOLUTION INTRAVENOUS ONCE
Status: COMPLETED | OUTPATIENT
Start: 2019-11-29 | End: 2019-11-29

## 2019-11-29 RX ORDER — POTASSIUM CHLORIDE 20 MEQ/1
40 TABLET, EXTENDED RELEASE ORAL PRN
Status: DISCONTINUED | OUTPATIENT
Start: 2019-11-29 | End: 2019-12-01 | Stop reason: HOSPADM

## 2019-11-29 RX ORDER — LORAZEPAM 1 MG/1
2 TABLET ORAL
Status: DISCONTINUED | OUTPATIENT
Start: 2019-11-29 | End: 2019-12-01 | Stop reason: HOSPADM

## 2019-11-29 RX ORDER — ONDANSETRON 2 MG/ML
4 INJECTION INTRAMUSCULAR; INTRAVENOUS EVERY 4 HOURS PRN
Status: DISCONTINUED | OUTPATIENT
Start: 2019-11-29 | End: 2019-12-01 | Stop reason: HOSPADM

## 2019-11-29 RX ORDER — LORAZEPAM 2 MG/ML
1 INJECTION INTRAMUSCULAR
Status: DISCONTINUED | OUTPATIENT
Start: 2019-11-29 | End: 2019-12-01 | Stop reason: HOSPADM

## 2019-11-29 RX ORDER — MAGNESIUM HYDROXIDE/ALUMINUM HYDROXICE/SIMETHICONE 120; 1200; 1200 MG/30ML; MG/30ML; MG/30ML
30 SUSPENSION ORAL EVERY 6 HOURS PRN
Status: DISCONTINUED | OUTPATIENT
Start: 2019-11-29 | End: 2019-12-01 | Stop reason: HOSPADM

## 2019-11-29 RX ORDER — ACETAMINOPHEN 325 MG/1
650 TABLET ORAL EVERY 4 HOURS PRN
Status: DISCONTINUED | OUTPATIENT
Start: 2019-11-29 | End: 2019-12-01 | Stop reason: HOSPADM

## 2019-11-29 RX ORDER — LORAZEPAM 2 MG/ML
3 INJECTION INTRAMUSCULAR
Status: DISCONTINUED | OUTPATIENT
Start: 2019-11-29 | End: 2019-12-01 | Stop reason: HOSPADM

## 2019-11-29 RX ORDER — LORAZEPAM 1 MG/1
4 TABLET ORAL
Status: DISCONTINUED | OUTPATIENT
Start: 2019-11-29 | End: 2019-12-01 | Stop reason: HOSPADM

## 2019-11-29 RX ORDER — LORAZEPAM 1 MG/1
3 TABLET ORAL
Status: DISCONTINUED | OUTPATIENT
Start: 2019-11-29 | End: 2019-12-01 | Stop reason: HOSPADM

## 2019-11-29 RX ORDER — LORAZEPAM 1 MG/1
1 TABLET ORAL
Status: DISCONTINUED | OUTPATIENT
Start: 2019-11-29 | End: 2019-12-01 | Stop reason: HOSPADM

## 2019-11-29 RX ORDER — POTASSIUM CHLORIDE 7.45 MG/ML
10 INJECTION INTRAVENOUS PRN
Status: DISCONTINUED | OUTPATIENT
Start: 2019-11-29 | End: 2019-12-01 | Stop reason: HOSPADM

## 2019-11-29 RX ADMIN — SODIUM CHLORIDE, POTASSIUM CHLORIDE, SODIUM LACTATE AND CALCIUM CHLORIDE 1000 ML: 600; 310; 30; 20 INJECTION, SOLUTION INTRAVENOUS at 19:52

## 2019-11-29 RX ADMIN — KETOROLAC TROMETHAMINE 15 MG: 15 INJECTION, SOLUTION INTRAMUSCULAR; INTRAVENOUS at 23:26

## 2019-11-29 RX ADMIN — ONDANSETRON 4 MG: 2 INJECTION INTRAMUSCULAR; INTRAVENOUS at 22:34

## 2019-11-29 RX ADMIN — Medication 10 ML: at 22:36

## 2019-11-29 RX ADMIN — FOLIC ACID: 5 INJECTION, SOLUTION INTRAMUSCULAR; INTRAVENOUS; SUBCUTANEOUS at 19:51

## 2019-11-29 RX ADMIN — LIDOCAINE HYDROCHLORIDE: 20 SOLUTION ORAL; TOPICAL at 21:09

## 2019-11-29 RX ADMIN — LORAZEPAM 1 MG: 2 INJECTION INTRAMUSCULAR; INTRAVENOUS at 19:51

## 2019-11-29 RX ADMIN — LORAZEPAM 4 MG: 2 INJECTION INTRAMUSCULAR; INTRAVENOUS at 23:30

## 2019-11-29 RX ADMIN — FAMOTIDINE 20 MG: 10 INJECTION, SOLUTION INTRAVENOUS at 21:07

## 2019-11-29 RX ADMIN — ONDANSETRON 4 MG: 2 INJECTION INTRAMUSCULAR; INTRAVENOUS at 19:51

## 2019-11-29 RX ADMIN — LORAZEPAM 1 MG: 2 INJECTION INTRAMUSCULAR; INTRAVENOUS at 21:04

## 2019-11-29 RX ADMIN — LORAZEPAM 4 MG: 2 INJECTION INTRAMUSCULAR; INTRAVENOUS at 22:34

## 2019-11-29 ASSESSMENT — PAIN - FUNCTIONAL ASSESSMENT: PAIN_FUNCTIONAL_ASSESSMENT: ACTIVITIES ARE NOT PREVENTED

## 2019-11-29 ASSESSMENT — PAIN SCALES - GENERAL
PAINLEVEL_OUTOF10: 4
PAINLEVEL_OUTOF10: 7
PAINLEVEL_OUTOF10: 7
PAINLEVEL_OUTOF10: 5

## 2019-11-29 ASSESSMENT — ENCOUNTER SYMPTOMS
NAUSEA: 1
COLOR CHANGE: 0
DIARRHEA: 0
ABDOMINAL PAIN: 1
SHORTNESS OF BREATH: 0
BLOOD IN STOOL: 0
CONSTIPATION: 0
VOMITING: 1
ABDOMINAL DISTENTION: 0

## 2019-11-29 ASSESSMENT — PAIN DESCRIPTION - DESCRIPTORS
DESCRIPTORS: HEADACHE
DESCRIPTORS: ACHING
DESCRIPTORS: HEADACHE

## 2019-11-29 ASSESSMENT — PAIN DESCRIPTION - LOCATION
LOCATION: HEAD

## 2019-11-29 ASSESSMENT — PAIN DESCRIPTION - FREQUENCY
FREQUENCY: CONTINUOUS

## 2019-11-29 ASSESSMENT — PAIN DESCRIPTION - ONSET
ONSET: ON-GOING

## 2019-11-29 ASSESSMENT — PAIN DESCRIPTION - PAIN TYPE
TYPE: ACUTE PAIN

## 2019-11-29 ASSESSMENT — PAIN DESCRIPTION - ORIENTATION: ORIENTATION: MID

## 2019-11-30 PROCEDURE — 6360000002 HC RX W HCPCS: Performed by: INTERNAL MEDICINE

## 2019-11-30 PROCEDURE — 2580000003 HC RX 258: Performed by: INTERNAL MEDICINE

## 2019-11-30 PROCEDURE — 6360000002 HC RX W HCPCS: Performed by: NURSE PRACTITIONER

## 2019-11-30 PROCEDURE — 1200000000 HC SEMI PRIVATE

## 2019-11-30 PROCEDURE — 2500000003 HC RX 250 WO HCPCS: Performed by: INTERNAL MEDICINE

## 2019-11-30 PROCEDURE — 6360000002 HC RX W HCPCS

## 2019-11-30 PROCEDURE — 6370000000 HC RX 637 (ALT 250 FOR IP): Performed by: INTERNAL MEDICINE

## 2019-11-30 PROCEDURE — 6370000000 HC RX 637 (ALT 250 FOR IP): Performed by: NURSE PRACTITIONER

## 2019-11-30 RX ORDER — MORPHINE SULFATE 2 MG/ML
2 INJECTION, SOLUTION INTRAMUSCULAR; INTRAVENOUS ONCE
Status: COMPLETED | OUTPATIENT
Start: 2019-11-30 | End: 2019-11-30

## 2019-11-30 RX ORDER — PANTOPRAZOLE SODIUM 40 MG/1
40 TABLET, DELAYED RELEASE ORAL
Status: DISCONTINUED | OUTPATIENT
Start: 2019-11-30 | End: 2019-12-01 | Stop reason: HOSPADM

## 2019-11-30 RX ORDER — MORPHINE SULFATE 2 MG/ML
INJECTION, SOLUTION INTRAMUSCULAR; INTRAVENOUS
Status: COMPLETED
Start: 2019-11-30 | End: 2019-11-30

## 2019-11-30 RX ORDER — DIAZEPAM 2 MG/1
2 TABLET ORAL EVERY 8 HOURS
Status: DISCONTINUED | OUTPATIENT
Start: 2019-11-30 | End: 2019-12-01 | Stop reason: HOSPADM

## 2019-11-30 RX ADMIN — LORAZEPAM 4 MG: 2 INJECTION INTRAMUSCULAR; INTRAVENOUS at 20:51

## 2019-11-30 RX ADMIN — Medication 10 ML: at 03:00

## 2019-11-30 RX ADMIN — DIAZEPAM 2 MG: 2 TABLET ORAL at 22:07

## 2019-11-30 RX ADMIN — PANTOPRAZOLE SODIUM 40 MG: 40 TABLET, DELAYED RELEASE ORAL at 10:22

## 2019-11-30 RX ADMIN — LORAZEPAM 4 MG: 2 INJECTION INTRAMUSCULAR; INTRAVENOUS at 00:36

## 2019-11-30 RX ADMIN — ONDANSETRON 4 MG: 2 INJECTION INTRAMUSCULAR; INTRAVENOUS at 02:59

## 2019-11-30 RX ADMIN — LORAZEPAM 2 MG: 2 INJECTION INTRAMUSCULAR; INTRAVENOUS at 10:22

## 2019-11-30 RX ADMIN — DIAZEPAM 2 MG: 2 TABLET ORAL at 12:19

## 2019-11-30 RX ADMIN — MORPHINE SULFATE 2 MG: 2 INJECTION, SOLUTION INTRAMUSCULAR; INTRAVENOUS at 03:46

## 2019-11-30 RX ADMIN — FOLIC ACID: 5 INJECTION, SOLUTION INTRAMUSCULAR; INTRAVENOUS; SUBCUTANEOUS at 18:46

## 2019-11-30 RX ADMIN — LORAZEPAM 2 MG: 2 INJECTION INTRAMUSCULAR; INTRAVENOUS at 17:43

## 2019-11-30 RX ADMIN — LORAZEPAM 2 MG: 2 INJECTION INTRAMUSCULAR; INTRAVENOUS at 12:19

## 2019-11-30 RX ADMIN — Medication 10 ML: at 10:23

## 2019-11-30 RX ADMIN — LORAZEPAM 4 MG: 2 INJECTION INTRAMUSCULAR; INTRAVENOUS at 03:01

## 2019-11-30 RX ADMIN — LORAZEPAM 2 MG: 1 TABLET ORAL at 14:59

## 2019-11-30 RX ADMIN — Medication 10 ML: at 23:14

## 2019-11-30 RX ADMIN — Medication 10 ML: at 20:51

## 2019-11-30 RX ADMIN — ONDANSETRON 4 MG: 2 INJECTION INTRAMUSCULAR; INTRAVENOUS at 20:51

## 2019-11-30 RX ADMIN — LORAZEPAM 4 MG: 2 INJECTION INTRAMUSCULAR; INTRAVENOUS at 04:54

## 2019-11-30 RX ADMIN — Medication 10 ML: at 04:54

## 2019-11-30 RX ADMIN — Medication 10 ML: at 03:45

## 2019-11-30 RX ADMIN — ONDANSETRON 4 MG: 2 INJECTION INTRAMUSCULAR; INTRAVENOUS at 17:41

## 2019-11-30 RX ADMIN — LORAZEPAM 4 MG: 2 INJECTION INTRAMUSCULAR; INTRAVENOUS at 23:14

## 2019-11-30 ASSESSMENT — PAIN DESCRIPTION - PAIN TYPE
TYPE: ACUTE PAIN

## 2019-11-30 ASSESSMENT — PAIN DESCRIPTION - PROGRESSION
CLINICAL_PROGRESSION: GRADUALLY WORSENING
CLINICAL_PROGRESSION: NOT CHANGED
CLINICAL_PROGRESSION: GRADUALLY WORSENING
CLINICAL_PROGRESSION: GRADUALLY WORSENING

## 2019-11-30 ASSESSMENT — PAIN DESCRIPTION - DESCRIPTORS
DESCRIPTORS: ACHING
DESCRIPTORS: SHARP;SHOOTING

## 2019-11-30 ASSESSMENT — PAIN DESCRIPTION - ONSET
ONSET: ON-GOING

## 2019-11-30 ASSESSMENT — PAIN DESCRIPTION - FREQUENCY
FREQUENCY: CONTINUOUS

## 2019-11-30 ASSESSMENT — PAIN DESCRIPTION - LOCATION
LOCATION: ABDOMEN
LOCATION: ABDOMEN
LOCATION: HEAD
LOCATION: ABDOMEN

## 2019-11-30 ASSESSMENT — PAIN - FUNCTIONAL ASSESSMENT
PAIN_FUNCTIONAL_ASSESSMENT: ACTIVITIES ARE NOT PREVENTED

## 2019-11-30 ASSESSMENT — PAIN DESCRIPTION - ORIENTATION
ORIENTATION: MID;RIGHT
ORIENTATION: MID

## 2019-11-30 ASSESSMENT — PAIN SCALES - GENERAL
PAINLEVEL_OUTOF10: 9
PAINLEVEL_OUTOF10: 5
PAINLEVEL_OUTOF10: 9
PAINLEVEL_OUTOF10: 10

## 2019-12-01 VITALS
TEMPERATURE: 97.7 F | OXYGEN SATURATION: 97 % | RESPIRATION RATE: 18 BRPM | DIASTOLIC BLOOD PRESSURE: 68 MMHG | BODY MASS INDEX: 22.05 KG/M2 | WEIGHT: 145.5 LBS | HEIGHT: 68 IN | HEART RATE: 70 BPM | SYSTOLIC BLOOD PRESSURE: 104 MMHG

## 2019-12-01 LAB
ALBUMIN SERPL-MCNC: 4.4 G/DL (ref 3.4–5)
ANION GAP SERPL CALCULATED.3IONS-SCNC: 14 MMOL/L (ref 3–16)
BUN BLDV-MCNC: 10 MG/DL (ref 7–20)
CALCIUM SERPL-MCNC: 9.3 MG/DL (ref 8.3–10.6)
CHLORIDE BLD-SCNC: 107 MMOL/L (ref 99–110)
CO2: 20 MMOL/L (ref 21–32)
CREAT SERPL-MCNC: 0.7 MG/DL (ref 0.9–1.3)
GFR AFRICAN AMERICAN: >60
GFR NON-AFRICAN AMERICAN: >60
GLUCOSE BLD-MCNC: 93 MG/DL (ref 70–99)
HCT VFR BLD CALC: 41.2 % (ref 40.5–52.5)
HEMOGLOBIN: 14.7 G/DL (ref 13.5–17.5)
MAGNESIUM: 2.2 MG/DL (ref 1.8–2.4)
MCH RBC QN AUTO: 32.4 PG (ref 26–34)
MCHC RBC AUTO-ENTMCNC: 35.6 G/DL (ref 31–36)
MCV RBC AUTO: 91 FL (ref 80–100)
PDW BLD-RTO: 12 % (ref 12.4–15.4)
PHOSPHORUS: 4.1 MG/DL (ref 2.5–4.9)
PLATELET # BLD: 171 K/UL (ref 135–450)
PMV BLD AUTO: 8.6 FL (ref 5–10.5)
POTASSIUM SERPL-SCNC: 3.8 MMOL/L (ref 3.5–5.1)
RBC # BLD: 4.53 M/UL (ref 4.2–5.9)
SODIUM BLD-SCNC: 141 MMOL/L (ref 136–145)
WBC # BLD: 7.5 K/UL (ref 4–11)

## 2019-12-01 PROCEDURE — 6370000000 HC RX 637 (ALT 250 FOR IP): Performed by: INTERNAL MEDICINE

## 2019-12-01 PROCEDURE — 85027 COMPLETE CBC AUTOMATED: CPT

## 2019-12-01 PROCEDURE — 80069 RENAL FUNCTION PANEL: CPT

## 2019-12-01 PROCEDURE — 36415 COLL VENOUS BLD VENIPUNCTURE: CPT

## 2019-12-01 PROCEDURE — 2580000003 HC RX 258: Performed by: INTERNAL MEDICINE

## 2019-12-01 PROCEDURE — 6360000002 HC RX W HCPCS: Performed by: INTERNAL MEDICINE

## 2019-12-01 PROCEDURE — 6360000002 HC RX W HCPCS: Performed by: NURSE PRACTITIONER

## 2019-12-01 PROCEDURE — 83735 ASSAY OF MAGNESIUM: CPT

## 2019-12-01 RX ORDER — PANTOPRAZOLE SODIUM 40 MG/1
40 TABLET, DELAYED RELEASE ORAL
Qty: 30 TABLET | Refills: 3 | Status: SHIPPED | OUTPATIENT
Start: 2019-12-01 | End: 2021-06-02

## 2019-12-01 RX ORDER — ONDANSETRON 4 MG/1
4 TABLET, ORALLY DISINTEGRATING ORAL EVERY 4 HOURS PRN
Qty: 10 TABLET | Refills: 0 | Status: SHIPPED | OUTPATIENT
Start: 2019-12-01 | End: 2019-12-07

## 2019-12-01 RX ORDER — THIAMINE MONONITRATE (VIT B1) 100 MG
100 TABLET ORAL DAILY
Status: DISCONTINUED | OUTPATIENT
Start: 2019-12-01 | End: 2019-12-01 | Stop reason: HOSPADM

## 2019-12-01 RX ORDER — ONDANSETRON 4 MG/1
4 TABLET, ORALLY DISINTEGRATING ORAL EVERY 4 HOURS PRN
Qty: 10 TABLET | Refills: 0 | Status: SHIPPED | OUTPATIENT
Start: 2019-12-01 | End: 2019-12-01

## 2019-12-01 RX ORDER — DIAZEPAM 2 MG/1
2 TABLET ORAL EVERY 8 HOURS
Qty: 3 TABLET | Refills: 0 | Status: SHIPPED | OUTPATIENT
Start: 2019-12-01 | End: 2019-12-02

## 2019-12-01 RX ORDER — LANOLIN ALCOHOL/MO/W.PET/CERES
100 CREAM (GRAM) TOPICAL DAILY
Qty: 30 TABLET | Refills: 0 | Status: SHIPPED | OUTPATIENT
Start: 2019-12-01 | End: 2021-06-02

## 2019-12-01 RX ADMIN — DIAZEPAM 2 MG: 2 TABLET ORAL at 09:40

## 2019-12-01 RX ADMIN — LORAZEPAM 2 MG: 2 INJECTION INTRAMUSCULAR; INTRAVENOUS at 05:59

## 2019-12-01 RX ADMIN — ONDANSETRON 4 MG: 2 INJECTION INTRAMUSCULAR; INTRAVENOUS at 06:00

## 2019-12-01 RX ADMIN — LORAZEPAM 4 MG: 2 INJECTION INTRAMUSCULAR; INTRAVENOUS at 01:49

## 2019-12-01 RX ADMIN — PANTOPRAZOLE SODIUM 40 MG: 40 TABLET, DELAYED RELEASE ORAL at 11:03

## 2019-12-01 RX ADMIN — Medication 100 MG: at 11:00

## 2019-12-01 RX ADMIN — Medication 10 ML: at 05:59

## 2019-12-01 RX ADMIN — Medication 10 ML: at 01:49

## 2019-12-01 ASSESSMENT — PAIN DESCRIPTION - FREQUENCY: FREQUENCY: CONTINUOUS

## 2019-12-01 ASSESSMENT — PAIN DESCRIPTION - PROGRESSION
CLINICAL_PROGRESSION: NOT CHANGED
CLINICAL_PROGRESSION: NOT CHANGED

## 2019-12-01 ASSESSMENT — PAIN DESCRIPTION - DESCRIPTORS: DESCRIPTORS: SHARP;SHOOTING

## 2019-12-01 ASSESSMENT — PAIN SCALES - GENERAL: PAINLEVEL_OUTOF10: 7

## 2019-12-01 ASSESSMENT — PAIN - FUNCTIONAL ASSESSMENT: PAIN_FUNCTIONAL_ASSESSMENT: ACTIVITIES ARE NOT PREVENTED

## 2019-12-01 ASSESSMENT — PAIN DESCRIPTION - ONSET: ONSET: ON-GOING

## 2019-12-01 ASSESSMENT — PAIN DESCRIPTION - PAIN TYPE: TYPE: ACUTE PAIN

## 2019-12-01 ASSESSMENT — PAIN DESCRIPTION - ORIENTATION: ORIENTATION: MID

## 2019-12-01 ASSESSMENT — PAIN DESCRIPTION - LOCATION: LOCATION: ABDOMEN

## 2019-12-07 ENCOUNTER — APPOINTMENT (OUTPATIENT)
Dept: GENERAL RADIOLOGY | Age: 29
End: 2019-12-07
Payer: COMMERCIAL

## 2019-12-07 ENCOUNTER — HOSPITAL ENCOUNTER (EMERGENCY)
Age: 29
Discharge: HOME | End: 2019-12-07
Payer: COMMERCIAL

## 2019-12-07 VITALS
SYSTOLIC BLOOD PRESSURE: 134 MMHG | TEMPERATURE: 98.2 F | HEIGHT: 68 IN | BODY MASS INDEX: 22.49 KG/M2 | DIASTOLIC BLOOD PRESSURE: 77 MMHG | OXYGEN SATURATION: 100 % | WEIGHT: 148.37 LBS | HEART RATE: 79 BPM | RESPIRATION RATE: 19 BRPM

## 2019-12-07 DIAGNOSIS — R11.2 NON-INTRACTABLE VOMITING WITH NAUSEA, UNSPECIFIED VOMITING TYPE: ICD-10-CM

## 2019-12-07 DIAGNOSIS — F10.930 ALCOHOL WITHDRAWAL SYNDROME WITHOUT COMPLICATION (HCC): Primary | ICD-10-CM

## 2019-12-07 LAB
A/G RATIO: 1.7 (ref 1.1–2.2)
ALBUMIN SERPL-MCNC: 4.9 G/DL (ref 3.4–5)
ALP BLD-CCNC: 58 U/L (ref 40–129)
ALT SERPL-CCNC: 12 U/L (ref 10–40)
AMPHETAMINE SCREEN, URINE: NORMAL
ANION GAP SERPL CALCULATED.3IONS-SCNC: 16 MMOL/L (ref 3–16)
AST SERPL-CCNC: 18 U/L (ref 15–37)
BARBITURATE SCREEN URINE: NORMAL
BASOPHILS ABSOLUTE: 0 K/UL (ref 0–0.2)
BASOPHILS RELATIVE PERCENT: 0.5 %
BENZODIAZEPINE SCREEN, URINE: NORMAL
BILIRUB SERPL-MCNC: 0.6 MG/DL (ref 0–1)
BUN BLDV-MCNC: 9 MG/DL (ref 7–20)
CALCIUM SERPL-MCNC: 9.9 MG/DL (ref 8.3–10.6)
CANNABINOID SCREEN URINE: NORMAL
CHLORIDE BLD-SCNC: 101 MMOL/L (ref 99–110)
CO2: 18 MMOL/L (ref 21–32)
COCAINE METABOLITE SCREEN URINE: NORMAL
CREAT SERPL-MCNC: 0.6 MG/DL (ref 0.9–1.3)
EOSINOPHILS ABSOLUTE: 0.3 K/UL (ref 0–0.6)
EOSINOPHILS RELATIVE PERCENT: 4.1 %
ETHANOL: NORMAL MG/DL (ref 0–0.08)
GFR AFRICAN AMERICAN: >60
GFR NON-AFRICAN AMERICAN: >60
GLOBULIN: 2.9 G/DL
GLUCOSE BLD-MCNC: 84 MG/DL (ref 70–99)
HCT VFR BLD CALC: 46.2 % (ref 40.5–52.5)
HEMOGLOBIN: 16.3 G/DL (ref 13.5–17.5)
LIPASE: 35 U/L (ref 13–60)
LYMPHOCYTES ABSOLUTE: 1.6 K/UL (ref 1–5.1)
LYMPHOCYTES RELATIVE PERCENT: 23.2 %
Lab: NORMAL
MCH RBC QN AUTO: 32.4 PG (ref 26–34)
MCHC RBC AUTO-ENTMCNC: 35.4 G/DL (ref 31–36)
MCV RBC AUTO: 91.5 FL (ref 80–100)
METHADONE SCREEN, URINE: NORMAL
MONOCYTES ABSOLUTE: 0.6 K/UL (ref 0–1.3)
MONOCYTES RELATIVE PERCENT: 8.9 %
NEUTROPHILS ABSOLUTE: 4.3 K/UL (ref 1.7–7.7)
NEUTROPHILS RELATIVE PERCENT: 63.3 %
OPIATE SCREEN URINE: NORMAL
OXYCODONE URINE: NORMAL
PDW BLD-RTO: 12.2 % (ref 12.4–15.4)
PH UA: 6
PHENCYCLIDINE SCREEN URINE: NORMAL
PLATELET # BLD: 224 K/UL (ref 135–450)
PMV BLD AUTO: 8.4 FL (ref 5–10.5)
POTASSIUM REFLEX MAGNESIUM: 3.6 MMOL/L (ref 3.5–5.1)
PROPOXYPHENE SCREEN: NORMAL
RBC # BLD: 5.04 M/UL (ref 4.2–5.9)
SODIUM BLD-SCNC: 135 MMOL/L (ref 136–145)
TOTAL PROTEIN: 7.8 G/DL (ref 6.4–8.2)
WBC # BLD: 6.8 K/UL (ref 4–11)

## 2019-12-07 PROCEDURE — 96375 TX/PRO/DX INJ NEW DRUG ADDON: CPT

## 2019-12-07 PROCEDURE — 85025 COMPLETE CBC W/AUTO DIFF WBC: CPT

## 2019-12-07 PROCEDURE — 80053 COMPREHEN METABOLIC PANEL: CPT

## 2019-12-07 PROCEDURE — G0480 DRUG TEST DEF 1-7 CLASSES: HCPCS

## 2019-12-07 PROCEDURE — 99284 EMERGENCY DEPT VISIT MOD MDM: CPT

## 2019-12-07 PROCEDURE — 6360000002 HC RX W HCPCS: Performed by: PHYSICIAN ASSISTANT

## 2019-12-07 PROCEDURE — 93005 ELECTROCARDIOGRAM TRACING: CPT | Performed by: PHYSICIAN ASSISTANT

## 2019-12-07 PROCEDURE — 80307 DRUG TEST PRSMV CHEM ANLYZR: CPT

## 2019-12-07 PROCEDURE — 71045 X-RAY EXAM CHEST 1 VIEW: CPT

## 2019-12-07 PROCEDURE — 83690 ASSAY OF LIPASE: CPT

## 2019-12-07 PROCEDURE — 96366 THER/PROPH/DIAG IV INF ADDON: CPT

## 2019-12-07 PROCEDURE — 2500000003 HC RX 250 WO HCPCS: Performed by: PHYSICIAN ASSISTANT

## 2019-12-07 PROCEDURE — 2580000003 HC RX 258: Performed by: PHYSICIAN ASSISTANT

## 2019-12-07 PROCEDURE — 96365 THER/PROPH/DIAG IV INF INIT: CPT

## 2019-12-07 RX ORDER — ONDANSETRON 2 MG/ML
4 INJECTION INTRAMUSCULAR; INTRAVENOUS ONCE
Status: COMPLETED | OUTPATIENT
Start: 2019-12-07 | End: 2019-12-07

## 2019-12-07 RX ORDER — LORAZEPAM 2 MG/ML
2 INJECTION INTRAMUSCULAR
Status: DISCONTINUED | OUTPATIENT
Start: 2019-12-07 | End: 2019-12-08 | Stop reason: HOSPADM

## 2019-12-07 RX ORDER — LORAZEPAM 2 MG/ML
1 INJECTION INTRAMUSCULAR
Status: DISCONTINUED | OUTPATIENT
Start: 2019-12-07 | End: 2019-12-08 | Stop reason: HOSPADM

## 2019-12-07 RX ORDER — LORAZEPAM 1 MG/1
2 TABLET ORAL
Status: DISCONTINUED | OUTPATIENT
Start: 2019-12-07 | End: 2019-12-08 | Stop reason: HOSPADM

## 2019-12-07 RX ORDER — SODIUM CHLORIDE 0.9 % (FLUSH) 0.9 %
10 SYRINGE (ML) INJECTION PRN
Status: DISCONTINUED | OUTPATIENT
Start: 2019-12-07 | End: 2019-12-08 | Stop reason: HOSPADM

## 2019-12-07 RX ORDER — LORAZEPAM 1 MG/1
1 TABLET ORAL
Status: DISCONTINUED | OUTPATIENT
Start: 2019-12-07 | End: 2019-12-08 | Stop reason: HOSPADM

## 2019-12-07 RX ORDER — ONDANSETRON 4 MG/1
4 TABLET, ORALLY DISINTEGRATING ORAL EVERY 8 HOURS PRN
Qty: 10 TABLET | Refills: 0 | Status: SHIPPED | OUTPATIENT
Start: 2019-12-07 | End: 2021-06-02

## 2019-12-07 RX ORDER — SODIUM CHLORIDE 0.9 % (FLUSH) 0.9 %
10 SYRINGE (ML) INJECTION EVERY 12 HOURS SCHEDULED
Status: DISCONTINUED | OUTPATIENT
Start: 2019-12-07 | End: 2019-12-08 | Stop reason: HOSPADM

## 2019-12-07 RX ORDER — LORAZEPAM 2 MG/ML
3 INJECTION INTRAMUSCULAR
Status: DISCONTINUED | OUTPATIENT
Start: 2019-12-07 | End: 2019-12-08 | Stop reason: HOSPADM

## 2019-12-07 RX ORDER — LORAZEPAM 2 MG/ML
4 INJECTION INTRAMUSCULAR
Status: DISCONTINUED | OUTPATIENT
Start: 2019-12-07 | End: 2019-12-08 | Stop reason: HOSPADM

## 2019-12-07 RX ORDER — LORAZEPAM 1 MG/1
4 TABLET ORAL
Status: DISCONTINUED | OUTPATIENT
Start: 2019-12-07 | End: 2019-12-08 | Stop reason: HOSPADM

## 2019-12-07 RX ORDER — 0.9 % SODIUM CHLORIDE 0.9 %
1000 INTRAVENOUS SOLUTION INTRAVENOUS ONCE
Status: COMPLETED | OUTPATIENT
Start: 2019-12-07 | End: 2019-12-07

## 2019-12-07 RX ORDER — LORAZEPAM 1 MG/1
3 TABLET ORAL
Status: DISCONTINUED | OUTPATIENT
Start: 2019-12-07 | End: 2019-12-08 | Stop reason: HOSPADM

## 2019-12-07 RX ORDER — CHLORDIAZEPOXIDE HYDROCHLORIDE 25 MG/1
25 CAPSULE, GELATIN COATED ORAL 3 TIMES DAILY PRN
Qty: 15 CAPSULE | Refills: 0 | Status: SHIPPED | OUTPATIENT
Start: 2019-12-07 | End: 2020-01-06

## 2019-12-07 RX ADMIN — FOLIC ACID: 5 INJECTION, SOLUTION INTRAMUSCULAR; INTRAVENOUS; SUBCUTANEOUS at 20:39

## 2019-12-07 RX ADMIN — SODIUM CHLORIDE, PRESERVATIVE FREE 10 ML: 5 INJECTION INTRAVENOUS at 21:21

## 2019-12-07 RX ADMIN — ONDANSETRON 4 MG: 2 INJECTION INTRAMUSCULAR; INTRAVENOUS at 20:36

## 2019-12-07 RX ADMIN — SODIUM CHLORIDE 1000 ML: 9 INJECTION, SOLUTION INTRAVENOUS at 20:36

## 2019-12-07 RX ADMIN — LORAZEPAM 1 MG: 2 INJECTION INTRAMUSCULAR; INTRAVENOUS at 20:36

## 2019-12-08 LAB
EKG ATRIAL RATE: 93 BPM
EKG DIAGNOSIS: NORMAL
EKG P AXIS: 78 DEGREES
EKG P-R INTERVAL: 160 MS
EKG Q-T INTERVAL: 352 MS
EKG QRS DURATION: 90 MS
EKG QTC CALCULATION (BAZETT): 437 MS
EKG R AXIS: 95 DEGREES
EKG T AXIS: 66 DEGREES
EKG VENTRICULAR RATE: 93 BPM

## 2019-12-08 PROCEDURE — 93010 ELECTROCARDIOGRAM REPORT: CPT | Performed by: INTERNAL MEDICINE

## 2019-12-08 ASSESSMENT — ENCOUNTER SYMPTOMS
SORE THROAT: 0
VOMITING: 1
BACK PAIN: 0
NAUSEA: 1
SHORTNESS OF BREATH: 0
ABDOMINAL PAIN: 0

## 2020-01-10 ENCOUNTER — HOSPITAL ENCOUNTER (INPATIENT)
Age: 30
LOS: 2 days | Discharge: HOME | End: 2020-01-12
Attending: PEDIATRICS | Admitting: PEDIATRICS
Payer: COMMERCIAL

## 2020-01-10 LAB
A/G RATIO: 1.4 (ref 1.1–2.2)
ALBUMIN SERPL-MCNC: 4.7 G/DL (ref 3.4–5)
ALP BLD-CCNC: 58 U/L (ref 40–129)
ALT SERPL-CCNC: 19 U/L (ref 10–40)
ANION GAP SERPL CALCULATED.3IONS-SCNC: 17 MMOL/L (ref 3–16)
AST SERPL-CCNC: 45 U/L (ref 15–37)
BASOPHILS ABSOLUTE: 0.1 K/UL (ref 0–0.2)
BASOPHILS RELATIVE PERCENT: 0.5 %
BILIRUB SERPL-MCNC: 0.6 MG/DL (ref 0–1)
BUN BLDV-MCNC: 13 MG/DL (ref 7–20)
CALCIUM SERPL-MCNC: 9.8 MG/DL (ref 8.3–10.6)
CHLORIDE BLD-SCNC: 96 MMOL/L (ref 99–110)
CO2: 20 MMOL/L (ref 21–32)
CREAT SERPL-MCNC: 0.7 MG/DL (ref 0.9–1.3)
EOSINOPHILS ABSOLUTE: 0.3 K/UL (ref 0–0.6)
EOSINOPHILS RELATIVE PERCENT: 3.3 %
GFR AFRICAN AMERICAN: >60
GFR NON-AFRICAN AMERICAN: >60
GLOBULIN: 3.3 G/DL
GLUCOSE BLD-MCNC: 101 MG/DL (ref 70–99)
HCT VFR BLD CALC: 44.8 % (ref 40.5–52.5)
HEMOGLOBIN: 16 G/DL (ref 13.5–17.5)
LIPASE: 73 U/L (ref 13–60)
LYMPHOCYTES ABSOLUTE: 0.9 K/UL (ref 1–5.1)
LYMPHOCYTES RELATIVE PERCENT: 9.3 %
MCH RBC QN AUTO: 32.2 PG (ref 26–34)
MCHC RBC AUTO-ENTMCNC: 35.6 G/DL (ref 31–36)
MCV RBC AUTO: 90.5 FL (ref 80–100)
MONOCYTES ABSOLUTE: 0.8 K/UL (ref 0–1.3)
MONOCYTES RELATIVE PERCENT: 8.1 %
NEUTROPHILS ABSOLUTE: 7.7 K/UL (ref 1.7–7.7)
NEUTROPHILS RELATIVE PERCENT: 78.8 %
PDW BLD-RTO: 12.4 % (ref 12.4–15.4)
PLATELET # BLD: 191 K/UL (ref 135–450)
PLATELET SLIDE REVIEW: ABNORMAL
PMV BLD AUTO: 9.1 FL (ref 5–10.5)
POTASSIUM REFLEX MAGNESIUM: 5.3 MMOL/L (ref 3.5–5.1)
RBC # BLD: 4.95 M/UL (ref 4.2–5.9)
SODIUM BLD-SCNC: 133 MMOL/L (ref 136–145)
TOTAL PROTEIN: 8 G/DL (ref 6.4–8.2)
WBC # BLD: 9.8 K/UL (ref 4–11)

## 2020-01-10 PROCEDURE — 96374 THER/PROPH/DIAG INJ IV PUSH: CPT

## 2020-01-10 PROCEDURE — 83690 ASSAY OF LIPASE: CPT

## 2020-01-10 PROCEDURE — 96375 TX/PRO/DX INJ NEW DRUG ADDON: CPT

## 2020-01-10 PROCEDURE — 80053 COMPREHEN METABOLIC PANEL: CPT

## 2020-01-10 PROCEDURE — 2580000003 HC RX 258: Performed by: NURSE PRACTITIONER

## 2020-01-10 PROCEDURE — G0480 DRUG TEST DEF 1-7 CLASSES: HCPCS

## 2020-01-10 PROCEDURE — 36415 COLL VENOUS BLD VENIPUNCTURE: CPT

## 2020-01-10 PROCEDURE — 6360000002 HC RX W HCPCS: Performed by: NURSE PRACTITIONER

## 2020-01-10 PROCEDURE — 1200000000 HC SEMI PRIVATE

## 2020-01-10 PROCEDURE — 99285 EMERGENCY DEPT VISIT HI MDM: CPT

## 2020-01-10 PROCEDURE — 85025 COMPLETE CBC W/AUTO DIFF WBC: CPT

## 2020-01-10 RX ORDER — 0.9 % SODIUM CHLORIDE 0.9 %
1000 INTRAVENOUS SOLUTION INTRAVENOUS ONCE
Status: COMPLETED | OUTPATIENT
Start: 2020-01-10 | End: 2020-01-10

## 2020-01-10 RX ORDER — ONDANSETRON 2 MG/ML
4 INJECTION INTRAMUSCULAR; INTRAVENOUS ONCE
Status: COMPLETED | OUTPATIENT
Start: 2020-01-10 | End: 2020-01-10

## 2020-01-10 RX ORDER — LORAZEPAM 2 MG/ML
1 INJECTION INTRAMUSCULAR ONCE
Status: COMPLETED | OUTPATIENT
Start: 2020-01-10 | End: 2020-01-10

## 2020-01-10 RX ADMIN — SODIUM CHLORIDE 1000 ML: 9 INJECTION, SOLUTION INTRAVENOUS at 21:48

## 2020-01-10 RX ADMIN — ONDANSETRON 4 MG: 2 INJECTION INTRAMUSCULAR; INTRAVENOUS at 21:48

## 2020-01-10 RX ADMIN — LORAZEPAM 1 MG: 2 INJECTION INTRAMUSCULAR; INTRAVENOUS at 21:48

## 2020-01-10 ASSESSMENT — ENCOUNTER SYMPTOMS
ABDOMINAL DISTENTION: 0
CONSTIPATION: 0
VOMITING: 1
DIARRHEA: 0
SHORTNESS OF BREATH: 0
NAUSEA: 1
ABDOMINAL PAIN: 0
COLOR CHANGE: 0
BLOOD IN STOOL: 0

## 2020-01-10 ASSESSMENT — PAIN SCALES - GENERAL: PAINLEVEL_OUTOF10: 6

## 2020-01-10 ASSESSMENT — PAIN DESCRIPTION - ORIENTATION: ORIENTATION: ANTERIOR

## 2020-01-10 ASSESSMENT — PAIN DESCRIPTION - PAIN TYPE: TYPE: ACUTE PAIN

## 2020-01-10 ASSESSMENT — PAIN DESCRIPTION - DESCRIPTORS: DESCRIPTORS: ACHING

## 2020-01-10 ASSESSMENT — PAIN DESCRIPTION - LOCATION: LOCATION: HEAD

## 2020-01-11 LAB
ALBUMIN SERPL-MCNC: 4.3 G/DL (ref 3.4–5)
ALP BLD-CCNC: 56 U/L (ref 40–129)
ALT SERPL-CCNC: 13 U/L (ref 10–40)
ANION GAP SERPL CALCULATED.3IONS-SCNC: 16 MMOL/L (ref 3–16)
AST SERPL-CCNC: 16 U/L (ref 15–37)
BILIRUB SERPL-MCNC: 0.6 MG/DL (ref 0–1)
BILIRUBIN DIRECT: <0.2 MG/DL (ref 0–0.3)
BILIRUBIN, INDIRECT: NORMAL MG/DL (ref 0–1)
BUN BLDV-MCNC: 7 MG/DL (ref 7–20)
CALCIUM SERPL-MCNC: 8.9 MG/DL (ref 8.3–10.6)
CHLORIDE BLD-SCNC: 102 MMOL/L (ref 99–110)
CO2: 19 MMOL/L (ref 21–32)
CREAT SERPL-MCNC: 0.7 MG/DL (ref 0.9–1.3)
ETHANOL: NORMAL MG/DL (ref 0–0.08)
GFR AFRICAN AMERICAN: >60
GFR NON-AFRICAN AMERICAN: >60
GLUCOSE BLD-MCNC: 112 MG/DL (ref 70–99)
LIPASE: 61 U/L (ref 13–60)
PHOSPHORUS: 3.1 MG/DL (ref 2.5–4.9)
POTASSIUM SERPL-SCNC: 3.6 MMOL/L (ref 3.5–5.1)
SODIUM BLD-SCNC: 137 MMOL/L (ref 136–145)
TOTAL PROTEIN: 6.7 G/DL (ref 6.4–8.2)

## 2020-01-11 PROCEDURE — 6370000000 HC RX 637 (ALT 250 FOR IP): Performed by: NURSE PRACTITIONER

## 2020-01-11 PROCEDURE — 80069 RENAL FUNCTION PANEL: CPT

## 2020-01-11 PROCEDURE — 6370000000 HC RX 637 (ALT 250 FOR IP): Performed by: PEDIATRICS

## 2020-01-11 PROCEDURE — 2500000003 HC RX 250 WO HCPCS: Performed by: PEDIATRICS

## 2020-01-11 PROCEDURE — 83690 ASSAY OF LIPASE: CPT

## 2020-01-11 PROCEDURE — 1200000000 HC SEMI PRIVATE

## 2020-01-11 PROCEDURE — 80076 HEPATIC FUNCTION PANEL: CPT

## 2020-01-11 PROCEDURE — 6360000002 HC RX W HCPCS: Performed by: PEDIATRICS

## 2020-01-11 PROCEDURE — 36415 COLL VENOUS BLD VENIPUNCTURE: CPT

## 2020-01-11 PROCEDURE — 2580000003 HC RX 258: Performed by: PEDIATRICS

## 2020-01-11 RX ORDER — LORAZEPAM 1 MG/1
4 TABLET ORAL
Status: DISCONTINUED | OUTPATIENT
Start: 2020-01-11 | End: 2020-01-11

## 2020-01-11 RX ORDER — LORAZEPAM 1 MG/1
1 TABLET ORAL
Status: DISCONTINUED | OUTPATIENT
Start: 2020-01-11 | End: 2020-01-11

## 2020-01-11 RX ORDER — LORAZEPAM 1 MG/1
3 TABLET ORAL
Status: DISCONTINUED | OUTPATIENT
Start: 2020-01-11 | End: 2020-01-11

## 2020-01-11 RX ORDER — SODIUM CHLORIDE 0.9 % (FLUSH) 0.9 %
10 SYRINGE (ML) INJECTION PRN
Status: DISCONTINUED | OUTPATIENT
Start: 2020-01-11 | End: 2020-01-11 | Stop reason: SDUPTHER

## 2020-01-11 RX ORDER — LORAZEPAM 2 MG/ML
1 INJECTION INTRAMUSCULAR
Status: DISCONTINUED | OUTPATIENT
Start: 2020-01-11 | End: 2020-01-11

## 2020-01-11 RX ORDER — NICOTINE 21 MG/24HR
1 PATCH, TRANSDERMAL 24 HOURS TRANSDERMAL DAILY
Status: DISCONTINUED | OUTPATIENT
Start: 2020-01-11 | End: 2020-01-12 | Stop reason: HOSPADM

## 2020-01-11 RX ORDER — SODIUM CHLORIDE 0.9 % (FLUSH) 0.9 %
10 SYRINGE (ML) INJECTION EVERY 12 HOURS SCHEDULED
Status: DISCONTINUED | OUTPATIENT
Start: 2020-01-11 | End: 2020-01-12 | Stop reason: HOSPADM

## 2020-01-11 RX ORDER — FOLIC ACID 1 MG/1
1 TABLET ORAL DAILY
Status: DISCONTINUED | OUTPATIENT
Start: 2020-01-11 | End: 2020-01-12 | Stop reason: HOSPADM

## 2020-01-11 RX ORDER — LORAZEPAM 2 MG/ML
3 INJECTION INTRAMUSCULAR
Status: DISCONTINUED | OUTPATIENT
Start: 2020-01-11 | End: 2020-01-11

## 2020-01-11 RX ORDER — THIAMINE MONONITRATE (VIT B1) 100 MG
100 TABLET ORAL DAILY
Status: DISCONTINUED | OUTPATIENT
Start: 2020-01-11 | End: 2020-01-12 | Stop reason: HOSPADM

## 2020-01-11 RX ORDER — ACETAMINOPHEN 325 MG/1
650 TABLET ORAL EVERY 4 HOURS PRN
Status: DISCONTINUED | OUTPATIENT
Start: 2020-01-11 | End: 2020-01-12 | Stop reason: HOSPADM

## 2020-01-11 RX ORDER — SODIUM CHLORIDE 0.9 % (FLUSH) 0.9 %
10 SYRINGE (ML) INJECTION EVERY 12 HOURS SCHEDULED
Status: DISCONTINUED | OUTPATIENT
Start: 2020-01-11 | End: 2020-01-11 | Stop reason: SDUPTHER

## 2020-01-11 RX ORDER — ONDANSETRON 2 MG/ML
4 INJECTION INTRAMUSCULAR; INTRAVENOUS EVERY 6 HOURS PRN
Status: DISCONTINUED | OUTPATIENT
Start: 2020-01-11 | End: 2020-01-12 | Stop reason: HOSPADM

## 2020-01-11 RX ORDER — SODIUM CHLORIDE 0.9 % (FLUSH) 0.9 %
10 SYRINGE (ML) INJECTION PRN
Status: DISCONTINUED | OUTPATIENT
Start: 2020-01-11 | End: 2020-01-12 | Stop reason: HOSPADM

## 2020-01-11 RX ORDER — LORAZEPAM 1 MG/1
2 TABLET ORAL
Status: DISCONTINUED | OUTPATIENT
Start: 2020-01-11 | End: 2020-01-11

## 2020-01-11 RX ORDER — POTASSIUM CHLORIDE 7.45 MG/ML
10 INJECTION INTRAVENOUS PRN
Status: DISCONTINUED | OUTPATIENT
Start: 2020-01-11 | End: 2020-01-11

## 2020-01-11 RX ORDER — LORAZEPAM 1 MG/1
1 TABLET ORAL EVERY 6 HOURS PRN
Status: DISCONTINUED | OUTPATIENT
Start: 2020-01-11 | End: 2020-01-12 | Stop reason: HOSPADM

## 2020-01-11 RX ORDER — LORAZEPAM 2 MG/ML
4 INJECTION INTRAMUSCULAR
Status: DISCONTINUED | OUTPATIENT
Start: 2020-01-11 | End: 2020-01-11

## 2020-01-11 RX ORDER — POTASSIUM CHLORIDE 20 MEQ/1
40 TABLET, EXTENDED RELEASE ORAL PRN
Status: DISCONTINUED | OUTPATIENT
Start: 2020-01-11 | End: 2020-01-11

## 2020-01-11 RX ORDER — PANTOPRAZOLE SODIUM 40 MG/1
40 TABLET, DELAYED RELEASE ORAL
Status: DISCONTINUED | OUTPATIENT
Start: 2020-01-11 | End: 2020-01-12 | Stop reason: HOSPADM

## 2020-01-11 RX ORDER — LORAZEPAM 2 MG/ML
2 INJECTION INTRAMUSCULAR
Status: DISCONTINUED | OUTPATIENT
Start: 2020-01-11 | End: 2020-01-11

## 2020-01-11 RX ORDER — ARIPIPRAZOLE 10 MG/1
10 TABLET ORAL DAILY
Status: DISCONTINUED | OUTPATIENT
Start: 2020-01-11 | End: 2020-01-12 | Stop reason: HOSPADM

## 2020-01-11 RX ORDER — CHLORDIAZEPOXIDE HYDROCHLORIDE 5 MG/1
5 CAPSULE, GELATIN COATED ORAL 3 TIMES DAILY
Status: DISCONTINUED | OUTPATIENT
Start: 2020-01-11 | End: 2020-01-12 | Stop reason: HOSPADM

## 2020-01-11 RX ORDER — BENZTROPINE MESYLATE 1 MG/1
1 TABLET ORAL 2 TIMES DAILY
Status: DISCONTINUED | OUTPATIENT
Start: 2020-01-11 | End: 2020-01-12 | Stop reason: HOSPADM

## 2020-01-11 RX ORDER — MULTIVITAMIN WITH FOLIC ACID 400 MCG
1 TABLET ORAL DAILY
Status: DISCONTINUED | OUTPATIENT
Start: 2020-01-12 | End: 2020-01-12 | Stop reason: HOSPADM

## 2020-01-11 RX ADMIN — SODIUM CHLORIDE, PRESERVATIVE FREE 10 ML: 5 INJECTION INTRAVENOUS at 08:59

## 2020-01-11 RX ADMIN — LORAZEPAM 1 MG: 1 TABLET ORAL at 06:11

## 2020-01-11 RX ADMIN — LORAZEPAM 1 MG: 1 TABLET ORAL at 14:51

## 2020-01-11 RX ADMIN — ACETAMINOPHEN 650 MG: 325 TABLET ORAL at 02:08

## 2020-01-11 RX ADMIN — BENZTROPINE MESYLATE 1 MG: 1 TABLET ORAL at 02:09

## 2020-01-11 RX ADMIN — BENZTROPINE MESYLATE 1 MG: 1 TABLET ORAL at 20:46

## 2020-01-11 RX ADMIN — FOLIC ACID: 5 INJECTION, SOLUTION INTRAMUSCULAR; INTRAVENOUS; SUBCUTANEOUS at 10:08

## 2020-01-11 RX ADMIN — ONDANSETRON 4 MG: 2 INJECTION INTRAMUSCULAR; INTRAVENOUS at 09:05

## 2020-01-11 RX ADMIN — LORAZEPAM 1 MG: 1 TABLET ORAL at 20:50

## 2020-01-11 RX ADMIN — LORAZEPAM 2 MG: 1 TABLET ORAL at 08:55

## 2020-01-11 RX ADMIN — Medication 100 MG: at 08:55

## 2020-01-11 RX ADMIN — CHLORDIAZEPOXIDE HYDROCHLORIDE 5 MG: 5 CAPSULE ORAL at 10:08

## 2020-01-11 RX ADMIN — LORAZEPAM 2 MG: 1 TABLET ORAL at 02:09

## 2020-01-11 RX ADMIN — FOLIC ACID 1 MG: 1 TABLET ORAL at 08:55

## 2020-01-11 RX ADMIN — PANTOPRAZOLE SODIUM 40 MG: 40 TABLET, DELAYED RELEASE ORAL at 06:11

## 2020-01-11 RX ADMIN — CHLORDIAZEPOXIDE HYDROCHLORIDE 5 MG: 5 CAPSULE ORAL at 20:46

## 2020-01-11 RX ADMIN — CHLORDIAZEPOXIDE HYDROCHLORIDE 5 MG: 5 CAPSULE ORAL at 14:14

## 2020-01-11 RX ADMIN — SODIUM CHLORIDE, PRESERVATIVE FREE 10 ML: 5 INJECTION INTRAVENOUS at 20:46

## 2020-01-11 ASSESSMENT — PAIN DESCRIPTION - PAIN TYPE
TYPE: ACUTE PAIN

## 2020-01-11 ASSESSMENT — PAIN SCALES - GENERAL
PAINLEVEL_OUTOF10: 0
PAINLEVEL_OUTOF10: 8
PAINLEVEL_OUTOF10: 8
PAINLEVEL_OUTOF10: 0
PAINLEVEL_OUTOF10: 3

## 2020-01-11 ASSESSMENT — PAIN DESCRIPTION - LOCATION
LOCATION: HEAD

## 2020-01-11 ASSESSMENT — PAIN DESCRIPTION - DESCRIPTORS
DESCRIPTORS: ACHING

## 2020-01-11 ASSESSMENT — PAIN DESCRIPTION - ORIENTATION: ORIENTATION: ANTERIOR

## 2020-01-11 NOTE — ED NOTES
PT does not want to be d/c home, pt wants to be admitted for ETOH detox.       Nika Nur RN  01/10/20 9799

## 2020-01-11 NOTE — ED PROVIDER NOTES
**EVALUATED BY ADVANCED PRACTICE PROVIDER**        [address]      Pt Name: Floyd Burrows  PKM:[id number]  [place] 1990  Date of evaluation: 1/10/2020  Provider: ARTURO Mason CNP      Chief Complaint:    Chief Complaint   Patient presents with    Nausea     3 times in 24 hrs        Nursing Notes, Past Medical Hx, Past Surgical Hx, Social Hx, Allergies, and Family Hx were all reviewed and agreed with or any disagreements were addressed in the HPI.    HPI:  (Location, Duration, Timing, Severity, Quality, Assoc Sx, Context, Modifying factors)  This is a  30 y.o. male with history of alcohol abuse who presents to the emergency department today complaining of alcohol withdrawal.  Last drink was yesterday. He has tremors, diaphoresis, and anxiety. He has had 3 episodes of nonbloody emesis today. He has a headache that is not the worst headache of his life and was not a thunderclap onset. He advised he would like to be admitted for alcohol withdrawal.    PastMedical/Surgical History:      Diagnosis Date    Alcohol abuse     PATIENT HAS EXTREME MANIPULITIVE & DRUG SEEKING BEHAVIOR. HE POCKETS HIS BENZODIZAPINES.  Anxiety     Drug-seeking behavior     Several times found pocketing medications given in hospital    Herpes genitalis in men     Manipulative behavior     Pancreatitis     Pneumonia     Portal vein thrombosis     pt denied    Seizures (HCC)     PER PATIENT ONLY.  DURING MULTIPLE HOSPITALIZATIONS HE HAS NEVER ONCE    Tobacco abuse          Procedure Laterality Date    ENDOSCOPY, COLON, DIAGNOSTIC  10/28/2013    Endoscopic retrograde cholangiopancreatography with pancreatic stent placement    ENDOSCOPY, COLON, DIAGNOSTIC  03/23/2018    Esophagogastroduodenoscopy with biopsy    ERCP  1/10/14    with stent removal       Medications:  Previous Medications    ARIPIPRAZOLE (ABILIFY) 10 MG TABLET    Take 10 mg by mouth normal limits    Narrative:     Performed at:  [facility]  [address] [place] [facility] [address]   Phone [phone]   LIPASE - Abnormal; Notable for the following components:    Lipase 73.0 (*)     All other components within normal limits    Narrative:     Performed at:  [facility]  [address] [place] [facility] [address]   Phone [phone]   [address] [place] [address], [address] of labs reviewed and werenegative at this time or not returned at the time of this note. RADIOLOGY:   Non-plain film images such as CT, Ultrasound and MRI are read by the radiologist. ARTURO Fitzpatrick CNP have directly visualized the radiologic plain film image(s) with the below findings:        Interpretation per the Radiologist below, if available at the time of this note:    No orders to display        No results found. MEDICAL DECISION MAKING / ED COURSE:      PROCEDURES:   Procedures    None    Patient was given:  Medications   0.9 % sodium chloride bolus (0 mLs Intravenous Stopped 1/10/20 2252)   0.9 % sodium chloride bolus (0 mLs Intravenous Stopped 1/10/20 2253)   LORazepam (ATIVAN) injection 1 mg (1 mg Intravenous Given 1/10/20 2148)   ondansetron (ZOFRAN) injection 4 mg (4 mg Intravenous Given 1/10/20 2148)       Patient presents with alcohol withdrawal.  See HPI for full presentation. Physical exam as above. 30year-old lying in bed with tremors very anxious. No suicidal ideation. Abdomen soft and nontender. Lungs CTA bilaterally cardiac same normal.  He is neurovascular intact without deficits. CBC and chemistry unremarkable. Lipase 70. Emergency department course included 2 L of fluid, Zofran, and 1 dose of Ativan.   He was resting slightly more comfortably on my reexam.  I advised patient if he would like to be discharged home with medications for withdrawal.  He is adamant that he needs to be admitted to the hospital for alcohol withdrawal.  He has a history of drug-seeking behavior so I will defer to the hospitalist for further treatment of the withdrawal.  The hospitalist, Dr. Romario Lowry, was consulted and agreed to meet patient and write orders. The patient tolerated their visit well. I evaluated the patient. The physician was available for consultation as needed. The patient and / or the family were informed of the results of any tests, a time was given to answer questions, a plan was proposed and they agreed with plan. CLINICAL IMPRESSION:  1. Alcohol withdrawal syndrome without complication ([facility] [address]        DISPOSITION Admitted 01/10/2020 11:33:59 PM      PATIENT REFERRED TO:  No follow-up provider specified.     DISCHARGE MEDICATIONS:  New Prescriptions    No medications on file       DISCONTINUED MEDICATIONS:  Discontinued Medications    No medications on file              (Please note the MDM and HPI sections of this note were completed with a voice recognition program.  Efforts were made to edit the dictations but occasionally words are mis-transcribed.)    Electronically signed, ARTURO Rodríguez CNP,          ARTURO Rodríguez CNP  01/10/20 9079

## 2020-01-11 NOTE — ED NOTES
Attempted IV several times, pt will not remain still and pulls away. Requested another provider to attempt.       Nas Weller RN  01/10/20 1018

## 2020-01-11 NOTE — H&P
(PROTONIX) 40 MG tablet Take 1 tablet by mouth every morning (before breakfast) 12/1/19   Cam Santiago MD   thiamine 100 MG tablet Take 1 tablet by mouth daily 12/1/19   Cam Santiago MD   benztropine (COGENTIN) 1 MG tablet Take 1 mg by mouth 2 times daily 11/11/19   Historical Provider, MD   ARIPiprazole (ABILIFY) 10 MG tablet Take 10 mg by mouth daily 10/8/19   Historical Provider, MD       Allergies:  Amoxicillin; Haldol [haloperidol lactate]; Phenergan [promethazine hcl]; Shrimp (diagnostic); Glucosamine; and Shellfish-derived products    Social History:      The patient currently lives in [address] [place] [address] with a roommate    TOBACCO:   reports that he has been smoking cigarettes. He started smoking about 12 years ago. He has a 5.00 pack-year smoking history. He has never used smokeless tobacco.  ETOH:   reports current alcohol use. 12 beers a night  DRUGS: Denies      Family History:             Problem Relation Age of Onset    Hypertension Father     High Blood Pressure Father     Alcohol Abuse Father     Depression Mother     High Blood Pressure Paternal Grandfather     Alcohol Abuse Paternal Grandfather     Heart Disease Paternal Grandmother     Anemia Paternal Grandmother     Depression Maternal Aunt     Alcohol Abuse Paternal Aunt     Alcohol Abuse Paternal Uncle        REVIEW OF SYSTEMS:   Pertinent positives as noted in the HPI. All other systems reviewed and negative. PHYSICAL EXAM PERFORMED:    /73   Pulse 78   Temp 97.9 °F (36.6 °C) (Oral)   Resp 18   Ht 5' 8\" (1.727 m)   Wt 145 lb 11.6 oz (66.1 kg)   SpO2 99%   BMI 22.16 kg/m²     General appearance: Tremulous in mild distress, appears stated age and cooperative. HEENT:  Normal cephalic, atraumatic without obvious deformity. Pupils equal, round, and reactive to light. Extra ocular muscles intact. Conjunctivae/corneas clear. Neck: Supple, with full range of motion. No jugular venous distention.  Trachea [phone].

## 2020-01-11 NOTE — PLAN OF CARE
Problem: Pain:  Goal: Pain level will decrease  Description  Pain level will decrease  1/11/2020 1259 by Juan Varma RN  Outcome: Ongoing  1/11/2020 0149 by Forest Armenta RN  Outcome: Ongoing  Goal: Control of acute pain  Description  Control of acute pain  1/11/2020 1259 by Juan Varma RN  Outcome: Ongoing  1/11/2020 0149 by Forest Armenta RN  Outcome: Ongoing  Goal: Control of chronic pain  Description  Control of chronic pain  1/11/2020 1259 by Juan Varma RN  Outcome: Ongoing  1/11/2020 0149 by Forest Armenta RN  Outcome: Ongoing

## 2020-01-12 VITALS
RESPIRATION RATE: 18 BRPM | DIASTOLIC BLOOD PRESSURE: 79 MMHG | TEMPERATURE: 99.9 F | WEIGHT: 143.96 LBS | HEART RATE: 86 BPM | OXYGEN SATURATION: 98 % | HEIGHT: 68 IN | BODY MASS INDEX: 21.82 KG/M2 | SYSTOLIC BLOOD PRESSURE: 114 MMHG

## 2020-01-12 PROCEDURE — 94760 N-INVAS EAR/PLS OXIMETRY 1: CPT

## 2020-01-12 PROCEDURE — 2580000003 HC RX 258: Performed by: PEDIATRICS

## 2020-01-12 PROCEDURE — 6370000000 HC RX 637 (ALT 250 FOR IP): Performed by: PEDIATRICS

## 2020-01-12 PROCEDURE — 6370000000 HC RX 637 (ALT 250 FOR IP): Performed by: NURSE PRACTITIONER

## 2020-01-12 RX ORDER — GUAIFENESIN/DEXTROMETHORPHAN 100-10MG/5
10 SYRUP ORAL ONCE
Status: COMPLETED | OUTPATIENT
Start: 2020-01-12 | End: 2020-01-12

## 2020-01-12 RX ORDER — MULTIVITAMIN WITH FOLIC ACID 400 MCG
1 TABLET ORAL DAILY
Qty: 30 TABLET | Refills: 0
Start: 2020-01-13 | End: 2021-07-07

## 2020-01-12 RX ORDER — CHLORDIAZEPOXIDE HYDROCHLORIDE 5 MG/1
5 CAPSULE, GELATIN COATED ORAL 3 TIMES DAILY
Qty: 3 CAPSULE | Refills: 0 | Status: SHIPPED | OUTPATIENT
Start: 2020-01-12 | End: 2020-01-13

## 2020-01-12 RX ORDER — FOLIC ACID 1 MG/1
1 TABLET ORAL DAILY
Qty: 30 TABLET | Refills: 3 | COMMUNITY
Start: 2020-01-13 | End: 2021-06-02

## 2020-01-12 RX ADMIN — SODIUM CHLORIDE, PRESERVATIVE FREE 10 ML: 5 INJECTION INTRAVENOUS at 08:35

## 2020-01-12 RX ADMIN — FOLIC ACID 1 MG: 1 TABLET ORAL at 08:34

## 2020-01-12 RX ADMIN — Medication 100 MG: at 08:34

## 2020-01-12 RX ADMIN — LORAZEPAM 1 MG: 1 TABLET ORAL at 09:40

## 2020-01-12 RX ADMIN — THERA TABS 1 TABLET: TAB at 08:34

## 2020-01-12 RX ADMIN — GUAIFENESIN AND DEXTROMETHORPHAN 10 ML: 100; 10 SYRUP ORAL at 11:39

## 2020-01-12 RX ADMIN — CHLORDIAZEPOXIDE HYDROCHLORIDE 5 MG: 5 CAPSULE ORAL at 08:34

## 2020-01-12 RX ADMIN — LORAZEPAM 1 MG: 1 TABLET ORAL at 03:21

## 2020-01-12 RX ADMIN — PANTOPRAZOLE SODIUM 40 MG: 40 TABLET, DELAYED RELEASE ORAL at 08:34

## 2020-01-12 NOTE — PROGRESS NOTES
Discharge instructions read and reviewed with patient. No questions or concerns. Patient was given a bus pass for transportation. [facility]
Pt awake and alert, somewhat agitated and pacing the halls. Ativan given per PRN order, this nurse witnessed pt chewing it. Tremors also noted in pt's hands. Call light and needs in reach. Will monitor.  Electronically signed by Nida Vaughn RN on 1/12/2020 at 3:38 AM
products    Past medical and surgical history reviewed. Any changes have been noted. Scheduled and prn Medications:    Scheduled Meds:   ARIPiprazole  10 mg Oral Daily    benztropine  1 mg Oral BID    pantoprazole  40 mg Oral QAM AC    sodium chloride flush  10 mL Intravenous 2 times per day    enoxaparin  40 mg Subcutaneous Daily    thiamine  100 mg Oral Daily    folic acid  1 mg Oral Daily    nicotine  1 patch Transdermal Daily    chlordiazePOXIDE  5 mg Oral TID    [START ON 1/12/2020] multivitamin  1 tablet Oral Daily     Continuous Infusions:  PRN Meds:.sodium chloride flush, ondansetron, acetaminophen, LORazepam    PHYSICAL EXAM:  /78   Pulse 97   Temp 97.6 °F (36.4 °C) (Oral)   Resp 18   Ht 5' 8\" (1.727 m)   Wt 143 lb 11.8 oz (65.2 kg)   SpO2 99%   BMI 21.86 kg/m²       Intake/Output Summary (Last 24 hours) at 1/11/2020 1141  Last data filed at 1/11/2020 1059  Gross per 24 hour   Intake 1240 ml   Output --   Net 1240 ml       General: Alert and oriented. Resting in bed in no apparent distress. Tremors noted  HEENT: Normocephalic. Atraumatic. Pupils equal and reactive. EOM intact. Oral mucosa pink/moist/intact. Neck: Supple. Symmetrical. Trachea midline. Lungs: Clear to auscultation bilaterally. Respirations even and unlabored. Chest: Exam unremarkable. Cardiac: S1/S2 noted. Regular Rhythm and rate. Abdomen/GI: Soft. Non-tender. Non-distended. BS+. Extremities: PP+. Atraumatic. No redness/cyanosis/edema noted. Brisk cap refill. Skin: Dry and intact. No lesions noted. Neuro: Grossly intact. No focal deficits noted.      LABS:    Lab Results   Component Value Date    WBC 9.8 01/10/2020    HGB 16.0 01/10/2020    HCT 44.8 01/10/2020    MCV 90.5 01/10/2020     01/10/2020    LYMPHOPCT 9.3 01/10/2020    RBC 4.95 01/10/2020    MCH 32.2 01/10/2020    MCHC 35.6 01/10/2020    RDW 12.4 01/10/2020       Lab Results   Component Value Date    CREATININE 0.7 (L) 01/10/2020    BUN 13

## 2020-01-12 NOTE — PLAN OF CARE
Problem: Pain:  Goal: Pain level will decrease  Description  Pain level will decrease  1/11/2020 2356 by Aiden Rao RN  Outcome: Ongoing  1/11/2020 1259 by Michael Ferrari RN  Outcome: Ongoing  Goal: Control of acute pain  Description  Control of acute pain  1/11/2020 2356 by Aiden Rao RN  Outcome: Ongoing  1/11/2020 1259 by Michael Ferrari RN  Outcome: Ongoing  Goal: Control of chronic pain  Description  Control of chronic pain  1/11/2020 2356 by Aiden Rao RN  Outcome: Ongoing  1/11/2020 1259 by Michael Ferrari RN  Outcome: Ongoing     Problem: Discharge Planning:  Goal: Discharged to appropriate level of care  Description  Discharged to appropriate level of care  Outcome: Ongoing     Problem: Fluid Volume - Deficit:  Goal: Absence of fluid volume deficit signs and symptoms  Description  Absence of fluid volume deficit signs and symptoms  Outcome: Ongoing     Problem: Nutrition Deficit:  Goal: Ability to achieve adequate nutritional intake will improve  Description  Ability to achieve adequate nutritional intake will improve  Outcome: Ongoing     Problem: Sleep Pattern Disturbance:  Goal: Appears well-rested  Description  Appears well-rested  Outcome: Ongoing

## 2020-01-12 NOTE — DISCHARGE SUMMARY
Hospital Medicine Discharge Summary      Patient ID: Ophelia Marlow      Patient's PCP: No primary care provider on file. Admit Date: 1/10/2020     Discharge Date:   01/12/20     Admitting Physician: Rima Li MD     Discharge Provider: ARTURO Sanchez CNP     Consults: None    Discharge Diagnoses:     Active Hospital Problems    Diagnosis Date Noted    Alcohol withdrawal delirium ([address] [F10.231] 03/23/2019       Disposition:  [x] Home  [] Home with home health [] Rehab [] Psych [] SNF  [] LTAC  [] Long term nursing home or group home [] Transfer to ICU  [] Transfer to PCU [] Other:    Establish PCP  establish care for PCP  Schedule an appointment as soon as possible for a visit in 1 week  for hospital follow up       Discharge Condition: stable      Code Status:  Prior     Activity: activity as tolerated    Diet: No diet orders on file     Discharge Medications:     Discharge Medication List as of 1/12/2020 11:51 AM           Details   chlordiazePOXIDE (LIBRIUM) 5 MG capsule Take 1 capsule by mouth 3 times daily for 1 day., Disp-3 capsule, [id number]      folic acid (FOLVITE) 1 MG tablet Take 1 tablet by mouth daily, Disp-30 tablet, R-3OTC      Multiple Vitamin (MULTIVITAMIN) tablet Take 1 tablet by mouth daily, Disp-30 tablet, R-0NO PRINT              Details   ondansetron (ZOFRAN ODT) 4 MG disintegrating tablet Take 1 tablet by mouth every 8 hours as needed for Nausea Let dissolve in mouth., Disp-10 tablet, R-0Print      pantoprazole (PROTONIX) 40 MG tablet Take 1 tablet by mouth every morning (before breakfast), Disp-30 tablet, R-3Normal      thiamine 100 MG tablet Take 1 tablet by mouth daily, Disp-30 tablet, R-0Normal      benztropine (COGENTIN) 1 MG tablet Take 1 mg by mouth 2 times dailyHistorical Med      ARIPiprazole (ABILIFY) 10 MG tablet Take 10 mg by mouth dailyHistorical Med             The patient was seen and examined on day of discharge and this discharge summary is in conjunction

## 2021-06-02 ENCOUNTER — APPOINTMENT (OUTPATIENT)
Dept: ULTRASOUND IMAGING | Age: 31
DRG: 282 | End: 2021-06-02
Payer: COMMERCIAL

## 2021-06-02 ENCOUNTER — HOSPITAL ENCOUNTER (INPATIENT)
Age: 31
LOS: 1 days | Discharge: HOME | DRG: 282 | End: 2021-06-04
Attending: EMERGENCY MEDICINE | Admitting: STUDENT IN AN ORGANIZED HEALTH CARE EDUCATION/TRAINING PROGRAM
Payer: COMMERCIAL

## 2021-06-02 DIAGNOSIS — F10.939 ALCOHOL WITHDRAWAL SYNDROME WITH COMPLICATION (HCC): ICD-10-CM

## 2021-06-02 DIAGNOSIS — F10.931 DELIRIUM TREMENS (HCC): ICD-10-CM

## 2021-06-02 DIAGNOSIS — K86.1 CHRONIC PANCREATITIS, UNSPECIFIED PANCREATITIS TYPE (HCC): Primary | ICD-10-CM

## 2021-06-02 LAB
A/G RATIO: 1.8 (ref 1.1–2.2)
ALBUMIN SERPL-MCNC: 5.5 G/DL (ref 3.4–5)
ALP BLD-CCNC: 65 U/L (ref 40–129)
ALT SERPL-CCNC: 20 U/L (ref 10–40)
ANION GAP SERPL CALCULATED.3IONS-SCNC: 14 MMOL/L (ref 3–16)
AST SERPL-CCNC: 29 U/L (ref 15–37)
BASOPHILS ABSOLUTE: 0 K/UL (ref 0–0.2)
BASOPHILS RELATIVE PERCENT: 0.2 %
BILIRUB SERPL-MCNC: 0.7 MG/DL (ref 0–1)
BILIRUBIN URINE: NEGATIVE
BLOOD, URINE: NEGATIVE
BUN BLDV-MCNC: 11 MG/DL (ref 7–20)
CALCIUM SERPL-MCNC: 9.6 MG/DL (ref 8.3–10.6)
CHLORIDE BLD-SCNC: 102 MMOL/L (ref 99–110)
CLARITY: CLEAR
CO2: 23 MMOL/L (ref 21–32)
COLOR: YELLOW
CREAT SERPL-MCNC: 0.7 MG/DL (ref 0.9–1.3)
EOSINOPHILS ABSOLUTE: 0.1 K/UL (ref 0–0.6)
EOSINOPHILS RELATIVE PERCENT: 0.6 %
ETHANOL: NORMAL MG/DL (ref 0–0.08)
GFR AFRICAN AMERICAN: >60
GFR NON-AFRICAN AMERICAN: >60
GLOBULIN: 3.1 G/DL
GLUCOSE BLD-MCNC: 107 MG/DL (ref 70–99)
GLUCOSE URINE: NEGATIVE MG/DL
HCT VFR BLD CALC: 45.8 % (ref 40.5–52.5)
HEMOGLOBIN: 16.4 G/DL (ref 13.5–17.5)
KETONES, URINE: NEGATIVE MG/DL
LEUKOCYTE ESTERASE, URINE: NEGATIVE
LIPASE: 34 U/L (ref 13–60)
LYMPHOCYTES ABSOLUTE: 1.4 K/UL (ref 1–5.1)
LYMPHOCYTES RELATIVE PERCENT: 10.8 %
MCH RBC QN AUTO: 32 PG (ref 26–34)
MCHC RBC AUTO-ENTMCNC: 35.8 G/DL (ref 31–36)
MCV RBC AUTO: 89.3 FL (ref 80–100)
MICROSCOPIC EXAMINATION: NORMAL
MONOCYTES ABSOLUTE: 0.8 K/UL (ref 0–1.3)
MONOCYTES RELATIVE PERCENT: 5.7 %
NEUTROPHILS ABSOLUTE: 11 K/UL (ref 1.7–7.7)
NEUTROPHILS RELATIVE PERCENT: 82.7 %
NITRITE, URINE: NEGATIVE
PDW BLD-RTO: 12.4 % (ref 12.4–15.4)
PH UA: 6.5 (ref 5–8)
PLATELET # BLD: 213 K/UL (ref 135–450)
PMV BLD AUTO: 8.6 FL (ref 5–10.5)
POTASSIUM REFLEX MAGNESIUM: 3.8 MMOL/L (ref 3.5–5.1)
PROTEIN UA: NEGATIVE MG/DL
RBC # BLD: 5.13 M/UL (ref 4.2–5.9)
SARS-COV-2, NAAT: NOT DETECTED
SODIUM BLD-SCNC: 139 MMOL/L (ref 136–145)
SPECIFIC GRAVITY UA: <1.005 (ref 1–1.03)
TOTAL PROTEIN: 8.6 G/DL (ref 6.4–8.2)
TROPONIN: <0.01 NG/ML
URINE REFLEX TO CULTURE: NORMAL
URINE TYPE: NORMAL
UROBILINOGEN, URINE: 0.2 E.U./DL
WBC # BLD: 13.3 K/UL (ref 4–11)

## 2021-06-02 PROCEDURE — 80053 COMPREHEN METABOLIC PANEL: CPT

## 2021-06-02 PROCEDURE — 82077 ASSAY SPEC XCP UR&BREATH IA: CPT

## 2021-06-02 PROCEDURE — 76705 ECHO EXAM OF ABDOMEN: CPT

## 2021-06-02 PROCEDURE — 93005 ELECTROCARDIOGRAM TRACING: CPT | Performed by: EMERGENCY MEDICINE

## 2021-06-02 PROCEDURE — 2580000003 HC RX 258: Performed by: EMERGENCY MEDICINE

## 2021-06-02 PROCEDURE — 96365 THER/PROPH/DIAG IV INF INIT: CPT

## 2021-06-02 PROCEDURE — 96375 TX/PRO/DX INJ NEW DRUG ADDON: CPT

## 2021-06-02 PROCEDURE — 87635 SARS-COV-2 COVID-19 AMP PRB: CPT

## 2021-06-02 PROCEDURE — 6360000002 HC RX W HCPCS: Performed by: EMERGENCY MEDICINE

## 2021-06-02 PROCEDURE — 83690 ASSAY OF LIPASE: CPT

## 2021-06-02 PROCEDURE — 85025 COMPLETE CBC W/AUTO DIFF WBC: CPT

## 2021-06-02 PROCEDURE — 6370000000 HC RX 637 (ALT 250 FOR IP): Performed by: EMERGENCY MEDICINE

## 2021-06-02 PROCEDURE — 84484 ASSAY OF TROPONIN QUANT: CPT

## 2021-06-02 PROCEDURE — 81003 URINALYSIS AUTO W/O SCOPE: CPT

## 2021-06-02 PROCEDURE — 96361 HYDRATE IV INFUSION ADD-ON: CPT

## 2021-06-02 PROCEDURE — 96376 TX/PRO/DX INJ SAME DRUG ADON: CPT

## 2021-06-02 RX ORDER — KETOROLAC TROMETHAMINE 30 MG/ML
15 INJECTION, SOLUTION INTRAMUSCULAR; INTRAVENOUS ONCE
Status: DISCONTINUED | OUTPATIENT
Start: 2021-06-02 | End: 2021-06-04 | Stop reason: HOSPADM

## 2021-06-02 RX ORDER — THIAMINE HYDROCHLORIDE 100 MG/ML
100 INJECTION, SOLUTION INTRAMUSCULAR; INTRAVENOUS DAILY
Status: DISCONTINUED | OUTPATIENT
Start: 2021-06-02 | End: 2021-06-02 | Stop reason: SDUPTHER

## 2021-06-02 RX ORDER — LORAZEPAM 2 MG/ML
4 INJECTION INTRAMUSCULAR
Status: DISCONTINUED | OUTPATIENT
Start: 2021-06-02 | End: 2021-06-03

## 2021-06-02 RX ORDER — LORAZEPAM 1 MG/1
3 TABLET ORAL
Status: DISCONTINUED | OUTPATIENT
Start: 2021-06-02 | End: 2021-06-03

## 2021-06-02 RX ORDER — SODIUM CHLORIDE 0.9 % (FLUSH) 0.9 %
5-40 SYRINGE (ML) INJECTION EVERY 12 HOURS SCHEDULED
Status: DISCONTINUED | OUTPATIENT
Start: 2021-06-02 | End: 2021-06-03 | Stop reason: SDUPTHER

## 2021-06-02 RX ORDER — ONDANSETRON 2 MG/ML
4 INJECTION INTRAMUSCULAR; INTRAVENOUS ONCE
Status: COMPLETED | OUTPATIENT
Start: 2021-06-02 | End: 2021-06-02

## 2021-06-02 RX ORDER — LORAZEPAM 1 MG/1
2 TABLET ORAL
Status: DISCONTINUED | OUTPATIENT
Start: 2021-06-02 | End: 2021-06-03

## 2021-06-02 RX ORDER — LORAZEPAM 1 MG/1
1 TABLET ORAL
Status: DISCONTINUED | OUTPATIENT
Start: 2021-06-02 | End: 2021-06-03

## 2021-06-02 RX ORDER — LORAZEPAM 2 MG/ML
3 INJECTION INTRAMUSCULAR
Status: DISCONTINUED | OUTPATIENT
Start: 2021-06-02 | End: 2021-06-03

## 2021-06-02 RX ORDER — LORAZEPAM 1 MG/1
4 TABLET ORAL
Status: DISCONTINUED | OUTPATIENT
Start: 2021-06-02 | End: 2021-06-03

## 2021-06-02 RX ORDER — LORAZEPAM 2 MG/ML
1 INJECTION INTRAMUSCULAR
Status: DISCONTINUED | OUTPATIENT
Start: 2021-06-02 | End: 2021-06-03

## 2021-06-02 RX ORDER — SODIUM CHLORIDE 9 MG/ML
25 INJECTION, SOLUTION INTRAVENOUS PRN
Status: DISCONTINUED | OUTPATIENT
Start: 2021-06-02 | End: 2021-06-03 | Stop reason: SDUPTHER

## 2021-06-02 RX ORDER — 0.9 % SODIUM CHLORIDE 0.9 %
1000 INTRAVENOUS SOLUTION INTRAVENOUS ONCE
Status: COMPLETED | OUTPATIENT
Start: 2021-06-02 | End: 2021-06-02

## 2021-06-02 RX ORDER — SODIUM CHLORIDE 0.9 % (FLUSH) 0.9 %
5-40 SYRINGE (ML) INJECTION PRN
Status: DISCONTINUED | OUTPATIENT
Start: 2021-06-02 | End: 2021-06-03 | Stop reason: SDUPTHER

## 2021-06-02 RX ORDER — LORAZEPAM 2 MG/ML
2 INJECTION INTRAMUSCULAR
Status: DISCONTINUED | OUTPATIENT
Start: 2021-06-02 | End: 2021-06-03

## 2021-06-02 RX ADMIN — SODIUM CHLORIDE, PRESERVATIVE FREE 10 ML: 5 INJECTION INTRAVENOUS at 22:15

## 2021-06-02 RX ADMIN — ONDANSETRON 4 MG: 2 INJECTION INTRAMUSCULAR; INTRAVENOUS at 19:57

## 2021-06-02 RX ADMIN — LORAZEPAM 2 MG: 1 TABLET ORAL at 22:14

## 2021-06-02 RX ADMIN — LORAZEPAM 2 MG: 2 INJECTION INTRAMUSCULAR; INTRAVENOUS at 19:58

## 2021-06-02 RX ADMIN — SODIUM CHLORIDE 1000 ML: 9 INJECTION, SOLUTION INTRAVENOUS at 19:59

## 2021-06-02 RX ADMIN — THIAMINE HYDROCHLORIDE 100 MG: 100 INJECTION, SOLUTION INTRAMUSCULAR; INTRAVENOUS at 19:59

## 2021-06-02 RX ADMIN — ONDANSETRON 4 MG: 2 INJECTION INTRAMUSCULAR; INTRAVENOUS at 22:13

## 2021-06-02 ASSESSMENT — PAIN DESCRIPTION - PAIN TYPE: TYPE: ACUTE PAIN

## 2021-06-02 ASSESSMENT — PAIN DESCRIPTION - DESCRIPTORS: DESCRIPTORS: SHOOTING

## 2021-06-02 ASSESSMENT — PAIN SCALES - GENERAL: PAINLEVEL_OUTOF10: 6

## 2021-06-02 ASSESSMENT — PAIN DESCRIPTION - LOCATION: LOCATION: ABDOMEN

## 2021-06-02 NOTE — ED PROVIDER NOTES
[address] [place] [address] EMERGENCY DEPARTMENT      CHIEF COMPLAINT  Abdominal Pain (hx of Pancreatitis, feels similar) and Shaking (drinks ETOH almost daily, has not had any today)       HISTORY OF PRESENT ILLNESS  Rosette Iyer is a 35 y.o. male with history of pancreatitis who presents to emergency department for evaluation of abdominal pain. Patient reports he has history of alcoholic pancreatitis. Says he has not had pancreatitis for several months. He started having pain again started last night. Reports it is over the epigastrium. Rates his pain as a 5 out of 10. Describes it as persistent, aching pain over the epigastrium. Has associated nausea. Has not vomited. Has not been eating today due to the nausea. Denies having chest pain. Denies having shortness of breath. Reports drinking a 3 of the 24 oz 10% beer daily. Reports last drink was yesterday. Reports having history of alcohol withdrawal seizure once. Denies any recent illnesses. Denies having any other complaints. Reports he still has his gallbladder. No other complaints, modifying factors or associated symptoms. I have reviewed the following from the nursing documentation. Past Medical History:   Diagnosis Date    Alcohol abuse     PATIENT HAS EXTREME MANIPULITIVE & DRUG SEEKING BEHAVIOR. HE POCKETS HIS BENZODIZAPINES.  Anxiety     Drug-seeking behavior     Several times found pocketing medications given in hospital    Herpes genitalis in men     Manipulative behavior     Pancreatitis     Pneumonia     Portal vein thrombosis     pt denied    Seizures (HCC)     PER PATIENT ONLY.  DURING MULTIPLE HOSPITALIZATIONS HE HAS NEVER ONCE    Tobacco abuse      Past Surgical History:   Procedure Laterality Date    ENDOSCOPY, COLON, DIAGNOSTIC  10/28/2013    Endoscopic retrograde cholangiopancreatography with pancreatic stent placement    ENDOSCOPY, COLON, DIAGNOSTIC  03/23/2018    Esophagogastroduodenoscopy with biopsy  ERCP  1/10/14    with stent removal     Family History   Problem Relation Age of Onset    Hypertension Father     High Blood Pressure Father     Alcohol Abuse Father     Depression Mother     High Blood Pressure Paternal Grandfather     Alcohol Abuse Paternal Grandfather     Heart Disease Paternal Grandmother     Anemia Paternal Grandmother     Depression Maternal Aunt     Alcohol Abuse Paternal Aunt     Alcohol Abuse Paternal Uncle      Social History     Socioeconomic History    Marital status: Single     Spouse name: Not on file    Number of children: 1    Years of education: 15    Highest education level: Not on file   Occupational History    Occupation:    Tobacco Use    Smoking status: Current Every Day Smoker     Packs/day: 0.50     Years: 10.00     Pack years: 5.00     Types: Cigarettes     Start date: 11/21/2007    Smokeless tobacco: Never Used   Vaping Use    Vaping Use: Former    Substances: Never   Substance and Sexual Activity    Alcohol use: Yes     Comment: 12 pack per day    Drug use: No    Sexual activity: Never   Other Topics Concern    Not on file   Social History Narrative    Not on file     Social Determinants of Health     Financial Resource Strain:     Difficulty of Paying Living Expenses:    Food Insecurity:     Worried About Running Out of Food in the Last Year:     Ran Out of Food in the Last Year:    Transportation Needs:     Lack of Transportation (Medical):      Lack of Transportation (Non-Medical):    Physical Activity:     Days of Exercise per Week:     Minutes of Exercise per Session:    Stress:     Feeling of Stress :    Social Connections:     Frequency of Communication with Friends and Family:     Frequency of Social Gatherings with Friends and Family:     Attends Gnosticism Services:     Active Member of Clubs or Organizations:     Attends Club or Organization Meetings:     Marital Status:    Intimate Partner Violence:     Fear of Current or Ex-Partner:     Emotionally Abused:     Physically Abused:     Sexually Abused:      Current Facility-Administered Medications   Medication Dose Route Frequency Provider Last Rate Last Admin    sodium chloride flush 0.9 % injection 5-40 mL  5-40 mL Intravenous 2 times per day Lay Edward MD   10 mL at 06/02/21 2215    sodium chloride flush 0.9 % injection 5-40 mL  5-40 mL Intravenous PRN Lay Edward MD        0.9 % sodium chloride infusion  25 mL Intravenous PRN Lay Edward MD        LORazepam (ATIVAN) tablet 1 mg  1 mg Oral Q1H PRN Lay Edward MD        Or    LORazepam (ATIVAN) injection 1 mg  1 mg Intravenous Q1H PRN Lay Edward MD        Or    LORazepam (ATIVAN) tablet 2 mg  2 mg Oral Q1H PRN Lay Edward MD   2 mg at 06/02/21 2214    Or    LORazepam (ATIVAN) injection 2 mg  2 mg Intravenous Q1H PRN Lay Edward MD   2 mg at 06/02/21 1958    Or    LORazepam (ATIVAN) tablet 3 mg  3 mg Oral Q1H PRN Lay Edward MD        Or    LORazepam (ATIVAN) injection 3 mg  3 mg Intravenous Q1H PRN Lay Edward MD        Or    LORazepam (ATIVAN) tablet 4 mg  4 mg Oral Q1H PRN Lay Edward MD        Or    LORazepam (ATIVAN) injection 4 mg  4 mg Intravenous Q1H PRN Lay Edward MD        ketorolac (TORADOL) injection 15 mg  15 mg Intravenous Once Lay Edward MD         Current Outpatient Medications   Medication Sig Dispense Refill    Multiple Vitamin (MULTIVITAMIN) tablet Take 1 tablet by mouth daily 30 tablet 0     Allergies   Allergen Reactions    Amoxicillin Hives    Haldol [Haloperidol Lactate] Other (See Comments)     Muscle spasms    Phenergan [Promethazine Hcl] Other (See Comments)     Pt believes it caused muscle spasms    Shrimp (Diagnostic) Itching    Glucosamine Itching      Itching of throat when eating shrimp. Pt states has had IV contrast for CT's without problem before.       Shellfish-Derived Products      Patient gets anxious around seafood       REVIEW OF SYSTEMS  10 systems reviewed, pertinent positives per HPI otherwise noted to be negative. PHYSICAL EXAM  BP (!) 131/90   Pulse 80   Temp 99 °F (37.2 °C) (Temporal)   Resp 20   Ht 5' 8\" (1.727 m)   Wt 149 lb 0.5 oz (67.6 kg)   SpO2 100%   BMI 22.66 kg/m²    GENERAL APPEARANCE: Awake and alert. Tremulous. HENT: Normocephalic. Atraumatic. PERRL. EOMI. No facial droop. HEART/CHEST: Hypertensive. Tachycardic. LUNGS: Respirations unlabored. Speaking comfortably in full sentences. ABDOMEN: Soft, non-distended abdomen. Tenderness to palpation over the epigastrium. No tenderness to palpation of the right upper quadrant. No guarding. No rebound. EXTREMITIES: No gross deformities. Moving all extremities. Palpable pulses all extremities. Sensation intact all extremities. SKIN: Warm and dry. No acute rashes. NEUROLOGICAL: Alert and oriented. No gross facial drooping. Answering questions appropriately. Moving all extremities.     LABS  Results for orders placed or performed during the hospital encounter of 06/02/21   COVID-19, Rapid    Specimen: Nasopharyngeal Swab   Result Value Ref Range    SARS-CoV-2, NAAT Not Detected Not Detected   CBC Auto Differential   Result Value Ref Range    WBC 13.3 (H) 4.0 - 11.0 K/uL    RBC 5.13 4.20 - 5.90 M/uL    Hemoglobin 16.4 13.5 - 17.5 g/dL    Hematocrit 45.8 40.5 - 52.5 %    MCV 89.3 80.0 - 100.0 fL    MCH 32.0 26.0 - 34.0 pg    MCHC 35.8 31.0 - 36.0 g/dL    RDW 12.4 12.4 - 15.4 %    Platelets 680 790 - 325 K/uL    MPV 8.6 5.0 - 10.5 fL    Neutrophils % 82.7 %    Lymphocytes % 10.8 %    Monocytes % 5.7 %    Eosinophils % 0.6 %    Basophils % 0.2 %    Neutrophils Absolute 11.0 (H) 1.7 - 7.7 K/uL    Lymphocytes Absolute 1.4 1.0 - 5.1 K/uL    Monocytes Absolute 0.8 0.0 - 1.3 K/uL    Eosinophils Absolute 0.1 0.0 - 0.6 K/uL    Basophils Absolute 0.0 0.0 - 0.2 K/uL   Comprehensive Metabolic Panel w/ Reflex to MG   Result Value Ref Range    Sodium 139 136 - 145 mmol/L    Potassium reflex Magnesium 3.8 3.5 - 5.1 mmol/L    Chloride 102 99 - 110 mmol/L    CO2 23 21 - 32 mmol/L    Anion Gap 14 3 - 16    Glucose 107 (H) 70 - 99 mg/dL    BUN 11 7 - 20 mg/dL    CREATININE 0.7 (L) 0.9 - 1.3 mg/dL    GFR Non-African American >60 >60    GFR African American >60 >60    Calcium 9.6 8.3 - 10.6 mg/dL    Total Protein 8.6 (H) 6.4 - 8.2 g/dL    Albumin 5.5 (H) 3.4 - 5.0 g/dL    Albumin/Globulin Ratio 1.8 1.1 - 2.2    Total Bilirubin 0.7 0.0 - 1.0 mg/dL    Alkaline Phosphatase 65 40 - 129 U/L    ALT 20 10 - 40 U/L    AST 29 15 - 37 U/L    Globulin 3.1 g/dL   Lipase   Result Value Ref Range    Lipase 34.0 13.0 - 60.0 U/L   Urinalysis Reflex to Culture    Specimen: Urine, clean catch   Result Value Ref Range    Color, UA YELLOW Straw/Yellow    Clarity, UA Clear Clear    Glucose, Ur Negative Negative mg/dL    Bilirubin Urine Negative Negative    Ketones, Urine Negative Negative mg/dL    Specific Gravity, UA <1.005 1.005 - 1.030    Blood, Urine Negative Negative    pH, UA 6.5 5.0 - 8.0    Protein, UA Negative Negative mg/dL    Urobilinogen, Urine 0.2 <2.0 E.U./dL    Nitrite, Urine Negative Negative    Leukocyte Esterase, Urine Negative Negative    Microscopic Examination Not Indicated     Urine Type NotGiven     Urine Reflex to Culture Not Indicated    Troponin   Result Value Ref Range    Troponin <0.01 <0.01 ng/mL   Ethanol   Result Value Ref Range    Ethanol Lvl None Detected mg/dL       I have reviewed all labs for this visit. ECG  The Ekg interpreted by me shows  Normal sinus rhythm with a rate of 97  Axis is right  QTc is normal  Intervals and Durations are unremarkable.       ST Segments: Nonspecific ST changes  No significant change from prior EKG dated December 7, 2019    RADIOLOGY  US GALLBLADDER RUQ    Result Date: 6/2/2021  EXAMINATION: RIGHT UPPER QUADRANT ULTRASOUND 6/2/2021 8:01 pm COMPARISON: 08/29/2019 HISTORY: ORDERING SYSTEM PROVIDED HISTORY: h/o pancreatitis. epigastric pain. nausea TECHNOLOGIST PROVIDED HISTORY: Reason for exam:->h/o pancreatitis. epigastric pain. nausea FINDINGS: LIVER:  The liver is inhomogeneous. No intrahepatic biliary ductal dilation or mass is identified. BILIARY SYSTEM:  Gallbladder is unremarkable without evidence of pericholecystic fluid, wall thickening or stones. Negative sonographic Miller's sign. Common bile duct is within normal limits measuring 2.2 mm. RIGHT KIDNEY: The right kidney is grossly unremarkable without evidence of hydronephrosis. It measures 10.7 x 5.6 x 3.6 cm, with cortical thickness of 10 mm. PANCREAS:  Multiple pancreatic calcifications are noted. The pancreatic duct is dilated measuring 5 mm. There is a possible ductal stone measuring 7 mm in the pancreatic head. OTHER: No evidence of right upper quadrant ascites. Chronic pancreatitis and dilation of the pancreatic duct. Potentially obstructing 7 mm stone in the pancreatic duct near the ampulla. No cholelithiasis or significant dilation of the common duct. Inhomogeneous liver parenchyma most likely due to chronic inflammation or fibrosis. ED COURSE/MDM  Patient seen and evaluated. At presentation, patient was awake, alert, afebrile, hemodynamically stable, and satting well on room air. Exam remarkable for tremors concerning for alcohol withdrawal. Patient reports having h/o alcohol withdrawal seizures. Patient had soft, nondistended abdomen, and mild tenderness to palpation over the epigastrium. No tenderness of patient over the right upper quadrant. However, as patient reports having his gallbladder, right upper quadrant ultrasound obtained. CBC, CMP, lipase EKG, and troponin obtained. Patient placed on CIWA protocol. Patient given Toradol for symptoms. Patient was given Ativan for his CIWA score in the 20s. CIWA 20s. Patient given Ativan 2 mg. He was given 1 L IV fluid bolus. On reassessment, patient appears improved.   Repeat CIWA improved. Patient reports having nausea again on reassessment. Patient was resting comfortably on the stretcher. Patient given IV Zofran. Heart rate improved after the IV fluid bolus. Right upper quadrant ultrasound showed chronic pancreatitis and dilation of the pancreatic duct. There was potentially obstructing 7 mm stone in the pancreatic duct near the ampulla. Results of the work-up discussed with the patient. I answered all of his questions. Hospitalist consulted for admission for alcohol withdrawal and further evaluation and treatment for the chronic pancreatitis. Admit. I provided at least 30 minutes of critical care excluding separately billable procedures. Pt was seen during the [place] 19 pandemic. Appropriate PPE worn by ME during patient encounters. Pt seen during a time with constrained hospital bed capacity and other potential inpatient and outpatient resources were constrained due to the viral pandemic.      During the patient's ED course, the patient was given:  Medications   sodium chloride flush 0.9 % injection 5-40 mL (10 mLs Intravenous Given 6/2/21 2215)   sodium chloride flush 0.9 % injection 5-40 mL (has no administration in time range)   0.9 % sodium chloride infusion (has no administration in time range)   LORazepam (ATIVAN) tablet 1 mg ( Oral See Alternative 6/2/21 2214)     Or   LORazepam (ATIVAN) injection 1 mg ( Intravenous See Alternative 6/2/21 2214)     Or   LORazepam (ATIVAN) tablet 2 mg (2 mg Oral Given 6/2/21 2214)     Or   LORazepam (ATIVAN) injection 2 mg ( Intravenous See Alternative 6/2/21 2214)     Or   LORazepam (ATIVAN) tablet 3 mg ( Oral See Alternative 6/2/21 2214)     Or   LORazepam (ATIVAN) injection 3 mg ( Intravenous See Alternative 6/2/21 2214)     Or   LORazepam (ATIVAN) tablet 4 mg ( Oral See Alternative 6/2/21 2214)     Or   LORazepam (ATIVAN) injection 4 mg ( Intravenous See Alternative 6/2/21 2214)   ketorolac (TORADOL) injection 15 mg (15 mg Intravenous Not Given 6/2/21 2010)   0.9 % sodium chloride bolus (0 mLs Intravenous Stopped 6/2/21 2215)   ondansetron (ZOFRAN) injection 4 mg (4 mg Intravenous Given 6/2/21 1957)   thiamine (B-1) 100 mg in sodium chloride 0.9 % 100 mL IVPB (0 mg Intravenous Stopped 6/2/21 2038)   ondansetron (ZOFRAN) injection 4 mg (4 mg Intravenous Given 6/2/21 2213)        CLINICAL IMPRESSION  1. Chronic pancreatitis, unspecified pancreatitis type ([facility] [address]    2. Alcohol withdrawal syndrome with complication (HCC)        Blood pressure (!) 131/90, pulse 80, temperature 99 °F (37.2 °C), temperature source Temporal, resp. rate 20, height 5' 8\" (1.727 m), weight 149 lb 0.5 oz (67.6 kg), SpO2 100 %. Jamal Hernandez was admitted to the hospital.       Patient was given scripts for the following medications. I counseled patient how to take these medications. New Prescriptions    No medications on file       Follow-up with:  No follow-up provider specified. DISCLAIMER: This chart was created using Dragon dictation software. Efforts were made by me to ensure accuracy, however some errors may be present due to limitations of this technology and occasionally words are not transcribed correctly.        Rony Daily MD  06/02/21 4186

## 2021-06-02 NOTE — ED NOTES
EDMD made aware of CIWA score 24.  Per EDMD start with 2mg Ativan      Santos Hooker RN  06/02/21 1955

## 2021-06-03 ENCOUNTER — APPOINTMENT (OUTPATIENT)
Dept: CT IMAGING | Age: 31
DRG: 282 | End: 2021-06-03
Payer: COMMERCIAL

## 2021-06-03 PROBLEM — K74.60 CIRRHOSIS OF LIVER (HCC): Status: ACTIVE | Noted: 2021-06-03

## 2021-06-03 PROBLEM — K86.89 PANCREATIC DUCT CALCULUS: Status: ACTIVE | Noted: 2021-06-03

## 2021-06-03 LAB
A/G RATIO: 1.9 (ref 1.1–2.2)
ALBUMIN SERPL-MCNC: 4.4 G/DL (ref 3.4–5)
ALP BLD-CCNC: 51 U/L (ref 40–129)
ALT SERPL-CCNC: 13 U/L (ref 10–40)
ANION GAP SERPL CALCULATED.3IONS-SCNC: 9 MMOL/L (ref 3–16)
AST SERPL-CCNC: 18 U/L (ref 15–37)
BASOPHILS ABSOLUTE: 0 K/UL (ref 0–0.2)
BASOPHILS RELATIVE PERCENT: 0.4 %
BILIRUB SERPL-MCNC: 0.9 MG/DL (ref 0–1)
BUN BLDV-MCNC: 8 MG/DL (ref 7–20)
CALCIUM SERPL-MCNC: 8.6 MG/DL (ref 8.3–10.6)
CHLORIDE BLD-SCNC: 104 MMOL/L (ref 99–110)
CO2: 22 MMOL/L (ref 21–32)
CREAT SERPL-MCNC: 0.7 MG/DL (ref 0.9–1.3)
EKG ATRIAL RATE: 97 BPM
EKG DIAGNOSIS: NORMAL
EKG P AXIS: 68 DEGREES
EKG P-R INTERVAL: 158 MS
EKG Q-T INTERVAL: 340 MS
EKG QRS DURATION: 94 MS
EKG QTC CALCULATION (BAZETT): 431 MS
EKG R AXIS: 91 DEGREES
EKG T AXIS: 44 DEGREES
EKG VENTRICULAR RATE: 97 BPM
EOSINOPHILS ABSOLUTE: 0.2 K/UL (ref 0–0.6)
EOSINOPHILS RELATIVE PERCENT: 3.2 %
GFR AFRICAN AMERICAN: >60
GFR NON-AFRICAN AMERICAN: >60
GLOBULIN: 2.3 G/DL
GLUCOSE BLD-MCNC: 95 MG/DL (ref 70–99)
HCT VFR BLD CALC: 39.8 % (ref 40.5–52.5)
HEMOGLOBIN: 14.2 G/DL (ref 13.5–17.5)
LYMPHOCYTES ABSOLUTE: 2.3 K/UL (ref 1–5.1)
LYMPHOCYTES RELATIVE PERCENT: 33.9 %
MAGNESIUM: 2.2 MG/DL (ref 1.8–2.4)
MCH RBC QN AUTO: 32 PG (ref 26–34)
MCHC RBC AUTO-ENTMCNC: 35.7 G/DL (ref 31–36)
MCV RBC AUTO: 89.6 FL (ref 80–100)
MONOCYTES ABSOLUTE: 0.5 K/UL (ref 0–1.3)
MONOCYTES RELATIVE PERCENT: 6.7 %
NEUTROPHILS ABSOLUTE: 3.9 K/UL (ref 1.7–7.7)
NEUTROPHILS RELATIVE PERCENT: 55.8 %
PDW BLD-RTO: 12.6 % (ref 12.4–15.4)
PLATELET # BLD: 169 K/UL (ref 135–450)
PMV BLD AUTO: 8.2 FL (ref 5–10.5)
POTASSIUM SERPL-SCNC: 3.7 MMOL/L (ref 3.5–5.1)
RBC # BLD: 4.44 M/UL (ref 4.2–5.9)
SODIUM BLD-SCNC: 135 MMOL/L (ref 136–145)
TOTAL PROTEIN: 6.7 G/DL (ref 6.4–8.2)
WBC # BLD: 6.9 K/UL (ref 4–11)

## 2021-06-03 PROCEDURE — 83735 ASSAY OF MAGNESIUM: CPT

## 2021-06-03 PROCEDURE — 1200000000 HC SEMI PRIVATE

## 2021-06-03 PROCEDURE — 2580000003 HC RX 258: Performed by: STUDENT IN AN ORGANIZED HEALTH CARE EDUCATION/TRAINING PROGRAM

## 2021-06-03 PROCEDURE — 6370000000 HC RX 637 (ALT 250 FOR IP): Performed by: INTERNAL MEDICINE

## 2021-06-03 PROCEDURE — 93010 ELECTROCARDIOGRAM REPORT: CPT | Performed by: INTERNAL MEDICINE

## 2021-06-03 PROCEDURE — 2580000003 HC RX 258: Performed by: INTERNAL MEDICINE

## 2021-06-03 PROCEDURE — 6360000004 HC RX CONTRAST MEDICATION: Performed by: INTERNAL MEDICINE

## 2021-06-03 PROCEDURE — 74177 CT ABD & PELVIS W/CONTRAST: CPT

## 2021-06-03 PROCEDURE — 85025 COMPLETE CBC W/AUTO DIFF WBC: CPT

## 2021-06-03 PROCEDURE — 6360000002 HC RX W HCPCS: Performed by: STUDENT IN AN ORGANIZED HEALTH CARE EDUCATION/TRAINING PROGRAM

## 2021-06-03 PROCEDURE — 80053 COMPREHEN METABOLIC PANEL: CPT

## 2021-06-03 PROCEDURE — 6370000000 HC RX 637 (ALT 250 FOR IP): Performed by: PHYSICIAN ASSISTANT

## 2021-06-03 PROCEDURE — 6370000000 HC RX 637 (ALT 250 FOR IP): Performed by: STUDENT IN AN ORGANIZED HEALTH CARE EDUCATION/TRAINING PROGRAM

## 2021-06-03 PROCEDURE — 36415 COLL VENOUS BLD VENIPUNCTURE: CPT

## 2021-06-03 PROCEDURE — 99284 EMERGENCY DEPT VISIT MOD MDM: CPT

## 2021-06-03 PROCEDURE — 6370000000 HC RX 637 (ALT 250 FOR IP): Performed by: EMERGENCY MEDICINE

## 2021-06-03 PROCEDURE — 6360000002 HC RX W HCPCS: Performed by: EMERGENCY MEDICINE

## 2021-06-03 PROCEDURE — 94760 N-INVAS EAR/PLS OXIMETRY 1: CPT

## 2021-06-03 RX ORDER — FOLIC ACID 1 MG/1
1 TABLET ORAL DAILY
Status: DISCONTINUED | OUTPATIENT
Start: 2021-06-03 | End: 2021-06-04 | Stop reason: HOSPADM

## 2021-06-03 RX ORDER — ONDANSETRON 2 MG/ML
4 INJECTION INTRAMUSCULAR; INTRAVENOUS EVERY 6 HOURS PRN
Status: DISCONTINUED | OUTPATIENT
Start: 2021-06-03 | End: 2021-06-04 | Stop reason: HOSPADM

## 2021-06-03 RX ORDER — LAMOTRIGINE 100 MG/1
50 TABLET ORAL NIGHTLY
COMMUNITY
End: 2021-11-24

## 2021-06-03 RX ORDER — SODIUM CHLORIDE 9 MG/ML
25 INJECTION, SOLUTION INTRAVENOUS PRN
Status: DISCONTINUED | OUTPATIENT
Start: 2021-06-03 | End: 2021-06-04 | Stop reason: HOSPADM

## 2021-06-03 RX ORDER — LAMOTRIGINE 100 MG/1
100 TABLET ORAL DAILY
Status: DISCONTINUED | OUTPATIENT
Start: 2021-06-03 | End: 2021-06-04 | Stop reason: HOSPADM

## 2021-06-03 RX ORDER — LAMOTRIGINE 25 MG/1
50 TABLET ORAL NIGHTLY
Status: DISCONTINUED | OUTPATIENT
Start: 2021-06-03 | End: 2021-06-04 | Stop reason: HOSPADM

## 2021-06-03 RX ORDER — SODIUM CHLORIDE 0.9 % (FLUSH) 0.9 %
5-40 SYRINGE (ML) INJECTION PRN
Status: DISCONTINUED | OUTPATIENT
Start: 2021-06-03 | End: 2021-06-04 | Stop reason: HOSPADM

## 2021-06-03 RX ORDER — METHOCARBAMOL 500 MG/1
500 TABLET, FILM COATED ORAL 2 TIMES DAILY PRN
COMMUNITY
End: 2021-07-07

## 2021-06-03 RX ORDER — CHLORDIAZEPOXIDE HYDROCHLORIDE 5 MG/1
15 CAPSULE, GELATIN COATED ORAL 4 TIMES DAILY
Status: DISCONTINUED | OUTPATIENT
Start: 2021-06-03 | End: 2021-06-04 | Stop reason: HOSPADM

## 2021-06-03 RX ORDER — NICOTINE 21 MG/24HR
1 PATCH, TRANSDERMAL 24 HOURS TRANSDERMAL DAILY
Status: DISCONTINUED | OUTPATIENT
Start: 2021-06-03 | End: 2021-06-04 | Stop reason: HOSPADM

## 2021-06-03 RX ORDER — ONDANSETRON 4 MG/1
4 TABLET, ORALLY DISINTEGRATING ORAL EVERY 8 HOURS PRN
Status: DISCONTINUED | OUTPATIENT
Start: 2021-06-03 | End: 2021-06-04 | Stop reason: HOSPADM

## 2021-06-03 RX ORDER — POLYETHYLENE GLYCOL 3350 17 G
2 POWDER IN PACKET (EA) ORAL PRN
Status: DISCONTINUED | OUTPATIENT
Start: 2021-06-03 | End: 2021-06-04 | Stop reason: HOSPADM

## 2021-06-03 RX ORDER — HYDROXYZINE HYDROCHLORIDE 25 MG/1
25 TABLET, FILM COATED ORAL EVERY MORNING
COMMUNITY
End: 2021-11-24

## 2021-06-03 RX ORDER — LAMOTRIGINE 100 MG/1
100 TABLET ORAL EVERY MORNING
COMMUNITY
End: 2021-11-24

## 2021-06-03 RX ORDER — MORPHINE SULFATE 2 MG/ML
2 INJECTION, SOLUTION INTRAMUSCULAR; INTRAVENOUS EVERY 6 HOURS PRN
Status: DISCONTINUED | OUTPATIENT
Start: 2021-06-03 | End: 2021-06-03

## 2021-06-03 RX ORDER — SODIUM CHLORIDE 0.9 % (FLUSH) 0.9 %
5-40 SYRINGE (ML) INJECTION EVERY 12 HOURS SCHEDULED
Status: DISCONTINUED | OUTPATIENT
Start: 2021-06-03 | End: 2021-06-04 | Stop reason: HOSPADM

## 2021-06-03 RX ORDER — THIAMINE HYDROCHLORIDE 100 MG/ML
100 INJECTION, SOLUTION INTRAMUSCULAR; INTRAVENOUS DAILY
Status: DISCONTINUED | OUTPATIENT
Start: 2021-06-03 | End: 2021-06-03 | Stop reason: SDUPTHER

## 2021-06-03 RX ORDER — SODIUM CHLORIDE, SODIUM LACTATE, POTASSIUM CHLORIDE, CALCIUM CHLORIDE 600; 310; 30; 20 MG/100ML; MG/100ML; MG/100ML; MG/100ML
INJECTION, SOLUTION INTRAVENOUS CONTINUOUS
Status: ACTIVE | OUTPATIENT
Start: 2021-06-03 | End: 2021-06-04

## 2021-06-03 RX ORDER — MORPHINE SULFATE 2 MG/ML
2 INJECTION, SOLUTION INTRAMUSCULAR; INTRAVENOUS EVERY 4 HOURS PRN
Status: DISCONTINUED | OUTPATIENT
Start: 2021-06-03 | End: 2021-06-04

## 2021-06-03 RX ORDER — HYDROXYZINE HYDROCHLORIDE 25 MG/1
50 TABLET, FILM COATED ORAL
Status: DISCONTINUED | OUTPATIENT
Start: 2021-06-03 | End: 2021-06-04 | Stop reason: HOSPADM

## 2021-06-03 RX ORDER — NAPROXEN 500 MG/1
500 TABLET ORAL 2 TIMES DAILY WITH MEALS
Status: ON HOLD | COMMUNITY
End: 2021-06-04 | Stop reason: HOSPADM

## 2021-06-03 RX ADMIN — THIAMINE HYDROCHLORIDE 100 MG: 100 INJECTION, SOLUTION INTRAMUSCULAR; INTRAVENOUS at 15:49

## 2021-06-03 RX ADMIN — LORAZEPAM 2 MG: 2 INJECTION INTRAMUSCULAR; INTRAVENOUS at 15:49

## 2021-06-03 RX ADMIN — IOPAMIDOL 75 ML: 755 INJECTION, SOLUTION INTRAVENOUS at 17:39

## 2021-06-03 RX ADMIN — LORAZEPAM 2 MG: 2 INJECTION INTRAMUSCULAR; INTRAVENOUS at 10:39

## 2021-06-03 RX ADMIN — ONDANSETRON 4 MG: 2 INJECTION INTRAMUSCULAR; INTRAVENOUS at 03:09

## 2021-06-03 RX ADMIN — LAMOTRIGINE 100 MG: 100 TABLET ORAL at 13:52

## 2021-06-03 RX ADMIN — CHLORDIAZEPOXIDE HYDROCHLORIDE 15 MG: 5 CAPSULE ORAL at 11:41

## 2021-06-03 RX ADMIN — CHLORDIAZEPOXIDE HYDROCHLORIDE 15 MG: 5 CAPSULE ORAL at 21:26

## 2021-06-03 RX ADMIN — CHLORDIAZEPOXIDE HYDROCHLORIDE 15 MG: 5 CAPSULE ORAL at 18:25

## 2021-06-03 RX ADMIN — ONDANSETRON 4 MG: 2 INJECTION INTRAMUSCULAR; INTRAVENOUS at 11:41

## 2021-06-03 RX ADMIN — LORAZEPAM 2 MG: 1 TABLET ORAL at 03:09

## 2021-06-03 RX ADMIN — OXYCODONE HYDROCHLORIDE 15 MG: 10 TABLET ORAL at 16:20

## 2021-06-03 RX ADMIN — LAMOTRIGINE 50 MG: 25 TABLET ORAL at 21:26

## 2021-06-03 RX ADMIN — SODIUM CHLORIDE, POTASSIUM CHLORIDE, SODIUM LACTATE AND CALCIUM CHLORIDE: 600; 310; 30; 20 INJECTION, SOLUTION INTRAVENOUS at 11:51

## 2021-06-03 RX ADMIN — MORPHINE SULFATE 2 MG: 2 INJECTION, SOLUTION INTRAMUSCULAR; INTRAVENOUS at 03:10

## 2021-06-03 RX ADMIN — Medication 10 ML: at 10:42

## 2021-06-03 RX ADMIN — SODIUM CHLORIDE, POTASSIUM CHLORIDE, SODIUM LACTATE AND CALCIUM CHLORIDE: 600; 310; 30; 20 INJECTION, SOLUTION INTRAVENOUS at 02:10

## 2021-06-03 RX ADMIN — HYDROXYZINE HYDROCHLORIDE 50 MG: 25 TABLET, FILM COATED ORAL at 13:53

## 2021-06-03 RX ADMIN — FOLIC ACID 1 MG: 1 TABLET ORAL at 15:49

## 2021-06-03 RX ADMIN — LORAZEPAM 2 MG: 2 INJECTION INTRAMUSCULAR; INTRAVENOUS at 13:47

## 2021-06-03 RX ADMIN — OXYCODONE HYDROCHLORIDE 15 MG: 10 TABLET ORAL at 11:38

## 2021-06-03 RX ADMIN — SODIUM CHLORIDE, POTASSIUM CHLORIDE, SODIUM LACTATE AND CALCIUM CHLORIDE: 600; 310; 30; 20 INJECTION, SOLUTION INTRAVENOUS at 21:25

## 2021-06-03 RX ADMIN — OXYCODONE HYDROCHLORIDE 15 MG: 10 TABLET ORAL at 21:26

## 2021-06-03 RX ADMIN — LORAZEPAM 2 MG: 1 TABLET ORAL at 06:53

## 2021-06-03 RX ADMIN — NICOTINE POLACRILEX 2 MG: 2 LOZENGE ORAL at 04:18

## 2021-06-03 ASSESSMENT — PAIN DESCRIPTION - DESCRIPTORS
DESCRIPTORS: CONSTANT;SHARP
DESCRIPTORS: SHARP;DISCOMFORT

## 2021-06-03 ASSESSMENT — PAIN SCALES - GENERAL
PAINLEVEL_OUTOF10: 8
PAINLEVEL_OUTOF10: 8
PAINLEVEL_OUTOF10: 9
PAINLEVEL_OUTOF10: 0
PAINLEVEL_OUTOF10: 8
PAINLEVEL_OUTOF10: 0
PAINLEVEL_OUTOF10: 0
PAINLEVEL_OUTOF10: 8

## 2021-06-03 ASSESSMENT — PAIN DESCRIPTION - PAIN TYPE
TYPE: ACUTE PAIN
TYPE: ACUTE PAIN

## 2021-06-03 ASSESSMENT — PAIN DESCRIPTION - PROGRESSION
CLINICAL_PROGRESSION: NOT CHANGED
CLINICAL_PROGRESSION: NOT CHANGED

## 2021-06-03 ASSESSMENT — PAIN DESCRIPTION - FREQUENCY: FREQUENCY: CONTINUOUS

## 2021-06-03 ASSESSMENT — PAIN - FUNCTIONAL ASSESSMENT: PAIN_FUNCTIONAL_ASSESSMENT: ACTIVITIES ARE NOT PREVENTED

## 2021-06-03 ASSESSMENT — PAIN DESCRIPTION - ORIENTATION: ORIENTATION: MID

## 2021-06-03 ASSESSMENT — PAIN DESCRIPTION - LOCATION
LOCATION: ABDOMEN
LOCATION: ABDOMEN

## 2021-06-03 ASSESSMENT — PAIN DESCRIPTION - ONSET: ONSET: ON-GOING

## 2021-06-03 NOTE — ED NOTES
Pt resting in bed at this time. Call light remains in reach no distress noted. RR even and unlabored, skin warm and dry. No needs at this time. Will continue to monitor closely.       Jody Xiao RN  06/02/21 9103

## 2021-06-03 NOTE — ED NOTES
Pt states he is feeling a little better after the meds. Pt states his headache is going away some. Pt states he is less shaky at this time.  Will continue to monitor      [address] Tavares RN  06/02/21 2047

## 2021-06-03 NOTE — PLAN OF CARE
Problem: Falls - Risk of:  Goal: Will remain free from falls  Description: Will remain free from falls  6/3/2021 0718 by Robbie Olvera RN  Outcome: Ongoing  6/3/2021 0424 by Fabrizio Henry RN  Outcome: Ongoing  Goal: Absence of physical injury  Description: Absence of physical injury  Outcome: Ongoing     Problem: Pain:  Description: Pain management should include both nonpharmacologic and pharmacologic interventions.   Goal: Pain level will decrease  Description: Pain level will decrease  6/3/2021 0718 by Robbie Olvera RN  Outcome: Ongoing  6/3/2021 0424 by Fabrizio Henry RN  Outcome: Ongoing  Goal: Control of acute pain  Description: Control of acute pain  Outcome: Ongoing  Goal: Control of chronic pain  Description: Control of chronic pain  Outcome: Ongoing

## 2021-06-03 NOTE — PROGRESS NOTES
Pt transported to CT via stretcher. Pt ambulated unsteadily to stretcher. When writer came back into the room bed alarm was off. This is the third noted time bed alarm has been off when returning to patient room this shift.  Electronically signed by Norma Lewis RN on 6/3/2021 at 5:40 PM

## 2021-06-03 NOTE — PROGRESS NOTES
Medication Reconciliation     List of medications patient is currently taking is complete. Source of information:   1. Conversation with patient at bedside  2. EPIC records        Notes regarding home medications:  1. Patient denies any prescription or otc medication use.   Colin Guerra, Pharmacy Intern 6/2/2021 10:01 PM

## 2021-06-03 NOTE — PROGRESS NOTES
Pt , Shira Ch from [facility] called.  Transferred call to patient room per request Electronically signed by Akhil Daniel RN on 6/3/2021 at 4:34 PM

## 2021-06-03 NOTE — CARE COORDINATION
INITIAL CASE MANAGEMENT ASSESSMENT    Reviewed chart, spoke  with patient to assess possible discharge needs. Explained Case Management role/services. Living Situation: pt states he lives with roommates and plans to return there at [place]. Home address has been verified. ADLs: independent. DME: none    PT/OT Recs: n/a     Active Services: none     Transportation: pt states he will call for a ride home. Pt is not a driving person. Medications: [facility] on [address]. Pt will use [facility] if open at [facility]. PCP: none      HD/PD: none    PLAN/COMMENTS: provided pt with alcohol resources. Pt states he contcted [facility] for a bed, pt was at [address] [place] [address] in April 2019, pt was told that bed will be available next week. Pt states he will return home until bed available. He will call for a ride at [place]. Electronically signed by SHAUNA Boothe on 6/3/2021 at 2:05 PM    SW/CM provided contact information for patient or family to call with any questions. EMILY/CM will follow and assist as needed.

## 2021-06-03 NOTE — PROGRESS NOTES
Hospitalist Progress Note      PCP: No primary care provider on file. Date of Admission: 6/2/2021    Chief Complaint: abd pain, Hx of chronic active alcohol abuse, Hx of chronic calcific pancreatitis with hx of pancreatic duct obstruction requiring ERCP stenting in the past.       Subjective: ongoing severe abd pain. Medications:  Reviewed    Infusion Medications    sodium chloride      lactated ringers 150 mL/hr at 06/03/21 1151     Scheduled Medications    nicotine  1 patch Transdermal Daily    sodium chloride flush  5-40 mL Intravenous 2 times per day    enoxaparin  40 mg Subcutaneous Daily    chlordiazePOXIDE  15 mg Oral 4x Daily    folic acid  1 mg Oral Daily    thiamine (VITAMIN B1) IVPB  100 mg Intravenous Q24H    lamoTRIgine  100 mg Oral Daily    lamoTRIgine  50 mg Oral Nightly    hydrOXYzine  50 mg Oral QAM AC    ketorolac  15 mg Intravenous Once     PRN Meds: nicotine polacrilex, sodium chloride flush, sodium chloride, ondansetron **OR** ondansetron, oxyCODONE, morphine, LORazepam **OR** LORazepam **OR** LORazepam **OR** LORazepam **OR** LORazepam **OR** LORazepam **OR** LORazepam **OR** LORazepam      Intake/Output Summary (Last 24 hours) at 6/3/2021 1557  Last data filed at 6/3/2021 0655  Gross per 24 hour   Intake 1160 ml   Output --   Net 1160 ml       Physical Exam Performed:    /63   Pulse 89   Temp 97.8 °F (36.6 °C) (Oral)   Resp 16   Ht 5' 8\" (1.727 m)   Wt 145 lb 8.1 oz (66 kg)   SpO2 95%   BMI 22.12 kg/m²     General appearance: No apparent distress, appears stated age and cooperative. HEENT: Pupils equal, round, and reactive to light. Conjunctivae/corneas clear. Neck: Supple, with full range of motion. No jugular venous distention. Trachea midline. Respiratory:  Normal respiratory effort. Clear to auscultation, bilaterally without Rales/Wheezes/Rhonchi.   Cardiovascular: Regular rate and rhythm with normal S1/S2 without murmurs, rubs or gallops. Abdomen: Soft, non-tender, non-distended with normal bowel sounds. Musculoskeletal: No clubbing, cyanosis or edema bilaterally. Full range of motion without deformity. Skin: Skin color, texture, turgor normal.  No rashes or lesions. Neurologic:  Neurovascularly intact without any focal sensory/motor deficits. Cranial nerves: II-XII intact, grossly non-focal.  Psychiatric: Alert and oriented, thought content appropriate, normal insight  Capillary Refill: Brisk,3 seconds, normal   Peripheral Pulses: +2 palpable, equal bilaterally       Labs:   Recent Labs     06/02/21 1949 06/03/21 0759   WBC 13.3* 6.9   HGB 16.4 14.2   HCT 45.8 39.8*    169     Recent Labs     06/02/21 1949 06/03/21 0759    135*   K 3.8 3.7    104   CO2 23 22   BUN 11 8   CREATININE 0.7* 0.7*   CALCIUM 9.6 8.6     Recent Labs     06/02/21 1949 06/03/21 0759   AST 29 18   ALT 20 13   BILITOT 0.7 0.9   ALKPHOS 65 51     No results for input(s): INR in the last 72 hours. Recent Labs     06/02/21 1949   TROPONINI <0.01       Urinalysis:      Lab Results   Component Value Date    NITRU Negative 06/02/2021    WBCUA 2 11/29/2019    RBCUA 1 11/29/2019    BLOODU Negative 06/02/2021    SPECGRAV <1.005 06/02/2021    GLUCOSEU Negative 06/02/2021       Radiology:  [address]   Final Result   Chronic pancreatitis and dilation of the pancreatic duct. Potentially   obstructing 7 mm stone in the pancreatic duct near the ampulla. No cholelithiasis or significant dilation of the common duct. Inhomogeneous liver parenchyma most likely due to chronic inflammation or   fibrosis.          CT ABDOMEN PELVIS W IV CONTRAST Additional Contrast? None    (Results Pending)           Assessment/Plan:    Active Hospital Problems    Diagnosis     Pancreatic duct calculus [K86.89]     Cirrhosis of liver ([facility] [address] [K74.60]     Chronic pancreatitis ([facility] [address] [K86.1]     Drug-seeking behavior [Z76.5]     MDD (major depressive disorder), recurrent episode, mild (HCC) [F33.0]     Tobacco abuse [Z72.0]     Alcohol abuse, continuous [F10.10]          Acute on chronic calcific alcoholic pancreatitis with possible pancr duct obstruction. GI involved. May need repeat ERCP. Increase IVF to 150. Cont pain control. Cont NPO. Alcohol abuse. Self reported seizures, though never actually witnessed or documented. No signs of withdrawals with complicating features at this time. Will cont with librium. Stop ativan - Hx of pocketing benzodiazepines. Monitor with CIWA. Hx of drug seeking behavior and pocketing medications in the hospital.   Monitor closely. Limit opiates and benzodiazepines. Pt unfortunately appears to have a real, albeit mostly self induced medical problem which is known to cause severe pain - I will continue to treat it was all the available precautions.         DVT Prophylaxis: lovenox  Diet: Diet NPO Exceptions are: Ice Chips, Sips of Water with Meds, Sips of Clear Liquids  Code Status: Full Code        Dispo - cc    Bert Bolden MD

## 2021-06-03 NOTE — PROGRESS NOTES
Patient arrived to 4107 from ER, A/Ox4, slightly unsteady on his feet. Patient c/o abdominal pain and \"burning\" and withdrawal symptoms. CIWA scoring was 12, 2mg PO ativan given with pain medication.  Patient oriented to room, call light and plan of care

## 2021-06-03 NOTE — ED NOTES
Pt states his shakes are getting better after the meds and his nausea feels better      Rah Hernandez RN  06/02/21 1801

## 2021-06-03 NOTE — CONSULTS
GASTROENTEROLOGY INPATIENT CONSULTATION        IDENTIFYING DATA/REASON FOR CONSULTATION   PATIENT:  Mariann Andrade  MRN:  [id number]  ADMIT DATE: 6/2/2021  TIME OF EVALUATION: 6/3/2021 10:30 AM  HOSPITAL STAY:   LOS: 0 days     REASON FOR CONSULTATION: Pancreatic duct dilation and possible pancreatic stone    HISTORY OF PRESENT ILLNESS   Mariann Andrade is a 35 y.o. male with a PMH of chronic calcific pancreatitis, alcohol abuse, tobacco abuse, anxiety and seizures who presented on 6/2/2021 with acute onset of abdominal pain, nausea, vomiting. We have been consulted regarding pancreatic duct stone seen on ultrasound imaging. Patient reports pain began suddenly yesterday morning. Pain located mid abdomen radiating to his back. He had associated nausea and vomiting. Symptoms similar to prior episodes of acute pancreatitis. Right upper quadrant ultrasound showed evidence of chronic pancreatitis and dilation of the pancreatic duct with a potential obstructing 7 mm stone in the duct near the ampulla. CBD was normal.  No gallstones noted. Blood work shows a normal lipase of 34, normal LFTs with a bilirubin of 0.7. He has a history of heavy alcohol use. He reports he drinks 8-9 beers on most days. His last alcoholic drink was 2 nights ago. He has a history of chronic calcific pancreatitis. Prior ERCP in 2013 with Dr. Malcolm Ruiz noted chronic pancreatitis, pancreatic duct disruption at the body/tail junction. He underwent pancreatic stent placement. Repeat ERCP in 2014 showed the pancreatic ducts duct obstruction had resolved, Zac type II chronic pancreatitis. Pancreatic stent was removed. Prior Endoscopic Evaluations:  EGD 3/23/18 with Dr. Boom Murry  1) The esophagus was normal without evidence of esophagitis, Albrecht's esophagus, strictures, rings, furrowing, masses, or nodules. No varices were noted.  2) No significant hiatal hernia was noted.  No gastric varices were noted.  3) Mild erythema of the antrum.  Biopsies were obtained from the antrum and body of the stomach to rule out active gastritis and H.pylori gastritis.  4) Moderate reactive duodenitis around the duodenal sweep.  There was mucosal edema in this location.  No ulcer was visualized.  This was likely secondary inflammation from recent pancreatitis.  5) The ampulla was normal.      ERCP 1/10/14 with Dr. Joel Sanchez  1) Pancreatic duct disruption resolved. 2) Zac II chronic pancreatitis  3) Pancreatic stent removal     ERCP 10/23/13 with Dr. Joel Sanchez  1) Normal cholangiogram  2) Minimal evidence of chronic pancreatitis. 3) Pancreatic duct disruption at body/tail junction. 4) Successful pancreatic stent placement     PAST MEDICAL, SURGICAL, FAMILY, and SOCIAL HISTORY     Past Medical History:   Diagnosis Date    Alcohol abuse     PATIENT HAS EXTREME MANIPULITIVE & DRUG SEEKING BEHAVIOR. HE POCKETS HIS BENZODIZAPINES.  Anxiety     Drug-seeking behavior     Several times found pocketing medications given in hospital    Herpes genitalis in men     Manipulative behavior     Pancreatitis     Pneumonia     Portal vein thrombosis     pt denied    Seizures (HCC)     PER PATIENT ONLY.  DURING MULTIPLE HOSPITALIZATIONS HE HAS NEVER ONCE    Tobacco abuse      Past Surgical History:   Procedure Laterality Date    ENDOSCOPY, COLON, DIAGNOSTIC  10/28/2013    Endoscopic retrograde cholangiopancreatography with pancreatic stent placement    ENDOSCOPY, COLON, DIAGNOSTIC  03/23/2018    Esophagogastroduodenoscopy with biopsy    ERCP  1/10/14    with stent removal     Family History   Problem Relation Age of Onset    Hypertension Father     High Blood Pressure Father     Alcohol Abuse Father     Depression Mother     High Blood Pressure Paternal Grandfather     Alcohol Abuse Paternal Grandfather     Heart Disease Paternal Grandmother     Anemia Paternal Grandmother     Depression Maternal Aunt     Alcohol Abuse Paternal Aunt     Alcohol Abuse Paternal Uncle      Social History     Socioeconomic History    Marital status: Single     Spouse name: None    Number of children: 1    Years of education: 12    Highest education level: None   Occupational History    Occupation:    Tobacco Use    Smoking status: Current Every Day Smoker     Packs/day: 1.00     Years: 10.00     Pack years: 10.00     Types: Cigarettes     Start date: 11/21/2007    Smokeless tobacco: Never Used   Vaping Use    Vaping Use: Former    Substances: Never   Substance and Sexual Activity    Alcohol use: Yes     Comment: 9-12 beers/day    Drug use: No    Sexual activity: Never   Other Topics Concern    None   Social History Narrative    None     Social Determinants of Health     Financial Resource Strain:     Difficulty of Paying Living Expenses:    Food Insecurity:     Worried About Running Out of Food in the Last Year:     Ran Out of Food in the Last Year:    Transportation Needs:     Lack of Transportation (Medical):      Lack of Transportation (Non-Medical):    Physical Activity:     Days of Exercise per Week:     Minutes of Exercise per Session:    Stress:     Feeling of Stress :    Social Connections:     Frequency of Communication with Friends and Family:     Frequency of Social Gatherings with Friends and Family:     Attends Bahai Services:     Active Member of Clubs or Organizations:     Attends Club or Organization Meetings:     Marital Status:    Intimate Partner Violence:     Fear of Current or Ex-Partner:     Emotionally Abused:     Physically Abused:     Sexually Abused:        MEDICATIONS   SCHEDULED:  nicotine, 1 patch, Daily  sodium chloride flush, 5-40 mL, 2 times per day  enoxaparin, 40 mg, Daily  thiamine, 100 mg, Daily  ketorolac, 15 mg, Once      FLUIDS/DRIPS:     sodium chloride      lactated ringers 100 mL/hr at 06/03/21 0210     PRNs: nicotine polacrilex, 2 mg, PRN  sodium chloride flush, 5-40 mL, PRN  sodium  104   CO2 23 22   BUN 11 8   CREATININE 0.7* 0.7*     Recent Labs     06/02/21 1949 06/03/21  0759   AST 29 18   ALT 20 13   BILITOT 0.7 0.9   ALKPHOS 65 51     Recent Labs     06/02/21 1949   LIPASE 34.0     No results for input(s): PROTIME, INR in the last 72 hours. Imaging  US GALLBLADDER RUQ   Final Result   Chronic pancreatitis and dilation of the pancreatic duct. Potentially   obstructing 7 mm stone in the pancreatic duct near the ampulla. No cholelithiasis or significant dilation of the common duct. Inhomogeneous liver parenchyma most likely due to chronic inflammation or   fibrosis. ASSESSMENT AND RECOMMENDATIONS   35 y.o. male with a PMH of  PMH of chronic calcific pancreatitis, alcohol abuse, tobacco abuse, anxiety and seizures who presented on 6/2/2021 with acute onset of abdominal pain, nausea, vomiting. We have been consulted regarding pancreatic duct stone seen on ultrasound imaging. IMPRESSION:  1. Abdominal pain, nausea, vomiting  2. Pancreatic ductal dilation on ultrasound with questionable pancreatic duct stone  3. Chronic pancreatitis 2/2 ETOH  -ERCP 2014: Zac II chronic pancreatitis  4. Alcohol abuse  -Last drink was 2 nights ago  -on CIWA protocol, on thiamine and folate supplements    RECOMMENDATIONS:    We will review case with Dr. Dallis Mortimer. Keep n.p.o. for now. Antiemetics/pain management per primary team.    If you have any questions or need any further information, please feel free to contact our consult team.  Thank you for allowing us to participate in the care of Khalida Salas. The note was completed using Dragon voice recognition transcription. Every effort was made to ensure accuracy; however, inadvertent transcription errors may be present despite my best efforts to edit errors.       Sonal Ortega PA-C

## 2021-06-03 NOTE — H&P
Hospital Medicine History & Physical      PCP: No primary care provider on file. Date of Admission: 6/2/2021    Date of Service: Pt seen/examined on 6/2/2021 and Admitted to Inpatient     Chief Complaint: Abdominal pain, nausea, poor p.o. intake, alcohol use with tremors      History Of Present Illness: The patient is a 35 y.o. male with past history of previous recurrent alcohol induced pancreatitis at least 5 times in the past, previous alcohol related acute seizures, smoking abuse, anxiety, drug-seeking behavior who presents to [facility] with acute onset worsening abdominal pain in the past 24 hours which is left him nauseous and unable to consume any food. He notes this is happened before and it seems similar to his previous pancreatitis pain and he does remark that he did have some beer yesterday which he reports that he typically drinks at least 324 ounce 10% beers daily and has been doing so for quite some time. He had been able to wean himself off of them previously but unfortunately in the last few days has restarted the drinking habit. Unfortunately he feels that this may have promoted some of his symptoms at this time. Other than the nausea and lack of appetite due to the epigastric abdominal pain that is currently present and seems to be intermittent at times, patient denies any other recent symptoms of fever, chills, dizziness, syncope, blood in urine/stool/sputum, chest pain, shortness of breath, dysuria, rashes. She does remark in the past he did have a, what sounds to be, biliary stent placed in the past but uncertain for what reason and it was apparently removed a few months later. Has not followed up with a GI physician for further evaluation of his liver or pancreas and is continued to drink.     Past Medical History:        Diagnosis Date  Alcohol abuse     PATIENT HAS EXTREME MANIPULITIVE & DRUG SEEKING BEHAVIOR. HE POCKETS HIS BENZODIZAPINES.  Anxiety     Drug-seeking behavior     Several times found pocketing medications given in hospital    Herpes genitalis in men     Manipulative behavior     Pancreatitis     Pneumonia     Portal vein thrombosis     pt denied    Seizures (HCC)     PER PATIENT ONLY. DURING MULTIPLE HOSPITALIZATIONS HE HAS NEVER ONCE    Tobacco abuse        Past Surgical History:        Procedure Laterality Date    ENDOSCOPY, COLON, DIAGNOSTIC  10/28/2013    Endoscopic retrograde cholangiopancreatography with pancreatic stent placement    ENDOSCOPY, COLON, DIAGNOSTIC  03/23/2018    Esophagogastroduodenoscopy with biopsy    ERCP  1/10/14    with stent removal       Medications Prior to Admission:    Prior to Admission medications    Medication Sig Start Date End Date Taking? Authorizing Provider   lamoTRIgine (LAMICTAL) 100 MG tablet Take 100 mg by mouth daily 100mg in the morning, 50mg at night   Yes Historical Provider, MD   lamoTRIgine (LAMICTAL) 25 MG tablet Take 25 mg by mouth daily   Yes Historical Provider, MD   hydrOXYzine (ATARAX) 25 MG tablet Take 50 mg by mouth every morning   Yes Historical Provider, MD   Multiple Vitamin (MULTIVITAMIN) tablet Take 1 tablet by mouth daily 1/13/20 2/12/20  Juana Greek, APRN - CNP       Allergies:  Amoxicillin, Haldol [haloperidol lactate], Phenergan [promethazine hcl], Shrimp (diagnostic), Glucosamine, and Shellfish-derived products    Social History:  The patient currently lives home    TOBACCO:   reports that he has been smoking cigarettes. He started smoking about 13 years ago. He has a 10.00 pack-year smoking history. He has never used smokeless tobacco.  ETOH:   reports current alcohol use. Family History:  Reviewed in detail and negative for DM, Early CAD, Cancer, CVA.  Positive as follows:        Problem Relation Age of Onset    Hypertension Father    Alessia Solares chronic inflammation or   fibrosis. CBC   Recent Labs     06/02/21 1949   WBC 13.3*   HGB 16.4   HCT 45.8         RENAL  Recent Labs     06/02/21 1949      K 3.8      CO2 23   BUN 11   CREATININE 0.7*     LFT'S  Recent Labs     06/02/21 1949   AST 29   ALT 20   BILITOT 0.7   ALKPHOS 65     COAG  No results for input(s): INR in the last 72 hours.   CARDIAC ENZYMES  Recent Labs     06/02/21 1949   TROPONINI <0.01       U/A:    Lab Results   Component Value Date    COLORU YELLOW 06/02/2021    WBCUA 2 11/29/2019    RBCUA 1 11/29/2019    CLARITYU Clear 06/02/2021    SPECGRAV <1.005 06/02/2021    LEUKOCYTESUR Negative 06/02/2021    BLOODU Negative 06/02/2021    GLUCOSEU Negative 06/02/2021       ABG  No results found for: BJV1EXJ, BEART, R7JRRQRN, PHART, THGBART, FQP2RYI, PO2ART, [place]        Active Hospital Problems    Diagnosis Date Noted    Pancreatic duct calculus [K86.89] 06/03/2021     Priority: High    Cirrhosis of liver ([facility] [address] [K74.60] 06/03/2021     Priority: Medium    Chronic pancreatitis ([facility] [address] [K86.1] 04/25/2019     Priority: Medium    Drug-seeking behavior [Z76.5] 04/14/2019    MDD (major depressive disorder), recurrent episode, mild ([facility] [address] [F33.0] 02/12/2018    Tobacco abuse [Z72.0] 01/15/2018    Alcohol abuse, continuous [F10.10] 01/07/2018         PHYSICIANS CERTIFICATION:    I certify that Sheba Dominguez is expected to be hospitalized for greater than 2 midnights based on the following assessment and plan:      ASSESSMENT/PLAN:  · Although it was noted patient does have drug-seeking behavior previously mentioned in reports, patient does have legitimate reason to have pain both in regards to his chronic pancreatitis but also notably the pancreatic duct stone, at this time no suspicion for possible ascending cholangitis without symptoms although patient does have slightly elevated white blood cell count  · Started patient IV fluids with lactated Ringer solution 100/h  · Keep

## 2021-06-04 VITALS
DIASTOLIC BLOOD PRESSURE: 74 MMHG | OXYGEN SATURATION: 97 % | BODY MASS INDEX: 22.05 KG/M2 | RESPIRATION RATE: 19 BRPM | HEART RATE: 80 BPM | TEMPERATURE: 98.2 F | WEIGHT: 145.5 LBS | HEIGHT: 68 IN | SYSTOLIC BLOOD PRESSURE: 130 MMHG

## 2021-06-04 LAB
A/G RATIO: 2 (ref 1.1–2.2)
ALBUMIN SERPL-MCNC: 4.1 G/DL (ref 3.4–5)
ALP BLD-CCNC: 45 U/L (ref 40–129)
ALT SERPL-CCNC: 11 U/L (ref 10–40)
ANION GAP SERPL CALCULATED.3IONS-SCNC: 10 MMOL/L (ref 3–16)
AST SERPL-CCNC: 15 U/L (ref 15–37)
BASOPHILS ABSOLUTE: 0 K/UL (ref 0–0.2)
BASOPHILS RELATIVE PERCENT: 0.4 %
BILIRUB SERPL-MCNC: 0.8 MG/DL (ref 0–1)
BUN BLDV-MCNC: 9 MG/DL (ref 7–20)
CALCIUM SERPL-MCNC: 8.5 MG/DL (ref 8.3–10.6)
CHLORIDE BLD-SCNC: 101 MMOL/L (ref 99–110)
CO2: 23 MMOL/L (ref 21–32)
CREAT SERPL-MCNC: 0.7 MG/DL (ref 0.9–1.3)
EOSINOPHILS ABSOLUTE: 0.3 K/UL (ref 0–0.6)
EOSINOPHILS RELATIVE PERCENT: 5.2 %
GFR AFRICAN AMERICAN: >60
GFR NON-AFRICAN AMERICAN: >60
GLOBULIN: 2.1 G/DL
GLUCOSE BLD-MCNC: 88 MG/DL (ref 70–99)
HCT VFR BLD CALC: 37.4 % (ref 40.5–52.5)
HEMOGLOBIN: 13.4 G/DL (ref 13.5–17.5)
LYMPHOCYTES ABSOLUTE: 1.3 K/UL (ref 1–5.1)
LYMPHOCYTES RELATIVE PERCENT: 23.2 %
MAGNESIUM: 2.1 MG/DL (ref 1.8–2.4)
MCH RBC QN AUTO: 32 PG (ref 26–34)
MCHC RBC AUTO-ENTMCNC: 35.9 G/DL (ref 31–36)
MCV RBC AUTO: 89.1 FL (ref 80–100)
MONOCYTES ABSOLUTE: 0.5 K/UL (ref 0–1.3)
MONOCYTES RELATIVE PERCENT: 9.1 %
NEUTROPHILS ABSOLUTE: 3.5 K/UL (ref 1.7–7.7)
NEUTROPHILS RELATIVE PERCENT: 62.1 %
PDW BLD-RTO: 12.1 % (ref 12.4–15.4)
PLATELET # BLD: 148 K/UL (ref 135–450)
PMV BLD AUTO: 8.4 FL (ref 5–10.5)
POTASSIUM SERPL-SCNC: 3.6 MMOL/L (ref 3.5–5.1)
RBC # BLD: 4.19 M/UL (ref 4.2–5.9)
SODIUM BLD-SCNC: 134 MMOL/L (ref 136–145)
TOTAL PROTEIN: 6.2 G/DL (ref 6.4–8.2)
WBC # BLD: 5.7 K/UL (ref 4–11)

## 2021-06-04 PROCEDURE — 85025 COMPLETE CBC W/AUTO DIFF WBC: CPT

## 2021-06-04 PROCEDURE — 6360000002 HC RX W HCPCS: Performed by: INTERNAL MEDICINE

## 2021-06-04 PROCEDURE — 36415 COLL VENOUS BLD VENIPUNCTURE: CPT

## 2021-06-04 PROCEDURE — 6360000002 HC RX W HCPCS: Performed by: STUDENT IN AN ORGANIZED HEALTH CARE EDUCATION/TRAINING PROGRAM

## 2021-06-04 PROCEDURE — 80053 COMPREHEN METABOLIC PANEL: CPT

## 2021-06-04 PROCEDURE — 83735 ASSAY OF MAGNESIUM: CPT

## 2021-06-04 PROCEDURE — 6370000000 HC RX 637 (ALT 250 FOR IP): Performed by: PHYSICIAN ASSISTANT

## 2021-06-04 PROCEDURE — 94761 N-INVAS EAR/PLS OXIMETRY MLT: CPT

## 2021-06-04 PROCEDURE — 6370000000 HC RX 637 (ALT 250 FOR IP): Performed by: INTERNAL MEDICINE

## 2021-06-04 RX ORDER — QUETIAPINE FUMARATE 25 MG/1
50 TABLET, FILM COATED ORAL 2 TIMES DAILY
Status: DISCONTINUED | OUTPATIENT
Start: 2021-06-04 | End: 2021-06-04

## 2021-06-04 RX ORDER — LORAZEPAM 2 MG/ML
1 INJECTION INTRAMUSCULAR ONCE
Status: COMPLETED | OUTPATIENT
Start: 2021-06-04 | End: 2021-06-04

## 2021-06-04 RX ADMIN — FOLIC ACID 1 MG: 1 TABLET ORAL at 08:49

## 2021-06-04 RX ADMIN — HYDROXYZINE HYDROCHLORIDE 50 MG: 25 TABLET, FILM COATED ORAL at 08:50

## 2021-06-04 RX ADMIN — ONDANSETRON 4 MG: 2 INJECTION INTRAMUSCULAR; INTRAVENOUS at 04:57

## 2021-06-04 RX ADMIN — OXYCODONE HYDROCHLORIDE 15 MG: 10 TABLET ORAL at 02:06

## 2021-06-04 RX ADMIN — CHLORDIAZEPOXIDE HYDROCHLORIDE 15 MG: 5 CAPSULE ORAL at 12:28

## 2021-06-04 RX ADMIN — LAMOTRIGINE 100 MG: 100 TABLET ORAL at 08:49

## 2021-06-04 RX ADMIN — MORPHINE SULFATE 2 MG: 2 INJECTION, SOLUTION INTRAMUSCULAR; INTRAVENOUS at 04:57

## 2021-06-04 RX ADMIN — CHLORDIAZEPOXIDE HYDROCHLORIDE 15 MG: 5 CAPSULE ORAL at 08:49

## 2021-06-04 RX ADMIN — LORAZEPAM 1 MG: 2 INJECTION INTRAMUSCULAR; INTRAVENOUS at 05:40

## 2021-06-04 ASSESSMENT — PAIN DESCRIPTION - ONSET: ONSET: ON-GOING

## 2021-06-04 ASSESSMENT — PAIN SCALES - GENERAL
PAINLEVEL_OUTOF10: 8
PAINLEVEL_OUTOF10: 0
PAINLEVEL_OUTOF10: 5
PAINLEVEL_OUTOF10: 9
PAINLEVEL_OUTOF10: 0

## 2021-06-04 ASSESSMENT — PAIN DESCRIPTION - PROGRESSION
CLINICAL_PROGRESSION: NOT CHANGED

## 2021-06-04 ASSESSMENT — PAIN DESCRIPTION - LOCATION: LOCATION: ABDOMEN;GENERALIZED

## 2021-06-04 ASSESSMENT — PAIN - FUNCTIONAL ASSESSMENT: PAIN_FUNCTIONAL_ASSESSMENT: ACTIVITIES ARE NOT PREVENTED

## 2021-06-04 ASSESSMENT — PAIN DESCRIPTION - DESCRIPTORS: DESCRIPTORS: CONSTANT;SHARP

## 2021-06-04 ASSESSMENT — PAIN DESCRIPTION - ORIENTATION: ORIENTATION: MID

## 2021-06-04 ASSESSMENT — PAIN DESCRIPTION - PAIN TYPE: TYPE: ACUTE PAIN

## 2021-06-04 ASSESSMENT — PAIN DESCRIPTION - FREQUENCY: FREQUENCY: CONTINUOUS

## 2021-06-04 NOTE — PLAN OF CARE
Problem: Falls - Risk of:  Goal: Will remain free from falls  Description: Will remain free from falls  Outcome: Ongoing  Goal: Absence of physical injury  Description: Absence of physical injury  Outcome: Ongoing     Problem: Falls - Risk of:  Goal: Will remain free from falls  Description: Will remain free from falls  Outcome: Ongoing  Goal: Absence of physical injury  Description: Absence of physical injury  Outcome: Ongoing     Problem: Pain:  Goal: Pain level will decrease  Description: Pain level will decrease  Outcome: Ongoing  Goal: Control of acute pain  Description: Control of acute pain  Outcome: Ongoing  Goal: Control of chronic pain  Description: Control of chronic pain  Outcome: Ongoing

## 2021-06-04 NOTE — DISCHARGE SUMMARY
Hospital Medicine Discharge Summary    Patient ID: Kierra Cha      Patient's PCP: No primary care provider on file. Admit Date: 6/2/2021     Discharge Date: 6/4/2021      Admitting Physician: Addie Leyden, DO     Discharge Physician: Sylvia Bee MD     Discharge Diagnoses: Active Hospital Problems    Diagnosis     Pancreatic duct calculus [K86.89]     Cirrhosis of liver ([facility] [address] [K74.60]     Chronic pancreatitis ([facility] [address] [K86.1]    [employer] Drug-seeking behavior [Z76.5]     MDD (major depressive disorder), recurrent episode, mild ([address] [F33.0]     Tobacco abuse [Z72.0]     Alcohol abuse, continuous [F10.10]        The patient was seen and examined on day of discharge and this discharge summary is in conjunction with any daily progress note from day of discharge. Hospital Course: 33yo man with Hx of alcohol abuse, self reported prior Hx of DTs and self reported single episode of alcohol withdrawal seizure, though unwitnessed, Hx of Bipolar disorder, prior Hx of recurrent alcohol induced calcific pancreatitis requiring pancreatic duct stenting in the remote past, who presented with abd pain. Pt repeatedly demanding pain and anxiety medications though the admission. When I went to see him today he told me \" I actually came for alcohol detox and my pain was related to detox\". He is tolerating diet well. His lipase and LFTs are normal. CT did not reveal acute pancreatitis changes. US showed 7mm stone in the pancreatic duct near the ampula. Pt told me today he does not want our GI doctors to do ERCP and he prefers to see his regular gastroenterologist in follow up. He reports he has no pain today and tolerated solid food without incident. When I discussed his alcohol problem, he told me that he plans to enrol self into inpatient alcohol rehab on Monday.    When I asked him what would keep him from drinking this weekend he reported - he has very supportive roommates and he has everything he needs at home. I did tell him that we do not prescribe librium or other benzodiazepines for withdrawal symptoms for OP use. He shows no signs of complicating withdrawal symptoms at this time. As he still appeared somewhat tremulous, I offered to keep him in the hospital with supportive care additional 24hrs, though he became pretty upset and told me he was hoping to leave today. I did not see a pressing reason to insist on continued hospitalization and wrote discharge orders. I was later advised by the nurse that pt became extremely upset that he is not being given librium or other benzodiazepines on discharge. Situation escalated to security and charge nurse. He was escorted by security downstairs to the main lobby. Physical Exam Performed:     /74   Pulse 80   Temp 98.2 °F (36.8 °C) (Oral)   Resp 19   Ht 5' 8\" (1.727 m)   Wt 145 lb 8.1 oz (66 kg)   SpO2 97%   BMI 22.12 kg/m²       General appearance:  No apparent distress, appears stated age and cooperative. HEENT:  Normal cephalic, atraumatic without obvious deformity. Pupils equal, round, and reactive to light. Extra ocular muscles intact. Conjunctivae/corneas clear. Neck: Supple, with full range of motion. No jugular venous distention. Trachea midline. Respiratory:  Normal respiratory effort. Clear to auscultation, bilaterally without Rales/Wheezes/Rhonchi. Cardiovascular:  Regular rate and rhythm with normal S1/S2 without murmurs, rubs or gallops. Abdomen: Soft, non-tender, non-distended with normal bowel sounds. Musculoskeletal:  No clubbing, cyanosis or edema bilaterally. Full range of motion without deformity. Skin: Skin color, texture, turgor normal.  No rashes or lesions. Neurologic:  Neurovascularly intact without any focal sensory/motor deficits.  Cranial nerves: II-XII intact, grossly non-focal.  Psychiatric:  Alert and oriented, thought content appropriate, normal insight  Capillary Refill: Brisk,< 3 seconds   Peripheral Pulses: +2 palpable, equal bilaterally       Labs: For convenience and continuity at follow-up the following most recent labs are provided:      CBC:    Lab Results   Component Value Date    WBC 5.7 06/04/2021    HGB 13.4 06/04/2021    HCT 37.4 06/04/2021     06/04/2021       Renal:    Lab Results   Component Value Date     06/04/2021    K 3.6 06/04/2021    K 3.8 06/02/2021     06/04/2021    CO2 23 06/04/2021    BUN 9 06/04/2021    CREATININE 0.7 06/04/2021    CALCIUM 8.5 06/04/2021    PHOS 3.1 01/11/2020         Significant Diagnostic Studies    Radiology:   CT ABDOMEN PELVIS W IV CONTRAST Additional Contrast? None   Preliminary Result   1. No acute process within the abdomen or pelvis. 2. Findings consistent with patient history of chronic pancreatitis. No   evidence of acute pancreatitis. 3. Bilateral nonobstructing nephrolithiasis. US GALLBLADDER RUQ   Final Result   Chronic pancreatitis and dilation of the pancreatic duct. Potentially   obstructing 7 mm stone in the pancreatic duct near the ampulla. No cholelithiasis or significant dilation of the common duct. Inhomogeneous liver parenchyma most likely due to chronic inflammation or   fibrosis. Consults:     IP CONSULT TO SOCIAL WORK  IP CONSULT TO GI    Disposition:  home     Condition at Discharge: Stable    Discharge Instructions/Follow-up:  PCP 1 week.      Code Status:  Full Code     Activity: activity as tolerated    Diet: regular diet      Discharge Medications:     Discharge Medication List as of 6/4/2021 12:19 PM           Details   !! lamoTRIgine (LAMICTAL) 100 MG tablet Take 100 mg by mouth daily Takes at noonHistorical Med      !! lamoTRIgine (LAMICTAL) 100 MG tablet Take 50 mg by mouth nightly Historical Med      hydrOXYzine (ATARAX) 25 MG tablet Take 50 mg by mouth every morningHistorical Med      methocarbamol (ROBAXIN) 500 MG tablet Take 500 mg by mouth 2 times daily as needed (muscle spasms)Historical Med      Multiple Vitamin (MULTIVITAMIN) tablet Take 1 tablet by mouth daily, Disp-30 tablet, R-0NO PRINT       ! ! - Potential duplicate medications found. Please discuss with provider. Time Spent on discharge is more than 30 minutes in the examination, evaluation, counseling and review of medications and discharge plan. Signed:    Monica Allen MD   6/4/2021      Thank you No primary care provider on file. for the opportunity to be involved in this patient's care. If you have any questions or concerns please feel free to contact me at [phone].

## 2021-06-04 NOTE — PROGRESS NOTES
Patient woke up while getting his vital signs taken at 04:06 requesting Ativan. Patient was reminded that he no longer had Ativan ordered. He then became upset. Staff noticed pt shake his hands forcefully and acted as if he were going to throw up stating he needs the Ativan to help him sleep and calm him down. When reminded patient that Ativan has been discontinued in the afternoon the day before on 6/3/21, patient then starting to become verbally aggressive with staff and threatening to leave if he did not get Ativan. Patient also stopped shakin his hands and acting like he were going to throw up once told he did not have Ativan anymore. Pt was then educated on scheduled Librium that was ordered 4 times daily in place of Ativan, and he became upset yelling \"this is bullshit! \"     Patient then proceeded to remove IV from right arm. He is now threating to leave AMA if we do not bring him Ativan. Charge nurse notified. Will notify on-call MD.     April Calderon RN 6/4/2021 5:11 AM     On call MD notified and aware of situation. One time dose of Ativan 1 mg IV ordered. And then let day team reevaluate. No other orders at this time.       April Calderon RN 6/4/2021 5:43 AM

## 2021-06-04 NOTE — CARE COORDINATION
Discharge Planning:  SW met with pt to discuss d/c plans. Pt stated that he has contacted the [facility] [address] and this facility has a bed available for this pt on Monday. Pt stated that he plans to return home today and has the support of family and friends that know his plan for sobriety and will ensure that he makes it to the [facility] [address] on Monday. Pt stated that he does not have a ride home and is requesting a LYFT. SW scheduled a LYFT for this pt at 1230. Pt and Bedside RN informed. \" scheduled on 6/4, 12:10 PM EDT   may arrive between 12:10 PM - 12:25 PM EDT. Schedule return    Cancel ride  [employer]isai  Full name  Via hi5 phone number  [phone]  Ride type  Lyft (4 seats)  Route  Pickup  [place] [address], [place], [address]  [address] [facility], [place], [place] [address]  Heads up: it's normal for these addresses to differ slightly from what was exactly requested. This happens because of minor variations in the pickup or drop-off. For example, the  may  the rider on the opposite side of the street, which may not be the same address as what was entered. Request details  Agent name  Raine Pandya  Date and time  6/4/21, 12:05 PM EDT  Internal note  Case management\"    PLAN: Pt will be discharged home today with the plan to f/u at the [facility] [address] on Monday. LYFT ride arranged for pt to return home.    Raine Pandya [place]  [phone]  Electronically signed by Herman Ratliff on 6/4/2021 at 12:06 PM

## 2021-06-04 NOTE — PROGRESS NOTES
Ambulated with patient to bathroom. Pt tolerated well.  Will continue to monitor Electronically signed by Lamin Fowler RN on 6/4/2021 at 8:59 AM

## 2021-06-04 NOTE — PROGRESS NOTES
INPATIENT CONSULTATION:    IDENTIFYING DATA/REASON FOR CONSULTATION   PATIENT:  Kristina Garza  MRN:  [id number]  ADMIT DATE: 6/2/2021  TIME OF EVALUATION: 6/4/2021 7:50 AM  HOSPITAL STAY:   LOS: 1 day   CONSULTING PHYSICIAN: Dennise Gallo MD   REASON FOR CONSULTATION: PD stone    Subjective:    Patient seen and examined in follow-up. Patient reports he is going through alcohol withdrawal, and feels tremulous, and without an appetite. He is stressed, and is not interested in pursuing ERCP with pancreatic duct stone extraction/lithotripsy. MEDICATIONS   SCHEDULED:  nicotine, 1 patch, Daily  sodium chloride flush, 5-40 mL, 2 times per day  enoxaparin, 40 mg, Daily  chlordiazePOXIDE, 15 mg, 4x Daily  folic acid, 1 mg, Daily  thiamine (VITAMIN B1) IVPB, 100 mg, Q24H  lamoTRIgine, 100 mg, Daily  lamoTRIgine, 50 mg, Nightly  hydrOXYzine, 50 mg, QAM AC  ketorolac, 15 mg, Once      FLUIDS/DRIPS:     sodium chloride       PRNs: nicotine polacrilex, 2 mg, PRN  sodium chloride flush, 5-40 mL, PRN  sodium chloride, 25 mL, PRN  ondansetron, 4 mg, Q8H PRN   Or  ondansetron, 4 mg, Q6H PRN  oxyCODONE, 15 mg, Q4H PRN  morphine, 2 mg, Q4H PRN      ALLERGIES:    Allergies   Allergen Reactions    Amoxicillin Hives    Haldol [Haloperidol Lactate] Other (See Comments)     Muscle spasms    Phenergan [Promethazine Hcl] Other (See Comments)     Pt believes it caused muscle spasms    Shrimp (Diagnostic) Itching    Glucosamine Itching      Itching of throat when eating shrimp. Pt states has had IV contrast for CT's without problem before.       Shellfish-Derived Products      Patient gets anxious around seafood         PHYSICAL EXAM     Vitals:    06/03/21 2002 06/03/21 2350 06/04/21 0406 06/04/21 0455   BP: 124/70 118/72 128/83    Pulse: 75 77 80    Resp: 16 18 16    Temp: 98.1 °F (36.7 °C) 97.7 °F (36.5 °C) 97.7 °F (36.5 °C)    TempSrc: Oral Oral Oral    SpO2: 95% 99% 98%    Weight:    145 lb 8.1 oz (66 kg)   Height: Globulin 2.3 g/dL   Magnesium    Collection Time: 06/03/21  7:59 AM   Result Value Ref Range    Magnesium 2.20 1.80 - 2.40 mg/dL   CBC Auto Differential    Collection Time: 06/04/21  7:08 AM   Result Value Ref Range    WBC 5.7 4.0 - 11.0 K/uL    RBC 4.19 (L) 4.20 - 5.90 M/uL    Hemoglobin 13.4 (L) 13.5 - 17.5 g/dL    Hematocrit 37.4 (L) 40.5 - 52.5 %    MCV 89.1 80.0 - 100.0 fL    MCH 32.0 26.0 - 34.0 pg    MCHC 35.9 31.0 - 36.0 g/dL    RDW 12.1 (L) 12.4 - 15.4 %    Platelets 321 934 - 585 K/uL    MPV 8.4 5.0 - 10.5 fL    Neutrophils % 62.1 %    Lymphocytes % 23.2 %    Monocytes % 9.1 %    Eosinophils % 5.2 %    Basophils % 0.4 %    Neutrophils Absolute 3.5 1.7 - 7.7 K/uL    Lymphocytes Absolute 1.3 1.0 - 5.1 K/uL    Monocytes Absolute 0.5 0.0 - 1.3 K/uL    Eosinophils Absolute 0.3 0.0 - 0.6 K/uL    Basophils Absolute 0.0 0.0 - 0.2 K/uL     Other Labs    Imaging    ASSESSMENT AND RECOMMENDATIONS   Alison Grayson is a 67-year-old male with past medical history of alcohol induced chronic calcific pancreatitis, tobacco abuse, anxiety and seizure disorder who presented to [facility] [place] 6/2/2021 with epigastric abdominal pain, nausea and vomiting. 1. Chronic calcific pancreatitis due to alcohol abuse with pancreatic duct stone: Patient with acute onset of pain with pancreatic duct stone on imaging. CT abdomen/pelvis without evidence of acute pancreatitis. Patient reports pain is resolved and lipase is normal.  Patient is not interested in puursuing ERCP with stone extraction versus lithotripsy at this time. Can reconsider following resolution of EtOH withdrawl. 2. Alcohol withdrawal: Continue CIWA protocol, management per primary team  3. Alcohol abuse: Patient counseled on complete cessation. Continue CIWA protocol and rally pack.     RECOMMENDATIONS:    -Low-fat diet  -Alcohol withdrawal protocol per primary team    If you have any questions or need any further information, please feel free to contact anyone on our consult team.  Thank you for allowing us to participate in the care of Rosario Ramsey. Dave Draft.  [address]

## 2021-07-07 ENCOUNTER — APPOINTMENT (OUTPATIENT)
Dept: GENERAL RADIOLOGY | Age: 31
End: 2021-07-07
Payer: COMMERCIAL

## 2021-07-07 ENCOUNTER — HOSPITAL ENCOUNTER (EMERGENCY)
Age: 31
Discharge: HOME | End: 2021-07-07
Attending: STUDENT IN AN ORGANIZED HEALTH CARE EDUCATION/TRAINING PROGRAM
Payer: COMMERCIAL

## 2021-07-07 VITALS
TEMPERATURE: 98.6 F | BODY MASS INDEX: 22.15 KG/M2 | HEART RATE: 75 BPM | HEIGHT: 68 IN | WEIGHT: 146.16 LBS | SYSTOLIC BLOOD PRESSURE: 113 MMHG | DIASTOLIC BLOOD PRESSURE: 83 MMHG | OXYGEN SATURATION: 98 % | RESPIRATION RATE: 20 BRPM

## 2021-07-07 DIAGNOSIS — F10.10 ALCOHOL ABUSE: Primary | ICD-10-CM

## 2021-07-07 LAB
A/G RATIO: 1.7 (ref 1.1–2.2)
ALBUMIN SERPL-MCNC: 4.8 G/DL (ref 3.4–5)
ALP BLD-CCNC: 65 U/L (ref 40–129)
ALT SERPL-CCNC: 25 U/L (ref 10–40)
ANION GAP SERPL CALCULATED.3IONS-SCNC: 12 MMOL/L (ref 3–16)
AST SERPL-CCNC: 35 U/L (ref 15–37)
BASOPHILS ABSOLUTE: 0 K/UL (ref 0–0.2)
BASOPHILS RELATIVE PERCENT: 0.5 %
BILIRUB SERPL-MCNC: 0.5 MG/DL (ref 0–1)
BUN BLDV-MCNC: 12 MG/DL (ref 7–20)
CALCIUM SERPL-MCNC: 9.2 MG/DL (ref 8.3–10.6)
CHLORIDE BLD-SCNC: 102 MMOL/L (ref 99–110)
CO2: 23 MMOL/L (ref 21–32)
CREAT SERPL-MCNC: 0.6 MG/DL (ref 0.9–1.3)
EOSINOPHILS ABSOLUTE: 0.2 K/UL (ref 0–0.6)
EOSINOPHILS RELATIVE PERCENT: 3.1 %
ETHANOL: NORMAL MG/DL (ref 0–0.08)
GFR AFRICAN AMERICAN: >60
GFR NON-AFRICAN AMERICAN: >60
GLOBULIN: 2.8 G/DL
GLUCOSE BLD-MCNC: 105 MG/DL (ref 70–99)
HCT VFR BLD CALC: 44.2 % (ref 40.5–52.5)
HEMOGLOBIN: 15.7 G/DL (ref 13.5–17.5)
LIPASE: 27 U/L (ref 13–60)
LYMPHOCYTES ABSOLUTE: 1.3 K/UL (ref 1–5.1)
LYMPHOCYTES RELATIVE PERCENT: 21.6 %
MCH RBC QN AUTO: 32.1 PG (ref 26–34)
MCHC RBC AUTO-ENTMCNC: 35.6 G/DL (ref 31–36)
MCV RBC AUTO: 90 FL (ref 80–100)
MONOCYTES ABSOLUTE: 0.5 K/UL (ref 0–1.3)
MONOCYTES RELATIVE PERCENT: 8.3 %
NEUTROPHILS ABSOLUTE: 3.9 K/UL (ref 1.7–7.7)
NEUTROPHILS RELATIVE PERCENT: 66.5 %
PDW BLD-RTO: 12.5 % (ref 12.4–15.4)
PLATELET # BLD: 194 K/UL (ref 135–450)
PMV BLD AUTO: 8.6 FL (ref 5–10.5)
POTASSIUM REFLEX MAGNESIUM: 3.8 MMOL/L (ref 3.5–5.1)
RBC # BLD: 4.91 M/UL (ref 4.2–5.9)
SODIUM BLD-SCNC: 137 MMOL/L (ref 136–145)
TOTAL PROTEIN: 7.6 G/DL (ref 6.4–8.2)
TROPONIN: <0.01 NG/ML
WBC # BLD: 5.8 K/UL (ref 4–11)

## 2021-07-07 PROCEDURE — 80053 COMPREHEN METABOLIC PANEL: CPT

## 2021-07-07 PROCEDURE — 6370000000 HC RX 637 (ALT 250 FOR IP): Performed by: NURSE PRACTITIONER

## 2021-07-07 PROCEDURE — 96365 THER/PROPH/DIAG IV INF INIT: CPT

## 2021-07-07 PROCEDURE — 85025 COMPLETE CBC W/AUTO DIFF WBC: CPT

## 2021-07-07 PROCEDURE — 71046 X-RAY EXAM CHEST 2 VIEWS: CPT

## 2021-07-07 PROCEDURE — 96375 TX/PRO/DX INJ NEW DRUG ADDON: CPT

## 2021-07-07 PROCEDURE — 83690 ASSAY OF LIPASE: CPT

## 2021-07-07 PROCEDURE — 6360000002 HC RX W HCPCS: Performed by: NURSE PRACTITIONER

## 2021-07-07 PROCEDURE — 6360000002 HC RX W HCPCS: Performed by: STUDENT IN AN ORGANIZED HEALTH CARE EDUCATION/TRAINING PROGRAM

## 2021-07-07 PROCEDURE — 82077 ASSAY SPEC XCP UR&BREATH IA: CPT

## 2021-07-07 PROCEDURE — 93005 ELECTROCARDIOGRAM TRACING: CPT | Performed by: NURSE PRACTITIONER

## 2021-07-07 PROCEDURE — 99283 EMERGENCY DEPT VISIT LOW MDM: CPT

## 2021-07-07 PROCEDURE — 2580000003 HC RX 258: Performed by: STUDENT IN AN ORGANIZED HEALTH CARE EDUCATION/TRAINING PROGRAM

## 2021-07-07 PROCEDURE — 2500000003 HC RX 250 WO HCPCS: Performed by: STUDENT IN AN ORGANIZED HEALTH CARE EDUCATION/TRAINING PROGRAM

## 2021-07-07 PROCEDURE — 6370000000 HC RX 637 (ALT 250 FOR IP): Performed by: STUDENT IN AN ORGANIZED HEALTH CARE EDUCATION/TRAINING PROGRAM

## 2021-07-07 PROCEDURE — 84484 ASSAY OF TROPONIN QUANT: CPT

## 2021-07-07 RX ORDER — MULTIVITAMIN WITH IRON
1 TABLET ORAL DAILY
Status: DISCONTINUED | OUTPATIENT
Start: 2021-07-07 | End: 2021-07-07 | Stop reason: HOSPADM

## 2021-07-07 RX ORDER — CHLORDIAZEPOXIDE HYDROCHLORIDE 25 MG/1
25 CAPSULE, GELATIN COATED ORAL 3 TIMES DAILY PRN
Qty: 12 CAPSULE | Refills: 0 | Status: SHIPPED | OUTPATIENT
Start: 2021-07-07 | End: 2021-07-11

## 2021-07-07 RX ORDER — 0.9 % SODIUM CHLORIDE 0.9 %
1000 INTRAVENOUS SOLUTION INTRAVENOUS ONCE
Status: DISCONTINUED | OUTPATIENT
Start: 2021-07-07 | End: 2021-07-07

## 2021-07-07 RX ORDER — UBIDECARENONE 75 MG
50 CAPSULE ORAL ONCE
Status: COMPLETED | OUTPATIENT
Start: 2021-07-07 | End: 2021-07-07

## 2021-07-07 RX ORDER — LORAZEPAM 2 MG/ML
1 INJECTION INTRAMUSCULAR ONCE
Status: COMPLETED | OUTPATIENT
Start: 2021-07-07 | End: 2021-07-07

## 2021-07-07 RX ORDER — FOLIC ACID 1 MG/1
1 TABLET ORAL DAILY
Status: DISCONTINUED | OUTPATIENT
Start: 2021-07-07 | End: 2021-07-07 | Stop reason: HOSPADM

## 2021-07-07 RX ORDER — CHLORDIAZEPOXIDE HYDROCHLORIDE 25 MG/1
50 CAPSULE, GELATIN COATED ORAL ONCE
Status: COMPLETED | OUTPATIENT
Start: 2021-07-07 | End: 2021-07-07

## 2021-07-07 RX ADMIN — THERA TABS 1 TABLET: TAB at 14:16

## 2021-07-07 RX ADMIN — CHLORDIAZEPOXIDE HYDROCHLORIDE 50 MG: 25 CAPSULE ORAL at 14:16

## 2021-07-07 RX ADMIN — LORAZEPAM 1 MG: 2 INJECTION INTRAMUSCULAR; INTRAVENOUS at 14:15

## 2021-07-07 RX ADMIN — FOLIC ACID: 5 INJECTION, SOLUTION INTRAMUSCULAR; INTRAVENOUS; SUBCUTANEOUS at 14:42

## 2021-07-07 RX ADMIN — Medication 50 MCG: at 15:00

## 2021-07-07 RX ADMIN — FOLIC ACID 1 MG: 1 TABLET ORAL at 14:16

## 2021-07-07 ASSESSMENT — PAIN DESCRIPTION - DESCRIPTORS: DESCRIPTORS: HEADACHE

## 2021-07-07 ASSESSMENT — PAIN SCALES - GENERAL: PAINLEVEL_OUTOF10: 5

## 2021-07-07 ASSESSMENT — PAIN DESCRIPTION - PAIN TYPE: TYPE: ACUTE PAIN

## 2021-07-07 ASSESSMENT — PAIN DESCRIPTION - LOCATION: LOCATION: HEAD

## 2021-07-07 NOTE — ED PROVIDER NOTES
MidLevel Attestation   I havepersonally performed and/or participated in the history, exam and medical decision making and agree with all pertinent clinical information. I have also reviewed and agree with the past medical, family and social historyunless otherwise noted. I have personally performed a face to face diagnostic evaluation onthis patient. I have reviewed the mid-levels findings and agree. In brief, Giovani Kat is a 35 y.o. male that presented to the emergency department with   Chief Complaint   Patient presents with    Alcohol Problem     last drink on monday. feeling like he is having withdrawl problem, feels anxious, heart racing     Cough     ongoing for a couple weeks    . CONSTITUTIONAL: Well appearing, in no acute distress   EYES: PERRL, No injection, discharge or scleral icterus. NECK: Normal ROM, NO LAD and Moist mucous membranes. CARDIOVASCULAR: Regular rate and rhythm. No murmurs or gallop. PULMONARY/CHEST: Airway patent. No retractions. Breath sounds clear with good air entry bilaterally. ABDOMEN: Soft, Non-distended and non-tender, without guarding or rebound. SKIN: Acyanotic, warm, dry   MUSCULOSKELETAL: No swelling, tenderness or deformity   NEUROLOGICAL: Awake. Pulses intact. Grossly nonfocal     35year-old history of alcohol abuse presenting stating that he is withdrawing from alcohol. On exam nontoxic in no acute distress. Vitals within normal limits. No signs of respiratory distress normal blood pressures and not tachycardic. Labs obtained with no significant findings. I did not think the patient was withdrawing. Jaylon Cohen He was given Librium in the emergency room and discharged home with treatment for 4 days with recommendation to follow-up with primary care doctor. EKG by my preliminary interpretation shows sinus rhythm with rate of 81, normal axis, normal intervals, with no ST changes indicative of ischemia at this time.       I have reviewed and interpreted all of the currently available lab results and diagnostics from this visit:  Results for orders placed or performed during the hospital encounter of 07/07/21   CBC Auto Differential   Result Value Ref Range    WBC 5.8 4.0 - 11.0 K/uL    RBC 4.91 4.20 - 5.90 M/uL    Hemoglobin 15.7 13.5 - 17.5 g/dL    Hematocrit 44.2 40.5 - 52.5 %    MCV 90.0 80.0 - 100.0 fL    MCH 32.1 26.0 - 34.0 pg    MCHC 35.6 31.0 - 36.0 g/dL    RDW 12.5 12.4 - 15.4 %    Platelets 952 722 - 838 K/uL    MPV 8.6 5.0 - 10.5 fL    Neutrophils % 66.5 %    Lymphocytes % 21.6 %    Monocytes % 8.3 %    Eosinophils % 3.1 %    Basophils % 0.5 %    Neutrophils Absolute 3.9 1.7 - 7.7 K/uL    Lymphocytes Absolute 1.3 1.0 - 5.1 K/uL    Monocytes Absolute 0.5 0.0 - 1.3 K/uL    Eosinophils Absolute 0.2 0.0 - 0.6 K/uL    Basophils Absolute 0.0 0.0 - 0.2 K/uL   Comprehensive Metabolic Panel w/ Reflex to MG   Result Value Ref Range    Sodium 137 136 - 145 mmol/L    Potassium reflex Magnesium 3.8 3.5 - 5.1 mmol/L    Chloride 102 99 - 110 mmol/L    CO2 23 21 - 32 mmol/L    Anion Gap 12 3 - 16    Glucose 105 (H) 70 - 99 mg/dL    BUN 12 7 - 20 mg/dL    CREATININE 0.6 (L) 0.9 - 1.3 mg/dL    GFR Non-African American >60 >60    GFR African American >60 >60    Calcium 9.2 8.3 - 10.6 mg/dL    Total Protein 7.6 6.4 - 8.2 g/dL    Albumin 4.8 3.4 - 5.0 g/dL    Albumin/Globulin Ratio 1.7 1.1 - 2.2    Total Bilirubin 0.5 0.0 - 1.0 mg/dL    Alkaline Phosphatase 65 40 - 129 U/L    ALT 25 10 - 40 U/L    AST 35 15 - 37 U/L    Globulin 2.8 g/dL   Troponin   Result Value Ref Range    Troponin <0.01 <0.01 ng/mL   Lipase   Result Value Ref Range    Lipase 27.0 13.0 - 60.0 U/L   Ethanol   Result Value Ref Range    Ethanol Lvl None Detected mg/dL   EKG 12 Lead   Result Value Ref Range    Ventricular Rate 81 BPM    Atrial Rate 81 BPM    P-R Interval 166 ms    QRS Duration 100 ms    Q-T Interval 364 ms    QTc Calculation (Bazett) 422 ms    P Axis 80 degrees    R Axis 91 degrees    T Axis 54 degrees    Diagnosis       Normal sinus rhythmRightward axisBorderline ECGWhen compared with ECG of 02-JUN-2021 19:56,No significant change was foundConfirmed by JESS SHEEHAN, Anshu Gross ([id number]) on 7/8/2021 12:24:14 PM     No results found. ED Medication Orders (From admission, onward)    Start Ordered     Status Ordering Provider    07/07/21 1400 07/07/21 1349  LORazepam (ATIVAN) injection 1 mg  ONCE      Last MAR action: Given - by Rhunette Keepers on 07/07/21 at [address], LASHELL F    07/07/21 1400 07/07/21 1349  vitamin B-12 (CYANOCOBALAMIN) tablet 50 mcg  ONCE      Last MAR action: Given - by Rhunette Keepers on 07/07/21 at [place], LASHELL F    07/07/21 1400 07/07/21 1353  chlordiazePOXIDE (LIBRIUM) capsule 50 mg  ONCE      Last MAR action: Given - by Rhunette Keepers on 07/07/21 at [address], [place] A    07/07/21 1400 07/07/21 1353  sodium chloride 0.9 % 0,369 mL with folic acid 1 mg, adult multi-vitamin with vitamin k 10 mL, thiamine 100 mg  ONCE      Last MAR action: Stopped - by Rhunette Keepers on 07/07/21 at [address] [facility]          Final Impression      1. Alcohol abuse        DISPOSITION         This record is transcribed utilizing voice recognition technology. There are inherent limitations in this technology. In addition, there may be limitations in editing of this report. If there are any discrepancies, please contact me directly.     Louie Cabral MD   7/8/2021         Dina Casas MD  07/08/21 5901

## 2021-07-07 NOTE — PROGRESS NOTES
Medication Reconciliation    List of medications for Dong Strauss is currently taking is complete.      Source of Information:   Epic records  Conversation with patient at bedside     Allergies  Allergy list not thoroughly reviewed with patient at this time  Allergies listed in Epic as follows: Amoxicillin, Haldol [haloperidol lactate], Phenergan [promethazine hcl], Shrimp (diagnostic), Glucosamine, and Shellfish-derived products     Current Medications:    Current Facility-Administered Medications:     folic acid (FOLVITE) tablet 1 mg, 1 mg, Oral, Daily, Charma Drafts, APRN - CNP, 1 mg at 07/07/21 1416    multivitamin 1 tablet, 1 tablet, Oral, Daily, Charma Drafts, APRN - CNP, 1 tablet at 07/07/21 1416    vitamin B-12 (CYANOCOBALAMIN) tablet 50 mcg, 50 mcg, Oral, Once, Charma Nika, APRN - CNP    sodium chloride 0.9 % 8,704 mL with folic acid 1 mg, adult multi-vitamin with vitamin k 10 mL, thiamine 100 mg, , Intravenous, Once, Dina Tate MD    Current Outpatient Medications:     lamoTRIgine (LAMICTAL) 100 MG tablet, Take 100 mg by mouth daily Takes at noon, Disp: , Rfl:     lamoTRIgine (LAMICTAL) 100 MG tablet, Take 50 mg by mouth nightly , Disp: , Rfl:     hydrOXYzine (ATARAX) 25 MG tablet, Take 50 mg by mouth every morning, Disp: , Rfl:     Notes Regarding Home Medications:   Patient received all of their AM home medications prior to arrival to the emergency department  Medication fill history not updating in [address] [facility], [address]  Patient states that current pharmacy is [facility] [address] does not accept [employer] and has not been filling the patient's prescriptions  Unsure of patient's adherence to medications and where he is getting them      HÉCTOR Marie AMY [facility], PharmD   7/7/2021 2:28 PM

## 2021-07-07 NOTE — ED NOTES
Pt discharged at this time. Discharge instructions and medications reviewed,  Questions were answered. PT verbalized understanding. VSS, Afebrile. Follow up appointments were discussed.          Jen Thibodeaux RN  07/07/21 5223

## 2021-07-07 NOTE — ED PROVIDER NOTES
retrograde cholangiopancreatography with pancreatic stent placement    ENDOSCOPY, COLON, DIAGNOSTIC  03/23/2018    Esophagogastroduodenoscopy with biopsy    ERCP  1/10/14    with stent removal        CURRENT MEDICATIONS   Current Outpatient Rx   Medication Sig Dispense Refill    chlordiazePOXIDE (LIBRIUM) 25 MG capsule Take 1 capsule by mouth 3 times daily as needed for Anxiety for up to 4 days. 12 capsule 0    lamoTRIgine (LAMICTAL) 100 MG tablet Take 100 mg by mouth daily Takes at noon      lamoTRIgine (LAMICTAL) 100 MG tablet Take 50 mg by mouth nightly       hydrOXYzine (ATARAX) 25 MG tablet Take 50 mg by mouth every morning          ALLERGIES   Allergies   Allergen Reactions    Amoxicillin Hives    Haldol [Haloperidol Lactate] Other (See Comments)     Muscle spasms    Phenergan [Promethazine Hcl] Other (See Comments)     Pt believes it caused muscle spasms    Shrimp (Diagnostic) Itching    Glucosamine Itching      Itching of throat when eating shrimp. Pt states has had IV contrast for CT's without problem before.       Shellfish-Derived Products      Patient gets anxious around seafood        SOCIAL & FAMILY HISTORY   Social History     Socioeconomic History    Marital status: Single     Spouse name: Not on file    Number of children: 1    Years of education: 12    Highest education level: Not on file   Occupational History    Occupation:    Tobacco Use    Smoking status: Current Every Day Smoker     Packs/day: 1.00     Years: 10.00     Pack years: 10.00     Types: Cigarettes     Start date: 11/21/2007    Smokeless tobacco: Never Used   Vaping Use    Vaping Use: Former    Substances: Never   Substance and Sexual Activity    Alcohol use: Yes     Comment: 9-12 beers/day    Drug use: No    Sexual activity: Never   Other Topics Concern    Not on file   Social History Narrative    Not on file     Social Determinants of Health     Financial Resource Strain:     Difficulty of Paying Living Expenses:    Food Insecurity:     Worried About Running Out of Food in the Last Year:     [address] in the Last Year:    Transportation Needs:     Lack of Transportation (Medical):      Lack of Transportation (Non-Medical):    Physical Activity:     Days of Exercise per Week:     Minutes of Exercise per Session:    Stress:     Feeling of Stress :    Social Connections:     Frequency of Communication with Friends and Family:     Frequency of Social Gatherings with Friends and Family:     Attends [employer] Services:     Active Member of Clubs or Organizations:     Attends Club or Organization Meetings:     Marital Status:    Intimate Partner Violence:     Fear of Current or Ex-Partner:     Emotionally Abused:     Physically Abused:     Sexually Abused:      Family History   Problem Relation Age of Onset    Hypertension Father     High Blood Pressure Father     Alcohol Abuse Father     Depression Mother     High Blood Pressure Paternal Grandfather     Alcohol Abuse Paternal Grandfather     Heart Disease Paternal Grandmother     Anemia Paternal Grandmother     Depression Maternal Aunt     Alcohol Abuse Paternal Aunt     Alcohol Abuse Paternal Uncle         PHYSICAL EXAM   VITAL SIGNS: /83   Pulse 75   Temp 98.6 °F (37 °C) (Oral)   Resp 20   Ht 5' 8\" (1.727 m)   Wt 146 lb 2.6 oz (66.3 kg)   SpO2 98%   BMI 22.22 kg/m²    Constitutional: Well developed, well nourished  EYES:  PERRLA, nonicteric  HENT: Atraumatic, dry mucus membranes  Neck: No massess, no JVD   Respiratory: Lungs clear to auscultation bilaterally, no retractions   Cardiovascular: regular rate, no murmurs  GI: Soft, nontender, normal bowel sounds  Musculoskeletal: No edema, no acute deformities  Integument: Warm and dry skin, no obvious rashes or petechia  Neurologic: Awake, alert, oriented x4, no aphasia, no slurred speech, CN II-XII intact, normal finger to nose test bilaterally, 5/5 strength in all 4 extremities, sensation to light touch intact bilaterally, patellar reflexes 2+ and equal bilaterally. Bilateral upper extremity tremors  Vascular: Radial pulses 2+ equal bilaterally   Psychiatric: cooperative, has good insight, has an appropriate affect     EKG   Interpreted by Dr. Lena Seo. Please see his note for interpretation. RADIOLOGY   XR CHEST (2 VW)   Final Result   No acute abnormality           Labs Reviewed   COMPREHENSIVE METABOLIC PANEL W/ REFLEX TO MG FOR LOW K - Abnormal; Notable for the following components:       Result Value    Glucose 105 (*)     CREATININE 0.6 (*)     All other components within normal limits    Narrative:     Performed at:  [facility]  [address], [facility] [place] [address]   Phone [phone]   CBC WITH AUTO DIFFERENTIAL    Narrative:     Performed at:  [place] [address], [facility] [place] [address]   Phone [phone]   TROPONIN    Narrative:     Performed at:  [facility]  [address], [facility] [place] [address]   Phone [phone]   LIPASE    Narrative:     Performed at:  [facility]  [address], [facility] [place] [address]   Phone [phone]   ETHANOL    Narrative:     Performed at:  [facility]  [address], [facility] [place] [address]   Phone [phone]     ED COURSE & MEDICAL DECISION MAKING   Pertinent Labs & Imaging studies reviewed and interpreted.  (See chart for details)       Vitals:    07/07/21 1445 07/07/21 1500 07/07/21 1515 07/07/21 1530   BP: 116/75 112/70 (!) 103/59 113/83   Pulse: 78 74 75 75   Resp: 16 21 21 20   Temp:       TempSrc:       SpO2:       Weight:       Height:         Medications   folic acid (FOLVITE) tablet 1 mg (1 mg Oral Given 7/7/21 1416)   multivitamin 1 tablet (1 tablet Oral Given 7/7/21 1416)   LORazepam (ATIVAN) injection 1 mg (1 mg Intravenous Given 7/7/21 1415)   vitamin B-12 (CYANOCOBALAMIN) tablet 50 mcg (50 mcg Oral Given 7/7/21 1500)   chlordiazePOXIDE (LIBRIUM) capsule 50 mg (50 mg Oral Given 7/7/21 1416)   sodium chloride 0.9 % 4,737 mL with folic acid 1 mg, adult multi-vitamin with vitamin k 10 mL, thiamine 100 mg ( Intravenous Stopped 7/7/21 1500)       This patient was seen and evaluated by myself and my attending physician Lea. Differential diagnosis includes but is not limited to electrolyte derangement, dehydration, arrhythmia, other. He is nontoxic in appearance and hemodynamically stable. He has obvious tremors to the upper extremities and his speech is tremulous. It is clear though when he is oriented. He was given vitamins and IV fluids. He was given Ativan for the tremors. Chemistry panel is unremarkable. His glucose is 105. Troponin is less than 0.01. LFTs are normal.  Negative ethanol level. No leukocytosis or anemia on CBC. Chest x-ray is interpreted by radiology and reviewed by myself showed no acute acute cardiopulmonary process. The patient is eating and drinking in the emergency department without incident. He wants to be discharged home. We will discharge him with Librium. The patient was discharged by my attending physician Dr. Rashi Bill. The patient was instructed to always return to the emergency department for worsening symptoms. Patient ambulated out of the emergency department with a steady gait. [place] FINAL IMPRESSION   1.  Alcohol abuse        PLAN  Discharge with PCP follow-up    (Please note that this note was completed with a voice recognition program.  Every attempt was made to edit the dictations, but inevitably there remain words that are mis-transcribed.)       Missy Kendrick, ARTURO - HERMILO  07/07/21 6486

## 2021-07-08 LAB
EKG ATRIAL RATE: 81 BPM
EKG DIAGNOSIS: NORMAL
EKG P AXIS: 80 DEGREES
EKG P-R INTERVAL: 166 MS
EKG Q-T INTERVAL: 364 MS
EKG QRS DURATION: 100 MS
EKG QTC CALCULATION (BAZETT): 422 MS
EKG R AXIS: 91 DEGREES
EKG T AXIS: 54 DEGREES
EKG VENTRICULAR RATE: 81 BPM

## 2021-07-08 PROCEDURE — 93010 ELECTROCARDIOGRAM REPORT: CPT | Performed by: INTERNAL MEDICINE

## 2021-07-16 ENCOUNTER — HOSPITAL ENCOUNTER (INPATIENT)
Age: 31
LOS: 2 days | Discharge: LEFT BEFORE DISCHARGE | DRG: 770 | End: 2021-07-19
Attending: HOSPITALIST | Admitting: HOSPITALIST
Payer: COMMERCIAL

## 2021-07-16 ENCOUNTER — APPOINTMENT (OUTPATIENT)
Dept: GENERAL RADIOLOGY | Age: 31
DRG: 770 | End: 2021-07-16
Payer: COMMERCIAL

## 2021-07-16 DIAGNOSIS — F10.930 ALCOHOL WITHDRAWAL SYNDROME WITHOUT COMPLICATION (HCC): Primary | ICD-10-CM

## 2021-07-16 LAB
A/G RATIO: 1.7 (ref 1.1–2.2)
ALBUMIN SERPL-MCNC: 5 G/DL (ref 3.4–5)
ALP BLD-CCNC: 63 U/L (ref 40–129)
ALT SERPL-CCNC: 38 U/L (ref 10–40)
AMPHETAMINE SCREEN, URINE: ABNORMAL
ANION GAP SERPL CALCULATED.3IONS-SCNC: 15 MMOL/L (ref 3–16)
AST SERPL-CCNC: 44 U/L (ref 15–37)
BARBITURATE SCREEN URINE: ABNORMAL
BASOPHILS ABSOLUTE: 0.1 K/UL (ref 0–0.2)
BASOPHILS RELATIVE PERCENT: 1.2 %
BENZODIAZEPINE SCREEN, URINE: POSITIVE
BILIRUB SERPL-MCNC: 0.8 MG/DL (ref 0–1)
BILIRUBIN URINE: ABNORMAL
BLOOD, URINE: NEGATIVE
BUN BLDV-MCNC: 11 MG/DL (ref 7–20)
CALCIUM SERPL-MCNC: 9.6 MG/DL (ref 8.3–10.6)
CANNABINOID SCREEN URINE: ABNORMAL
CHLORIDE BLD-SCNC: 102 MMOL/L (ref 99–110)
CLARITY: CLEAR
CO2: 20 MMOL/L (ref 21–32)
COCAINE METABOLITE SCREEN URINE: ABNORMAL
COLOR: ABNORMAL
CREAT SERPL-MCNC: 0.8 MG/DL (ref 0.9–1.3)
EOSINOPHILS ABSOLUTE: 0.2 K/UL (ref 0–0.6)
EOSINOPHILS RELATIVE PERCENT: 3.1 %
ETHANOL: NORMAL MG/DL (ref 0–0.08)
GFR AFRICAN AMERICAN: >60
GFR NON-AFRICAN AMERICAN: >60
GLOBULIN: 3 G/DL
GLUCOSE BLD-MCNC: 145 MG/DL (ref 70–99)
GLUCOSE URINE: NEGATIVE MG/DL
HCT VFR BLD CALC: 45.1 % (ref 40.5–52.5)
HEMOGLOBIN: 16.1 G/DL (ref 13.5–17.5)
KETONES, URINE: ABNORMAL MG/DL
LEUKOCYTE ESTERASE, URINE: NEGATIVE
LIPASE: 29 U/L (ref 13–60)
LYMPHOCYTES ABSOLUTE: 2 K/UL (ref 1–5.1)
LYMPHOCYTES RELATIVE PERCENT: 29.2 %
Lab: ABNORMAL
MCH RBC QN AUTO: 32.2 PG (ref 26–34)
MCHC RBC AUTO-ENTMCNC: 35.8 G/DL (ref 31–36)
MCV RBC AUTO: 89.9 FL (ref 80–100)
METHADONE SCREEN, URINE: ABNORMAL
MICROSCOPIC EXAMINATION: ABNORMAL
MONOCYTES ABSOLUTE: 0.6 K/UL (ref 0–1.3)
MONOCYTES RELATIVE PERCENT: 9.5 %
NEUTROPHILS ABSOLUTE: 3.9 K/UL (ref 1.7–7.7)
NEUTROPHILS RELATIVE PERCENT: 57 %
NITRITE, URINE: NEGATIVE
OPIATE SCREEN URINE: ABNORMAL
OXYCODONE URINE: ABNORMAL
PDW BLD-RTO: 12.6 % (ref 12.4–15.4)
PH UA: 6
PH UA: 6 (ref 5–8)
PHENCYCLIDINE SCREEN URINE: ABNORMAL
PLATELET # BLD: 196 K/UL (ref 135–450)
PMV BLD AUTO: 8.3 FL (ref 5–10.5)
POTASSIUM REFLEX MAGNESIUM: 3.6 MMOL/L (ref 3.5–5.1)
PROPOXYPHENE SCREEN: ABNORMAL
PROTEIN UA: NEGATIVE MG/DL
RBC # BLD: 5.02 M/UL (ref 4.2–5.9)
SODIUM BLD-SCNC: 137 MMOL/L (ref 136–145)
SPECIFIC GRAVITY UA: 1.02 (ref 1–1.03)
TOTAL PROTEIN: 8 G/DL (ref 6.4–8.2)
URINE REFLEX TO CULTURE: ABNORMAL
URINE TYPE: ABNORMAL
UROBILINOGEN, URINE: 1 E.U./DL
WBC # BLD: 6.8 K/UL (ref 4–11)

## 2021-07-16 PROCEDURE — 99284 EMERGENCY DEPT VISIT MOD MDM: CPT

## 2021-07-16 PROCEDURE — 2580000003 HC RX 258: Performed by: PHYSICIAN ASSISTANT

## 2021-07-16 PROCEDURE — 85025 COMPLETE CBC W/AUTO DIFF WBC: CPT

## 2021-07-16 PROCEDURE — 2500000003 HC RX 250 WO HCPCS: Performed by: PHYSICIAN ASSISTANT

## 2021-07-16 PROCEDURE — 96365 THER/PROPH/DIAG IV INF INIT: CPT

## 2021-07-16 PROCEDURE — 96375 TX/PRO/DX INJ NEW DRUG ADDON: CPT

## 2021-07-16 PROCEDURE — 83690 ASSAY OF LIPASE: CPT

## 2021-07-16 PROCEDURE — 71045 X-RAY EXAM CHEST 1 VIEW: CPT

## 2021-07-16 PROCEDURE — 81003 URINALYSIS AUTO W/O SCOPE: CPT

## 2021-07-16 PROCEDURE — 80307 DRUG TEST PRSMV CHEM ANLYZR: CPT

## 2021-07-16 PROCEDURE — 80053 COMPREHEN METABOLIC PANEL: CPT

## 2021-07-16 PROCEDURE — 82077 ASSAY SPEC XCP UR&BREATH IA: CPT

## 2021-07-16 PROCEDURE — 6360000002 HC RX W HCPCS: Performed by: PHYSICIAN ASSISTANT

## 2021-07-16 PROCEDURE — 96376 TX/PRO/DX INJ SAME DRUG ADON: CPT

## 2021-07-16 RX ORDER — LORAZEPAM 2 MG/ML
1 INJECTION INTRAMUSCULAR ONCE
Status: COMPLETED | OUTPATIENT
Start: 2021-07-16 | End: 2021-07-16

## 2021-07-16 RX ADMIN — LORAZEPAM 1 MG: 2 INJECTION INTRAMUSCULAR; INTRAVENOUS at 19:37

## 2021-07-16 RX ADMIN — FOLIC ACID: 5 INJECTION, SOLUTION INTRAMUSCULAR; INTRAVENOUS; SUBCUTANEOUS at 20:34

## 2021-07-16 RX ADMIN — LORAZEPAM 1 MG: 2 INJECTION INTRAMUSCULAR; INTRAVENOUS at 21:56

## 2021-07-16 ASSESSMENT — PAIN DESCRIPTION - FREQUENCY: FREQUENCY: CONTINUOUS

## 2021-07-16 ASSESSMENT — PAIN DESCRIPTION - DESCRIPTORS: DESCRIPTORS: SHARP

## 2021-07-16 ASSESSMENT — PAIN DESCRIPTION - ORIENTATION: ORIENTATION: UPPER

## 2021-07-16 ASSESSMENT — PAIN DESCRIPTION - PAIN TYPE: TYPE: ACUTE PAIN

## 2021-07-16 ASSESSMENT — PAIN DESCRIPTION - LOCATION: LOCATION: ABDOMEN

## 2021-07-16 ASSESSMENT — PAIN DESCRIPTION - ONSET: ONSET: GRADUAL

## 2021-07-16 ASSESSMENT — PAIN DESCRIPTION - PROGRESSION: CLINICAL_PROGRESSION: GRADUALLY WORSENING

## 2021-07-16 ASSESSMENT — PAIN SCALES - GENERAL: PAINLEVEL_OUTOF10: 8

## 2021-07-17 ENCOUNTER — APPOINTMENT (OUTPATIENT)
Dept: CT IMAGING | Age: 31
DRG: 770 | End: 2021-07-17
Payer: COMMERCIAL

## 2021-07-17 PROBLEM — F10.139 ALCOHOL ABUSE WITH WITHDRAWAL (HCC): Status: ACTIVE | Noted: 2021-07-17

## 2021-07-17 LAB
GLUCOSE BLD-MCNC: 116 MG/DL (ref 70–99)
GLUCOSE BLD-MCNC: 125 MG/DL (ref 70–99)
PERFORMED ON: ABNORMAL
PERFORMED ON: ABNORMAL

## 2021-07-17 PROCEDURE — 6370000000 HC RX 637 (ALT 250 FOR IP): Performed by: NURSE PRACTITIONER

## 2021-07-17 PROCEDURE — 1200000000 HC SEMI PRIVATE

## 2021-07-17 PROCEDURE — 94761 N-INVAS EAR/PLS OXIMETRY MLT: CPT

## 2021-07-17 PROCEDURE — 2580000003 HC RX 258: Performed by: NURSE PRACTITIONER

## 2021-07-17 PROCEDURE — 74150 CT ABDOMEN W/O CONTRAST: CPT

## 2021-07-17 PROCEDURE — 6360000002 HC RX W HCPCS: Performed by: NURSE PRACTITIONER

## 2021-07-17 RX ORDER — LAMOTRIGINE 100 MG/1
100 TABLET ORAL DAILY
Status: DISCONTINUED | OUTPATIENT
Start: 2021-07-17 | End: 2021-07-20 | Stop reason: HOSPADM

## 2021-07-17 RX ORDER — LORAZEPAM 1 MG/1
3 TABLET ORAL
Status: DISCONTINUED | OUTPATIENT
Start: 2021-07-17 | End: 2021-07-19

## 2021-07-17 RX ORDER — GAUZE BANDAGE 2" X 2"
100 BANDAGE TOPICAL DAILY
Status: DISCONTINUED | OUTPATIENT
Start: 2021-07-17 | End: 2021-07-20 | Stop reason: HOSPADM

## 2021-07-17 RX ORDER — SODIUM CHLORIDE 9 MG/ML
25 INJECTION, SOLUTION INTRAVENOUS PRN
Status: DISCONTINUED | OUTPATIENT
Start: 2021-07-17 | End: 2021-07-20 | Stop reason: HOSPADM

## 2021-07-17 RX ORDER — SODIUM CHLORIDE 0.9 % (FLUSH) 0.9 %
5-40 SYRINGE (ML) INJECTION EVERY 12 HOURS SCHEDULED
Status: DISCONTINUED | OUTPATIENT
Start: 2021-07-17 | End: 2021-07-20 | Stop reason: HOSPADM

## 2021-07-17 RX ORDER — LORAZEPAM 2 MG/ML
1 INJECTION INTRAMUSCULAR
Status: DISCONTINUED | OUTPATIENT
Start: 2021-07-17 | End: 2021-07-19

## 2021-07-17 RX ORDER — ONDANSETRON 2 MG/ML
4 INJECTION INTRAMUSCULAR; INTRAVENOUS EVERY 6 HOURS PRN
Status: DISCONTINUED | OUTPATIENT
Start: 2021-07-17 | End: 2021-07-20 | Stop reason: HOSPADM

## 2021-07-17 RX ORDER — SODIUM CHLORIDE 0.9 % (FLUSH) 0.9 %
5-40 SYRINGE (ML) INJECTION PRN
Status: DISCONTINUED | OUTPATIENT
Start: 2021-07-17 | End: 2021-07-20 | Stop reason: HOSPADM

## 2021-07-17 RX ORDER — CHLORDIAZEPOXIDE HYDROCHLORIDE 25 MG/1
25 CAPSULE, GELATIN COATED ORAL 3 TIMES DAILY
Status: DISCONTINUED | OUTPATIENT
Start: 2021-07-17 | End: 2021-07-20 | Stop reason: HOSPADM

## 2021-07-17 RX ORDER — ONDANSETRON 4 MG/1
4 TABLET, ORALLY DISINTEGRATING ORAL EVERY 8 HOURS PRN
Status: DISCONTINUED | OUTPATIENT
Start: 2021-07-17 | End: 2021-07-20 | Stop reason: HOSPADM

## 2021-07-17 RX ORDER — NICOTINE 21 MG/24HR
1 PATCH, TRANSDERMAL 24 HOURS TRANSDERMAL DAILY
Status: DISCONTINUED | OUTPATIENT
Start: 2021-07-17 | End: 2021-07-20 | Stop reason: HOSPADM

## 2021-07-17 RX ORDER — LORAZEPAM 2 MG/ML
4 INJECTION INTRAMUSCULAR
Status: DISCONTINUED | OUTPATIENT
Start: 2021-07-17 | End: 2021-07-19

## 2021-07-17 RX ORDER — ACETAMINOPHEN 325 MG/1
650 TABLET ORAL EVERY 6 HOURS PRN
Status: DISCONTINUED | OUTPATIENT
Start: 2021-07-17 | End: 2021-07-20 | Stop reason: HOSPADM

## 2021-07-17 RX ORDER — FOLIC ACID 1 MG/1
1 TABLET ORAL DAILY
Status: DISCONTINUED | OUTPATIENT
Start: 2021-07-17 | End: 2021-07-20 | Stop reason: HOSPADM

## 2021-07-17 RX ORDER — LORAZEPAM 1 MG/1
4 TABLET ORAL
Status: DISCONTINUED | OUTPATIENT
Start: 2021-07-17 | End: 2021-07-19

## 2021-07-17 RX ORDER — LORAZEPAM 1 MG/1
2 TABLET ORAL
Status: DISCONTINUED | OUTPATIENT
Start: 2021-07-17 | End: 2021-07-19

## 2021-07-17 RX ORDER — POLYETHYLENE GLYCOL 3350 17 G/17G
17 POWDER, FOR SOLUTION ORAL DAILY PRN
Status: DISCONTINUED | OUTPATIENT
Start: 2021-07-17 | End: 2021-07-20 | Stop reason: HOSPADM

## 2021-07-17 RX ORDER — HYDROXYZINE HYDROCHLORIDE 25 MG/1
50 TABLET, FILM COATED ORAL EVERY MORNING
Status: DISCONTINUED | OUTPATIENT
Start: 2021-07-17 | End: 2021-07-19 | Stop reason: ALTCHOICE

## 2021-07-17 RX ORDER — LORAZEPAM 1 MG/1
1 TABLET ORAL
Status: DISCONTINUED | OUTPATIENT
Start: 2021-07-17 | End: 2021-07-19

## 2021-07-17 RX ORDER — ACETAMINOPHEN 650 MG/1
650 SUPPOSITORY RECTAL EVERY 6 HOURS PRN
Status: DISCONTINUED | OUTPATIENT
Start: 2021-07-17 | End: 2021-07-20 | Stop reason: HOSPADM

## 2021-07-17 RX ORDER — LORAZEPAM 2 MG/ML
2 INJECTION INTRAMUSCULAR
Status: DISCONTINUED | OUTPATIENT
Start: 2021-07-17 | End: 2021-07-19

## 2021-07-17 RX ORDER — LAMOTRIGINE 25 MG/1
50 TABLET ORAL NIGHTLY
Status: DISCONTINUED | OUTPATIENT
Start: 2021-07-17 | End: 2021-07-20 | Stop reason: HOSPADM

## 2021-07-17 RX ORDER — MULTIVITAMIN WITH IRON
1 TABLET ORAL DAILY
Status: DISCONTINUED | OUTPATIENT
Start: 2021-07-17 | End: 2021-07-20 | Stop reason: HOSPADM

## 2021-07-17 RX ORDER — LORAZEPAM 2 MG/ML
3 INJECTION INTRAMUSCULAR
Status: DISCONTINUED | OUTPATIENT
Start: 2021-07-17 | End: 2021-07-19

## 2021-07-17 RX ADMIN — SODIUM CHLORIDE, PRESERVATIVE FREE 10 ML: 5 INJECTION INTRAVENOUS at 22:10

## 2021-07-17 RX ADMIN — FOLIC ACID 1 MG: 1 TABLET ORAL at 08:10

## 2021-07-17 RX ADMIN — LORAZEPAM 4 MG: 2 INJECTION INTRAMUSCULAR; INTRAVENOUS at 13:43

## 2021-07-17 RX ADMIN — Medication 10 ML: at 20:29

## 2021-07-17 RX ADMIN — LAMOTRIGINE 100 MG: 100 TABLET ORAL at 11:35

## 2021-07-17 RX ADMIN — LORAZEPAM 4 MG: 1 TABLET ORAL at 17:10

## 2021-07-17 RX ADMIN — LORAZEPAM 3 MG: 2 INJECTION INTRAMUSCULAR; INTRAVENOUS at 02:57

## 2021-07-17 RX ADMIN — LORAZEPAM 3 MG: 2 INJECTION INTRAMUSCULAR; INTRAVENOUS at 08:16

## 2021-07-17 RX ADMIN — THERA TABS 1 TABLET: TAB at 08:10

## 2021-07-17 RX ADMIN — LORAZEPAM 2 MG: 2 INJECTION INTRAMUSCULAR; INTRAVENOUS at 21:31

## 2021-07-17 RX ADMIN — Medication 100 MG: at 08:10

## 2021-07-17 RX ADMIN — HYDROXYZINE HYDROCHLORIDE 50 MG: 25 TABLET, FILM COATED ORAL at 11:35

## 2021-07-17 RX ADMIN — ONDANSETRON 4 MG: 2 INJECTION INTRAMUSCULAR; INTRAVENOUS at 22:30

## 2021-07-17 RX ADMIN — LORAZEPAM 4 MG: 2 INJECTION INTRAMUSCULAR; INTRAVENOUS at 15:50

## 2021-07-17 RX ADMIN — LORAZEPAM 3 MG: 2 INJECTION INTRAMUSCULAR; INTRAVENOUS at 18:57

## 2021-07-17 RX ADMIN — LORAZEPAM 4 MG: 2 INJECTION INTRAMUSCULAR; INTRAVENOUS at 11:44

## 2021-07-17 RX ADMIN — ACETAMINOPHEN 650 MG: 325 TABLET ORAL at 02:57

## 2021-07-17 RX ADMIN — LORAZEPAM 2 MG: 2 INJECTION INTRAMUSCULAR; INTRAVENOUS at 23:26

## 2021-07-17 RX ADMIN — ACETAMINOPHEN 650 MG: 325 TABLET ORAL at 21:20

## 2021-07-17 RX ADMIN — Medication 10 ML: at 08:10

## 2021-07-17 RX ADMIN — LAMOTRIGINE 50 MG: 25 TABLET ORAL at 20:26

## 2021-07-17 RX ADMIN — LORAZEPAM 2 MG: 2 INJECTION INTRAMUSCULAR; INTRAVENOUS at 20:26

## 2021-07-17 RX ADMIN — CHLORDIAZEPOXIDE HYDROCHLORIDE 25 MG: 25 CAPSULE ORAL at 13:37

## 2021-07-17 RX ADMIN — CHLORDIAZEPOXIDE HYDROCHLORIDE 25 MG: 25 CAPSULE ORAL at 20:26

## 2021-07-17 RX ADMIN — CHLORDIAZEPOXIDE HYDROCHLORIDE 25 MG: 25 CAPSULE ORAL at 08:10

## 2021-07-17 ASSESSMENT — PAIN DESCRIPTION - PAIN TYPE
TYPE: ACUTE PAIN

## 2021-07-17 ASSESSMENT — PAIN DESCRIPTION - ONSET
ONSET: ON-GOING
ONSET: GRADUAL

## 2021-07-17 ASSESSMENT — PAIN DESCRIPTION - FREQUENCY
FREQUENCY: INTERMITTENT
FREQUENCY: CONTINUOUS
FREQUENCY: INTERMITTENT

## 2021-07-17 ASSESSMENT — PAIN DESCRIPTION - ORIENTATION
ORIENTATION: MID
ORIENTATION: UPPER
ORIENTATION: MID

## 2021-07-17 ASSESSMENT — PAIN SCALES - GENERAL
PAINLEVEL_OUTOF10: 5
PAINLEVEL_OUTOF10: 10
PAINLEVEL_OUTOF10: 3
PAINLEVEL_OUTOF10: 6
PAINLEVEL_OUTOF10: 8
PAINLEVEL_OUTOF10: 2

## 2021-07-17 ASSESSMENT — PAIN DESCRIPTION - PROGRESSION
CLINICAL_PROGRESSION: NOT CHANGED
CLINICAL_PROGRESSION: GRADUALLY IMPROVING
CLINICAL_PROGRESSION: NOT CHANGED
CLINICAL_PROGRESSION: NOT CHANGED

## 2021-07-17 ASSESSMENT — PAIN DESCRIPTION - LOCATION
LOCATION: HEAD
LOCATION: HEAD
LOCATION: ABDOMEN
LOCATION: HEAD

## 2021-07-17 ASSESSMENT — PAIN DESCRIPTION - DESCRIPTORS
DESCRIPTORS: HEADACHE
DESCRIPTORS: SHARP

## 2021-07-17 NOTE — PROGRESS NOTES
Medication Reconciliation     List of medications patient is currently taking is complete. Source of information:   1. Conversation with patient at bedside  2. EPIC records        Notes regarding home medications:  1. Patient received all of his home medications today. 2.Pt states he takes 25mg hydroxyzine in morning and 50mg at night. 3. Total daily dose of 150mg lamotrigine but cant remember if he takes 50 in morning or at night.   Denies otc or herbal use  Katie Amor, Pharmacy Intern 7/16/2021 10:37 PM

## 2021-07-17 NOTE — PROGRESS NOTES
Pt arrived to floor from ER at [address] [place] [address] via stretcher. Pt oriented to room, call light, policies and procedures, the menu and ordering. Call light within reach. Bed in lowest position, bed alarm on, and wheels locked. Pt verbalized understanding. No complaints, questions or concerns at this time.

## 2021-07-17 NOTE — PROGRESS NOTES
Patient alert and oriented times four. Patient in seizure precautions. Fall risk assessment completed. Fall precautions in place. Call light within reach. Pt educated on calling for assistance before getting up. Walkway free of clutter. Will continue to monitor. Call light within reach.  Electronically signed by Jade Singh RN on 7/17/2021 at 7:56 PM

## 2021-07-17 NOTE — PROGRESS NOTES
No falls noted this shift. Patient ambulates with x1 staff assistance without difficulty. Family member at bedside, spent the night. Bed kept in low position. Safe environment maintained. Bedside table & call light in reach. Uses call light appropriately when needing assistance.

## 2021-07-17 NOTE — ED NOTES
Report called to [facility] RN, all questions answered.      Manuel Lorenzo RN  07/17/21 8950

## 2021-07-17 NOTE — H&P
Hospital Medicine History & Physical      PCP: Ryan Thakkar MD    Date of Admission: 7/16/2021    Date of Service: Pt seen/examined on 7/17/2021 and Admitted to Inpatient     Chief Complaint:  Alcohol w/d, no ETOH > 24 hours      History Of Present Illness: The patient is a 35 y.o. male who presents to [facility] with PMHx alcohol abuse, pneumonia, pancreatitis, portal vein thrombosis, \"seizures associated with alcohol withdrawal.\"    Seen on 7/11 given librium and d/c, took meds for a day or two then said \"screw it \" and started drinking again. Lives at group home, leaves during the day and goes drinking, then comes back to sleep. Has had seizures with ETOH w/d in the past according to the patient. No alcohol x 24 hours, feeling paranoid, jittery. Denies hallucinations. Denies suicidal or homicidal ideation. Reports he does have family but endorses he does not get along with them very well because they have expectations of him that he just cannot seem to meet. ED work-up: Patient arrived tachycardic at 115, very anxious requiring Ativan. CO2 of 20 blood glucose 145. Lipase 29. Urine drug screen positive for benzodiazepines. Urinalysis negative for ketones. CBC is unremarkable. AST mildly elevated 44. Liver enzymes otherwise unremarkable. Chest x-ray is negative for any evidence of acute disease. On my exam the patient is resting comfortably. Finished banana bag. No evidence of encephalopathy. Blood pressure and heart rate are stable and within normal limits. CODE STATUS full    Past Medical History:        Diagnosis Date    Alcohol abuse     PATIENT HAS EXTREME MANIPULITIVE & DRUG SEEKING BEHAVIOR. HE POCKETS HIS BENZODIZAPINES.      Anxiety     Drug-seeking behavior     Several times found pocketing medications given in hospital  Herpes genitalis in men     Manipulative behavior     Pancreatitis     Pneumonia     Portal vein thrombosis     pt denied    Seizures (HCC)     PER PATIENT ONLY. DURING MULTIPLE HOSPITALIZATIONS HE HAS NEVER ONCE    Tobacco abuse        Past Surgical History:        Procedure Laterality Date    ENDOSCOPY, COLON, DIAGNOSTIC  10/28/2013    Endoscopic retrograde cholangiopancreatography with pancreatic stent placement    ENDOSCOPY, COLON, DIAGNOSTIC  03/23/2018    Esophagogastroduodenoscopy with biopsy    ERCP  1/10/14    with stent removal       Medications Prior to Admission:    Prior to Admission medications    Medication Sig Start Date End Date Taking? Authorizing Provider   lamoTRIgine (LAMICTAL) 100 MG tablet Take 100 mg by mouth daily Takes at noon   Yes Historical Provider, MD   lamoTRIgine (LAMICTAL) 100 MG tablet Take 50 mg by mouth nightly    Yes Historical Provider, MD   hydrOXYzine (ATARAX) 25 MG tablet Take 50 mg by mouth every morning   Yes Historical Provider, MD       Allergies:  Amoxicillin, Haldol [haloperidol lactate], Phenergan [promethazine hcl], Shrimp (diagnostic), Glucosamine, and Shellfish-derived products    Social History:  The patient currently lives group home. TOBACCO:   reports that he has been smoking cigarettes. He started smoking about 13 years ago. He has a 10.00 pack-year smoking history. He has never used smokeless tobacco.  ETOH:   reports current alcohol use. Family History:  Reviewed in detail and negative for DM, Early CAD, Cancer, CVA.  Positive as follows:        Problem Relation Age of Onset    Hypertension Father     High Blood Pressure Father     Alcohol Abuse Father     Depression Mother     High Blood Pressure Paternal Grandfather     Alcohol Abuse Paternal Grandfather     Heart Disease Paternal Grandmother     Anemia Paternal Grandmother     Depression Maternal Aunt     Alcohol Abuse Paternal Aunt     Alcohol Abuse Paternal Uncle REVIEW OF SYSTEMS:   Positive for anxiety, shakiness and as noted in the HPI. All other systems reviewed and negative. PHYSICAL EXAM:    /73   Pulse 70   Temp 98 °F (36.7 °C)   Resp 22   Ht 5' 8\" (1.727 m)   Wt 148 lb 2.4 oz (67.2 kg)   SpO2 98%   BMI 22.53 kg/m²     General appearance: No apparent distress appears stated age and cooperative. HEENT Normal cephalic, atraumatic without obvious deformity. Pupils equal, round, and reactive to light. Extra ocular muscles intact. Conjunctivae/corneas clear. Neck: Supple, No jugular venous distention/bruits. Trachea midline without thyromegaly or adenopathy with full range of motion. Lungs: Clear to auscultation, bilaterally without Rales/Wheezes/Rhonchi with good respiratory effort. Heart: Regular rate and rhythm with Normal S1/S2 without murmurs, rubs or gallops, point of maximum impulse non-displaced  Abdomen: Soft, non-tender or non-distended without rigidity or guarding and positive bowel sounds all four quadrants. Extremities: No clubbing, cyanosis, or edema bilaterally. Full range of motion without deformity and normal gait intact. Skin: Skin color, texture, turgor normal.  No rashes or lesions. Neurologic: Alert and oriented X 3, neurovascularly intact with sensory/motor intact upper extremities/lower extremities, bilaterally. Cranial nerves: II-XII intact, grossly non-focal.    Mental status: Alert, oriented, thought content appropriate. Negative suicidal or homicidal ideations. No indication of auditory or visual hallucinations. Cooperative.   Pleasant  Capillary Refill: Acceptable  < 3 seconds  Peripheral Pulses: +3 Easily felt, not easily obliterated with pressure      CXR:  I have reviewed the CXR with the following interpretation: NAD  EKG:  I have reviewed the EKG with the following interpretation: N/A    CBC   Recent Labs     07/16/21  1933   WBC 6.8   HGB 16.1   HCT 45.1         RENAL  Recent Labs

## 2021-07-17 NOTE — ED PROVIDER NOTES
[address]       Pt Name: Oscar Ferro  MRN: [id number]  [place] 1990  Date of evaluation: 7/16/2021  Provider: TITO Villanueva    The ED Attending Physician was available for consultation but did not see or evaluate this patient. CHIEF COMPLAINT       Chief Complaint   Patient presents with    Abdominal Pain     for a few days; Last drink \"sometime yesterday\"        HISTORY OF PRESENT ILLNESS  (Location/Symptom, Timing/Onset, Context/Setting, Quality, Duration, Modifying Factors, Severity.)   Oscar Ferro is a 35 y.o. male who presents to the emergency department with complaints of agitation and shakes, nausea, concern for alcohol withdrawal.  Patient has had alcohol withdrawal syndrome before, and has been hospitalized for this, he says he has had seizures in the past.  He reports that his last drink was sometime last night, probably before midnight, but not sure exactly when. He says that increasingly throughout the day today he is felt extremely agitated, he has some abdominal pain and nausea, no appetite. Denies any drug use otherwise. Says he has a  who he needs to talk to about addiction treatment but is not currently in any treatment program.    No other complaints. Nursing Notes were reviewed and I agree. REVIEW OF SYSTEMS    (2-9 systems for level 4, 10 or more for level 5)     Constitutional: Positive for chills. Negative for fever, unexpected weight change. HENT:  Negative for congestion, rhinorrhea, sneezing, sore throat, trouble swallowing. Respiratory:  Negative for cough, shortness of breath, wheezing, stridor. Cardiovascular:  Negative for chest pain, palpitations, leg swelling. Gastrointestinal: Positive for nausea, abdominal pain. Negative for vomiting, diarrhea, constipation, blood in stool. Genitourinary:  Negative for dysuria, hematuria, flank pain, groin pain.    Musculoskeletal:  Negative for myalgias, arthralgias, neck pain or stiffness. Neurological: Positive for headache. Negative for dizziness, seizures, syncope, focal weakness, numbness. Psychiatric/Behavioral: Positive for anxiety, agitation. Negative for suicidal ideas, hallucinations, confusion. Except as noted above the remainder of the review of systems was reviewed and negative. PAST MEDICAL HISTORY         Diagnosis Date    Alcohol abuse     PATIENT HAS EXTREME MANIPULITIVE & DRUG SEEKING BEHAVIOR. HE POCKETS HIS BENZODIZAPINES.  Anxiety     Drug-seeking behavior     Several times found pocketing medications given in hospital    Herpes genitalis in men     Manipulative behavior     Pancreatitis     Pneumonia     Portal vein thrombosis     pt denied    Seizures (HCC)     PER PATIENT ONLY.  DURING MULTIPLE HOSPITALIZATIONS HE HAS NEVER ONCE    Tobacco abuse        SURGICAL HISTORY           Procedure Laterality Date    ENDOSCOPY, COLON, DIAGNOSTIC  10/28/2013    Endoscopic retrograde cholangiopancreatography with pancreatic stent placement    ENDOSCOPY, COLON, DIAGNOSTIC  03/23/2018    Esophagogastroduodenoscopy with biopsy    ERCP  1/10/14    with stent removal       CURRENT MEDICATIONS       Previous Medications    HYDROXYZINE (ATARAX) 25 MG TABLET    Take 50 mg by mouth every morning    LAMOTRIGINE (LAMICTAL) 100 MG TABLET    Take 100 mg by mouth daily Takes at noon    LAMOTRIGINE (LAMICTAL) 100 MG TABLET    Take 50 mg by mouth nightly        ALLERGIES     Amoxicillin, Haldol [haloperidol lactate], Phenergan [promethazine hcl], Shrimp (diagnostic), Glucosamine, and Shellfish-derived products    FAMILY HISTORY           Problem Relation Age of Onset    Hypertension Father     High Blood Pressure Father     Alcohol Abuse Father     Depression Mother     High Blood Pressure Paternal Grandfather     Alcohol Abuse Paternal Grandfather     Heart Disease Paternal Grandmother     Anemia Paternal Grandmother     Depression Maternal Aunt     Alcohol Abuse Paternal Aunt     Alcohol Abuse Paternal Uncle      Family Status   Relation Name Status    Father  Alive    Mother  Alive    PGF  (Not Specified)    PGM  (Not Specified)    MAunt  (Not Specified)    PAunt  (Not Specified)    PUnc  (Not Specified)        SOCIAL HISTORY      reports that he has been smoking cigarettes. He started smoking about 13 years ago. He has a 10.00 pack-year smoking history. He has never used smokeless tobacco. He reports current alcohol use. He reports that he does not use drugs. PHYSICAL EXAM    (up to 7 for level 4, 8 or more for level 5)     ED Triage Vitals [07/16/21 1910]   BP Temp Temp Source Pulse Resp SpO2 Height Weight   (!) 157/93 97.3 °F (36.3 °C) Oral 115 17 97 % 5' 8\" (1.727 m) 148 lb 2.4 oz (67.2 kg)       Constitutional:  Appearing well-developed and well-nourished. No distress. HENT:  Normocephalic and atraumatic. Conjunctivae and EOM normal. PERRLA. Neck:  Normal range of motion. No tracheal deviation. Cardiovascular:  Normal rate, regular rhythm, normal heart sounds, intact distal pulses. Pulmonary/Chest:  Effort and breath sounds normal. No respiratory distress, wheezes or rales. Abdominal:  Soft. Bowel sounds normal. No tenderness, distension, mass, rebound or guarding. Musculoskeletal:  Normal range of motion. No edema exhibited. Neurological:  Alert and oriented to person, place, and time. No cranial nerve deficit observed. Skin:  Skin is warm and dry. Not diaphoretic. Psychiatric: Patient is anxious. Normal affect, behavior, judgment, thought content. DIAGNOSTIC RESULTS     When ordered, EKGs are interpreted by the ED physician in the absence of a cardiologist. Please the physician's note for interpretation of EKG. RADIOLOGY:   Interpretation per the Radiologist below, if available at the time of this note:    XR CHEST PORTABLE   Final Result   No acute cardiopulmonary disease. LABS:  Labs Reviewed   COMPREHENSIVE METABOLIC PANEL W/ REFLEX TO MG FOR LOW K - Abnormal; Notable for the following components:       Result Value    CO2 20 (*)     Glucose 145 (*)     CREATININE 0.8 (*)     AST 44 (*)     All other components within normal limits    Narrative:     Performed at:  [facility]  [address], [place] [facility] [address]   Phone [phone]   URINE RT REFLEX TO CULTURE - Abnormal; Notable for the following components:    Bilirubin Urine SMALL (*)     Ketones, Urine TRACE (*)     All other components within normal limits    Narrative:     Performed at:  [place] [address] [facility] [address]   Phone [phone]   URINE DRUG SCREEN - Abnormal; Notable for the following components:    Benzodiazepine Screen, Urine POSITIVE (*)     All other components within normal limits    Narrative:     Performed at:  [address] [facility] [address]   Phone [phone]   CBC WITH AUTO DIFFERENTIAL    Narrative:     Performed at:  [address] [facility] [address]   Phone [phone]   LIPASE    Narrative:     Performed at:  [facility]  [address] [place] [address] [facility] [address]   Phone [phone]   ETHANOL    Narrative:     Performed at:  [facility]  [address] [place] [address] [facility] [address]   Phone [phone]       All other labs were within normal range or not returned as of this dictation.     EMERGENCY DEPARTMENT COURSE and DIFFERENTIAL DIAGNOSIS/MDM:   Vitals:    Vitals:    07/16/21 2045 07/16/21 2115 07/16/21 2145 07/16/21 2215   BP: 117/71 108/64 107/68 100/60   Pulse: 77 84 85 75   Resp: 22 18 22 23   Temp:       TempSrc:       SpO2: 98% 98% 98% 97%   Weight:       Height:           The patient's condition in the ED

## 2021-07-17 NOTE — PROGRESS NOTES
Patient resting quietly in bed. Alert and oriented. Working on breakfast tray. Having slight nausea at this time. 3 mg of Ativan given for CIWA score of 18. Agreed to take some meds at this time. Wants to wait to take rest until noon. See MAR. Complains of 5/10 headache. Ambulates with standby assist. Bed alarm on. Call light and belongings within reach. Will continue to monitor.      Electronically signed by Sondra Oropeza RN on 7/17/2021 at 8:29 AM

## 2021-07-17 NOTE — PROGRESS NOTES
Loss of IV access. Multiple attempts made to place new peripheral IV and were unsuccessful. Dr. Arlet Evans made aware. OK to try for u/s guided IV or place PICC.  Ultrasound IV placed by PICC team.

## 2021-07-17 NOTE — PLAN OF CARE
Problem: Pain:  Goal: Pain level will decrease  Description: Pain level will decrease  Outcome: Ongoing  Note: Pain /discomfort being managed with PRN analgesics per MD orders. Patient able to express presence and absence of pain and rate pain appropriately using numerical scale. Goal: Control of acute pain  Description: Control of acute pain  Outcome: Ongoing  Note: Pain /discomfort being managed with PRN analgesics per MD orders. Patient able to express presence and absence of pain and rate pain appropriately using numerical scale. Goal: Control of chronic pain  Description: Control of chronic pain  Outcome: Ongoing  Note: Pain /discomfort being managed with PRN analgesics per MD orders. Patient able to express presence and absence of pain and rate pain appropriately using numerical scale. Problem: Falls - Risk of:  Goal: Will remain free from falls  Description: Will remain free from falls  Outcome: Ongoing  Note: No falls noted this shift. Patient ambulates with x1 staff assistance without difficulty. Family member at bedside, spent the night. Bed kept in low position. Safe environment maintained. Bedside table & call light in reach. Uses call light appropriately when needing assistance. Goal: Absence of physical injury  Description: Absence of physical injury  Outcome: Ongoing  Note: Fall risk assessment completed . Fall precautions in place, bed/ chair alarm on, side rails 2/4 up, call light in reach, educated pt on calling for assistance when needed, room clear of clutter. Pt verbalized understanding.

## 2021-07-18 LAB
A/G RATIO: 1.7 (ref 1.1–2.2)
ALBUMIN SERPL-MCNC: 4.4 G/DL (ref 3.4–5)
ALP BLD-CCNC: 52 U/L (ref 40–129)
ALT SERPL-CCNC: 27 U/L (ref 10–40)
AMYLASE: 47 U/L (ref 25–115)
ANION GAP SERPL CALCULATED.3IONS-SCNC: 15 MMOL/L (ref 3–16)
AST SERPL-CCNC: 31 U/L (ref 15–37)
BASOPHILS ABSOLUTE: 0 K/UL (ref 0–0.2)
BASOPHILS RELATIVE PERCENT: 0.3 %
BILIRUB SERPL-MCNC: 0.8 MG/DL (ref 0–1)
BUN BLDV-MCNC: 10 MG/DL (ref 7–20)
CALCIUM IONIZED: 1.07 MMOL/L (ref 1.12–1.32)
CALCIUM SERPL-MCNC: 8.9 MG/DL (ref 8.3–10.6)
CHLORIDE BLD-SCNC: 104 MMOL/L (ref 99–110)
CO2: 19 MMOL/L (ref 21–32)
CREAT SERPL-MCNC: 0.7 MG/DL (ref 0.9–1.3)
EOSINOPHILS ABSOLUTE: 0.5 K/UL (ref 0–0.6)
EOSINOPHILS RELATIVE PERCENT: 7.8 %
ESTIMATED AVERAGE GLUCOSE: 79.6 MG/DL
GFR AFRICAN AMERICAN: >60
GFR NON-AFRICAN AMERICAN: >60
GLOBULIN: 2.6 G/DL
GLUCOSE BLD-MCNC: 101 MG/DL (ref 70–99)
GLUCOSE BLD-MCNC: 102 MG/DL (ref 70–99)
GLUCOSE BLD-MCNC: 126 MG/DL (ref 70–99)
HBA1C MFR BLD: 4.4 %
HCT VFR BLD CALC: 42.7 % (ref 40.5–52.5)
HEMOGLOBIN: 15.1 G/DL (ref 13.5–17.5)
LIPASE: 38 U/L (ref 13–60)
LYMPHOCYTES ABSOLUTE: 1.7 K/UL (ref 1–5.1)
LYMPHOCYTES RELATIVE PERCENT: 23.4 %
MCH RBC QN AUTO: 32.1 PG (ref 26–34)
MCHC RBC AUTO-ENTMCNC: 35.3 G/DL (ref 31–36)
MCV RBC AUTO: 90.8 FL (ref 80–100)
MONOCYTES ABSOLUTE: 0.6 K/UL (ref 0–1.3)
MONOCYTES RELATIVE PERCENT: 8.1 %
NEUTROPHILS ABSOLUTE: 4.2 K/UL (ref 1.7–7.7)
NEUTROPHILS RELATIVE PERCENT: 60.4 %
PDW BLD-RTO: 12.7 % (ref 12.4–15.4)
PERFORMED ON: ABNORMAL
PERFORMED ON: ABNORMAL
PH VENOUS: 7.44 (ref 7.35–7.45)
PHOSPHORUS: 4.1 MG/DL (ref 2.5–4.9)
PLATELET # BLD: 174 K/UL (ref 135–450)
PMV BLD AUTO: 8.3 FL (ref 5–10.5)
POTASSIUM REFLEX MAGNESIUM: 4 MMOL/L (ref 3.5–5.1)
RBC # BLD: 4.7 M/UL (ref 4.2–5.9)
SODIUM BLD-SCNC: 138 MMOL/L (ref 136–145)
TOTAL PROTEIN: 7 G/DL (ref 6.4–8.2)
TSH SERPL DL<=0.05 MIU/L-ACNC: 1.11 UIU/ML (ref 0.27–4.2)
VITAMIN B-12: 306 PG/ML (ref 211–911)
WBC # BLD: 7 K/UL (ref 4–11)

## 2021-07-18 PROCEDURE — 82607 VITAMIN B-12: CPT

## 2021-07-18 PROCEDURE — 94761 N-INVAS EAR/PLS OXIMETRY MLT: CPT

## 2021-07-18 PROCEDURE — 6360000002 HC RX W HCPCS: Performed by: NURSE PRACTITIONER

## 2021-07-18 PROCEDURE — 83036 HEMOGLOBIN GLYCOSYLATED A1C: CPT

## 2021-07-18 PROCEDURE — 85025 COMPLETE CBC W/AUTO DIFF WBC: CPT

## 2021-07-18 PROCEDURE — 2580000003 HC RX 258: Performed by: NURSE PRACTITIONER

## 2021-07-18 PROCEDURE — 6370000000 HC RX 637 (ALT 250 FOR IP): Performed by: NURSE PRACTITIONER

## 2021-07-18 PROCEDURE — 84100 ASSAY OF PHOSPHORUS: CPT

## 2021-07-18 PROCEDURE — 84443 ASSAY THYROID STIM HORMONE: CPT

## 2021-07-18 PROCEDURE — 36415 COLL VENOUS BLD VENIPUNCTURE: CPT

## 2021-07-18 PROCEDURE — 80053 COMPREHEN METABOLIC PANEL: CPT

## 2021-07-18 PROCEDURE — 83690 ASSAY OF LIPASE: CPT

## 2021-07-18 PROCEDURE — 82150 ASSAY OF AMYLASE: CPT

## 2021-07-18 PROCEDURE — 1200000000 HC SEMI PRIVATE

## 2021-07-18 PROCEDURE — 82330 ASSAY OF CALCIUM: CPT

## 2021-07-18 RX ORDER — DIPHENHYDRAMINE HCL 25 MG
25 TABLET ORAL ONCE
Status: DISCONTINUED | OUTPATIENT
Start: 2021-07-18 | End: 2021-07-20 | Stop reason: HOSPADM

## 2021-07-18 RX ORDER — LANOLIN ALCOHOL/MO/W.PET/CERES
9 CREAM (GRAM) TOPICAL NIGHTLY PRN
Status: DISCONTINUED | OUTPATIENT
Start: 2021-07-18 | End: 2021-07-19

## 2021-07-18 RX ADMIN — CHLORDIAZEPOXIDE HYDROCHLORIDE 25 MG: 25 CAPSULE ORAL at 07:47

## 2021-07-18 RX ADMIN — LORAZEPAM 4 MG: 2 INJECTION INTRAMUSCULAR; INTRAVENOUS at 17:20

## 2021-07-18 RX ADMIN — LAMOTRIGINE 100 MG: 100 TABLET ORAL at 07:47

## 2021-07-18 RX ADMIN — LORAZEPAM 4 MG: 2 INJECTION INTRAMUSCULAR; INTRAVENOUS at 15:57

## 2021-07-18 RX ADMIN — LORAZEPAM 2 MG: 2 INJECTION INTRAMUSCULAR; INTRAVENOUS at 02:50

## 2021-07-18 RX ADMIN — LORAZEPAM 4 MG: 2 INJECTION INTRAMUSCULAR; INTRAVENOUS at 13:37

## 2021-07-18 RX ADMIN — LORAZEPAM 4 MG: 2 INJECTION INTRAMUSCULAR; INTRAVENOUS at 19:24

## 2021-07-18 RX ADMIN — HYDROXYZINE HYDROCHLORIDE 50 MG: 25 TABLET, FILM COATED ORAL at 07:47

## 2021-07-18 RX ADMIN — LAMOTRIGINE 50 MG: 25 TABLET ORAL at 20:14

## 2021-07-18 RX ADMIN — FOLIC ACID 1 MG: 1 TABLET ORAL at 07:47

## 2021-07-18 RX ADMIN — CHLORDIAZEPOXIDE HYDROCHLORIDE 25 MG: 25 CAPSULE ORAL at 20:14

## 2021-07-18 RX ADMIN — LORAZEPAM 4 MG: 2 INJECTION INTRAMUSCULAR; INTRAVENOUS at 22:30

## 2021-07-18 RX ADMIN — Medication 10 ML: at 07:49

## 2021-07-18 RX ADMIN — Medication 10 ML: at 20:15

## 2021-07-18 RX ADMIN — LORAZEPAM 4 MG: 2 INJECTION INTRAMUSCULAR; INTRAVENOUS at 21:23

## 2021-07-18 RX ADMIN — LORAZEPAM 4 MG: 2 INJECTION INTRAMUSCULAR; INTRAVENOUS at 05:43

## 2021-07-18 RX ADMIN — CHLORDIAZEPOXIDE HYDROCHLORIDE 25 MG: 25 CAPSULE ORAL at 13:37

## 2021-07-18 RX ADMIN — LORAZEPAM 4 MG: 2 INJECTION INTRAMUSCULAR; INTRAVENOUS at 07:17

## 2021-07-18 RX ADMIN — LORAZEPAM 4 MG: 2 INJECTION INTRAMUSCULAR; INTRAVENOUS at 18:20

## 2021-07-18 RX ADMIN — ONDANSETRON 4 MG: 2 INJECTION INTRAMUSCULAR; INTRAVENOUS at 05:43

## 2021-07-18 RX ADMIN — LORAZEPAM 4 MG: 1 TABLET ORAL at 11:50

## 2021-07-18 RX ADMIN — LORAZEPAM 4 MG: 2 INJECTION INTRAMUSCULAR; INTRAVENOUS at 01:29

## 2021-07-18 RX ADMIN — LORAZEPAM 2 MG: 2 INJECTION INTRAMUSCULAR; INTRAVENOUS at 00:29

## 2021-07-18 RX ADMIN — LORAZEPAM 4 MG: 2 INJECTION INTRAMUSCULAR; INTRAVENOUS at 20:24

## 2021-07-18 RX ADMIN — LORAZEPAM 4 MG: 2 INJECTION INTRAMUSCULAR; INTRAVENOUS at 23:31

## 2021-07-18 RX ADMIN — Medication 10 ML: at 22:31

## 2021-07-18 RX ADMIN — ONDANSETRON 4 MG: 2 INJECTION INTRAMUSCULAR; INTRAVENOUS at 18:02

## 2021-07-18 RX ADMIN — ACETAMINOPHEN 650 MG: 325 TABLET ORAL at 23:38

## 2021-07-18 RX ADMIN — Medication 100 MG: at 07:46

## 2021-07-18 RX ADMIN — LORAZEPAM 2 MG: 2 INJECTION INTRAMUSCULAR; INTRAVENOUS at 04:50

## 2021-07-18 RX ADMIN — THERA TABS 1 TABLET: TAB at 07:48

## 2021-07-18 ASSESSMENT — PAIN DESCRIPTION - ORIENTATION
ORIENTATION: MID

## 2021-07-18 ASSESSMENT — PAIN DESCRIPTION - DESCRIPTORS
DESCRIPTORS: HEADACHE

## 2021-07-18 ASSESSMENT — PAIN DESCRIPTION - LOCATION
LOCATION: HEAD

## 2021-07-18 ASSESSMENT — PAIN DESCRIPTION - PAIN TYPE
TYPE: ACUTE PAIN

## 2021-07-18 ASSESSMENT — PAIN DESCRIPTION - ONSET
ONSET: ON-GOING

## 2021-07-18 ASSESSMENT — PAIN DESCRIPTION - PROGRESSION
CLINICAL_PROGRESSION: NOT CHANGED

## 2021-07-18 ASSESSMENT — PAIN SCALES - GENERAL
PAINLEVEL_OUTOF10: 5
PAINLEVEL_OUTOF10: 0
PAINLEVEL_OUTOF10: 3

## 2021-07-18 ASSESSMENT — PAIN - FUNCTIONAL ASSESSMENT
PAIN_FUNCTIONAL_ASSESSMENT: PREVENTS OR INTERFERES SOME ACTIVE ACTIVITIES AND ADLS

## 2021-07-18 ASSESSMENT — PAIN DESCRIPTION - FREQUENCY
FREQUENCY: CONTINUOUS
FREQUENCY: INTERMITTENT
FREQUENCY: INTERMITTENT

## 2021-07-18 NOTE — PROGRESS NOTES
Patient refused Lovenox injection this morning. Educated on the importance of preventing blood clots. Patient states, \"I am ambulating a lot in the halls to prevent a blood clot\". Continues to refuse Lovenox.

## 2021-07-18 NOTE — PROGRESS NOTES
Patient having nausea and vomiting. Zofran given. Ativan given per CIWA scale. Patient scoring in 30s most of shift. Alert and oriented. Bed alarm on. Seizure precautions in place. Call light and belongings within reach. Will continue to monitor.      Electronically signed by Damaso Garsia RN on 7/18/2021 at 6:11 PM

## 2021-07-18 NOTE — PROGRESS NOTES
Prn ativan and zofran given as ordered.  Electronically signed by Seven Naylor RN on 7/18/2021 at 5:54 AM

## 2021-07-18 NOTE — PROGRESS NOTES
Hospitalist Progress Note  7/18/2021 9:23 AM    PCP: Omid Vanegas MD    [id number]     Date of Admission: 7/16/2021                                                                                                                     HOSPITAL COURSE    Patient demographics:  The patient  Thanh Albrecht is a 35 y.o. male     Significant past medical history:   Patient Active Problem List   Diagnosis    Oppositional defiant disorder    Uncomplicated alcohol dependence ([address]    Episode of recurrent major depressive disorder ([address]    Alcohol abuse, continuous    Tobacco abuse    Alcohol withdrawal, uncomplicated ([address]    Benzodiazepine abuse ([address]    Panic attacks    MDD (major depressive disorder), recurrent episode, mild ([address]    Upper GI bleed    Bipolar disease, chronic ([address]    Alcohol withdrawal delirium ([address]    Acute on chronic pancreatitis ([address]    Drug-seeking behavior    Alcohol abuse    Chronic pancreatitis ([address]    Abdominal pain    Epigastric pain    Mood disorder ([address]    Tobacco dependence    Alcohol withdrawal syndrome, uncomplicated ([address]    Pancreatic duct calculus    Cirrhosis of liver ([address]    Alcohol abuse with withdrawal ([address]         Presenting symptoms:  Alcohol w/d,    Diagnostic workup:      CONSULTS DURING ADMISSION :   IP CONSULT TO SOCIAL WORK      Patient was diagnosed with:        Treatment while inpatient:                                                                                         ----------------------------------------------------------      SUBJECTIVE COMPLAINTS- follow up for alcohol abuse    Diet: ADULT DIET;  Regular      OBJECTIVE:   Patient Active Problem List   Diagnosis    Oppositional defiant disorder    Uncomplicated alcohol dependence ([address]    Episode of recurrent major depressive disorder ([address]    Alcohol abuse, continuous    Tobacco abuse    Alcohol withdrawal, uncomplicated ([address]    Benzodiazepine abuse ([address]    Panic attacks    MDD (major depressive disorder), recurrent episode, mild (HCC)    Upper GI bleed    Bipolar disease, chronic (HCC)    Alcohol withdrawal delirium [facility]    Acute on chronic pancreatitis ([facility]    Drug-seeking behavior    Alcohol abuse    Chronic pancreatitis ([facility]    Abdominal pain    Epigastric pain    Mood disorder (HCC)    Tobacco dependence    Alcohol withdrawal syndrome, uncomplicated (HCC)    Pancreatic duct calculus    Cirrhosis of liver (HCC)    Alcohol abuse with withdrawal (HCC)       Allergies  Amoxicillin, Haldol [haloperidol lactate], Phenergan [promethazine hcl], Shrimp (diagnostic), Glucosamine, and Shellfish-derived products    Medications    Scheduled Meds:   lamoTRIgine  100 mg Oral Daily    lamoTRIgine  50 mg Oral Nightly    hydrOXYzine  50 mg Oral QAM    sodium chloride flush  5-40 mL Intravenous 2 times per day    enoxaparin  40 mg Subcutaneous Daily    thiamine  100 mg Oral Daily    multivitamin  1 tablet Oral Daily    folic acid  1 mg Oral Daily    chlordiazePOXIDE  25 mg Oral TID    nicotine  1 patch Transdermal Daily    sodium chloride flush  5-40 mL Intravenous 2 times per day     Continuous Infusions:   sodium chloride      sodium chloride       PRN Meds:  sodium chloride flush, sodium chloride, ondansetron **OR** ondansetron, polyethylene glycol, acetaminophen **OR** acetaminophen, sodium chloride flush, sodium chloride, LORazepam **OR** LORazepam **OR** LORazepam **OR** LORazepam **OR** LORazepam **OR** LORazepam **OR** LORazepam **OR** LORazepam    Vitals   Vitals /wt   Patient Vitals for the past 8 hrs:   BP Temp Temp src Pulse Resp SpO2 Weight   07/18/21 0833 -- -- -- -- -- 98 % --   07/18/21 0809 127/86 97.8 °F (36.6 °C) Oral 77 14 97 % --   07/18/21 0657 -- -- -- 82 -- -- --   07/18/21 0602 -- -- -- -- -- -- 143 lb 11.8 oz (65.2 kg)   07/18/21 0550 -- -- -- 84 -- -- --   07/18/21 0439 111/71 97.5 °F (36.4 °C) Oral 87 15 99 % --        72HR INTAKE/OUTPUT:      Intake/Output Summary (Last 24 hours) at 7/18/2021 0923  Last data filed at 7/18/2021 0605  Gross per 24 hour   Intake 160 ml   Output 550 ml   Net -390 ml       Exam:    Gen:   Alert and oriented ×3  Eyes: PERRL. No sclera icterus. No conjunctival injection. ENT: No discharge. Pharynx clear. External appearance of ears and nose normal.  Neck: Trachea midline. No obvious mass. Resp: No accessory muscle use. No crackles. No wheezes. No rhonchi. CV: Regular rate. Regular rhythm. No murmur or rub. No edema. GI: Non-tender. Non-distended. No hernia. Skin: Warm, dry, normal texture and turgor. Lymph: No cervical LAD. No supraclavicular LAD. M/S: / Ext. No cyanosis. No clubbing. No joint deformity. Neuro: CN 2-12 are intact,  no neurologic deficits noted. PT/INR: No results for input(s): PROTIME, INR in the last 72 hours. APTT: No results for input(s): APTT in the last 72 hours. CBC:   Recent Labs     07/16/21 1933 07/18/21 0438   WBC 6.8 7.0   HGB 16.1 15.1   HCT 45.1 42.7   MCV 89.9 90.8    174       BMP:   Recent Labs     07/16/21 1933 07/18/21 0437    138   K 3.6 4.0    104   CO2 20* 19*   PHOS  --  4.1   BUN 11 10   CREATININE 0.8* 0.7*       LIVER PROFILE:   Recent Labs     07/16/21 1933 07/18/21 0437   ALKPHOS 63 52   AST 44* 31   ALT 38 27   BILITOT 0.8 0.8     Recent Labs     07/18/21 0437   AMYLASE 47       Recent Labs     07/16/21 1933 07/18/21 0437   LIPASE 29.0 38.0       UA:No results for input(s): NITRITE, LABCAST, WBCUA, RBCUA, MUCUS in the last 72 hours. TROPONIN: No results for input(s): Delroy Beery in the last 72 hours. Lab Results   Component Value Date/Time    URRFLXCULT Not Indicated 07/16/2021 07:33 PM       No results for input(s): TSHREFLEX in the last 72 hours.     No components found for: [id number]  POC GLUCOSE:    Recent Labs     07/17/21  0949 07/17/21 2123 07/18/21  0626   POCGLU 125* 116* 126*     No results for input(s): LABA1C in the last 72 hours. Lab Results   Component Value Date    LABA1C 4.6 11/22/2017         ASSESSMENT AND PLAN  Alcohol withdrawal/addiction abuse:  CIWA score initially in ED: 19  CIWA protocol  MVI, thiamine, folate  Librium p.o. Accu-Cheks regular diet  SS consult for alcohol rehab  IVF  UDS positive for benzodiazepines. There is documentation that the patient is somewhat manipulative and has been caught pocketing medications administered in the hospital on admission. Code Status: Full Code        Dispo -continue care        The patient and / or the family were informed of the results of any tests, a time was given to answer questions, a plan was proposed and they agreed with plan. Arhs Aguila MD    This note was transcribed using [id number]. Please disregard any translational errors.

## 2021-07-18 NOTE — PLAN OF CARE
Problem: Pain:  Goal: Pain level will decrease  Description: Pain level will decrease  7/18/2021 1028 by Samuel Srivastava RN  Outcome: Ongoing  Note: Educated patient on pain management. Will assess patients pain level per unit protocol, and provide pain management measures as needed. Electronically signed by Samuel Srivastava RN on 7/18/2021 at 10:28 AM      Problem: Falls - Risk of:  Goal: Will remain free from falls  Description: Will remain free from falls  7/18/2021 1028 by Samuel Srivastava RN  Outcome: Ongoing  Note: Fall risk assessment completed. Fall precautions in place. Call light within reach. Pt educated on calling for assistance before getting up. Walkway free of clutter. Will continue to monitor.   Electronically signed by Samuel Srivastava RN on 7/18/2021 at 10:28 AM

## 2021-07-18 NOTE — PLAN OF CARE
Problem: Pain:  Goal: Pain level will decrease  Description: Pain level will decrease  7/17/2021 2303 by Lela uW RN  Outcome: Ongoing  7/17/2021 2302 by Lela Wu RN  Outcome: Ongoing  Goal: Control of acute pain  Description: Control of acute pain  7/17/2021 2303 by Lela Wu RN  Outcome: Ongoing  7/17/2021 2302 by Lela Wu RN  Outcome: Ongoing  Goal: Control of chronic pain  Description: Control of chronic pain  7/17/2021 2303 by Lela Wu RN  Outcome: Ongoing  7/17/2021 2302 by Lela Wu RN  Outcome: Ongoing     Problem: Falls - Risk of:  Goal: Will remain free from falls  Description: Will remain free from falls  7/17/2021 2303 by Lela Wu RN  Outcome: Ongoing  7/17/2021 2302 by Lela Wu RN  Outcome: Ongoing  Goal: Absence of physical injury  Description: Absence of physical injury  7/17/2021 2303 by Lela Wu RN  Outcome: Ongoing  7/17/2021 2302 by Llea Wu RN  Outcome: Ongoing

## 2021-07-18 NOTE — PROGRESS NOTES
Prn ativan given as ordered.  Pt ambulated hallway sba well Electronically signed by Emilie Eaton RN on 7/17/2021 at 9:21 PM

## 2021-07-18 NOTE — PROGRESS NOTES
Patient alert and oriented times four. Ciwa score of 20. Prn ativan given as ordered.  Electronically signed by Donna Lopez RN on 7/18/2021 at 1:35 AM

## 2021-07-18 NOTE — PROGRESS NOTES
Patient in bed eating breakfast. Requesting to take meds early. Meds given without difficulty. CIWA score of 7 at this time. Assisted to bathroom with standby assist. Patient unsteady. Seizure precautions in place. Complains of moderate headache but states Ativan is helping. Bed alarm on. Call light and belongings within reach. Will continue to monitor.      Electronically signed by Michael Casey RN on 7/18/2021 at 8:42 AM

## 2021-07-18 NOTE — PROGRESS NOTES
Patient alert, lying in bed, Bed alarm on, call light within reach. Pt assessed for pain. Pt denies any pain at this time. Will continue to monitor pt and assess for pain throughout rest of shift.  Electronically signed by Orlando Baca RN on 7/18/2021 at 2:37 AM

## 2021-07-18 NOTE — PROGRESS NOTES
Patient ambulating in [place] with [facility]. [address]. Ambulating with walker for steady gait.

## 2021-07-19 VITALS
SYSTOLIC BLOOD PRESSURE: 122 MMHG | BODY MASS INDEX: 22.25 KG/M2 | OXYGEN SATURATION: 95 % | TEMPERATURE: 98.5 F | DIASTOLIC BLOOD PRESSURE: 76 MMHG | HEART RATE: 92 BPM | RESPIRATION RATE: 15 BRPM | HEIGHT: 68 IN | WEIGHT: 146.83 LBS

## 2021-07-19 LAB
ANION GAP SERPL CALCULATED.3IONS-SCNC: 14 MMOL/L (ref 3–16)
BUN BLDV-MCNC: 7 MG/DL (ref 7–20)
CALCIUM SERPL-MCNC: 8.8 MG/DL (ref 8.3–10.6)
CHLORIDE BLD-SCNC: 105 MMOL/L (ref 99–110)
CO2: 20 MMOL/L (ref 21–32)
CREAT SERPL-MCNC: 0.7 MG/DL (ref 0.9–1.3)
GFR AFRICAN AMERICAN: >60
GFR NON-AFRICAN AMERICAN: >60
GLUCOSE BLD-MCNC: 100 MG/DL (ref 70–99)
GLUCOSE BLD-MCNC: 103 MG/DL (ref 70–99)
GLUCOSE BLD-MCNC: 115 MG/DL (ref 70–99)
HCT VFR BLD CALC: 44.8 % (ref 40.5–52.5)
HEMOGLOBIN: 15.9 G/DL (ref 13.5–17.5)
MCH RBC QN AUTO: 32.1 PG (ref 26–34)
MCHC RBC AUTO-ENTMCNC: 35.5 G/DL (ref 31–36)
MCV RBC AUTO: 90.4 FL (ref 80–100)
PDW BLD-RTO: 12.7 % (ref 12.4–15.4)
PERFORMED ON: ABNORMAL
PERFORMED ON: ABNORMAL
PLATELET # BLD: 167 K/UL (ref 135–450)
PMV BLD AUTO: 8.5 FL (ref 5–10.5)
POTASSIUM SERPL-SCNC: 3.8 MMOL/L (ref 3.5–5.1)
RBC # BLD: 4.96 M/UL (ref 4.2–5.9)
SODIUM BLD-SCNC: 139 MMOL/L (ref 136–145)
WBC # BLD: 6.6 K/UL (ref 4–11)

## 2021-07-19 PROCEDURE — 6360000002 HC RX W HCPCS: Performed by: HOSPITALIST

## 2021-07-19 PROCEDURE — 94761 N-INVAS EAR/PLS OXIMETRY MLT: CPT

## 2021-07-19 PROCEDURE — 36415 COLL VENOUS BLD VENIPUNCTURE: CPT

## 2021-07-19 PROCEDURE — 80048 BASIC METABOLIC PNL TOTAL CA: CPT

## 2021-07-19 PROCEDURE — 2580000003 HC RX 258: Performed by: NURSE PRACTITIONER

## 2021-07-19 PROCEDURE — 85027 COMPLETE CBC AUTOMATED: CPT

## 2021-07-19 PROCEDURE — 6360000002 HC RX W HCPCS: Performed by: NURSE PRACTITIONER

## 2021-07-19 PROCEDURE — 6370000000 HC RX 637 (ALT 250 FOR IP): Performed by: NURSE PRACTITIONER

## 2021-07-19 RX ORDER — LORAZEPAM 1 MG/1
1 TABLET ORAL
Status: DISCONTINUED | OUTPATIENT
Start: 2021-07-19 | End: 2021-07-19

## 2021-07-19 RX ORDER — LORAZEPAM 1 MG/1
2 TABLET ORAL
Status: DISCONTINUED | OUTPATIENT
Start: 2021-07-19 | End: 2021-07-19

## 2021-07-19 RX ORDER — LORAZEPAM 1 MG/1
1 TABLET ORAL
Status: DISCONTINUED | OUTPATIENT
Start: 2021-07-19 | End: 2021-07-20 | Stop reason: HOSPADM

## 2021-07-19 RX ORDER — LORAZEPAM 2 MG/ML
3 INJECTION INTRAMUSCULAR
Status: DISCONTINUED | OUTPATIENT
Start: 2021-07-19 | End: 2021-07-19

## 2021-07-19 RX ORDER — LANOLIN ALCOHOL/MO/W.PET/CERES
6 CREAM (GRAM) TOPICAL NIGHTLY PRN
Status: DISCONTINUED | OUTPATIENT
Start: 2021-07-19 | End: 2021-07-20 | Stop reason: HOSPADM

## 2021-07-19 RX ORDER — LORAZEPAM 2 MG/ML
1 INJECTION INTRAMUSCULAR
Status: DISCONTINUED | OUTPATIENT
Start: 2021-07-19 | End: 2021-07-20 | Stop reason: HOSPADM

## 2021-07-19 RX ORDER — LORAZEPAM 1 MG/1
3 TABLET ORAL
Status: DISCONTINUED | OUTPATIENT
Start: 2021-07-19 | End: 2021-07-19

## 2021-07-19 RX ORDER — LORAZEPAM 2 MG/ML
4 INJECTION INTRAMUSCULAR
Status: DISCONTINUED | OUTPATIENT
Start: 2021-07-19 | End: 2021-07-20 | Stop reason: HOSPADM

## 2021-07-19 RX ORDER — LORAZEPAM 1 MG/1
2 TABLET ORAL
Status: DISCONTINUED | OUTPATIENT
Start: 2021-07-19 | End: 2021-07-20 | Stop reason: HOSPADM

## 2021-07-19 RX ORDER — LORAZEPAM 2 MG/ML
1 INJECTION INTRAMUSCULAR
Status: DISCONTINUED | OUTPATIENT
Start: 2021-07-19 | End: 2021-07-19

## 2021-07-19 RX ORDER — LORAZEPAM 2 MG/ML
2 INJECTION INTRAMUSCULAR
Status: DISCONTINUED | OUTPATIENT
Start: 2021-07-19 | End: 2021-07-19

## 2021-07-19 RX ORDER — LORAZEPAM 1 MG/1
4 TABLET ORAL
Status: DISCONTINUED | OUTPATIENT
Start: 2021-07-19 | End: 2021-07-19

## 2021-07-19 RX ORDER — LORAZEPAM 2 MG/ML
2 INJECTION INTRAMUSCULAR
Status: DISCONTINUED | OUTPATIENT
Start: 2021-07-19 | End: 2021-07-20 | Stop reason: HOSPADM

## 2021-07-19 RX ORDER — LORAZEPAM 2 MG/ML
4 INJECTION INTRAMUSCULAR
Status: DISCONTINUED | OUTPATIENT
Start: 2021-07-19 | End: 2021-07-19

## 2021-07-19 RX ORDER — LORAZEPAM 1 MG/1
4 TABLET ORAL
Status: DISCONTINUED | OUTPATIENT
Start: 2021-07-19 | End: 2021-07-20 | Stop reason: HOSPADM

## 2021-07-19 RX ORDER — LORAZEPAM 2 MG/ML
3 INJECTION INTRAMUSCULAR
Status: DISCONTINUED | OUTPATIENT
Start: 2021-07-19 | End: 2021-07-20 | Stop reason: HOSPADM

## 2021-07-19 RX ORDER — HYDROXYZINE HYDROCHLORIDE 25 MG/1
25 TABLET, FILM COATED ORAL DAILY
Status: DISCONTINUED | OUTPATIENT
Start: 2021-07-20 | End: 2021-07-20 | Stop reason: HOSPADM

## 2021-07-19 RX ORDER — LORAZEPAM 1 MG/1
3 TABLET ORAL
Status: DISCONTINUED | OUTPATIENT
Start: 2021-07-19 | End: 2021-07-20 | Stop reason: HOSPADM

## 2021-07-19 RX ADMIN — CHLORDIAZEPOXIDE HYDROCHLORIDE 25 MG: 25 CAPSULE ORAL at 08:46

## 2021-07-19 RX ADMIN — LORAZEPAM 4 MG: 2 INJECTION INTRAMUSCULAR; INTRAVENOUS at 17:50

## 2021-07-19 RX ADMIN — LORAZEPAM 4 MG: 2 INJECTION INTRAMUSCULAR; INTRAVENOUS at 02:37

## 2021-07-19 RX ADMIN — LORAZEPAM 4 MG: 2 INJECTION INTRAMUSCULAR; INTRAVENOUS at 21:22

## 2021-07-19 RX ADMIN — LORAZEPAM 4 MG: 2 INJECTION INTRAMUSCULAR; INTRAVENOUS at 14:39

## 2021-07-19 RX ADMIN — LAMOTRIGINE 50 MG: 25 TABLET ORAL at 21:29

## 2021-07-19 RX ADMIN — THERA TABS 1 TABLET: TAB at 08:46

## 2021-07-19 RX ADMIN — FOLIC ACID 1 MG: 1 TABLET ORAL at 08:46

## 2021-07-19 RX ADMIN — Medication 10 ML: at 09:27

## 2021-07-19 RX ADMIN — LORAZEPAM 2 MG: 2 INJECTION INTRAMUSCULAR; INTRAVENOUS at 09:23

## 2021-07-19 RX ADMIN — HYDROXYZINE HYDROCHLORIDE 50 MG: 25 TABLET, FILM COATED ORAL at 08:46

## 2021-07-19 RX ADMIN — Medication 100 MG: at 08:46

## 2021-07-19 RX ADMIN — Medication 10 ML: at 21:30

## 2021-07-19 RX ADMIN — CHLORDIAZEPOXIDE HYDROCHLORIDE 25 MG: 25 CAPSULE ORAL at 21:29

## 2021-07-19 RX ADMIN — LAMOTRIGINE 100 MG: 100 TABLET ORAL at 13:04

## 2021-07-19 RX ADMIN — LORAZEPAM 4 MG: 2 INJECTION INTRAMUSCULAR; INTRAVENOUS at 01:38

## 2021-07-19 RX ADMIN — LORAZEPAM 4 MG: 2 INJECTION INTRAMUSCULAR; INTRAVENOUS at 03:34

## 2021-07-19 RX ADMIN — CHLORDIAZEPOXIDE HYDROCHLORIDE 25 MG: 25 CAPSULE ORAL at 14:39

## 2021-07-19 RX ADMIN — LORAZEPAM 4 MG: 2 INJECTION INTRAMUSCULAR; INTRAVENOUS at 00:35

## 2021-07-19 RX ADMIN — LORAZEPAM 4 MG: 2 INJECTION INTRAMUSCULAR; INTRAVENOUS at 11:33

## 2021-07-19 RX ADMIN — ONDANSETRON 4 MG: 2 INJECTION INTRAMUSCULAR; INTRAVENOUS at 10:26

## 2021-07-19 RX ADMIN — LORAZEPAM 4 MG: 2 INJECTION INTRAMUSCULAR; INTRAVENOUS at 10:26

## 2021-07-19 RX ADMIN — LORAZEPAM 4 MG: 2 INJECTION INTRAMUSCULAR; INTRAVENOUS at 07:38

## 2021-07-19 ASSESSMENT — PAIN SCALES - GENERAL
PAINLEVEL_OUTOF10: 0
PAINLEVEL_OUTOF10: 0
PAINLEVEL_OUTOF10: 3

## 2021-07-19 ASSESSMENT — PAIN DESCRIPTION - ORIENTATION: ORIENTATION: MID

## 2021-07-19 ASSESSMENT — PAIN DESCRIPTION - PROGRESSION: CLINICAL_PROGRESSION: NOT CHANGED

## 2021-07-19 ASSESSMENT — PAIN - FUNCTIONAL ASSESSMENT: PAIN_FUNCTIONAL_ASSESSMENT: PREVENTS OR INTERFERES SOME ACTIVE ACTIVITIES AND ADLS

## 2021-07-19 ASSESSMENT — PAIN DESCRIPTION - LOCATION: LOCATION: HEAD

## 2021-07-19 ASSESSMENT — PAIN DESCRIPTION - FREQUENCY: FREQUENCY: CONTINUOUS

## 2021-07-19 ASSESSMENT — PAIN DESCRIPTION - ONSET: ONSET: ON-GOING

## 2021-07-19 ASSESSMENT — PAIN DESCRIPTION - DESCRIPTORS: DESCRIPTORS: HEADACHE

## 2021-07-19 ASSESSMENT — PAIN DESCRIPTION - PAIN TYPE: TYPE: ACUTE PAIN

## 2021-07-19 NOTE — PROGRESS NOTES
Pt very anxious and restless, c/o having a \"panic attack\" and feeling/seeing bugs crawling on his skin. CIWA score 34, medicated with Ativan 4 mg IV, as ordered. Will continue to monitor.

## 2021-07-19 NOTE — PROGRESS NOTES
Hand off report given to Ene De La Torre RN  Electronically signed by Josh Rowan RN on 7/19/2021 at 10:27 AM

## 2021-07-19 NOTE — PROGRESS NOTES
Patient is alert and oriented x4, resting quietly in bed. No complaints of pain at this time. Bed rails up x4 for seizure precautions. Pt has continued CIWA scores above 20, which this nurse has given ativan 4mg for every 3 hours. Call light within reach. No other needs made known at this time. Fall precautions in place.  Electronically signed by Rivka Crigler, RN on 7/19/2021 at 6:48 PM

## 2021-07-19 NOTE — PROGRESS NOTES
DebWa scored pt with am meds and morning assessment. Brought pt PRN Ativan per order. L IV site is red, hard and pt cried out when flushed. Nurse educated pt that IV is infiltrated. Used R IV that is difficult to flush medications through. Informed Charge nurse.     Electronically signed by Mack Tafoya RN on 7/19/2021 at 9:48 AM

## 2021-07-19 NOTE — PLAN OF CARE
Problem: Pain:  Goal: Pain level will decrease  Description: Pain level will decrease  7/18/2021 2213 by Emily Marlow RN  Outcome: Met This Shift  Note: No c/o of pain per this shift. 7/18/2021 1028 by Briseida Mcwilliams RN  Outcome: Ongoing  Note: Educated patient on pain management. Will assess patients pain level per unit protocol, and provide pain management measures as needed. Electronically signed by Briseida Mcwilliams RN on 7/18/2021 at 10:28 AM   Goal: Control of acute pain  Description: Control of acute pain  Outcome: Met This Shift  Note: No c/o of pain per this shift. Goal: Control of chronic pain  Description: Control of chronic pain  Outcome: Met This Shift  Note: No c/o of pain per this shift. Problem: Falls - Risk of:  Goal: Will remain free from falls  Description: Will remain free from falls  7/18/2021 2213 by Emily Marlow RN  Outcome: Met This Shift  Note: Patient educated on fall prevention. Call light is within reach, bed locked in lowest position, personal items within reach, and bed alarm is on. Will round on patient per unit guidelines. 7/18/2021 1028 by Briseida Mcwilliams RN  Outcome: Ongoing  Note: Fall risk assessment completed. Fall precautions in place. Call light within reach. Pt educated on calling for assistance before getting up. Walkway free of clutter. Will continue to monitor. Electronically signed by Briseida Mcwilliams RN on 7/18/2021 at 10:28 AM   Goal: Absence of physical injury  Description: Absence of physical injury  Outcome: Met This Shift  Note: Pt is free of injury. No injury noted. Fall precautions in place. Call light within reach. Will monitor.

## 2021-07-19 NOTE — PLAN OF CARE
Problem: Pain:  Description: Pain management should include both nonpharmacologic and pharmacologic interventions. Goal: Pain level will decrease  Description: Pain level will decrease  7/19/2021 0715 by Alexis Blood RN  Outcome: Ongoing  7/18/2021 2213 by Britney Kendrick RN  Outcome: Met This Shift  Note: No c/o of pain per this shift. Goal: Control of acute pain  Description: Control of acute pain  7/19/2021 0715 by Alexis Blood RN  Outcome: Ongoing  7/18/2021 2213 by Britney Kendrick RN  Outcome: Met This Shift  Note: No c/o of pain per this shift. Goal: Control of chronic pain  Description: Control of chronic pain  7/19/2021 0715 by Alexis Blood RN  Outcome: Ongoing  7/18/2021 2213 by Britney Kendrick RN  Outcome: Met This Shift  Note: No c/o of pain per this shift. Problem: Falls - Risk of:  Goal: Will remain free from falls  Description: Will remain free from falls  7/19/2021 0715 by Alexis Blood RN  Outcome: Ongoing  7/18/2021 2213 by Britney Kendrick RN  Outcome: Met This Shift  Note: Patient educated on fall prevention. Call light is within reach, bed locked in lowest position, personal items within reach, and bed alarm is on. Will round on patient per unit guidelines. Goal: Absence of physical injury  Description: Absence of physical injury  7/19/2021 0715 by Alexis Blood RN  Outcome: Ongoing  7/18/2021 2213 by Britney Kendrick RN  Outcome: Met This Shift  Note: Pt is free of injury. No injury noted. Fall precautions in place. Call light within reach. Will monitor.

## 2021-07-19 NOTE — PLAN OF CARE
Problem: Pain:  Goal: Pain level will decrease  Description: Pain level will decrease  7/19/2021 1521 by Jorje Cheadle, RN  Outcome: Ongoing  7/19/2021 0715 by Jericho Asher RN  Outcome: Ongoing  Goal: Control of acute pain  Description: Control of acute pain  7/19/2021 1521 by Jorje Cheadle, RN  Outcome: Ongoing  7/19/2021 0715 by Jericho Asher RN  Outcome: Ongoing  Goal: Control of chronic pain  Description: Control of chronic pain  7/19/2021 1521 by Jorje Cheadle, RN  Outcome: Ongoing  7/19/2021 0715 by Jericho Asher RN  Outcome: Ongoing     Problem: Falls - Risk of:  Goal: Will remain free from falls  Description: Will remain free from falls  7/19/2021 1521 by Jorje Cheadle, RN  Outcome: Ongoing  7/19/2021 0715 by Jericho Asher RN  Outcome: Ongoing  Goal: Absence of physical injury  Description: Absence of physical injury  7/19/2021 1521 by Jorje Cheadle, RN  Outcome: Ongoing  7/19/2021 0715 by Jericho Asher RN  Outcome: Ongoing

## 2021-07-19 NOTE — PROGRESS NOTES
PT AAO x4 per this shift. VSS. Pt continues on CIWA protocol scoring high throughout this shift. Pt unable to sleep per this shift. NP Indu Min notified and placing orders for melatonin and benadryl. Pt refusing both d/t making him groggy. Education ineffective. Pt having ongoing nausea but refusing medicaine. Pt accepting tylenol of ongoing nausea. Pt able to tolerate PO fluids. Pt able to walk the floor with somewhat stedy gait with walker and PCA Thersa. No further needs voiced at this time. Fall precautions in place. Bed alarm on. Call light within reach. Will continue to round.  Electronically signed by Yoana Perales RN on 7/19/2021 at 4:11 AM

## 2021-07-19 NOTE — CARE COORDINATION
Attempted to see patient in am--with PCA for care; this pm resting per Rn.   Electronically signed by SHANAE Beaulieu, AUTUMN, Case Management on 7/19/2021 at 2:49 PM  [facility] [phone]

## 2021-07-19 NOTE — PROGRESS NOTES
CMU contacted for a tele camera as patient has set off his bed alarm 3 times since shift change at 0700, is growing increasingly impulsive, and is very unsteady on his feet. No cameras available at this time, but patient has been placed on the wait list. Per CMU there is 1 patient in front of this one no the wait list. CMU to alert this RN should a camra become available this shift. Patient resting in bed at this time w/ 3/4 bedrails up, bed alarm on, and all belongings within reach. Patient's primary RN Celestino Puckett alerted to request for tele camera.

## 2021-07-20 ENCOUNTER — HOSPITAL ENCOUNTER (INPATIENT)
Age: 31
LOS: 1 days | Discharge: LEFT BEFORE DISCHARGE | DRG: 770 | End: 2021-07-21
Attending: INTERNAL MEDICINE | Admitting: INTERNAL MEDICINE
Payer: COMMERCIAL

## 2021-07-20 VITALS
TEMPERATURE: 97.6 F | SYSTOLIC BLOOD PRESSURE: 132 MMHG | OXYGEN SATURATION: 100 % | BODY MASS INDEX: 21.52 KG/M2 | RESPIRATION RATE: 18 BRPM | WEIGHT: 141.98 LBS | HEART RATE: 81 BPM | DIASTOLIC BLOOD PRESSURE: 80 MMHG | HEIGHT: 68 IN

## 2021-07-20 DIAGNOSIS — F10.930 ALCOHOL WITHDRAWAL SYNDROME WITHOUT COMPLICATION (HCC): Primary | ICD-10-CM

## 2021-07-20 LAB
A/G RATIO: 1.8 (ref 1.1–2.2)
ALBUMIN SERPL-MCNC: 4.9 G/DL (ref 3.4–5)
ALP BLD-CCNC: 62 U/L (ref 40–129)
ALT SERPL-CCNC: 24 U/L (ref 10–40)
ANION GAP SERPL CALCULATED.3IONS-SCNC: 13 MMOL/L (ref 3–16)
AST SERPL-CCNC: 24 U/L (ref 15–37)
BASOPHILS ABSOLUTE: 0 K/UL (ref 0–0.2)
BASOPHILS RELATIVE PERCENT: 0.4 %
BILIRUB SERPL-MCNC: 0.7 MG/DL (ref 0–1)
BILIRUBIN URINE: ABNORMAL
BLOOD, URINE: NEGATIVE
BUN BLDV-MCNC: 10 MG/DL (ref 7–20)
CALCIUM SERPL-MCNC: 9.3 MG/DL (ref 8.3–10.6)
CHLORIDE BLD-SCNC: 104 MMOL/L (ref 99–110)
CLARITY: CLEAR
CO2: 22 MMOL/L (ref 21–32)
COLOR: ABNORMAL
CREAT SERPL-MCNC: 0.8 MG/DL (ref 0.9–1.3)
EOSINOPHILS ABSOLUTE: 0.2 K/UL (ref 0–0.6)
EOSINOPHILS RELATIVE PERCENT: 3.5 %
EPITHELIAL CELLS, UA: 1 /HPF (ref 0–5)
ETHANOL: NORMAL MG/DL (ref 0–0.08)
GFR AFRICAN AMERICAN: >60
GFR NON-AFRICAN AMERICAN: >60
GLOBULIN: 2.7 G/DL
GLUCOSE BLD-MCNC: 107 MG/DL (ref 70–99)
GLUCOSE URINE: NEGATIVE MG/DL
HCT VFR BLD CALC: 44 % (ref 40.5–52.5)
HEMOGLOBIN: 15.6 G/DL (ref 13.5–17.5)
HYALINE CASTS: 13 /LPF (ref 0–8)
KETONES, URINE: ABNORMAL MG/DL
LEUKOCYTE ESTERASE, URINE: ABNORMAL
LIPASE: 24 U/L (ref 13–60)
LYMPHOCYTES ABSOLUTE: 1.1 K/UL (ref 1–5.1)
LYMPHOCYTES RELATIVE PERCENT: 16.1 %
MCH RBC QN AUTO: 32.2 PG (ref 26–34)
MCHC RBC AUTO-ENTMCNC: 35.6 G/DL (ref 31–36)
MCV RBC AUTO: 90.5 FL (ref 80–100)
MICROSCOPIC EXAMINATION: YES
MONOCYTES ABSOLUTE: 0.5 K/UL (ref 0–1.3)
MONOCYTES RELATIVE PERCENT: 7.1 %
NEUTROPHILS ABSOLUTE: 4.9 K/UL (ref 1.7–7.7)
NEUTROPHILS RELATIVE PERCENT: 72.9 %
NITRITE, URINE: NEGATIVE
PDW BLD-RTO: 12.9 % (ref 12.4–15.4)
PH UA: 6 (ref 5–8)
PLATELET # BLD: 193 K/UL (ref 135–450)
PMV BLD AUTO: 8.5 FL (ref 5–10.5)
POTASSIUM REFLEX MAGNESIUM: 3.7 MMOL/L (ref 3.5–5.1)
PROTEIN UA: NEGATIVE MG/DL
RBC # BLD: 4.85 M/UL (ref 4.2–5.9)
RBC UA: 1 /HPF (ref 0–4)
SODIUM BLD-SCNC: 139 MMOL/L (ref 136–145)
SPECIFIC GRAVITY UA: 1.02 (ref 1–1.03)
TOTAL PROTEIN: 7.6 G/DL (ref 6.4–8.2)
URINE REFLEX TO CULTURE: ABNORMAL
URINE TYPE: ABNORMAL
UROBILINOGEN, URINE: 1 E.U./DL
WBC # BLD: 6.7 K/UL (ref 4–11)
WBC UA: 5 /HPF (ref 0–5)

## 2021-07-20 PROCEDURE — 6360000002 HC RX W HCPCS: Performed by: INTERNAL MEDICINE

## 2021-07-20 PROCEDURE — 2500000003 HC RX 250 WO HCPCS: Performed by: PHYSICIAN ASSISTANT

## 2021-07-20 PROCEDURE — 81001 URINALYSIS AUTO W/SCOPE: CPT

## 2021-07-20 PROCEDURE — 99283 EMERGENCY DEPT VISIT LOW MDM: CPT

## 2021-07-20 PROCEDURE — 6370000000 HC RX 637 (ALT 250 FOR IP): Performed by: INTERNAL MEDICINE

## 2021-07-20 PROCEDURE — 80053 COMPREHEN METABOLIC PANEL: CPT

## 2021-07-20 PROCEDURE — 82077 ASSAY SPEC XCP UR&BREATH IA: CPT

## 2021-07-20 PROCEDURE — 2580000003 HC RX 258: Performed by: PHYSICIAN ASSISTANT

## 2021-07-20 PROCEDURE — 85025 COMPLETE CBC W/AUTO DIFF WBC: CPT

## 2021-07-20 PROCEDURE — 1200000000 HC SEMI PRIVATE

## 2021-07-20 PROCEDURE — 83690 ASSAY OF LIPASE: CPT

## 2021-07-20 PROCEDURE — 96374 THER/PROPH/DIAG INJ IV PUSH: CPT

## 2021-07-20 PROCEDURE — 6360000002 HC RX W HCPCS: Performed by: PHYSICIAN ASSISTANT

## 2021-07-20 PROCEDURE — 96361 HYDRATE IV INFUSION ADD-ON: CPT

## 2021-07-20 RX ORDER — HYDROXYZINE HYDROCHLORIDE 25 MG/1
50 TABLET, FILM COATED ORAL NIGHTLY
Status: DISCONTINUED | OUTPATIENT
Start: 2021-07-20 | End: 2021-07-21 | Stop reason: HOSPADM

## 2021-07-20 RX ORDER — HYDROXYZINE HYDROCHLORIDE 25 MG/1
25 TABLET, FILM COATED ORAL EVERY MORNING
Status: DISCONTINUED | OUTPATIENT
Start: 2021-07-21 | End: 2021-07-21 | Stop reason: HOSPADM

## 2021-07-20 RX ORDER — POLYETHYLENE GLYCOL 3350 17 G/17G
17 POWDER, FOR SOLUTION ORAL DAILY PRN
Status: DISCONTINUED | OUTPATIENT
Start: 2021-07-20 | End: 2021-07-21 | Stop reason: HOSPADM

## 2021-07-20 RX ORDER — PANTOPRAZOLE SODIUM 40 MG/1
40 TABLET, DELAYED RELEASE ORAL
Status: DISCONTINUED | OUTPATIENT
Start: 2021-07-21 | End: 2021-07-21 | Stop reason: HOSPADM

## 2021-07-20 RX ORDER — ACETAMINOPHEN 325 MG/1
650 TABLET ORAL EVERY 6 HOURS PRN
Status: DISCONTINUED | OUTPATIENT
Start: 2021-07-20 | End: 2021-07-21 | Stop reason: HOSPADM

## 2021-07-20 RX ORDER — 0.9 % SODIUM CHLORIDE 0.9 %
1000 INTRAVENOUS SOLUTION INTRAVENOUS ONCE
Status: COMPLETED | OUTPATIENT
Start: 2021-07-20 | End: 2021-07-20

## 2021-07-20 RX ORDER — HYDROXYZINE HYDROCHLORIDE 25 MG/1
50 TABLET, FILM COATED ORAL NIGHTLY
COMMUNITY
End: 2021-11-24

## 2021-07-20 RX ORDER — LORAZEPAM 2 MG/ML
2 INJECTION INTRAMUSCULAR
Status: DISCONTINUED | OUTPATIENT
Start: 2021-07-20 | End: 2021-07-21 | Stop reason: HOSPADM

## 2021-07-20 RX ORDER — SODIUM CHLORIDE 9 MG/ML
25 INJECTION, SOLUTION INTRAVENOUS PRN
Status: DISCONTINUED | OUTPATIENT
Start: 2021-07-20 | End: 2021-07-21 | Stop reason: HOSPADM

## 2021-07-20 RX ORDER — LAMOTRIGINE 25 MG/1
50 TABLET ORAL NIGHTLY
Status: DISCONTINUED | OUTPATIENT
Start: 2021-07-20 | End: 2021-07-21 | Stop reason: HOSPADM

## 2021-07-20 RX ORDER — CHLORDIAZEPOXIDE HYDROCHLORIDE 5 MG/1
10 CAPSULE, GELATIN COATED ORAL 3 TIMES DAILY
Status: DISCONTINUED | OUTPATIENT
Start: 2021-07-20 | End: 2021-07-21 | Stop reason: HOSPADM

## 2021-07-20 RX ORDER — ONDANSETRON 2 MG/ML
4 INJECTION INTRAMUSCULAR; INTRAVENOUS EVERY 6 HOURS PRN
Status: DISCONTINUED | OUTPATIENT
Start: 2021-07-20 | End: 2021-07-21 | Stop reason: HOSPADM

## 2021-07-20 RX ORDER — LORAZEPAM 2 MG/ML
1 INJECTION INTRAMUSCULAR
Status: DISCONTINUED | OUTPATIENT
Start: 2021-07-20 | End: 2021-07-21 | Stop reason: HOSPADM

## 2021-07-20 RX ORDER — SODIUM CHLORIDE 0.9 % (FLUSH) 0.9 %
5-40 SYRINGE (ML) INJECTION EVERY 12 HOURS SCHEDULED
Status: DISCONTINUED | OUTPATIENT
Start: 2021-07-20 | End: 2021-07-21 | Stop reason: HOSPADM

## 2021-07-20 RX ORDER — LORAZEPAM 1 MG/1
1 TABLET ORAL
Status: DISCONTINUED | OUTPATIENT
Start: 2021-07-20 | End: 2021-07-21 | Stop reason: HOSPADM

## 2021-07-20 RX ORDER — ONDANSETRON 4 MG/1
4 TABLET, ORALLY DISINTEGRATING ORAL EVERY 8 HOURS PRN
Status: DISCONTINUED | OUTPATIENT
Start: 2021-07-20 | End: 2021-07-21 | Stop reason: HOSPADM

## 2021-07-20 RX ORDER — LORAZEPAM 1 MG/1
2 TABLET ORAL
Status: DISCONTINUED | OUTPATIENT
Start: 2021-07-20 | End: 2021-07-21 | Stop reason: HOSPADM

## 2021-07-20 RX ORDER — LORAZEPAM 1 MG/1
4 TABLET ORAL
Status: DISCONTINUED | OUTPATIENT
Start: 2021-07-20 | End: 2021-07-21 | Stop reason: HOSPADM

## 2021-07-20 RX ORDER — LAMOTRIGINE 100 MG/1
100 TABLET ORAL EVERY MORNING
Status: DISCONTINUED | OUTPATIENT
Start: 2021-07-21 | End: 2021-07-21 | Stop reason: HOSPADM

## 2021-07-20 RX ORDER — LORAZEPAM 2 MG/ML
3 INJECTION INTRAMUSCULAR
Status: DISCONTINUED | OUTPATIENT
Start: 2021-07-20 | End: 2021-07-21 | Stop reason: HOSPADM

## 2021-07-20 RX ORDER — LORAZEPAM 2 MG/ML
4 INJECTION INTRAMUSCULAR
Status: DISCONTINUED | OUTPATIENT
Start: 2021-07-20 | End: 2021-07-21 | Stop reason: HOSPADM

## 2021-07-20 RX ORDER — LORAZEPAM 2 MG/ML
1 INJECTION INTRAMUSCULAR ONCE
Status: COMPLETED | OUTPATIENT
Start: 2021-07-20 | End: 2021-07-20

## 2021-07-20 RX ORDER — SODIUM CHLORIDE 0.9 % (FLUSH) 0.9 %
5-40 SYRINGE (ML) INJECTION PRN
Status: DISCONTINUED | OUTPATIENT
Start: 2021-07-20 | End: 2021-07-21 | Stop reason: HOSPADM

## 2021-07-20 RX ORDER — LORAZEPAM 1 MG/1
3 TABLET ORAL
Status: DISCONTINUED | OUTPATIENT
Start: 2021-07-20 | End: 2021-07-21 | Stop reason: HOSPADM

## 2021-07-20 RX ORDER — ACETAMINOPHEN 650 MG/1
650 SUPPOSITORY RECTAL EVERY 6 HOURS PRN
Status: DISCONTINUED | OUTPATIENT
Start: 2021-07-20 | End: 2021-07-21 | Stop reason: HOSPADM

## 2021-07-20 RX ORDER — OMEPRAZOLE 20 MG/1
20 CAPSULE, DELAYED RELEASE ORAL DAILY
COMMUNITY
End: 2021-08-19

## 2021-07-20 RX ADMIN — ONDANSETRON 4 MG: 2 INJECTION INTRAMUSCULAR; INTRAVENOUS at 23:25

## 2021-07-20 RX ADMIN — LORAZEPAM 1 MG: 2 INJECTION INTRAMUSCULAR; INTRAVENOUS at 18:13

## 2021-07-20 RX ADMIN — FOLIC ACID: 5 INJECTION, SOLUTION INTRAMUSCULAR; INTRAVENOUS; SUBCUTANEOUS at 20:30

## 2021-07-20 RX ADMIN — CHLORDIAZEPOXIDE HYDROCHLORIDE 10 MG: 5 CAPSULE ORAL at 22:00

## 2021-07-20 RX ADMIN — SODIUM CHLORIDE 1000 ML: 9 INJECTION, SOLUTION INTRAVENOUS at 18:14

## 2021-07-20 RX ADMIN — LORAZEPAM 4 MG: 2 INJECTION INTRAMUSCULAR; INTRAVENOUS at 23:46

## 2021-07-20 ASSESSMENT — PAIN DESCRIPTION - FREQUENCY: FREQUENCY: CONTINUOUS

## 2021-07-20 ASSESSMENT — PAIN DESCRIPTION - ONSET: ONSET: ON-GOING

## 2021-07-20 ASSESSMENT — ENCOUNTER SYMPTOMS
EYE REDNESS: 0
CHOKING: 0
ABDOMINAL PAIN: 0
APNEA: 0
VOMITING: 1
EYE DISCHARGE: 0
BACK PAIN: 0
NAUSEA: 1
SHORTNESS OF BREATH: 0
FACIAL SWELLING: 0

## 2021-07-20 ASSESSMENT — PAIN SCALES - GENERAL
PAINLEVEL_OUTOF10: 1
PAINLEVEL_OUTOF10: 10

## 2021-07-20 ASSESSMENT — PAIN DESCRIPTION - PROGRESSION: CLINICAL_PROGRESSION: NOT CHANGED

## 2021-07-20 ASSESSMENT — PAIN DESCRIPTION - LOCATION
LOCATION: HEAD
LOCATION: HEAD

## 2021-07-20 ASSESSMENT — PAIN DESCRIPTION - PAIN TYPE
TYPE: ACUTE PAIN
TYPE: ACUTE PAIN

## 2021-07-20 ASSESSMENT — PAIN DESCRIPTION - ORIENTATION: ORIENTATION: MID

## 2021-07-20 ASSESSMENT — PAIN - FUNCTIONAL ASSESSMENT: PAIN_FUNCTIONAL_ASSESSMENT: PREVENTS OR INTERFERES SOME ACTIVE ACTIVITIES AND ADLS

## 2021-07-20 ASSESSMENT — PAIN DESCRIPTION - DESCRIPTORS: DESCRIPTORS: HEADACHE

## 2021-07-20 NOTE — DISCHARGE SUMMARY
Hospital Medicine Discharge Summary    Patient ID: Jerald Long      Patient's PCP: Sacha Carmen MD    Admit Date: 7/16/2021     Discharge Date: 7/19/2021  Left AMA    Admitting Physician: Pita Qureshi MD     Discharge Physician: Iva Hsu MD     Discharge Diagnoses: Active Hospital Problems    Diagnosis     Alcohol abuse with withdrawal ([facility] [address] [F10.139]        The patient was seen and examined on day of discharge and this discharge summary is in conjunction with any daily progress note from day of discharge. Hospital Course:   35 y.o. male who presents to [facility] with PMHx alcohol abuse, pneumonia, pancreatitis, portal vein thrombosis, \"seizures associated with alcohol withdrawal.\"     Seen on 7/11 given librium and d/c, took meds for a day or two then said \"screw it \" and started drinking again. Lives at group home, leaves during the day and goes drinking, then comes back to sleep. Has had seizures with ETOH w/d in the past according to the patient. . No alcohol x 24 hours, feeling paranoid, jittery.  Denies hallucinations.  Denies suicidal or homicidal ideation. Reports he does have family but endorses he does not get along with them very well because they have expectations of him that he just cannot seem to meet.     ED work-up: tachycardic , very anxious requiring Ativan.  CO2 20 .  Lipase 29.  Urine drug screen positive for benzodiazepines.  Urinalysis negative for ketones.  CBC unremarkable.  AST mildly elevated 44 LFT otherwise unremarkable. Chest x-ray negative, CT abd/pelvis chronic pancreatitis pancreatic duct dilated with  stones in main duct near ampulla unchanged from 6/2021 non obstructive bilateral nephrolithiasis      On exam the patient is resting comfortably.  Finished banana bag.  No evidence of encephalopathy.  Blood pressure and heart rate are stable and within normal limits.     Admitted inpateint placed on librium 25mg po TID with CIWA protocol. Despite being asleep and without tremors when he was awoken, and stable vitals  he seemed medicated but then immediately demanded more ativan every hour. He  Signed AMA evening 7/19/21     Physical Exam Performed:     /76   Pulse 92   Temp 98.5 °F (36.9 °C) (Oral)   Resp 15   Ht 5' 8\" (1.727 m)   Wt 146 lb 13.2 oz (66.6 kg)   SpO2 95%   BMI 22.32 kg/m²   Refer to exam from progress note that am    Labs: For convenience and continuity at follow-up the following most recent labs are provided:      CBC:    Lab Results   Component Value Date    WBC 6.6 07/19/2021    HGB 15.9 07/19/2021    HCT 44.8 07/19/2021     07/19/2021       Renal:    Lab Results   Component Value Date     07/19/2021    K 3.8 07/19/2021    K 4.0 07/18/2021     07/19/2021    CO2 20 07/19/2021    BUN 7 07/19/2021    CREATININE 0.7 07/19/2021    CALCIUM 8.8 07/19/2021    PHOS 4.1 07/18/2021         Significant Diagnostic Studies    Radiology:   CT ABDOMEN WO CONTRAST Additional Contrast? None   Final Result   Stable findings of chronic pancreatitis. The pancreatic duct remains dilated   with possible stones in the main pancreatic duct in the lower head segment. Nonobstructive bilateral nephrolithiasis. Punctate caliceal stones are noted. No acute inflammatory abnormality is identified. XR CHEST PORTABLE   Final Result   No acute cardiopulmonary disease.               Consults:     IP CONSULT TO SOCIAL WORK  IP CONSULT TO SOCIAL WORK    Disposition:  Left AMA 958pm 7/19/21  Condition at Discharge: Stable    Discharge Instructions/Follow-up:  PCP    Code Status:  Prior     Activity: activity as tolerated    Diet: regular diet      Discharge Medications:     Discharge Medication List as of 7/19/2021 10:13 PM           Details   !! lamoTRIgine (LAMICTAL) 100 MG tablet Take 100 mg by mouth daily Takes at [facility]      !! lamoTRIgine (LAMICTAL) 100 MG tablet Take 50 mg by mouth nightly Historical Med      hydrOXYzine (ATARAX) 25 MG tablet Take by mouth 2 times daily 25 mg AM, 50 mg nightlyHistorical Med       !! - Potential duplicate medications found. Please discuss with provider. Time Spent on discharge is more than 30 minutes in the examination, evaluation, counseling and review of medications and discharge plan. Signed:    Jeyson Cuellar MD   7/20/2021      Thank you Curry Chapman MD for the opportunity to be involved in this patient's care. If you have any questions or concerns please feel free to contact me at [phone].

## 2021-07-20 NOTE — PROGRESS NOTES
Notified that patient left AMA d/t camera that was placed in his room. I was told he was taking the bus and not driving. He had been being dosed with ativan.       ARTURO Ayon - CNP 7/19/2021 9:58 PM

## 2021-07-20 NOTE — H&P
Hospital Medicine History & Physical      PCP: Briana Nava MD    Date of Admission: 7/20/2021    Date of Service: Pt seen/examined on 7/20/2021 and Admitted to Inpatient. Chief Complaint:  Alcohol withdrawal      History Of Present Illness: The patient is a 35 y.o. male with hx polysubstance abuse including alcoholism, manipulative behavior especially with benzos (he has pocketed them in the past), seizures who presents to [facility] with alcohol withdrawal. Patient was just admitted for the same and left AMA yesterday because he thought he could detox at home. He returns today with tremors and hallucinations. He is interested in going to rehab at discharge. In the ED, labs were unremarkable. Ethanol level was undetectable. U/A showed no evidence of infection. Past Medical History:        Diagnosis Date    Alcohol abuse     PATIENT HAS EXTREME MANIPULITIVE & DRUG SEEKING BEHAVIOR. HE POCKETS HIS BENZODIZAPINES.  Anxiety     Drug-seeking behavior     Several times found pocketing medications given in hospital    Herpes genitalis in men     Manipulative behavior     Pancreatitis     Pneumonia     Portal vein thrombosis     pt denied    Seizures (HCC)     PER PATIENT ONLY. DURING MULTIPLE HOSPITALIZATIONS HE HAS NEVER ONCE    Tobacco abuse        Past Surgical History:        Procedure Laterality Date    ENDOSCOPY, COLON, DIAGNOSTIC  10/28/2013    Endoscopic retrograde cholangiopancreatography with pancreatic stent placement    ENDOSCOPY, COLON, DIAGNOSTIC  03/23/2018    Esophagogastroduodenoscopy with biopsy    ERCP  1/10/14    with stent removal       Medications Prior to Admission:    Prior to Admission medications    Medication Sig Start Date End Date Taking?  Authorizing Provider   hydrOXYzine (ATARAX) 25 MG tablet Take 50 mg by mouth nightly   Yes Historical Provider, MD   omeprazole (PRILOSEC) 20 MG delayed release capsule Take 20 mg by mouth daily   Yes Historical Provider, MD   lamoTRIgine (LAMICTAL) 100 MG tablet Take 100 mg by mouth every morning    Yes Historical Provider, MD   lamoTRIgine (LAMICTAL) 100 MG tablet Take 50 mg by mouth nightly    Yes Historical Provider, MD   hydrOXYzine (ATARAX) 25 MG tablet Take 25 mg by mouth every morning 25 mg AM, 50 mg nightly   Yes Historical Provider, MD       Allergies:  Amoxicillin, Haldol [haloperidol lactate], Phenergan [promethazine hcl], Shrimp (diagnostic), Glucosamine, and Shellfish-derived products    Social History:  The patient currently lives at home. TOBACCO:   reports that he has been smoking cigarettes. He started smoking about 13 years ago. He has a 10.00 pack-year smoking history. He has never used smokeless tobacco.  ETOH:   reports current alcohol use. Family History:  Reviewed in detail and negative for DM, Early CAD, Cancer, CVA. Positive as follows:        Problem Relation Age of Onset    Hypertension Father     High Blood Pressure Father     Alcohol Abuse Father     Depression Mother     High Blood Pressure Paternal Grandfather     Alcohol Abuse Paternal Grandfather     Heart Disease Paternal Grandmother     Anemia Paternal Grandmother     Depression Maternal Aunt     Alcohol Abuse Paternal Aunt     Alcohol Abuse Paternal Uncle        REVIEW OF SYSTEMS:   Positive for and as noted in the HPI. All other systems reviewed and negative. PHYSICAL EXAM:    BP (!) 147/97   Pulse 108 Comment: 108  Temp 98.3 °F (36.8 °C) (Temporal)   Resp 20   Ht 5' 8\" (1.727 m)   Wt 141 lb 15.6 oz (64.4 kg)   SpO2 97%   BMI 21.59 kg/m²     General appearance: No apparent distress appears stated age and cooperative. HEENT Normal cephalic, atraumatic without obvious deformity. Pupils equal, round, and reactive to light. Extra ocular muscles intact. Conjunctivae/corneas clear. Neck: Supple, No jugular venous distention/bruits. Trachea midline without thyromegaly or adenopathy with full range of motion. Lungs: Clear to auscultation, bilaterally without Rales/Wheezes/Rhonchi with good respiratory effort. Heart: Regular rate and rhythm with Normal S1/S2 without murmurs, rubs or gallops, point of maximum impulse non-displaced  Abdomen: Soft, non-tender or non-distended without rigidity or guarding and positive bowel sounds all four quadrants. Extremities: No clubbing, cyanosis, or edema bilaterally. Full range of motion without deformity and normal gait intact. Skin: Skin color, texture, turgor normal.  No rashes or lesions. Neurologic: Bilateral upper extremity tremors, visual hallucinations per patient. Alert and oriented X 3, neurovascularly intact with sensory/motor intact upper extremities/lower extremities, bilaterally. Cranial nerves: II-XII intact, grossly non-focal.  Mental status: Alert, oriented, thought content appropriate. Capillary Refill: Acceptable  < 3 seconds  Peripheral Pulses: +3 Easily felt, not easily obliterated with pressure      CBC   Recent Labs     07/18/21 0438 07/19/21  0730 07/20/21  1519   WBC 7.0 6.6 6.7   HGB 15.1 15.9 15.6   HCT 42.7 44.8 44.0    167 193      RENAL  Recent Labs     07/18/21 0437 07/19/21  0730 07/20/21  1519    139 139   K 4.0 3.8 3.7    105 104   CO2 19* 20* 22   PHOS 4.1  --   --    BUN 10 7 10   CREATININE 0.7* 0.7* 0.8*     LFT'S  Recent Labs     07/18/21  0437 07/20/21  1519   AST 31 24   ALT 27 24   BILITOT 0.8 0.7   ALKPHOS 52 62     COAG  No results for input(s): INR in the last 72 hours. CARDIAC ENZYMES  No results for input(s): CKTOTAL, CKMB, CKMBINDEX, TROPONINI in the last 72 hours.     U/A:    Lab Results   Component Value Date    COLORU DK YELLOW 07/20/2021    WBCUA 5 07/20/2021    RBCUA 1 07/20/2021    CLARITYU Clear 07/20/2021    SPECGRAV 1.024 07/20/2021 LEUKOCYTESUR SMALL 07/20/2021    BLOODU Negative 07/20/2021    GLUCOSEU Negative 07/20/2021       ABG  No results found for: CFG1VNF, BEART, U5NNUIQB, PHART, THGBART, YXL7DFF, PO2ART, GPI3MEI        Active Hospital Problems    Diagnosis Date Noted    Alcohol withdrawal, uncomplicated ([facility] [address] [S64.421] 01/28/2018         PHYSICIANS CERTIFICATION:    I certify that Claudia Hair is expected to be hospitalized for more than 2 midnights based on the following assessment and plan:      ASSESSMENT/PLAN:        Alcohol withdrawal without complication  -Rally pack x 3 days  -CIWA protocol  -Ativan PRN  -Librium TID  -patient known for manipulative behavior and pocketing benzos, please observe patient when he takes medication  -SW consulted for rehab resources  -monitor phosphorous for refeeding syndrome    Seizure disorder  -Lamictal BID    GERD  -PPI    DVT Prophylaxis: Lovenox  Diet: ADULT DIET; Regular  Code Status: Full Code  PT/OT Eval Status: defer    Dispo - admit med/surg tele inpatient       Bernabe Alcaraz MD    Thank you Gilbert Arambula MD for the opportunity to be involved in this patient's care. If you have any questions or concerns please feel free to contact me at [phone].

## 2021-07-20 NOTE — ED PROVIDER NOTES
**ADVANCED PRACTICE PROVIDER, I HAVE EVALUATED THIS PATIENT**        [address]      Pt Name: Rochelle Powell  TBO:[id number]  [place] 1990  Date of evaluation: 7/20/2021  Provider: Jd Abernathy PA-C      Chief Complaint:    Chief Complaint   Patient presents with    Alcohol Problem     Was here for detox yesterday left AMA, drinks 12-16 beers a day     Abdominal Pain         Nursing Notes, Past Medical Hx, Past Surgical Hx, Social Hx, Allergies, and Family Hx were all reviewed and agreed with or any disagreements were addressed in the HPI.    HPI: (Location, Duration, Timing, Severity, Quality, Assoc Sx, Context, Modifying factors)  This is a  35 y.o. male who presents to the emergency room with chief complaint of alcohol withdrawal.  Katya Harris he was here admitted in hospital for rehab. In hospital for 5 days and he left last night around 9 or 10:00 PM.he can manage at home. Got home started having more nausea and shaking so he came back. Said he try to get into [employer] [facility] [employer]. He has had medical clearance. He states that he did not do any alcohol or drugs when he went home. He noticed tremors he still sees spots on the walls. Slight headache. Some nausea. Denies chest pain, no abdominal pain, no diarrhea. Denies any confusion. PastMedical/Surgical History:      Diagnosis Date    Alcohol abuse     PATIENT HAS EXTREME MANIPULITIVE & DRUG SEEKING BEHAVIOR. HE POCKETS HIS BENZODIZAPINES.  Anxiety     Drug-seeking behavior     Several times found pocketing medications given in hospital    Herpes genitalis in men     Manipulative behavior     Pancreatitis     Pneumonia     Portal vein thrombosis     pt denied    Seizures (HCC)     PER PATIENT ONLY.  DURING MULTIPLE HOSPITALIZATIONS HE HAS NEVER ONCE    Tobacco abuse          Procedure Laterality Date    ENDOSCOPY, COLON, DIAGNOSTIC  10/28/2013    Endoscopic retrograde cholangiopancreatography with pancreatic stent placement    ENDOSCOPY, COLON, DIAGNOSTIC  03/23/2018    Esophagogastroduodenoscopy with biopsy    ERCP  1/10/14    with stent removal       Medications:  Previous Medications    HYDROXYZINE (ATARAX) 25 MG TABLET    Take 25 mg by mouth every morning 25 mg AM, 50 mg nightly    HYDROXYZINE (ATARAX) 25 MG TABLET    Take 50 mg by mouth nightly    LAMOTRIGINE (LAMICTAL) 100 MG TABLET    Take 100 mg by mouth every morning     LAMOTRIGINE (LAMICTAL) 100 MG TABLET    Take 50 mg by mouth nightly     OMEPRAZOLE (PRILOSEC) 20 MG DELAYED RELEASE CAPSULE    Take 20 mg by mouth daily         Review of Systems:  (2-9 systems needed)  Review of Systems   Constitutional: Negative for chills and fever. HENT: Negative for congestion, facial swelling and sneezing. Eyes: Negative for discharge and redness. Respiratory: Negative for apnea, choking and shortness of breath. Cardiovascular: Negative for chest pain. Gastrointestinal: Positive for nausea and vomiting. Negative for abdominal pain. Genitourinary: Negative for dysuria. Musculoskeletal: Negative for back pain, neck pain and neck stiffness. Neurological: Positive for tremors and headaches. Negative for dizziness and seizures. All other systems reviewed and are negative. \"Positives and Pertinent negatives as per HPI\"    Physical Exam:  Physical Exam  Vitals and nursing note reviewed. Constitutional:       Appearance: He is well-developed. He is not diaphoretic. HENT:      Head: Normocephalic and atraumatic. Nose: Nose normal.   Eyes:      General:         Right eye: No discharge. Left eye: No discharge. Cardiovascular:      Rate and Rhythm: Normal rate and regular rhythm. Heart sounds: Normal heart sounds. No murmur heard. No friction rub. No gallop. Pulmonary:      Effort: Pulmonary effort is normal. No respiratory distress. Breath sounds: Normal breath sounds.  No wheezing or rales. Chest:      Chest wall: No tenderness. Abdominal:      General: Abdomen is flat. Bowel sounds are normal. There is no distension. Palpations: Abdomen is soft. There is no mass. Tenderness: There is no abdominal tenderness. There is no guarding or rebound. Hernia: No hernia is present. Musculoskeletal:         General: Normal range of motion. Cervical back: Normal range of motion and neck supple. Skin:     General: Skin is warm and dry. Neurological:      Mental Status: He is alert and oriented to person, place, and time. Sensory: Sensation is intact. Motor: Tremor present. Coordination: Coordination is intact. Gait: Gait is intact.    Psychiatric:         Behavior: Behavior normal.         MEDICAL DECISION MAKING    Vitals:    Vitals:    07/20/21 1506 07/20/21 1731   BP: (!) 147/97 (!) 147/97   Pulse: 108 108   Resp: 20    Temp: 98.3 °F (36.8 °C)    TempSrc: Temporal    SpO2: 97%    Weight: 141 lb 15.6 oz (64.4 kg)    Height: 5' 8\" (1.727 m)        LABS:  Labs Reviewed   COMPREHENSIVE METABOLIC PANEL W/ REFLEX TO MG FOR LOW K - Abnormal; Notable for the following components:       Result Value    Glucose 107 (*)     CREATININE 0.8 (*)     All other components within normal limits    Narrative:     Performed at:  [facility]  [address] [address]   Phone [phone]   URINE RT REFLEX TO CULTURE - Abnormal; Notable for the following components:    Bilirubin Urine SMALL (*)     Ketones, Urine TRACE (*)     Leukocyte Esterase, Urine SMALL (*)     All other components within normal limits    Narrative:     Performed at:  [facility]  [address] [place], [place] [address]   Phone [phone]   MICROSCOPIC URINALYSIS - Abnormal; Notable for the following components:    Hyaline Casts, UA 13 (*)     All other components within normal limits    Narrative: Performed at:  [facility]  [address] [facility] [address] [address]   Phone [phone]   CBC WITH AUTO DIFFERENTIAL    Narrative:     Performed at:  [facility]  [address] [facility] [address] [address]   Phone [phone]   LIPASE    Narrative:     Performed at:  [facility]  [address] [facility] [address] [address]   Phone [phone]   ETHANOL        Remainder of labs reviewed and were negative at this time or not returned at the time of this note. RADIOLOGY:   Non-plain film images such as CT, Ultrasound and MRI are read by the radiologist. Ewa Shah PA-C have directly visualized the radiologic plain film image(s) with the below findings:      Interpretation per the Radiologist below, if available at the time of this note:    No orders to display        [address] Additional Contrast? None    Result Date: 7/17/2021  EXAMINATION: CT ABDOMEN WITHOUT CONTRAST 7/17/2021 2:46 pm TECHNIQUE: CT of the abdomen was performed without the administration of intravenous contrast. Multiplanar reformatted images are provided for review. Dose modulation, iterative reconstruction, and/or weight based adjustment of the mA/kV was utilized to reduce the radiation dose to as low as reasonably achievable. COMPARISON: CT abdomen pelvis with contrast, 06/03/2021 HISTORY: ORDERING SYSTEM PROVIDED HISTORY: possible pancreatic duct TECHNOLOGIST PROVIDED HISTORY: Reason for exam:->possible pancreatic duct Additional Contrast?->None Reason for Exam: possible pancreatic duct stone Acuity: Acute Type of Exam: Initial FINDINGS: There are limitations without contrast Lower Chest: The lung bases are clear. Organs: The liver is homogeneous and normal in attenuation. No gallbladder stones are seen. There are multiple pancreatic parenchymal calcifications.   The pancreatic duct is dilated, measuring 6 mm at the level of the pancreatic body. There are several stones in the main pancreatic duct, near the ampulla, largest measuring 7 mm, unchanged. The spleen and adrenal glands are unremarkable. The kidneys are symmetrical.  There are bilateral caliceal calculi measuring 1-2 mm, greater on the right. There is no hydronephrosis. GI/Bowel: The stomach, small bowel and colon are unremarkable. Peritoneum/Retroperitoneum: Aorta is normal in caliber. No adenopathy, free fluid or pseudocyst is identified. Bones/Soft Tissues: No acute osseous abnormality. Abdominal wall is unremarkable. Stable findings of chronic pancreatitis. The pancreatic duct remains dilated with possible stones in the main pancreatic duct in the lower head segment. Nonobstructive bilateral nephrolithiasis. Punctate caliceal stones are noted. No acute inflammatory abnormality is identified. XR CHEST (2 VW)    Result Date: 7/7/2021  EXAMINATION: TWO XRAY VIEWS OF THE CHEST 7/7/2021 12:58 pm COMPARISON: 12/07/2019 HISTORY: ORDERING SYSTEM PROVIDED HISTORY: cough, lungs hurt when he coughs TECHNOLOGIST PROVIDED HISTORY: Reason for exam:->cough, lungs hurt when he coughs Reason for Exam: alcohol withdrawl Acuity: Acute Type of Exam: Initial Relevant Medical/Surgical History: cough FINDINGS: The heart and pulmonary vascularity are within normal limits. There are no focal areas of consolidation or pleural effusion. The osseous structures are intact. No acute abnormality     XR CHEST PORTABLE    Result Date: 7/16/2021  EXAMINATION: ONE XRAY VIEW OF THE CHEST 7/16/2021 7:43 pm COMPARISON: 07/07/2021 HISTORY: ORDERING SYSTEM PROVIDED HISTORY: tachycardia TECHNOLOGIST PROVIDED HISTORY: Reason for exam:->tachycardia Reason for Exam: tachycardia Acuity: Acute Type of Exam: Initial FINDINGS: The cardiac silhouette, mediastinal and hilar contours are normal.  The lungs are clear. Old granulomatous disease is noted. There are no pleural effusions.  No acute osseous abnormalities are identified. No acute cardiopulmonary disease. MEDICAL DECISION MAKING / ED COURSE:      PROCEDURES:   Procedures    None    Patient was given:  Medications   0.9 % sodium chloride bolus (1,000 mLs Intravenous New Bag 7/20/21 1814)   LORazepam (ATIVAN) injection 1 mg (1 mg Intravenous Given 7/20/21 1813)       Emergency room course: Patient on exam pupils are equal round and reactive to light extraocular movement is intact. No nystagmus. Throat is clear. Neck is supple full range of motion without tenderness. No midline tender cervical, thoracic or lumbar spine. Cardiovascular tachycardic rate rhythm normal.  No chest wall tenderness. Abdomen is nondistended. Normal bowel sounds all 4 quadrant. No rebound or guarding noted. No palpable mass. Bilateral lower extremities show no edema. He does have tremors noted to the upper extremity.  strength 5+ equal bilaterally no facial drooping no slurred speech noted. Tongue and smile does not deviate he is alert oriented x4. Answers questions appropriately does not appear to be in acute distress. Ambulatory with no difficulty difficulty. Lab results today show CBC within normal limits with a white count of 6.7. CMP unremarkable. Lipase 24.0. Urinalysis shows small leukocytes, negative for nitrates negative for blood, trace ketones. Patient was given a liter normal saline. Also given Ativan 1 mg IV. I did talk to Dr. Suresh Ruvalcaba hospitalist regarding this patient that I will put a consult in for admission he was okay with this. I did put a consult in through [employer] [facility]. Patient was okay with being admitted. He states he will stay this time. Will be admitted in stable condition. Banana bag was ordered as well. The patient tolerated their visit well. I evaluated the patient. The physician was available for consultation as needed.   The patient and / or the family were informed of the results of any tests, a time was given to answer questions, a plan was proposed and they agreed with plan. CLINICAL IMPRESSION:  1. Alcohol withdrawal syndrome without complication ([address]        DISPOSITION Decision To Admit 07/20/2021 06:33:13 PM      PATIENT REFERRED TO:  No follow-up provider specified.     DISCHARGE MEDICATIONS:  New Prescriptions    No medications on file       DISCONTINUED MEDICATIONS:  Discontinued Medications    No medications on file              (Please note the MDM and HPI sections of this note were completed with a voice recognition program.  Efforts were made to edit the dictations but occasionally words are mis-transcribed.)    Electronically signed, Asa Becerra PA-C,          Asa Becerra PA-C  07/20/21 0378

## 2021-07-21 NOTE — PROGRESS NOTES
Pt asked to be scored on the CIWA due to feeling a little nausea and a headache. I proceeded to score him. While asking him the questions pt was sitting calm on his bed with no obvious signs of distress. He was not having tremors, or hallucinations but was a little agitated. He then asked me how much Ativan he was going to be receiving? I told him that he did not score high enough to receive ativan but in turn offered him Tylenol for headache and Zofran for his nausea. He stated that was not good enough and told me to go get my charge nurse.

## 2021-07-21 NOTE — PROGRESS NOTES
Pt called and stated he wanted me to take out his IV. When I asked why he stated that he wanted to leave. I told him that I needed to contact the Doctor to make them aware and to get the [facility] paper. When I asked him why he wanted to leave he said that he just did and that he would file a grievance later.

## 2021-07-21 NOTE — PROGRESS NOTES
Pt stated he took his Atarax and Lamictal while in the ER. When I asked about why it wasn't in the mar he stated the nurse and doctors did not know he took it due to it being a home med that he brought with him. He refused his meds due to taking them in the ER.

## 2021-07-21 NOTE — PROGRESS NOTES
Called to pt's room because pt to speak to the charge nurse. Pt fully dressed and standing at the bedside, agitated and verbally aggressive. Pt complained that his nurse did not ask him \"the right questions\" from the CIWA protocol. Pt screaming at staff and threatening to leave AMA if he didn't receive medication. Pt advised to calm down or security would be called. Pt scored 23 on CIWA scale and received Ativan 4mg per prn order. 0015 Pt wanting to leave AMA.    Electronically signed by Teja Rose RN on 7/21/2021 at 12:16 AM

## 2021-07-21 NOTE — PLAN OF CARE
Problem: Falls - Risk of:  Goal: Will remain free from falls  Description: Will remain free from falls  Outcome: Ongoing  Goal: Absence of physical injury  Description: Absence of physical injury  Outcome: Ongoing     Problem: Safety:  Goal: Free from accidental physical injury pt will use call light   Description: Free from accidental physical injury  Outcome: Ongoing  Goal: Free from intentional harm  Description: Free from intentional harm  Outcome: Ongoing     Problem: Daily Care:  Goal: Daily care needs are met  Description: Daily care needs are met  Outcome: Ongoing     Problem: Pain:  Goal: Patient's pain/discomfort is manageable  Description: Patient's pain/discomfort is manageable  Outcome: Ongoing     Problem: Discharge Planning:  Goal: Patients continuum of care needs are met  Description: Patients continuum of care needs are met  Outcome: Ongoing

## 2021-07-21 NOTE — DISCHARGE SUMMARY
round, and reactive to light. Extra ocular muscles intact. Conjunctivae/corneas clear. Neck: Supple, No jugular venous distention/bruits. Trachea midline without thyromegaly or adenopathy with full range of motion. Lungs: Clear to auscultation, bilaterally without Rales/Wheezes/Rhonchi with good respiratory effort. Heart: Regular rate and rhythm with Normal S1/S2 without murmurs, rubs or gallops, point of maximum impulse non-displaced  Abdomen: Soft, non-tender or non-distended without rigidity or guarding and positive bowel sounds all four quadrants. Extremities: No clubbing, cyanosis, or edema bilaterally. Full range of motion without deformity and normal gait intact. Skin: Skin color, texture, turgor normal.  No rashes or lesions. Neurologic: Bilateral upper extremity tremors, visual hallucinations per patient. Alert and oriented X 3, neurovascularly intact with sensory/motor intact upper extremities/lower extremities, bilaterally. Cranial nerves: II-XII intact, grossly non-focal.  Mental status: Alert, oriented, thought content appropriate. Capillary Refill: Acceptable  < 3 seconds  Peripheral Pulses: +3 Easily felt, not easily obliterated with pressure    Labs:  For convenience and continuity at follow-up the following most recent labs are provided:      CBC:    Lab Results   Component Value Date    WBC 6.7 07/20/2021    HGB 15.6 07/20/2021    HCT 44.0 07/20/2021     07/20/2021       Renal:    Lab Results   Component Value Date     07/20/2021    K 3.7 07/20/2021     07/20/2021    CO2 22 07/20/2021    BUN 10 07/20/2021    CREATININE 0.8 07/20/2021    CALCIUM 9.3 07/20/2021    PHOS 4.1 07/18/2021         Significant Diagnostic Studies    Radiology:   No orders to display          Consults:     IP CONSULT TO SOCIAL WORK    Disposition:  AMA     Condition at Discharge: Stable    Discharge Instructions/Follow-up:  meds as prescribed, return to the ED for worsening withdrawal symptoms    Code Status:  Full Code    Activity: activity as tolerated    Diet: regular diet      Discharge Medications:     Discharge Medication List as of 7/21/2021  2:52 AM           Details   !! hydrOXYzine (ATARAX) 25 MG tablet Take 50 mg by mouth nightlyHistorical Med      omeprazole (PRILOSEC) 20 MG delayed release capsule Take 20 mg by mouth dailyHistorical Med      !! lamoTRIgine (LAMICTAL) 100 MG tablet Take 100 mg by mouth every morning Historical Med      !! lamoTRIgine (LAMICTAL) 100 MG tablet Take 50 mg by mouth nightly Historical Med      !! hydrOXYzine (ATARAX) 25 MG tablet Take 25 mg by mouth every morning 25 mg AM, 50 mg nightlyHistorical Med       !! - Potential duplicate medications found. Please discuss with provider. Time Spent on discharge is more than 30 minutes in the examination, evaluation, counseling and review of medications and discharge plan. Signed:    Nelson Riggs MD   7/21/2021      Thank you Briana Nava MD for the opportunity to be involved in this patient's care. If you have any questions or concerns please feel free to contact me at [phone].

## 2021-07-21 NOTE — PROGRESS NOTES
Kateryna my charge nurse came in and asked what was wrong. He then started yelling at her saying that Ching Rojas scored him wrong and that he wanted his ativan. She then advised me to rescore him. I asked him the same set of questions and he then proceeded to be \"worse\" off than he was when I scored him the first time. While scoring him he was then yelling at us stating I was yet again not asking him the right questions. He was told that he would receive 4 mg of ativan due to scoring 23 on the CIWA scale. He was asked to calm down or we were going to call security. and to get back into bed which he did and he received 4 mg of ativan per PRN order.

## 2021-07-21 NOTE — PROGRESS NOTES
I messaged Rainer Suarez NP to let her know that pt wanted to leave AMA. He was alert and oriented. She said ok. I printed the paper and called and asked sercurity to walk him out due him being verbally abusive to the staff. I removed his IV without complications and he was walked out with sercurity.

## 2021-07-21 NOTE — PROGRESS NOTES
While waiting on my charge nurse to arrive to his room pt was up walking around in his room yelling and being very verbally abusive to staff.

## 2021-08-19 ENCOUNTER — HOSPITAL ENCOUNTER (INPATIENT)
Age: 31
LOS: 1 days | Discharge: LEFT BEFORE DISCHARGE | DRG: 770 | End: 2021-08-20
Attending: EMERGENCY MEDICINE | Admitting: INTERNAL MEDICINE
Payer: COMMERCIAL

## 2021-08-19 DIAGNOSIS — R11.2 NAUSEA AND VOMITING, INTRACTABILITY OF VOMITING NOT SPECIFIED, UNSPECIFIED VOMITING TYPE: ICD-10-CM

## 2021-08-19 DIAGNOSIS — F10.932 ALCOHOL WITHDRAWAL SYNDROME WITH PERCEPTUAL DISTURBANCE (HCC): Primary | ICD-10-CM

## 2021-08-19 DIAGNOSIS — E86.0 DEHYDRATION: ICD-10-CM

## 2021-08-19 LAB
A/G RATIO: 1.6 (ref 1.1–2.2)
ALBUMIN SERPL-MCNC: 5.6 G/DL (ref 3.4–5)
ALP BLD-CCNC: 79 U/L (ref 40–129)
ALT SERPL-CCNC: 19 U/L (ref 10–40)
ANION GAP SERPL CALCULATED.3IONS-SCNC: 18 MMOL/L (ref 3–16)
AST SERPL-CCNC: 39 U/L (ref 15–37)
BASOPHILS ABSOLUTE: 0 K/UL (ref 0–0.2)
BASOPHILS RELATIVE PERCENT: 0.5 %
BILIRUB SERPL-MCNC: 0.6 MG/DL (ref 0–1)
BUN BLDV-MCNC: 9 MG/DL (ref 7–20)
CALCIUM SERPL-MCNC: 11 MG/DL (ref 8.3–10.6)
CHLORIDE BLD-SCNC: 96 MMOL/L (ref 99–110)
CO2: 24 MMOL/L (ref 21–32)
CREAT SERPL-MCNC: 0.8 MG/DL (ref 0.9–1.3)
EOSINOPHILS ABSOLUTE: 0.2 K/UL (ref 0–0.6)
EOSINOPHILS RELATIVE PERCENT: 2.7 %
ETHANOL: NORMAL MG/DL (ref 0–0.08)
GFR AFRICAN AMERICAN: >60
GFR NON-AFRICAN AMERICAN: >60
GLOBULIN: 3.6 G/DL
GLUCOSE BLD-MCNC: 125 MG/DL (ref 70–99)
HCT VFR BLD CALC: 51.6 % (ref 40.5–52.5)
HEMOGLOBIN: 18.1 G/DL (ref 13.5–17.5)
LACTIC ACID: 2.8 MMOL/L (ref 0.4–2)
LIPASE: 45 U/L (ref 13–60)
LYMPHOCYTES ABSOLUTE: 2.5 K/UL (ref 1–5.1)
LYMPHOCYTES RELATIVE PERCENT: 33.5 %
MCH RBC QN AUTO: 32.4 PG (ref 26–34)
MCHC RBC AUTO-ENTMCNC: 35.1 G/DL (ref 31–36)
MCV RBC AUTO: 92.2 FL (ref 80–100)
MONOCYTES ABSOLUTE: 0.6 K/UL (ref 0–1.3)
MONOCYTES RELATIVE PERCENT: 7.7 %
NEUTROPHILS ABSOLUTE: 4.2 K/UL (ref 1.7–7.7)
NEUTROPHILS RELATIVE PERCENT: 55.6 %
PDW BLD-RTO: 12.8 % (ref 12.4–15.4)
PLATELET # BLD: 260 K/UL (ref 135–450)
PMV BLD AUTO: 8.4 FL (ref 5–10.5)
POTASSIUM REFLEX MAGNESIUM: 4.4 MMOL/L (ref 3.5–5.1)
RBC # BLD: 5.6 M/UL (ref 4.2–5.9)
SODIUM BLD-SCNC: 138 MMOL/L (ref 136–145)
TOTAL PROTEIN: 9.2 G/DL (ref 6.4–8.2)
WBC # BLD: 7.6 K/UL (ref 4–11)

## 2021-08-19 PROCEDURE — 6370000000 HC RX 637 (ALT 250 FOR IP): Performed by: INTERNAL MEDICINE

## 2021-08-19 PROCEDURE — 2580000003 HC RX 258: Performed by: EMERGENCY MEDICINE

## 2021-08-19 PROCEDURE — 96375 TX/PRO/DX INJ NEW DRUG ADDON: CPT

## 2021-08-19 PROCEDURE — 82077 ASSAY SPEC XCP UR&BREATH IA: CPT

## 2021-08-19 PROCEDURE — 96376 TX/PRO/DX INJ SAME DRUG ADON: CPT

## 2021-08-19 PROCEDURE — 99285 EMERGENCY DEPT VISIT HI MDM: CPT

## 2021-08-19 PROCEDURE — 93005 ELECTROCARDIOGRAM TRACING: CPT | Performed by: EMERGENCY MEDICINE

## 2021-08-19 PROCEDURE — 6370000000 HC RX 637 (ALT 250 FOR IP): Performed by: EMERGENCY MEDICINE

## 2021-08-19 PROCEDURE — 96361 HYDRATE IV INFUSION ADD-ON: CPT

## 2021-08-19 PROCEDURE — 36415 COLL VENOUS BLD VENIPUNCTURE: CPT

## 2021-08-19 PROCEDURE — 80053 COMPREHEN METABOLIC PANEL: CPT

## 2021-08-19 PROCEDURE — 83605 ASSAY OF LACTIC ACID: CPT

## 2021-08-19 PROCEDURE — 6360000002 HC RX W HCPCS: Performed by: EMERGENCY MEDICINE

## 2021-08-19 PROCEDURE — 96374 THER/PROPH/DIAG INJ IV PUSH: CPT

## 2021-08-19 PROCEDURE — 85025 COMPLETE CBC W/AUTO DIFF WBC: CPT

## 2021-08-19 PROCEDURE — 1200000000 HC SEMI PRIVATE

## 2021-08-19 PROCEDURE — 83690 ASSAY OF LIPASE: CPT

## 2021-08-19 RX ORDER — ONDANSETRON 2 MG/ML
8 INJECTION INTRAMUSCULAR; INTRAVENOUS ONCE
Status: COMPLETED | OUTPATIENT
Start: 2021-08-19 | End: 2021-08-19

## 2021-08-19 RX ORDER — LORAZEPAM 2 MG/ML
2 INJECTION INTRAMUSCULAR ONCE
Status: COMPLETED | OUTPATIENT
Start: 2021-08-19 | End: 2021-08-19

## 2021-08-19 RX ORDER — NAPROXEN 500 MG/1
1 TABLET ORAL 2 TIMES DAILY
Status: ON HOLD | COMMUNITY
Start: 2021-07-20 | End: 2021-11-15

## 2021-08-19 RX ORDER — CLONIDINE HYDROCHLORIDE 0.1 MG/1
0.1 TABLET ORAL ONCE
Status: COMPLETED | OUTPATIENT
Start: 2021-08-19 | End: 2021-08-19

## 2021-08-19 RX ORDER — 0.9 % SODIUM CHLORIDE 0.9 %
30 INTRAVENOUS SOLUTION INTRAVENOUS ONCE
Status: COMPLETED | OUTPATIENT
Start: 2021-08-19 | End: 2021-08-19

## 2021-08-19 RX ORDER — DIAZEPAM 5 MG/1
10 TABLET ORAL ONCE
Status: COMPLETED | OUTPATIENT
Start: 2021-08-19 | End: 2021-08-19

## 2021-08-19 RX ADMIN — DIAZEPAM 10 MG: 5 TABLET ORAL at 23:45

## 2021-08-19 RX ADMIN — SODIUM CHLORIDE 2019 ML: 9 INJECTION, SOLUTION INTRAVENOUS at 18:01

## 2021-08-19 RX ADMIN — LORAZEPAM 2 MG: 2 INJECTION INTRAMUSCULAR; INTRAVENOUS at 18:00

## 2021-08-19 RX ADMIN — CLONIDINE HYDROCHLORIDE 0.1 MG: 0.1 TABLET ORAL at 19:28

## 2021-08-19 RX ADMIN — LORAZEPAM 2 MG: 2 INJECTION INTRAMUSCULAR; INTRAVENOUS at 20:03

## 2021-08-19 RX ADMIN — ONDANSETRON 8 MG: 2 INJECTION INTRAMUSCULAR; INTRAVENOUS at 20:02

## 2021-08-19 ASSESSMENT — PAIN SCALES - GENERAL
PAINLEVEL_OUTOF10: 7
PAINLEVEL_OUTOF10: 6
PAINLEVEL_OUTOF10: 3

## 2021-08-19 ASSESSMENT — PAIN DESCRIPTION - LOCATION
LOCATION: ABDOMEN
LOCATION: ABDOMEN

## 2021-08-19 ASSESSMENT — PAIN DESCRIPTION - ORIENTATION
ORIENTATION: LEFT
ORIENTATION: LEFT

## 2021-08-19 ASSESSMENT — PAIN DESCRIPTION - ONSET
ONSET: ON-GOING
ONSET: SUDDEN

## 2021-08-19 ASSESSMENT — PAIN - FUNCTIONAL ASSESSMENT
PAIN_FUNCTIONAL_ASSESSMENT: PREVENTS OR INTERFERES WITH MANY ACTIVE NOT PASSIVE ACTIVITIES
PAIN_FUNCTIONAL_ASSESSMENT: PREVENTS OR INTERFERES SOME ACTIVE ACTIVITIES AND ADLS

## 2021-08-19 ASSESSMENT — PAIN DESCRIPTION - FREQUENCY
FREQUENCY: CONTINUOUS
FREQUENCY: CONTINUOUS

## 2021-08-19 ASSESSMENT — PAIN DESCRIPTION - PAIN TYPE
TYPE: ACUTE PAIN
TYPE: ACUTE PAIN

## 2021-08-19 ASSESSMENT — PAIN DESCRIPTION - PROGRESSION
CLINICAL_PROGRESSION: GRADUALLY WORSENING
CLINICAL_PROGRESSION: NOT CHANGED

## 2021-08-19 ASSESSMENT — PAIN DESCRIPTION - DESCRIPTORS: DESCRIPTORS: ACHING

## 2021-08-19 NOTE — ED NOTES
Pt to ED with c/o n/v and right side abd pain. Pt also has tremors and sweating from possible etoh withdrawal.  States he drinks approx 12 beers/day and has not had any alcohol since yesterday evening.       Jason Amos RN  08/19/21 0651

## 2021-08-20 VITALS
OXYGEN SATURATION: 98 % | SYSTOLIC BLOOD PRESSURE: 135 MMHG | TEMPERATURE: 97.9 F | HEIGHT: 68 IN | RESPIRATION RATE: 15 BRPM | BODY MASS INDEX: 23.19 KG/M2 | DIASTOLIC BLOOD PRESSURE: 85 MMHG | HEART RATE: 79 BPM | WEIGHT: 153 LBS

## 2021-08-20 LAB
ANION GAP SERPL CALCULATED.3IONS-SCNC: 12 MMOL/L (ref 3–16)
BASOPHILS ABSOLUTE: 0 K/UL (ref 0–0.2)
BASOPHILS RELATIVE PERCENT: 0.5 %
BUN BLDV-MCNC: 8 MG/DL (ref 7–20)
CALCIUM SERPL-MCNC: 8.3 MG/DL (ref 8.3–10.6)
CHLORIDE BLD-SCNC: 105 MMOL/L (ref 99–110)
CO2: 21 MMOL/L (ref 21–32)
CREAT SERPL-MCNC: <0.5 MG/DL (ref 0.9–1.3)
EKG ATRIAL RATE: 73 BPM
EKG DIAGNOSIS: NORMAL
EKG P AXIS: 55 DEGREES
EKG P-R INTERVAL: 164 MS
EKG Q-T INTERVAL: 388 MS
EKG QRS DURATION: 94 MS
EKG QTC CALCULATION (BAZETT): 427 MS
EKG R AXIS: 92 DEGREES
EKG T AXIS: 47 DEGREES
EKG VENTRICULAR RATE: 73 BPM
EOSINOPHILS ABSOLUTE: 0.3 K/UL (ref 0–0.6)
EOSINOPHILS RELATIVE PERCENT: 4.9 %
GFR AFRICAN AMERICAN: >60
GFR NON-AFRICAN AMERICAN: >60
GLUCOSE BLD-MCNC: 90 MG/DL (ref 70–99)
HCT VFR BLD CALC: 47.6 % (ref 40.5–52.5)
HEMOGLOBIN: 16.4 G/DL (ref 13.5–17.5)
LYMPHOCYTES ABSOLUTE: 2 K/UL (ref 1–5.1)
LYMPHOCYTES RELATIVE PERCENT: 37.1 %
MCH RBC QN AUTO: 32.7 PG (ref 26–34)
MCHC RBC AUTO-ENTMCNC: 34.5 G/DL (ref 31–36)
MCV RBC AUTO: 94.7 FL (ref 80–100)
MONOCYTES ABSOLUTE: 0.4 K/UL (ref 0–1.3)
MONOCYTES RELATIVE PERCENT: 8.2 %
NEUTROPHILS ABSOLUTE: 2.6 K/UL (ref 1.7–7.7)
NEUTROPHILS RELATIVE PERCENT: 49.3 %
PDW BLD-RTO: 12.8 % (ref 12.4–15.4)
PLATELET # BLD: 161 K/UL (ref 135–450)
PMV BLD AUTO: 7.8 FL (ref 5–10.5)
POTASSIUM REFLEX MAGNESIUM: 4 MMOL/L (ref 3.5–5.1)
RBC # BLD: 5.02 M/UL (ref 4.2–5.9)
SODIUM BLD-SCNC: 138 MMOL/L (ref 136–145)
WBC # BLD: 5.3 K/UL (ref 4–11)

## 2021-08-20 PROCEDURE — 2580000003 HC RX 258: Performed by: INTERNAL MEDICINE

## 2021-08-20 PROCEDURE — 6360000002 HC RX W HCPCS: Performed by: INTERNAL MEDICINE

## 2021-08-20 PROCEDURE — 6370000000 HC RX 637 (ALT 250 FOR IP): Performed by: INTERNAL MEDICINE

## 2021-08-20 PROCEDURE — 80048 BASIC METABOLIC PNL TOTAL CA: CPT

## 2021-08-20 PROCEDURE — 36415 COLL VENOUS BLD VENIPUNCTURE: CPT

## 2021-08-20 PROCEDURE — 94760 N-INVAS EAR/PLS OXIMETRY 1: CPT

## 2021-08-20 PROCEDURE — 93010 ELECTROCARDIOGRAM REPORT: CPT | Performed by: INTERNAL MEDICINE

## 2021-08-20 PROCEDURE — 85025 COMPLETE CBC W/AUTO DIFF WBC: CPT

## 2021-08-20 RX ORDER — GAUZE BANDAGE 2" X 2"
100 BANDAGE TOPICAL DAILY
Status: DISCONTINUED | OUTPATIENT
Start: 2021-08-20 | End: 2021-08-20 | Stop reason: HOSPADM

## 2021-08-20 RX ORDER — POLYETHYLENE GLYCOL 3350 17 G/17G
17 POWDER, FOR SOLUTION ORAL DAILY PRN
Status: DISCONTINUED | OUTPATIENT
Start: 2021-08-20 | End: 2021-08-20 | Stop reason: HOSPADM

## 2021-08-20 RX ORDER — SODIUM CHLORIDE 0.9 % (FLUSH) 0.9 %
10 SYRINGE (ML) INJECTION PRN
Status: DISCONTINUED | OUTPATIENT
Start: 2021-08-20 | End: 2021-08-20 | Stop reason: HOSPADM

## 2021-08-20 RX ORDER — LAMOTRIGINE 25 MG/1
50 TABLET ORAL NIGHTLY
Status: DISCONTINUED | OUTPATIENT
Start: 2021-08-20 | End: 2021-08-20 | Stop reason: HOSPADM

## 2021-08-20 RX ORDER — FOLIC ACID 1 MG/1
1 TABLET ORAL DAILY
Status: DISCONTINUED | OUTPATIENT
Start: 2021-08-20 | End: 2021-08-20 | Stop reason: HOSPADM

## 2021-08-20 RX ORDER — ACETAMINOPHEN 650 MG/1
650 SUPPOSITORY RECTAL EVERY 4 HOURS PRN
Status: DISCONTINUED | OUTPATIENT
Start: 2021-08-20 | End: 2021-08-20 | Stop reason: HOSPADM

## 2021-08-20 RX ORDER — LORAZEPAM 1 MG/1
2 TABLET ORAL
Status: DISCONTINUED | OUTPATIENT
Start: 2021-08-20 | End: 2021-08-20 | Stop reason: HOSPADM

## 2021-08-20 RX ORDER — LAMOTRIGINE 100 MG/1
100 TABLET ORAL EVERY MORNING
Status: DISCONTINUED | OUTPATIENT
Start: 2021-08-20 | End: 2021-08-20 | Stop reason: HOSPADM

## 2021-08-20 RX ORDER — LORAZEPAM 2 MG/ML
3 INJECTION INTRAMUSCULAR
Status: DISCONTINUED | OUTPATIENT
Start: 2021-08-20 | End: 2021-08-20 | Stop reason: HOSPADM

## 2021-08-20 RX ORDER — SODIUM CHLORIDE 0.9 % (FLUSH) 0.9 %
10 SYRINGE (ML) INJECTION EVERY 12 HOURS SCHEDULED
Status: DISCONTINUED | OUTPATIENT
Start: 2021-08-20 | End: 2021-08-20 | Stop reason: HOSPADM

## 2021-08-20 RX ORDER — LORAZEPAM 2 MG/ML
4 INJECTION INTRAMUSCULAR
Status: DISCONTINUED | OUTPATIENT
Start: 2021-08-20 | End: 2021-08-20 | Stop reason: HOSPADM

## 2021-08-20 RX ORDER — PANTOPRAZOLE SODIUM 40 MG/1
40 TABLET, DELAYED RELEASE ORAL
Status: DISCONTINUED | OUTPATIENT
Start: 2021-08-20 | End: 2021-08-20 | Stop reason: HOSPADM

## 2021-08-20 RX ORDER — LORAZEPAM 2 MG/ML
2 INJECTION INTRAMUSCULAR
Status: DISCONTINUED | OUTPATIENT
Start: 2021-08-20 | End: 2021-08-20 | Stop reason: HOSPADM

## 2021-08-20 RX ORDER — LORAZEPAM 2 MG/ML
1 INJECTION INTRAMUSCULAR
Status: DISCONTINUED | OUTPATIENT
Start: 2021-08-20 | End: 2021-08-20 | Stop reason: HOSPADM

## 2021-08-20 RX ORDER — M-VIT,TX,IRON,MINS/CALC/FOLIC 27MG-0.4MG
1 TABLET ORAL DAILY
Status: DISCONTINUED | OUTPATIENT
Start: 2021-08-20 | End: 2021-08-20 | Stop reason: HOSPADM

## 2021-08-20 RX ORDER — ACETAMINOPHEN 325 MG/1
650 TABLET ORAL EVERY 4 HOURS PRN
Status: DISCONTINUED | OUTPATIENT
Start: 2021-08-20 | End: 2021-08-20 | Stop reason: HOSPADM

## 2021-08-20 RX ORDER — HYDROXYZINE HYDROCHLORIDE 25 MG/1
25 TABLET, FILM COATED ORAL EVERY MORNING
Status: DISCONTINUED | OUTPATIENT
Start: 2021-08-20 | End: 2021-08-20 | Stop reason: HOSPADM

## 2021-08-20 RX ORDER — LORAZEPAM 1 MG/1
1 TABLET ORAL
Status: DISCONTINUED | OUTPATIENT
Start: 2021-08-20 | End: 2021-08-20 | Stop reason: HOSPADM

## 2021-08-20 RX ORDER — HYDROXYZINE HYDROCHLORIDE 25 MG/1
50 TABLET, FILM COATED ORAL NIGHTLY
Status: DISCONTINUED | OUTPATIENT
Start: 2021-08-20 | End: 2021-08-20 | Stop reason: HOSPADM

## 2021-08-20 RX ORDER — ONDANSETRON 2 MG/ML
4 INJECTION INTRAMUSCULAR; INTRAVENOUS EVERY 4 HOURS PRN
Status: DISCONTINUED | OUTPATIENT
Start: 2021-08-20 | End: 2021-08-20 | Stop reason: HOSPADM

## 2021-08-20 RX ORDER — LORAZEPAM 1 MG/1
3 TABLET ORAL
Status: DISCONTINUED | OUTPATIENT
Start: 2021-08-20 | End: 2021-08-20 | Stop reason: HOSPADM

## 2021-08-20 RX ORDER — LORAZEPAM 1 MG/1
4 TABLET ORAL
Status: DISCONTINUED | OUTPATIENT
Start: 2021-08-20 | End: 2021-08-20 | Stop reason: HOSPADM

## 2021-08-20 RX ORDER — SODIUM CHLORIDE 9 MG/ML
25 INJECTION, SOLUTION INTRAVENOUS PRN
Status: DISCONTINUED | OUTPATIENT
Start: 2021-08-20 | End: 2021-08-20 | Stop reason: HOSPADM

## 2021-08-20 RX ORDER — SODIUM CHLORIDE 9 MG/ML
INJECTION, SOLUTION INTRAVENOUS CONTINUOUS
Status: DISCONTINUED | OUTPATIENT
Start: 2021-08-20 | End: 2021-08-20 | Stop reason: HOSPADM

## 2021-08-20 RX ADMIN — HYDROXYZINE HYDROCHLORIDE 25 MG: 25 TABLET, FILM COATED ORAL at 09:43

## 2021-08-20 RX ADMIN — LAMOTRIGINE 100 MG: 100 TABLET ORAL at 09:44

## 2021-08-20 RX ADMIN — Medication 1 TABLET: at 09:43

## 2021-08-20 RX ADMIN — ENOXAPARIN SODIUM 40 MG: 40 INJECTION SUBCUTANEOUS at 09:43

## 2021-08-20 RX ADMIN — LORAZEPAM 2 MG: 1 TABLET ORAL at 13:44

## 2021-08-20 RX ADMIN — SODIUM CHLORIDE: 9 INJECTION, SOLUTION INTRAVENOUS at 12:40

## 2021-08-20 RX ADMIN — Medication 10 ML: at 09:38

## 2021-08-20 RX ADMIN — LORAZEPAM 2 MG: 2 INJECTION INTRAMUSCULAR; INTRAVENOUS at 08:33

## 2021-08-20 RX ADMIN — LORAZEPAM 2 MG: 2 INJECTION INTRAMUSCULAR; INTRAVENOUS at 09:36

## 2021-08-20 RX ADMIN — ONDANSETRON 4 MG: 2 INJECTION INTRAMUSCULAR; INTRAVENOUS at 08:33

## 2021-08-20 RX ADMIN — SODIUM CHLORIDE: 9 INJECTION, SOLUTION INTRAVENOUS at 02:48

## 2021-08-20 RX ADMIN — Medication 100 MG: at 09:44

## 2021-08-20 RX ADMIN — LORAZEPAM 2 MG: 2 INJECTION INTRAMUSCULAR; INTRAVENOUS at 11:48

## 2021-08-20 RX ADMIN — PANTOPRAZOLE SODIUM 40 MG: 40 TABLET, DELAYED RELEASE ORAL at 09:02

## 2021-08-20 RX ADMIN — FOLIC ACID 1 MG: 1 TABLET ORAL at 09:43

## 2021-08-20 RX ADMIN — LORAZEPAM 4 MG: 2 INJECTION INTRAMUSCULAR; INTRAVENOUS at 02:51

## 2021-08-20 ASSESSMENT — PAIN SCALES - GENERAL: PAINLEVEL_OUTOF10: 0

## 2021-08-20 NOTE — PROGRESS NOTES
Monitoring patient vitals. Patient denies pain at this time. Complaints of nausea. Antiemetic administered and CIWA triggered x2. Resolved with medication intervention. See eMAR. Decreased PO and minimally tolerating fluids. Urine x1 unable to measure or send due to patient flushing. Continuing to monitor. No additional concerns at this time.

## 2021-08-20 NOTE — CARE COORDINATION
Spoke with [facility] and asked them to reach out to patient regarding treatment- provided patient cell # listed [phone], also provided  with GCB# and Name.

## 2021-08-20 NOTE — PROGRESS NOTES
Patient arrived to room [phone] from [facility]. Patient is A/Ox3, disoriented to time. Oriented patient to room and call light. Fall assessment completed, patient denies any fall history, but it a high fall risk d/t severe tremors and unsteady gait. Instructed patient to call for help out of bed and notified alarm on bed is activated should he forget to call for assistance, patient verbalized understanding. Scored patient using CIWA tool. Patient complains of nausea and having a constant itching feeling in his arms and over his body. Patient with a visible tremor at rest. Patient states he has slight auditory disturbances, but has moderate visual, complaining of seeing dark spots around the room. Patient with moderate anxiety and a severe headache. CIWA score at this time is 24, medicated patient per MD orders, see MAR. Patient denies any further needs at this time, will continue to monitor and assess for needs and comfort.

## 2021-08-20 NOTE — PROGRESS NOTES
Medication Reconciliation     List of medications patient is currently taking is complete. Source of information:   1. Conversation with patient at bedside  2. EPIC records        Notes regarding home medications:  1. Patient received all of his AM home medications today PTA. 2. Patient reports no longer taking Prilosec - removed from med list.     Denies any other OTC/herbal medications.      Gamaliel Montilla, Pharmacy Intern

## 2021-08-20 NOTE — PLAN OF CARE
Problem: Pain:  Goal: Pain level will decrease  Description: Pain level will decrease  Outcome: Ongoing  Note: Pt assessed for pain. Pt denies any pain at this time. Will continue to monitor pt and assess for pain throughout rest of shift. Goal: Control of acute pain  Description: Control of acute pain  Outcome: Ongoing  Note: Pt assessed for pain. Pt denies any pain at this time. Will continue to monitor pt and assess for pain throughout rest of shift. Goal: Control of chronic pain  Description: Control of chronic pain  Outcome: Ongoing  Note: Denies chronic pain. Problem: Falls - Risk of:  Goal: Will remain free from falls  Description: Will remain free from falls  Outcome: Ongoing  Note: Fall risk assessment completed. Fall precautions in place. Call light within reach. Pt educated on calling for assistance before getting up. Walkway free of clutter. Will continue to monitor. Goal: Absence of physical injury  Description: Absence of physical injury  Outcome: Ongoing  Note: Fall risk assessment completed. Fall precautions in place. Call light within reach. Pt educated on calling for assistance before getting up. Walkway free of clutter. Will continue to monitor.

## 2021-08-20 NOTE — H&P
Hospital Medicine  History and Physical    PCP: Jimy Mcneil MD  Patient Name: Blanca Flood    Date of Service: Pt seen/examined on 8/19/21 and admitted to Inpatient with expected LOS greater than two midnights due to medical therapy    CHIEF COMPLAINT:  Pt c/o tremors, abdominal pain  HISTORY OF PRESENT ILLNESS: Pt is an 35y.o. year-old male with a history of daily alcohol abuse, alcoholic liver cirrhosis and pancreatitis. Presents to the emergency room for evaluation of tremors and right upper quadrant abdominal pain. He states that his abdominal pain was present when he woke up. Is dull and aching and does not radiate. He believes that he is in alcohol withdrawal because he has nausea, sweating and he is beginning to have tremors. These are symptoms similar to those which he has had with past episodes of withdrawal.  He reports that he drinks approximately #12, 12 ounce beers daily. He has not had a drink of alcohol in the last 24 hours. He is being admitted for further evaluation and treatment. Associated signs and symptoms do not include fever or chills, nausea, vomiting, abdominal pain, hemoptysis, hematochezia, diarrhea, constipation or urinary symptoms. Past Medical History:        Diagnosis Date    Alcohol abuse     PATIENT HAS EXTREME MANIPULITIVE & DRUG SEEKING BEHAVIOR. HE POCKETS HIS BENZODIZAPINES.  Anxiety     Drug-seeking behavior     Several times found pocketing medications given in hospital    Herpes genitalis in men     Manipulative behavior     Pancreatitis     Pneumonia     Portal vein thrombosis     pt denied    Seizures (HCC)     PER PATIENT ONLY.  DURING MULTIPLE HOSPITALIZATIONS HE HAS NEVER ONCE    Tobacco abuse        Past Surgical History:        Procedure Laterality Date    ENDOSCOPY, COLON, DIAGNOSTIC  10/28/2013    Endoscopic retrograde cholangiopancreatography with pancreatic stent placement    ENDOSCOPY, COLON, DIAGNOSTIC  03/23/2018 Esophagogastroduodenoscopy with biopsy    ERCP  1/10/14    with stent removal       Allergies:  Amoxicillin, Haldol [haloperidol lactate], Phenergan [promethazine hcl], Shrimp (diagnostic), Glucosamine, and Shellfish-derived products    Medications Prior to Admission:    Prior to Admission medications    Medication Sig Start Date End Date Taking? Authorizing Provider   naproxen (NAPROSYN) 500 MG tablet Take 1 tablet by mouth 2 times daily 7/20/21  Yes Historical Provider, MD   hydrOXYzine (ATARAX) 25 MG tablet Take 50 mg by mouth nightly   Yes Historical Provider, MD   lamoTRIgine (LAMICTAL) 100 MG tablet Take 100 mg by mouth every morning    Yes Historical Provider, MD   lamoTRIgine (LAMICTAL) 100 MG tablet Take 50 mg by mouth nightly    Yes Historical Provider, MD   hydrOXYzine (ATARAX) 25 MG tablet Take 25 mg by mouth every morning 25 mg AM, 50 mg nightly   Yes Historical Provider, MD       Family History:       Problem Relation Age of Onset    Hypertension Father     High Blood Pressure Father     Alcohol Abuse Father     Depression Mother     High Blood Pressure Paternal Grandfather     Alcohol Abuse Paternal Grandfather     Heart Disease Paternal Grandmother     Anemia Paternal Grandmother     Depression Maternal Aunt     Alcohol Abuse Paternal Aunt     Alcohol Abuse Paternal Uncle      Social History:   TOBACCO:   reports that he has been smoking cigarettes. He started smoking about 13 years ago. He has a 10.00 pack-year smoking history. He has never used smokeless tobacco.  ETOH:   reports current alcohol use.   OCCUPATION:      REVIEW OF SYSTEMS:  A full review of systems was performed and is negative except for that which appears in the HPI    Physical Exam:    Vitals: /89   Pulse 101   Temp 98.9 °F (37.2 °C) (Oral)   Resp 24   Ht 5' 8\" (1.727 m)   Wt 148 lb 5.9 oz (67.3 kg)   SpO2 100%   BMI 22.56 kg/m²   General appearance: WD/WN 35y.o. year-old male who is alert, appears stated pain  Resolved Problems:    * No resolved hospital problems. *      Plan:       Alcohol withdrawal in the setting of chronic daily alcohol abuse - Pt advised to stop drinking Alcohol, and was advised to join a 12 step program or other support system.  has been consulted to provide information for resources to help with alcohol cessation. Will provide Thiamine, Folate and a Multivitamin. Pt will be monitored for withdrawal symptoms, and the CIWA protocol has been started to manage withdrawal. Alcohol use increases the risk of Cancer (cancer sites linked to alcohol use include the mouth, pharynx (throat), larynx (voice box), esophagus, liver, breast, and colorectal region), Cardiovascular disease, Cirrhosis, Dementia, Depression, Seizures, Gout, Hypertension, Infectious disease, Nerve damage (including alcoholic neuropathy, muscle weakness, incontinence, constipation, erectile dysfunction, and other problems), Pancreatitis and more. Abdominal pain - currently mild. Will monitor            DVT Prophylaxis: Lovenox   Diet: No diet orders on file  Code Status: Prior  (Advanced care planning has been discussed with patient and/or responsible family member and is reflected in the code status.  Further orders associated with this have been entered if appropriate)    Disposition: Anticipate that patient will remain in the hospital for 2 to 3 days depending on further evaluation and clinical course    Please note that over 50 minutes was spent in evaluating the patient, review of records and results, discussion with staff/family, etc.    Sheila Arnett MD, MD

## 2021-08-20 NOTE — FLOWSHEET NOTE
08/20/21 1411   Readmission Assessment   Number of Days since last admission? 8-30 days   Previous Disposition Other (comment)  (in group home)   Who is being Interviewed Patient   What was the patient's/caregiver's perception as to why they think they needed to return back to the hospital? Other (Comment)  (alcoholic)   Did you visit your Primary Care Physician after you left the hospital, before you returned this time? No   Why weren't you able to visit your PCP? Did not have an appointment   Did you see a specialist, such as Cardiac, Pulmonary, Orthopedic Physician, etc. after you left the hospital? No   Who advised the patient to return to the hospital? Self-referral   Does the patient report anything that got in the way of taking their medications? No   In our efforts to provide the best possible care to you and others like you, can you think of anything that we could have done to help you after you left the hospital the first time, so that you might not have needed to return so soon?  Other (Comment)

## 2021-08-20 NOTE — CARE COORDINATION
INITIAL CASE MANAGEMENT ASSESSMENT    Reviewed chart, met with patient discussed with him - that his current behaviors are effecting his health in the past 6 months he has been in the emergency room 11 times and admitted to hospital 6 times, how can we help him to stay sober and live a healthier life. SW working to assess possible discharge needs. Explained Case Management role/services. Living Situation:  Lives in group home in [place] since April- advised that he is active with [facility] [address] [facility] [address] [facility])  Reports that his  is [address] called [phone], advised that his  is Jesus Melchor [phone], called left message - requesting call back regarding discharge planning. ADLs: Independent     DME: none    PT/OT Recs: N/A     Active Services: [place] [facility]. SW called [place] and left message- requesting a call back. [phone]. Transportation: insurance can assist with discharge transport if needed. Medications: has insurance- [facility]    PCP:   Eloise Washington MD    HD/PD: N/A    PLAN/COMMENTS: EMILY is working on treatment but patient doesn't seem to be totally sold on going into treatment. EMILY has reached out to patient's  with [facility]. [facility] or SNF- list not given  Resources list for Addiction treatment given. The Plan for Transition of Care is related to the following treatment goals: return to group home or enter treatment for addictions at discharge. The Patient  was provided with a choice of provider and agrees   with the discharge plan. [x] Yes [] No    Freedom of choice list was provided with basic dialogue that supports the patient's individualized plan of care/goals, treatment preferences and shares the quality data associated with the providers.  [x] Yes [] No    EMILY/CM provided contact information for patient or family to call with any questions. SW/CM will follow and assist as needed.

## 2021-08-20 NOTE — PROGRESS NOTES
Patient verbalizing desire to be discharged. RN assured patient that MD will be notified but affirmed need to try PO medications before discharge. Within the hour patient got dressed, unhooked IV from fluids, and tried to walk off the unit. RN notified MD, Charge nurse, and reviewed the [facility] paperwork with the patient. Patient verbalized understanding.

## 2021-09-16 NOTE — DISCHARGE SUMMARY
Hospital Medicine Discharge Summary    Patient ID: Joe Pompa      Patient's PCP: Rossy Varghese MD    Admit Date: 8/19/2021     Discharge Date: 8/20/2021      Admitting Physician: Cony Mitchell MD     Discharge Physician: Skylar De Anda MD     Discharge Diagnoses: Active Hospital Problems    Diagnosis     Abdominal pain [R10.9]     Alcohol withdrawal, uncomplicated ([address] [D89.980]     Alcohol abuse, continuous [F10.10]        The patient was seen and examined on day of discharge and this discharge summary is in conjunction with any daily progress note from day of discharge. Hospital Course:    Pt is an 35y.o. year-old male with a history of daily alcohol abuse, alcoholic liver cirrhosis and pancreatitis. Presents to the emergency room for evaluation of tremors and right upper quadrant abdominal pain. He states that his abdominal pain was present when he woke up. Is dull and aching and does not radiate. He believes that he is in alcohol withdrawal because he has nausea, sweating and he is beginning to have tremors. These are symptoms similar to those which he has had with past episodes of withdrawal.  He reports that he drinks approximately #12, 12 ounce beers daily. He has not had a drink of alcohol in the last 24 hours. He is being admitted for further evaluation and treatment. Associated signs and symptoms do not include fever or chills, nausea, vomiting, abdominal pain, hemoptysis, hematochezia, diarrhea, constipation or urinary symptoms.     Alcohol withdrawal in the setting of chronic daily alcohol abuse - Pt advised to stop drinking Alcohol, and was advised to join a 12 step program or other support system.  has been consulted to provide information for resources to help with alcohol cessation. Will provide Thiamine, Folate and a Multivitamin.  Pt will be monitored for withdrawal symptoms, and the CIWA protocol has been started to manage withdrawal. Alcohol use increases the risk of Cancer (cancer sites linked to alcohol use include the mouth, pharynx (throat), larynx (voice box), esophagus, liver, breast, and colorectal region), Cardiovascular disease, Cirrhosis, Dementia, Depression, Seizures, Gout, Hypertension, Infectious disease, Nerve damage (including alcoholic neuropathy, muscle weakness, incontinence, constipation, erectile dysfunction, and other problems), Pancreatitis and more.      Abdominal pain - currently mild. Will monitor      Physical Exam Performed:     /85   Pulse 79   Temp 97.9 °F (36.6 °C) (Oral)   Resp 15   Ht 5' 8\" (1.727 m)   Wt 153 lb (69.4 kg)   SpO2 98%   BMI 23.26 kg/m²     General appearance: NAD  Lungs: clear to auscultation bilaterally and no use of accessory muscles. Heart: regular rate and rhythm, S1, S2 normal, no murmur, click, rub or gallop and normal apical impulse  Abdomen: soft, non-tender; bowel sounds normal; no masses, no organomegaly  Extremities: extremities atraumatic, no cyanosis or edema and Homans sign is negative, no sign of DVT. Capillary Refill: Acceptable < 3 seconds   Peripheral Pulses: +3 easily felt, not easily obliterated with pressures   Skin: Skin color, texture, turgor normal. No rashes or lesions on exposed skin  Neurologic:Tremuoulsness. Cranial nerves: II-XII intact, grossly non-focal. Gait was not tested. Mental Status: Alert and oriented, thought content appropriate, normal insight       Labs:  For convenience and continuity at follow-up the following most recent labs are provided:      CBC:    Lab Results   Component Value Date    WBC 5.3 08/20/2021    HGB 16.4 08/20/2021    HCT 47.6 08/20/2021     08/20/2021       Renal:    Lab Results   Component Value Date     08/20/2021    K 4.0 08/20/2021     08/20/2021    CO2 21 08/20/2021    BUN 8 08/20/2021    CREATININE <0.5 08/20/2021    CALCIUM 8.3 08/20/2021    PHOS 4.1 07/18/2021         Significant Diagnostic Studies    Radiology:   No orders to display          Consults:     IP CONSULT TO SOCIAL WORK    Disposition:  home     Condition at Discharge: Stable    Discharge Instructions/Follow-up:  pcp in 1 week    Code Status:  Prior     Activity: activity as tolerated    Diet: regular diet      Discharge Medications:     Discharge Medication List as of 8/20/2021  2:47 PM           Details   naproxen (NAPROSYN) 500 MG tablet Take 1 tablet by mouth 2 times dailyHistorical Med      !! hydrOXYzine (ATARAX) 25 MG tablet Take 50 mg by mouth nightlyHistorical Med      !! lamoTRIgine (LAMICTAL) 100 MG tablet Take 100 mg by mouth every morning Historical Med      !! lamoTRIgine (LAMICTAL) 100 MG tablet Take 50 mg by mouth nightly Historical Med      !! hydrOXYzine (ATARAX) 25 MG tablet Take 25 mg by mouth every morning 25 mg AM, 50 mg nightlyHistorical Med       !! - Potential duplicate medications found. Please discuss with provider. Time Spent on discharge is more than 30 minutes in the examination, evaluation, counseling and review of medications and discharge plan. Signed:    Neda Pascual MD   9/16/2021      Thank you Monica Lombardi MD for the opportunity to be involved in this patient's care. If you have any questions or concerns please feel free to contact me at [phone].

## 2021-09-29 ENCOUNTER — APPOINTMENT (OUTPATIENT)
Dept: CT IMAGING | Age: 31
DRG: 770 | End: 2021-09-29
Payer: COMMERCIAL

## 2021-09-29 ENCOUNTER — HOSPITAL ENCOUNTER (INPATIENT)
Age: 31
LOS: 1 days | Discharge: LEFT BEFORE DISCHARGE | DRG: 770 | End: 2021-09-30
Attending: EMERGENCY MEDICINE | Admitting: INTERNAL MEDICINE
Payer: COMMERCIAL

## 2021-09-29 DIAGNOSIS — F10.939 ALCOHOL WITHDRAWAL SYNDROME WITH COMPLICATION (HCC): Primary | ICD-10-CM

## 2021-09-29 DIAGNOSIS — K85.20 ALCOHOL-INDUCED ACUTE PANCREATITIS WITHOUT INFECTION OR NECROSIS: ICD-10-CM

## 2021-09-29 PROBLEM — F10.239 ALCOHOL DEPENDENCE WITH WITHDRAWAL (HCC): Status: ACTIVE | Noted: 2021-09-29

## 2021-09-29 LAB
A/G RATIO: 1.7 (ref 1.1–2.2)
ALBUMIN SERPL-MCNC: 4.7 G/DL (ref 3.4–5)
ALP BLD-CCNC: 64 U/L (ref 40–129)
ALT SERPL-CCNC: 18 U/L (ref 10–40)
ANION GAP SERPL CALCULATED.3IONS-SCNC: 13 MMOL/L (ref 3–16)
AST SERPL-CCNC: 23 U/L (ref 15–37)
BASOPHILS ABSOLUTE: 0 K/UL (ref 0–0.2)
BASOPHILS RELATIVE PERCENT: 0.5 %
BILIRUB SERPL-MCNC: 0.7 MG/DL (ref 0–1)
BILIRUBIN URINE: NEGATIVE
BLOOD, URINE: NEGATIVE
BUN BLDV-MCNC: 15 MG/DL (ref 7–20)
CALCIUM SERPL-MCNC: 9.6 MG/DL (ref 8.3–10.6)
CHLORIDE BLD-SCNC: 105 MMOL/L (ref 99–110)
CLARITY: CLEAR
CO2: 24 MMOL/L (ref 21–32)
COLOR: YELLOW
CREAT SERPL-MCNC: 1 MG/DL (ref 0.9–1.3)
EOSINOPHILS ABSOLUTE: 0.1 K/UL (ref 0–0.6)
EOSINOPHILS RELATIVE PERCENT: 1.9 %
GFR AFRICAN AMERICAN: >60
GFR NON-AFRICAN AMERICAN: >60
GLOBULIN: 2.7 G/DL
GLUCOSE BLD-MCNC: 90 MG/DL (ref 70–99)
GLUCOSE URINE: NEGATIVE MG/DL
HCT VFR BLD CALC: 44.6 % (ref 40.5–52.5)
HEMOGLOBIN: 15.6 G/DL (ref 13.5–17.5)
KETONES, URINE: NEGATIVE MG/DL
LEUKOCYTE ESTERASE, URINE: NEGATIVE
LIPASE: 74 U/L (ref 13–60)
LYMPHOCYTES ABSOLUTE: 1.6 K/UL (ref 1–5.1)
LYMPHOCYTES RELATIVE PERCENT: 22 %
MCH RBC QN AUTO: 31.9 PG (ref 26–34)
MCHC RBC AUTO-ENTMCNC: 35.1 G/DL (ref 31–36)
MCV RBC AUTO: 91 FL (ref 80–100)
MICROSCOPIC EXAMINATION: NORMAL
MONOCYTES ABSOLUTE: 0.6 K/UL (ref 0–1.3)
MONOCYTES RELATIVE PERCENT: 8.6 %
NEUTROPHILS ABSOLUTE: 4.9 K/UL (ref 1.7–7.7)
NEUTROPHILS RELATIVE PERCENT: 67 %
NITRITE, URINE: NEGATIVE
PDW BLD-RTO: 12.2 % (ref 12.4–15.4)
PH UA: 7 (ref 5–8)
PLATELET # BLD: 191 K/UL (ref 135–450)
PMV BLD AUTO: 8.3 FL (ref 5–10.5)
POTASSIUM REFLEX MAGNESIUM: 3.9 MMOL/L (ref 3.5–5.1)
PROTEIN UA: NEGATIVE MG/DL
RBC # BLD: 4.9 M/UL (ref 4.2–5.9)
SODIUM BLD-SCNC: 142 MMOL/L (ref 136–145)
SPECIFIC GRAVITY UA: 1.02 (ref 1–1.03)
TOTAL PROTEIN: 7.4 G/DL (ref 6.4–8.2)
URINE REFLEX TO CULTURE: NORMAL
URINE TYPE: NORMAL
UROBILINOGEN, URINE: 0.2 E.U./DL
WBC # BLD: 7.4 K/UL (ref 4–11)

## 2021-09-29 PROCEDURE — 80053 COMPREHEN METABOLIC PANEL: CPT

## 2021-09-29 PROCEDURE — 81003 URINALYSIS AUTO W/O SCOPE: CPT

## 2021-09-29 PROCEDURE — 2580000003 HC RX 258: Performed by: INTERNAL MEDICINE

## 2021-09-29 PROCEDURE — 36415 COLL VENOUS BLD VENIPUNCTURE: CPT

## 2021-09-29 PROCEDURE — 2580000003 HC RX 258: Performed by: EMERGENCY MEDICINE

## 2021-09-29 PROCEDURE — 6360000002 HC RX W HCPCS: Performed by: EMERGENCY MEDICINE

## 2021-09-29 PROCEDURE — 85025 COMPLETE CBC W/AUTO DIFF WBC: CPT

## 2021-09-29 PROCEDURE — 83690 ASSAY OF LIPASE: CPT

## 2021-09-29 PROCEDURE — 1200000000 HC SEMI PRIVATE

## 2021-09-29 PROCEDURE — 96376 TX/PRO/DX INJ SAME DRUG ADON: CPT

## 2021-09-29 PROCEDURE — 74176 CT ABD & PELVIS W/O CONTRAST: CPT

## 2021-09-29 PROCEDURE — 99283 EMERGENCY DEPT VISIT LOW MDM: CPT

## 2021-09-29 PROCEDURE — 96374 THER/PROPH/DIAG INJ IV PUSH: CPT

## 2021-09-29 PROCEDURE — 6370000000 HC RX 637 (ALT 250 FOR IP): Performed by: INTERNAL MEDICINE

## 2021-09-29 PROCEDURE — 96375 TX/PRO/DX INJ NEW DRUG ADDON: CPT

## 2021-09-29 RX ORDER — ACETAMINOPHEN 650 MG/1
650 SUPPOSITORY RECTAL EVERY 6 HOURS PRN
Status: DISCONTINUED | OUTPATIENT
Start: 2021-09-29 | End: 2021-09-30 | Stop reason: HOSPADM

## 2021-09-29 RX ORDER — CHLORDIAZEPOXIDE HYDROCHLORIDE 5 MG/1
5 CAPSULE, GELATIN COATED ORAL 3 TIMES DAILY
Status: DISCONTINUED | OUTPATIENT
Start: 2021-09-29 | End: 2021-09-30 | Stop reason: HOSPADM

## 2021-09-29 RX ORDER — FENTANYL CITRATE 50 UG/ML
50 INJECTION, SOLUTION INTRAMUSCULAR; INTRAVENOUS ONCE
Status: COMPLETED | OUTPATIENT
Start: 2021-09-29 | End: 2021-09-29

## 2021-09-29 RX ORDER — LORAZEPAM 1 MG/1
1 TABLET ORAL
Status: DISCONTINUED | OUTPATIENT
Start: 2021-09-29 | End: 2021-09-30 | Stop reason: HOSPADM

## 2021-09-29 RX ORDER — LORAZEPAM 2 MG/ML
3 INJECTION INTRAMUSCULAR
Status: DISCONTINUED | OUTPATIENT
Start: 2021-09-29 | End: 2021-09-30 | Stop reason: HOSPADM

## 2021-09-29 RX ORDER — LORAZEPAM 2 MG/ML
1 INJECTION INTRAMUSCULAR ONCE
Status: COMPLETED | OUTPATIENT
Start: 2021-09-29 | End: 2021-09-29

## 2021-09-29 RX ORDER — LORAZEPAM 1 MG/1
2 TABLET ORAL
Status: DISCONTINUED | OUTPATIENT
Start: 2021-09-29 | End: 2021-09-30 | Stop reason: HOSPADM

## 2021-09-29 RX ORDER — ACETAMINOPHEN 325 MG/1
650 TABLET ORAL EVERY 6 HOURS PRN
Status: DISCONTINUED | OUTPATIENT
Start: 2021-09-29 | End: 2021-09-30 | Stop reason: HOSPADM

## 2021-09-29 RX ORDER — SODIUM CHLORIDE 0.9 % (FLUSH) 0.9 %
5-40 SYRINGE (ML) INJECTION EVERY 12 HOURS SCHEDULED
Status: DISCONTINUED | OUTPATIENT
Start: 2021-09-30 | End: 2021-09-30 | Stop reason: HOSPADM

## 2021-09-29 RX ORDER — ONDANSETRON 4 MG/1
4 TABLET, ORALLY DISINTEGRATING ORAL EVERY 8 HOURS PRN
Status: DISCONTINUED | OUTPATIENT
Start: 2021-09-29 | End: 2021-09-30 | Stop reason: HOSPADM

## 2021-09-29 RX ORDER — SODIUM CHLORIDE 9 MG/ML
25 INJECTION, SOLUTION INTRAVENOUS PRN
Status: DISCONTINUED | OUTPATIENT
Start: 2021-09-29 | End: 2021-09-30 | Stop reason: HOSPADM

## 2021-09-29 RX ORDER — GAUZE BANDAGE 2" X 2"
100 BANDAGE TOPICAL DAILY
Status: DISCONTINUED | OUTPATIENT
Start: 2021-09-30 | End: 2021-09-30 | Stop reason: HOSPADM

## 2021-09-29 RX ORDER — LORAZEPAM 1 MG/1
4 TABLET ORAL
Status: DISCONTINUED | OUTPATIENT
Start: 2021-09-29 | End: 2021-09-30 | Stop reason: HOSPADM

## 2021-09-29 RX ORDER — MULTIVITAMIN WITH IRON
1 TABLET ORAL DAILY
Status: DISCONTINUED | OUTPATIENT
Start: 2021-09-30 | End: 2021-09-30 | Stop reason: HOSPADM

## 2021-09-29 RX ORDER — LORAZEPAM 2 MG/ML
2 INJECTION INTRAMUSCULAR
Status: DISCONTINUED | OUTPATIENT
Start: 2021-09-29 | End: 2021-09-30 | Stop reason: HOSPADM

## 2021-09-29 RX ORDER — 0.9 % SODIUM CHLORIDE 0.9 %
1000 INTRAVENOUS SOLUTION INTRAVENOUS ONCE
Status: COMPLETED | OUTPATIENT
Start: 2021-09-29 | End: 2021-09-29

## 2021-09-29 RX ORDER — LORAZEPAM 1 MG/1
3 TABLET ORAL
Status: DISCONTINUED | OUTPATIENT
Start: 2021-09-29 | End: 2021-09-30 | Stop reason: HOSPADM

## 2021-09-29 RX ORDER — POLYETHYLENE GLYCOL 3350 17 G/17G
17 POWDER, FOR SOLUTION ORAL DAILY PRN
Status: DISCONTINUED | OUTPATIENT
Start: 2021-09-29 | End: 2021-09-30 | Stop reason: HOSPADM

## 2021-09-29 RX ORDER — SODIUM CHLORIDE 0.9 % (FLUSH) 0.9 %
5-40 SYRINGE (ML) INJECTION PRN
Status: DISCONTINUED | OUTPATIENT
Start: 2021-09-29 | End: 2021-09-30 | Stop reason: HOSPADM

## 2021-09-29 RX ORDER — LORAZEPAM 2 MG/ML
1 INJECTION INTRAMUSCULAR
Status: DISCONTINUED | OUTPATIENT
Start: 2021-09-29 | End: 2021-09-30 | Stop reason: HOSPADM

## 2021-09-29 RX ORDER — LORAZEPAM 2 MG/ML
4 INJECTION INTRAMUSCULAR
Status: DISCONTINUED | OUTPATIENT
Start: 2021-09-29 | End: 2021-09-30 | Stop reason: HOSPADM

## 2021-09-29 RX ORDER — ONDANSETRON 2 MG/ML
4 INJECTION INTRAMUSCULAR; INTRAVENOUS EVERY 6 HOURS PRN
Status: DISCONTINUED | OUTPATIENT
Start: 2021-09-29 | End: 2021-09-30 | Stop reason: HOSPADM

## 2021-09-29 RX ADMIN — FENTANYL CITRATE 50 MCG: 50 INJECTION INTRAMUSCULAR; INTRAVENOUS at 16:43

## 2021-09-29 RX ADMIN — Medication 10 ML: at 21:20

## 2021-09-29 RX ADMIN — LORAZEPAM 1 MG: 2 INJECTION INTRAMUSCULAR; INTRAVENOUS at 16:43

## 2021-09-29 RX ADMIN — CHLORDIAZEPOXIDE HYDROCHLORIDE 5 MG: 5 CAPSULE ORAL at 21:21

## 2021-09-29 RX ADMIN — LORAZEPAM 4 MG: 1 TABLET ORAL at 21:21

## 2021-09-29 RX ADMIN — LORAZEPAM 1 MG: 2 INJECTION INTRAMUSCULAR; INTRAVENOUS at 16:57

## 2021-09-29 RX ADMIN — SODIUM CHLORIDE 1000 ML: 9 INJECTION, SOLUTION INTRAVENOUS at 16:44

## 2021-09-29 ASSESSMENT — PAIN DESCRIPTION - PAIN TYPE: TYPE: ACUTE PAIN

## 2021-09-29 ASSESSMENT — PAIN - FUNCTIONAL ASSESSMENT: PAIN_FUNCTIONAL_ASSESSMENT: ACTIVITIES ARE NOT PREVENTED

## 2021-09-29 ASSESSMENT — PAIN DESCRIPTION - LOCATION
LOCATION: ABDOMEN
LOCATION: ABDOMEN

## 2021-09-29 ASSESSMENT — PAIN DESCRIPTION - FREQUENCY: FREQUENCY: CONTINUOUS

## 2021-09-29 ASSESSMENT — PAIN DESCRIPTION - PROGRESSION
CLINICAL_PROGRESSION: NOT CHANGED
CLINICAL_PROGRESSION: NOT CHANGED

## 2021-09-29 ASSESSMENT — PAIN DESCRIPTION - DESCRIPTORS: DESCRIPTORS: BURNING;SHARP

## 2021-09-29 ASSESSMENT — PAIN SCALES - GENERAL
PAINLEVEL_OUTOF10: 8
PAINLEVEL_OUTOF10: 8
PAINLEVEL_OUTOF10: 7

## 2021-09-29 ASSESSMENT — PAIN DESCRIPTION - ORIENTATION: ORIENTATION: MID

## 2021-09-29 ASSESSMENT — PAIN DESCRIPTION - DIRECTION: RADIATING_TOWARDS: M/A

## 2021-09-29 ASSESSMENT — PAIN DESCRIPTION - ONSET: ONSET: ON-GOING

## 2021-09-29 NOTE — ED NOTES
ED SBAR report provider to Cristopher Kraft RN. Patient to be transported to Room 5131 via stretcher by ED tech. Patient transported with bedside cardiac monitor and with IV medications infusing. IV site clean, dry, and intact. MEWS score and pain assessed as 8 and documented. Updated patient on plan of care.        Joe Gonsalez RN  09/29/21 1254

## 2021-09-29 NOTE — H&P
Hospital Medicine History & Physical      PCP: Crow Jones MD    Date of Admission: 9/29/2021    Date of Service: Pt seen/examined on 9/29/2021    Chief Complaint:      Chief Complaint   Patient presents with    Abdominal Pain    Alcohol Intoxication     Usually drinks 12pack a day, has not had any in at at least 24 hours and feels sick. Has seizure in the past when stopping ETOH       History Of Present Illness:     71-year-old male with history of pancreatic ductal stones, alcohol abuse presented to the hospital complains of symptoms of alcohol withdrawal.  Patient states that he usually drinks 12 packs of beer daily. He states that his last drink was yesterday. Since then he has been trying to quit and now has been feeling anxious, tremulous and feeling nauseous. He also has abdominal pain. He says that his abdominal pain is mostly chronic related movements pancreatic ductal stones. Due to concern for withdrawal he came to the hospital.  On arrival he was noted to be tachycardic. Lab work fairly unremarkable. CT of the abdomen was performed which showed findings consistent with chronic pancreatitis with no evidence of acute pancreatitis. Patient was admitted to the hospital for the work-up and management      Past Medical History:        Diagnosis Date    Alcohol abuse     PATIENT HAS EXTREME MANIPULITIVE & DRUG SEEKING BEHAVIOR. HE POCKETS HIS BENZODIZAPINES.  Anxiety     Drug-seeking behavior     Several times found pocketing medications given in hospital    Herpes genitalis in men     Manipulative behavior     Pancreatitis     Pneumonia     Portal vein thrombosis     pt denied    Seizures (HCC)     PER PATIENT ONLY.  DURING MULTIPLE HOSPITALIZATIONS HE HAS NEVER ONCE    Tobacco abuse        Past Surgical History:        Procedure Laterality Date    ENDOSCOPY, COLON, DIAGNOSTIC  10/28/2013    Endoscopic retrograde cholangiopancreatography with pancreatic stent placement    ENDOSCOPY, COLON, DIAGNOSTIC  03/23/2018    Esophagogastroduodenoscopy with biopsy    ERCP  1/10/14    with stent removal       Medications Prior to Admission:    Prior to Admission medications    Medication Sig Start Date End Date Taking? Authorizing Provider   naproxen (NAPROSYN) 500 MG tablet Take 1 tablet by mouth 2 times daily 7/20/21   Historical Provider, MD   hydrOXYzine (ATARAX) 25 MG tablet Take 50 mg by mouth nightly    Historical Provider, MD   lamoTRIgine (LAMICTAL) 100 MG tablet Take 100 mg by mouth every morning     Historical Provider, MD   lamoTRIgine (LAMICTAL) 100 MG tablet Take 50 mg by mouth nightly     Historical Provider, MD   hydrOXYzine (ATARAX) 25 MG tablet Take 25 mg by mouth every morning 25 mg AM, 50 mg nightly    Historical Provider, MD       Allergies:  Amoxicillin, Haldol [haloperidol lactate], Phenergan [promethazine hcl], Shrimp (diagnostic), Glucosamine, and Shellfish-derived products    Social History:       reports that he has been smoking cigarettes. He started smoking about 13 years ago. He has a 2.50 pack-year smoking history. He has never used smokeless tobacco. He reports current alcohol use. He reports that he does not use drugs. Family History:  Reviewed in detail and negative for DM, Early CAD, Cancer, CVA. Positive as follows:        Problem Relation Age of Onset    Hypertension Father     High Blood Pressure Father     Alcohol Abuse Father     Depression Mother     High Blood Pressure Paternal Grandfather     Alcohol Abuse Paternal Grandfather     Heart Disease Paternal Grandmother     Anemia Paternal Grandmother     Depression Maternal Aunt     Alcohol Abuse Paternal Aunt     Alcohol Abuse Paternal Uncle        REVIEW OF SYSTEMS:   Positive review  noted in the HPI. All other systems reviewed and negative.     PHYSICAL EXAM:    BP (!) 148/87   Pulse 76   Temp 98.3 °F (36.8 °C) (Temporal)   Resp 13   Ht 5' 8\" (1.727 m)   Wt 144 lb 10 oz (65.6 kg) SpO2 100%   BMI 21.99 kg/m²   General Appearance: Anxious appearing, tremulous  Skin: warm and dry, no rash or erythema  Head: normocephalic and atraumatic  Eyes: pupils equal, round, and reactive to light, extraocular eye movements intact, conjunctivae normal  ENT: tympanic membrane, external ear and ear canal normal bilaterally, nose without deformity, nasal mucosa and turbinates normal without polyps  Neck: supple and non-tender without mass, no thyromegaly or thyroid nodules, no cervical lymphadenopathy  Pulmonary/Chest: clear to auscultation bilaterally- no wheezes, rales or rhonchi, normal air movement, no respiratory distress  Cardiovascular: normal rate, regular rhythm, normal S1 and S2, no murmurs, rubs, clicks, or gallops, Peripheral pulses good, Cap refill <3 sec, no carotid bruits  Abdomen: soft, epigastric tenderness, non-distended, normal bowel sounds, no masses or organomegaly  Extremities: no cyanosis, clubbing or edema  Musculoskeletal: normal range of motion, no joint swelling, deformity or tenderness  Neurologic: reflexes normal and symmetric, no cranial nerve deficit, gait, coordination and speech normal      LABS:        CBC   Recent Labs     09/29/21  1557   WBC 7.4   HGB 15.6   HCT 44.6         RENAL  Recent Labs     09/29/21  1557      K 3.9      CO2 24   BUN 15   CREATININE 1.0     LFT'S  Recent Labs     09/29/21  1557   AST 23   ALT 18   BILITOT 0.7   ALKPHOS 64     COAG  No results for input(s): INR in the last 72 hours. CARDIAC ENZYMES  No results for input(s): CKTOTAL, CKMB, CKMBINDEX, TROPONINI in the last 72 hours.     U/A:    Lab Results   Component Value Date    COLORU YELLOW 09/29/2021    WBCUA 5 07/20/2021    RBCUA 1 07/20/2021    CLARITYU Clear 09/29/2021    SPECGRAV 1.025 09/29/2021    LEUKOCYTESUR Negative 09/29/2021    BLOODU Negative 09/29/2021    GLUCOSEU Negative 09/29/2021       ABG  No results found for: PZO3WUK, BEART, Q5WANRGJ, PHART, THGBART, DEP0JHH, PO2ART, XDI3CKT    UA:  Recent Labs     09/29/21  1554   COLORU YELLOW   PHUR 7.0   CLARITYU Clear   SPECGRAV 1.025   LEUKOCYTESUR Negative   UROBILINOGEN 0.2   BILIRUBINUR Negative   BLOODU Negative   GLUCOSEU Negative   KETUA Negative       Microbiology:  No results for input(s): LABGRAM, LABANAE, ORG, CXSURG in the last 72 hours. Nasal Culture: No results for input(s): ORG, MRSAPCR in the last 72 hours. Blood Culture: No results for input(s): BC, BLOODCULT2, ORG in the last 72 hours. Fungal Culture:   No results for input(s): FUNGSM in the last 72 hours. No results for input(s): FUNCXBLD in the last 72 hours. CSF Culture:  No results for input(s): COLORCSF, APPEARCSF, CFTUBE, CLOTCSF, WBCCSF, RBCCSF, NEUTCSF, NUMCELLSCSF, LYMPHSCSF, MONOCSF, GLUCCSF, VOLCSF in the last 72 hours. Respiratory Culture:  No results for input(s): [employer] Shield in the last 72 hours. AFB:No results for input(s): AFBSMEAR in the last 72 hours. Urine Culture  No results for input(s): LABURIN in the last 72 hours. RADIOLOGY:    CT ABDOMEN PELVIS WO CONTRAST Additional Contrast? None   Final Result   Coarse calcifications are again seen throughout the pancreas with mild   pancreatic ductal dilatation. Findings consistent with chronic pancreatitis,   overall similar in appearance to prior. No evidence of acute pancreatitis or   acute process on noncontrast exam.      Punctate bilateral nonobstructing renal calculi.              Previous medical records personally reviewed and analyzed         PHYSICIAN CERTIFICATION    I certify that Derian Mtz is expected to be hospitalized for >2 midnights based on the following assessment and plan:    ASSESSMENT/PLAN:  Active Hospital Problems    Diagnosis Date Noted    Alcohol dependence with withdrawal [facility]) [F10.239] 09/29/2021     Alcohol abuse with acute withdrawal symptoms  -Continue CIWA protocol  -We will start on Librium  -Continue MVI/thiamine    History of chronic pancreatitis  -CT abdomen with coarse calcifications throughout the pancreas with mild pancreatic ductal dilation  -Continue to monitor      DVT Prophylaxis: Lovenox  Diet: Low-fat diet  Code Status: Full    Dispo -pending clinical course       Jimmy Berrios MD  The note was completed using EMR. Every effort was made to ensure accuracy; however, inadvertent computerized transcription errors may be present. Thank you Margarita Ralph MD for the opportunity to be involved in this patient's care. If you have any questions or concerns please feel free to contact me at [phone].

## 2021-09-29 NOTE — ED PROVIDER NOTES
[address] [facility]      Pt Name: Anthony Swann  MRN: [id number]  [place] 1990  Date of evaluation: 9/29/2021  Provider: Georgette 149, [facility]  Chief Complaint   Patient presents with    Abdominal Pain    Alcohol Intoxication     Usually drinks 12pack a day, has not had any in at at least 24 hours and feels sick. Has seizure in the past when stopping ETOH       I wore personal protective equipment when I was in the room the entire time. This includes gloves, N95 mask, face shield, and a glove over my stethoscope for protection. HPI  Anthony Swann is a 35 y.o. male who presents with epigastric abdominal pain that started last night. He states he quit drinking yesterday. He drinks alcohol daily. He has a history of pancreatitis. He denies any history of stomach ulcers. He states he has been nervous and shaky today. Usually drinks a 12 pack of beer daily. He has had seizures in the past when he stopped drinking. He denies any fever chills. Denies any dysuria nocturia hematuria. Denies any diarrhea. Nothing makes it better or worse. He describes it as severe    REVIEW OF SYSTEMS  All systems negative except as noted in the HPI. Reviewed Nurses' notes and concur. No LMP for male patient. PAST MEDICAL HISTORY  Past Medical History:   Diagnosis Date    Alcohol abuse     PATIENT HAS EXTREME MANIPULITIVE & DRUG SEEKING BEHAVIOR. HE POCKETS HIS BENZODIZAPINES.  Anxiety     Drug-seeking behavior     Several times found pocketing medications given in hospital    Herpes genitalis in men     Manipulative behavior     Pancreatitis     Pneumonia     Portal vein thrombosis     pt denied    Seizures (HCC)     PER PATIENT ONLY.  DURING MULTIPLE HOSPITALIZATIONS HE HAS NEVER ONCE    Tobacco abuse        FAMILY HISTORY  Family History   Problem Relation Age of Onset    Hypertension Father     High Blood Pressure COMPREHENSIVE METABOLIC PANEL W/ REFLEX TO MG FOR LOW K    Narrative:     Performed at:  [facility]  [address] [place], [place] [address]   Phone [phone]   URINE RT REFLEX TO CULTURE    Narrative:     Performed at:  [facility]  [address] [place], [place] [address]   Phone [phone]         RADIOLOGY/PROCEDURES  I personally reviewed the images for this case. CT ABDOMEN PELVIS WO CONTRAST Additional Contrast? None   Final Result   Coarse calcifications are again seen throughout the pancreas with mild   pancreatic ductal dilatation. Findings consistent with chronic pancreatitis,   overall similar in appearance to prior. No evidence of acute pancreatitis or   acute process on noncontrast exam.      Punctate bilateral nonobstructing renal calculi. COURSE & MEDICAL DECISION MAKING  Pertinent Labs & Imaging studies reviewed. (See chart for details)    Vitals:    09/29/21 1333 09/29/21 1652 09/29/21 1653 09/29/21 1702   BP: (!) 157/94 (!) 150/84 (!) 150/84 (!) 148/87   Pulse: 115 81 85 76   Resp: 18 15  13   Temp: 98.3 °F (36.8 °C)      TempSrc: Temporal      SpO2: 100% 100%  100%   Weight: 144 lb 10 oz (65.6 kg)      Height: 5' 8\" (1.727 m)          Medications   0.9 % sodium chloride bolus (1,000 mLs IntraVENous New Bag 9/29/21 1644)   LORazepam (ATIVAN) injection 1 mg (1 mg IntraVENous Given 9/29/21 1643)   fentaNYL (SUBLIMAZE) injection 50 mcg (50 mcg IntraVENous Given 9/29/21 1643)       New Prescriptions    No medications on file       SEP-1 CORE MEASURE DATA  Exclusion criteria: the patient is NOT to be included for sepsis due to: Infection is not suspected    Patient remained stable in the ED. patient required Ativan during his emergency room stay due to his shakes and anxiety. CIWA scale was 36. Lipase was elevated at 74. Patient was given fentanyl which seemed to show some improvement.   The remainder of disease, gastroenteritis, AAA.          Pura Velasquez, DO  09/29/21 2789

## 2021-09-30 VITALS
DIASTOLIC BLOOD PRESSURE: 83 MMHG | SYSTOLIC BLOOD PRESSURE: 134 MMHG | HEIGHT: 68 IN | WEIGHT: 142.2 LBS | OXYGEN SATURATION: 100 % | RESPIRATION RATE: 18 BRPM | BODY MASS INDEX: 21.55 KG/M2 | HEART RATE: 77 BPM | TEMPERATURE: 97.3 F

## 2021-09-30 LAB
ANION GAP SERPL CALCULATED.3IONS-SCNC: 14 MMOL/L (ref 3–16)
BUN BLDV-MCNC: 13 MG/DL (ref 7–20)
CALCIUM SERPL-MCNC: 9.1 MG/DL (ref 8.3–10.6)
CHLORIDE BLD-SCNC: 102 MMOL/L (ref 99–110)
CO2: 22 MMOL/L (ref 21–32)
CREAT SERPL-MCNC: 0.6 MG/DL (ref 0.9–1.3)
GFR AFRICAN AMERICAN: >60
GFR NON-AFRICAN AMERICAN: >60
GLUCOSE BLD-MCNC: 92 MG/DL (ref 70–99)
POTASSIUM REFLEX MAGNESIUM: 4.1 MMOL/L (ref 3.5–5.1)
SODIUM BLD-SCNC: 138 MMOL/L (ref 136–145)

## 2021-09-30 PROCEDURE — 36415 COLL VENOUS BLD VENIPUNCTURE: CPT

## 2021-09-30 PROCEDURE — 6360000002 HC RX W HCPCS: Performed by: INTERNAL MEDICINE

## 2021-09-30 PROCEDURE — 80048 BASIC METABOLIC PNL TOTAL CA: CPT

## 2021-09-30 RX ADMIN — LORAZEPAM 4 MG: 2 INJECTION INTRAMUSCULAR; INTRAVENOUS at 06:52

## 2021-09-30 RX ADMIN — LORAZEPAM 4 MG: 2 INJECTION INTRAMUSCULAR; INTRAVENOUS at 05:15

## 2021-09-30 RX ADMIN — LORAZEPAM 4 MG: 2 INJECTION INTRAMUSCULAR; INTRAVENOUS at 02:26

## 2021-09-30 ASSESSMENT — PAIN SCALES - GENERAL: PAINLEVEL_OUTOF10: 0

## 2021-09-30 ASSESSMENT — PAIN DESCRIPTION - PROGRESSION
CLINICAL_PROGRESSION: NOT CHANGED

## 2021-09-30 NOTE — PROGRESS NOTES
Patient approached the desk again stating he is leaving today- notified patient we are awaiting call back from MD. Will update

## 2021-09-30 NOTE — PROGRESS NOTES
Patient walked to nurses station stating \" I want to sign out. \"    When asked patient why-     Patient states \" im just laying here\"     Notified patient we will call the doctor

## 2021-09-30 NOTE — PROGRESS NOTES
Pt arrived from ED to bed 5131. Pt ambulated to the bed. Telebox connected and confirmed with [facility]. Seizure precautions placed. Pt alert and orientated to room. CIWA scored 35.      Electronically signed by Fang Mcgovern RN on 9/29/2021 at 9:20 PM

## 2021-09-30 NOTE — PROGRESS NOTES
Physical Therapy  Junius Brittle  [id number]  [id number]    PT orders received for pt however he has left hospital AMA; will sign off  Electronically signed by KINGA DAWKINS PT on 9/30/2021 at 12:15 PM

## 2021-09-30 NOTE — PROGRESS NOTES
4 Eyes Skin Assessment     NAME:  Junius Brittle  YOB: 1990  MEDICAL RECORD NUMBER:  [id number]    The patient is being assess for  Admission    I agree that 2 RN's have performed a thorough Head to Toe Skin Assessment on the patient. ALL assessment sites listed below have been assessed. Areas assessed by both nurses:    Head, Face, Ears, Shoulders, Back, Chest, Arms, Elbows, Hands, Sacrum. Buttock, Coccyx, Ischium and Legs. Feet and Heels        Does the Patient have a Wound?  No noted wound(s)       Xiang Prevention initiated:  No   Wound Care Orders initiated:  No    Pressure Injury (Stage 3,4, Unstageable, DTI, NWPT, and Complex wounds) if present place consult order under [de-identified] No    New and Established Ostomies if present place consult order under : No      Nurse 1 eSignature: Electronically signed by Khris Perez RN on 9/29/21 at 11:34 PM EDT    **SHARE this note so that the co-signing nurse is able to place an eSignature**    Nurse 2 eSignature: Electronically signed by Crys Merchant RN on 9/29/21 at 11:35 PM EDT

## 2021-10-04 NOTE — DISCHARGE SUMMARY
Hospitalist Discharge Summary    Patient ID:  Cooper Loco  [id number]  23 y.o.  1990    Admit date: 9/29/2021    Discharge date: 9/30/2021    Disposition: left AMA    Admission Diagnoses:   Patient Active Problem List   Diagnosis    Oppositional defiant disorder    Uncomplicated alcohol dependence ([address]    Episode of recurrent major depressive disorder ([address]    Alcohol abuse, continuous    Tobacco abuse    Alcohol withdrawal, uncomplicated ([address]    Benzodiazepine abuse ([address]    Panic attacks    MDD (major depressive disorder), recurrent episode, mild ([address]    Upper GI bleed    Bipolar disease, chronic ([address]    Alcohol withdrawal delirium ([address]    Acute on chronic pancreatitis ([address]    Drug-seeking behavior    Alcohol abuse    Chronic pancreatitis ([address]    Abdominal pain    Epigastric pain    Mood disorder ([address]    Tobacco dependence    Alcohol withdrawal syndrome, uncomplicated ([address]    Pancreatic duct calculus    Cirrhosis of liver ([address]    Alcohol abuse with withdrawal ([address]    Alcohol dependence with withdrawal ([address]       Discharge Diagnoses: Active Problems:    Alcohol dependence with withdrawal ([address]  Resolved Problems:    * No resolved hospital problems. *      Code Status:  Prior    Condition:  Stable    Discharge Diet: Diet:  No diet orders on file    PCP to do list:  F/u for improvement of symptoms    Hospital Course: As per HPI:    64-year-old male with history of pancreatic ductal stones, alcohol abuse presented to the hospital complains of symptoms of alcohol withdrawal.  Patient states that he usually drinks 12 packs of beer daily. He states that his last drink was yesterday. Since then he has been trying to quit and now has been feeling anxious, tremulous and feeling nauseous. He also has abdominal pain. He says that his abdominal pain is mostly chronic related movements pancreatic ductal stones.   Due to concern for withdrawal he came to the hospital.  On arrival he was noted to be tachycardic. Lab work fairly unremarkable. CT of the abdomen was performed which showed findings consistent with chronic pancreatitis with no evidence of acute pancreatitis. Patient was admitted to the hospital for the work-up and management     Following problems were being addressed    Alcohol abuse with acute withdrawal symptoms  -Continue CIWA protocol  -continue libirum  -Continue MVI/thiamine  -pt ultimately LAMA     History of chronic pancreatitis  -CT abdomen with coarse calcifications throughout the pancreas with mild pancreatic ductal dilation  -Continue to monitor    Discharge Medications:   Discharge Medication List as of 9/30/2021  2:22 PM        Discharge Medication List as of 9/30/2021  2:22 PM        Discharge Medication List as of 9/30/2021  2:22 PM      CONTINUE these medications which have NOT CHANGED    Details   naproxen (NAPROSYN) 500 MG tablet Take 1 tablet by mouth 2 times dailyHistorical Med      !! hydrOXYzine (ATARAX) 25 MG tablet Take 50 mg by mouth nightlyHistorical Med      !! lamoTRIgine (LAMICTAL) 100 MG tablet Take 100 mg by mouth every morning Historical Med      !! lamoTRIgine (LAMICTAL) 100 MG tablet Take 50 mg by mouth nightly Historical Med      !! hydrOXYzine (ATARAX) 25 MG tablet Take 25 mg by mouth every morning 25 mg AM, 50 mg nightlyHistorical Med       !! - Potential duplicate medications found. Please discuss with provider.         Discharge Medication List as of 9/30/2021  2:22 PM              Procedures: none    Assessment on Discharge: Stable, improved     Discharge ROS:  A complete review of systems was asked and negative except for none    Discharge Exam:  /83   Pulse 77   Temp 97.3 °F (36.3 °C) (Oral)   Resp 18   Ht 5' 8\" (1.727 m)   Wt 142 lb 3.2 oz (64.5 kg)   SpO2 100%   BMI 21.62 kg/m²     Gen: NAD  HEENT: NC/AT, moist mucous membranes, no oropharyngeal erythema or exudate  Neck: supple, trachea midline, no anterior cervical or SC LAD  Heart:  Normal s1/s2, RRR, no murmurs, gallops, or rubs. no leg edema  Lungs:  CTA bilaterally, no wheeze,no rales or rhonchi, no use of accessory muscles  Abd: bowel sounds present, soft, nontender, nondistended, no masses  Extrem:  No clubbing, cyanosis,  no edema  Skin: no lesion or masses  Psych:  A & O x3  Neuro: grossly intact, moves all four extremities    Pertinent Studies During Hospital Stay:  Radiology:  CT ABDOMEN PELVIS WO CONTRAST Additional Contrast? None    Result Date: 9/29/2021  EXAMINATION: CT OF THE ABDOMEN AND PELVIS WITHOUT CONTRAST 9/29/2021 2:36 pm TECHNIQUE: CT of the abdomen and pelvis was performed without the administration of intravenous contrast. Multiplanar reformatted images are provided for review. Dose modulation, iterative reconstruction, and/or weight based adjustment of the mA/kV was utilized to reduce the radiation dose to as low as reasonably achievable. COMPARISON: CT abdomen and pelvis July 17, 2021. HISTORY: ORDERING SYSTEM PROVIDED HISTORY: Gastric abdominal pain, has a history of pancreatitis, stop drinking 1 day ago. Usually drinks every day. TECHNOLOGIST PROVIDED HISTORY: Reason for exam:->Gastric abdominal pain, has a history of pancreatitis, stop drinking 1 day ago. Usually drinks every day. Additional Contrast?->None Decision Support Exception - unselect if not a suspected or confirmed emergency medical condition->Emergency Medical Condition (MA) Reason for Exam: Gastric abdominal pain, has a history of pancreatitis, stop drinking 1 day ago. Usually drinks every day. Acuity: Acute Type of Exam: Initial FINDINGS: Lower Chest: The lung bases are without acute process. Organs: Evaluation of the abdominal viscera is limited due to lack of intravenous contrast.  Multiple coarse calcifications are again seen throughout the pancreas.   There is mild pancreatic ductal dilatation measuring 6-7 mm, grossly similar in appearance to prior.  No peripancreatic inflammatory changes or focal fluid collection. No substantial change compared to prior. The liver, spleen, gallbladder, and adrenal glands are without acute process. Several punctate nonobstructing calculi are seen within both kidneys. Both kidneys are symmetric in size without evidence of hydronephrosis or hydroureter. GI/Bowel: The stomach is grossly unremarkable. The small and large bowel are normal in caliber. No evidence of obstruction or focal acute process. The appendix is not definitely visualized. Pelvis: The bladder and prostate are without acute process. Peritoneum/Retroperitoneum: No lymphadenopathy, free intraperitoneal air, free fluid, or focal fluid collection identified. Bones/Soft Tissues: Soft tissues and osseous structures are without acute process. Coarse calcifications are again seen throughout the pancreas with mild pancreatic ductal dilatation. Findings consistent with chronic pancreatitis, overall similar in appearance to prior. No evidence of acute pancreatitis or acute process on noncontrast exam. Punctate bilateral nonobstructing renal calculi. Last Labs on Discharge:     No results found for this or any previous visit (from the past 24 hour(s)). Follow up: with Trang Ruth MD    Note that over 30 minutes was spent in preparing discharge papers, discussing discharge with patient, medication review, etc.    Thank you Trang Ruth MD for the opportunity to be involved in this patient's care. If you have any questions or concerns please feel free to contact me at [phone].     Electronically signed by Bethany Glass MD on 10/3/2021 at 9:16 PM

## 2021-11-06 NOTE — ED PROVIDER NOTES
[address]      Pt Name: Blanca Flood  MRN: [id number]  [place] 1990  Date of evaluation: 8/19/2021  Provider: Coral Eisenberg, [address] [place] [address]  Chief Complaint   Patient presents with    Abdominal Pain     Patient woke up with right sided abdominal pain. Tremors from possible etoh withdrawal. Sweating. Patient with nausea. Last drink was yesterday evening.  Nausea    Alcohol Problem       I wore personal protective equipment when I was in the room the entire time. This includes gloves, N95 mask, face shield, and a glove over my stethoscope for protection. HPI  Blanca Flood is a 35 y.o. male who presents with alcohol withdrawal.  His complaint of abdominal pain, muscle twitches, shaking, visual hallucinations which he describes as spots. He is trying to stop drinking alcohol. He is normally drinking 18 beers per day. He is down to 6 beers per day. He started with symptoms 24 hours ago. He denies any fevers or chills. Had nausea vomiting. He states he stopped drinking for 1 year but then started again in May. He was in a program called [facility]HOLE [facility]ORTER but then fell off the [facility] in May. He denies any chest pains. Denies any diarrhea. Nothing makes it better or worse. He describes it as severe. Nurse reports a CIWA scale 27 in triage. ? REVIEW OF SYSTEMS  All systems negative except as noted in the HPI. Reviewed Nurses' notes and concur. No LMP for male patient. PAST MEDICAL HISTORY  Past Medical History:   Diagnosis Date    Alcohol abuse     PATIENT HAS EXTREME MANIPULITIVE & DRUG SEEKING BEHAVIOR. HE POCKETS HIS BENZODIZAPINES.      Anxiety     Drug-seeking behavior     Several times found pocketing medications given in hospital    Herpes genitalis in men     Manipulative behavior     Pancreatitis     Pneumonia     Portal vein thrombosis     pt denied    Seizures ([facility] [address]     PER PATIENT ONLY. DURING MULTIPLE HOSPITALIZATIONS HE HAS NEVER ONCE    Tobacco abuse        FAMILY HISTORY  Family History   Problem Relation Age of Onset    Hypertension Father     High Blood Pressure Father     Alcohol Abuse Father     Depression Mother     High Blood Pressure Paternal Grandfather     Alcohol Abuse Paternal Grandfather     Heart Disease Paternal Grandmother     Anemia Paternal Grandmother     Depression Maternal Aunt     Alcohol Abuse Paternal Aunt     Alcohol Abuse Paternal Uncle        SOCIAL HISTORY   reports that he has been smoking cigarettes. He started smoking about 13 years ago. He has a 10.00 pack-year smoking history. He has never used smokeless tobacco. He reports current alcohol use. He reports that he does not use drugs. SURGICAL HISTORY  Past Surgical History:   Procedure Laterality Date    ENDOSCOPY, COLON, DIAGNOSTIC  10/28/2013    Endoscopic retrograde cholangiopancreatography with pancreatic stent placement    ENDOSCOPY, COLON, DIAGNOSTIC  03/23/2018    Esophagogastroduodenoscopy with biopsy    ERCP  1/10/14    with stent removal       CURRENT MEDICATIONS  Current Outpatient Rx   Medication Sig Dispense Refill    hydrOXYzine (ATARAX) 25 MG tablet Take 50 mg by mouth nightly      omeprazole (PRILOSEC) 20 MG delayed release capsule Take 20 mg by mouth daily      lamoTRIgine (LAMICTAL) 100 MG tablet Take 100 mg by mouth every morning       lamoTRIgine (LAMICTAL) 100 MG tablet Take 50 mg by mouth nightly       hydrOXYzine (ATARAX) 25 MG tablet Take 25 mg by mouth every morning 25 mg AM, 50 mg nightly         ALLERGIES  Allergies   Allergen Reactions    Amoxicillin Hives    Haldol [Haloperidol Lactate] Other (See Comments)     Muscle spasms    Phenergan [Promethazine Hcl] Other (See Comments)     Pt believes it caused muscle spasms    Shrimp (Diagnostic) Itching    Glucosamine Itching      Itching of throat when eating shrimp.   Pt states has had IV contrast for CT's without problem before.  Shellfish-Derived Products      Patient gets anxious around seafood         PHYSICAL EXAM  VITAL SIGNS: /89   Pulse 101   Temp 98.9 °F (37.2 °C) (Oral)   Resp 24   Ht 5' 8\" (1.727 m)   Wt 148 lb 5.9 oz (67.3 kg)   SpO2 100%   BMI 22.56 kg/m²    Constitutional: Well-developed, well-nourished, appears tremorous and anxious, nontoxic, activity: Patient appears tremulous and anxious and ill but not toxic. He is speaking in full sentences  HEENT: Normocephalic, Atraumatic, Bilateral ears are normal, Oropharynx moist, No oral exudates, Nose normal.  Eyes: PERRLA, EOMI, Conjunctiva normal, No discharge. No scleral icterus. Neck: Normal range of motion, No tenderness, Supple,  Lymphatic: No lymphadenopathy noted. Cardiovascular: Mildly tachycardic heart rate, Normal rhythm, no murmurs, no gallops, no rubs. Thorax & Lungs: Normal breath sounds, No respiratory distress, No wheezing,  Abdomen: Soft, mild epigastric tender with no guarding, no rebound, no rigidity; no distension, no masses, no pulsatile masses, no hepatosplenomegaly, decreased bowel sounds. Skin: Warm, Dry, No erythema, No rash. Back: No tenderness, Full range of motion, No scoliosis. Extremities: No edema, No tenderness, No cyanosis, No clubbing. No amputations, capillary refill less than 2 seconds. Musculoskeletal: Good range of motion in all major joints, No tenderness to palpation or major deformities noted.   Neurologic: Alert & oriented x 3  Psychiatric: Affect normal, Judgment normal, Mood normal.    LABORATORY  Labs Reviewed   CBC WITH AUTO DIFFERENTIAL - Abnormal; Notable for the following components:       Result Value    Hemoglobin 18.1 (*)     All other components within normal limits    Narrative:     Performed at:  [facility]  [address] [place], [place] [address]   Phone [phone]   COMPREHENSIVE METABOLIC PANEL W/ REFLEX TO MG FOR LOW K - Abnormal; Notable for the following components:    Chloride 96 (*)     Anion Gap 18 (*)     Glucose 125 (*)     CREATININE 0.8 (*)     Calcium 11.0 (*)     Total Protein 9.2 (*)     Albumin 5.6 (*)     AST 39 (*)     All other components within normal limits    Narrative:     Performed at:  [facility]  [address], [place] [address]   Phone [phone]   LACTIC ACID, PLASMA - Abnormal; Notable for the following components:    Lactic Acid 2.8 (*)     All other components within normal limits    Narrative:     Performed at:  [facility]  [address] [place] [address]   Phone [phone]   ETHANOL    Narrative:     Performed at:  [facility]  [address] [facility] [place] [address]   Phone [phone]   LIPASE    Narrative:     Performed at:  [facility]  [address], [facility] [place] [address]   Phone [phone]   URINE RT REFLEX TO CULTURE   URINE DRUG SCREEN     EKG Interpretation    Interpreted by emergency department physician  Time performed: 1819  Time read: 1823    Rhythm: Sinus  Ventricular Rate: 73  QRS Axis: 92  Ectopy: None  Conduction: Normal sinus rhythm with rightward axis and LVH by voltage with early repolarization abnormalities  ST Segments: Consistent with early repolarization abnormalities  T Waves: Consistent with early repolarization abnormalities  Q Waves: None noted    Other findings: None    Compared to EKG on: None to compare    Clinical Impression: Normal sinus rhythm with rightward axis and LVH by voltage with early repolarization abnormalities. There is no old EKG to compare. Georgette 149, DO        RADIOLOGY/PROCEDURES  I personally reviewed the images for this case. No orders to display        [facility]  Pertinent Labs & Imaging studies reviewed.  (See chart for details)    Vitals:    08/19/21 1815 08/19/21 1830 08/19/21 1900 08/19/21 1957   BP: (!) 138/94 128/86 118/83 136/89   Pulse: 77 76 77 101   Resp: 13 16 24    Temp:       TempSrc:       SpO2: 100% 100% 100%    Weight:       Height:           Medications   0.9 % sodium chloride IV bolus 2,019 mL (0 mLs Intravenous Stopped 8/19/21 1919)   LORazepam (ATIVAN) injection 2 mg (2 mg Intravenous Given 8/19/21 1800)   cloNIDine (CATAPRES) tablet 0.1 mg (0.1 mg Oral Given 8/19/21 1928)   ondansetron (ZOFRAN) injection 8 mg (8 mg Intravenous Given 8/19/21 2002)   LORazepam (ATIVAN) injection 2 mg (2 mg Intravenous Given 8/19/21 2003)       New Prescriptions    No medications on file       SEP-1 CORE MEASURE DATA  Exclusion criteria: the patient is NOT to be included for sepsis due to: Infection is not suspected    Patient remained stable in the ED. he became agitated and tremorous again at 8:00 PM.  I had repeated a second dose of Ativan. CIWA was back up to 26. Therefore, hospitalist was notified at 2005 for admission. Laboratory work showed a calcium of 11 but otherwise remainder of it was noncontributory. The patient's blood pressure was found to be elevated according to CMS/Medicare and the Affordable Care Act/ObamaCare criteria. Elevated blood pressure could occur because of pain or anxiety or other reasons and does not mean that they need to have their blood pressure treated or medications otherwise adjusted. However, this could also be a sign that they will need to have their blood pressure treated or medications changed. The patient was instructed to follow up closely with their personal physician to have their blood pressure rechecked. The patient was instructed to take a list of recent blood pressure readings to their next visit with their personal physician. See discharge instructions for specific medications, discharge information, and treatments. They were verbally instructed to return to emergency if any problems.     (This chart has been completed using [address]. Although attempts have been made to ensure accuracy, words and/or phrases may not be transcribed as intended.)    Patient refused pain medicines at the time of her exam.    IMPRESSION(S):  1. Alcohol withdrawal syndrome with perceptual disturbance (HCC)    2. Dehydration    3. Nausea and vomiting, intractability of vomiting not specified, unspecified vomiting type        ? Recheck Times: 1900, 2000  Critical Care Time: 35 minutes not including billable procedures. Diagnostic considerations include but are not limited to:   Abdominal Aortic Aneurysm, Acute Coronary Syndrome, Ischemic Bowel, Bowel Obstruction (including Gastric Outlet Obstruction), PUD, GERD, Acute Cholecystitis, Pancreatitis, Hepatitis, Colitis, SMA Syndrome, Mesenteric Steal Syndrome, Splanchnic Vein Thrombosis, other         Radha Levine DO  08/19/21 2010 Yes

## 2021-11-14 ENCOUNTER — HOSPITAL ENCOUNTER (INPATIENT)
Age: 31
LOS: 2 days | Discharge: HOME | End: 2021-11-17
Attending: EMERGENCY MEDICINE | Admitting: STUDENT IN AN ORGANIZED HEALTH CARE EDUCATION/TRAINING PROGRAM
Payer: COMMERCIAL

## 2021-11-14 ENCOUNTER — APPOINTMENT (OUTPATIENT)
Dept: CT IMAGING | Age: 31
End: 2021-11-14
Payer: COMMERCIAL

## 2021-11-14 DIAGNOSIS — R52 INTRACTABLE PAIN: ICD-10-CM

## 2021-11-14 DIAGNOSIS — K86.89 DILATED PANCREATIC DUCT: ICD-10-CM

## 2021-11-14 DIAGNOSIS — F10.939 ALCOHOL WITHDRAWAL SYNDROME WITH COMPLICATION (HCC): Primary | ICD-10-CM

## 2021-11-14 LAB
A/G RATIO: 1.7 (ref 1.1–2.2)
ACETAMINOPHEN LEVEL: <5 UG/ML (ref 10–30)
ALBUMIN SERPL-MCNC: 5.1 G/DL (ref 3.4–5)
ALP BLD-CCNC: 76 U/L (ref 40–129)
ALT SERPL-CCNC: 24 U/L (ref 10–40)
ANION GAP SERPL CALCULATED.3IONS-SCNC: 18 MMOL/L (ref 3–16)
AST SERPL-CCNC: 25 U/L (ref 15–37)
BASOPHILS ABSOLUTE: 0 K/UL (ref 0–0.2)
BASOPHILS RELATIVE PERCENT: 0.5 %
BILIRUB SERPL-MCNC: 0.9 MG/DL (ref 0–1)
BUN BLDV-MCNC: 10 MG/DL (ref 7–20)
CALCIUM SERPL-MCNC: 9.9 MG/DL (ref 8.3–10.6)
CHLORIDE BLD-SCNC: 101 MMOL/L (ref 99–110)
CO2: 21 MMOL/L (ref 21–32)
CREAT SERPL-MCNC: 0.7 MG/DL (ref 0.9–1.3)
EOSINOPHILS ABSOLUTE: 0.1 K/UL (ref 0–0.6)
EOSINOPHILS RELATIVE PERCENT: 1.4 %
ETHANOL: NORMAL MG/DL (ref 0–0.08)
GFR AFRICAN AMERICAN: >60
GFR NON-AFRICAN AMERICAN: >60
GLUCOSE BLD-MCNC: 95 MG/DL (ref 70–99)
HCT VFR BLD CALC: 49.8 % (ref 40.5–52.5)
HEMOGLOBIN: 17.2 G/DL (ref 13.5–17.5)
LIPASE: 33 U/L (ref 13–60)
LYMPHOCYTES ABSOLUTE: 1.4 K/UL (ref 1–5.1)
LYMPHOCYTES RELATIVE PERCENT: 15.9 %
MCH RBC QN AUTO: 31.7 PG (ref 26–34)
MCHC RBC AUTO-ENTMCNC: 34.4 G/DL (ref 31–36)
MCV RBC AUTO: 92 FL (ref 80–100)
MONOCYTES ABSOLUTE: 0.7 K/UL (ref 0–1.3)
MONOCYTES RELATIVE PERCENT: 7.4 %
NEUTROPHILS ABSOLUTE: 6.6 K/UL (ref 1.7–7.7)
NEUTROPHILS RELATIVE PERCENT: 74.8 %
PDW BLD-RTO: 12.6 % (ref 12.4–15.4)
PLATELET # BLD: 274 K/UL (ref 135–450)
PMV BLD AUTO: 8.4 FL (ref 5–10.5)
POTASSIUM SERPL-SCNC: 3.4 MMOL/L (ref 3.5–5.1)
RBC # BLD: 5.42 M/UL (ref 4.2–5.9)
SALICYLATE, SERUM: <0.3 MG/DL (ref 15–30)
SODIUM BLD-SCNC: 140 MMOL/L (ref 136–145)
TOTAL PROTEIN: 8.1 G/DL (ref 6.4–8.2)
TROPONIN: <0.01 NG/ML
WBC # BLD: 8.9 K/UL (ref 4–11)

## 2021-11-14 PROCEDURE — 96375 TX/PRO/DX INJ NEW DRUG ADDON: CPT

## 2021-11-14 PROCEDURE — 6370000000 HC RX 637 (ALT 250 FOR IP): Performed by: EMERGENCY MEDICINE

## 2021-11-14 PROCEDURE — 80053 COMPREHEN METABOLIC PANEL: CPT

## 2021-11-14 PROCEDURE — 80143 DRUG ASSAY ACETAMINOPHEN: CPT

## 2021-11-14 PROCEDURE — 36415 COLL VENOUS BLD VENIPUNCTURE: CPT

## 2021-11-14 PROCEDURE — 2580000003 HC RX 258: Performed by: EMERGENCY MEDICINE

## 2021-11-14 PROCEDURE — G0378 HOSPITAL OBSERVATION PER HR: HCPCS

## 2021-11-14 PROCEDURE — 96365 THER/PROPH/DIAG IV INF INIT: CPT

## 2021-11-14 PROCEDURE — 6360000004 HC RX CONTRAST MEDICATION: Performed by: EMERGENCY MEDICINE

## 2021-11-14 PROCEDURE — 83690 ASSAY OF LIPASE: CPT

## 2021-11-14 PROCEDURE — 84484 ASSAY OF TROPONIN QUANT: CPT

## 2021-11-14 PROCEDURE — 6360000002 HC RX W HCPCS: Performed by: EMERGENCY MEDICINE

## 2021-11-14 PROCEDURE — 99284 EMERGENCY DEPT VISIT MOD MDM: CPT

## 2021-11-14 PROCEDURE — 74177 CT ABD & PELVIS W/CONTRAST: CPT

## 2021-11-14 PROCEDURE — 82077 ASSAY SPEC XCP UR&BREATH IA: CPT

## 2021-11-14 PROCEDURE — 6370000000 HC RX 637 (ALT 250 FOR IP): Performed by: STUDENT IN AN ORGANIZED HEALTH CARE EDUCATION/TRAINING PROGRAM

## 2021-11-14 PROCEDURE — 80179 DRUG ASSAY SALICYLATE: CPT

## 2021-11-14 PROCEDURE — 85025 COMPLETE CBC W/AUTO DIFF WBC: CPT

## 2021-11-14 RX ORDER — KETOROLAC TROMETHAMINE 30 MG/ML
30 INJECTION, SOLUTION INTRAMUSCULAR; INTRAVENOUS ONCE
Status: DISCONTINUED | OUTPATIENT
Start: 2021-11-14 | End: 2021-11-17 | Stop reason: HOSPADM

## 2021-11-14 RX ORDER — HYDROXYZINE HYDROCHLORIDE 25 MG/1
50 TABLET, FILM COATED ORAL NIGHTLY
Status: DISCONTINUED | OUTPATIENT
Start: 2021-11-15 | End: 2021-11-17 | Stop reason: HOSPADM

## 2021-11-14 RX ORDER — LORAZEPAM 2 MG/ML
1 INJECTION INTRAMUSCULAR
Status: DISCONTINUED | OUTPATIENT
Start: 2021-11-14 | End: 2021-11-14

## 2021-11-14 RX ORDER — LORAZEPAM 1 MG/1
1 TABLET ORAL
Status: DISCONTINUED | OUTPATIENT
Start: 2021-11-14 | End: 2021-11-14

## 2021-11-14 RX ORDER — SODIUM CHLORIDE 9 MG/ML
25 INJECTION, SOLUTION INTRAVENOUS PRN
Status: DISCONTINUED | OUTPATIENT
Start: 2021-11-14 | End: 2021-11-15 | Stop reason: SDUPTHER

## 2021-11-14 RX ORDER — GAUZE BANDAGE 2" X 2"
100 BANDAGE TOPICAL DAILY
Status: DISCONTINUED | OUTPATIENT
Start: 2021-11-15 | End: 2021-11-17 | Stop reason: HOSPADM

## 2021-11-14 RX ORDER — LORAZEPAM 1 MG/1
4 TABLET ORAL
Status: DISCONTINUED | OUTPATIENT
Start: 2021-11-14 | End: 2021-11-14

## 2021-11-14 RX ORDER — LAMOTRIGINE 25 MG/1
50 TABLET ORAL NIGHTLY
Status: DISCONTINUED | OUTPATIENT
Start: 2021-11-15 | End: 2021-11-17 | Stop reason: HOSPADM

## 2021-11-14 RX ORDER — LORAZEPAM 1 MG/1
2 TABLET ORAL
Status: DISCONTINUED | OUTPATIENT
Start: 2021-11-14 | End: 2021-11-14

## 2021-11-14 RX ORDER — LAMOTRIGINE 100 MG/1
100 TABLET ORAL EVERY MORNING
Status: DISCONTINUED | OUTPATIENT
Start: 2021-11-15 | End: 2021-11-17 | Stop reason: HOSPADM

## 2021-11-14 RX ORDER — SODIUM CHLORIDE 9 MG/ML
25 INJECTION, SOLUTION INTRAVENOUS PRN
Status: DISCONTINUED | OUTPATIENT
Start: 2021-11-14 | End: 2021-11-14 | Stop reason: SDUPTHER

## 2021-11-14 RX ORDER — SODIUM CHLORIDE 0.9 % (FLUSH) 0.9 %
5-40 SYRINGE (ML) INJECTION PRN
Status: DISCONTINUED | OUTPATIENT
Start: 2021-11-14 | End: 2021-11-17 | Stop reason: HOSPADM

## 2021-11-14 RX ORDER — LORAZEPAM 2 MG/ML
4 INJECTION INTRAMUSCULAR
Status: DISCONTINUED | OUTPATIENT
Start: 2021-11-14 | End: 2021-11-14

## 2021-11-14 RX ORDER — MORPHINE SULFATE 4 MG/ML
4 INJECTION, SOLUTION INTRAMUSCULAR; INTRAVENOUS PRN
Status: DISCONTINUED | OUTPATIENT
Start: 2021-11-14 | End: 2021-11-14

## 2021-11-14 RX ORDER — LORAZEPAM 2 MG/ML
2 INJECTION INTRAMUSCULAR
Status: DISCONTINUED | OUTPATIENT
Start: 2021-11-14 | End: 2021-11-14

## 2021-11-14 RX ORDER — DIPHENHYDRAMINE HYDROCHLORIDE 50 MG/ML
25 INJECTION INTRAMUSCULAR; INTRAVENOUS ONCE
Status: COMPLETED | OUTPATIENT
Start: 2021-11-14 | End: 2021-11-14

## 2021-11-14 RX ORDER — LORAZEPAM 2 MG/ML
0.5 INJECTION INTRAMUSCULAR EVERY 4 HOURS PRN
Status: DISCONTINUED | OUTPATIENT
Start: 2021-11-14 | End: 2021-11-15

## 2021-11-14 RX ORDER — ONDANSETRON 4 MG/1
4 TABLET, ORALLY DISINTEGRATING ORAL ONCE
Status: COMPLETED | OUTPATIENT
Start: 2021-11-14 | End: 2021-11-14

## 2021-11-14 RX ORDER — 0.9 % SODIUM CHLORIDE 0.9 %
1000 INTRAVENOUS SOLUTION INTRAVENOUS ONCE
Status: COMPLETED | OUTPATIENT
Start: 2021-11-14 | End: 2021-11-14

## 2021-11-14 RX ORDER — LORAZEPAM 2 MG/ML
3 INJECTION INTRAMUSCULAR
Status: DISCONTINUED | OUTPATIENT
Start: 2021-11-14 | End: 2021-11-14

## 2021-11-14 RX ORDER — SODIUM CHLORIDE 0.9 % (FLUSH) 0.9 %
5-40 SYRINGE (ML) INJECTION PRN
Status: DISCONTINUED | OUTPATIENT
Start: 2021-11-14 | End: 2021-11-14 | Stop reason: SDUPTHER

## 2021-11-14 RX ORDER — ONDANSETRON 2 MG/ML
4 INJECTION INTRAMUSCULAR; INTRAVENOUS EVERY 6 HOURS PRN
Status: DISCONTINUED | OUTPATIENT
Start: 2021-11-14 | End: 2021-11-17 | Stop reason: HOSPADM

## 2021-11-14 RX ORDER — THIAMINE HYDROCHLORIDE 100 MG/ML
100 INJECTION, SOLUTION INTRAMUSCULAR; INTRAVENOUS DAILY
Status: DISCONTINUED | OUTPATIENT
Start: 2021-11-14 | End: 2021-11-14

## 2021-11-14 RX ORDER — SODIUM CHLORIDE 0.9 % (FLUSH) 0.9 %
5-40 SYRINGE (ML) INJECTION EVERY 12 HOURS SCHEDULED
Status: DISCONTINUED | OUTPATIENT
Start: 2021-11-14 | End: 2021-11-14 | Stop reason: SDUPTHER

## 2021-11-14 RX ORDER — POTASSIUM CHLORIDE 20 MEQ/1
20 TABLET, EXTENDED RELEASE ORAL ONCE
Status: COMPLETED | OUTPATIENT
Start: 2021-11-14 | End: 2021-11-14

## 2021-11-14 RX ORDER — 0.9 % SODIUM CHLORIDE 0.9 %
1000 INTRAVENOUS SOLUTION INTRAVENOUS ONCE
Status: DISCONTINUED | OUTPATIENT
Start: 2021-11-14 | End: 2021-11-14

## 2021-11-14 RX ORDER — SODIUM CHLORIDE 0.9 % (FLUSH) 0.9 %
5-40 SYRINGE (ML) INJECTION EVERY 12 HOURS SCHEDULED
Status: DISCONTINUED | OUTPATIENT
Start: 2021-11-14 | End: 2021-11-17 | Stop reason: HOSPADM

## 2021-11-14 RX ORDER — OXYCODONE HYDROCHLORIDE 5 MG/1
5 TABLET ORAL EVERY 6 HOURS PRN
Status: DISCONTINUED | OUTPATIENT
Start: 2021-11-14 | End: 2021-11-17 | Stop reason: HOSPADM

## 2021-11-14 RX ORDER — LORAZEPAM 1 MG/1
3 TABLET ORAL
Status: DISCONTINUED | OUTPATIENT
Start: 2021-11-14 | End: 2021-11-14

## 2021-11-14 RX ADMIN — Medication 10 ML: at 21:53

## 2021-11-14 RX ADMIN — IOPAMIDOL 75 ML: 755 INJECTION, SOLUTION INTRAVENOUS at 17:26

## 2021-11-14 RX ADMIN — LORAZEPAM 2 MG: 2 INJECTION INTRAMUSCULAR; INTRAVENOUS at 20:17

## 2021-11-14 RX ADMIN — POTASSIUM CHLORIDE 20 MEQ: 20 TABLET, EXTENDED RELEASE ORAL at 21:50

## 2021-11-14 RX ADMIN — THIAMINE HYDROCHLORIDE 100 MG: 100 INJECTION, SOLUTION INTRAMUSCULAR; INTRAVENOUS at 18:43

## 2021-11-14 RX ADMIN — DIPHENHYDRAMINE HYDROCHLORIDE 25 MG: 50 INJECTION, SOLUTION INTRAMUSCULAR; INTRAVENOUS at 18:31

## 2021-11-14 RX ADMIN — SODIUM CHLORIDE 1000 ML: 9 INJECTION, SOLUTION INTRAVENOUS at 18:29

## 2021-11-14 RX ADMIN — ONDANSETRON 4 MG: 4 TABLET, ORALLY DISINTEGRATING ORAL at 16:26

## 2021-11-14 ASSESSMENT — PAIN DESCRIPTION - LOCATION
LOCATION: ABDOMEN
LOCATION: HEAD;ABDOMEN

## 2021-11-14 ASSESSMENT — PAIN SCALES - GENERAL
PAINLEVEL_OUTOF10: 8
PAINLEVEL_OUTOF10: 6

## 2021-11-14 ASSESSMENT — PAIN DESCRIPTION - DESCRIPTORS: DESCRIPTORS: BURNING

## 2021-11-14 ASSESSMENT — PAIN DESCRIPTION - PAIN TYPE: TYPE: ACUTE PAIN

## 2021-11-14 ASSESSMENT — PAIN DESCRIPTION - ORIENTATION: ORIENTATION: UPPER

## 2021-11-14 ASSESSMENT — PAIN DESCRIPTION - PROGRESSION: CLINICAL_PROGRESSION: NOT CHANGED

## 2021-11-14 ASSESSMENT — PAIN SCALES - WONG BAKER: WONGBAKER_NUMERICALRESPONSE: 8

## 2021-11-14 NOTE — ED PROVIDER NOTES
Diego Kovacs  Other (Pt c/o alcohol withdrawl related stomach pain. Drinks 12 pack a day. Hasn't had drink in 24 hours. Reports shakiness, nausea, vomiitng, Diarrhea. Hx of seizure with alcohol withdrawal. )       HISTORY OF PRESENT ILLNESS  Florentino Chappell is a 28 y.o. male with history of alcohol abuse and alcoholic pancreatitis who presents to the emergency department for evaluation of epigastric abdominal pain and nausea vomiting. Reports he has not been able to tolerate anything by mouth for the past 3 days due to persistent vomiting every time he tries to drink or eat anything. Reports having epigastric abdominal pain that has been progressively worsening. He has tried over-the-counter Tylenol and ibuprofen at home with no improvement in symptoms. Rates his pain as a 10 out of 10. Reports it feels very similar to when he had pancreatitis in the past.  Says he does have stones in the biliary duct. Says he still has his gallbladder. No fevers or chills at home. Last drink was yesterday. Reports drinking at least 12 beers per day plus liquor sometimes. Reports having history of withdrawal seizures. No other complaints, modifying factors or associated symptoms. I have reviewed the following from the nursing documentation. Past Medical History:   Diagnosis Date    Alcohol abuse     PATIENT HAS EXTREME MANIPULITIVE & DRUG SEEKING BEHAVIOR. HE POCKETS HIS BENZODIZAPINES.  Anxiety     Drug-seeking behavior     Several times found pocketing medications given in hospital    Herpes genitalis in men     Manipulative behavior     Pancreatitis     Pneumonia     Portal vein thrombosis     pt denied    Seizures (HCC)     PER PATIENT ONLY.  DURING MULTIPLE HOSPITALIZATIONS HE HAS NEVER ONCE    Tobacco abuse      Past Surgical History:   Procedure Laterality Date    ENDOSCOPY, COLON, DIAGNOSTIC  10/28/2013    Endoscopic retrograde cholangiopancreatography with pancreatic stent placement    ENDOSCOPY, COLON, DIAGNOSTIC  03/23/2018    Esophagogastroduodenoscopy with biopsy    ERCP  1/10/14    with stent removal     Family History   Problem Relation Age of Onset    Hypertension Father     High Blood Pressure Father     Alcohol Abuse Father     Depression Mother     High Blood Pressure Paternal Grandfather     Alcohol Abuse Paternal Grandfather     Heart Disease Paternal Grandmother     Anemia Paternal Grandmother     Depression Maternal Aunt     Alcohol Abuse Paternal Aunt     Alcohol Abuse Paternal Uncle      Social History     Socioeconomic History    Marital status: Single     Spouse name: Not on file    Number of children: 1    Years of education: 15    Highest education level: Not on file   Occupational History    Occupation:    Tobacco Use    Smoking status: Current Every Day Smoker     Packs/day: 0.50     Years: 10.00     Pack years: 5.00     Types: Cigarettes     Start date: 11/21/2007    Smokeless tobacco: Never Used   Vaping Use    Vaping Use: Former    Substances: Never   Substance and Sexual Activity    Alcohol use: Yes     Comment: 9-12 beers/day    Drug use: No    Sexual activity: Not Currently   Other Topics Concern    Not on file   Social History Narrative    Not on file     Social Determinants of Health     Financial Resource Strain:     Difficulty of Paying Living Expenses: Not on file   Food Insecurity:     Worried About Running Out of Food in the Last Year: Not on file    Shaun of Food in the Last Year: Not on file   Transportation Needs:     Lack of Transportation (Medical): Not on file    Lack of Transportation (Non-Medical):  Not on file   Physical Activity:     Days of Exercise per Week: Not on file    Minutes of Exercise per Session: Not on file   Stress:     Feeling of Stress : Not on file   Social Connections:     Frequency of Communication with Friends and Family: Not on file    Frequency of Social Gatherings with Friends and Family: Not on file    Attends Orthodox Services: Not on file    Active Member of Clubs or Organizations: Not on file    Attends Club or Organization Meetings: Not on file    Marital Status: Not on file   Intimate Partner Violence:     Fear of Current or Ex-Partner: Not on file    Emotionally Abused: Not on file    Physically Abused: Not on file    Sexually Abused: Not on file   Housing Stability:     Unable to Pay for Housing in the Last Year: Not on file    Number of [place] in the Last Year: Not on file    Unstable Housing in the Last Year: Not on file     Current Facility-Administered Medications   Medication Dose Route Frequency Provider Last Rate Last Admin    ketorolac (TORADOL) injection 30 mg  30 mg IntraMUSCular Once Lucien Childers MD        sodium chloride flush 0.9 % injection 5-40 mL  5-40 mL IntraVENous 2 times per day Lucien Childers MD        sodium chloride flush 0.9 % injection 5-40 mL  5-40 mL IntraVENous PRN Lucien Childers MD        0.9 % sodium chloride infusion  25 mL IntraVENous PRN Lucien Childers MD        thiamine (B-1) 100 mg in sodium chloride 0.9 % 100 mL IVPB  100 mg IntraVENous Q24H Lucien Childers MD   Stopped at 11/14/21 1926    potassium chloride (KLOR-CON M) extended release tablet 20 mEq  20 mEq Oral Once Leonel M Zeenat, DO         Current Outpatient Medications   Medication Sig Dispense Refill    naproxen (NAPROSYN) 500 MG tablet Take 1 tablet by mouth 2 times daily      hydrOXYzine (ATARAX) 25 MG tablet Take 50 mg by mouth nightly      lamoTRIgine (LAMICTAL) 100 MG tablet Take 100 mg by mouth every morning       lamoTRIgine (LAMICTAL) 100 MG tablet Take 50 mg by mouth nightly       hydrOXYzine (ATARAX) 25 MG tablet Take 25 mg by mouth every morning 25 mg AM, 50 mg nightly       Allergies   Allergen Reactions    Amoxicillin Hives    Haldol [Haloperidol Lactate] Other (See Comments) Muscle spasms    Phenergan [Promethazine Hcl] Other (See Comments)     Pt believes it caused muscle spasms    Shrimp (Diagnostic) Itching    Glucosamine Itching      Itching of throat when eating shrimp. Pt states has had IV contrast for CT's without problem before.  Shellfish-Derived Products      Patient gets anxious around seafood       REVIEW OF SYSTEMS  10 systems reviewed, pertinent positives per HPI otherwise noted to be negative. PHYSICAL EXAM  /85   Pulse 69   Temp 98 °F (36.7 °C) (Oral)   Resp 18   Ht 5' 8\" (1.727 m)   Wt 144 lb 10 oz (65.6 kg)   SpO2 100%   BMI 21.99 kg/m²    GENERAL APPEARANCE: Awake and alert. In moderate distress due to pain. HENT: Normocephalic. Atraumatic. PERRL. EOMI. No facial droop. HEART/CHEST: RRR. Normotensive. LUNGS: Respirations unlabored. Speaking comfortably in full sentences. ABDOMEN: Soft, non-distended abdomen. Tenderness palpation over the epigastrium. No guarding. No rebound. EXTREMITIES: No gross deformities. Moving all extremities. SKIN: Warm and dry. No acute rashes. NEUROLOGICAL: Alert and oriented. No gross facial drooping. Answering questions appropriately. Moving all extremities. PSYCHIATRIC: Pleasant. Normal mood and affect.     LABS  Results for orders placed or performed during the hospital encounter of 11/14/21   CBC Auto Differential   Result Value Ref Range    WBC 8.9 4.0 - 11.0 K/uL    RBC 5.42 4.20 - 5.90 M/uL    Hemoglobin 17.2 13.5 - 17.5 g/dL    Hematocrit 49.8 40.5 - 52.5 %    MCV 92.0 80.0 - 100.0 fL    MCH 31.7 26.0 - 34.0 pg    MCHC 34.4 31.0 - 36.0 g/dL    RDW 12.6 12.4 - 15.4 %    Platelets 779 733 - 494 K/uL    MPV 8.4 5.0 - 10.5 fL    Neutrophils % 74.8 %    Lymphocytes % 15.9 %    Monocytes % 7.4 %    Eosinophils % 1.4 %    Basophils % 0.5 %    Neutrophils Absolute 6.6 1.7 - 7.7 K/uL    Lymphocytes Absolute 1.4 1.0 - 5.1 K/uL    Monocytes Absolute 0.7 0.0 - 1.3 K/uL    Eosinophils Absolute 0.1 0.0 - 0.6 K/uL Basophils Absolute 0.0 0.0 - 0.2 K/uL   Comprehensive Metabolic Panel   Result Value Ref Range    Sodium 140 136 - 145 mmol/L    Potassium 3.4 (L) 3.5 - 5.1 mmol/L    Chloride 101 99 - 110 mmol/L    CO2 21 21 - 32 mmol/L    Anion Gap 18 (H) 3 - 16    Glucose 95 70 - 99 mg/dL    BUN 10 7 - 20 mg/dL    CREATININE 0.7 (L) 0.9 - 1.3 mg/dL    GFR Non-African American >60 >60    GFR African American >60 >60    Calcium 9.9 8.3 - 10.6 mg/dL    Total Protein 8.1 6.4 - 8.2 g/dL    Albumin 5.1 (H) 3.4 - 5.0 g/dL    Albumin/Globulin Ratio 1.7 1.1 - 2.2    Total Bilirubin 0.9 0.0 - 1.0 mg/dL    Alkaline Phosphatase 76 40 - 129 U/L    ALT 24 10 - 40 U/L    AST 25 15 - 37 U/L   Ethanol   Result Value Ref Range    Ethanol Lvl None Detected mg/dL   Acetaminophen Level   Result Value Ref Range    Acetaminophen Level <5 (L) 10 - 30 ug/mL   Salicylate   Result Value Ref Range    Salicylate, Serum <2.2 (L) 15.0 - 30.0 mg/dL   Lipase   Result Value Ref Range    Lipase 33.0 13.0 - 60.0 U/L   Troponin   Result Value Ref Range    Troponin <0.01 <0.01 ng/mL       I have reviewed all labs for this visit. ECG  The Ekg interpreted by me shows  Normal sinus rhythm with sinus arrhythmia with a rate of 61  Axis is rightward  QTc is normal  Intervals and Durations are unremarkable. ST Segments: Nonspecific changes    RADIOLOGY  CT ABDOMEN PELVIS W IV CONTRAST Additional Contrast? None    Result Date: 11/14/2021  EXAMINATION: CT OF THE ABDOMEN AND PELVIS WITH CONTRAST 11/14/2021 5:05 pm TECHNIQUE: CT of the abdomen and pelvis was performed with the administration of intravenous contrast. Multiplanar reformatted images are provided for review. Dose modulation, iterative reconstruction, and/or weight based adjustment of the mA/kV was utilized to reduce the radiation dose to as low as reasonably achievable. COMPARISON: 09/29/2021 HISTORY: ORDERING SYSTEM PROVIDED HISTORY: h/o 7mm stone in the pancreatic duct near ampulla. abd pain.  n/v TECHNOLOGIST PROVIDED HISTORY: Reason for exam:->h/o 7mm stone in the pancreatic duct near ampulla. abd pain. n/v Additional Contrast?->None Decision Support Exception - unselect if not a suspected or confirmed emergency medical condition->Emergency Medical Condition (MA) Reason for Exam: c/o alcohol withdrawl related stomach pain Acuity: Acute Type of Exam: Initial FINDINGS: Lower Chest: The lung bases are clear. Organs: The liver is mildly enlarged with hypertrophy of the caudate and left lobes fatty replacement throughout. No focal lesion is seen. The gallbladder and bile ducts are normal.  There are extensive punctate and irregular calcifications scattered throughout the pancreas which were seen previously and are unchanged. There is diffuse mild dilatation of the pancreatic duct which tapers list distally. The largest transverse dimension of the duct seen along the head of the pancreas measuring 7 mm which is grossly unchanged. There are calcifications seen around the duct distal distally which are obscuring the ampullary region of the duct. No obvious focal mass or active inflammation is seen. The spleen is unremarkable. The adrenals are normal.  The kidneys are normal size and function normally with no obvious hydronephrosis or renal stones. The ureters are normal caliber. GI/Bowel: There is mild feces scattered in the colon. There are small air-fluid levels throughout the bowel which is not significantly dilated. The appendix is not well visualized with no pericecal inflammation. There is no bowel wall thickening. The mesentery is unremarkable. Pelvis: The bladder is well distended with no filling defects. The prostate gland is borderline enlarged but unchanged. No adenopathy or ascites is seen. The mesentery is unremarkable Peritoneum/Retroperitoneum:   The aorta is unremarkable with no retroperitoneal mass or adenopathy. Bones/Soft Tissues: The bones are intact.   No aggressive osseous lesion is seen. Moderate calcific pancreatitis and mild dilatation of the pancreatic duct extending to the head region which is unchanged. No active inflammatory changes or masses are seen. The ampullary portion of the duct is partially obscured due to calcifications throughout the area. If there is persistent clinical concern for a calcification in the duct, suggest ERCP correlation. Small air-fluid levels throughout the bowel which could represent enteritis or early ileus recommend clinical follow-up Mild chronic liver changes with fatty replacement throughout     ED COURSE/MDM  Patient seen and evaluated. At presentation, patient was awake alert, afebrile, hemodynamically stable, and satting well on room air. Abdomen was soft, nontender, and mildly tender to palpation over the epigastrium. No guarding or rebound present. Differential diagnosis includes acute versus chronic pancreatitis secondary to alcohol versus gallstone, cholecystitis, choledocholithiasis, ACS, among others. CBC, CMP, lipase, alcohol, salicylate, EKG, troponin obtained. I spoke with ultrasound but unable to obtain right upper quadrant ultrasound today because it is the weekend. Liver enzymes within normal.  Total bilirubin within normal limits. Creatinine normal.  Troponin less than 0.1. No leukocytosis present. Lipase within normal limits at 33. These were discussed with patient who said he did not feel comfortable going home. Says he has been repeatedly vomiting at home. Not able to keep any medications down. He also complains of 10 out of 10 pain despite receiving the Toradol. Reports still feeling nauseous after Zofran. Also expresses concern due to history of alcohol withdrawal seizures. Patient placed on CIWA protocol. Given Ativan as needed for symptoms. CT abdomen pelvis ordered. He was given morphine for pain control. He was given 1 L IV fluid bolus. Zofran given.  CT abdomen pelvis showed mild pancreatic ductal dilation similar to prior scans. These were discussed with patient. Patient still reports having abdominal pain, severe withdrawal symptoms. Hospitalist consulted for admission for further evaluation and treatment. I spoke with Dr. Jones Monday. Admit. I provided at least 30 minutes of critical care excluding separately billable procedures. Pt was seen during the [place] 19 pandemic. Appropriate PPE worn by ME during patient encounters. Pt seen during a time with constrained hospital bed capacity and other potential inpatient and outpatient resources were constrained due to the viral pandemic. During the patient's ED course, the patient was given:  Medications   ketorolac (TORADOL) injection 30 mg (30 mg IntraMUSCular Not Given 11/14/21 1626)   sodium chloride flush 0.9 % injection 5-40 mL (has no administration in time range)   sodium chloride flush 0.9 % injection 5-40 mL (has no administration in time range)   0.9 % sodium chloride infusion (has no administration in time range)   thiamine (B-1) 100 mg in sodium chloride 0.9 % 100 mL IVPB (0 mg IntraVENous Stopped 11/14/21 1926)   potassium chloride (KLOR-CON M) extended release tablet 20 mEq (has no administration in time range)   ondansetron (ZOFRAN-ODT) disintegrating tablet 4 mg (4 mg Oral Given 11/14/21 1626)   0.9 % sodium chloride bolus (0 mLs IntraVENous Stopped 11/14/21 2006)   diphenhydrAMINE (BENADRYL) injection 25 mg (25 mg IntraVENous Given 11/14/21 1831)   iopamidol (ISOVUE-370) 76 % injection 75 mL (75 mLs IntraVENous Given 11/14/21 1726)        CLINICAL IMPRESSION  1. Alcohol withdrawal syndrome with complication ([address]    2. Intractable pain    3. Dilated pancreatic duct        Blood pressure 135/85, pulse 69, temperature 98 °F (36.7 °C), temperature source Oral, resp. rate 18, height 5' 8\" (1.727 m), weight 144 lb 10 oz (65.6 kg), SpO2 100 %.     DISPOSITION  Jesus Bacon was admitted to the hospital.      Patient was given scripts for the following medications. I counseled patient how to take these medications. New Prescriptions    No medications on file       Follow-up with:  No follow-up provider specified. DISCLAIMER: This chart was created using Dragon dictation software. Efforts were made by me to ensure accuracy, however some errors may be present due to limitations of this technology and occasionally words are not transcribed correctly.         Michaela Angelucci, MD  11/14/21 0560

## 2021-11-15 LAB
ANION GAP SERPL CALCULATED.3IONS-SCNC: 11 MMOL/L (ref 3–16)
BASOPHILS ABSOLUTE: 0.1 K/UL (ref 0–0.2)
BASOPHILS RELATIVE PERCENT: 0.8 %
BUN BLDV-MCNC: 8 MG/DL (ref 7–20)
CALCIUM SERPL-MCNC: 8.8 MG/DL (ref 8.3–10.6)
CHLORIDE BLD-SCNC: 104 MMOL/L (ref 99–110)
CO2: 23 MMOL/L (ref 21–32)
CREAT SERPL-MCNC: 0.7 MG/DL (ref 0.9–1.3)
EOSINOPHILS ABSOLUTE: 0.5 K/UL (ref 0–0.6)
EOSINOPHILS RELATIVE PERCENT: 8 %
GFR AFRICAN AMERICAN: >60
GFR NON-AFRICAN AMERICAN: >60
GLUCOSE BLD-MCNC: 82 MG/DL (ref 70–99)
HCT VFR BLD CALC: 41.9 % (ref 40.5–52.5)
HEMOGLOBIN: 14.9 G/DL (ref 13.5–17.5)
LYMPHOCYTES ABSOLUTE: 2.2 K/UL (ref 1–5.1)
LYMPHOCYTES RELATIVE PERCENT: 33.3 %
MAGNESIUM: 2.2 MG/DL (ref 1.8–2.4)
MCH RBC QN AUTO: 32.6 PG (ref 26–34)
MCHC RBC AUTO-ENTMCNC: 35.5 G/DL (ref 31–36)
MCV RBC AUTO: 91.9 FL (ref 80–100)
MONOCYTES ABSOLUTE: 0.6 K/UL (ref 0–1.3)
MONOCYTES RELATIVE PERCENT: 8.9 %
NEUTROPHILS ABSOLUTE: 3.2 K/UL (ref 1.7–7.7)
NEUTROPHILS RELATIVE PERCENT: 49 %
PDW BLD-RTO: 12.8 % (ref 12.4–15.4)
PLATELET # BLD: 210 K/UL (ref 135–450)
PMV BLD AUTO: 8.6 FL (ref 5–10.5)
POTASSIUM SERPL-SCNC: 4.2 MMOL/L (ref 3.5–5.1)
RBC # BLD: 4.56 M/UL (ref 4.2–5.9)
SODIUM BLD-SCNC: 138 MMOL/L (ref 136–145)
WBC # BLD: 6.5 K/UL (ref 4–11)

## 2021-11-15 PROCEDURE — 96375 TX/PRO/DX INJ NEW DRUG ADDON: CPT

## 2021-11-15 PROCEDURE — 36415 COLL VENOUS BLD VENIPUNCTURE: CPT

## 2021-11-15 PROCEDURE — 80048 BASIC METABOLIC PNL TOTAL CA: CPT

## 2021-11-15 PROCEDURE — G0378 HOSPITAL OBSERVATION PER HR: HCPCS

## 2021-11-15 PROCEDURE — 6370000000 HC RX 637 (ALT 250 FOR IP): Performed by: NURSE PRACTITIONER

## 2021-11-15 PROCEDURE — 85025 COMPLETE CBC W/AUTO DIFF WBC: CPT

## 2021-11-15 PROCEDURE — 6370000000 HC RX 637 (ALT 250 FOR IP): Performed by: INTERNAL MEDICINE

## 2021-11-15 PROCEDURE — 83735 ASSAY OF MAGNESIUM: CPT

## 2021-11-15 PROCEDURE — 1200000000 HC SEMI PRIVATE

## 2021-11-15 PROCEDURE — 6370000000 HC RX 637 (ALT 250 FOR IP): Performed by: STUDENT IN AN ORGANIZED HEALTH CARE EDUCATION/TRAINING PROGRAM

## 2021-11-15 PROCEDURE — 6360000002 HC RX W HCPCS: Performed by: STUDENT IN AN ORGANIZED HEALTH CARE EDUCATION/TRAINING PROGRAM

## 2021-11-15 PROCEDURE — 96376 TX/PRO/DX INJ SAME DRUG ADON: CPT

## 2021-11-15 PROCEDURE — 94761 N-INVAS EAR/PLS OXIMETRY MLT: CPT

## 2021-11-15 PROCEDURE — 2580000003 HC RX 258: Performed by: STUDENT IN AN ORGANIZED HEALTH CARE EDUCATION/TRAINING PROGRAM

## 2021-11-15 RX ORDER — NICOTINE 21 MG/24HR
1 PATCH, TRANSDERMAL 24 HOURS TRANSDERMAL DAILY
Status: DISCONTINUED | OUTPATIENT
Start: 2021-11-15 | End: 2021-11-17 | Stop reason: HOSPADM

## 2021-11-15 RX ORDER — LORAZEPAM 1 MG/1
2 TABLET ORAL
Status: DISCONTINUED | OUTPATIENT
Start: 2021-11-15 | End: 2021-11-17 | Stop reason: HOSPADM

## 2021-11-15 RX ORDER — LORAZEPAM 1 MG/1
3 TABLET ORAL
Status: DISCONTINUED | OUTPATIENT
Start: 2021-11-15 | End: 2021-11-17 | Stop reason: HOSPADM

## 2021-11-15 RX ORDER — LORAZEPAM 2 MG/ML
3 INJECTION INTRAMUSCULAR
Status: DISCONTINUED | OUTPATIENT
Start: 2021-11-15 | End: 2021-11-17 | Stop reason: HOSPADM

## 2021-11-15 RX ORDER — SODIUM CHLORIDE 0.9 % (FLUSH) 0.9 %
5-40 SYRINGE (ML) INJECTION PRN
Status: DISCONTINUED | OUTPATIENT
Start: 2021-11-15 | End: 2021-11-16

## 2021-11-15 RX ORDER — LORAZEPAM 2 MG/ML
4 INJECTION INTRAMUSCULAR
Status: DISCONTINUED | OUTPATIENT
Start: 2021-11-15 | End: 2021-11-17 | Stop reason: HOSPADM

## 2021-11-15 RX ORDER — LORAZEPAM 1 MG/1
1 TABLET ORAL
Status: DISCONTINUED | OUTPATIENT
Start: 2021-11-15 | End: 2021-11-17 | Stop reason: HOSPADM

## 2021-11-15 RX ORDER — SODIUM CHLORIDE 0.9 % (FLUSH) 0.9 %
5-40 SYRINGE (ML) INJECTION EVERY 12 HOURS SCHEDULED
Status: DISCONTINUED | OUTPATIENT
Start: 2021-11-15 | End: 2021-11-16

## 2021-11-15 RX ORDER — SODIUM CHLORIDE 9 MG/ML
25 INJECTION, SOLUTION INTRAVENOUS PRN
Status: DISCONTINUED | OUTPATIENT
Start: 2021-11-15 | End: 2021-11-17 | Stop reason: HOSPADM

## 2021-11-15 RX ORDER — FOLIC ACID 1 MG/1
1 TABLET ORAL DAILY
Status: DISCONTINUED | OUTPATIENT
Start: 2021-11-15 | End: 2021-11-17 | Stop reason: HOSPADM

## 2021-11-15 RX ORDER — LORAZEPAM 2 MG/ML
1 INJECTION INTRAMUSCULAR
Status: DISCONTINUED | OUTPATIENT
Start: 2021-11-15 | End: 2021-11-17 | Stop reason: HOSPADM

## 2021-11-15 RX ORDER — LORAZEPAM 1 MG/1
4 TABLET ORAL
Status: DISCONTINUED | OUTPATIENT
Start: 2021-11-15 | End: 2021-11-17 | Stop reason: HOSPADM

## 2021-11-15 RX ORDER — LORAZEPAM 2 MG/ML
2 INJECTION INTRAMUSCULAR
Status: DISCONTINUED | OUTPATIENT
Start: 2021-11-15 | End: 2021-11-17 | Stop reason: HOSPADM

## 2021-11-15 RX ADMIN — SODIUM CHLORIDE, PRESERVATIVE FREE 10 ML: 5 INJECTION INTRAVENOUS at 11:00

## 2021-11-15 RX ADMIN — SODIUM CHLORIDE, PRESERVATIVE FREE 10 ML: 5 INJECTION INTRAVENOUS at 00:19

## 2021-11-15 RX ADMIN — OXYCODONE 5 MG: 5 TABLET ORAL at 00:18

## 2021-11-15 RX ADMIN — ONDANSETRON 4 MG: 2 INJECTION INTRAMUSCULAR; INTRAVENOUS at 19:51

## 2021-11-15 RX ADMIN — LORAZEPAM 3 MG: 1 TABLET ORAL at 15:25

## 2021-11-15 RX ADMIN — OXYCODONE 5 MG: 5 TABLET ORAL at 12:38

## 2021-11-15 RX ADMIN — FOLIC ACID 1 MG: 1 TABLET ORAL at 12:38

## 2021-11-15 RX ADMIN — LORAZEPAM 3 MG: 1 TABLET ORAL at 19:51

## 2021-11-15 RX ADMIN — ONDANSETRON 4 MG: 2 INJECTION INTRAMUSCULAR; INTRAVENOUS at 00:19

## 2021-11-15 RX ADMIN — LORAZEPAM 1 MG: 1 TABLET ORAL at 23:05

## 2021-11-15 RX ADMIN — OXYCODONE 5 MG: 5 TABLET ORAL at 19:51

## 2021-11-15 RX ADMIN — LORAZEPAM 0.5 MG: 2 INJECTION INTRAMUSCULAR; INTRAVENOUS at 10:39

## 2021-11-15 RX ADMIN — LORAZEPAM 0.5 MG: 2 INJECTION INTRAMUSCULAR; INTRAVENOUS at 00:49

## 2021-11-15 RX ADMIN — SODIUM CHLORIDE, PRESERVATIVE FREE 10 ML: 5 INJECTION INTRAVENOUS at 19:55

## 2021-11-15 RX ADMIN — OXYCODONE 5 MG: 5 TABLET ORAL at 06:35

## 2021-11-15 RX ADMIN — LORAZEPAM 0.5 MG: 2 INJECTION INTRAMUSCULAR; INTRAVENOUS at 06:35

## 2021-11-15 RX ADMIN — Medication 100 MG: at 10:42

## 2021-11-15 ASSESSMENT — PAIN DESCRIPTION - ORIENTATION
ORIENTATION: UPPER

## 2021-11-15 ASSESSMENT — PAIN SCALES - GENERAL
PAINLEVEL_OUTOF10: 7
PAINLEVEL_OUTOF10: 0
PAINLEVEL_OUTOF10: 7
PAINLEVEL_OUTOF10: 0
PAINLEVEL_OUTOF10: 8
PAINLEVEL_OUTOF10: 6
PAINLEVEL_OUTOF10: 8
PAINLEVEL_OUTOF10: 8

## 2021-11-15 ASSESSMENT — PAIN - FUNCTIONAL ASSESSMENT
PAIN_FUNCTIONAL_ASSESSMENT: ACTIVITIES ARE NOT PREVENTED
PAIN_FUNCTIONAL_ASSESSMENT: PREVENTS OR INTERFERES SOME ACTIVE ACTIVITIES AND ADLS
PAIN_FUNCTIONAL_ASSESSMENT: ACTIVITIES ARE NOT PREVENTED
PAIN_FUNCTIONAL_ASSESSMENT: PREVENTS OR INTERFERES SOME ACTIVE ACTIVITIES AND ADLS

## 2021-11-15 ASSESSMENT — PAIN DESCRIPTION - DESCRIPTORS
DESCRIPTORS: CONSTANT;SHARP
DESCRIPTORS: CONSTANT;DULL;SHARP
DESCRIPTORS: CONSTANT;SHARP

## 2021-11-15 ASSESSMENT — PAIN DESCRIPTION - ONSET
ONSET: GRADUAL
ONSET: ON-GOING

## 2021-11-15 ASSESSMENT — PAIN DESCRIPTION - LOCATION
LOCATION: ABDOMEN;HEAD
LOCATION: ABDOMEN;HIP

## 2021-11-15 ASSESSMENT — PAIN DESCRIPTION - FREQUENCY
FREQUENCY: CONTINUOUS

## 2021-11-15 ASSESSMENT — PAIN DESCRIPTION - PAIN TYPE
TYPE: ACUTE PAIN

## 2021-11-15 ASSESSMENT — PAIN DESCRIPTION - PROGRESSION
CLINICAL_PROGRESSION: GRADUALLY IMPROVING
CLINICAL_PROGRESSION: GRADUALLY WORSENING
CLINICAL_PROGRESSION: GRADUALLY IMPROVING

## 2021-11-15 NOTE — ED NOTES
MD notified of pt.'s pulse 52. Pt. Is sleeping at this time.   Advised to pass on to nurse upstairs and if any other concerns contact MD.       Linnette Salguero RN  11/14/21 9043

## 2021-11-15 NOTE — ED NOTES
Pt. To be admitted to 3W room 3102. Report called to Carlos Montez RN. Transport to be initiated by ED tech staff.       Katherine Day RN  11/14/21 9470

## 2021-11-15 NOTE — H&P
Hospital Medicine History & Physical      PCP: Kasie Vanegas MD    Date of Admission: 11/14/2021    Date of Service: Pt seen/examined on 11/14/2021 and  Placed in Observation. Chief Complaint: Abdominal pain, poor p.o. intake      History Of Present Illness: The patient is a 28 y.o. male with past medical history as below and still continues to drink alcohol with his last drink being 2 days ago from what can be understood who presents to [facility] with concern for acute onset worsening abdominal pain prior to coming to the ED for the last 8 hours or so. Patient notes that he did not have anything that could have triggered the abdominal pain but he has not been able to eat since it started. He notes nausea but no vomiting or diarrhea. Prior to today he does have some intermittent abdominal pain probably related to his chronic pancreatitis and he does still endorse drinking alcohol but overall did not have any other recent other symptoms of fever, chills, dizziness, syncope, chest pain, shortness of breath, dysuria, blood in urine/stool/sputum. Past Medical History:        Diagnosis Date    Alcohol abuse     PATIENT HAS EXTREME MANIPULITIVE & DRUG SEEKING BEHAVIOR. HE POCKETS HIS BENZODIZAPINES.  Anxiety     Drug-seeking behavior     Several times found pocketing medications given in hospital    Herpes genitalis in men     Manipulative behavior     Pancreatitis     Pneumonia     Portal vein thrombosis     pt denied    Seizures (HCC)     PER PATIENT ONLY.  DURING MULTIPLE HOSPITALIZATIONS HE HAS NEVER ONCE    Tobacco abuse        Past Surgical History:        Procedure Laterality Date    ENDOSCOPY, COLON, DIAGNOSTIC  10/28/2013    Endoscopic retrograde cholangiopancreatography with pancreatic stent placement    ENDOSCOPY, COLON, DIAGNOSTIC 03/23/2018    Esophagogastroduodenoscopy with biopsy    ERCP  1/10/14    with stent removal       Medications Prior to Admission:    Prior to Admission medications    Medication Sig Start Date End Date Taking? Authorizing Provider   naproxen (NAPROSYN) 500 MG tablet Take 1 tablet by mouth 2 times daily 7/20/21   Historical Provider, MD   hydrOXYzine (ATARAX) 25 MG tablet Take 50 mg by mouth nightly    Historical Provider, MD   lamoTRIgine (LAMICTAL) 100 MG tablet Take 100 mg by mouth every morning     Historical Provider, MD   lamoTRIgine (LAMICTAL) 100 MG tablet Take 50 mg by mouth nightly     Historical Provider, MD   hydrOXYzine (ATARAX) 25 MG tablet Take 25 mg by mouth every morning 25 mg AM, 50 mg nightly    Historical Provider, MD       Allergies:  Amoxicillin, Haldol [haloperidol lactate], Phenergan [promethazine hcl], Shrimp (diagnostic), Glucosamine, and Shellfish-derived products    Social History:  The patient currently lives home    TOBACCO:   reports that he has been smoking cigarettes. He started smoking about 13 years ago. He has a 5.00 pack-year smoking history. He has never used smokeless tobacco.  ETOH:   reports current alcohol use. Family History:  Reviewed in detail and negative for DM, Early CAD, Cancer, CVA. Positive as follows:        Problem Relation Age of Onset    Hypertension Father     High Blood Pressure Father     Alcohol Abuse Father     Depression Mother     High Blood Pressure Paternal Grandfather     Alcohol Abuse Paternal Grandfather     Heart Disease Paternal Grandmother     Anemia Paternal Grandmother     Depression Maternal Aunt     Alcohol Abuse Paternal Aunt     Alcohol Abuse Paternal Uncle        REVIEW OF SYSTEMS:  as noted in the HPI. All other systems reviewed and negative.     PHYSICAL EXAM:    /80   Pulse 64   Temp 97.7 °F (36.5 °C) (Oral)   Resp 15   Ht 5' 8\" (1.727 m)   Wt 145 lb 11.6 oz (66.1 kg)   SpO2 98%   BMI 22.16 kg/m²     General 11/14/21  1425   TROPONINI <0.01       U/A:    Lab Results   Component Value Date    COLORU YELLOW 09/29/2021    WBCUA 5 07/20/2021    RBCUA 1 07/20/2021    CLARITYU Clear 09/29/2021    SPECGRAV 1.025 09/29/2021    LEUKOCYTESUR Negative 09/29/2021    BLOODU Negative 09/29/2021    GLUCOSEU Negative 09/29/2021       ABG  No results found for: DTF9WXT, BEART, N0GXHAEB, PHART, THGBART, TDK7ISU, PO2ART, XGU4GPF        Active Hospital Problems    Diagnosis Date Noted    Cirrhosis of liver ([facility] [address] [K74.60] 06/03/2021    Abdominal pain [R10.9] 04/25/2019    Chronic pancreatitis ([facility] [K86.1] 04/25/2019         PHYSICIANS CERTIFICATION:    I certify that Sahara Sportskeith is expected to be hospitalized for less than 2 midnights based on the following assessment and plan:      ASSESSMENT/PLAN:  · Abdominal pain  · Chronic pancreatitis  · Cirrhosis of liver    Plan:  · Patient CT abdomen findings do seem to be more demonstratable of chronic pancreatitis findings and chronic previous occurrences of pancreatic stones, patient's abdominal pain seems to be improved at this time so uncertain as to whether MRCP or ERCP needs to be pursued  · Admitted to observation for pain control and to monitor for any worsening alcohol withdrawal, patient's most recent drink from what can be understood was about 2 days ago. · GI consulted for evaluation to consider possible need for ERCP versus MRCP  · Pain control with oxycodone 5 mg as needed as well as low-dose as needed Ativan IV for any withdrawal symptoms, can consider further CIWA protocol and further Ativan if needed  · If patient's abdominal pain improves, does not have any findings of alcohol withdrawal in the next 24 hours, and also is tolerating diet appropriately then can consider discharging depending on if GI has any other further recommendations at this time    DVT Prophylaxis: Lovenox  Diet: ADULT DIET;  Clear Liquid  Code Status: Full Code  PT/OT Eval Status: Ambulatory    Dispo -pending clinical course       Leonelmisti Palma, DO    Thank you Marta Rowland MD for the opportunity to be involved in this patient's care. If you have any questions or concerns please feel free to contact me at [phone].

## 2021-11-15 NOTE — PROGRESS NOTES
Hospitalist Progress Note      PCP: Greg Sanderson MD    Date of Admission: 11/14/2021        Subjective: Having some tremor, no hallucination though, improved abdominal pain, no nausea or vomiting. Medications:  Reviewed    Infusion Medications    sodium chloride       Scheduled Medications    ketorolac  30 mg IntraMUSCular Once    thiamine (VITAMIN B1) IVPB  100 mg IntraVENous Q24H    lamoTRIgine  100 mg Oral QAM    lamoTRIgine  50 mg Oral Nightly    hydrOXYzine  50 mg Oral Nightly    sodium chloride flush  5-40 mL IntraVENous 2 times per day    enoxaparin  40 mg SubCUTAneous Daily    thiamine  100 mg Oral Daily     PRN Meds: sodium chloride flush, sodium chloride, ondansetron, LORazepam, oxyCODONE      Intake/Output Summary (Last 24 hours) at 11/15/2021 1043  Last data filed at 11/15/2021 0907  Gross per 24 hour   Intake 250 ml   Output --   Net 250 ml       Physical Exam Performed:    /80   Pulse 64   Temp 97.7 °F (36.5 °C) (Oral)   Resp 15   Ht 5' 8\" (1.727 m)   Wt 145 lb 11.6 oz (66.1 kg)   SpO2 99%   BMI 22.16 kg/m²     General appearance: No apparent distress  Respiratory:  Normal respiratory effort. Clear to auscultation, bilaterally without Rales/Wheezes/Rhonchi. Cardiovascular: Regular rate and rhythm with normal S1/S2 without murmurs, rubs or gallops. Abdomen: Soft, minimally tender in epigastric region  Musculoskeletal: No clubbing, cyanosis  Skin: Skin color, texture, turgor normal.  No rashes or lesions. Neurologic:  No focal weakness.   Psychiatric: Alert and oriented  Capillary Refill: Brisk,3 seconds, normal   Peripheral Pulses: +2 palpable, equal bilaterally       Labs:   Recent Labs     11/14/21  1415 11/15/21  0458   WBC 8.9 6.5   HGB 17.2 14.9   HCT 49.8 41.9    210     Recent Labs     11/14/21  1415 11/15/21  0458    138   K 3.4* 4.2    104   CO2 21 23   BUN 10 8   CREATININE 0.7* 0.7*   CALCIUM 9.9 8.8     Recent Labs 11/14/21  1415   AST 25   ALT 24   BILITOT 0.9   ALKPHOS 76     No results for input(s): INR in the last 72 hours. Recent Labs     11/14/21  1425   TROPONINI <0.01       Urinalysis:      Lab Results   Component Value Date    NITRU Negative 09/29/2021    WBCUA 5 07/20/2021    RBCUA 1 07/20/2021    BLOODU Negative 09/29/2021    SPECGRAV 1.025 09/29/2021    GLUCOSEU Negative 09/29/2021       Radiology:  CT ABDOMEN PELVIS W IV CONTRAST Additional Contrast? None   Final Result   Moderate calcific pancreatitis and mild dilatation of the pancreatic duct   extending to the head region which is unchanged. No active inflammatory   changes or masses are seen. The ampullary portion of the duct is partially   obscured due to calcifications throughout the area. If there is persistent   clinical concern for a calcification in the duct, suggest ERCP correlation. Small air-fluid levels throughout the bowel which could represent enteritis   or early ileus recommend clinical follow-up      Mild chronic liver changes with fatty replacement throughout                 Assessment/Plan:    Active Hospital Problems    Diagnosis     Cirrhosis of liver (HCC) [K74.60]     Abdominal pain [R10.9]     Chronic pancreatitis ([facility] [address] [K86.1]      1. Pancreatitis, monitoring for now pending further clinical dynamics. 2.  Alcohol abuse with alcohol withdrawal, CIWA protocol, thiamine folic acid  3. History of seizure, seizure precaution  4. History of portal vein thrombosis. 5.  High anion gap metabolic acidosis on admission, improved  6. Hypokalemia, on admission improved. Diet: ADULT DIET;  Clear Liquid  Code Status: Full Code        Ariana Bone MD

## 2021-11-15 NOTE — PROGRESS NOTES
Patient scored 17 on CIWA score. Medication administered. See MAR. Patient requesting  Patient educated on new low fat diet Dr. Roldan Whitten upgraded patient to. Dietary at bedside. Patient denies any additional requests at this time. Fall precautions in place, call light within reach, and bedside table nearby. Will continue to monitor and round on patient q 2 hours.      Electronically signed by Piotr Barry RN on 11/15/2021 at 4:51 PM

## 2021-11-15 NOTE — CONSULTS
GASTROENTEROLOGY INPATIENT CONSULTATION        IDENTIFYING DATA/REASON FOR CONSULTATION   PATIENT:  Antoine Sinclair  MRN:  [id number]  ADMIT DATE: 11/14/2021  TIME OF EVALUATION: 11/15/2021 9:59 AM  HOSPITAL STAY:   LOS: 0 days     REASON FOR CONSULTATION: Chronic pancreatitis    HISTORY OF PRESENT ILLNESS   Antoine Sinclair is a 28 y.o. male with a PMH of chronic calcific pancreatitis, alcohol abuse, tobacco use, marijuana use, seizures, and anxiety who presented on 11/14/2021 with acute on chronic abdominal pain, nausea, vomiting. We have been consulted regarding chronic pancreatitis with concern for pancreatic duct stone. Patient reports he has been having recurrent episodes of epigastric abdominal pain radiating to his back with associated nausea and vomiting. He has had multiple hospitalizations with acute pancreatitis. This current episode started a few days ago. He has been unable to keep food down. He vomited 3 times yesterday. He was seen this past June and right upper quadrant ultrasound showed evidence of chronic pancreatitis with dilation of the pancreatic duct and a potential obstructing 7 mm stone near the ampulla. There is no cholelithiasis or significant dilation of the common bile duct. He refused an ERCP at that time. CT A/P with IV contrast this admission showed moderate calcific pancreatitis with unchanged mild dilation of the pancreatic duct extending to the head region. There was no active inflammatory changes or masses. The ampullary portion of the duct partially obscured due to calcifications throughout the area. Patient's blood work notes a lipase level of 33, normal LFTs, normal white count. Patient consumed Jell-O and broth this morning and was able to tolerate. Patient is an active alcoholic drinker. His last alcoholic drink was 2 days ago. He reports drinking 12 beers daily. He also smokes marijuana approximately 3 times a year. Hot showers help improve his symptoms.   He reports history of frequent migraines. He denies NSAID use. RUQ US 6/2/21 showed Chronic pancreatitis and dilation of the pancreatic duct. Potentially obstructing 7 mm stone in the pancreatic duct near the ampulla. Prior Endoscopic Evaluations:  EGD 3/23/18 with Dr. Hayder Agee  1) The esophagus was normal without evidence of esophagitis, Albrecht's esophagus, strictures, rings, furrowing, masses, or nodules. No varices were noted. 2) No significant hiatal hernia was noted. No gastric varices were noted. 3) Mild erythema of the antrum. Biopsies were obtained from the antrum and body of the stomach to rule out active gastritis and H.pylori gastritis. 4) Moderate reactive duodenitis around the duodenal sweep. There was mucosal edema in this location. No ulcer was visualized. This was likely secondary inflammation from recent pancreatitis. 5) The ampulla was normal.      ERCP 1/10/14 with Dr. Lily Cannon  1) Pancreatic duct disruption resolved. 2) Kaplan II chronic pancreatitis  3) Pancreatic stent removal     ERCP 10/23/13 with Dr. Lily Cannon  1) Normal cholangiogram  2) Minimal evidence of chronic pancreatitis. 3) Pancreatic duct disruption at body/tail junction. 4) Successful pancreatic stent placement'      PAST MEDICAL, SURGICAL, FAMILY, and SOCIAL HISTORY     Past Medical History:   Diagnosis Date    Alcohol abuse     PATIENT HAS EXTREME MANIPULITIVE & DRUG SEEKING BEHAVIOR. HE POCKETS HIS BENZODIZAPINES.  Anxiety     Drug-seeking behavior     Several times found pocketing medications given in hospital    Herpes genitalis in men     Manipulative behavior     Pancreatitis     Pneumonia     Portal vein thrombosis     pt denied    Seizures (HCC)     PER PATIENT ONLY.  DURING MULTIPLE HOSPITALIZATIONS HE HAS NEVER ONCE    Tobacco abuse      Past Surgical History:   Procedure Laterality Date    ENDOSCOPY, COLON, DIAGNOSTIC  10/28/2013    Endoscopic retrograde cholangiopancreatography with pancreatic stent placement    ENDOSCOPY, COLON, DIAGNOSTIC  03/23/2018    Esophagogastroduodenoscopy with biopsy    ERCP  1/10/14    with stent removal     Family History   Problem Relation Age of Onset    Hypertension Father     High Blood Pressure Father     Alcohol Abuse Father     Depression Mother     High Blood Pressure Paternal Grandfather     Alcohol Abuse Paternal Grandfather     Heart Disease Paternal Grandmother     Anemia Paternal Grandmother     Depression Maternal Aunt     Alcohol Abuse Paternal Aunt     Alcohol Abuse Paternal Uncle      Social History     Socioeconomic History    Marital status: Single     Spouse name: None    Number of children: 1    Years of education: 15    Highest education level: None   Occupational History    Occupation:    Tobacco Use    Smoking status: Current Every Day Smoker     Packs/day: 0.50     Years: 10.00     Pack years: 5.00     Types: Cigarettes     Start date: 11/21/2007    Smokeless tobacco: Never Used   Vaping Use    Vaping Use: Former    Substances: Never   Substance and Sexual Activity    Alcohol use: Yes     Comment: 9-12 beers/day    Drug use: No    Sexual activity: Not Currently   Other Topics Concern    None   Social History Narrative    None     Social Determinants of Health     Financial Resource Strain:     Difficulty of Paying Living Expenses: Not on file   Food Insecurity:     Worried About Running Out of Food in the Last Year: Not on file    Shaun of Food in the Last Year: Not on file   Transportation Needs:     Lack of Transportation (Medical): Not on file    Lack of Transportation (Non-Medical):  Not on file   Physical Activity:     Days of Exercise per Week: Not on file    Minutes of Exercise per Session: Not on file   Stress:     Feeling of Stress : Not on file   Social Connections:     Frequency of Communication with Friends and Family: Not on file    Frequency of Social Gatherings with Friends and Family: Not on file    Attends Faith Services: Not on file    Active Member of Clubs or Organizations: Not on file    Attends Club or Organization Meetings: Not on file    Marital Status: Not on file   Intimate Partner Violence:     Fear of Current or Ex-Partner: Not on file    Emotionally Abused: Not on file    Physically Abused: Not on file    Sexually Abused: Not on file   Housing Stability:     Unable to Pay for Housing in the Last Year: Not on file    Number of Places Lived in the Last Year: Not on file    Unstable Housing in the Last Year: Not on file       MEDICATIONS   SCHEDULED:  ketorolac, 30 mg, Once  thiamine (VITAMIN B1) IVPB, 100 mg, Q24H  lamoTRIgine, 100 mg, QAM  lamoTRIgine, 50 mg, Nightly  hydrOXYzine, 50 mg, Nightly  sodium chloride flush, 5-40 mL, 2 times per day  enoxaparin, 40 mg, Daily  thiamine, 100 mg, Daily      FLUIDS/DRIPS:     sodium chloride       PRNs: sodium chloride flush, 5-40 mL, PRN  sodium chloride, 25 mL, PRN  ondansetron, 4 mg, Q6H PRN  LORazepam, 0.5 mg, Q4H PRN  oxyCODONE, 5 mg, Q6H PRN      ALLERGIES:  He   Allergies   Allergen Reactions    Amoxicillin Hives    Haldol [Haloperidol Lactate] Other (See Comments)     Muscle spasms    Phenergan [Promethazine Hcl] Other (See Comments)     Pt believes it caused muscle spasms    Shrimp (Diagnostic) Itching    Glucosamine Itching      Itching of throat when eating shrimp. Pt states has had IV contrast for CT's without problem before.  Shellfish-Derived Products      Patient gets anxious around seafood       REVIEW OF SYSTEMS   Pertinent ROS noted in HPI    PHYSICAL EXAM     Vitals:    11/14/21 2317 11/15/21 0606 11/15/21 0658 11/15/21 0836   BP: 104/63  123/80    Pulse: 50  64    Resp: 15  15    Temp: 97.5 °F (36.4 °C)  97.7 °F (36.5 °C)    TempSrc: Oral  Oral    SpO2: 97%  98% 99%   Weight:  145 lb 11.6 oz (66.1 kg)     Height:           I/O last 3 completed shifts:   In: 10 [I.V.:10]  Out: - Physical Exam:  General appearance: alert, cooperative, no distress, appears stated age  Eyes: Anicteric  Head: Normocephalic, without obvious abnormality  Lungs: clear to auscultation bilaterally, Normal Effort  Heart: regular rate and rhythm, normal S1 and S2, no murmurs or rubs  Abdomen: soft, non-distended, non-tender. Bowel sounds normal. No masses,  no organomegaly. Extremities: atraumatic, no cyanosis or edema  Skin: warm and dry, no jaundice  Neuro: Grossly intact, A&OX3      LABS AND IMAGING   Laboratory   Recent Labs     11/14/21  1415 11/15/21  0458   WBC 8.9 6.5   HGB 17.2 14.9   HCT 49.8 41.9   MCV 92.0 91.9    210     Recent Labs     11/14/21  1415 11/15/21  0458    138   K 3.4* 4.2    104   CO2 21 23   BUN 10 8   CREATININE 0.7* 0.7*     Recent Labs     11/14/21  1415   AST 25   ALT 24   BILITOT 0.9   ALKPHOS 76     Recent Labs     11/14/21  1425   LIPASE 33.0     No results for input(s): PROTIME, INR in the last 72 hours. Imaging  CT ABDOMEN PELVIS W IV CONTRAST Additional Contrast? None   Final Result   Moderate calcific pancreatitis and mild dilatation of the pancreatic duct   extending to the head region which is unchanged. No active inflammatory   changes or masses are seen. The ampullary portion of the duct is partially   obscured due to calcifications throughout the area. If there is persistent   clinical concern for a calcification in the duct, suggest ERCP correlation. Small air-fluid levels throughout the bowel which could represent enteritis   or early ileus recommend clinical follow-up      Mild chronic liver changes with fatty replacement throughout               ASSESSMENT AND RECOMMENDATIONS   28 y.o. male with a PMH of chronic calcific pancreatitis, alcohol abuse, tobacco use, marijuana use, seizures, and anxiety who presented on 11/14/2021 with acute on chronic abdominal pain, nausea, vomiting.  We have been consulted regarding chronic pancreatitis with concern for pancreatic duct stone. IMPRESSION:    1. Chronic calcific pancreatitis with PD dilation  -RUQ US 6/2021 showed evidence of chronic pancreatitis and dilation of the pancreatic duct with a potential obstructing 7 mm stone in the duct near the ampulla. -CT A/P 7/2021 showed unchanged pancreatic ductal dilation with possible stone in the main pancreatic duct in the lower head segment  -CT A/P 11/2021 showed chronic calcific pancreatitis with unchanged mild dilation of the pancreatic duct. The ampullary portion of the duct was partially obscured due to calcifications, no obvious stone  -Lipase normal  2. Acute on chronic abdominal pain, nausea, vomiting  -Possibly related to chronic pancreatitis versus cyclic vomiting syndrome from marijuana use. -CT with no acute findings. Lipase and LFTs normal.  -Clinically improving. Tolerated Jell-O and broth this morning  3. Alcohol use disorder  4. Marijuana use        RECOMMENDATIONS:    We will review case with Dr. Kimberly Hampton. Please await his further input and recommendations. If you have any questions or need any further information, please feel free to contact our consult team.  Thank you for allowing us to participate in the care of Lucretia Kimble. The note was completed using Dragon voice recognition transcription. Every effort was made to ensure accuracy; however, inadvertent transcription errors may be present despite my best efforts to edit errors. Kenyon Bee PA-C    Attending physician addendum:      I have personally seen and examined the patient, reviewed the patient's medical record and pertinent labs and clinical imaging. I have personally staffed the case with TITO Calabrese. I agree with her consultation note, exam findings, assessment and plans  as written above. I have made appropriate modifications and edited her assessment and plan where needed to reflect my impression and plans for this patient.        This is a 28-year-old male with a past medical history for IV drug use, alcohol abuse, tobacco use, marijuana use, seizure disorder, anxiety, who presented with acute on chronic abdominal pain with nausea and vomiting. Has had recurrent admissions with similar pancreatic type pain (upper pain with radiation to his back). The patient had CT imaging showing chronic calcific pancreatitis with dilation of the pancreatic duct and suspected PD stone. There was normal liver enzymes and no evidence of liver ductal dilatation or extrahepatic ductal dilatation. Patient's lipase was normal.  There was no acute inflammatory changes noted. His previous EGD showed some mild duodenitis but no history of peptic ulcer disease. He denies NSAID use. He did have some nausea and vomiting. He denies hematemesis or melena. He has been tolerating liquids since his admission. His pain seems somewhat improved. He is otherwise with out any acute complaints    /80   Pulse 64   Temp 97.7 °F (36.5 °C) (Oral)   Resp 15   Ht 5' 8\" (1.727 m)   Wt 145 lb 11.6 oz (66.1 kg)   SpO2 99%   BMI 22.16 kg/m²      Gen- NAD  Abd- Soft, mild epigastric TTP  Lungs- CTAB  CV- RRR    Labs and CT reviewed. Impression:    1) Intractable nausea and vomiting- Suspect related to #2 and ongoing Etoh use. Possible acute on chronic flare. CT also with ? Ileus. EGD in 2018 without PUD. ? Gastroparesis. 2) Chronic calcific pancreatitis with pancreatic ductal dilation-  Will need to review case with Dr. Cathi Urban to see if there is a role for ERCP with lithotripsy for large PD stone  3) Etoh abuse  4) Hx of Marijuana use  5) Remote history of IVDA. Plan:  Educate patient on stopping Etoh and nicotine  Supportive care  Review case with Dr. Cathi Urban to discuss role of endoscopic therapy for PD stone and large duct chronic pancreatitis. May need referral to Dr. Pantera Mckee or Haider Cottrell for surgical consultation as an outpatient.    Will check fecal elastase and Hemoglobin A1C. Thank you for allowing me to participate in this patient's care. If there are any questions or concerns regarding this patient, or the plan we have set in place, please feel free to contact me at [phone].      Slick Cifuentes, DO

## 2021-11-15 NOTE — PROGRESS NOTES
Pharmacy Medication Reconciliation Note     List of medications patient is currently taking is complete. Source of information:   1. Conversation  with pt at bedside. He follows with [facility]. Stopped Lamictal and Hydroxyzine a few months ago on his own. Plans to make an appt with provider there to speak about getting back on medications. At this time he does not wish to restart them. Allergies   Allergen Reactions    Amoxicillin Hives    Haldol [Haloperidol Lactate] Other (See Comments)     Muscle spasms    Phenergan [Promethazine Hcl] Other (See Comments)     Pt believes it caused muscle spasms    Shrimp (Diagnostic) Itching    Glucosamine Itching      Itching of throat when eating shrimp. Pt states has had IV contrast for CT's without problem before.       Shellfish-Derived Products      Patient gets anxious around seafood         Trent Starks, [facility]  11/15/2021  10:20 AM

## 2021-11-15 NOTE — PROGRESS NOTES
Pt refused his lamictal,and atarax. Pt was medicated with zpfran for complaints of nausea with out vomiting. Will continue to monitor.

## 2021-11-16 PROBLEM — E43 SEVERE MALNUTRITION (HCC): Status: ACTIVE | Noted: 2021-11-16

## 2021-11-16 LAB
ANION GAP SERPL CALCULATED.3IONS-SCNC: 14 MMOL/L (ref 3–16)
BASOPHILS ABSOLUTE: 0 K/UL (ref 0–0.2)
BASOPHILS RELATIVE PERCENT: 0.6 %
BUN BLDV-MCNC: 8 MG/DL (ref 7–20)
CALCIUM SERPL-MCNC: 8.9 MG/DL (ref 8.3–10.6)
CHLORIDE BLD-SCNC: 103 MMOL/L (ref 99–110)
CO2: 21 MMOL/L (ref 21–32)
CREAT SERPL-MCNC: 0.7 MG/DL (ref 0.9–1.3)
EOSINOPHILS ABSOLUTE: 0.8 K/UL (ref 0–0.6)
EOSINOPHILS RELATIVE PERCENT: 13.7 %
GFR AFRICAN AMERICAN: >60
GFR NON-AFRICAN AMERICAN: >60
GLUCOSE BLD-MCNC: 91 MG/DL (ref 70–99)
HCT VFR BLD CALC: 44.3 % (ref 40.5–52.5)
HEMOGLOBIN: 15.2 G/DL (ref 13.5–17.5)
LYMPHOCYTES ABSOLUTE: 1.8 K/UL (ref 1–5.1)
LYMPHOCYTES RELATIVE PERCENT: 31.2 %
MAGNESIUM: 2.2 MG/DL (ref 1.8–2.4)
MCH RBC QN AUTO: 31.9 PG (ref 26–34)
MCHC RBC AUTO-ENTMCNC: 34.4 G/DL (ref 31–36)
MCV RBC AUTO: 92.8 FL (ref 80–100)
MONOCYTES ABSOLUTE: 0.5 K/UL (ref 0–1.3)
MONOCYTES RELATIVE PERCENT: 8.7 %
NEUTROPHILS ABSOLUTE: 2.6 K/UL (ref 1.7–7.7)
NEUTROPHILS RELATIVE PERCENT: 45.8 %
PDW BLD-RTO: 12.6 % (ref 12.4–15.4)
PLATELET # BLD: 203 K/UL (ref 135–450)
PMV BLD AUTO: 8.2 FL (ref 5–10.5)
POTASSIUM SERPL-SCNC: 3.7 MMOL/L (ref 3.5–5.1)
RBC # BLD: 4.77 M/UL (ref 4.2–5.9)
SODIUM BLD-SCNC: 138 MMOL/L (ref 136–145)
WBC # BLD: 5.7 K/UL (ref 4–11)

## 2021-11-16 PROCEDURE — 80048 BASIC METABOLIC PNL TOTAL CA: CPT

## 2021-11-16 PROCEDURE — 6370000000 HC RX 637 (ALT 250 FOR IP): Performed by: STUDENT IN AN ORGANIZED HEALTH CARE EDUCATION/TRAINING PROGRAM

## 2021-11-16 PROCEDURE — 94761 N-INVAS EAR/PLS OXIMETRY MLT: CPT

## 2021-11-16 PROCEDURE — 36415 COLL VENOUS BLD VENIPUNCTURE: CPT

## 2021-11-16 PROCEDURE — 1200000000 HC SEMI PRIVATE

## 2021-11-16 PROCEDURE — 2580000003 HC RX 258: Performed by: STUDENT IN AN ORGANIZED HEALTH CARE EDUCATION/TRAINING PROGRAM

## 2021-11-16 PROCEDURE — 83735 ASSAY OF MAGNESIUM: CPT

## 2021-11-16 PROCEDURE — 6360000002 HC RX W HCPCS: Performed by: STUDENT IN AN ORGANIZED HEALTH CARE EDUCATION/TRAINING PROGRAM

## 2021-11-16 PROCEDURE — 85025 COMPLETE CBC W/AUTO DIFF WBC: CPT

## 2021-11-16 PROCEDURE — 6370000000 HC RX 637 (ALT 250 FOR IP): Performed by: INTERNAL MEDICINE

## 2021-11-16 RX ADMIN — Medication 100 MG: at 08:56

## 2021-11-16 RX ADMIN — LORAZEPAM 1 MG: 1 TABLET ORAL at 08:56

## 2021-11-16 RX ADMIN — LORAZEPAM 2 MG: 1 TABLET ORAL at 04:45

## 2021-11-16 RX ADMIN — LORAZEPAM 3 MG: 1 TABLET ORAL at 13:42

## 2021-11-16 RX ADMIN — LORAZEPAM 3 MG: 1 TABLET ORAL at 16:51

## 2021-11-16 RX ADMIN — SODIUM CHLORIDE, PRESERVATIVE FREE 10 ML: 5 INJECTION INTRAVENOUS at 10:37

## 2021-11-16 RX ADMIN — OXYCODONE 5 MG: 5 TABLET ORAL at 03:13

## 2021-11-16 RX ADMIN — FOLIC ACID 1 MG: 1 TABLET ORAL at 08:56

## 2021-11-16 RX ADMIN — LORAZEPAM 1 MG: 1 TABLET ORAL at 02:37

## 2021-11-16 RX ADMIN — OXYCODONE 5 MG: 5 TABLET ORAL at 18:15

## 2021-11-16 RX ADMIN — LORAZEPAM 2 MG: 1 TABLET ORAL at 18:53

## 2021-11-16 RX ADMIN — ONDANSETRON 4 MG: 2 INJECTION INTRAMUSCULAR; INTRAVENOUS at 02:39

## 2021-11-16 RX ADMIN — OXYCODONE 5 MG: 5 TABLET ORAL at 11:04

## 2021-11-16 RX ADMIN — LORAZEPAM 2 MG: 1 TABLET ORAL at 11:09

## 2021-11-16 RX ADMIN — LORAZEPAM 3 MG: 1 TABLET ORAL at 21:02

## 2021-11-16 ASSESSMENT — PAIN DESCRIPTION - ORIENTATION
ORIENTATION: UPPER

## 2021-11-16 ASSESSMENT — PAIN SCALES - GENERAL
PAINLEVEL_OUTOF10: 6
PAINLEVEL_OUTOF10: 8
PAINLEVEL_OUTOF10: 7
PAINLEVEL_OUTOF10: 0
PAINLEVEL_OUTOF10: 0
PAINLEVEL_OUTOF10: 3
PAINLEVEL_OUTOF10: 7

## 2021-11-16 ASSESSMENT — PAIN DESCRIPTION - PROGRESSION
CLINICAL_PROGRESSION: NOT CHANGED
CLINICAL_PROGRESSION: GRADUALLY IMPROVING
CLINICAL_PROGRESSION: NOT CHANGED
CLINICAL_PROGRESSION: NOT CHANGED
CLINICAL_PROGRESSION: GRADUALLY IMPROVING

## 2021-11-16 ASSESSMENT — PAIN DESCRIPTION - FREQUENCY
FREQUENCY: CONTINUOUS

## 2021-11-16 ASSESSMENT — PAIN - FUNCTIONAL ASSESSMENT
PAIN_FUNCTIONAL_ASSESSMENT: PREVENTS OR INTERFERES SOME ACTIVE ACTIVITIES AND ADLS

## 2021-11-16 ASSESSMENT — PAIN DESCRIPTION - ONSET
ONSET: ON-GOING

## 2021-11-16 ASSESSMENT — PAIN DESCRIPTION - DESCRIPTORS
DESCRIPTORS: ACHING

## 2021-11-16 ASSESSMENT — PAIN DESCRIPTION - PAIN TYPE
TYPE: ACUTE PAIN

## 2021-11-16 ASSESSMENT — PAIN DESCRIPTION - LOCATION
LOCATION: ABDOMEN
LOCATION: ABDOMEN;HIP
LOCATION: ABDOMEN;HIP

## 2021-11-16 NOTE — PROGRESS NOTES
INPATIENT PROGRESS NOTE        IDENTIFYING DATA/REASON FOR CONSULTATION   PATIENT:  Sugey Alston  MRN:  [id number]  ADMIT DATE: 11/14/2021  TIME OF EVALUATION: 11/16/2021 7:39 AM  HOSPITAL STAY:   LOS: 1 day   CONSULTING PHYSICIAN: Carlton Sheehan MD   REASON FOR CONSULTATION: chronic pancreatitis with pancreatic duct stone    Subjective:    Patient seen in follow up. His pain is stable. He is tolerating solid foods. MEDICATIONS   SCHEDULED:  folic acid, 1 mg, Daily  sodium chloride flush, 5-40 mL, 2 times per day  nicotine, 1 patch, Daily  ketorolac, 30 mg, Once  thiamine (VITAMIN B1) IVPB, 100 mg, Q24H  lamoTRIgine, 100 mg, QAM  lamoTRIgine, 50 mg, Nightly  hydrOXYzine, 50 mg, Nightly  sodium chloride flush, 5-40 mL, 2 times per day  enoxaparin, 40 mg, Daily  thiamine, 100 mg, Daily      FLUIDS/DRIPS:     sodium chloride       PRNs: sodium chloride flush, 5-40 mL, PRN  sodium chloride, 25 mL, PRN  LORazepam, 1 mg, Q1H PRN   Or  LORazepam, 1 mg, Q1H PRN   Or  LORazepam, 2 mg, Q1H PRN   Or  LORazepam, 2 mg, Q1H PRN   Or  LORazepam, 3 mg, Q1H PRN   Or  LORazepam, 3 mg, Q1H PRN   Or  LORazepam, 4 mg, Q1H PRN   Or  LORazepam, 4 mg, Q1H PRN  sodium chloride flush, 5-40 mL, PRN  ondansetron, 4 mg, Q6H PRN  oxyCODONE, 5 mg, Q6H PRN      ALLERGIES:    Allergies   Allergen Reactions    Amoxicillin Hives    Haldol [Haloperidol Lactate] Other (See Comments)     Muscle spasms    Phenergan [Promethazine Hcl] Other (See Comments)     Pt believes it caused muscle spasms    Shrimp (Diagnostic) Itching    Glucosamine Itching      Itching of throat when eating shrimp. Pt states has had IV contrast for CT's without problem before.       Shellfish-Derived Products      Patient gets anxious around seafood         PHYSICAL EXAM   VITALS:  /69   Pulse 85   Temp 98 °F (36.7 °C) (Oral)   Resp 16   Ht 5' 8\" (1.727 m)   Wt 145 lb 11.6 oz (66.1 kg)   SpO2 96%   BMI 22.16 kg/m²   TEMPERATURE:  Current - Temp: 98 °F (36.7 °C); Max - Temp  Av °F (36.7 °C)  Min: 98 °F (36.7 °C)  Max: 98 °F (36.7 °C)    Physical Exam:  General appearance: alert, cooperative, no distress, appears stated age  Eyes: Anicteric  Head: Normocephalic, without obvious abnormality  Lungs: clear to auscultation bilaterally, Normal Effort  Heart: regular rate and rhythm, normal S1 and S2, no murmurs or rubs  Abdomen: soft, non-distended, non-tender. Bowel sounds normal.   Extremities: atraumatic, no cyanosis or edema, mild tremors  Skin: warm and dry, no jaundice  Neuro: Grossly intact, A&OX3    LABS AND IMAGING   Laboratory   Recent Labs     21  1415 11/15/21  0458 21  0445   WBC 8.9 6.5 5.7   HGB 17.2 14.9 15.2   HCT 49.8 41.9 44.3   MCV 92.0 91.9 92.8    210 203     Recent Labs     21  1415 11/15/21  0458 21  0445    138 138   K 3.4* 4.2 3.7    104 103   CO2 21 23 21   BUN 10 8 8   CREATININE 0.7* 0.7* 0.7*     Recent Labs     21  1415   AST 25   ALT 24   BILITOT 0.9   ALKPHOS 76     Recent Labs     21  1425   LIPASE 33.0     No results for input(s): PROTIME, INR in the last 72 hours. Imaging  CT ABDOMEN PELVIS W IV CONTRAST Additional Contrast? None   Final Result   Moderate calcific pancreatitis and mild dilatation of the pancreatic duct   extending to the head region which is unchanged. No active inflammatory   changes or masses are seen. The ampullary portion of the duct is partially   obscured due to calcifications throughout the area. If there is persistent   clinical concern for a calcification in the duct, suggest ERCP correlation.       Small air-fluid levels throughout the bowel which could represent enteritis   or early ileus recommend clinical follow-up      Mild chronic liver changes with fatty replacement throughout             Endoscopy      ASSESSMENT AND RECOMMENDATIONS   Cooper Loco is a 28 y.o. male with  PMH of chronic calcific pancreatitis, alcohol abuse, tobacco use, marijuana use, seizures, and anxiety who presented on 11/14/2021 with acute on chronic abdominal pain, nausea, vomiting. CT A/P 11/2021 showed chronic calcific pancreatitis with unchanged mild dilation of the pancreatic duct and suspected large PD stone near the ampulla      1. Chronic calcific pancreatitis with PD stone  -PD stone with PD dilation seen on US in June 2021 and again on CT 11/15/21.    -Lipase normal  -Pt tolerating food    2. Alcohol use disorder  -On [facility] protocol    3. Marijuana use      RECOMMENDATIONS:    -Will have pt follow up the office in 3-4 weeks to discuss treatment options including ERCP +/-lithotripsy vs surgery  -Strongly encourage pt to stop drinking alcohol  -Continue diet as tolerated        If you have any questions or need any further information, please feel free to contact us [phone]. Thank you for allowing us to participate in the care of Tama Severs. The note was completed using Dragon voice recognition transcription. Every effort was made to ensure accuracy; however, inadvertent transcription errors may be present despite my best efforts to edit errors. Cindy FRANCIS      Attending physician addendum:      I have personally seen and examined the patient, reviewed the patient's medical record and pertinent labs and clinical imaging. I have personally staffed the case with TITO Preston. I agree with her consultation note, exam findings, assessment and plans  as written above. I have made appropriate modifications and edited her assessment and plan where needed to reflect my impression and plans for this patient. Patient is tolerating diet. No exacerbation of pain or vomiting. No fever noted.      BP (!) 97/59   Pulse 76   Temp 97.6 °F (36.4 °C) (Oral)   Resp 14   Ht 5' 8\" (1.727 m)   Wt 145 lb 11.6 oz (66.1 kg)   SpO2 96%   BMI 22.16 kg/m²      Gen- NAD  Abd- Soft, NT/ND  Lungs- CTAB  CV- RRR     Labs and CT reviewed.      Impression:     1) Intractable nausea and vomiting- Suspect related to #2 and ongoing Etoh use. Possible acute on chronic pancreatitis flare. CT also with ? Ileus. EGD in 2018 without PUD. ? Gastroparesis. 2) Chronic calcific pancreatitis with pancreatic ductal dilation-  Will need to review case with Dr. Amado Polanco to see if there is a role for ERCP with lithotripsy for large PD stone  3) Etoh abuse  4) Hx of Marijuana use  5) Remote history of IVDA.      Plan:  Pantoprazole daily  Advance to low fat diet  I educated patient on stopping Etoh and nicotine  Patient will have follow up in our office with Dr. Amado Polanco or Becky Hawley in next 1-2 weeks to discuss role of endoscopic therapy for his chronic calcific pancreatitis and large PD stone and large duct chronic pancreatitis. May need referral to Dr. Jerry Edwards or Theanna Calvillo for surgical consultation as an outpatient if he failed to improve with endoscopic therapy. Will check fecal elastase. OK for D/C later today and outpatient follow up       Thank you for allowing me to participate in this patient's care. If there are any questions or concerns regarding this patient, or the plan we have set in place, please feel free to contact me at [phone].      Rene Lentz, DO

## 2021-11-16 NOTE — PROGRESS NOTES
Hospitalist Progress Note      PCP: Ana Romero MD    Date of Admission: 11/14/2021         Subjective: Having tremor, anxious, no hallucination though, improved abdominal pain. Tolerating p.o. intake, no nausea at this time. Medications:  Reviewed    Infusion Medications    sodium chloride       Scheduled Medications    folic acid  1 mg Oral Daily    sodium chloride flush  5-40 mL IntraVENous 2 times per day    nicotine  1 patch TransDERmal Daily    ketorolac  30 mg IntraMUSCular Once    thiamine (VITAMIN B1) IVPB  100 mg IntraVENous Q24H    lamoTRIgine  100 mg Oral QAM    lamoTRIgine  50 mg Oral Nightly    hydrOXYzine  50 mg Oral Nightly    sodium chloride flush  5-40 mL IntraVENous 2 times per day    enoxaparin  40 mg SubCUTAneous Daily    thiamine  100 mg Oral Daily     PRN Meds: sodium chloride flush, sodium chloride, LORazepam **OR** LORazepam **OR** LORazepam **OR** LORazepam **OR** LORazepam **OR** LORazepam **OR** LORazepam **OR** LORazepam, sodium chloride flush, ondansetron, oxyCODONE      Intake/Output Summary (Last 24 hours) at 11/16/2021 0835  Last data filed at 11/15/2021 0907  Gross per 24 hour   Intake 240 ml   Output --   Net 240 ml       Physical Exam Performed:    BP (!) 97/59   Pulse 76   Temp 97.6 °F (36.4 °C) (Oral)   Resp 14   Ht 5' 8\" (1.727 m)   Wt 145 lb 11.6 oz (66.1 kg)   SpO2 96%   BMI 22.16 kg/m²     General appearance: No apparent distress  Respiratory:  Normal respiratory effort. Clear to auscultation, bilaterally without Rales/Wheezes/Rhonchi. Cardiovascular: Regular rate and rhythm with normal S1/S2 without murmurs, rubs or gallops. Abdomen: Soft, minimal epigastric tenderness  Musculoskeletal: No clubbing, cyanosis   Skin: Skin color, texture, turgor normal.  No rashes or lesions.   Neurologic:  No focal weakness   Psychiatric: Alert and oriented  Capillary Refill: Brisk,3 seconds, normal   Peripheral Pulses: +2 palpable, equal bilaterally Labs:   Recent Labs     11/14/21  1415 11/15/21  0458 11/16/21  0445   WBC 8.9 6.5 5.7   HGB 17.2 14.9 15.2   HCT 49.8 41.9 44.3    210 203     Recent Labs     11/14/21  1415 11/15/21  0458 11/16/21  0445    138 138   K 3.4* 4.2 3.7    104 103   CO2 21 23 21   BUN 10 8 8   CREATININE 0.7* 0.7* 0.7*   CALCIUM 9.9 8.8 8.9     Recent Labs     11/14/21  1415   AST 25   ALT 24   BILITOT 0.9   ALKPHOS 76     No results for input(s): INR in the last 72 hours. Recent Labs     11/14/21  1425   TROPONINI <0.01       Urinalysis:      Lab Results   Component Value Date    NITRU Negative 09/29/2021    WBCUA 5 07/20/2021    RBCUA 1 07/20/2021    BLOODU Negative 09/29/2021    SPECGRAV 1.025 09/29/2021    GLUCOSEU Negative 09/29/2021       Radiology:  CT ABDOMEN PELVIS W IV CONTRAST Additional Contrast? None   Final Result   Moderate calcific pancreatitis and mild dilatation of the pancreatic duct   extending to the head region which is unchanged. No active inflammatory   changes or masses are seen. The ampullary portion of the duct is partially   obscured due to calcifications throughout the area. If there is persistent   clinical concern for a calcification in the duct, suggest ERCP correlation. Small air-fluid levels throughout the bowel which could represent enteritis   or early ileus recommend clinical follow-up      Mild chronic liver changes with fatty replacement throughout                 Assessment/Plan:    Active Hospital Problems    Diagnosis     Alcohol abuse with withdrawal ([facility] [address] [F10.139]     Cirrhosis of liver ([address] [K74.60]     Abdominal pain [R10.9]     Chronic pancreatitis ([address] [K86.1]      1. Pancreatitis, monitoring for now pending further clinical dynamics. GI consulted, patient need follow-up as outpatient for further plan of treatment. 2.  Alcohol abuse with alcohol withdrawal, CIWA protocol, thiamine folic acid  3.   History of seizure, seizure precaution, no current events. 4.  History of portal vein thrombosis. 5.  High anion gap metabolic acidosis on admission, improved      Diet: ADULT DIET; Regular; 4 carb choices (60 gm/meal);  Low Fat/Low Chol/High Fiber/ANDREY  Code Status: Full Code        Katie Almodovar MD

## 2021-11-16 NOTE — PROGRESS NOTES
Comprehensive Nutrition Assessment    Type and Reason for Visit:  Initial, Positive Nutrition Screen    Nutrition Recommendations/Plan:   Add chocolate Ensure Enlive tid to start  Refer to MD for diet liberalization (removing carb control) to help ensure adequacy    Nutrition Assessment:  Pt with PMH of ETOH abuse, drug seeking behavior, chronic pancreatitis triggered a positive screen, requesting to speak with a Dietitian. Pt denied request to speak with a Dietitian. Adm diagnosis, abd pain with nausea and vomiting. Diet advanced to [facility] (4) / Low Fat / Low Cholesterol / High Fiber / ANDREY. Pt reported and records confirm good po intake. Pt demonstrates signs of muscle and fat wasting. Agreed to adding chocolate Ensure Enlive tid to start. Pt denied hx of DM and BS pattern acceptable per labs. Will refer to MD, recommending no carb modification, to help ensure adequacy. Malnutrition Assessment:  Malnutrition Status:  Severe malnutrition    Context:  Acute Illness     Findings of the 6 clinical characteristics of malnutrition:  Energy Intake:  No significant decrease in energy intake  Weight Loss:  No significant weight loss     Body Fat Loss:  7 - Moderate body fat loss Orbital, Buccal region   Muscle Mass Loss:  7 - Moderate muscle mass loss Temples (temporalis), Clavicles (pectoralis & deltoids)  Fluid Accumulation:  No significant fluid accumulation     Strength:  Not Performed    Estimated Daily Nutrient Needs:  Energy (kcal):  4002-3674 (28-35 x ABW 66 kg); Weight Used for Energy Requirements:        Protein (g):  79-99 (1.2-1.5 x ABW 66 kg); Weight Used for Protein Requirements:           Fluid (ml/day):   ; Method Used for Fluid Requirements:  1 ml/kcal      Nutrition Related Findings:  Noted no edema. Wounds:   (track marks to extremities.)       Current Nutrition Therapies:    ADULT DIET; Regular; 4 carb choices (60 gm/meal);  Low Fat/Low Chol/High Fiber/ANDREY  ADULT ORAL NUTRITION SUPPLEMENT; Breakfast, Lunch, Dinner; Standard High Calorie/High Protein Oral Supplement    Anthropometric Measures:  · Height: 5' 8\" (172.7 cm)  · Current Body Weight: 146 lb (66.2 kg)   · Admission Body Weight: 145 lb (65.8 kg)    · Usual Body Weight:  (140s per records and pt feedback)     · Ideal Body Weight: 154 lbs; % Ideal Body Weight 94.8 %   · BMI: 22.2  · Adjusted Body Weight:  ; No Adjustment   · BMI Categories: Normal Weight (BMI 18.5-24. 9)       Nutrition Diagnosis:   · Severe malnutrition related to inadequate protein-energy intake as evidenced by moderate muscle loss, moderate loss of subcutaneous fat      Nutrition Interventions:   Food and/or Nutrient Delivery:  Continue Current Diet, Start Oral Nutrition Supplement  Nutrition Education/Counseling:  No recommendation at this time   Coordination of Nutrition Care:  Continue to monitor while inpatient    Goals:  continue to consume >/= to 50 %       Nutrition Monitoring and Evaluation:   Behavioral-Environmental Outcomes:  None Identified   Food/Nutrient Intake Outcomes:  Food and Nutrient Intake, Supplement Intake  Physical Signs/Symptoms Outcomes:  Biochemical Data, Constipation, Diarrhea, Nausea or Vomiting, Weight, Skin, Fluid Status or Edema, GI Status     Discharge Planning:     Too soon to determine     Electronically signed by Ariana Braun RD, LD on 11/16/21 at 12:15 PM EST    Contact: [phone]

## 2021-11-16 NOTE — PROGRESS NOTES
Patient requesting ativan for anxiety. Scoring 9 on CIWA scale. 1 mg PO ativan administered. See MAR. Will reassess in one hour.        Electronically signed by Ashley Blair RN on 11/16/2021 at 1:28 PM

## 2021-11-16 NOTE — PROGRESS NOTES
Patient requesting ativan for anxiety. Scoring 18 on CIWA. See MAR. Oxycodone and zofran given as well per pt request. Will reassess in 1 hour.

## 2021-11-16 NOTE — PROGRESS NOTES
Patient requesting ativan for anxiety, agitation, and headache. Scoring 16 on CIWA scale. 3 mg PO ativan administered. See MAR.  Will reassess in one hour.        Electronically signed by Claudio Davis RN on 11/16/2021 at 1:42 PM

## 2021-11-16 NOTE — PROGRESS NOTES
Patient requesting ativan for anxiety. Scoring 14 on CIWA scale. 2 mg PO ativan administered. See MAR.  Will reassess in one hour.

## 2021-11-16 NOTE — PROGRESS NOTES
Patient requesting ativan. States he is ready for bed but still feeling anxious. Scoring an 8 on CIWA. Ativan given, will reassess in 1 hour. No other needs expressed at this time.

## 2021-11-16 NOTE — ADT AUTH CERT
Utilization Reviews         Pancreatitis - Care Day 3 (11/16/2021) by Ligia Strauss RN       Review Status Review Entered   Completed 11/16/2021 16:37      Criteria Review      Care Day: 3 Care Date: 11/16/2021 Level of Care: Inpatient Floor    Guideline Day 3    Level Of Care    (X) Floor to discharge    11/16/2021 4:37 PM EST by Venu Rajiv      medsurg    Clinical Status    (X) * Hemodynamic stability    11/16/2021 4:37 PM EST by Venu Southmayd      hr 74-85  bp: 97/57    ( ) * No evidence of infection requiring inpatient care    ( ) * Amylase level normal or improved    (X) * Pain absent or managed    11/16/2021 4:37 PM EST by Venu Southmayd      managed    ( ) * Diet tolerated    (X) * Renal function at baseline or acceptable for next level of care    11/16/2021 4:37 PM EST by Venu Rajiv      wdl    (X) * Electrolyte abnormalities absent or acceptable for next level of care    11/16/2021 4:37 PM EST by Cyndy Chavarria wdl    ( ) * Discharge plans and education understood    Activity    ( ) * Ambulatory or acceptable for next level of care    Routes    ( ) * Oral hydration    ( ) * Oral medications or regimen acceptable for next level of care    ( ) * Oral diet or acceptable for next level of care    * Milestone   Additional Notes   11/16  Day 3      Pt remains on medsurg unit      Vitals:  t: 36.8 p: 76 rr: 15 bp: 97/59 spo2: 96% ra      Orders:   Lovenox 40mg sq daily   Ativan per ciwa protocol (5 doses today)   Seizure precautions   Telemetry   Cbc, bmp, mg daily      Per IM PN:      Assessment/Plan:       Active Hospital Problems     Diagnosis     · Alcohol abuse with withdrawal ([facility] [F10.139]     · Cir[facility]osis [facility] liver ([facility] [K74.60]     · Abdominal pain [R10.9]     · Chronic pancreatitis ([facility] [K86.1]         1.  Pancreatitis, monitoring for now pending further clinical dynamics.  GI consulted, patient need follow-up as outpatient for further plan of treatment.    2.  Alcohol abuse with alcohol withdrawal, [facility] protocol, thiamine folic acid   3.  History of seizure, seizure precaution, no current events. 4.  History of portal vein thrombosis. 5.  High anion gap metabolic acidosis on admission, improved      Per GI PN:  ASSESSMENT AND RECOMMENDATIONS   Donald Block is a 28 y.o. male with  PMH of chronic calcific pancreatitis, alcohol abuse, tobacco use, marijuana use, seizures, and anxiety who presented on 11/14/2021 with acute on chronic abdominal pain, nausea, vomiting. CT A/P 11/2021 showed chronic calcific pancreatitis with unchanged mild dilation of the pancreatic duct and suspected large PD stone near the ampulla           1. Chronic calcific pancreatitis with PD stone   -PD stone with PD dilation seen on US in June 2021 and again on CT 11/15/21.     -Lipase normal   -Pt tolerating food       2. Alcohol use disorder   -On CIWA protocol       3.  Marijuana use           RECOMMENDATIONS:     -Will have pt follow up the office in 3-4 weeks to discuss treatment options including ERCP +/-lithotripsy vs surgery   -Strongly encourage pt to stop drinking alcohol   -Continue diet as tolerated           Pancreatitis - Care Day 2 (11/15/2021) by Sharmaine Silva RN       Review Status Review Entered   Completed 11/16/2021 16:32      Criteria Review      Care Day: 2 Care Date: 11/15/2021 Level of Care: Inpatient Floor    Guideline Day 2    Level Of Care    (X) ICU or floor    11/16/2021 4:32 PM EST by Jennifer Carbajal      medsurbruno    Clinical Status    (X) * Hemodynamic stability    11/16/2021 4:32 PM EST by Jennifer Carbajal      hr 64-85  bp: 115/69    (X) * Pain absent or reduced    11/16/2021 4:32 PM EST by Jennifer Carbajal      reduced    Interventions    ( ) * NG tube absent    * Milestone   Additional Notes   11/15  Day 1      Pt remains on medsurg unit.  Admission order upgraded to inpatient today from observation status      Vitals:  t: 36.7 p: 85 rr: 15 bp: 123/80 spo2:98% ra Orders:   Lovenox 40mg sq daily   Ativan per ciwa protocol (3 doses today)   Seizure precautions   Telemetry   Cbc, bmp, mg daily   Zofran 4mg iv prn (2 doses today)      Per IM PN:   Assessment/Plan:       Active Hospital Problems     Diagnosis     · Cirrhosis of liver (HCC) [K74.60]     · Abdominal pain [R10.9]     · Chronic pancreatitis ([address] [K86.1]         1.  Pancreatitis, monitoring for now pending further clinical dynamics. 2.  Alcohol abuse with alcohol withdrawal, [facility] protocol, thiamine folic acid   3.  History of seizure, seizure precaution   4.  History of portal vein thrombosis.    5.  High anion gap metabolic acidosis on admission, improved   6.  Hypokalemia, on admission improved.           Pancreatitis - Clinical Indications for Admission to Inpatient Care by Colton Rios RN       Review Status Review Entered   Completed 11/15/2021 09:30      Criteria Review      Clinical Indications for Admission to Inpatient Care    Most Recent : Salinas Serrano Most Recent Date: 11/15/2021 9:30 AM EST    (X) Admission is indicated for  1 or more  of the following  (1) (2) (3) (4) (5) (6) (7):       (X) Pancreatitis (acute or chronic) requiring inpatient care, as indicated by  1 or more  of the       following :          (X) Inability to maintain oral hydration          11/15/2021 9:30 AM EST by Salinas Serrano            Reports he has not been able to tolerate anything by mouth for the past 3 days due to persistent vomiting every time he tries to drink or eat anything   Additional Notes   11/14        Pt presented to the ed with chief complaint of alcohol withdrawal, N/V, diarrhea      HPI:  Leigha Burks is a 28 y.o. male with history of alcohol abuse and alcoholic pancreatitis who presents to the emergency department for evaluation of epigastric abdominal pain and nausea vomiting.  Reports he has not been able to tolerate anything by mouth for the past 3 days due to persistent vomiting every time he tries to drink or eat anything.  Reports having epigastric abdominal pain that has been progressively worsening. Scooter Chacon has tried over-the-counter Tylenol and ibuprofen at home with no improvement in symptoms.  Rates his pain as a 10 out of 10.  Reports it feels very similar to when he had pancreatitis in the past.  Says he does have stones in the biliary duct.  Says he still has his gallbladder.  No fevers or chills at home.  Last drink was yesterday.  Reports drinking at least 12 beers per day plus liquor sometimes.  Reports having history of withdrawal seizures. Past Medical History:   Diagnosis Date   · Alcohol abuse       PATIENT HAS EXTREME MANIPULITIVE & DRUG SEEKING BEHAVIOR. HE POCKETS HIS BENZODIZAPINES. · Anxiety     · Drug-seeking behavior       Several times found pocketing medications given in hospital   · Herpes genitalis in men     · Manipulative behavior     · Pancreatitis     · Pneumonia     · Portal vein thrombosis       pt denied   · Seizures (HCC)       PER PATIENT ONLY. DURING MULTIPLE HOSPITALIZATIONS HE HAS NEVER ONCE   · Tobacco abuse         PHYSICAL EXAM   /85   Pulse 69   Temp 98 °F (36.7 °C) (Oral)   Resp 18   Ht 5' 8\" (1.727 m)   Wt 144 lb 10 oz (65.6 kg)   SpO2 100%   BMI 21.99 kg/m²     GENERAL APPEARANCE: Awake and alert.  In moderate distress due to pain. HENT: Normocephalic. Atraumatic. PERRL. EOMI. No facial droop. HEART/CHEST: RRR.  Normotensive. LUNGS: Respirations unlabored. Speaking comfortably in full sentences. ABDOMEN: Soft, non-distended abdomen.  Tenderness palpation over the epigastrium. No guarding. No rebound. EXTREMITIES: No gross deformities. Moving all extremities. SKIN: Warm and dry. No acute rashes. NEUROLOGICAL: Alert and oriented. No gross facial drooping. Answering questions appropriately. Moving all extremities. PSYCHIATRIC: Pleasant. Normal mood and affect.       Abn labs:  k+: 3.4          ECG   The Ekg interpreted by me shows   Normal sinus rhythm with sinus arrhythmia with a rate of 61   Axis is rightward   QTc is normal   Intervals and Durations are unremarkable.       ST Segments: Nonspecific changes      CT abd/pelvis:   Moderate calcific pancreatitis and mild dilatation of the pancreatic duct extending to the head region which is unchanged.  No active inflammatory changes or masses are seen.  The ampullary portion of the duct is partially obscured due to calcifications throughout the area.  If there is persistent clinical concern for a calcification in the duct, suggest ERCP correlation.  Small air-fluid levels throughout the bowel which could represent enteritis or early ileus recommend clinical follow-up Mild chronic liver changes with fatty replacement throughout       ED treatment: 1L NS bolus iv x 1, benadryl 25mg iv x 1, zofran 4mg po x 1, potassium chloride 20meq po x 1      Plan:  admit for alcohol withdrawal syndrome, intractable pain, dilated pancreatic duct

## 2021-11-16 NOTE — PROGRESS NOTES
Patient resting in bed and is A&O x4. Assessment completed and medication administered. See MAR. Patient refused lovenox and lamictal. Education provided about importance of medication. Still refused. IV in left AC saline locked. Patient denies any additional requests at this time. Fall precautions in place, call light within reach, and bedside table nearby. Will continue to monitor and round on patient q 2 hours.        Electronically signed by Bismark Manzo RN on 11/16/2021 at 10:03 AM

## 2021-11-16 NOTE — PROGRESS NOTES
Patient requesting ativan for anxiety, agitation, and headache. Scoring 16 on CIWA scale.  3 mg PO ativan administered. See MAR.  Will reassess in one hour.          Electronically signed by Nina Strickland RN on 11/16/2021 at 4:53 PM

## 2021-11-16 NOTE — CARE COORDINATION
INITIAL CASE MANAGEMENT ASSESSMENT    Reviewed chart, met with patient at bedside to assess possible discharge needs. Explained Case Management role/services. Living Situation: homeless, address listed on [facility] is [facility] CM office. That's where he gets mail. Pt interested in shelter list and took [phone] number for possible inpatient alcohol treatment    ADLs: independent     DME: none    PT/OT Recs: up independent in room     Active Services: [facility] behavior case management, had used [facility], [employer] and [employer] in the past for alcohol treatment programs     Transportation: normally takes the bus, unsure at [employer]     Medications: [address]-no issues with meds    PCP: Dr Christen Lora      HD/PD: na    PLAN/COMMENTS: pt reports he is in contact with [facility] , [employer] and [facility]. Has used all the programs in the past.    The Plan for Transition of Care is related to the following treatment goals:  Shelter list and phone number for [employer], he has spoken with them in the past    The Patient was provided with a choice of provider and agrees   with the discharge plan. [x] Yes [] No    Freedom of choice list was provided with basic dialogue that supports the patient's individualized plan of care/goals, treatment preferences and shares the quality data associated with the providers. [x] Yes [] No    SW/CM provided contact information for patient or family to call with any questions. SW/CM will follow and assist as needed. Electronically signed by [id number] SHAUNA Arechiga on 11/16/2021 at 10:58 AM     Spoke with Lias per pt request.  She will contact pt and come visit him if he is agreeable.   Electronically signed by [id number] SHAUNA Arechiga on 11/16/2021 at 11:19 AM

## 2021-11-16 NOTE — PROGRESS NOTES
Patient resting in bed and rates pain 7/10 at 1815. Medication administered. See MAR. VSS. Shift uneventful. Rechecked patient and CIWA scale at 0 at this time with eyes closed, sleeping. Patient denies any additional requests at this time. Fall precautions in place, call light within reach, and bedside table nearby. Will continue to monitor and round on patient q 2 hours. Electronically signed by Tyrell Tadeo RN on 11/16/2021 at 6:38 PM        Patient woke up at this time and requesting ativan for anxiousness, agitation, headache, and nausea. CIWA scale performed. Patient scored 15. Medication to be administered. See MAR.        Electronically signed by Tyrell Tadeo RN on 11/16/2021 at 6:50 PM

## 2021-11-17 VITALS
OXYGEN SATURATION: 97 % | DIASTOLIC BLOOD PRESSURE: 81 MMHG | HEART RATE: 95 BPM | SYSTOLIC BLOOD PRESSURE: 127 MMHG | WEIGHT: 146.61 LBS | TEMPERATURE: 98 F | BODY MASS INDEX: 22.22 KG/M2 | HEIGHT: 68 IN | RESPIRATION RATE: 16 BRPM

## 2021-11-17 PROBLEM — R00.0 SINUS TACHYCARDIA: Status: ACTIVE | Noted: 2021-11-17

## 2021-11-17 LAB
ANION GAP SERPL CALCULATED.3IONS-SCNC: 13 MMOL/L (ref 3–16)
BASOPHILS ABSOLUTE: 0 K/UL (ref 0–0.2)
BASOPHILS RELATIVE PERCENT: 0.6 %
BUN BLDV-MCNC: 7 MG/DL (ref 7–20)
CALCIUM SERPL-MCNC: 9.2 MG/DL (ref 8.3–10.6)
CHLORIDE BLD-SCNC: 103 MMOL/L (ref 99–110)
CO2: 22 MMOL/L (ref 21–32)
CREAT SERPL-MCNC: 0.7 MG/DL (ref 0.9–1.3)
EKG ATRIAL RATE: 90 BPM
EKG DIAGNOSIS: NORMAL
EKG P AXIS: 75 DEGREES
EKG P-R INTERVAL: 154 MS
EKG Q-T INTERVAL: 344 MS
EKG QRS DURATION: 86 MS
EKG QTC CALCULATION (BAZETT): 420 MS
EKG R AXIS: 95 DEGREES
EKG T AXIS: 49 DEGREES
EKG VENTRICULAR RATE: 90 BPM
EOSINOPHILS ABSOLUTE: 0.7 K/UL (ref 0–0.6)
EOSINOPHILS RELATIVE PERCENT: 10.9 %
GFR AFRICAN AMERICAN: >60
GFR NON-AFRICAN AMERICAN: >60
GLUCOSE BLD-MCNC: 93 MG/DL (ref 70–99)
HCT VFR BLD CALC: 46.3 % (ref 40.5–52.5)
HEMOGLOBIN: 16.1 G/DL (ref 13.5–17.5)
LYMPHOCYTES ABSOLUTE: 1.6 K/UL (ref 1–5.1)
LYMPHOCYTES RELATIVE PERCENT: 27.2 %
MAGNESIUM: 2.1 MG/DL (ref 1.8–2.4)
MCH RBC QN AUTO: 31.9 PG (ref 26–34)
MCHC RBC AUTO-ENTMCNC: 34.8 G/DL (ref 31–36)
MCV RBC AUTO: 91.6 FL (ref 80–100)
MONOCYTES ABSOLUTE: 0.5 K/UL (ref 0–1.3)
MONOCYTES RELATIVE PERCENT: 8.8 %
NEUTROPHILS ABSOLUTE: 3.2 K/UL (ref 1.7–7.7)
NEUTROPHILS RELATIVE PERCENT: 52.5 %
PDW BLD-RTO: 12.7 % (ref 12.4–15.4)
PLATELET # BLD: 229 K/UL (ref 135–450)
PMV BLD AUTO: 8.4 FL (ref 5–10.5)
POTASSIUM SERPL-SCNC: 3.8 MMOL/L (ref 3.5–5.1)
RBC # BLD: 5.06 M/UL (ref 4.2–5.9)
SODIUM BLD-SCNC: 138 MMOL/L (ref 136–145)
TSH SERPL DL<=0.05 MIU/L-ACNC: 1.51 UIU/ML (ref 0.27–4.2)
WBC # BLD: 6.1 K/UL (ref 4–11)

## 2021-11-17 PROCEDURE — 2580000003 HC RX 258: Performed by: STUDENT IN AN ORGANIZED HEALTH CARE EDUCATION/TRAINING PROGRAM

## 2021-11-17 PROCEDURE — 6370000000 HC RX 637 (ALT 250 FOR IP): Performed by: NURSE PRACTITIONER

## 2021-11-17 PROCEDURE — 93010 ELECTROCARDIOGRAM REPORT: CPT | Performed by: INTERNAL MEDICINE

## 2021-11-17 PROCEDURE — 6370000000 HC RX 637 (ALT 250 FOR IP): Performed by: INTERNAL MEDICINE

## 2021-11-17 PROCEDURE — 80048 BASIC METABOLIC PNL TOTAL CA: CPT

## 2021-11-17 PROCEDURE — 99253 IP/OBS CNSLTJ NEW/EST LOW 45: CPT | Performed by: INTERNAL MEDICINE

## 2021-11-17 PROCEDURE — 94760 N-INVAS EAR/PLS OXIMETRY 1: CPT

## 2021-11-17 PROCEDURE — 6360000002 HC RX W HCPCS: Performed by: STUDENT IN AN ORGANIZED HEALTH CARE EDUCATION/TRAINING PROGRAM

## 2021-11-17 PROCEDURE — 83735 ASSAY OF MAGNESIUM: CPT

## 2021-11-17 PROCEDURE — 93005 ELECTROCARDIOGRAM TRACING: CPT | Performed by: INTERNAL MEDICINE

## 2021-11-17 PROCEDURE — 85025 COMPLETE CBC W/AUTO DIFF WBC: CPT

## 2021-11-17 PROCEDURE — 6360000002 HC RX W HCPCS: Performed by: INTERNAL MEDICINE

## 2021-11-17 PROCEDURE — 6370000000 HC RX 637 (ALT 250 FOR IP): Performed by: STUDENT IN AN ORGANIZED HEALTH CARE EDUCATION/TRAINING PROGRAM

## 2021-11-17 PROCEDURE — 84443 ASSAY THYROID STIM HORMONE: CPT

## 2021-11-17 PROCEDURE — 36415 COLL VENOUS BLD VENIPUNCTURE: CPT

## 2021-11-17 RX ORDER — FOLIC ACID 1 MG/1
1 TABLET ORAL DAILY
Qty: 30 TABLET | Refills: 0 | Status: SHIPPED | OUTPATIENT
Start: 2021-11-18 | End: 2021-11-24

## 2021-11-17 RX ORDER — THIAMINE MONONITRATE (VIT B1) 100 MG
100 TABLET ORAL DAILY
Qty: 30 TABLET | Refills: 0 | Status: SHIPPED | OUTPATIENT
Start: 2021-11-18 | End: 2021-11-24

## 2021-11-17 RX ADMIN — Medication 100 MG: at 08:10

## 2021-11-17 RX ADMIN — LORAZEPAM 2 MG: 1 TABLET ORAL at 01:59

## 2021-11-17 RX ADMIN — ONDANSETRON 4 MG: 2 INJECTION INTRAMUSCULAR; INTRAVENOUS at 08:10

## 2021-11-17 RX ADMIN — LORAZEPAM 2 MG: 2 INJECTION INTRAMUSCULAR; INTRAVENOUS at 07:01

## 2021-11-17 RX ADMIN — LORAZEPAM 2 MG: 2 INJECTION INTRAMUSCULAR; INTRAVENOUS at 12:25

## 2021-11-17 RX ADMIN — LORAZEPAM 4 MG: 2 INJECTION INTRAMUSCULAR; INTRAVENOUS at 03:05

## 2021-11-17 RX ADMIN — OXYCODONE 5 MG: 5 TABLET ORAL at 08:10

## 2021-11-17 RX ADMIN — LORAZEPAM 2 MG: 2 INJECTION INTRAMUSCULAR; INTRAVENOUS at 05:31

## 2021-11-17 RX ADMIN — FOLIC ACID 1 MG: 1 TABLET ORAL at 08:11

## 2021-11-17 RX ADMIN — OXYCODONE 5 MG: 5 TABLET ORAL at 01:59

## 2021-11-17 RX ADMIN — SODIUM CHLORIDE, PRESERVATIVE FREE 10 ML: 5 INJECTION INTRAVENOUS at 08:11

## 2021-11-17 ASSESSMENT — PAIN SCALES - GENERAL
PAINLEVEL_OUTOF10: 7
PAINLEVEL_OUTOF10: 7
PAINLEVEL_OUTOF10: 3

## 2021-11-17 ASSESSMENT — PAIN DESCRIPTION - DESCRIPTORS
DESCRIPTORS: ACHING
DESCRIPTORS: ACHING

## 2021-11-17 ASSESSMENT — PAIN DESCRIPTION - LOCATION
LOCATION: ABDOMEN
LOCATION: ABDOMEN

## 2021-11-17 ASSESSMENT — PAIN DESCRIPTION - FREQUENCY
FREQUENCY: CONTINUOUS
FREQUENCY: CONTINUOUS

## 2021-11-17 ASSESSMENT — PAIN DESCRIPTION - ONSET
ONSET: ON-GOING
ONSET: ON-GOING

## 2021-11-17 ASSESSMENT — PAIN - FUNCTIONAL ASSESSMENT
PAIN_FUNCTIONAL_ASSESSMENT: PREVENTS OR INTERFERES SOME ACTIVE ACTIVITIES AND ADLS
PAIN_FUNCTIONAL_ASSESSMENT: PREVENTS OR INTERFERES SOME ACTIVE ACTIVITIES AND ADLS

## 2021-11-17 ASSESSMENT — PAIN DESCRIPTION - PAIN TYPE
TYPE: ACUTE PAIN
TYPE: ACUTE PAIN

## 2021-11-17 ASSESSMENT — PAIN DESCRIPTION - PROGRESSION
CLINICAL_PROGRESSION: NOT CHANGED
CLINICAL_PROGRESSION: GRADUALLY IMPROVING

## 2021-11-17 NOTE — PROGRESS NOTES
Patient A&O. Tolerating PO. Call light within reach. Will continue to monitor and reassess.  Electronically signed by Sean Mckeon RN on 11/17/2021

## 2021-11-17 NOTE — PROGRESS NOTES
Patient was withdrawaling and on CIWAA protocol. Patient was wanting to be discharged. Dr. Gerhard Bolaños wanted patient to stay an extra day d/t how ativan the patient had received in a 24 hour period. Patient still wanted to leave. This nurse explained the outcomes of leaving against medical advice. Patient verbalized understanding, and signed AMA paperwork with this nurse and Roc Rayo, charge RN, as witnesses. Dr. Gerhard Bolaños aware of patient leaving AMA.   Electronically signed by Julius Moore RN on 11/17/2021

## 2021-11-17 NOTE — PROGRESS NOTES
Patient requesting ativan, complaining he is very anxious, sweating and has a moderate headache. Scoring 15 on CIWA. Ativan given per protocol. Will reassess patient in 1 hour.

## 2021-11-17 NOTE — PROGRESS NOTES
Patient requesting ativan for anxiety. Patient assessed by this RN, scoring 18 on CIWA. Ativan given per protocol. See MAR. Will reassess patient in 1 hour.

## 2021-11-17 NOTE — PLAN OF CARE
Nutrition Problem #1: Severe malnutrition  Intervention: Food and/or Nutrient Delivery: Continue Current Diet, Start Oral Nutrition Supplement  Nutritional Goals: continue to consume >/= to 50 %
Problem: Pain:  Goal: Pain level will decrease  Description: Pain level will decrease  11/17/2021 1013 by Shelley iNck RN  Outcome: Ongoing     Problem: Pain:  Goal: Control of acute pain  Description: Control of acute pain  11/17/2021 1013 by Shelley Nick RN  Outcome: Ongoing     Problem: Pain:  Goal: Control of chronic pain  Description: Control of chronic pain  11/17/2021 1013 by Shelley Nick RN  Outcome: Ongoing     Problem: Falls - Risk of:  Goal: Will remain free from falls  Description: Will remain free from falls  11/17/2021 1013 by Shelley Nick RN  Outcome: Ongoing     Problem: Falls - Risk of:  Goal: Absence of physical injury  Description: Absence of physical injury  11/17/2021 1013 by Shelley Nick RN  Outcome: Ongoing     Problem: Nutrition  Goal: Optimal nutrition therapy  11/17/2021 1013 by Shelley Nick RN  Outcome: Ongoing
Problem: Pain:  Goal: Pain level will decrease  Description: Pain level will decrease  Outcome: Ongoing  Note: Pt assessed for pain. Pt denies any pain at this time. Will continue to monitor pt and assess for pain throughout rest of shift. Goal: Control of acute pain  Description: Control of acute pain  Outcome: Ongoing  Note: Pt assessed for pain. Pt denies any pain at this time. Will continue to monitor pt and assess for pain throughout rest of shift. Goal: Control of chronic pain  Description: Control of chronic pain  Outcome: Ongoing  Note: Pt assessed for pain. Pt denies any pain at this time. Will continue to monitor pt and assess for pain throughout rest of shift. Problem: Falls - Risk of:  Goal: Will remain free from falls  Description: Will remain free from falls  Outcome: Ongoing  Note: Fall risk assessment completed. Fall precautions in place. Call light within reach. Pt educated on calling for assistance before getting up. Walkway free of clutter. Will continue to monitor. Goal: Absence of physical injury  Description: Absence of physical injury  Outcome: Ongoing  Note: Pt is free of injury. No injury noted. Fall precautions in place. Call light within reach. Will monitor.
prevention. Call light is within reach, bed locked in lowest position, personal items within reach, and bed alarm is on. Will round on patient per unit guidelines. Goal: Absence of physical injury  Description: Absence of physical injury  11/16/2021 0742 by Driss Jara RN  Outcome: Ongoing  Note: Pt is free of injury. No injury noted. Fall precautions in place. Call light within reach. Will monitor. 11/16/2021 0116 by Justine Saldivar RN  Outcome: Ongoing  Note: Pt assessed for fall risk and fall precautions put into place. Bed in lowest position and wheels locked, call light within reach. Nonskid footwear in place. Patient educated on appropriate method of transfer and to call for assistance.

## 2021-11-17 NOTE — PROGRESS NOTES
Hospitalist Progress Note      PCP: Hafsa Strong MD    Date of Admission: 11/14/2021        Subjective: up to chair, no hallucination, tremor better, no abdominal pain this morning. Medications:  Reviewed    Infusion Medications    sodium chloride       Scheduled Medications    folic acid  1 mg Oral Daily    nicotine  1 patch TransDERmal Daily    ketorolac  30 mg IntraMUSCular Once    thiamine (VITAMIN B1) IVPB  100 mg IntraVENous Q24H    lamoTRIgine  100 mg Oral QAM    lamoTRIgine  50 mg Oral Nightly    hydrOXYzine  50 mg Oral Nightly    sodium chloride flush  5-40 mL IntraVENous 2 times per day    enoxaparin  40 mg SubCUTAneous Daily    thiamine  100 mg Oral Daily     PRN Meds: sodium chloride, LORazepam **OR** LORazepam **OR** LORazepam **OR** LORazepam **OR** LORazepam **OR** LORazepam **OR** LORazepam **OR** LORazepam, sodium chloride flush, ondansetron, oxyCODONE      Intake/Output Summary (Last 24 hours) at 11/17/2021 0858  Last data filed at 11/16/2021 1810  Gross per 24 hour   Intake 349.45 ml   Output --   Net 349.45 ml       Physical Exam Performed:    /79   Pulse 85   Temp 97.5 °F (36.4 °C) (Oral)   Resp 16   Ht 5' 8\" (1.727 m)   Wt 146 lb 9.7 oz (66.5 kg)   SpO2 95%   BMI 22.29 kg/m²     General appearance: No apparent distress  Respiratory:  Normal respiratory effort. Clear to auscultation, bilaterally without Rales/Wheezes/Rhonchi. Cardiovascular: Regular rate and rhythm with normal S1/S2 without murmurs, rubs or gallops. Abdomen: Soft, non-tender, non-distended   Musculoskeletal: No clubbing, cyanosis   Skin: Skin color, texture, turgor normal.  No rashes or lesions.   Neurologic:  No focal weakness   Psychiatric: Alert and oriented  Capillary Refill: Brisk,3 seconds, normal   Peripheral Pulses: +2 palpable, equal bilaterally       Labs:   Recent Labs     11/15/21  0458 11/16/21  0445 11/17/21  0453   WBC 6.5 5.7 6.1   HGB 14.9 15.2 16.1   HCT 41.9 44.3 46.3  203 229     Recent Labs     11/15/21  0458 11/16/21  0445 11/17/21  0453    138 138   K 4.2 3.7 3.8    103 103   CO2 23 21 22   BUN 8 8 7   CREATININE 0.7* 0.7* 0.7*   CALCIUM 8.8 8.9 9.2     Recent Labs     11/14/21  1415   AST 25   ALT 24   BILITOT 0.9   ALKPHOS 76     No results for input(s): INR in the last 72 hours. Recent Labs     11/14/21  1425   TROPONINI <0.01       Urinalysis:      Lab Results   Component Value Date    NITRU Negative 09/29/2021    WBCUA 5 07/20/2021    RBCUA 1 07/20/2021    BLOODU Negative 09/29/2021    SPECGRAV 1.025 09/29/2021    GLUCOSEU Negative 09/29/2021       Radiology:  CT ABDOMEN PELVIS W IV CONTRAST Additional Contrast? None   Final Result   Moderate calcific pancreatitis and mild dilatation of the pancreatic duct   extending to the head region which is unchanged. No active inflammatory   changes or masses are seen. The ampullary portion of the duct is partially   obscured due to calcifications throughout the area. If there is persistent   clinical concern for a calcification in the duct, suggest ERCP correlation. Small air-fluid levels throughout the bowel which could represent enteritis   or early ileus recommend clinical follow-up      Mild chronic liver changes with fatty replacement throughout                 Assessment/Plan:    Active Hospital Problems    Diagnosis     Severe malnutrition ([facility] [E43]     Alcohol abuse with withdrawal ([facility] [F10.139]     Cirrhosis of liver ([facility] [K74.60]     Abdominal pain [R10.9]     Chronic pancreatitis ([facility] [K86.1]      1.  Pancreatitis, could be acute on chronic, currently improved. GI consulted, patient need follow-up as outpatient for further plan of treatment. 2.  Alcohol abuse with alcohol withdrawal, [facility] protocol, thiamine folic acid  3.  History of seizure, seizure precaution, no current events. 4.  History of portal vein thrombosis.   5.  High anion gap metabolic acidosis on admission, improved  6. Episode of SVT, I will order TSH, will consult cardiology, it seems induced by smoking. 7.  Tobacco abuse, counseled, to note that patient was found smoking in the hospital this morning. Diet: ADULT DIET; Regular; 4 carb choices (60 gm/meal);  Low Fat/Low Chol/High Fiber/ANDREY  ADULT ORAL NUTRITION SUPPLEMENT; Breakfast, Lunch, Dinner; Standard High Calorie/High Protein Oral Supplement  Code Status: Full Code        Ariana Bone MD

## 2021-11-17 NOTE — PROGRESS NOTES
Patient states he does not feel as anxious. He is no longer restless or sweating. Patient states he feels that ativan has helped. Will continue to reassess patient as needed.

## 2021-11-17 NOTE — CONSULTS
History and Physical  [facility]   Cardiology    Chief Complaint: elevated heart rate when up active, possible psvt    HPI:     Patient is a 28 y.o. male presented for alcohol withdrawn and pancreatitis pain. He is improving per patient with less anxiety and he is ambulatory. Per RN when he walks around or goes to bathroom is heart rate elevates. There is concern that this is abnormal. The patient denies any cardiac sx, such as chest pain, sob, palps. He is mildly dizziness when orthostatic which resolves spontaneously. The elevations in heart rate are totally compatible with sinus tachycardia. Cards consult. Past Medical History:   Diagnosis Date    Alcohol abuse     PATIENT HAS EXTREME MANIPULITIVE & DRUG SEEKING BEHAVIOR. HE POCKETS HIS BENZODIZAPINES.  Anxiety     Drug-seeking behavior     Several times found pocketing medications given in hospital    Herpes genitalis in men     Manipulative behavior     Pancreatitis     Pneumonia     Portal vein thrombosis     pt denied    Seizures (HCC)     PER PATIENT ONLY.  DURING MULTIPLE HOSPITALIZATIONS HE HAS NEVER ONCE    Tobacco abuse       Past Surgical History:   Procedure Laterality Date    ENDOSCOPY, COLON, DIAGNOSTIC  10/28/2013    Endoscopic retrograde cholangiopancreatography with pancreatic stent placement    ENDOSCOPY, COLON, DIAGNOSTIC  03/23/2018    Esophagogastroduodenoscopy with biopsy    ERCP  1/10/14    with stent removal        Medications Prior to Admission: [DISCONTINUED] naproxen (NAPROSYN) 500 MG tablet, Take 1 tablet by mouth 2 times daily  hydrOXYzine (ATARAX) 25 MG tablet, Take 50 mg by mouth nightly  lamoTRIgine (LAMICTAL) 100 MG tablet, Take 100 mg by mouth every morning   lamoTRIgine (LAMICTAL) 100 MG tablet, Take 50 mg by mouth nightly   hydrOXYzine (ATARAX) 25 MG tablet, Take 25 mg by mouth every morning 25 mg AM, 50 mg nightly    Allergies   Allergen Reactions    Amoxicillin Hives    Haldol [Haloperidol Lactate] Other (See Comments)     Muscle spasms    Phenergan [Promethazine Hcl] Other (See Comments)     Pt believes it caused muscle spasms    Shrimp (Diagnostic) Itching    Glucosamine Itching      Itching of throat when eating shrimp. Pt states has had IV contrast for CT's without problem before.       Shellfish-Derived Products      Patient gets anxious around seafood      Social History     Tobacco Use    Smoking status: Current Every Day Smoker     Packs/day: 0.50     Years: 10.00     Pack years: 5.00     Types: Cigarettes     Start date: 11/21/2007    Smokeless tobacco: Never Used   Substance Use Topics    Alcohol use: Yes     Comment: 9-12 beers/day      Family History   Problem Relation Age of Onset    Hypertension Father     High Blood Pressure Father     Alcohol Abuse Father     Depression Mother     High Blood Pressure Paternal Grandfather     Alcohol Abuse Paternal Grandfather     Heart Disease Paternal Grandmother     Anemia Paternal Grandmother     Depression Maternal Aunt     Alcohol Abuse Paternal Aunt     Alcohol Abuse Paternal Uncle         Review of Systems:  · Constitutional: No Fever or Weight Loss  · Eyes: No decreased vision  · ENT: No Headaches, Hearing Loss or Vertigo  · Cardiovascular: No chest pain, dyspnea on exertion, palpitations or loss of consciousness  · Respiratory: No cough or wheezing    · Gastrointestinal:  abdominal pain improved  · Genitourinary: No dysuria, trouble voiding, or hematuria  · Musculoskeletal:  No gait disturbance, weakness or joint complaints  · Integumentary: No rash or pruritis  · Neurological: No TIA or stroke symptoms  · Psychiatric:  Anxiety less per patient  · Endocrine: No malaise, fatigue or temperature intolerance  · Hematologic/Lymphatic: No bleeding problems, blood clots or swollen lymph nodes  · Allergic/Immunologic: No nasal congestion or hives      Objective Data:     /79   Pulse 85   Temp 97.5 °F (36.4 °C) (Oral)   Resp 16   Ht 5' 8\" (1.727 m)   Wt 146 lb 9.7 oz (66.5 kg)   SpO2 95%   BMI 22.29 kg/m²     General appearance: alert, appears stated age and cooperative  Alert, awake, oriented x 3  Eyes:  No erythema  Head: atraumatic  Neck: no JVD  Lungs: clear to auscultation bilaterally  Heart: regular rate and rhythm, S1, S2 normal, no murmur, click, rub or gallop  Abdomen: soft, non-tender; bowel sounds normal; no masses,  no organomegaly  Extremities: extremities normal, atraumatic, no cyanosis or edema  Skin: Skin color, texture, turgor normal. No rashes or lesions  Hematologic: no remarkable bruising       ECG: normal sinus rhythm     Data Review    Recent Labs     11/15/21  0458 11/16/21  0445 11/17/21  0453    138 138   K 4.2 3.7 3.8    103 103   CO2 23 21 22   BUN 8 8 7   CREATININE 0.7* 0.7* 0.7*     Recent Labs     11/15/21  0458 11/16/21  0445 11/17/21  0453   WBC 6.5 5.7 6.1   HGB 14.9 15.2 16.1   HCT 41.9 44.3 46.3   MCV 91.9 92.8 91.6    203 229     Lab Results   Component Value Date    CKTOTAL 96 02/10/2019    TROPONINI <0.01 11/14/2021         Assessment:     Active Problems:    Chronic pancreatitis (HCC)    Abdominal pain    Cirrhosis of liver (HCC)    Alcohol abuse with withdrawal (HCC)    Severe malnutrition (HCC)    Sinus tachycardia  Resolved Problems:    * No resolved hospital problems. *      Plan:     1. The patient has no cardiac sx, his ekg, exam and cxr are normal. His heart rate elevations are largely all typical of sinus tachycardia and his RN says during activity by her observation and reports. Even if he had some atrial tachycardia it would be benign. The nicoderm patch might contribute to sinus tachycardia. The sinus tachycardia is likely in part exaggerated as deconditioned from etoh and withdrawn. 2. No other testing needed. Will follow as needed. Reviewed with patient and RN.

## 2021-11-17 NOTE — PROGRESS NOTES
Patient went into SVT with a heart rate >160 while up in the bathroom. Stat EKG ordered. Visible P waves on the tele monitor now that pt is back in bed with a heart rate of 105. Pt smoking in bathroom upon entering the room. JIMENEZ Min notified. Patient itching, restless and noticeably anxious. Scoring CIWA of 25. Will give ativan per protocol and continue to monitor.

## 2021-11-17 NOTE — PROGRESS NOTES
INPATIENT PROGRESS NOTE        IDENTIFYING DATA/REASON FOR CONSULTATION   PATIENT:  Mandy Suero  MRN:  [id number]  ADMIT DATE: 11/14/2021  TIME OF EVALUATION: 11/17/2021 6:32 AM  HOSPITAL STAY:   LOS: 2 days   CONSULTING PHYSICIAN: Carlton No MD   REASON FOR CONSULTATION: chronic pancreatitis with pancreatic duct stone    Subjective:    Patient seen in follow up. Per RN notes pt with withdrawal symptoms overnight, was given Ativan. This morning pt resting comfortably, no distress, no new complaints    MEDICATIONS   SCHEDULED:  folic acid, 1 mg, Daily  nicotine, 1 patch, Daily  ketorolac, 30 mg, Once  thiamine (VITAMIN B1) IVPB, 100 mg, Q24H  lamoTRIgine, 100 mg, QAM  lamoTRIgine, 50 mg, Nightly  hydrOXYzine, 50 mg, Nightly  sodium chloride flush, 5-40 mL, 2 times per day  enoxaparin, 40 mg, Daily  thiamine, 100 mg, Daily      FLUIDS/DRIPS:     sodium chloride       PRNs: sodium chloride, 25 mL, PRN  LORazepam, 1 mg, Q1H PRN   Or  LORazepam, 1 mg, Q1H PRN   Or  LORazepam, 2 mg, Q1H PRN   Or  LORazepam, 2 mg, Q1H PRN   Or  LORazepam, 3 mg, Q1H PRN   Or  LORazepam, 3 mg, Q1H PRN   Or  LORazepam, 4 mg, Q1H PRN   Or  LORazepam, 4 mg, Q1H PRN  sodium chloride flush, 5-40 mL, PRN  ondansetron, 4 mg, Q6H PRN  oxyCODONE, 5 mg, Q6H PRN      ALLERGIES:    Allergies   Allergen Reactions    Amoxicillin Hives    Haldol [Haloperidol Lactate] Other (See Comments)     Muscle spasms    Phenergan [Promethazine Hcl] Other (See Comments)     Pt believes it caused muscle spasms    Shrimp (Diagnostic) Itching    Glucosamine Itching      Itching of throat when eating shrimp. Pt states has had IV contrast for CT's without problem before.       Shellfish-Derived Products      Patient gets anxious around seafood         PHYSICAL EXAM   VITALS:  /64   Pulse 80   Temp 98.2 °F (36.8 °C) (Oral)   Resp 16   Ht 5' 8\" (1.727 m)   Wt 146 lb 9.7 oz (66.5 kg)   SpO2 97%   BMI 22.29 kg/m²   TEMPERATURE:  Current - Temp: 98.2 °F (36.8 °C); Max - Temp  Av.9 °F (36.6 °C)  Min: 97.5 °F (36.4 °C)  Max: 98.2 °F (36.8 °C)    Physical Exam:  General appearance: alert, cooperative, no distress, appears stated age  Eyes: Anicteric  Head: Normocephalic, without obvious abnormality  Lungs: clear to auscultation bilaterally, Normal Effort  Heart: regular rate and rhythm, normal S1 and S2, no murmurs or rubs  Abdomen: soft, non-distended, non-tender. Bowel sounds normal.   Extremities: atraumatic, no cyanosis or edema, mild tremors  Skin: warm and dry, no jaundice  Neuro: Grossly intact, A&OX3    LABS AND IMAGING   Laboratory   Recent Labs     11/15/21  0458 11/16/21  0445 11/17/21  0453   WBC 6.5 5.7 6.1   HGB 14.9 15.2 16.1   HCT 41.9 44.3 46.3   MCV 91.9 92.8 91.6    203 229     Recent Labs     11/15/21  0458 11/16/21  0445 11/17/21  0453    138 138   K 4.2 3.7 3.8    103 103   CO2 23 21 22   BUN 8 8 7   CREATININE 0.7* 0.7* 0.7*     Recent Labs     21  1415   AST 25   ALT 24   BILITOT 0.9   ALKPHOS 76     Recent Labs     21  1425   LIPASE 33.0     No results for input(s): PROTIME, INR in the last 72 hours. Imaging  CT ABDOMEN PELVIS W IV CONTRAST Additional Contrast? None   Final Result   Moderate calcific pancreatitis and mild dilatation of the pancreatic duct   extending to the head region which is unchanged. No active inflammatory   changes or masses are seen. The ampullary portion of the duct is partially   obscured due to calcifications throughout the area. If there is persistent   clinical concern for a calcification in the duct, suggest ERCP correlation.       Small air-fluid levels throughout the bowel which could represent enteritis   or early ileus recommend clinical follow-up      Mild chronic liver changes with fatty replacement throughout             Endoscopy      ASSESSMENT AND RECOMMENDATIONS   Donald Block is a 28 y.o. male with  PMH of chronic calcific pancreatitis, alcohol abuse, tobacco use, marijuana use, seizures, and anxiety who presented on 11/14/2021 with acute on chronic abdominal pain, nausea, vomiting. CT A/P 11/2021 showed chronic calcific pancreatitis with unchanged mild dilation of the pancreatic duct and suspected large PD stone near the ampulla      1. Chronic calcific pancreatitis with PD stone  -PD stone with PD dilation seen on US in June 2021 and again on CT 11/15/21.    -Lipase normal  -Pt tolerating food    2. Alcohol use disorder  -On CIWA protocol, thiamine, folic acid    3. Marijuana use      RECOMMENDATIONS:    -Will have pt follow up the office in 3-4 weeks to discuss treatment options including ERCP +/-lithotripsy vs surgery  -Strongly encouraged ETOH and tobacco cessation  -diet as tolerated  -ok from GI standpoint for discharged once Hospitalist determines pt to be stable from alcohol withdrawal standpoint        If you have any questions or need any further information, please feel free to contact us [phone]. Thank you for allowing us to participate in the care of Justyna Husain. The note was completed using Dragon voice recognition transcription. Every effort was made to ensure accuracy; however, inadvertent transcription errors may be present despite my best efforts to edit errors. Rekha FRANCIS    Attending physician addendum:      I have personally seen and examined the patient, reviewed the patient's medical record and pertinent labs and clinical imaging. I have personally staffed the case with TITO Reynolds. I agree with her consultation note, exam findings, assessment and plans  as written above. I have made appropriate modifications and edited her assessment and plan where needed to reflect my impression and plans for this patient. Still in Etoh withdrawal.  No worsening of pain or vomiting.   ON low fat diet    /79   Pulse 85   Temp 97.5 °F (36.4 °C) (Oral)   Resp 16   Ht 5' 8\" (1.727 m)   Wt 146 lb 9.7 oz (66.5 kg) SpO2 95%   BMI 22.29 kg/m²      1) Intractable nausea and vomiting- improved. Suspect related to #2 and ongoing Etoh use.  Possible acute on chronic pancreatitis flare.  CT also with ? Ileus.  EGD in 2018 without PUD.  ? Gastroparesis. 2) Chronic calcific pancreatitis with pancreatic ductal dilation-  Reviewed case with Dr. Marvel Mireles who plans to discuss endoscopic therapy as an outpatient. Patient needs to stop drinking and smoking and follow up in office prior to consideration of this as patient has history of noncompliance  3) Etoh abuse  4) Hx of Marijuana use  5) Remote history of IVDA.      Plan:  Pantoprazole daily  Continue low fat diet  CIWA  Patient was educated patient on stopping Etoh and nicotine  Will arrange an  Office visit in our office with Dr. Marvel Mireles or Darwin Quiroz in next 1-2 weeks to discuss role of endoscopic therapy for his chronic calcific pancreatitis and large PD stone and large duct chronic pancreatitis. May need referral to Dr. Maurice Pennington or Dajuan Reich for surgical consultation as an outpatient if he failed to improve with endoscopic therapy. Await  fecal elastase. OK for D/C after stable from withdrawal     Thank you for allowing me to participate in this patient's care. If there are any questions or concerns regarding this patient, or the plan we have set in place, please feel free to contact me at [phone].      Rubens Espinosa, DO

## 2021-11-24 ENCOUNTER — HOSPITAL ENCOUNTER (EMERGENCY)
Age: 31
Discharge: HOME | End: 2021-11-24
Attending: EMERGENCY MEDICINE
Payer: COMMERCIAL

## 2021-11-24 VITALS
SYSTOLIC BLOOD PRESSURE: 105 MMHG | HEART RATE: 62 BPM | OXYGEN SATURATION: 98 % | WEIGHT: 140 LBS | TEMPERATURE: 98.2 F | BODY MASS INDEX: 21.22 KG/M2 | HEIGHT: 68 IN | DIASTOLIC BLOOD PRESSURE: 87 MMHG | RESPIRATION RATE: 24 BRPM

## 2021-11-24 DIAGNOSIS — R10.10 UPPER ABDOMINAL PAIN: ICD-10-CM

## 2021-11-24 DIAGNOSIS — F10.10 ALCOHOL ABUSE: Primary | ICD-10-CM

## 2021-11-24 LAB
A/G RATIO: 1.8 (ref 1.1–2.2)
ALBUMIN SERPL-MCNC: 4.6 G/DL (ref 3.4–5)
ALP BLD-CCNC: 60 U/L (ref 40–129)
ALT SERPL-CCNC: 18 U/L (ref 10–40)
AMPHETAMINE SCREEN, URINE: ABNORMAL
ANION GAP SERPL CALCULATED.3IONS-SCNC: 12 MMOL/L (ref 3–16)
AST SERPL-CCNC: 22 U/L (ref 15–37)
BACTERIA: ABNORMAL /HPF
BARBITURATE SCREEN URINE: ABNORMAL
BASOPHILS ABSOLUTE: 0.1 K/UL (ref 0–0.2)
BASOPHILS RELATIVE PERCENT: 0.9 %
BENZODIAZEPINE SCREEN, URINE: POSITIVE
BILIRUB SERPL-MCNC: 0.6 MG/DL (ref 0–1)
BILIRUBIN URINE: NEGATIVE
BLOOD, URINE: NEGATIVE
BUN BLDV-MCNC: 11 MG/DL (ref 7–20)
CALCIUM SERPL-MCNC: 9.6 MG/DL (ref 8.3–10.6)
CANNABINOID SCREEN URINE: ABNORMAL
CHLORIDE BLD-SCNC: 104 MMOL/L (ref 99–110)
CLARITY: ABNORMAL
CO2: 21 MMOL/L (ref 21–32)
COCAINE METABOLITE SCREEN URINE: ABNORMAL
COLOR: YELLOW
COMMENT UA: ABNORMAL
CREAT SERPL-MCNC: 0.7 MG/DL (ref 0.9–1.3)
EOSINOPHILS ABSOLUTE: 0.3 K/UL (ref 0–0.6)
EOSINOPHILS RELATIVE PERCENT: 3.1 %
ETHANOL: NORMAL MG/DL (ref 0–0.08)
GFR AFRICAN AMERICAN: >60
GFR NON-AFRICAN AMERICAN: >60
GLUCOSE BLD-MCNC: 142 MG/DL (ref 70–99)
GLUCOSE URINE: NEGATIVE MG/DL
HCT VFR BLD CALC: 42.7 % (ref 40.5–52.5)
HEMOGLOBIN: 15.1 G/DL (ref 13.5–17.5)
KETONES, URINE: NEGATIVE MG/DL
LEUKOCYTE ESTERASE, URINE: NEGATIVE
LIPASE: 46 U/L (ref 13–60)
LYMPHOCYTES ABSOLUTE: 1.7 K/UL (ref 1–5.1)
LYMPHOCYTES RELATIVE PERCENT: 21.1 %
Lab: ABNORMAL
MAGNESIUM: 2 MG/DL (ref 1.8–2.4)
MCH RBC QN AUTO: 32.4 PG (ref 26–34)
MCHC RBC AUTO-ENTMCNC: 35.3 G/DL (ref 31–36)
MCV RBC AUTO: 91.7 FL (ref 80–100)
METHADONE SCREEN, URINE: ABNORMAL
MICROSCOPIC EXAMINATION: YES
MONOCYTES ABSOLUTE: 0.7 K/UL (ref 0–1.3)
MONOCYTES RELATIVE PERCENT: 8.3 %
MUCUS: ABNORMAL /LPF
NEUTROPHILS ABSOLUTE: 5.4 K/UL (ref 1.7–7.7)
NEUTROPHILS RELATIVE PERCENT: 66.6 %
NITRITE, URINE: NEGATIVE
OPIATE SCREEN URINE: ABNORMAL
OXYCODONE URINE: ABNORMAL
PDW BLD-RTO: 12.9 % (ref 12.4–15.4)
PH UA: 6
PH UA: 6 (ref 5–8)
PHENCYCLIDINE SCREEN URINE: ABNORMAL
PLATELET # BLD: 214 K/UL (ref 135–450)
PMV BLD AUTO: 9 FL (ref 5–10.5)
POTASSIUM REFLEX MAGNESIUM: 3.4 MMOL/L (ref 3.5–5.1)
PROPOXYPHENE SCREEN: ABNORMAL
PROTEIN UA: NEGATIVE MG/DL
RBC # BLD: 4.65 M/UL (ref 4.2–5.9)
RBC UA: ABNORMAL /HPF (ref 0–4)
SODIUM BLD-SCNC: 137 MMOL/L (ref 136–145)
SPECIFIC GRAVITY UA: 1.02 (ref 1–1.03)
TOTAL PROTEIN: 7.1 G/DL (ref 6.4–8.2)
URINE REFLEX TO CULTURE: ABNORMAL
URINE TYPE: ABNORMAL
UROBILINOGEN, URINE: 0.2 E.U./DL
WBC # BLD: 8.1 K/UL (ref 4–11)
WBC UA: ABNORMAL /HPF (ref 0–5)

## 2021-11-24 PROCEDURE — 82077 ASSAY SPEC XCP UR&BREATH IA: CPT

## 2021-11-24 PROCEDURE — 96375 TX/PRO/DX INJ NEW DRUG ADDON: CPT

## 2021-11-24 PROCEDURE — 96374 THER/PROPH/DIAG INJ IV PUSH: CPT

## 2021-11-24 PROCEDURE — 6370000000 HC RX 637 (ALT 250 FOR IP): Performed by: NURSE PRACTITIONER

## 2021-11-24 PROCEDURE — 83690 ASSAY OF LIPASE: CPT

## 2021-11-24 PROCEDURE — 6360000002 HC RX W HCPCS: Performed by: NURSE PRACTITIONER

## 2021-11-24 PROCEDURE — 2580000003 HC RX 258: Performed by: NURSE PRACTITIONER

## 2021-11-24 PROCEDURE — 99283 EMERGENCY DEPT VISIT LOW MDM: CPT

## 2021-11-24 PROCEDURE — 80053 COMPREHEN METABOLIC PANEL: CPT

## 2021-11-24 PROCEDURE — 2500000003 HC RX 250 WO HCPCS: Performed by: NURSE PRACTITIONER

## 2021-11-24 PROCEDURE — 83735 ASSAY OF MAGNESIUM: CPT

## 2021-11-24 PROCEDURE — 81001 URINALYSIS AUTO W/SCOPE: CPT

## 2021-11-24 PROCEDURE — 85025 COMPLETE CBC W/AUTO DIFF WBC: CPT

## 2021-11-24 PROCEDURE — 80307 DRUG TEST PRSMV CHEM ANLYZR: CPT

## 2021-11-24 RX ORDER — SODIUM CHLORIDE 9 MG/ML
25 INJECTION, SOLUTION INTRAVENOUS PRN
Status: DISCONTINUED | OUTPATIENT
Start: 2021-11-24 | End: 2021-11-25 | Stop reason: HOSPADM

## 2021-11-24 RX ORDER — ONDANSETRON 2 MG/ML
4 INJECTION INTRAMUSCULAR; INTRAVENOUS EVERY 30 MIN PRN
Status: DISCONTINUED | OUTPATIENT
Start: 2021-11-24 | End: 2021-11-25 | Stop reason: HOSPADM

## 2021-11-24 RX ORDER — LORAZEPAM 1 MG/1
1 TABLET ORAL
Status: DISCONTINUED | OUTPATIENT
Start: 2021-11-24 | End: 2021-11-24 | Stop reason: ALTCHOICE

## 2021-11-24 RX ORDER — LORAZEPAM 1 MG/1
4 TABLET ORAL
Status: DISCONTINUED | OUTPATIENT
Start: 2021-11-24 | End: 2021-11-24 | Stop reason: ALTCHOICE

## 2021-11-24 RX ORDER — LORAZEPAM 2 MG/ML
2 INJECTION INTRAMUSCULAR
Status: DISCONTINUED | OUTPATIENT
Start: 2021-11-24 | End: 2021-11-24 | Stop reason: ALTCHOICE

## 2021-11-24 RX ORDER — SUCRALFATE 1 G/1
1 TABLET ORAL 4 TIMES DAILY
Qty: 120 TABLET | Refills: 0 | Status: SHIPPED | OUTPATIENT
Start: 2021-11-24 | End: 2021-12-20

## 2021-11-24 RX ORDER — LORAZEPAM 2 MG/ML
1 INJECTION INTRAMUSCULAR
Status: DISCONTINUED | OUTPATIENT
Start: 2021-11-24 | End: 2021-11-24 | Stop reason: ALTCHOICE

## 2021-11-24 RX ORDER — KETOROLAC TROMETHAMINE 30 MG/ML
15 INJECTION, SOLUTION INTRAMUSCULAR; INTRAVENOUS ONCE
Status: COMPLETED | OUTPATIENT
Start: 2021-11-24 | End: 2021-11-24

## 2021-11-24 RX ORDER — LORAZEPAM 1 MG/1
3 TABLET ORAL
Status: DISCONTINUED | OUTPATIENT
Start: 2021-11-24 | End: 2021-11-24 | Stop reason: ALTCHOICE

## 2021-11-24 RX ORDER — LORAZEPAM 2 MG/ML
3 INJECTION INTRAMUSCULAR
Status: DISCONTINUED | OUTPATIENT
Start: 2021-11-24 | End: 2021-11-24 | Stop reason: ALTCHOICE

## 2021-11-24 RX ORDER — SODIUM CHLORIDE 0.9 % (FLUSH) 0.9 %
5-40 SYRINGE (ML) INJECTION EVERY 12 HOURS SCHEDULED
Status: DISCONTINUED | OUTPATIENT
Start: 2021-11-24 | End: 2021-11-25 | Stop reason: HOSPADM

## 2021-11-24 RX ORDER — LORAZEPAM 1 MG/1
2 TABLET ORAL
Status: DISCONTINUED | OUTPATIENT
Start: 2021-11-24 | End: 2021-11-24 | Stop reason: ALTCHOICE

## 2021-11-24 RX ORDER — SODIUM CHLORIDE 0.9 % (FLUSH) 0.9 %
5-40 SYRINGE (ML) INJECTION PRN
Status: DISCONTINUED | OUTPATIENT
Start: 2021-11-24 | End: 2021-11-25 | Stop reason: HOSPADM

## 2021-11-24 RX ORDER — FAMOTIDINE 20 MG/1
20 TABLET, FILM COATED ORAL 2 TIMES DAILY
Qty: 60 TABLET | Refills: 0 | Status: SHIPPED | OUTPATIENT
Start: 2021-11-24 | End: 2021-12-20

## 2021-11-24 RX ORDER — LORAZEPAM 2 MG/ML
4 INJECTION INTRAMUSCULAR
Status: DISCONTINUED | OUTPATIENT
Start: 2021-11-24 | End: 2021-11-24 | Stop reason: ALTCHOICE

## 2021-11-24 RX ADMIN — LIDOCAINE HYDROCHLORIDE: 20 SOLUTION ORAL; TOPICAL at 23:04

## 2021-11-24 RX ADMIN — KETOROLAC TROMETHAMINE 15 MG: 30 INJECTION, SOLUTION INTRAMUSCULAR at 22:25

## 2021-11-24 RX ADMIN — Medication 5 ML: at 22:11

## 2021-11-24 RX ADMIN — LORAZEPAM 2 MG: 2 INJECTION INTRAMUSCULAR; INTRAVENOUS at 22:25

## 2021-11-24 RX ADMIN — FOLIC ACID: 5 INJECTION, SOLUTION INTRAMUSCULAR; INTRAVENOUS; SUBCUTANEOUS at 22:41

## 2021-11-24 ASSESSMENT — ENCOUNTER SYMPTOMS
VOMITING: 1
SHORTNESS OF BREATH: 0
DIARRHEA: 0
NAUSEA: 1
ABDOMINAL PAIN: 1
CHEST TIGHTNESS: 0

## 2021-11-24 ASSESSMENT — PAIN SCALES - GENERAL: PAINLEVEL_OUTOF10: 8

## 2021-11-25 NOTE — ED PROVIDER NOTES
[address] [facility]        Pt Name: Gabi Wilkinson  MRN: [id number]  [place] 1990  Date of evaluation: 11/24/2021  Provider: ARTURO Han - HERMILO  PCP: Rachell Branch MD  Note Started: 10:12 PM EST        I have seen and evaluated this patient with my supervising physician Misa Montoya       Chief Complaint   Patient presents with    Withdrawal     stomach pain, NV that started early this morning. Last drink Tuesday 11/22. HISTORY OF PRESENT ILLNESS   (Location, Timing/Onset, Context/Setting, Quality, Duration, Modifying Factors, Severity, Associated Signs and Symptoms)  Note limiting factors. Chief Complaint: Upper abdominal pain and alcohol withdrawal     Gabi Wilkinson is a 28 y.o. male who presents Upper abdominal pain with nausea and vomiting that began this morning. The patient reports history of chronic alcohol abuse and chronic pancreatitis. Reports his last drink was Tuesday of this week. Admits that he was seen at the [facility] and was discharged with multiple days of Valium and Librium, states that he is not taking his medications and instead \"saving them for an emergency\". He does not believe that he can stop drinking until he enters an inpatient rehabilitation program, states that he has been calling but it is \"not working out\", however he states in the next sentence that \"they have a bed for me now but with Thanksgiving tomorrow they can take me until Friday\". Patient reports that [facility]. States that he has history of seizure with alcohol withdrawal.      Denies any headache, fever, visual disturbances. No chest pain or pressure. No neck or back pain. No shortness of breath, cough, or congestion. No diarrhea, constipation, or dysuria. No rash.     Nursing Notes were all reviewed and agreed with or any disagreements were addressed in the HPI. REVIEW OF SYSTEMS    (2-9 systems for level 4, 10 or more for level 5)     Review of Systems   Constitutional: Negative for activity change, chills and fever. Respiratory: Negative for chest tightness and shortness of breath. Cardiovascular: Negative for chest pain. Gastrointestinal: Positive for abdominal pain, nausea and vomiting. Negative for diarrhea. Genitourinary: Negative for dysuria. Neurological: Positive for tremors. All other systems reviewed and are negative. Positives and Pertinent negatives as per HPI. Except as noted above in the ROS, all other systems were reviewed and negative. PAST MEDICAL HISTORY     Past Medical History:   Diagnosis Date    Alcohol abuse     PATIENT HAS EXTREME MANIPULITIVE & DRUG SEEKING BEHAVIOR. HE POCKETS HIS BENZODIZAPINES.  Anxiety     Drug-seeking behavior     Several times found pocketing medications given in hospital    Herpes genitalis in men     Manipulative behavior     Pancreatitis     Pneumonia     Portal vein thrombosis     pt denied    Seizures (HCC)     PER PATIENT ONLY.  DURING MULTIPLE HOSPITALIZATIONS HE HAS NEVER ONCE    Tobacco abuse          SURGICAL HISTORY     Past Surgical History:   Procedure Laterality Date    ENDOSCOPY, COLON, DIAGNOSTIC  10/28/2013    Endoscopic retrograde cholangiopancreatography with pancreatic stent placement    ENDOSCOPY, COLON, DIAGNOSTIC  03/23/2018    Esophagogastroduodenoscopy with biopsy    ERCP  1/10/14    with stent removal         CURRENTMEDICATIONS       Discharge Medication List as of 11/24/2021 11:05 PM            ALLERGIES     Amoxicillin, Haldol [haloperidol lactate], Phenergan [promethazine hcl], Shrimp (diagnostic), Glucosamine, and Shellfish-derived products    FAMILYHISTORY       Family History   Problem Relation Age of Onset    Hypertension Father     High Blood Pressure Father     Alcohol Abuse Father     Depression Mother     High Blood Pressure Paternal Grandfather     Alcohol Abuse Paternal Grandfather     Heart Disease Paternal Grandmother     Anemia Paternal Grandmother     Depression Maternal Aunt     Alcohol Abuse Paternal Aunt     Alcohol Abuse Paternal Uncle           SOCIAL HISTORY       Social History     Tobacco Use    Smoking status: Current Every Day Smoker     Packs/day: 0.50     Years: 10.00     Pack years: 5.00     Types: Cigarettes     Start date: 11/21/2007    Smokeless tobacco: Never Used   Vaping Use    Vaping Use: Former    Substances: Never   Substance Use Topics    Alcohol use: Yes     Comment: 9-12 beers/day    Drug use: No       SCREENINGS             PHYSICAL EXAM    (up to 7 for level 4, 8 or more for level 5)     ED Triage Vitals [11/24/21 2106]   BP Temp Temp Source Pulse Resp SpO2 Height Weight   106/74 98.2 °F (36.8 °C) Oral 89 16 98 % 5' 8\" (1.727 m) 140 lb (63.5 kg)       Physical Exam  Vitals and nursing note reviewed. Constitutional:       Appearance: He is well-developed. He is not diaphoretic. HENT:      Head: Normocephalic and atraumatic. Right Ear: External ear normal.      Left Ear: External ear normal.   Eyes:      General:         Right eye: No discharge. Left eye: No discharge. Neck:      Vascular: No JVD. Cardiovascular:      Rate and Rhythm: Normal rate and regular rhythm. Pulses: Normal pulses. Heart sounds: Normal heart sounds. Pulmonary:      Effort: Pulmonary effort is normal. No respiratory distress. Breath sounds: Normal breath sounds. Abdominal:      Palpations: Abdomen is soft. Tenderness: There is no guarding. Musculoskeletal:         General: Normal range of motion. Cervical back: Normal range of motion and neck supple. Skin:     General: Skin is warm and dry. Coloration: Skin is not pale. Neurological:      Mental Status: He is alert and oriented to person, place, and time.    Psychiatric:         Behavior: Behavior normal. DIAGNOSTIC RESULTS   LABS:    Labs Reviewed   COMPREHENSIVE METABOLIC PANEL W/ REFLEX TO MG FOR LOW K - Abnormal; Notable for the following components:       Result Value    Potassium reflex Magnesium 3.4 (*)     Glucose 142 (*)     CREATININE 0.7 (*)     All other components within normal limits    Narrative:     Performed at:  [facility] [address] [place], [place]   Phone [phone]   URINE RT REFLEX TO CULTURE - Abnormal; Notable for the following components:    Clarity, UA TURBID (*)     All other components within normal limits    Narrative:     Performed at:  [facility] [address] [place], [place]   Phone [phone]   [address] - Abnormal; Notable for the following components:    Benzodiazepine Screen, Urine POSITIVE (*)     All other components within normal limits    Narrative:     Performed at:  [facility] [address] [place], [place]   Phone [phone]   MICROSCOPIC URINALYSIS - Abnormal; Notable for the following components:    Mucus, UA 1+ (*)     Bacteria, UA Rare (*)     All other components within normal limits    Narrative:     Performed at:  [facility] [address] [place], [place]   Phone [phone]   CBC WITH AUTO DIFFERENTIAL    Narrative:     Performed at:  [facility] [address] [place], [place]   Phone [phone]   LIPASE    Narrative:     Performed at:  [facility] [address], [place]   Phone [phone]   ETHANOL    Narrative:     Performed at:  [facility] [address], [place]   Phone [phone]   MAGNESIUM    Narrative:     Performed at:  [facility] [address], [place]   Phone [phone]       When ordered only abnormal lab results are displayed. All other labs were within normal range or not returned as of this dictation. EKG: When ordered, EKG's are interpreted by the Emergency Department Physician in the absence of a cardiologist.  Please see their note for interpretation of EKG. RADIOLOGY:   Non-plain film images such as CT, Ultrasound and MRI are read by the radiologist. Plain radiographic images are visualized and preliminarily interpreted by the ED Provider with the below findings:        Interpretation per the Radiologist below, if available at the time of this note:    No orders to display     No results found.         PROCEDURES   Unless otherwise noted below, none     Procedures    CRITICAL CARE TIME   N/A    CONSULTS:  IP CONSULT TO SOCIAL WORK      EMERGENCY DEPARTMENT COURSE and DIFFERENTIAL DIAGNOSIS/MDM:   Vitals:    Vitals:    11/24/21 2106 11/24/21 2237 11/24/21 2259 11/24/21 2306   BP: 106/74 106/74  105/87   Pulse: 89 89 74 62   Resp: 16  18 24   Temp: 98.2 °F (36.8 °C)      TempSrc: Oral      SpO2: 98%      Weight: 140 lb (63.5 kg)      Height: 5' 8\" (1.727 m)          Patient was given the following medications:  Medications   sodium chloride flush 0.9 % injection 5-40 mL (5 mLs IntraVENous Given 11/24/21 2211)   sodium chloride flush 0.9 % injection 5-40 mL (has no administration in time range)   0.9 % sodium chloride infusion (has no administration in time range)   ondansetron (ZOFRAN) injection 4 mg (has no administration in time range)   sodium chloride 0.9 % 4,026 mL with folic acid 1 mg, adult multi-vitamin with vitamin k 10 mL, thiamine 300 mg ( IntraVENous Stopped 11/24/21 2259)   ketorolac (TORADOL) injection 15 mg (15 mg IntraVENous Given 11/24/21 2225)   aluminum & magnesium hydroxide-simethicone (MAALOX) 30 mL, lidocaine viscous hcl (XYLOCAINE) 5 mL (GI COCKTAIL) ( Oral Given 11/24/21 2304)           Briefly, this is a 81-year-old male who presents to the emergency department complaining of upper abdominal pain with history of chronic pancreatitis and alcohol withdrawal.  Reports his last alcoholic drink was 57/40/9054. He describes feeling tremulous, reports history of seizure activity with alcohol withdrawal.    [facility] protocol was initiated, the nurse did score the patient much higher than I did. With patient reporting headache and feeling anxious, he did score 8. She did give him half a milligram of IV Ativan, asked her not to give the other 1-1/2 mg, she will waste this medication. He was given Toradol and Zofran. Some IV fluids. Tolerating p.o. without difficulty. He is not actively vomiting. CBC is unremarkable. CMP unremarkable. Normal lipase. Patient did initially tell me that he was saving his Librium and Valium for \"an emergency\" however when I asked him about this again, states that he took the medication today and it was uncertain how much he had left. Patient reports that he has a bed available at the [facility] but they can take him today or tomorrow due to the holiday but that should able to take him Friday and then he tells me that perhaps he does not have a bed and he needs help to quit drinking. The patient is given a list of resources and directions to call each of the resources. He tells me that he has an outpatient appointment with [place] [facility] next week, he is instructed to keep that appointment. Discharged home with Carafate and Pepcid. He has had 3 CTs of the abdomen and pelvis since July, exam this evening was benign with normal labs, we will not repeat imaging this evening. I see nothing that would suggest an acute abdomen at this time.  Based on history, physical exam, risk factors, and tests my suspicion for bowel obstruction, incarcerated hernia, acute pancreatitis, intra-abdominal abscess, perforated viscus, diverticulitis, cholecystitis, appendicitis, testicular torsion and cardiac ischemia is very low. There is no evidence of peritonitis, sepsis or toxicity at this time. I feel the patient can be managed as an outpatient with follow-up with his family doctor in 24-48 hours. Instructions have been given for the patient to return to the emergency department for worsening of the pain, high fevers, intractable vomiting, bleeding or any other concerns    FINAL IMPRESSION      1. Alcohol abuse    2.  Upper abdominal pain          DISPOSITION/PLAN   DISPOSITION        PATIENT REFERRED TO:  Marta Rodriguez MD  [address] [facility] [address] [facility]  [phone]    Schedule an appointment as soon as possible for a visit         DISCHARGE MEDICATIONS:  Discharge Medication List as of 11/24/2021 11:05 PM      START taking these medications    Details   sucralfate (CARAFATE) 1 GM tablet Take 1 tablet by mouth 4 times daily, Disp-120 tablet, R-0Print      famotidine (PEPCID) 20 MG tablet Take 1 tablet by mouth 2 times daily, Disp-60 tablet, R-0Print             DISCONTINUED MEDICATIONS:  Discharge Medication List as of 11/24/2021 11:05 PM      STOP taking these medications       folic acid (FOLVITE) 1 MG tablet Comments:   Reason for Stopping:         thiamine mononitrate (THIAMINE) 100 MG tablet Comments:   Reason for Stopping:         hydrOXYzine (ATARAX) 25 MG tablet Comments:   Reason for Stopping:         lamoTRIgine (LAMICTAL) 100 MG tablet Comments:   Reason for Stopping:         lamoTRIgine (LAMICTAL) 100 MG tablet Comments:   Reason for Stopping:         hydrOXYzine (ATARAX) 25 MG tablet Comments:   Reason for Stopping:                      (Please note that portions of this note were completed with a voice recognition program.  Efforts were made to edit the dictations but occasionally words are mis-transcribed.)    ARTURO Carbajal CNP (electronically signed)           ARTURO Carbajal CNP  11/24/21 3111

## 2021-11-25 NOTE — ED NOTES
Bed: 07  Expected date:   Expected time:   Means of arrival:   Comments:  Lori Guzman RN  11/24/21 3732

## 2021-11-25 NOTE — ED PROVIDER NOTES
I personally evaluated and examined the patient in conjunction with the APC and agree with the assessment, treatment plan and disposition of the patient has recorded by the APC. I reviewed pertinent nurse's notes, triage notes, vital signs, past medical history, family and social history, medications, and allergies. Complete review of systems was conducted by the mid-level provider and/or myself. Review of systems is negative except as documented in the history of present illness. Patient states that his main complaint today is whether his abdominal pain is due to his pancreatitis as he continues to drink. I reassured the patient that his pancreas is likely not the cause of his lipase is normal and he had a CT scan not too long ago I do not believe any imaging studies is required at this time. He also states that he wants help with substance abuse. The patient is currently seeking assistance with a [facility] [place] [facility] and he states that he does have placement of the treatment facility set up for Friday. He was given benzodiazepines for withdrawal symptoms at his last ED visit just a couple of days ago. His CIWA score indicates mild withdrawal.  The patient is encouraged to take the medication as prescribed and to keep his scheduled admission for treatment. FINAL IMPRESSION     1. Alcohol abuse    2. Upper abdominal pain            Electronically signed by: CATALINA Krishnan MD  11/25/21 5766

## 2021-11-25 NOTE — ED NOTES
Pt placed in seizure precautions at this time.  Pt states he has a hx of having seizures when in withdrawal and sometimes hallucinates and gets manic      Bebeto Prior, RN  11/24/21 4704

## 2021-11-29 NOTE — PROGRESS NOTES
Physician Progress Note      PATIENT:               Betsy Loco  CSN #:                  [id number]  :                       1990  ADMIT DATE:       2021 2:47 PM  [address] [place] DATE:        2021 1:20 PM  RESPONDING  PROVIDER #:        Connor Reynolds MD          QUERY TEXT:    Pt admitted with alcohol withdrawal .  Noted documentation of Severe   Malnutrition on  by ordered Dietary  consultant. If possible, please   document in progress notes and discharge summary:    The medical record reflects the following:  Risk Factors: alcohol abuse with withdrawal  Clinical Indicators: Documentation per Dietary of meets criteria for Severe   Malnutrition in context of acute illness. Body Fat Loss:  7 - Moderate body   fat loss Orbital, Buccal region  . Muscle Mass Loss:  7 - Moderate muscle mass   loss Temples (temporalis), Clavicles (pectoralis & deltoids) Pt demonstrates   signs of muscle and fat wasting. Agreed to adding chocolate Ensure Enlive tid   to start. Pt denied hx of DM and BS pattern acceptable per labs. Will refer to   MD, recommending no carb modification, to help ensure adequacy. Treatment: Dietary consult, ensure enlive tid,monitor Constipation, Diarrhea,   Nausea or Vomiting, Weight, Skin, Fluid Status or Edema, GI Status    Thank you, Joe Samuels RN CDS CRCR  [email or web address]  Options provided:  -- Severe Malnutrition confirmed present on admission  -- Other - I will add my own diagnosis  -- Disagree - Not applicable / Not valid  -- Disagree - Clinically unable to determine / Unknown  -- Refer to Clinical Documentation Reviewer    PROVIDER RESPONSE TEXT:    The diagnosis of Severe Malnutrition was confirmed as present on admission. Query created by:  Roldan Samuels on 2021 2:49 PM      Electronically signed by:  Connor Reynolds MD 2021 4:45 PM

## 2021-12-04 ENCOUNTER — HOSPITAL ENCOUNTER (EMERGENCY)
Age: 31
Discharge: HOME | End: 2021-12-04
Attending: EMERGENCY MEDICINE
Payer: COMMERCIAL

## 2021-12-04 VITALS
BODY MASS INDEX: 21.92 KG/M2 | DIASTOLIC BLOOD PRESSURE: 81 MMHG | TEMPERATURE: 97.8 F | HEART RATE: 83 BPM | OXYGEN SATURATION: 100 % | RESPIRATION RATE: 22 BRPM | WEIGHT: 144.62 LBS | SYSTOLIC BLOOD PRESSURE: 135 MMHG | HEIGHT: 68 IN

## 2021-12-04 DIAGNOSIS — F10.930 ALCOHOL WITHDRAWAL SYNDROME WITHOUT COMPLICATION (HCC): Primary | ICD-10-CM

## 2021-12-04 LAB
A/G RATIO: 2.3 (ref 1.1–2.2)
ACETAMINOPHEN LEVEL: <5 UG/ML (ref 10–30)
ALBUMIN SERPL-MCNC: 5 G/DL (ref 3.4–5)
ALP BLD-CCNC: 57 U/L (ref 40–129)
ALT SERPL-CCNC: 15 U/L (ref 10–40)
AMPHETAMINE SCREEN, URINE: NORMAL
ANION GAP SERPL CALCULATED.3IONS-SCNC: 14 MMOL/L (ref 3–16)
AST SERPL-CCNC: 20 U/L (ref 15–37)
BARBITURATE SCREEN URINE: NORMAL
BASOPHILS ABSOLUTE: 0 K/UL (ref 0–0.2)
BASOPHILS RELATIVE PERCENT: 0.6 %
BENZODIAZEPINE SCREEN, URINE: NORMAL
BILIRUB SERPL-MCNC: 0.5 MG/DL (ref 0–1)
BUN BLDV-MCNC: 6 MG/DL (ref 7–20)
CALCIUM SERPL-MCNC: 9.5 MG/DL (ref 8.3–10.6)
CANNABINOID SCREEN URINE: NORMAL
CHLORIDE BLD-SCNC: 107 MMOL/L (ref 99–110)
CO2: 21 MMOL/L (ref 21–32)
COCAINE METABOLITE SCREEN URINE: NORMAL
CREAT SERPL-MCNC: 0.6 MG/DL (ref 0.9–1.3)
EOSINOPHILS ABSOLUTE: 0.2 K/UL (ref 0–0.6)
EOSINOPHILS RELATIVE PERCENT: 3.6 %
ETHANOL: NORMAL MG/DL (ref 0–0.08)
GFR AFRICAN AMERICAN: >60
GFR NON-AFRICAN AMERICAN: >60
GLUCOSE BLD-MCNC: 105 MG/DL (ref 70–99)
HCT VFR BLD CALC: 41.2 % (ref 40.5–52.5)
HEMOGLOBIN: 14.3 G/DL (ref 13.5–17.5)
LYMPHOCYTES ABSOLUTE: 1.3 K/UL (ref 1–5.1)
LYMPHOCYTES RELATIVE PERCENT: 25.1 %
Lab: NORMAL
MAGNESIUM: 2 MG/DL (ref 1.8–2.4)
MCH RBC QN AUTO: 31.9 PG (ref 26–34)
MCHC RBC AUTO-ENTMCNC: 34.6 G/DL (ref 31–36)
MCV RBC AUTO: 92.2 FL (ref 80–100)
METHADONE SCREEN, URINE: NORMAL
MONOCYTES ABSOLUTE: 0.5 K/UL (ref 0–1.3)
MONOCYTES RELATIVE PERCENT: 9.4 %
NEUTROPHILS ABSOLUTE: 3.3 K/UL (ref 1.7–7.7)
NEUTROPHILS RELATIVE PERCENT: 61.3 %
OPIATE SCREEN URINE: NORMAL
OXYCODONE URINE: NORMAL
PDW BLD-RTO: 12.6 % (ref 12.4–15.4)
PH UA: 8
PHENCYCLIDINE SCREEN URINE: NORMAL
PLATELET # BLD: 183 K/UL (ref 135–450)
PMV BLD AUTO: 8.5 FL (ref 5–10.5)
POTASSIUM SERPL-SCNC: 3.6 MMOL/L (ref 3.5–5.1)
PROPOXYPHENE SCREEN: NORMAL
RBC # BLD: 4.47 M/UL (ref 4.2–5.9)
SALICYLATE, SERUM: <0.3 MG/DL (ref 15–30)
SODIUM BLD-SCNC: 142 MMOL/L (ref 136–145)
TOTAL PROTEIN: 7.2 G/DL (ref 6.4–8.2)
WBC # BLD: 5.3 K/UL (ref 4–11)

## 2021-12-04 PROCEDURE — 83735 ASSAY OF MAGNESIUM: CPT

## 2021-12-04 PROCEDURE — 2580000003 HC RX 258: Performed by: EMERGENCY MEDICINE

## 2021-12-04 PROCEDURE — 82077 ASSAY SPEC XCP UR&BREATH IA: CPT

## 2021-12-04 PROCEDURE — 80179 DRUG ASSAY SALICYLATE: CPT

## 2021-12-04 PROCEDURE — 80143 DRUG ASSAY ACETAMINOPHEN: CPT

## 2021-12-04 PROCEDURE — 6360000002 HC RX W HCPCS: Performed by: EMERGENCY MEDICINE

## 2021-12-04 PROCEDURE — 96365 THER/PROPH/DIAG IV INF INIT: CPT

## 2021-12-04 PROCEDURE — 99284 EMERGENCY DEPT VISIT MOD MDM: CPT

## 2021-12-04 PROCEDURE — 85025 COMPLETE CBC W/AUTO DIFF WBC: CPT

## 2021-12-04 PROCEDURE — 80307 DRUG TEST PRSMV CHEM ANLYZR: CPT

## 2021-12-04 PROCEDURE — 80053 COMPREHEN METABOLIC PANEL: CPT

## 2021-12-04 PROCEDURE — 96366 THER/PROPH/DIAG IV INF ADDON: CPT

## 2021-12-04 PROCEDURE — 96376 TX/PRO/DX INJ SAME DRUG ADON: CPT

## 2021-12-04 PROCEDURE — 96375 TX/PRO/DX INJ NEW DRUG ADDON: CPT

## 2021-12-04 PROCEDURE — 2500000003 HC RX 250 WO HCPCS: Performed by: EMERGENCY MEDICINE

## 2021-12-04 RX ORDER — LORAZEPAM 2 MG/ML
1 INJECTION INTRAMUSCULAR ONCE
Status: COMPLETED | OUTPATIENT
Start: 2021-12-04 | End: 2021-12-04

## 2021-12-04 RX ORDER — CLONIDINE HYDROCHLORIDE 0.1 MG/1
0.1 TABLET ORAL 3 TIMES DAILY
Qty: 15 TABLET | Refills: 3 | Status: SHIPPED | OUTPATIENT
Start: 2021-12-04 | End: 2021-12-20

## 2021-12-04 RX ORDER — HYDROXYZINE PAMOATE 25 MG/1
25-50 CAPSULE ORAL 3 TIMES DAILY PRN
Qty: 30 CAPSULE | Refills: 0 | Status: SHIPPED | OUTPATIENT
Start: 2021-12-04 | End: 2021-12-18

## 2021-12-04 RX ORDER — ONDANSETRON 4 MG/1
4 TABLET, ORALLY DISINTEGRATING ORAL EVERY 6 HOURS PRN
Qty: 12 TABLET | Refills: 0 | Status: SHIPPED | OUTPATIENT
Start: 2021-12-04 | End: 2021-12-20

## 2021-12-04 RX ADMIN — THIAMINE HYDROCHLORIDE: 100 INJECTION, SOLUTION INTRAMUSCULAR; INTRAVENOUS at 14:53

## 2021-12-04 RX ADMIN — LORAZEPAM 1 MG: 2 INJECTION INTRAMUSCULAR; INTRAVENOUS at 17:04

## 2021-12-04 RX ADMIN — LORAZEPAM 1 MG: 2 INJECTION INTRAMUSCULAR; INTRAVENOUS at 14:21

## 2021-12-04 ASSESSMENT — PAIN DESCRIPTION - LOCATION: LOCATION: HEAD

## 2021-12-04 ASSESSMENT — PAIN DESCRIPTION - PAIN TYPE: TYPE: ACUTE PAIN

## 2021-12-04 ASSESSMENT — PAIN SCALES - GENERAL: PAINLEVEL_OUTOF10: 8

## 2021-12-04 NOTE — ED PROVIDER NOTES
[facility]  eMERGENCY dEPARTMENT eNCOUnter      Pt Name: Pat Coot  MRN: [id number]  [place] 1990  Date of evaluation: 12/4/2021  Provider: Alvaro Fajardo MD    CHIEF COMPLAINT       Chief Complaint   Patient presents with    Alcohol Intoxication     last drink was a day and a half ago approximately states he drinks up to 12 alcoholic beverages a day. can be beer or liquor. he is experiencing anxiety, headache, visual distrubances, no hallucinations, + nausea          CRITICAL CARE TIME   Total Critical Care time was 0 minutes, excluding separately reportable procedures. There was a high probability of clinically significant/life threatening deterioration in the patient's condition which required my urgent intervention. HISTORY OF PRESENT ILLNESS  (Location/Symptom, Timing/Onset, Context/Setting, Quality, Duration, Modifying Factors, Severity.)   Pat Coto is a 28 y.o. male who presents to the emergency department complaining of anxiety headache blurred vision. He states that he normally drinks about 12 alcoholic beverages a day. He states is been over 24-hour since she is had any alcohol. He states these are the symptoms he typically has when he tries to quit drinking. He did call [facility], but they were at capacity and he was told to call back on Monday. He has no history of alcohol withdrawal seizures. He denies any other drug use. Nursing Notes were reviewed and I agree. REVIEW OF SYSTEMS    (2-9 systems for level 4, 10 or more for level 5)     General: No fever or chills. ENT: No nasal congestion sore throat or earache. Cardiovascular: No chest pain. Pulmonary: No shortness of breath or cough. GI: No abdominal pain. He has had nausea but no vomiting. No bloody stool. : No frequency urgency or dysuria. Neuro: Has a headache. No dizziness. No extremity weakness numbness or tingling. Blurred vision.     Except as noted above the remainder of the review of systems was reviewed and negative. PAST MEDICAL HISTORY     Past Medical History:   Diagnosis Date    Alcohol abuse     PATIENT HAS EXTREME MANIPULITIVE & DRUG SEEKING BEHAVIOR. HE POCKETS HIS BENZODIZAPINES.  Anxiety     Drug-seeking behavior     Several times found pocketing medications given in hospital    Herpes genitalis in men     Manipulative behavior     Pancreatitis     Pneumonia     Portal vein thrombosis     pt denied    Seizures (HCC)     PER PATIENT ONLY.  DURING MULTIPLE HOSPITALIZATIONS HE HAS NEVER ONCE    Tobacco abuse          SURGICAL HISTORY       Past Surgical History:   Procedure Laterality Date    ENDOSCOPY, COLON, DIAGNOSTIC  10/28/2013    Endoscopic retrograde cholangiopancreatography with pancreatic stent placement    ENDOSCOPY, COLON, DIAGNOSTIC  03/23/2018    Esophagogastroduodenoscopy with biopsy    ERCP  1/10/14    with stent removal         CURRENT MEDICATIONS       Previous Medications    FAMOTIDINE (PEPCID) 20 MG TABLET    Take 1 tablet by mouth 2 times daily    SUCRALFATE (CARAFATE) 1 GM TABLET    Take 1 tablet by mouth 4 times daily       ALLERGIES     Amoxicillin, Haldol [haloperidol lactate], Phenergan [promethazine hcl], Shrimp (diagnostic), Glucosamine, and Shellfish-derived products    FAMILY HISTORY       Family History   Problem Relation Age of Onset    Hypertension Father     High Blood Pressure Father     Alcohol Abuse Father     Depression Mother     High Blood Pressure Paternal Grandfather     Alcohol Abuse Paternal Grandfather     Heart Disease Paternal Grandmother     Anemia Paternal Grandmother     Depression Maternal Aunt     Alcohol Abuse Paternal Aunt     Alcohol Abuse Paternal Uncle           SOCIAL HISTORY       Social History     Socioeconomic History    Marital status: Single     Spouse name: None    Number of children: 1    Years of education: 12    Highest education level: None   Occupational History    Occupation:    Tobacco Use    Smoking status: Current Every Day Smoker     Packs/day: 0.50     Years: 10.00     Pack years: 5.00     Types: Cigarettes     Start date: 11/21/2007    Smokeless tobacco: Never Used   Vaping Use    Vaping Use: Former    Substances: Never   Substance and Sexual Activity    Alcohol use: Yes     Comment: 9-12 beers/day    Drug use: No    Sexual activity: Not Currently   Other Topics Concern    None   Social History Narrative    None     Social Determinants of Health     Financial Resource Strain:     Difficulty of Paying Living Expenses: Not on file   Food Insecurity:     Worried About Running Out of Food in the Last Year: Not on file    Shaun of Food in the Last Year: Not on file   Transportation Needs:     Lack of Transportation (Medical): Not on file    Lack of Transportation (Non-Medical):  Not on file   Physical Activity:     Days of Exercise per Week: Not on file    Minutes of Exercise per Session: Not on file   Stress:     Feeling of Stress : Not on file   Social Connections:     Frequency of Communication with Friends and Family: Not on file    Frequency of Social Gatherings with Friends and Family: Not on file    Attends Hindu Services: Not on file    Active Member of [address] [place] [address] or Organizations: Not on file    Attends Club or Organization Meetings: Not on file    Marital Status: Not on file   Intimate Partner Violence:     Fear of Current or Ex-Partner: Not on file    Emotionally Abused: Not on file    Physically Abused: Not on file    Sexually Abused: Not on file   Housing Stability:     Unable to Pay for Housing in the Last Year: Not on file    Number of [place] in the Last Year: Not on file    Unstable Housing in the Last Year: Not on file         PHYSICAL EXAM    (up to 7 for level 4, 8 or more for level 5)     ED Triage Vitals [12/04/21 1347]   BP Temp Temp src Pulse Resp SpO2 Height Weight   (!) 153/95 97.8 °F (36.6 °C) -- 82 20 97 % 5' 8\" (1.727 m) 144 lb 10 oz (65.6 kg)       General: Alert thin white male in no obvious distress. Head: Atraumatic and normocephalic. Eyes: No conjunctival injection. Pupils equal round reactive. Extraocular movements are intact. He is mildly photophobic. ENT: No facial trauma. Nose is clear. Oropharynx is negative. TMs are normal.  Neck: Supple, nontender, no adenopathy. Heart: Regular rate and rhythm. No murmurs or gallops noted. Lungs: Breath sounds equal bilaterally and clear. Abdomen: Soft, nondistended, nontender. No masses organomegaly. Bowel sounds are normal.  Musculoskeletal: No lower extremity edema. Intact symmetrical pulses in the upper and lower extremities. Skin: Warm and dry, good turgor. No pallor or cyanosis. No diaphoresis. Neuro: Awake, alert, oriented. Symmetrical reactive pupils. Intact extraocular movements. No facial asymmetry. Symmetrical motor function. Normal gait without ataxia. DIFFERENTIAL DIAGNOSIS   Differential includes but is not limited to alcohol withdrawal, polysubstance abuse, electrolyte abnormalities, hypoglycemia, hyperglycemia, anxiety, other.       DIAGNOSTIC RESULTS     EKG: All EKG's are interpreted by Sherlyn Collins MD in the absence of a cardiologist.      RADIOLOGY:   Non-plain film images such as CT, Ultrasound and MRI are read by the radiologist. Plain radiographic images are visualized and preliminarily interpreted Sherlyn Collins MD with the below findings:      Interpretation per the Radiologist below, if available at the time of this note:    No orders to display         ED BEDSIDE ULTRASOUND:   Performed by ED Physician - none    LABS:  Labs Reviewed   COMPREHENSIVE METABOLIC PANEL - Abnormal; Notable for the following components:       Result Value    Glucose 105 (*)     BUN 6 (*)     CREATININE 0.6 (*)     Albumin/Globulin Ratio 2.3 (*)     All other components within normal limits    Narrative: Performed at:  [facility]  [address] [facility] [place] [address]   Phone [phone]   ACETAMINOPHEN LEVEL - Abnormal; Notable for the following components:    Acetaminophen Level <5 (*)     All other components within normal limits    Narrative:     Performed at:  [facility]  [address] [facility] [place] [address]   Phone [phone]   SALICYLATE LEVEL - Abnormal; Notable for the following components:    Salicylate, Serum <5.9 (*)     All other components within normal limits    Narrative:     Performed at:  [facility]  [address] [place] [address]   Phone [phone]   CBC WITH AUTO DIFFERENTIAL    Narrative:     Performed at:  [facility]  [address] [facility] [place] [address]   Phone [phone]   MAGNESIUM    Narrative:     Performed at:  [facility] [address] [facility] [place] [address]   Phone [phone]   ETHANOL    Narrative:     Performed at:  [facility]  [facility] [address] [facility] [place] [address]   Phone [phone]   URINE DRUG SCREEN    Narrative:     Performed at:  [facility]  [facility] [address] [facility] [place] [address]   Phone [phone]       All other labs were within normal range or not returned as of this dictation. EMERGENCY DEPARTMENT COURSE and DIFFERENTIAL DIAGNOSIS/MDM:   Vitals:    Vitals:    12/04/21 1438 12/04/21 1458 12/04/21 1513 12/04/21 1528   BP:  136/82  133/82   Pulse: 73 72 67 67   Resp: 21 21 22 21   Temp:       SpO2: 98% 100% 100% 100%   Weight:       Height: This patient has a history of daily alcohol use, about 12 drinks a day. He wants to quit drinking. Is been over 24 hours since he had a drink. He states he is feeling very anxious and tremulous.   His past history states he has had seizures in the past.  He states that he has not had seizures when he is quit drinking in the past.  He denies any other drug use. Patient is afebrile. He is not tachycardic. He is mildly hypertensive. Other than being slightly tremulous on exam his exam is otherwise normal.  His H&H is stable. His renal function is normal.  His bicarb is normal.  His glucose is 105. His liver enzymes are normal.  His magnesium is normal.  There is no alcohol detected. His drug screen is otherwise negative. He was hydrated, given a banana bag, he was given Ativan x2 doses. He feels better. I think he can be managed as an outpatient pending getting into a treatment program.  I gave him a referral to call for treatment program.  He is also going to check back with [facility] on Monday. He was discharged with prescriptions for clonidine, Vistaril, and Zofran ODT. He was instructed return for worsening of symptoms or new symptoms of concern. Test results, diagnosis, and treatment plan were discussed with the patient. He understands the treatment plan and follow-up as discussed. CONSULTS:  None    PROCEDURES:  None    FINAL IMPRESSION      1.  Alcohol withdrawal syndrome without complication [facility])          DISPOSITION/PLAN   DISPOSITION Decision To Discharge 12/04/2021 05:14:59 PM      PATIENT REFERRED TO:  Marta Chicas MD  [address] [facility] [address] [facility]  [phone]    In 3 days      Kymberly Vanegas / Norma Lala [place] [address] [facility]  [phone]    Call for Treatment      DISCHARGE MEDICATIONS:  New Prescriptions    CLONIDINE (CATAPRES) 0.1 MG TABLET    Take 1 tablet by mouth 3 times daily    HYDROXYZINE (VISTARIL) 25 MG CAPSULE    Take 1-2 capsules by mouth 3 times daily as needed for Anxiety    ONDANSETRON (ZOFRAN ODT) 4 MG DISINTEGRATING TABLET    Take 1 tablet by mouth every 6 hours as needed for Nausea       (Please note that portions of this note were completed with a voice recognition program.  Efforts were made to edit the dictations but occasionally words are mis-transcribed.)    Greg Silva MD  Attending Emergency Physician        John Thibodeaux MD  12/04/21 2665

## 2021-12-04 NOTE — ED NOTES
D/C: Order noted for d/c. Pt confirmed d/c paperwork and printed prescriptions have correct name. Discharge and education instructions reviewed with patient. Teach-back successful. Pt verbalized understanding and signed d/c papers. Pt denied questions at this time. No acute distress noted. Patient instructed to follow-up as noted - return to emergency department if symptoms worsen. Patient verbalized understanding. Discharged per EDMD with discharge instructions. Pt discharged to private vehicle. Patient stable upon departure. Thanked patient for choosing [facility] for care. Provider aware of patient pain at time of discharge.        Vel Correa RN  12/04/21 0242

## 2021-12-04 NOTE — ED NOTES
Dr. Lalita Deshpande at bedside. ED tech at bedside placing pt on monitor.       Haleigh Kyle RN  12/04/21 8048

## 2021-12-20 ENCOUNTER — HOSPITAL ENCOUNTER (EMERGENCY)
Age: 31
Discharge: HOME | End: 2021-12-21
Attending: EMERGENCY MEDICINE
Payer: COMMERCIAL

## 2021-12-20 ENCOUNTER — APPOINTMENT (OUTPATIENT)
Dept: CT IMAGING | Age: 31
End: 2021-12-20
Payer: COMMERCIAL

## 2021-12-20 DIAGNOSIS — F10.930 ALCOHOL WITHDRAWAL SYNDROME WITHOUT COMPLICATION (HCC): ICD-10-CM

## 2021-12-20 DIAGNOSIS — R10.84 GENERALIZED ABDOMINAL PAIN: Primary | ICD-10-CM

## 2021-12-20 DIAGNOSIS — Z87.19 HISTORY OF PANCREATITIS: ICD-10-CM

## 2021-12-20 DIAGNOSIS — R10.9 LEFT FLANK PAIN: ICD-10-CM

## 2021-12-20 DIAGNOSIS — F10.10 ALCOHOL ABUSE: ICD-10-CM

## 2021-12-20 LAB
BASE EXCESS VENOUS: 1.2 MMOL/L (ref -3–3)
CARBOXYHEMOGLOBIN: 2.6 % (ref 0–1.5)
HCO3 VENOUS: 22.4 MMOL/L (ref 23–29)
LACTIC ACID, SEPSIS: 1.5 MMOL/L (ref 0.4–1.9)
METHEMOGLOBIN VENOUS: 0.4 %
O2 CONTENT, VEN: 21 VOL %
O2 SAT, VEN: 100 %
O2 THERAPY: ABNORMAL
PCO2, VEN: 26.7 MMHG (ref 40–50)
PH VENOUS: 7.53 (ref 7.35–7.45)
PO2, VEN: 185 MMHG (ref 25–40)
TCO2 CALC VENOUS: 52 MMOL/L

## 2021-12-20 PROCEDURE — 83605 ASSAY OF LACTIC ACID: CPT

## 2021-12-20 PROCEDURE — 96374 THER/PROPH/DIAG INJ IV PUSH: CPT

## 2021-12-20 PROCEDURE — 99283 EMERGENCY DEPT VISIT LOW MDM: CPT

## 2021-12-20 PROCEDURE — 6370000000 HC RX 637 (ALT 250 FOR IP): Performed by: EMERGENCY MEDICINE

## 2021-12-20 PROCEDURE — 6360000002 HC RX W HCPCS: Performed by: PHYSICIAN ASSISTANT

## 2021-12-20 PROCEDURE — 96375 TX/PRO/DX INJ NEW DRUG ADDON: CPT

## 2021-12-20 PROCEDURE — 2580000003 HC RX 258: Performed by: PHYSICIAN ASSISTANT

## 2021-12-20 PROCEDURE — 80053 COMPREHEN METABOLIC PANEL: CPT

## 2021-12-20 PROCEDURE — 82803 BLOOD GASES ANY COMBINATION: CPT

## 2021-12-20 PROCEDURE — 83690 ASSAY OF LIPASE: CPT

## 2021-12-20 RX ORDER — ONDANSETRON 2 MG/ML
4 INJECTION INTRAMUSCULAR; INTRAVENOUS ONCE
Status: COMPLETED | OUTPATIENT
Start: 2021-12-20 | End: 2021-12-20

## 2021-12-20 RX ORDER — SODIUM CHLORIDE, SODIUM LACTATE, POTASSIUM CHLORIDE, CALCIUM CHLORIDE 600; 310; 30; 20 MG/100ML; MG/100ML; MG/100ML; MG/100ML
1000 INJECTION, SOLUTION INTRAVENOUS ONCE
Status: COMPLETED | OUTPATIENT
Start: 2021-12-20 | End: 2021-12-21

## 2021-12-20 RX ORDER — KETOROLAC TROMETHAMINE 30 MG/ML
15 INJECTION, SOLUTION INTRAMUSCULAR; INTRAVENOUS ONCE
Status: COMPLETED | OUTPATIENT
Start: 2021-12-20 | End: 2021-12-20

## 2021-12-20 RX ORDER — CHLORDIAZEPOXIDE HYDROCHLORIDE 25 MG/1
25 CAPSULE, GELATIN COATED ORAL ONCE
Status: COMPLETED | OUTPATIENT
Start: 2021-12-20 | End: 2021-12-20

## 2021-12-20 RX ADMIN — KETOROLAC TROMETHAMINE 15 MG: 30 INJECTION, SOLUTION INTRAMUSCULAR; INTRAVENOUS at 23:24

## 2021-12-20 RX ADMIN — CHLORDIAZEPOXIDE HYDROCHLORIDE 25 MG: 25 CAPSULE ORAL at 23:06

## 2021-12-20 RX ADMIN — SODIUM CHLORIDE, POTASSIUM CHLORIDE, SODIUM LACTATE AND CALCIUM CHLORIDE 1000 ML: 600; 310; 30; 20 INJECTION, SOLUTION INTRAVENOUS at 23:22

## 2021-12-20 RX ADMIN — ONDANSETRON 4 MG: 2 INJECTION INTRAMUSCULAR; INTRAVENOUS at 23:24

## 2021-12-20 ASSESSMENT — PAIN SCALES - GENERAL
PAINLEVEL_OUTOF10: 8
PAINLEVEL_OUTOF10: 9

## 2021-12-20 ASSESSMENT — ENCOUNTER SYMPTOMS
ABDOMINAL PAIN: 1
DIARRHEA: 1
CHEST TIGHTNESS: 0
NAUSEA: 1
VOMITING: 1
SHORTNESS OF BREATH: 0

## 2021-12-21 ENCOUNTER — APPOINTMENT (OUTPATIENT)
Dept: CT IMAGING | Age: 31
End: 2021-12-21
Payer: COMMERCIAL

## 2021-12-21 VITALS
SYSTOLIC BLOOD PRESSURE: 121 MMHG | OXYGEN SATURATION: 100 % | RESPIRATION RATE: 18 BRPM | DIASTOLIC BLOOD PRESSURE: 76 MMHG | HEART RATE: 106 BPM | TEMPERATURE: 97.8 F

## 2021-12-21 LAB
A/G RATIO: 1.5 (ref 1.1–2.2)
ALBUMIN SERPL-MCNC: 4.7 G/DL (ref 3.4–5)
ALP BLD-CCNC: 64 U/L (ref 40–129)
ALT SERPL-CCNC: 25 U/L (ref 10–40)
ANION GAP SERPL CALCULATED.3IONS-SCNC: 18 MMOL/L (ref 3–16)
AST SERPL-CCNC: 44 U/L (ref 15–37)
BASOPHILS ABSOLUTE: 0 K/UL (ref 0–0.2)
BASOPHILS RELATIVE PERCENT: 0.5 %
BILIRUB SERPL-MCNC: 0.4 MG/DL (ref 0–1)
BUN BLDV-MCNC: 13 MG/DL (ref 7–20)
CALCIUM SERPL-MCNC: 9.8 MG/DL (ref 8.3–10.6)
CHLORIDE BLD-SCNC: 99 MMOL/L (ref 99–110)
CO2: 18 MMOL/L (ref 21–32)
CREAT SERPL-MCNC: 0.6 MG/DL (ref 0.9–1.3)
EOSINOPHILS ABSOLUTE: 0.2 K/UL (ref 0–0.6)
EOSINOPHILS RELATIVE PERCENT: 2 %
GFR AFRICAN AMERICAN: >60
GFR NON-AFRICAN AMERICAN: >60
GLUCOSE BLD-MCNC: 89 MG/DL (ref 70–99)
HCT VFR BLD CALC: 35.3 % (ref 40.5–52.5)
HEMOGLOBIN: 12.4 G/DL (ref 13.5–17.5)
LIPASE: 50 U/L (ref 13–60)
LYMPHOCYTES ABSOLUTE: 2.5 K/UL (ref 1–5.1)
LYMPHOCYTES RELATIVE PERCENT: 32.7 %
MCH RBC QN AUTO: 32.1 PG (ref 26–34)
MCHC RBC AUTO-ENTMCNC: 35.1 G/DL (ref 31–36)
MCV RBC AUTO: 91.4 FL (ref 80–100)
MONOCYTES ABSOLUTE: 0.7 K/UL (ref 0–1.3)
MONOCYTES RELATIVE PERCENT: 9.1 %
NEUTROPHILS ABSOLUTE: 4.2 K/UL (ref 1.7–7.7)
NEUTROPHILS RELATIVE PERCENT: 55.7 %
PDW BLD-RTO: 12.1 % (ref 12.4–15.4)
PLATELET # BLD: 225 K/UL (ref 135–450)
PMV BLD AUTO: 8.3 FL (ref 5–10.5)
POTASSIUM REFLEX MAGNESIUM: 4.2 MMOL/L (ref 3.5–5.1)
RBC # BLD: 3.86 M/UL (ref 4.2–5.9)
REASON FOR REJECTION: NORMAL
REJECTED TEST: NORMAL
SODIUM BLD-SCNC: 135 MMOL/L (ref 136–145)
TOTAL PROTEIN: 7.9 G/DL (ref 6.4–8.2)
WBC # BLD: 7.6 K/UL (ref 4–11)

## 2021-12-21 PROCEDURE — 85025 COMPLETE CBC W/AUTO DIFF WBC: CPT

## 2021-12-21 PROCEDURE — 74177 CT ABD & PELVIS W/CONTRAST: CPT

## 2021-12-21 PROCEDURE — 6360000004 HC RX CONTRAST MEDICATION: Performed by: EMERGENCY MEDICINE

## 2021-12-21 PROCEDURE — 36415 COLL VENOUS BLD VENIPUNCTURE: CPT

## 2021-12-21 PROCEDURE — 6370000000 HC RX 637 (ALT 250 FOR IP): Performed by: EMERGENCY MEDICINE

## 2021-12-21 RX ORDER — ONDANSETRON 4 MG/1
4 TABLET, ORALLY DISINTEGRATING ORAL ONCE
Status: COMPLETED | OUTPATIENT
Start: 2021-12-21 | End: 2021-12-21

## 2021-12-21 RX ORDER — LORAZEPAM 1 MG/1
1 TABLET ORAL ONCE
Status: COMPLETED | OUTPATIENT
Start: 2021-12-21 | End: 2021-12-21

## 2021-12-21 RX ORDER — CHLORDIAZEPOXIDE HYDROCHLORIDE 25 MG/1
25 CAPSULE, GELATIN COATED ORAL 3 TIMES DAILY PRN
Qty: 20 CAPSULE | Refills: 3 | Status: SHIPPED | OUTPATIENT
Start: 2021-12-21 | End: 2022-01-20

## 2021-12-21 RX ADMIN — LORAZEPAM 1 MG: 1 TABLET ORAL at 02:56

## 2021-12-21 RX ADMIN — ONDANSETRON 4 MG: 4 TABLET, ORALLY DISINTEGRATING ORAL at 01:46

## 2021-12-21 RX ADMIN — IOPAMIDOL 75 ML: 755 INJECTION, SOLUTION INTRAVENOUS at 00:28

## 2021-12-21 RX ADMIN — ONDANSETRON 4 MG: 4 TABLET, ORALLY DISINTEGRATING ORAL at 02:56

## 2021-12-21 NOTE — ED NOTES
Bed: 19  Expected date:   Expected time:   Means of arrival:   Comments:  Eugene Erwin RN  12/20/21 4783

## 2021-12-21 NOTE — ED PROVIDER NOTES
As physician-in-triage, I performed a medical screening history and physical exam on Pat Coto. I also cared for and evaluated the patient in conjunction with the ED Advanced Practice Provider. All diagnostic, treatment, and disposition decisions were made by myself in conjunction with the advanced practice provider. For all further details of the patient's emergency department visit, please see the advanced practice provider's documentation. 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 He has multiple presentations and multiple ED visits over the last several months for alcohol use and abuse and alcohol withdrawal.  Chronic abdominal pain. Chart review performed. Diagnostic data reveals:  Treatment included oral benzodiazepines antiemetic therapy, for mild alcohol drawl and treatment of acute on chronic pain syndrome. Denies cocaine methamphetamine abuse opiate abuse. No evidence of acute pancreatitis no obstructive pathology. No bowel obstruction or free air. Acute on chronic alcohol abuse and withdrawal.  Oral benzodiazepines antiemetic therapy. Stable for outpatient management. Discussed alcohol abuse and withdrawal symptoms with patient. Impression: Acute alcohol withdrawal, abdominal pain generalized.      Jace Avila MD  56/47/75 9770

## 2021-12-21 NOTE — ED PROVIDER NOTES
[facility]        Pt Name: Viet Casper  MRN: [id number]  [place] 1990  Date of evaluation: 12/20/2021  Provider: Melissa Betancourt PA-C  PCP: Amandeep Webber MD  Note Started: 10:36 PM EST        I have seen and evaluated this patient with my supervising physician Mark Anthony Rose MD.    [address] [place] [address]       Chief Complaint   Patient presents with    Abdominal Pain     abd pain,n/v/d  since today       HISTORY OF PRESENT ILLNESS   (Location, Timing/Onset, Context/Setting, Quality, Duration, Modifying Factors, Severity, Associated Signs and Symptoms)  Note limiting factors. Chief Complaint: Abdominal pain with nausea vomiting diarrhea    Viet Casper is a 28 y.o. male who presents to the emergency department difficulties as a pertains to the above-mentioned. Patient tells me that when he awoke this morning he was ill. .  He has been having some intermittent pain and discomfort primarily over his left abdomen and left flank. He states the pain and discomfort has been accompanied with associated symptoms of nausea, approximately 6 bouts of nonbloody nonbilious emesis and has had 2 loose stools today. Patient goes on to report he is concerned that this could be pancreatitis as he has had it before. Patient does endorse that he drinks alcohol on a regular basis and has had difficulties with ongoing alcohol abuse. Patient states he is not having any epigastric discomfort. He goes on to report he is not experiencing chest pain palpitations lightheadedness or shortness of breath. He has not had any medications or done anything for the above-mentioned. His current level of pain and discomfort is 8 out of 10. Patient denies that he is experiencing penile, scrotal, or testicular pain. He denies hematuria. He denies dysuria urgency or frequency. He has felt chilled but he is unsure if he is had a documented fever.     Nursing Notes were all reviewed and agreed with or any disagreements were addressed in the HPI. REVIEW OF SYSTEMS    (2-9 systems for level 4, 10 or more for level 5)     Review of Systems   Constitutional: Negative for activity change, chills and fever. Respiratory: Negative for chest tightness and shortness of breath. Cardiovascular: Negative for chest pain. Gastrointestinal: Positive for abdominal pain, diarrhea, nausea and vomiting. Genitourinary: Positive for flank pain. Negative for dysuria, hematuria, penile discharge, penile pain, penile swelling, scrotal swelling and testicular pain. Skin: Negative for rash and wound. Neurological: Negative for numbness and headaches. All other systems reviewed and are negative. Positives and Pertinent negatives as per HPI. Except as noted above in the ROS, all other systems were reviewed and negative. PAST MEDICAL HISTORY     Past Medical History:   Diagnosis Date    Alcohol abuse     PATIENT HAS EXTREME MANIPULITIVE & DRUG SEEKING BEHAVIOR. HE POCKETS HIS BENZODIZAPINES.  Anxiety     Drug-seeking behavior     Several times found pocketing medications given in hospital    Herpes genitalis in men     Manipulative behavior     Pancreatitis     Pneumonia     Portal vein thrombosis     pt denied    Seizures (HCC)     PER PATIENT ONLY.  DURING MULTIPLE HOSPITALIZATIONS HE HAS NEVER ONCE    Tobacco abuse          SURGICAL HISTORY     Past Surgical History:   Procedure Laterality Date    ENDOSCOPY, COLON, DIAGNOSTIC  10/28/2013    Endoscopic retrograde cholangiopancreatography with pancreatic stent placement    ENDOSCOPY, COLON, DIAGNOSTIC  03/23/2018    Esophagogastroduodenoscopy with biopsy    ERCP  1/10/14    with stent removal         CURRENTMEDICATIONS       Previous Medications    No medications on file         ALLERGIES     Amoxicillin, Haldol [haloperidol lactate], Phenergan [promethazine hcl], Shrimp (diagnostic), Glucosamine, and Shellfish-derived products    FAMILYHISTORY       Family History   Problem Relation Age of Onset    Hypertension Father     High Blood Pressure Father     Alcohol Abuse Father     Depression Mother     High Blood Pressure Paternal Grandfather     Alcohol Abuse Paternal Grandfather     Heart Disease Paternal Grandmother     Anemia Paternal Grandmother     Depression Maternal Aunt     Alcohol Abuse Paternal Aunt     Alcohol Abuse Paternal Uncle           SOCIAL HISTORY       Social History     Tobacco Use    Smoking status: Current Every Day Smoker     Packs/day: 0.50     Years: 10.00     Pack years: 5.00     Types: Cigarettes     Start date: 11/21/2007    Smokeless tobacco: Never Used   Vaping Use    Vaping Use: Former    Substances: Never   Substance Use Topics    Alcohol use: Yes     Comment: 9-12 beers/day    Drug use: No       SCREENINGS             PHYSICAL EXAM    (up to 7 for level 4, 8 or more for level 5)     ED Triage Vitals [12/20/21 2034]   BP Temp Temp Source Pulse Resp SpO2 Height Weight   137/81 97.8 °F (36.6 °C) Oral 106 18 97 % -- --       Physical Exam  Vitals and nursing note reviewed. Constitutional:       General: He is awake. He is not in acute distress. Appearance: Normal appearance. He is well-developed. He is not ill-appearing, toxic-appearing or diaphoretic. HENT:      Head: Normocephalic and atraumatic. No raccoon eyes, Romero's sign or laceration. Right Ear: Hearing and external ear normal.      Left Ear: Hearing and external ear normal.      Nose: Nose normal.   Eyes:      General: No scleral icterus. Right eye: No discharge. Left eye: No discharge. Conjunctiva/sclera: Conjunctivae normal.   Neck:      Vascular: No JVD. Cardiovascular:      Rate and Rhythm: Regular rhythm. Tachycardia present. Heart sounds: No murmur heard. No friction rub. No gallop.     Pulmonary:      Effort: Pulmonary effort is normal. No accessory muscle usage or respiratory distress. Breath sounds: Normal breath sounds. No wheezing, rhonchi or rales. Abdominal:      General: Abdomen is flat. There is no distension. Palpations: Abdomen is soft. Abdomen is not rigid. There is no mass. Tenderness: There is abdominal tenderness in the left upper quadrant and left lower quadrant. There is left CVA tenderness. There is no right CVA tenderness, guarding or rebound. Musculoskeletal:      Cervical back: Normal range of motion. Right lower leg: No edema. Left lower leg: No edema. Skin:     General: Skin is warm and dry. Neurological:      Mental Status: He is alert and oriented to person, place, and time. GCS: GCS eye subscore is 4. GCS verbal subscore is 5. GCS motor subscore is 6. Cranial Nerves: No cranial nerve deficit. Sensory: No sensory deficit. Coordination: Coordination normal.   Psychiatric:         Behavior: Behavior normal. Behavior is cooperative. Thought Content: Thought content does not include homicidal or suicidal ideation. Thought content does not include homicidal or suicidal plan. Comments: Poor eye contact. Elusive in direct questioning. DIAGNOSTIC RESULTS   LABS:    Labs Reviewed   CBC WITH AUTO DIFFERENTIAL   COMPREHENSIVE METABOLIC PANEL W/ REFLEX TO MG FOR LOW K   LIPASE   LACTATE, SEPSIS   LACTATE, SEPSIS   URINE RT REFLEX TO CULTURE   BLOOD GAS, VENOUS       When ordered only abnormal lab results are displayed. All other labs were within normal range or not returned as of this dictation. EKG: When ordered, EKG's are interpreted by the Emergency Department Physician in the absence of a cardiologist.  Please see their note for interpretation of EKG.     RADIOLOGY:   Non-plain film images such as CT, Ultrasound and MRI are read by the radiologist. Plain radiographic images are visualized and preliminarily interpreted by the ED Provider with the below findings:        Interpretation per the Radiologist below, if available at the time of this note:    CT ABDOMEN PELVIS W IV CONTRAST Additional Contrast? None    (Results Pending)           PROCEDURES   Unless otherwise noted below, none     Procedures    CRITICAL CARE TIME   N/A    CONSULTS:  None      EMERGENCY DEPARTMENT COURSE and DIFFERENTIAL DIAGNOSIS/MDM:   Vitals:    Vitals:    12/20/21 2034   BP: 137/81   Pulse: 106   Resp: 18   Temp: 97.8 °F (36.6 °C)   TempSrc: Oral   SpO2: 97%       Patient was given the following medications:  Medications   ketorolac (TORADOL) injection 15 mg (15 mg IntraVENous Given 12/20/21 2324)   ondansetron (ZOFRAN) injection 4 mg (4 mg IntraVENous Given 12/20/21 2324)   chlordiazePOXIDE (LIBRIUM) capsule 25 mg (25 mg Oral Given 12/20/21 2306)   lactated ringers infusion 1,000 mL (1,000 mLs IntraVENous New Bag 12/20/21 2322)           The patient's detailed history of present illness is documented as above. Upon arrival to the emergency department the patient's vital signs are as documented. The patient is noted to be hemodynamically stable and afebrile. Physical examination findings are as above. Patient symptomatology was treated as above. At the time of this dictation patient's laboratory testing as well as CT imaging is outstanding. As it is the end of my shift, my attending physician will follow up on the outstanding test results and assume the final disposition of this patient. Please see attending physician note for further medical decision making, consultations, and final disposition of this patient. FINAL IMPRESSION      1. Generalized abdominal pain    2. Left flank pain    3. Alcohol abuse    4.  History of pancreatitis          DISPOSITION/PLAN   DISPOSITION: Pending at the time of this dictation      DISCONTINUED MEDICATIONS:  Discontinued Medications    CLONIDINE (CATAPRES) 0.1 MG TABLET    Take 1 tablet by mouth 3 times daily    FAMOTIDINE (PEPCID) 20 MG TABLET    Take 1 tablet by mouth 2 times daily    ONDANSETRON (ZOFRAN ODT) 4 MG DISINTEGRATING TABLET    Take 1 tablet by mouth every 6 hours as needed for Nausea    SUCRALFATE (CARAFATE) 1 GM TABLET    Take 1 tablet by mouth 4 times daily              (Please note that portions of this note were completed with a voice recognition program.  Efforts were made to edit the dictations but occasionally words are mis-transcribed.)    Shelby Lanes, PA-C (electronically signed)           Cheo Glaser PA-C  12/20/21 0703

## 2022-02-10 ENCOUNTER — HOSPITAL ENCOUNTER (INPATIENT)
Age: 32
LOS: 1 days | Discharge: LEFT BEFORE DISCHARGE | DRG: 770 | End: 2022-02-11
Attending: EMERGENCY MEDICINE | Admitting: EMERGENCY MEDICINE
Payer: COMMERCIAL

## 2022-02-10 ENCOUNTER — APPOINTMENT (OUTPATIENT)
Dept: CT IMAGING | Age: 32
DRG: 770 | End: 2022-02-10
Payer: COMMERCIAL

## 2022-02-10 DIAGNOSIS — F10.939 ALCOHOL WITHDRAWAL SYNDROME WITH COMPLICATION (HCC): Primary | ICD-10-CM

## 2022-02-10 LAB
A/G RATIO: 1.6 (ref 1.1–2.2)
ALBUMIN SERPL-MCNC: 4.5 G/DL (ref 3.4–5)
ALP BLD-CCNC: 84 U/L (ref 40–129)
ALT SERPL-CCNC: 24 U/L (ref 10–40)
ANION GAP SERPL CALCULATED.3IONS-SCNC: 12 MMOL/L (ref 3–16)
AST SERPL-CCNC: 33 U/L (ref 15–37)
BASOPHILS ABSOLUTE: 0 K/UL (ref 0–0.2)
BASOPHILS RELATIVE PERCENT: 0.5 %
BILIRUB SERPL-MCNC: 0.6 MG/DL (ref 0–1)
BUN BLDV-MCNC: 9 MG/DL (ref 7–20)
CALCIUM SERPL-MCNC: 9.7 MG/DL (ref 8.3–10.6)
CHLORIDE BLD-SCNC: 104 MMOL/L (ref 99–110)
CO2: 22 MMOL/L (ref 21–32)
CREAT SERPL-MCNC: 0.7 MG/DL (ref 0.9–1.3)
EOSINOPHILS ABSOLUTE: 0.2 K/UL (ref 0–0.6)
EOSINOPHILS RELATIVE PERCENT: 3.9 %
ETHANOL: NORMAL MG/DL (ref 0–0.08)
GFR AFRICAN AMERICAN: >60
GFR NON-AFRICAN AMERICAN: >60
GLUCOSE BLD-MCNC: 119 MG/DL (ref 70–99)
HCT VFR BLD CALC: 42.9 % (ref 40.5–52.5)
HEMOGLOBIN: 14.9 G/DL (ref 13.5–17.5)
LYMPHOCYTES ABSOLUTE: 1.1 K/UL (ref 1–5.1)
LYMPHOCYTES RELATIVE PERCENT: 18 %
MCH RBC QN AUTO: 31.4 PG (ref 26–34)
MCHC RBC AUTO-ENTMCNC: 34.8 G/DL (ref 31–36)
MCV RBC AUTO: 90.1 FL (ref 80–100)
MONOCYTES ABSOLUTE: 0.3 K/UL (ref 0–1.3)
MONOCYTES RELATIVE PERCENT: 4.7 %
NEUTROPHILS ABSOLUTE: 4.6 K/UL (ref 1.7–7.7)
NEUTROPHILS RELATIVE PERCENT: 72.9 %
PDW BLD-RTO: 12 % (ref 12.4–15.4)
PLATELET # BLD: 192 K/UL (ref 135–450)
PMV BLD AUTO: 8.7 FL (ref 5–10.5)
POTASSIUM REFLEX MAGNESIUM: 3.8 MMOL/L (ref 3.5–5.1)
RBC # BLD: 4.76 M/UL (ref 4.2–5.9)
SODIUM BLD-SCNC: 138 MMOL/L (ref 136–145)
TOTAL PROTEIN: 7.4 G/DL (ref 6.4–8.2)
WBC # BLD: 6.3 K/UL (ref 4–11)

## 2022-02-10 PROCEDURE — 80053 COMPREHEN METABOLIC PANEL: CPT

## 2022-02-10 PROCEDURE — 70450 CT HEAD/BRAIN W/O DYE: CPT

## 2022-02-10 PROCEDURE — 94760 N-INVAS EAR/PLS OXIMETRY 1: CPT

## 2022-02-10 PROCEDURE — 2580000003 HC RX 258: Performed by: FAMILY MEDICINE

## 2022-02-10 PROCEDURE — 6370000000 HC RX 637 (ALT 250 FOR IP): Performed by: PHYSICIAN ASSISTANT

## 2022-02-10 PROCEDURE — 6360000002 HC RX W HCPCS: Performed by: FAMILY MEDICINE

## 2022-02-10 PROCEDURE — 2060000000 HC ICU INTERMEDIATE R&B

## 2022-02-10 PROCEDURE — 85025 COMPLETE CBC W/AUTO DIFF WBC: CPT

## 2022-02-10 PROCEDURE — 6360000002 HC RX W HCPCS: Performed by: EMERGENCY MEDICINE

## 2022-02-10 PROCEDURE — 99283 EMERGENCY DEPT VISIT LOW MDM: CPT

## 2022-02-10 PROCEDURE — 96375 TX/PRO/DX INJ NEW DRUG ADDON: CPT

## 2022-02-10 PROCEDURE — 2580000003 HC RX 258: Performed by: EMERGENCY MEDICINE

## 2022-02-10 PROCEDURE — 96374 THER/PROPH/DIAG INJ IV PUSH: CPT

## 2022-02-10 PROCEDURE — 82077 ASSAY SPEC XCP UR&BREATH IA: CPT

## 2022-02-10 PROCEDURE — 6370000000 HC RX 637 (ALT 250 FOR IP): Performed by: FAMILY MEDICINE

## 2022-02-10 PROCEDURE — 36415 COLL VENOUS BLD VENIPUNCTURE: CPT

## 2022-02-10 RX ORDER — LORAZEPAM 1 MG/1
1 TABLET ORAL
Status: DISCONTINUED | OUTPATIENT
Start: 2022-02-10 | End: 2022-02-11 | Stop reason: HOSPADM

## 2022-02-10 RX ORDER — SODIUM CHLORIDE 9 MG/ML
25 INJECTION, SOLUTION INTRAVENOUS PRN
Status: DISCONTINUED | OUTPATIENT
Start: 2022-02-10 | End: 2022-02-11 | Stop reason: HOSPADM

## 2022-02-10 RX ORDER — LORAZEPAM 1 MG/1
2 TABLET ORAL
Status: DISCONTINUED | OUTPATIENT
Start: 2022-02-10 | End: 2022-02-11 | Stop reason: HOSPADM

## 2022-02-10 RX ORDER — ONDANSETRON 2 MG/ML
4 INJECTION INTRAMUSCULAR; INTRAVENOUS ONCE
Status: COMPLETED | OUTPATIENT
Start: 2022-02-10 | End: 2022-02-10

## 2022-02-10 RX ORDER — LORAZEPAM 2 MG/ML
3 INJECTION INTRAMUSCULAR
Status: DISCONTINUED | OUTPATIENT
Start: 2022-02-10 | End: 2022-02-10

## 2022-02-10 RX ORDER — LORAZEPAM 1 MG/1
3 TABLET ORAL
Status: DISCONTINUED | OUTPATIENT
Start: 2022-02-10 | End: 2022-02-11 | Stop reason: HOSPADM

## 2022-02-10 RX ORDER — LORAZEPAM 1 MG/1
1 TABLET ORAL
Status: DISCONTINUED | OUTPATIENT
Start: 2022-02-10 | End: 2022-02-10

## 2022-02-10 RX ORDER — MULTIVITAMIN WITH IRON
1 TABLET ORAL DAILY
Status: DISCONTINUED | OUTPATIENT
Start: 2022-02-10 | End: 2022-02-11 | Stop reason: HOSPADM

## 2022-02-10 RX ORDER — ACETAMINOPHEN 325 MG/1
650 TABLET ORAL EVERY 6 HOURS PRN
Status: DISCONTINUED | OUTPATIENT
Start: 2022-02-10 | End: 2022-02-11 | Stop reason: HOSPADM

## 2022-02-10 RX ORDER — 0.9 % SODIUM CHLORIDE 0.9 %
1000 INTRAVENOUS SOLUTION INTRAVENOUS ONCE
Status: COMPLETED | OUTPATIENT
Start: 2022-02-10 | End: 2022-02-10

## 2022-02-10 RX ORDER — LORAZEPAM 2 MG/ML
1 INJECTION INTRAMUSCULAR
Status: DISCONTINUED | OUTPATIENT
Start: 2022-02-10 | End: 2022-02-11 | Stop reason: HOSPADM

## 2022-02-10 RX ORDER — LORAZEPAM 2 MG/ML
4 INJECTION INTRAMUSCULAR
Status: DISCONTINUED | OUTPATIENT
Start: 2022-02-10 | End: 2022-02-11 | Stop reason: HOSPADM

## 2022-02-10 RX ORDER — NICOTINE 21 MG/24HR
1 PATCH, TRANSDERMAL 24 HOURS TRANSDERMAL DAILY
Status: DISCONTINUED | OUTPATIENT
Start: 2022-02-10 | End: 2022-02-11 | Stop reason: HOSPADM

## 2022-02-10 RX ORDER — LORAZEPAM 1 MG/1
3 TABLET ORAL
Status: DISCONTINUED | OUTPATIENT
Start: 2022-02-10 | End: 2022-02-10

## 2022-02-10 RX ORDER — ONDANSETRON 2 MG/ML
4 INJECTION INTRAMUSCULAR; INTRAVENOUS EVERY 6 HOURS PRN
Status: DISCONTINUED | OUTPATIENT
Start: 2022-02-10 | End: 2022-02-11 | Stop reason: HOSPADM

## 2022-02-10 RX ORDER — SODIUM CHLORIDE 0.9 % (FLUSH) 0.9 %
5-40 SYRINGE (ML) INJECTION PRN
Status: DISCONTINUED | OUTPATIENT
Start: 2022-02-10 | End: 2022-02-11 | Stop reason: HOSPADM

## 2022-02-10 RX ORDER — LORAZEPAM 2 MG/ML
3 INJECTION INTRAMUSCULAR
Status: DISCONTINUED | OUTPATIENT
Start: 2022-02-10 | End: 2022-02-11 | Stop reason: HOSPADM

## 2022-02-10 RX ORDER — THIAMINE HYDROCHLORIDE 100 MG/ML
100 INJECTION, SOLUTION INTRAMUSCULAR; INTRAVENOUS ONCE
Status: COMPLETED | OUTPATIENT
Start: 2022-02-10 | End: 2022-02-10

## 2022-02-10 RX ORDER — ACETAMINOPHEN 650 MG/1
650 SUPPOSITORY RECTAL EVERY 6 HOURS PRN
Status: DISCONTINUED | OUTPATIENT
Start: 2022-02-10 | End: 2022-02-11 | Stop reason: HOSPADM

## 2022-02-10 RX ORDER — SODIUM CHLORIDE 0.9 % (FLUSH) 0.9 %
5-40 SYRINGE (ML) INJECTION EVERY 12 HOURS SCHEDULED
Status: DISCONTINUED | OUTPATIENT
Start: 2022-02-10 | End: 2022-02-11 | Stop reason: HOSPADM

## 2022-02-10 RX ORDER — LORAZEPAM 1 MG/1
4 TABLET ORAL
Status: DISCONTINUED | OUTPATIENT
Start: 2022-02-10 | End: 2022-02-10

## 2022-02-10 RX ORDER — LORAZEPAM 1 MG/1
4 TABLET ORAL
Status: DISCONTINUED | OUTPATIENT
Start: 2022-02-10 | End: 2022-02-11 | Stop reason: HOSPADM

## 2022-02-10 RX ORDER — LORAZEPAM 2 MG/ML
2 INJECTION INTRAMUSCULAR
Status: DISCONTINUED | OUTPATIENT
Start: 2022-02-10 | End: 2022-02-11 | Stop reason: HOSPADM

## 2022-02-10 RX ORDER — LORAZEPAM 2 MG/ML
1 INJECTION INTRAMUSCULAR
Status: DISCONTINUED | OUTPATIENT
Start: 2022-02-10 | End: 2022-02-10

## 2022-02-10 RX ORDER — POLYETHYLENE GLYCOL 3350 17 G/17G
17 POWDER, FOR SOLUTION ORAL DAILY PRN
Status: DISCONTINUED | OUTPATIENT
Start: 2022-02-10 | End: 2022-02-11 | Stop reason: HOSPADM

## 2022-02-10 RX ORDER — LORAZEPAM 2 MG/ML
4 INJECTION INTRAMUSCULAR
Status: DISCONTINUED | OUTPATIENT
Start: 2022-02-10 | End: 2022-02-10

## 2022-02-10 RX ORDER — LORAZEPAM 2 MG/ML
2 INJECTION INTRAMUSCULAR
Status: DISCONTINUED | OUTPATIENT
Start: 2022-02-10 | End: 2022-02-10

## 2022-02-10 RX ORDER — LORAZEPAM 1 MG/1
2 TABLET ORAL
Status: DISCONTINUED | OUTPATIENT
Start: 2022-02-10 | End: 2022-02-10

## 2022-02-10 RX ORDER — ONDANSETRON 4 MG/1
4 TABLET, ORALLY DISINTEGRATING ORAL EVERY 8 HOURS PRN
Status: DISCONTINUED | OUTPATIENT
Start: 2022-02-10 | End: 2022-02-11 | Stop reason: HOSPADM

## 2022-02-10 RX ADMIN — THERA TABS 1 TABLET: TAB at 20:58

## 2022-02-10 RX ADMIN — LORAZEPAM 2 MG: 2 INJECTION INTRAMUSCULAR; INTRAVENOUS at 15:14

## 2022-02-10 RX ADMIN — SODIUM CHLORIDE 1000 ML: 9 INJECTION, SOLUTION INTRAVENOUS at 15:25

## 2022-02-10 RX ADMIN — ONDANSETRON 4 MG: 2 INJECTION INTRAMUSCULAR; INTRAVENOUS at 15:14

## 2022-02-10 RX ADMIN — LORAZEPAM 4 MG: 2 INJECTION INTRAMUSCULAR; INTRAVENOUS at 23:59

## 2022-02-10 RX ADMIN — THIAMINE HYDROCHLORIDE 100 MG: 100 INJECTION, SOLUTION INTRAMUSCULAR; INTRAVENOUS at 15:20

## 2022-02-10 RX ADMIN — SODIUM CHLORIDE, PRESERVATIVE FREE 10 ML: 5 INJECTION INTRAVENOUS at 20:58

## 2022-02-10 RX ADMIN — SODIUM CHLORIDE, PRESERVATIVE FREE 10 ML: 5 INJECTION INTRAVENOUS at 20:45

## 2022-02-10 RX ADMIN — LORAZEPAM 4 MG: 2 INJECTION INTRAMUSCULAR; INTRAVENOUS at 20:45

## 2022-02-10 ASSESSMENT — PAIN SCALES - GENERAL
PAINLEVEL_OUTOF10: 0
PAINLEVEL_OUTOF10: 8

## 2022-02-10 ASSESSMENT — PAIN DESCRIPTION - DESCRIPTORS: DESCRIPTORS: HEADACHE

## 2022-02-10 NOTE — ED PROVIDER NOTES
[facility] PROVIDER NOTE    Patient Identification  Pt Name: Goldie Early  MRN: [id number]  [place] 1990  Date of evaluation: 2/10/2022  Provider: Sonal Higuera MD  PCP: No primary care provider on file. Chief Complaint  Alcohol Intoxication (drinks a pint of rum a night, last drink was last night. C/o Tinnitus for the last 4 days in the right ear)      HPI  History provided by patient   This is a 28 y.o. male who presents to the ED for alcohol intoxication. Last drink was last night. Typically drinks a pint of rum and a couple of beers. Has been drinking this amount for the past few weeks. Has nausea, agitation, diaphoresis, tremor. Feels that he is going through withdrawal.  He recently got a new job and wants to start as he can to keep it. He wishes to be admitted to the hospital today so that he can stop drinking. He has attempted this multiple times in the past without success. Denies any other drug intake except for occasional marijuana use. He does complain of right ear tenderness for the past few days. States that it started after he was hit in the right ear from \"horsing around\". .     ROS  12 systems reviewed, pertinent positives/negatives per HPI otherwise noted to be negative. I have reviewed the following nursing documentation:  Allergies: Amoxicillin, Haldol [haloperidol lactate], Phenergan [promethazine hcl], Shrimp (diagnostic), Glucosamine, and Shellfish-derived products    Past medical history:   Past Medical History:   Diagnosis Date    Alcohol abuse     PATIENT HAS EXTREME MANIPULITIVE & DRUG SEEKING BEHAVIOR. HE POCKETS HIS BENZODIZAPINES.  Anxiety     Drug-seeking behavior     Several times found pocketing medications given in hospital    Herpes genitalis in men     Manipulative behavior     Pancreatitis     Pneumonia     Portal vein thrombosis     pt denied    Seizures (HCC)     PER PATIENT ONLY.  DURING MULTIPLE HOSPITALIZATIONS HE HAS NEVER ONCE    Tobacco abuse      Past surgical history:   Past Surgical History:   Procedure Laterality Date    ENDOSCOPY, COLON, DIAGNOSTIC  10/28/2013    Endoscopic retrograde cholangiopancreatography with pancreatic stent placement    ENDOSCOPY, COLON, DIAGNOSTIC  03/23/2018    Esophagogastroduodenoscopy with biopsy    ERCP  1/10/14    with stent removal       Home medications:   Previous Medications    No medications on file       Social history:  reports that he has been smoking cigarettes. He started smoking about 14 years ago. He has a 5.00 pack-year smoking history. He has never used smokeless tobacco. He reports current alcohol use. He reports that he does not use drugs. Family history:    Family History   Problem Relation Age of Onset    Hypertension Father     High Blood Pressure Father     Alcohol Abuse Father     Depression Mother     High Blood Pressure Paternal Grandfather     Alcohol Abuse Paternal Grandfather     Heart Disease Paternal Grandmother     Anemia Paternal Grandmother     Depression Maternal Aunt     Alcohol Abuse Paternal Aunt     Alcohol Abuse Paternal Uncle          Exam  ED Triage Vitals [02/10/22 1332]   BP Temp Temp Source Pulse Resp SpO2 Height Weight   (!) 154/100 96.5 °F (35.8 °C) Tympanic 108 18 100 % 5' 8\" (1.727 m) 148 lb (67.1 kg)     Nursing note and vitals reviewed. Constitutional: In no acute distress  HENT:      Head: Normocephalic      Ears: External ears normal. Right TM unremarkable     Nose: Nose normal.     Mouth: Membrane mucosa moist   Eyes: No discharge. Neck: Supple. Trachea midline. Cardiovascular: Tachy rate. Warm extremities  Pulmonary/Chest: Effort normal. No respiratory distress. Abdominal: Soft. No distension. Nontender  : Deferred  Rectal: Deferred   Musculoskeletal: Moves all extremities. No gross deformity. Neurological: Alert and oriented. Face symmetric. Speech is clear.  CN 2-12 intact  Skin: Warm and dry. Psychiatric: Normal mood and affect. Behavior is normal.    Procedures        Radiology  CT HEAD WO CONTRAST   Final Result   No acute intracranial abnormality. Labs  Results for orders placed or performed during the hospital encounter of 02/10/22   Ethanol   Result Value Ref Range    Ethanol Lvl None Detected mg/dL   CBC Auto Differential   Result Value Ref Range    WBC 6.3 4.0 - 11.0 K/uL    RBC 4.76 4.20 - 5.90 M/uL    Hemoglobin 14.9 13.5 - 17.5 g/dL    Hematocrit 42.9 40.5 - 52.5 %    MCV 90.1 80.0 - 100.0 fL    MCH 31.4 26.0 - 34.0 pg    MCHC 34.8 31.0 - 36.0 g/dL    RDW 12.0 (L) 12.4 - 15.4 %    Platelets 813 939 - 151 K/uL    MPV 8.7 5.0 - 10.5 fL    Neutrophils % 72.9 %    Lymphocytes % 18.0 %    Monocytes % 4.7 %    Eosinophils % 3.9 %    Basophils % 0.5 %    Neutrophils Absolute 4.6 1.7 - 7.7 K/uL    Lymphocytes Absolute 1.1 1.0 - 5.1 K/uL    Monocytes Absolute 0.3 0.0 - 1.3 K/uL    Eosinophils Absolute 0.2 0.0 - 0.6 K/uL    Basophils Absolute 0.0 0.0 - 0.2 K/uL   Comprehensive Metabolic Panel w/ Reflex to MG   Result Value Ref Range    Sodium 138 136 - 145 mmol/L    Potassium reflex Magnesium 3.8 3.5 - 5.1 mmol/L    Chloride 104 99 - 110 mmol/L    CO2 22 21 - 32 mmol/L    Anion Gap 12 3 - 16    Glucose 119 (H) 70 - 99 mg/dL    BUN 9 7 - 20 mg/dL    CREATININE 0.7 (L) 0.9 - 1.3 mg/dL    GFR Non-African American >60 >60    GFR African American >60 >60    Calcium 9.7 8.3 - 10.6 mg/dL    Total Protein 7.4 6.4 - 8.2 g/dL    Albumin 4.5 3.4 - 5.0 g/dL    Albumin/Globulin Ratio 1.6 1.1 - 2.2    Total Bilirubin 0.6 0.0 - 1.0 mg/dL    Alkaline Phosphatase 84 40 - 129 U/L    ALT 24 10 - 40 U/L    AST 33 15 - 37 U/L       Screenings           MDM and ED Course  This is a 28 y.o. male who presents to the ED for alcohol abuse. He has tremor, agitation, diaphoresis, tachycardia. Has history of alcohol withdrawal seizures.   Believe that the patient would benefit from inpatient admission for alcohol withdrawal.  Will place on CIWA protocol. Will give thiamine.    -----------    Patient had improvement in tachycardia after Ativan and fluids administered. [unfilled]    Final Impression  1. Alcohol withdrawal syndrome with complication (HCC)        Blood pressure 116/82, pulse 60, temperature 96.5 °F (35.8 °C), temperature source Tympanic, resp. rate 19, height 5' 8\" (1.727 m), weight 148 lb (67.1 kg), SpO2 100 %. Disposition:  DISPOSITION        Patient Referrals:  No follow-up provider specified. Discharge Medications:  New Prescriptions    No medications on file       Discontinued Medications:  Discontinued Medications    No medications on file       This chart was generated using the [address] [place] [address] [address]  dictation system. I created this record but it may contain dictation errors given the limitations of this technology.         Sonal Higuera MD  02/10/22 1922

## 2022-02-11 ENCOUNTER — HOSPITAL ENCOUNTER (EMERGENCY)
Age: 32
Discharge: HOME | End: 2022-02-12
Attending: EMERGENCY MEDICINE
Payer: COMMERCIAL

## 2022-02-11 VITALS
BODY MASS INDEX: 22.43 KG/M2 | WEIGHT: 148 LBS | DIASTOLIC BLOOD PRESSURE: 73 MMHG | OXYGEN SATURATION: 98 % | SYSTOLIC BLOOD PRESSURE: 112 MMHG | HEART RATE: 85 BPM | TEMPERATURE: 98.3 F | HEIGHT: 68 IN | RESPIRATION RATE: 16 BRPM

## 2022-02-11 DIAGNOSIS — F10.930 ALCOHOL WITHDRAWAL SYNDROME WITHOUT COMPLICATION (HCC): Primary | ICD-10-CM

## 2022-02-11 DIAGNOSIS — R51.9 NONINTRACTABLE HEADACHE, UNSPECIFIED CHRONICITY PATTERN, UNSPECIFIED HEADACHE TYPE: ICD-10-CM

## 2022-02-11 LAB
ANION GAP SERPL CALCULATED.3IONS-SCNC: 13 MMOL/L (ref 3–16)
BUN BLDV-MCNC: 6 MG/DL (ref 7–20)
CALCIUM SERPL-MCNC: 8.7 MG/DL (ref 8.3–10.6)
CHLORIDE BLD-SCNC: 107 MMOL/L (ref 99–110)
CO2: 19 MMOL/L (ref 21–32)
CREAT SERPL-MCNC: 0.6 MG/DL (ref 0.9–1.3)
GFR AFRICAN AMERICAN: >60
GFR NON-AFRICAN AMERICAN: >60
GLUCOSE BLD-MCNC: 98 MG/DL (ref 70–99)
HCT VFR BLD CALC: 40.9 % (ref 40.5–52.5)
HEMOGLOBIN: 14.1 G/DL (ref 13.5–17.5)
MCH RBC QN AUTO: 31.7 PG (ref 26–34)
MCHC RBC AUTO-ENTMCNC: 34.5 G/DL (ref 31–36)
MCV RBC AUTO: 91.8 FL (ref 80–100)
PDW BLD-RTO: 12.1 % (ref 12.4–15.4)
PLATELET # BLD: 165 K/UL (ref 135–450)
PMV BLD AUTO: 8.5 FL (ref 5–10.5)
POTASSIUM SERPL-SCNC: 4 MMOL/L (ref 3.5–5.1)
RBC # BLD: 4.45 M/UL (ref 4.2–5.9)
SODIUM BLD-SCNC: 139 MMOL/L (ref 136–145)
WBC # BLD: 6 K/UL (ref 4–11)

## 2022-02-11 PROCEDURE — 85027 COMPLETE CBC AUTOMATED: CPT

## 2022-02-11 PROCEDURE — 80048 BASIC METABOLIC PNL TOTAL CA: CPT

## 2022-02-11 PROCEDURE — 36415 COLL VENOUS BLD VENIPUNCTURE: CPT

## 2022-02-11 PROCEDURE — 6370000000 HC RX 637 (ALT 250 FOR IP): Performed by: PHYSICIAN ASSISTANT

## 2022-02-11 PROCEDURE — 6360000002 HC RX W HCPCS: Performed by: FAMILY MEDICINE

## 2022-02-11 PROCEDURE — 2580000003 HC RX 258: Performed by: FAMILY MEDICINE

## 2022-02-11 PROCEDURE — 6370000000 HC RX 637 (ALT 250 FOR IP): Performed by: FAMILY MEDICINE

## 2022-02-11 PROCEDURE — 99282 EMERGENCY DEPT VISIT SF MDM: CPT

## 2022-02-11 PROCEDURE — 6370000000 HC RX 637 (ALT 250 FOR IP): Performed by: HOSPITALIST

## 2022-02-11 RX ORDER — CHLORDIAZEPOXIDE HYDROCHLORIDE 25 MG/1
25 CAPSULE, GELATIN COATED ORAL 3 TIMES DAILY
Status: DISCONTINUED | OUTPATIENT
Start: 2022-02-11 | End: 2022-02-11 | Stop reason: HOSPADM

## 2022-02-11 RX ADMIN — SODIUM CHLORIDE, PRESERVATIVE FREE 10 ML: 5 INJECTION INTRAVENOUS at 08:48

## 2022-02-11 RX ADMIN — ENOXAPARIN SODIUM 40 MG: 40 INJECTION SUBCUTANEOUS at 08:48

## 2022-02-11 RX ADMIN — LORAZEPAM 2 MG: 2 INJECTION INTRAMUSCULAR; INTRAVENOUS at 12:27

## 2022-02-11 RX ADMIN — LORAZEPAM 2 MG: 2 INJECTION INTRAMUSCULAR; INTRAVENOUS at 18:38

## 2022-02-11 RX ADMIN — LORAZEPAM 2 MG: 2 INJECTION INTRAMUSCULAR; INTRAVENOUS at 05:04

## 2022-02-11 RX ADMIN — CHLORDIAZEPOXIDE HYDROCHLORIDE 25 MG: 25 CAPSULE ORAL at 09:36

## 2022-02-11 RX ADMIN — CHLORDIAZEPOXIDE HYDROCHLORIDE 25 MG: 25 CAPSULE ORAL at 14:16

## 2022-02-11 RX ADMIN — THERA TABS 1 TABLET: TAB at 08:47

## 2022-02-11 RX ADMIN — LORAZEPAM 4 MG: 2 INJECTION INTRAMUSCULAR; INTRAVENOUS at 02:20

## 2022-02-11 RX ADMIN — LORAZEPAM 3 MG: 2 INJECTION INTRAMUSCULAR; INTRAVENOUS at 08:48

## 2022-02-11 RX ADMIN — LORAZEPAM 2 MG: 2 INJECTION INTRAMUSCULAR; INTRAVENOUS at 15:42

## 2022-02-11 ASSESSMENT — PAIN DESCRIPTION - LOCATION: LOCATION: HEAD

## 2022-02-11 ASSESSMENT — PAIN SCALES - GENERAL
PAINLEVEL_OUTOF10: 8
PAINLEVEL_OUTOF10: 7
PAINLEVEL_OUTOF10: 3

## 2022-02-11 ASSESSMENT — PAIN DESCRIPTION - DESCRIPTORS
DESCRIPTORS: HEADACHE

## 2022-02-11 ASSESSMENT — PAIN DESCRIPTION - PAIN TYPE: TYPE: ACUTE PAIN

## 2022-02-11 NOTE — PROGRESS NOTES
[facility] PROGRESS NOTE    2/11/2022 9:05 AM        Name: Linda Galeana . Admitted: 2/10/2022  Primary Care Provider: No primary care provider on file. (Tel: None)                        Subjective:  . No acute events overnight. Resting well. Pain control. Diet ok. Labs reviewed  Denies any chest pain sob.    Sleepy drowsy at time still having withdrawal symptoms  Reviewed interval ancillary notes    Current Medications  sodium chloride flush 0.9 % injection 5-40 mL, 2 times per day  sodium chloride flush 0.9 % injection 5-40 mL, PRN  0.9 % sodium chloride infusion, PRN  sodium chloride flush 0.9 % injection 5-40 mL, 2 times per day  sodium chloride flush 0.9 % injection 5-40 mL, PRN  0.9 % sodium chloride infusion, PRN  enoxaparin (LOVENOX) injection 40 mg, Daily  ondansetron (ZOFRAN-ODT) disintegrating tablet 4 mg, Q8H PRN   Or  ondansetron (ZOFRAN) injection 4 mg, Q6H PRN  polyethylene glycol (GLYCOLAX) packet 17 g, Daily PRN  acetaminophen (TYLENOL) tablet 650 mg, Q6H PRN   Or  acetaminophen (TYLENOL) suppository 650 mg, Q6H PRN  multivitamin 1 tablet, Daily  LORazepam (ATIVAN) tablet 1 mg, Q1H PRN   Or  LORazepam (ATIVAN) injection 1 mg, Q1H PRN   Or  LORazepam (ATIVAN) tablet 2 mg, Q1H PRN   Or  LORazepam (ATIVAN) injection 2 mg, Q1H PRN   Or  LORazepam (ATIVAN) tablet 3 mg, Q1H PRN   Or  LORazepam (ATIVAN) injection 3 mg, Q1H PRN   Or  LORazepam (ATIVAN) tablet 4 mg, Q1H PRN   Or  LORazepam (ATIVAN) injection 4 mg, Q1H PRN  nicotine (NICODERM CQ) 21 MG/24HR 1 patch, Daily        Objective:  /86   Pulse 68   Temp 97.8 °F (36.6 °C) (Oral)   Resp 16   Ht 5' 8\" (1.727 m)   Wt 148 lb (67.1 kg)   SpO2 99%   BMI 22.50 kg/m²   No intake or output data in the 24 hours ending 02/11/22 0905   Wt Readings from Last 3 Encounters:   02/10/22 148 lb (67.1 kg)   12/04/21 144 lb 10 oz (65.6 kg)   11/24/21 140 lb (63.5 kg)       General appearance:  Appears comfortable  Eyes: Sclera clear. Pupils equal.  ENT: Moist oral mucosa. Trachea midline, no adenopathy. Cardiovascular: Regular rhythm, normal S1, S2. No murmur. No edema in lower extremities  Respiratory: Not using accessory muscles. Good inspiratory effort. Clear to auscultation bilaterally, no wheeze or crackles. GI: Abdomen soft, no tenderness, not distended, normal bowel sounds  Musculoskeletal: No cyanosis in digits, neck supple  Neurology: CN 2-12 grossly intact. No speech or motor deficits  Psych: Normal affect. Alert and oriented in time, place and person  Skin: Warm, dry, normal turgor    Labs and Tests:  CBC:   Recent Labs     02/10/22  1402 02/11/22  0517   WBC 6.3 6.0   HGB 14.9 14.1    165     BMP:    Recent Labs     02/10/22  1402 02/11/22  0517    139   K 3.8 4.0    107   CO2 22 19*   BUN 9 6*   CREATININE 0.7* 0.6*   GLUCOSE 119* 98     Hepatic:   Recent Labs     02/10/22  1402   AST 33   ALT 24   BILITOT 0.6   ALKPHOS 84       Discussed care with patient             Problem List  Active Problems:    Alcohol withdrawal syndrome with complication (HCC)  Resolved Problems:    * No resolved hospital problems. *       Assessment & Plan:   1. Acute alcohol withdrawal symptoms with complication  -Continue [facility] protocol Librium. -Multivitamin.  -Monitor closely last drink was yesterday. Diet: ADULT DIET;  Regular  Code:Full Code  DVT PPX massielnox       Nuria Malone MD   2/11/2022 9:05 AM

## 2022-02-11 NOTE — H&P
HOSPITALISTS HISTORY AND PHYSICAL    2/10/2022 9:38 PM    Patient Information:  Cory Wills is a 28 y.o. male [id number]  PCP:  No primary care provider on file. (Tel: None )    Chief complaint:    Chief Complaint   Patient presents with    Alcohol Intoxication     drinks a pint of rum a night, last drink was last night. C/o Tinnitus for the last 4 days in the right ear        History of Present Illness:  Mike Ordonez is a 28 y.o. male with current alcohol abuse presented with c/o alcohol with latrell . He is c/o ringing in the ear, nausea, palpitations and anxiety. Admits to drinking pint of rum and beers . Has been drinking daily for the past several weeks. Last drink was last night . Blood alcohol level is undetectable. Denies any other drug use        REVIEW OF SYSTEMS:   Constitutional: Negative for fever,chills or night sweats  ENT: Negative for rhinorrhea, epistaxis, hoarseness, sore throat. Respiratory: Negative for shortness of breath,wheezing  Cardiovascular: Negative for chest pain, palpitations   Gastrointestinal: Negative for nausea, vomiting, diarrhea  Genitourinary: Negative for polyuria, dysuria   Hematologic/Lymphatic: Negative for bleeding tendency, easy bruising  Musculoskeletal: Negative for myalgias and arthralgias  Neurologic: Negative for confusion,dysarthria. Skin: Negative for itching,rash  Psychiatric: Negative for depression,anxiety, agitation. Endocrine: Negative for polydipsia,polyuria,heat /cold intolerance. Past Medical History:   has a past medical history of Alcohol abuse, Anxiety, Drug-seeking behavior, Herpes genitalis in men, Manipulative behavior, Pancreatitis, Pneumonia, Portal vein thrombosis, Seizures ([facility] [address] and Tobacco abuse. Past Surgical History:   has a past surgical history that includes ERCP (1/10/14);  Endoscopy, colon, diagnostic (10/28/2013); and Endoscopy, colon, diagnostic (03/23/2018). Medications:  No current facility-administered medications on file prior to encounter. No current outpatient medications on file prior to encounter. Allergies: Allergies   Allergen Reactions    Amoxicillin Hives    Haldol [Haloperidol Lactate] Other (See Comments)     Muscle spasms    Phenergan [Promethazine Hcl] Other (See Comments)     Pt believes it caused muscle spasms    Shrimp (Diagnostic) Itching    Glucosamine Itching      Itching of throat when eating shrimp. Pt states has had IV contrast for CT's without problem before.  Shellfish-Derived Products      Patient gets anxious around seafood        Social History:  Patient Lives    reports that he has been smoking cigarettes. He started smoking about 14 years ago. He has a 5.00 pack-year smoking history. He has never used smokeless tobacco. He reports current alcohol use. He reports that he does not use drugs. Family History:  family history includes Alcohol Abuse in his father, paternal aunt, paternal grandfather, and paternal uncle; Anemia in his paternal grandmother; Depression in his maternal aunt and mother; Heart Disease in his paternal grandmother; High Blood Pressure in his father and paternal grandfather; Hypertension in his father. ,     Physical Exam:  /62   Pulse 60   Temp 97.4 °F (36.3 °C) (Oral)   Resp 18   Ht 5' 8\" (1.727 m)   Wt 148 lb (67.1 kg)   SpO2 98%   BMI 22.50 kg/m²     General appearance:  Appears comfortable. Well nourished  Eyes: Sclera clear, pupils equal  ENT: Moist mucus membranes, no thrush. Trachea midline. Cardiovascular: Regular rhythm, normal S1, S2. No murmur, gallop, rub.  No edema in lower extremities  Respiratory: Clear to auscultation bilaterally, no wheeze, good inspiratory effort  Gastrointestinal: Abdomen soft, non-tender, not distended, normal bowel sounds  Musculoskeletal: No cyanosis in digits, neck supple  Neurology: Cranial nerves grossly intact. Alert and oriented in time, place and person. No speech or motor deficits  Psychiatry: Appropriate affect. Not agitated  Skin: Warm, dry, normal turgor, no rash  Brisk capillary refill, peripheral pulses palpable   Labs:  CBC:   Lab Results   Component Value Date    WBC 6.3 02/10/2022    RBC 4.76 02/10/2022    HGB 14.9 02/10/2022    HCT 42.9 02/10/2022    MCV 90.1 02/10/2022    MCH 31.4 02/10/2022    MCHC 34.8 02/10/2022    RDW 12.0 02/10/2022     02/10/2022    MPV 8.7 02/10/2022     BMP:    Lab Results   Component Value Date     02/10/2022    K 3.8 02/10/2022     02/10/2022    CO2 22 02/10/2022    BUN 9 02/10/2022    CREATININE 0.7 02/10/2022    CALCIUM 9.7 02/10/2022    GFRAA >60 02/10/2022    LABGLOM >60 02/10/2022    GLUCOSE 119 02/10/2022     CT HEAD WO CONTRAST   Final Result   No acute intracranial abnormality. Chest Xray:   EKG:    I visualized CXR images and EKG strips    Discussed case  with    Problem List  Active Problems:    Alcohol withdrawal syndrome with complication ([address]  Resolved Problems:    * No resolved hospital problems. *        Assessment/Plan:   Alcohol with drawal syndrome  Ativan per [facility] protocol  Multivitamin  SW consult for dc planning       DVT prophylaxis Lovenox  Code status  Full   Diet   IV access   Marie Catheter    Admit as inpatient. I anticipate hospitalization spanning more than two midnights for investigation and treatment of the above medically necessary diagnoses. Please note that some part of this chart was generated using Dragon dictation software. Although every effort was made to ensure the accuracy of this automated transcription, some errors in transcription may have occurred inadvertently. If you may need any clarification, please do not hesitate to contact me through [facility].        Shiva Staples MD    2/10/2022 9:38 PM

## 2022-02-11 NOTE — PROGRESS NOTES
This nurse has educated patient to use the urinal for the purpose of collecting urine for diagnostics. Patient has been up to the bathroom several times and states that he has \"forgotten\".

## 2022-02-11 NOTE — CARE COORDINATION
Social Work Consult    Met with patient at bedside and provided substance abuse referral.  States he is going to call [facility]. Stated he was tired and needed to sleep and would not engage in conversation.     Jim Bosch MSN, BSN, RN  Case Management   [phone]

## 2022-02-12 VITALS
SYSTOLIC BLOOD PRESSURE: 121 MMHG | BODY MASS INDEX: 21.22 KG/M2 | RESPIRATION RATE: 20 BRPM | WEIGHT: 140 LBS | DIASTOLIC BLOOD PRESSURE: 91 MMHG | HEIGHT: 68 IN | TEMPERATURE: 97.6 F | OXYGEN SATURATION: 99 % | HEART RATE: 76 BPM

## 2022-02-12 RX ORDER — ONDANSETRON 4 MG/1
4 TABLET, ORALLY DISINTEGRATING ORAL EVERY 8 HOURS PRN
Qty: 10 TABLET | Refills: 0 | Status: SHIPPED | OUTPATIENT
Start: 2022-02-12 | End: 2022-02-17 | Stop reason: ALTCHOICE

## 2022-02-12 RX ORDER — CHLORDIAZEPOXIDE HYDROCHLORIDE 25 MG/1
CAPSULE, GELATIN COATED ORAL
Qty: 24 CAPSULE | Refills: 0 | Status: SHIPPED | OUTPATIENT
Start: 2022-02-12 | End: 2022-02-17 | Stop reason: ALTCHOICE

## 2022-02-12 NOTE — ED PROVIDER NOTES
EMERGENCY DEPARTMENT PROVIDER NOTE    Patient Identification  Pt Name: Goldie Early  MRN: [id number]  Iqra 1990  Date of evaluation: 2/11/2022  Provider: Raul Aleman DO  PCP: No primary care provider on file. Chief Complaint  Alcohol Intoxication (Patient states he is here to withdrawl from alcohol, states his last drink was a few days ago but unsure. Patient drank a pint of rum and a few beers. Patient c/o of headache.)      HPI  (History provided by patient)  This is a 28 y.o. male with pertinent past medical history of alcohol dependency and abuse who was brought in by self for concern for alcohol withdrawal.  Patient reports feeling anxious, also with a generalized aching headache. Mild in severity. Nothing seems to make his symptoms any better or worse. Patient states last about 2 days ago. Patient was admitted to the hospital yesterday for alcohol withdrawal symptoms, per record review he left the hospital [address] after he felt the nurses were not giving him a shower quickly enough. Patient then signed back into the emergency department. He adamantly denies to me any suicidal ideation.     ROS    Const:  No fevers, no chills, no generalized weakness  Skin:  No rash, no lesions  Eyes:  No visual changes, no blurry or double vision, no pain  ENT:  No sore throat, no difficulty swallowing, no ear pain, no sinus pain or congestion  Card:  No chest pain, no palpitations, no edema  Resp:  No shortness of breath, no cough, no wheezing  Abd:  No abdominal pain, +nausea, no vomiting, no diarrhea  Genitourinary:  No dysuria, no hematuria  MSK:  No joint pain, no myalgia  Neuro:  No focal weakness, +headache, no paresthesia    All other systems reviewed and negative unless otherwise noted in HPI        I have reviewed the following nursing documentation:  Allergies: Amoxicillin, Haldol [haloperidol lactate], Phenergan [promethazine hcl], Shrimp (diagnostic), Glucosamine, and Shellfish-derived products    Past medical history:   Past Medical History:   Diagnosis Date    Alcohol abuse     PATIENT HAS EXTREME MANIPULITIVE & DRUG SEEKING BEHAVIOR. HE POCKETS HIS BENZODIZAPINES.  Anxiety     Drug-seeking behavior     Several times found pocketing medications given in hospital    Herpes genitalis in men     Manipulative behavior     Pancreatitis     Pneumonia     Portal vein thrombosis     pt denied    Seizures (HCC)     PER PATIENT ONLY. DURING MULTIPLE HOSPITALIZATIONS HE HAS NEVER ONCE    Tobacco abuse      Past surgical history:   Past Surgical History:   Procedure Laterality Date    ENDOSCOPY, COLON, DIAGNOSTIC  10/28/2013    Endoscopic retrograde cholangiopancreatography with pancreatic stent placement    ENDOSCOPY, COLON, DIAGNOSTIC  03/23/2018    Esophagogastroduodenoscopy with biopsy    ERCP  1/10/14    with stent removal       Home medications:   Discharge Medication List as of 2/12/2022  1:35 AM          Social history:  reports that he has been smoking cigarettes. He started smoking about 14 years ago. He has a 5.00 pack-year smoking history. He has never used smokeless tobacco. He reports current alcohol use. He reports that he does not use drugs. Family history:    Family History   Problem Relation Age of Onset    Hypertension Father     High Blood Pressure Father     Alcohol Abuse Father     Depression Mother     High Blood Pressure Paternal Grandfather     Alcohol Abuse Paternal Grandfather     Heart Disease Paternal Grandmother     Anemia Paternal Grandmother     Depression Maternal Aunt     Alcohol Abuse Paternal Aunt     Alcohol Abuse Paternal Uncle          Exam  ED Triage Vitals [02/11/22 2039]   BP Temp Temp Source Pulse Resp SpO2 Height Weight   121/82 97.6 °F (36.4 °C) Oral 105 20 97 % 5' 8\" (1.727 m) 140 lb (63.5 kg)     Nursing note and vitals reviewed. Constitutional: Well developed, well nourished. Non-toxic in appearance.   HENT: Head: Normocephalic and atraumatic. Ears: External ears normal.      Nose: Nose normal.     Mouth: Membrane mucosa moist and pink. Eyes: Anicteric sclera. No discharge. PERRL, no nystagmus. Neck: Supple. Trachea midline. Cardiovascular: RRR; no murmurs, rubs, or gallops. Pulmonary/Chest: Effort normal. No respiratory distress. CTAB. No stridor. No wheezes. No rales. Abdominal: Soft. No distension. Musculoskeletal: Moves all extremities. No gross deformity. Neurological: Alert and orientedx4. Face symmetric. Speech is clear. CN 2-12 intact. 5/5 motor and sensation grossly intact all extremities. No pronator drift. Normal finger to nose, normal heel to shin. Normal gait observed. Skin: Warm and dry. No rash. Psychiatric: Normal mood and affect. Behavior is normal.        Radiology  No orders to display       Labs  No results found for this visit on 02/11/22. Screenings           MDM and ED Course    Patient afebrile and nontoxic. No distress. Mild tachycardia noted at triage, however this had resolved at time of my evaluation. Patient is alert, fully oriented, neuro exam is nonfocal.  His speech is clear with steady gait and clinically he has no evidence of intoxication. No red flags for SAH/ICH as cause of headache. Patient had recent laboratory work-up within the past 24 hours, this was reviewed and without evidence of acute endorgan dysfunction or clinically significant electrolyte derangement. Patient currently with CIWA score of 4. He has been without alcohol for the past 48 hours or more. No indication at this time for readmission to the hospital.  Patient felt safe for discharge to self-care, resources provided for outpatient alcohol detox and substance abuse clinics. Patient is agreeable with plan, feels comfortable returning to home. Strict return precautions were discussed. Final Impression  1. Alcohol withdrawal syndrome without complication ([address]    2.  Nonintractable headache, unspecified chronicity pattern, unspecified headache type        Blood pressure (!) 121/91, pulse 76, temperature 97.6 °F (36.4 °C), temperature source Oral, resp. rate 20, height 5' 8\" (1.727 m), weight 140 lb (63.5 kg), SpO2 99 %. Disposition:  DISPOSITION Decision To Discharge 02/12/2022 01:28:10 AM      Patient Referrals:  Kimberly Busby  [phone]          Discharge Medications:  Discharge Medication List as of 2/12/2022  1:35 AM      START taking these medications    Details   chlordiazePOXIDE (LIBRIUM) 25 MG capsule Take 2 capsules by mouth every 6 hours for 1 day, then take 1 capsule by mouth every six hours for 4 days, Disp-24 capsule, R-0Print      ondansetron (ZOFRAN-ODT) 4 MG disintegrating tablet Take 1 tablet by mouth every 8 hours as needed for Nausea or Vomiting, Disp-10 tablet, R-0Print             Discontinued Medications:  Discharge Medication List as of 2/12/2022  1:35 AM          This chart was generated using the [address] [place] [address] [address]  dictation system. I created this record but it may contain dictation errors given the limitations of this technology.     Shaquille Chang DO (electronically signed)  Attending Emergency Physician       Shaquille Chang DO  02/12/22 [id number] [facility],   02/12/22 1130

## 2022-02-12 NOTE — PROGRESS NOTES
This pt asked to take a shower at the start of the shift. Explained to pt that change of shift is happening right now. Also explained that he cannot be up alone in the shower and the physician has to be notified because the tele box will be off of him while in the shower. Asked pt to let me get my other pt's settled so that I can remain in the room while he is in the shower. Pt agreed. Less than an hour later the pt came out to the nurses station and stated \"I guess I'm signing out\". This RN stated directly to the pt \"you want to sign out because you can't take a shower right now? I asked you to give me some time but fine I will print the paper for you to sign\". Asked MT to print the papers. Pt went and got fully dressed, PIV removed, and pt signed papers. Pt was asked if he had a ride and pt stated that he did. Gave pt directions to the ER so that he could meet his ride outside. Walked pt to the elevators. Security, Night , hospitalist and charge nurse all notified of these events.      Shana Ruggiero RN

## 2022-02-17 ENCOUNTER — HOSPITAL ENCOUNTER (EMERGENCY)
Age: 32
Discharge: HOME | End: 2022-02-17
Attending: EMERGENCY MEDICINE
Payer: COMMERCIAL

## 2022-02-17 VITALS
WEIGHT: 140 LBS | HEART RATE: 60 BPM | RESPIRATION RATE: 18 BRPM | TEMPERATURE: 97.2 F | BODY MASS INDEX: 21.22 KG/M2 | DIASTOLIC BLOOD PRESSURE: 73 MMHG | SYSTOLIC BLOOD PRESSURE: 123 MMHG | HEIGHT: 68 IN | OXYGEN SATURATION: 99 %

## 2022-02-17 DIAGNOSIS — F10.930 ALCOHOL WITHDRAWAL SYNDROME WITHOUT COMPLICATION (HCC): Primary | ICD-10-CM

## 2022-02-17 LAB
A/G RATIO: 1.2 (ref 1.1–2.2)
ALBUMIN SERPL-MCNC: 3.6 G/DL (ref 3.4–5)
ALP BLD-CCNC: 59 U/L (ref 40–129)
ALT SERPL-CCNC: 17 U/L (ref 10–40)
AMPHETAMINE SCREEN, URINE: ABNORMAL
ANION GAP SERPL CALCULATED.3IONS-SCNC: 14 MMOL/L (ref 3–16)
AST SERPL-CCNC: 27 U/L (ref 15–37)
BARBITURATE SCREEN URINE: ABNORMAL
BASOPHILS ABSOLUTE: 0 K/UL (ref 0–0.2)
BASOPHILS RELATIVE PERCENT: 0.4 %
BENZODIAZEPINE SCREEN, URINE: POSITIVE
BILIRUB SERPL-MCNC: 0.7 MG/DL (ref 0–1)
BILIRUBIN URINE: ABNORMAL
BLOOD, URINE: NEGATIVE
BUN BLDV-MCNC: 7 MG/DL (ref 7–20)
CALCIUM SERPL-MCNC: 8.7 MG/DL (ref 8.3–10.6)
CANNABINOID SCREEN URINE: ABNORMAL
CHLORIDE BLD-SCNC: 106 MMOL/L (ref 99–110)
CLARITY: ABNORMAL
CO2: 16 MMOL/L (ref 21–32)
COCAINE METABOLITE SCREEN URINE: ABNORMAL
COLOR: ABNORMAL
CREAT SERPL-MCNC: 0.6 MG/DL (ref 0.9–1.3)
EOSINOPHILS ABSOLUTE: 0.3 K/UL (ref 0–0.6)
EOSINOPHILS RELATIVE PERCENT: 5.2 %
EPITHELIAL CELLS, UA: 1 /HPF (ref 0–5)
ETHANOL: NORMAL MG/DL (ref 0–0.08)
GFR AFRICAN AMERICAN: >60
GFR NON-AFRICAN AMERICAN: >60
GLUCOSE BLD-MCNC: 85 MG/DL (ref 70–99)
GLUCOSE URINE: NEGATIVE MG/DL
HCT VFR BLD CALC: 40.9 % (ref 40.5–52.5)
HEMOGLOBIN: 14 G/DL (ref 13.5–17.5)
HYALINE CASTS: 11 /LPF (ref 0–8)
KETONES, URINE: 15 MG/DL
LEUKOCYTE ESTERASE, URINE: NEGATIVE
LYMPHOCYTES ABSOLUTE: 1.3 K/UL (ref 1–5.1)
LYMPHOCYTES RELATIVE PERCENT: 23.7 %
Lab: ABNORMAL
MCH RBC QN AUTO: 31.7 PG (ref 26–34)
MCHC RBC AUTO-ENTMCNC: 34.2 G/DL (ref 31–36)
MCV RBC AUTO: 92.9 FL (ref 80–100)
METHADONE SCREEN, URINE: ABNORMAL
MICROSCOPIC EXAMINATION: YES
MONOCYTES ABSOLUTE: 0.8 K/UL (ref 0–1.3)
MONOCYTES RELATIVE PERCENT: 14 %
NEUTROPHILS ABSOLUTE: 3.1 K/UL (ref 1.7–7.7)
NEUTROPHILS RELATIVE PERCENT: 56.7 %
NITRITE, URINE: NEGATIVE
OPIATE SCREEN URINE: ABNORMAL
OXYCODONE URINE: ABNORMAL
PDW BLD-RTO: 13.1 % (ref 12.4–15.4)
PH UA: 7
PH UA: 7 (ref 5–8)
PHENCYCLIDINE SCREEN URINE: ABNORMAL
PLATELET # BLD: 210 K/UL (ref 135–450)
PMV BLD AUTO: 7.8 FL (ref 5–10.5)
POTASSIUM SERPL-SCNC: 3.7 MMOL/L (ref 3.5–5.1)
PROPOXYPHENE SCREEN: ABNORMAL
PROTEIN UA: NEGATIVE MG/DL
RBC # BLD: 4.41 M/UL (ref 4.2–5.9)
RBC UA: 1 /HPF (ref 0–4)
SODIUM BLD-SCNC: 136 MMOL/L (ref 136–145)
SPECIFIC GRAVITY UA: 1.02 (ref 1–1.03)
TOTAL PROTEIN: 6.7 G/DL (ref 6.4–8.2)
URINE REFLEX TO CULTURE: ABNORMAL
URINE TYPE: ABNORMAL
UROBILINOGEN, URINE: 1 E.U./DL
WBC # BLD: 5.4 K/UL (ref 4–11)
WBC UA: 3 /HPF (ref 0–5)

## 2022-02-17 PROCEDURE — 85025 COMPLETE CBC W/AUTO DIFF WBC: CPT

## 2022-02-17 PROCEDURE — 80307 DRUG TEST PRSMV CHEM ANLYZR: CPT

## 2022-02-17 PROCEDURE — 99283 EMERGENCY DEPT VISIT LOW MDM: CPT

## 2022-02-17 PROCEDURE — 81001 URINALYSIS AUTO W/SCOPE: CPT

## 2022-02-17 PROCEDURE — 6370000000 HC RX 637 (ALT 250 FOR IP): Performed by: EMERGENCY MEDICINE

## 2022-02-17 PROCEDURE — 96376 TX/PRO/DX INJ SAME DRUG ADON: CPT

## 2022-02-17 PROCEDURE — 6360000002 HC RX W HCPCS: Performed by: EMERGENCY MEDICINE

## 2022-02-17 PROCEDURE — 96374 THER/PROPH/DIAG INJ IV PUSH: CPT

## 2022-02-17 PROCEDURE — 80053 COMPREHEN METABOLIC PANEL: CPT

## 2022-02-17 PROCEDURE — 96375 TX/PRO/DX INJ NEW DRUG ADDON: CPT

## 2022-02-17 PROCEDURE — 82077 ASSAY SPEC XCP UR&BREATH IA: CPT

## 2022-02-17 PROCEDURE — 36415 COLL VENOUS BLD VENIPUNCTURE: CPT

## 2022-02-17 PROCEDURE — 2580000003 HC RX 258: Performed by: EMERGENCY MEDICINE

## 2022-02-17 RX ORDER — GAUZE BANDAGE 2" X 2"
100 BANDAGE TOPICAL DAILY
Status: DISCONTINUED | OUTPATIENT
Start: 2022-02-17 | End: 2022-02-17 | Stop reason: HOSPADM

## 2022-02-17 RX ORDER — ONDANSETRON 8 MG/1
8 TABLET, ORALLY DISINTEGRATING ORAL EVERY 8 HOURS PRN
Qty: 9 TABLET | Refills: 0 | Status: SHIPPED | OUTPATIENT
Start: 2022-02-17 | End: 2022-07-11

## 2022-02-17 RX ORDER — LORAZEPAM 2 MG/ML
1 INJECTION INTRAMUSCULAR ONCE
Status: COMPLETED | OUTPATIENT
Start: 2022-02-17 | End: 2022-02-17

## 2022-02-17 RX ORDER — KETOROLAC TROMETHAMINE 30 MG/ML
30 INJECTION, SOLUTION INTRAMUSCULAR; INTRAVENOUS ONCE
Status: COMPLETED | OUTPATIENT
Start: 2022-02-17 | End: 2022-02-17

## 2022-02-17 RX ORDER — ONDANSETRON 2 MG/ML
8 INJECTION INTRAMUSCULAR; INTRAVENOUS ONCE
Status: COMPLETED | OUTPATIENT
Start: 2022-02-17 | End: 2022-02-17

## 2022-02-17 RX ORDER — 0.9 % SODIUM CHLORIDE 0.9 %
1000 INTRAVENOUS SOLUTION INTRAVENOUS ONCE
Status: COMPLETED | OUTPATIENT
Start: 2022-02-17 | End: 2022-02-17

## 2022-02-17 RX ORDER — LORAZEPAM 1 MG/1
1 TABLET ORAL EVERY 6 HOURS PRN
Qty: 12 TABLET | Refills: 0 | Status: SHIPPED | OUTPATIENT
Start: 2022-02-17 | End: 2022-02-21

## 2022-02-17 RX ADMIN — Medication 100 MG: at 15:59

## 2022-02-17 RX ADMIN — SODIUM CHLORIDE 1000 ML: 9 INJECTION, SOLUTION INTRAVENOUS at 15:59

## 2022-02-17 RX ADMIN — LORAZEPAM 1 MG: 2 INJECTION INTRAMUSCULAR; INTRAVENOUS at 18:41

## 2022-02-17 RX ADMIN — KETOROLAC TROMETHAMINE 30 MG: 30 INJECTION, SOLUTION INTRAMUSCULAR at 18:12

## 2022-02-17 RX ADMIN — LORAZEPAM 1 MG: 2 INJECTION INTRAMUSCULAR; INTRAVENOUS at 15:57

## 2022-02-17 RX ADMIN — ONDANSETRON 8 MG: 2 INJECTION INTRAMUSCULAR; INTRAVENOUS at 15:56

## 2022-02-17 ASSESSMENT — PAIN - FUNCTIONAL ASSESSMENT: PAIN_FUNCTIONAL_ASSESSMENT: 0-10

## 2022-02-17 ASSESSMENT — PAIN SCALES - GENERAL: PAINLEVEL_OUTOF10: 8

## 2022-02-17 ASSESSMENT — PAIN DESCRIPTION - PAIN TYPE: TYPE: ACUTE PAIN

## 2022-02-17 ASSESSMENT — PAIN DESCRIPTION - LOCATION: LOCATION: HEAD

## 2022-02-17 NOTE — DISCHARGE SUMMARY
[facility] DISCHARGE SUMMARY    Patient Demographics    PatientIris Sinclair  Date of Birth. 1990  MRN. [id number]     Primary care provider. No primary care provider on file. (Tel: None)    Admit date: 2/10/2022    Discharge date (blank if same as Note Date): 2/11/2022  Note Date: 2/17/2022     Reason for Hospitalization. Chief Complaint   Patient presents with    Alcohol Intoxication     drinks a pint of rum a night, last drink was last night. C/o Tinnitus for the last 4 days in the right ear           [facility] Course. Alcohol intoxication with abuse w at risk for withdrawal  Was admitted for alcohol intoxication been treated for alcohol withdrawal.  -Prior to completing treatment patient left [address] [place] [address]. IP CONSULT TO SOCIAL WORK  IP CONSULT TO INTERNAL MEDICINE  IP CONSULT TO SOCIAL WORK    Physical examination on discharge day. /73   Pulse 85   Temp 98.3 °F (36.8 °C) (Oral)   Resp 16   Ht 5' 8\" (1.727 m)   Wt 148 lb (67.1 kg)   SpO2 98%   BMI 22.50 kg/m²   General appearance. Alert. Looks comfortable. HEENT. Sclera clear. Moist mucus membranes. Cardiovascular. Regular rate and rhythm, normal S1, S2. No murmur. Respiratory. Not using accessory muscles. Clear to auscultation bilaterally, no wheeze. Gastrointestinal. Abdomen soft, non-tender, not distended, normal bowel sounds  Neurology. Facial symmetry. No speech deficits. Moving all extremities equally. Extremities. No edema in lower extremities. Skin. Warm, dry, normal turgor    Condition at time of discharge stable     Medication instructions provided to patient at discharge. Medication List      You have not been prescribed any medications. Discharge recommendations given to patient. Follow Up. pcp in 1 week   Disposition. AGAINST MEDICAL ADVICE  Activity.  activity as tolerated  Diet: No diet orders on file      Spent 45  minutes in discharge process.     Signed:  Jacque Estevez MD     2/17/2022 5:15 PM

## 2022-02-17 NOTE — ED PROVIDER NOTES
[address] [facility]      Pt Name: Tara Alvarado  MRN: [id number]  [place] 1990  Date of evaluation: 2/17/2022  Provider[facility]  Chief Complaint   Patient presents with    Alcohol Intoxication     reports abd pain, ringing rt ear, n/v states alcohol withdrawal, last drink tuesday evening. pt wants to be admitted for detox       I wore personal protective equipment when I was in the room the entire time. This includes gloves, N95 mask, face shield, and a glove over my stethoscope for protection. HPI  Tara Alvarado is a 28 y.o. male who presents with complaint of alcohol withdrawal.  He states he has not had a drink for 24 hours. He normally drinks 12 drinks per day which is a mixture of liquor and beer. His last alcohol was 24 hours ago. He states he was given an ultimatum by his roommate quit drinking if he wanted to stay or if he keeps drinking he has to leave. He denies any other complaints. He is having some ringing in his right ear. He denies any dizziness. He states has been nauseated and vomiting. States he vomited in the waiting room. He is having stomach pain with vomiting. Nothing makes it better or worse. He described as moderate. He has been into other programs in the past.  He is asking for help. REVIEW OF SYSTEMS  All systems negative except as noted in the HPI. Reviewed Nurses' notes and concur. No LMP for male patient. PAST MEDICAL HISTORY  Past Medical History:   Diagnosis Date    Alcohol abuse     PATIENT HAS EXTREME MANIPULITIVE & DRUG SEEKING BEHAVIOR. HE POCKETS HIS BENZODIZAPINES.  Anxiety     Drug-seeking behavior     Several times found pocketing medications given in hospital    Herpes genitalis in men     Manipulative behavior     Pancreatitis     Pneumonia     Portal vein thrombosis     pt denied    Seizures (HCC)     PER PATIENT ONLY.  DURING MULTIPLE HOSPITALIZATIONS HE HAS NEVER ONCE    Tobacco abuse        FAMILY HISTORY  Family History   Problem Relation Age of Onset    Hypertension Father     High Blood Pressure Father     Alcohol Abuse Father     Depression Mother     High Blood Pressure Paternal Grandfather     Alcohol Abuse Paternal Grandfather     Heart Disease Paternal Grandmother     Anemia Paternal Grandmother     Depression Maternal Aunt     Alcohol Abuse Paternal Aunt     Alcohol Abuse Paternal Uncle        SOCIAL HISTORY   reports that he has been smoking cigarettes. He started smoking about 14 years ago. He has a 5.00 pack-year smoking history. He has never used smokeless tobacco. He reports current alcohol use. He reports that he does not use drugs. SURGICAL HISTORY  Past Surgical History:   Procedure Laterality Date    ENDOSCOPY, COLON, DIAGNOSTIC  10/28/2013    Endoscopic retrograde cholangiopancreatography with pancreatic stent placement    ENDOSCOPY, COLON, DIAGNOSTIC  03/23/2018    Esophagogastroduodenoscopy with biopsy    ERCP  1/10/14    with stent removal       CURRENT MEDICATIONS  Current Outpatient Rx   Medication Sig Dispense Refill    LORazepam (ATIVAN) 1 MG tablet Take 1 tablet by mouth every 6 hours as needed for Anxiety for up to 4 days. 12 tablet 0    ondansetron (ZOFRAN ODT) 8 MG TBDP disintegrating tablet Place 1 tablet under the tongue every 8 hours as needed for Nausea or Vomiting 9 tablet 0       ALLERGIES  Allergies   Allergen Reactions    Amoxicillin Hives    Haldol [Haloperidol Lactate] Other (See Comments)     Muscle spasms    Phenergan [Promethazine Hcl] Other (See Comments)     Pt believes it caused muscle spasms    Shrimp (Diagnostic) Itching    Glucosamine Itching      Itching of throat when eating shrimp. Pt states has had IV contrast for CT's without problem before.       Shellfish-Derived Products      Patient gets anxious around seafood       Tetanus vaccination status reviewed: tetanus re-vaccination not indicated. PHYSICAL EXAM  VITAL SIGNS: /66   Pulse 64   Temp 97.2 °F (36.2 °C) (Tympanic)   Resp 18   Ht 5' 8\" (1.727 m)   Wt 140 lb (63.5 kg)   SpO2 99%   BMI 21.29 kg/m²   Constitutional: Well-developed, well-nourished, appears mildly anxious but otherwise normal, speaking in full sentences, nontoxic  HENT: Normocephalic, Atraumatic, Bilateral external ears normal, TM's were normal, Mucus membranes are mildly dry and oropharynx is patent and clear, No oral exudates, Nose normal.  Eyes:  Conjunctiva normal, No discharge. No scleral icterus. Neck: Normal range of motion, No tenderness, Supple. Lymphatic: No lymphadenopathy noted. Cardiovascular: Normal heart rate, Normal rhythm, no murmurs, no gallops, no rubs. Thorax & Lungs: Normal breath sounds, no respiratory distress, no wheezing, no rales, no rhonchi  Abdomen: Soft, Nontender, No hepatosplenomegaly, No masses, No pulsatile masses, No distension, normal bowel sounds  Skin: Warm, Dry, No erythema, No rash. Back: No tenderness, Full range of motion, No scoliosis. Extremities: No edema, No tenderness, No cyanosis, No clubbing. No amputations, capillary refill less than 2 seconds. Musculoskeletal: Good range of motion in all major joints, No tenderness to palpation, no major deformities noted. Neurologic: Alert & oriented x 3, CN II through XII are intact, normal motor function, normal sensory function, no focal deficits noted, no clonus, mild diffuse tremors. Psychiatric: Affect normal, Mood mildly depressed. ?   LABORATORY  Labs Reviewed   COMPREHENSIVE METABOLIC PANEL - Abnormal; Notable for the following components:       Result Value    CO2 16 (*)     CREATININE 0.6 (*)     All other components within normal limits    Narrative:     Performed at:  [address] [address]   Phone [phone]   URINALYSIS WITH REFLEX TO CULTURE - Abnormal; Notable for the following components:    Clarity, UA CLOUDY (*)     Bilirubin Urine SMALL (*)     Ketones, Urine 15 (*)     All other components within normal limits    Narrative:     Performed at:  [facility]  [address] [facility] [employer] [address]   Phone [phone]   URINE DRUG SCREEN - Abnormal; Notable for the following components:    Benzodiazepine Screen, Urine POSITIVE (*)     All other components within normal limits    Narrative:     Performed at:  [facility]  [address] [employer] [employer] [address]   Phone [phone]   MICROSCOPIC URINALYSIS - Abnormal; Notable for the following components:    Hyaline Casts, UA 11 (*)     All other components within normal limits    Narrative:     Performed at:  [address] [employer] [address]   Phone [phone]   CBC WITH AUTO DIFFERENTIAL    Narrative:     Performed at:  [facility]  [address] [place] [address] [employer] [address]   Phone [phone]   ETHANOL    Narrative:     Performed at:  [facility]  [address] [place] [address] [employer] [address]   Phone [phone]         [facility]  Pertinent Labs reviewed.  (See chart for details)    Vitals:    02/17/22 1449 02/17/22 1800   BP: 123/74 114/66   Pulse: 84 64   Resp: 17 18   Temp: 97.2 °F (36.2 °C)    TempSrc: Tympanic    SpO2: 98% 99%   Weight: 140 lb (63.5 kg)    Height: 5' 8\" (1.727 m)        Medications   thiamine mononitrate tablet 100 mg (100 mg Oral Given 2/17/22 1559)   0.9 % sodium chloride bolus (0 mLs IntraVENous Stopped 2/17/22 1843)   LORazepam (ATIVAN) injection 1 mg (1 mg IntraVENous Given 2/17/22 1557)   ondansetron (ZOFRAN) injection 8 mg (8 mg IntraVENous Given 2/17/22 1556)   ketorolac (TORADOL) injection 30 mg (30 mg IntraVENous Given 2/17/22 1812)   LORazepam (ATIVAN) injection 1 mg (1 mg IntraVENous Given 2/17/22 6228)       New Prescriptions    LORAZEPAM (ATIVAN) 1 MG TABLET    Take 1 tablet by mouth every 6 hours as needed for Anxiety for up to 4 days. ONDANSETRON (ZOFRAN ODT) 8 MG TBDP DISINTEGRATING TABLET    Place 1 tablet under the tongue every 8 hours as needed for Nausea or Vomiting       SEP-1 CORE MEASURE DATA  Exclusion criteria: the patient is NOT to be included for sepsis due to:  SIRS criteria are not met        Patient remained stable in the ED. his laboratory work was returned as normal.  His vital signs remained stable and he was not tachycardic or hypertensive. Therefore, I feel patient can be safely discharged with prescription for Ativan and Zofran for alcohol withdrawal symptoms. He was instructed to follow with his doctor in 3 to 5 days and return if any problems. The patient's blood pressure was found to be elevated according to CMS/Medicare and the Affordable Care Act/ObamaCare criteria. Elevated blood pressure could occur because of pain or anxiety or other reasons and does not mean that they need to have their blood pressure treated or medications otherwise adjusted. However, this could also be a sign that they will need to have their blood pressure treated or medications changed. The patient was instructed to follow up closely with their personal physician to have their blood pressure rechecked. The patient was instructed to take a list of recent blood pressure readings to their next visit with their personal physician. See discharge instructions for specific medications, discharge information, and treatments. They were verbally instructed to return to emergency if any problems. (This chart has been completed using [address]. Although attempts have been made to ensure accuracy, words and/or phrases may not be transcribed as intended.)    Patient refused pain medicines at the time of their exam.    IMPRESSION(S):  1.  Alcohol withdrawal syndrome without complication ([facility] [address]        ?   Recheck Times: 1910    Diagnostic considerations include but are not limited to:  Drug or alcohol use or abuse, psychosis, behavioral mental illness, metabolic disturbance, infection, neurologic emergency, hypoglycemia, steroid abuse, other       Romana Del, DO  02/17/22 1921

## 2022-02-18 NOTE — ED NOTES
Discharge and education instructions reviewed. Patient verbalized understanding, teach-back successful. Patient denied questions at this time. No acute distress noted. Patient instructed to follow-up as noted - return to emergency department if symptoms worsen. Patient verbalized understanding. Discharged per EDMD with discharge instructions.         Aydin Camacho RN  02/17/22 1256

## 2022-03-08 ENCOUNTER — HOSPITAL ENCOUNTER (OUTPATIENT)
Age: 32
Setting detail: OBSERVATION
Discharge: LEFT BEFORE DISCHARGE | End: 2022-03-09
Attending: EMERGENCY MEDICINE | Admitting: FAMILY MEDICINE
Payer: COMMERCIAL

## 2022-03-08 DIAGNOSIS — F10.930 ALCOHOL WITHDRAWAL SYNDROME WITHOUT COMPLICATION (HCC): Primary | ICD-10-CM

## 2022-03-08 LAB
A/G RATIO: 2 (ref 1.1–2.2)
ALBUMIN SERPL-MCNC: 4.9 G/DL (ref 3.4–5)
ALP BLD-CCNC: 63 U/L (ref 40–129)
ALT SERPL-CCNC: 18 U/L (ref 10–40)
ANION GAP SERPL CALCULATED.3IONS-SCNC: 15 MMOL/L (ref 3–16)
AST SERPL-CCNC: 24 U/L (ref 15–37)
BASOPHILS ABSOLUTE: 0 K/UL (ref 0–0.2)
BASOPHILS RELATIVE PERCENT: 0.8 %
BILIRUB SERPL-MCNC: 0.5 MG/DL (ref 0–1)
BUN BLDV-MCNC: 10 MG/DL (ref 7–20)
CALCIUM SERPL-MCNC: 9.4 MG/DL (ref 8.3–10.6)
CHLORIDE BLD-SCNC: 104 MMOL/L (ref 99–110)
CO2: 19 MMOL/L (ref 21–32)
CREAT SERPL-MCNC: 0.7 MG/DL (ref 0.9–1.3)
EOSINOPHILS ABSOLUTE: 0.2 K/UL (ref 0–0.6)
EOSINOPHILS RELATIVE PERCENT: 4 %
ETHANOL: NORMAL MG/DL (ref 0–0.08)
GFR AFRICAN AMERICAN: >60
GFR NON-AFRICAN AMERICAN: >60
GLUCOSE BLD-MCNC: 133 MG/DL (ref 70–99)
HCT VFR BLD CALC: 42.3 % (ref 40.5–52.5)
HEMOGLOBIN: 14.4 G/DL (ref 13.5–17.5)
INR BLD: 1.03 (ref 0.88–1.12)
LIPASE: 58 U/L (ref 13–60)
LYMPHOCYTES ABSOLUTE: 2.1 K/UL (ref 1–5.1)
LYMPHOCYTES RELATIVE PERCENT: 36.4 %
MCH RBC QN AUTO: 31.4 PG (ref 26–34)
MCHC RBC AUTO-ENTMCNC: 34 G/DL (ref 31–36)
MCV RBC AUTO: 92.2 FL (ref 80–100)
MONOCYTES ABSOLUTE: 0.5 K/UL (ref 0–1.3)
MONOCYTES RELATIVE PERCENT: 9 %
NEUTROPHILS ABSOLUTE: 2.9 K/UL (ref 1.7–7.7)
NEUTROPHILS RELATIVE PERCENT: 49.8 %
PDW BLD-RTO: 13.1 % (ref 12.4–15.4)
PLATELET # BLD: 186 K/UL (ref 135–450)
PMV BLD AUTO: 8.1 FL (ref 5–10.5)
POTASSIUM REFLEX MAGNESIUM: 3.7 MMOL/L (ref 3.5–5.1)
PROTHROMBIN TIME: 11.7 SEC (ref 9.9–12.7)
RBC # BLD: 4.59 M/UL (ref 4.2–5.9)
SODIUM BLD-SCNC: 138 MMOL/L (ref 136–145)
TOTAL PROTEIN: 7.3 G/DL (ref 6.4–8.2)
WBC # BLD: 5.9 K/UL (ref 4–11)

## 2022-03-08 PROCEDURE — 80053 COMPREHEN METABOLIC PANEL: CPT

## 2022-03-08 PROCEDURE — 2580000003 HC RX 258: Performed by: PHYSICIAN ASSISTANT

## 2022-03-08 PROCEDURE — 36415 COLL VENOUS BLD VENIPUNCTURE: CPT

## 2022-03-08 PROCEDURE — 83690 ASSAY OF LIPASE: CPT

## 2022-03-08 PROCEDURE — 99283 EMERGENCY DEPT VISIT LOW MDM: CPT

## 2022-03-08 PROCEDURE — 6370000000 HC RX 637 (ALT 250 FOR IP): Performed by: PHYSICIAN ASSISTANT

## 2022-03-08 PROCEDURE — 6370000000 HC RX 637 (ALT 250 FOR IP): Performed by: FAMILY MEDICINE

## 2022-03-08 PROCEDURE — G0378 HOSPITAL OBSERVATION PER HR: HCPCS

## 2022-03-08 PROCEDURE — 96375 TX/PRO/DX INJ NEW DRUG ADDON: CPT

## 2022-03-08 PROCEDURE — 96365 THER/PROPH/DIAG IV INF INIT: CPT

## 2022-03-08 PROCEDURE — 2500000003 HC RX 250 WO HCPCS: Performed by: PHYSICIAN ASSISTANT

## 2022-03-08 PROCEDURE — 6360000002 HC RX W HCPCS: Performed by: PHYSICIAN ASSISTANT

## 2022-03-08 PROCEDURE — 85025 COMPLETE CBC W/AUTO DIFF WBC: CPT

## 2022-03-08 PROCEDURE — 96366 THER/PROPH/DIAG IV INF ADDON: CPT

## 2022-03-08 PROCEDURE — 85610 PROTHROMBIN TIME: CPT

## 2022-03-08 PROCEDURE — 82077 ASSAY SPEC XCP UR&BREATH IA: CPT

## 2022-03-08 PROCEDURE — 96376 TX/PRO/DX INJ SAME DRUG ADON: CPT

## 2022-03-08 PROCEDURE — 96374 THER/PROPH/DIAG INJ IV PUSH: CPT

## 2022-03-08 RX ORDER — GAUZE BANDAGE 2" X 2"
100 BANDAGE TOPICAL DAILY
Status: DISCONTINUED | OUTPATIENT
Start: 2022-03-08 | End: 2022-03-09 | Stop reason: HOSPADM

## 2022-03-08 RX ORDER — LORAZEPAM 1 MG/1
1 TABLET ORAL
Status: DISCONTINUED | OUTPATIENT
Start: 2022-03-08 | End: 2022-03-09 | Stop reason: SDUPTHER

## 2022-03-08 RX ORDER — SODIUM CHLORIDE 0.9 % (FLUSH) 0.9 %
5-40 SYRINGE (ML) INJECTION PRN
Status: DISCONTINUED | OUTPATIENT
Start: 2022-03-08 | End: 2022-03-09 | Stop reason: SDUPTHER

## 2022-03-08 RX ORDER — LORAZEPAM 2 MG/ML
1 INJECTION INTRAMUSCULAR
Status: DISCONTINUED | OUTPATIENT
Start: 2022-03-08 | End: 2022-03-09 | Stop reason: SDUPTHER

## 2022-03-08 RX ORDER — LORAZEPAM 1 MG/1
3 TABLET ORAL
Status: DISCONTINUED | OUTPATIENT
Start: 2022-03-08 | End: 2022-03-09 | Stop reason: SDUPTHER

## 2022-03-08 RX ORDER — LORAZEPAM 2 MG/ML
3 INJECTION INTRAMUSCULAR
Status: DISCONTINUED | OUTPATIENT
Start: 2022-03-08 | End: 2022-03-09 | Stop reason: SDUPTHER

## 2022-03-08 RX ORDER — LORAZEPAM 1 MG/1
2 TABLET ORAL
Status: DISCONTINUED | OUTPATIENT
Start: 2022-03-08 | End: 2022-03-09 | Stop reason: SDUPTHER

## 2022-03-08 RX ORDER — SODIUM CHLORIDE 0.9 % (FLUSH) 0.9 %
5-40 SYRINGE (ML) INJECTION EVERY 12 HOURS SCHEDULED
Status: DISCONTINUED | OUTPATIENT
Start: 2022-03-08 | End: 2022-03-09 | Stop reason: SDUPTHER

## 2022-03-08 RX ORDER — SODIUM CHLORIDE 9 MG/ML
25 INJECTION, SOLUTION INTRAVENOUS PRN
Status: DISCONTINUED | OUTPATIENT
Start: 2022-03-08 | End: 2022-03-09 | Stop reason: SDUPTHER

## 2022-03-08 RX ORDER — LORAZEPAM 1 MG/1
4 TABLET ORAL
Status: DISCONTINUED | OUTPATIENT
Start: 2022-03-08 | End: 2022-03-09 | Stop reason: SDUPTHER

## 2022-03-08 RX ORDER — FOLIC ACID 1 MG/1
1 TABLET ORAL DAILY
Status: DISCONTINUED | OUTPATIENT
Start: 2022-03-08 | End: 2022-03-09 | Stop reason: HOSPADM

## 2022-03-08 RX ORDER — LORAZEPAM 2 MG/ML
4 INJECTION INTRAMUSCULAR
Status: DISCONTINUED | OUTPATIENT
Start: 2022-03-08 | End: 2022-03-09 | Stop reason: SDUPTHER

## 2022-03-08 RX ORDER — LORAZEPAM 2 MG/ML
2 INJECTION INTRAMUSCULAR
Status: DISCONTINUED | OUTPATIENT
Start: 2022-03-08 | End: 2022-03-09 | Stop reason: SDUPTHER

## 2022-03-08 RX ADMIN — LORAZEPAM 4 MG: 2 INJECTION INTRAMUSCULAR; INTRAVENOUS at 23:53

## 2022-03-08 RX ADMIN — SODIUM CHLORIDE, PRESERVATIVE FREE 10 ML: 5 INJECTION INTRAVENOUS at 16:08

## 2022-03-08 RX ADMIN — LORAZEPAM 4 MG: 1 TABLET ORAL at 21:49

## 2022-03-08 RX ADMIN — SODIUM CHLORIDE, PRESERVATIVE FREE 10 ML: 5 INJECTION INTRAVENOUS at 23:53

## 2022-03-08 RX ADMIN — THIAMINE HCL TAB 100 MG 100 MG: 100 TAB at 20:06

## 2022-03-08 RX ADMIN — LORAZEPAM 4 MG: 1 TABLET ORAL at 22:57

## 2022-03-08 RX ADMIN — LORAZEPAM 4 MG: 2 INJECTION INTRAMUSCULAR; INTRAVENOUS at 16:07

## 2022-03-08 RX ADMIN — LORAZEPAM 4 MG: 1 TABLET ORAL at 20:06

## 2022-03-08 RX ADMIN — SODIUM CHLORIDE, PRESERVATIVE FREE 10 ML: 5 INJECTION INTRAVENOUS at 21:49

## 2022-03-08 RX ADMIN — LORAZEPAM 4 MG: 2 INJECTION INTRAMUSCULAR; INTRAVENOUS at 11:54

## 2022-03-08 RX ADMIN — FOLIC ACID 1 MG: 1 TABLET ORAL at 20:06

## 2022-03-08 RX ADMIN — FOLIC ACID: 5 INJECTION, SOLUTION INTRAMUSCULAR; INTRAVENOUS; SUBCUTANEOUS at 13:12

## 2022-03-08 ASSESSMENT — PAIN DESCRIPTION - DESCRIPTORS
DESCRIPTORS: PRESSURE;HEADACHE
DESCRIPTORS: PRESSURE;HEADACHE
DESCRIPTORS: PRESSURE;DULL
DESCRIPTORS: HEADACHE;PRESSURE

## 2022-03-08 ASSESSMENT — ENCOUNTER SYMPTOMS
NAUSEA: 1
BACK PAIN: 0
COLOR CHANGE: 0
VOMITING: 1
SHORTNESS OF BREATH: 0
ABDOMINAL PAIN: 0
DIARRHEA: 0
CONSTIPATION: 0

## 2022-03-08 ASSESSMENT — PAIN SCALES - GENERAL
PAINLEVEL_OUTOF10: 7
PAINLEVEL_OUTOF10: 0
PAINLEVEL_OUTOF10: 8
PAINLEVEL_OUTOF10: 10
PAINLEVEL_OUTOF10: 7
PAINLEVEL_OUTOF10: 8

## 2022-03-08 ASSESSMENT — PAIN DESCRIPTION - LOCATION
LOCATION: HEAD
LOCATION: ABDOMEN
LOCATION: HEAD

## 2022-03-08 ASSESSMENT — PAIN DESCRIPTION - ONSET: ONSET: PROGRESSIVE

## 2022-03-08 ASSESSMENT — PAIN - FUNCTIONAL ASSESSMENT: PAIN_FUNCTIONAL_ASSESSMENT: FACES

## 2022-03-08 NOTE — ED NOTES
Provided resources for inpatient treatment at [address] [facility] and outpatient treatment at [facility]  For alcohol and drug services and sober living houses for continual care out in the community. Provided my contact information for further assistance if needed by patient.       Gregory Matias  03/08/22 7244

## 2022-03-08 NOTE — H&P
Hospital Medicine History and Physical    3/8/2022    Date of Admission: 3/8/2022    Date of Service: Pt seen/examined on 3/8/2022 and admitted to observation. Chief complaint:  Chief Complaint   Patient presents with    Delirium Tremens (DTS)     last alcohol more than 12 hr ago. Drinks approx a case of beer/day denies hard liquor or MJ       History of Presenting Illness: This is a pleasant 28 y.o. male presented to the ER\" with the wanting to be detoxed, he states he has been drinking 1 case of beer daily for several months but cannot quantify how many months and also has some wine in addition to his beers, last drank over 24 hours ago when he had a case of beer and some wine. He has a past medical history of severe alcohol withdrawal with seizures, does have some tremulousness and nausea. He denies chest pain, shortness of breath, fever, chills, vomiting, diarrhea. Past Medical History:      Diagnosis Date    Alcohol abuse     PATIENT HAS EXTREME MANIPULITIVE & DRUG SEEKING BEHAVIOR. HE POCKETS HIS BENZODIZAPINES.  Anxiety     Drug-seeking behavior     Several times found pocketing medications given in hospital    Herpes genitalis in men     Manipulative behavior     Pancreatitis     Pneumonia     Portal vein thrombosis     pt denied    Seizures (HCC)     PER PATIENT ONLY. DURING MULTIPLE HOSPITALIZATIONS HE HAS NEVER ONCE    Tobacco abuse        Past Surgical History:      Procedure Laterality Date    ENDOSCOPY, COLON, DIAGNOSTIC  10/28/2013    Endoscopic retrograde cholangiopancreatography with pancreatic stent placement    ENDOSCOPY, COLON, DIAGNOSTIC  03/23/2018    Esophagogastroduodenoscopy with biopsy    ERCP  1/10/14    with stent removal       Medications (prior to admission):  Prior to Admission medications    Medication Sig Start Date End Date Taking?  Authorizing Provider   ondansetron (ZOFRAN ODT) 8 MG TBDP disintegrating tablet Place 1 tablet under the tongue every 8 hours as needed for Nausea or Vomiting 2/17/22   Sejal Torrez DO       Allergy(ies):  Amoxicillin, Haldol [haloperidol lactate], Phenergan [promethazine hcl], Shrimp (diagnostic), Glucosamine, and Shellfish-derived products    Social History:  TOBACCO:  reports that he has been smoking cigarettes. He started smoking about 14 years ago. He has a 5.00 pack-year smoking history. He has never used smokeless tobacco.  ETOH:  reports current alcohol use. Family History:      Problem Relation Age of Onset    Hypertension Father     High Blood Pressure Father     Alcohol Abuse Father     Depression Mother     High Blood Pressure Paternal Grandfather     Alcohol Abuse Paternal Grandfather     Heart Disease Paternal Grandmother     Anemia Paternal Grandmother     Depression Maternal Aunt     Alcohol Abuse Paternal Aunt     Alcohol Abuse Paternal Uncle        Review of Systems:  All other systems are reviewed, positive and negative are noted in HPI. Vitals and physical examination:  /76   Pulse 89   Temp 97.5 °F (36.4 °C) (Oral)   Resp 22   Ht 5' 8\" (1.727 m)   Wt 140 lb (63.5 kg)   SpO2 99%   BMI 21.29 kg/m²   Gen/overall appearance: Not in acute distress. Alert. Head: Normocephalic, atraumatic  Eyes: EOMI, good acuity  ENT:- Oral mucosa moist  Neck: No JVD, thyromegaly  CVS: Nml S1S2, no MRG, RRR  Pulm: Clear bilaterally. No crackles/wheezes  Gastrointestinal: Soft, NT/ND, +BS  Musculoskeletal: No edema. Warm  Neuro: No focal deficit. Moves extremity spontaneously. Psychiatry: Appropriate affect. Not agitated. Skin: Warm, dry with normal turgor.  No rash    Labs/imaging/EKG:  CBC:   Recent Labs     03/08/22  1146   WBC 5.9   HGB 14.4        BMP:    Recent Labs     03/08/22  1146      K 3.7      CO2 19*   BUN 10   CREATININE 0.7*   GLUCOSE 133*     Hepatic:   Recent Labs     03/08/22  1146   AST 24   ALT 18   BILITOT 0.5   ALKPHOS 63       CT HEAD WO malnutrition  - consult dietitian for supplements         Activities: Up with assist  Prophylaxis: lovenox  Code status: Full    ==========================================================          Thank you No primary care provider on file. for the opportunity to be involved in this patient's care.  If you have any questions or concerns please feel free to contact me at [phone].  -----------------------------  Junaid Villalta MD  [facility] hospitalist

## 2022-03-08 NOTE — ED PROVIDER NOTES
This patient was seen by the Mid-Level Provider. I have seen and examined the patient, agree with the workup, evaluation, management and diagnosis. Care plan has been discussed. My assessment reveals a 30-year-old male who presents with alcohol withdrawal.  This is a 30-year-old male who drinks at least 15 beers a day who presents with shakiness and states that he is going through withdrawal.  He states his last drink was more than 12 hours ago. The patient has been given Ativan in the past.          Radiology results:    No orders to display         LABS:    Labs Reviewed   COMPREHENSIVE METABOLIC PANEL W/ REFLEX TO MG FOR LOW K - Abnormal; Notable for the following components:       Result Value    CO2 19 (*)     Glucose 133 (*)     CREATININE 0.7 (*)     All other components within normal limits   CBC WITH AUTO DIFFERENTIAL   PROTIME-INR   ETHANOL               Exam:    Well-nourished male in no acute distress. Oriented the room he was not significantly tremulous. He was neurologic intact with no focal motor or sensory deficits throughout. Medical decision makin-year-old male who presents with alcohol withdrawal.  The patient's CIWA score was elevated per the nursing staff. Given the amount of alcohol the patient drinks and his history and his CIWA score we have decided to admit him for further care and medical consultation. He is in stable condition. FINAL IMPRESSION:    1.  Alcohol withdrawal syndrome without complication ([facility]            Cielo Chavez MD  [phone]

## 2022-03-08 NOTE — ED PROVIDER NOTES
10/28/2013    Endoscopic retrograde cholangiopancreatography with pancreatic stent placement    ENDOSCOPY, COLON, DIAGNOSTIC  03/23/2018    Esophagogastroduodenoscopy with biopsy    ERCP  1/10/14    with stent removal       Medications:  Previous Medications    ONDANSETRON (ZOFRAN ODT) 8 MG TBDP DISINTEGRATING TABLET    Place 1 tablet under the tongue every 8 hours as needed for Nausea or Vomiting     Review of Systems:  Review of Systems   Constitutional: Negative for chills, fatigue and fever. Respiratory: Negative for shortness of breath. Cardiovascular: Negative for chest pain. Gastrointestinal: Positive for nausea and vomiting. Negative for abdominal pain, constipation and diarrhea. Musculoskeletal: Negative for back pain. Skin: Negative for color change and rash. Neurological: Positive for tremors and headaches. Psychiatric/Behavioral: The patient is nervous/anxious. All other systems reviewed and are negative. Positives and Pertinent negatives as per HPI. Except as noted above in the ROS, all other systems were reviewed/completed and are negative. Physical Exam:  Physical Exam  Vitals and nursing note reviewed. Constitutional:       Appearance: Normal appearance. He is well-developed. He is not toxic-appearing or diaphoretic. HENT:      Head: Normocephalic. Right Ear: External ear normal.      Left Ear: External ear normal.      Nose: Nose normal.   Eyes:      General:         Right eye: No discharge. Left eye: No discharge. Conjunctiva/sclera: Conjunctivae normal.   Cardiovascular:      Rate and Rhythm: Normal rate and regular rhythm. Heart sounds: Normal heart sounds. No murmur heard. No friction rub. No gallop. Pulmonary:      Effort: Pulmonary effort is normal. No respiratory distress. Breath sounds: Normal breath sounds. No wheezing or rales. Musculoskeletal:         General: Normal range of motion.       Cervical back: Normal range of motion and neck supple. Skin:     General: Skin is warm and dry. Coloration: Skin is not pale. Neurological:      General: No focal deficit present. Mental Status: He is alert and oriented to person, place, and time. Motor: Tremor present. Psychiatric:         Behavior: Behavior normal. Behavior is cooperative.        MEDICAL DECISION MAKING    Vitals:    Vitals:    03/08/22 1152 03/08/22 1158 03/08/22 1245 03/08/22 1300   BP: (!) 144/93  118/87 114/85   Pulse: 62  64 63   Resp: 12  20 21   Temp:  98.3 °F (36.8 °C)     TempSrc:  Oral     SpO2: 98%      Weight:       Height:         LABS:   Results for orders placed or performed during the hospital encounter of 03/08/22   CBC with Auto Differential   Result Value Ref Range    WBC 5.9 4.0 - 11.0 K/uL    RBC 4.59 4.20 - 5.90 M/uL    Hemoglobin 14.4 13.5 - 17.5 g/dL    Hematocrit 42.3 40.5 - 52.5 %    MCV 92.2 80.0 - 100.0 fL    MCH 31.4 26.0 - 34.0 pg    MCHC 34.0 31.0 - 36.0 g/dL    RDW 13.1 12.4 - 15.4 %    Platelets 608 866 - 778 K/uL    MPV 8.1 5.0 - 10.5 fL    Neutrophils % 49.8 %    Lymphocytes % 36.4 %    Monocytes % 9.0 %    Eosinophils % 4.0 %    Basophils % 0.8 %    Neutrophils Absolute 2.9 1.7 - 7.7 K/uL    Lymphocytes Absolute 2.1 1.0 - 5.1 K/uL    Monocytes Absolute 0.5 0.0 - 1.3 K/uL    Eosinophils Absolute 0.2 0.0 - 0.6 K/uL    Basophils Absolute 0.0 0.0 - 0.2 K/uL   Comprehensive Metabolic Panel w/ Reflex to MG   Result Value Ref Range    Sodium 138 136 - 145 mmol/L    Potassium reflex Magnesium 3.7 3.5 - 5.1 mmol/L    Chloride 104 99 - 110 mmol/L    CO2 19 (L) 21 - 32 mmol/L    Anion Gap 15 3 - 16    Glucose 133 (H) 70 - 99 mg/dL    BUN 10 7 - 20 mg/dL    CREATININE 0.7 (L) 0.9 - 1.3 mg/dL    GFR Non-African American >60 >60    GFR African American >60 >60    Calcium 9.4 8.3 - 10.6 mg/dL    Total Protein 7.3 6.4 - 8.2 g/dL    Albumin 4.9 3.4 - 5.0 g/dL    Albumin/Globulin Ratio 2.0 1.1 - 2.2    Total Bilirubin 0.5 0.0 - 1.0 mg/dL Alkaline Phosphatase 63 40 - 129 U/L    ALT 18 10 - 40 U/L    AST 24 15 - 37 U/L   Protime-INR   Result Value Ref Range    Protime 11.7 9.9 - 12.7 sec    INR 1.03 0.88 - 1.12   Ethanol   Result Value Ref Range    Ethanol Lvl None Detected mg/dL       Remainder of labs reviewed and were negative at this time or not returned at the time of this note. RADIOLOGY:   Non-plain film images suchas CT, Ultrasound and MRI are read by the radiologist. Charisse GROVE PA have directly visualized the radiologic plain film image(s) with the below findings:  Interpretation per the Radiologist below, if available at the time of this note:    No orders to display     [facility] / ED COURSE:      PROCEDURES:   Procedures    Patient was given:  Medications   sodium chloride flush 0.9 % injection 5-40 mL ( IntraVENous Canceled Entry 3/8/22 1157)   sodium chloride flush 0.9 % injection 5-40 mL (has no administration in time range)   0.9 % sodium chloride infusion (has no administration in time range)   LORazepam (ATIVAN) tablet 1 mg ( Oral See Alternative 3/8/22 1154)     Or   LORazepam (ATIVAN) injection 1 mg ( IntraVENous See Alternative 3/8/22 1154)     Or   LORazepam (ATIVAN) tablet 2 mg ( Oral See Alternative 3/8/22 1154)     Or   LORazepam (ATIVAN) injection 2 mg ( IntraVENous See Alternative 3/8/22 1154)     Or   LORazepam (ATIVAN) tablet 3 mg ( Oral See Alternative 3/8/22 1154)     Or   LORazepam (ATIVAN) injection 3 mg ( IntraVENous See Alternative 3/8/22 1154)     Or   LORazepam (ATIVAN) tablet 4 mg ( Oral See Alternative 3/8/22 1154)     Or   LORazepam (ATIVAN) injection 4 mg (4 mg IntraVENous Given 3/8/22 1154)   sodium chloride 0.9 % 3,990 mL with folic acid 1 mg, adult multi-vitamin with vitamin k 10 mL, thiamine 300 mg ( IntraVENous New Bag 3/8/22 1312)     Patient presents to the emergency department with alcohol withdrawal symptoms including tremor, nausea vomiting, headache.   Has a documented CIWA score by nursing staff of 38. Was given 4 mg of IV Ativan. Symptoms are slightly improved. With this elevated CIWA scale patient will be brought in for further care and management. Patient stable throughout ED course. Admitted to hospitalist team.    The patient tolerated their visit well. I have evaluated the patient with Dr. Nash Daniels. I have discussed the findings of today's workup with the patient and addressed the patient's questions and concerns. CLINICAL IMPRESSION:  1. Alcohol withdrawal syndrome without complication ([facility] [address]        DISPOSITION Admitted 03/08/2022 01:26:53 PM      PATIENT REFERRED TO:  No follow-up provider specified.     DISCHARGE MEDICATIONS:  New Prescriptions    No medications on file              (Please note the MDM and HPI sections of this note were completed with avoice recognition program.  Efforts were made to edit the dictations but occasionally words are mis-transcribed.)    Electronically signed, TITO Murillo,            TITO Austin  03/08/22 1031       TITO Austin  03/08/22 4654

## 2022-03-09 VITALS
SYSTOLIC BLOOD PRESSURE: 112 MMHG | OXYGEN SATURATION: 99 % | BODY MASS INDEX: 21.22 KG/M2 | HEART RATE: 60 BPM | TEMPERATURE: 97.4 F | HEIGHT: 68 IN | WEIGHT: 140 LBS | RESPIRATION RATE: 16 BRPM | DIASTOLIC BLOOD PRESSURE: 74 MMHG

## 2022-03-09 LAB
A/G RATIO: 2.5 (ref 1.1–2.2)
ACETAMINOPHEN LEVEL: <5 UG/ML (ref 10–30)
ALBUMIN SERPL-MCNC: 4.9 G/DL (ref 3.4–5)
ALP BLD-CCNC: 74 U/L (ref 40–129)
ALT SERPL-CCNC: 16 U/L (ref 10–40)
AMYLASE: 59 U/L (ref 25–115)
ANION GAP SERPL CALCULATED.3IONS-SCNC: 13 MMOL/L (ref 3–16)
AST SERPL-CCNC: 23 U/L (ref 15–37)
BASOPHILS ABSOLUTE: 0 K/UL (ref 0–0.2)
BASOPHILS RELATIVE PERCENT: 0.5 %
BILIRUB SERPL-MCNC: 1.4 MG/DL (ref 0–1)
BUN BLDV-MCNC: 8 MG/DL (ref 7–20)
CALCIUM SERPL-MCNC: 9.9 MG/DL (ref 8.3–10.6)
CHLORIDE BLD-SCNC: 104 MMOL/L (ref 99–110)
CO2: 21 MMOL/L (ref 21–32)
CREAT SERPL-MCNC: 0.6 MG/DL (ref 0.9–1.3)
EOSINOPHILS ABSOLUTE: 0.5 K/UL (ref 0–0.6)
EOSINOPHILS RELATIVE PERCENT: 7.5 %
GFR AFRICAN AMERICAN: >60
GFR NON-AFRICAN AMERICAN: >60
GLUCOSE BLD-MCNC: 92 MG/DL (ref 70–99)
HCT VFR BLD CALC: 43.1 % (ref 40.5–52.5)
HEMOGLOBIN: 15.2 G/DL (ref 13.5–17.5)
LIPASE: 45 U/L (ref 13–60)
LYMPHOCYTES ABSOLUTE: 1.4 K/UL (ref 1–5.1)
LYMPHOCYTES RELATIVE PERCENT: 22 %
MCH RBC QN AUTO: 32.2 PG (ref 26–34)
MCHC RBC AUTO-ENTMCNC: 35.2 G/DL (ref 31–36)
MCV RBC AUTO: 91.6 FL (ref 80–100)
MONOCYTES ABSOLUTE: 0.5 K/UL (ref 0–1.3)
MONOCYTES RELATIVE PERCENT: 7.1 %
NEUTROPHILS ABSOLUTE: 4.1 K/UL (ref 1.7–7.7)
NEUTROPHILS RELATIVE PERCENT: 62.9 %
PDW BLD-RTO: 13.1 % (ref 12.4–15.4)
PLATELET # BLD: 175 K/UL (ref 135–450)
PMV BLD AUTO: 8.9 FL (ref 5–10.5)
POTASSIUM REFLEX MAGNESIUM: 3.7 MMOL/L (ref 3.5–5.1)
RBC # BLD: 4.7 M/UL (ref 4.2–5.9)
SODIUM BLD-SCNC: 138 MMOL/L (ref 136–145)
TOTAL PROTEIN: 6.9 G/DL (ref 6.4–8.2)
WBC # BLD: 6.6 K/UL (ref 4–11)

## 2022-03-09 PROCEDURE — 6360000002 HC RX W HCPCS: Performed by: PHYSICIAN ASSISTANT

## 2022-03-09 PROCEDURE — G0378 HOSPITAL OBSERVATION PER HR: HCPCS

## 2022-03-09 PROCEDURE — 36415 COLL VENOUS BLD VENIPUNCTURE: CPT

## 2022-03-09 PROCEDURE — 80053 COMPREHEN METABOLIC PANEL: CPT

## 2022-03-09 PROCEDURE — 6360000002 HC RX W HCPCS: Performed by: FAMILY MEDICINE

## 2022-03-09 PROCEDURE — 83690 ASSAY OF LIPASE: CPT

## 2022-03-09 PROCEDURE — 82150 ASSAY OF AMYLASE: CPT

## 2022-03-09 PROCEDURE — 6370000000 HC RX 637 (ALT 250 FOR IP): Performed by: FAMILY MEDICINE

## 2022-03-09 PROCEDURE — 80143 DRUG ASSAY ACETAMINOPHEN: CPT

## 2022-03-09 PROCEDURE — 2580000003 HC RX 258: Performed by: PHYSICIAN ASSISTANT

## 2022-03-09 PROCEDURE — 2580000003 HC RX 258: Performed by: FAMILY MEDICINE

## 2022-03-09 PROCEDURE — 2500000003 HC RX 250 WO HCPCS: Performed by: FAMILY MEDICINE

## 2022-03-09 PROCEDURE — 85025 COMPLETE CBC W/AUTO DIFF WBC: CPT

## 2022-03-09 PROCEDURE — 6370000000 HC RX 637 (ALT 250 FOR IP): Performed by: PHYSICIAN ASSISTANT

## 2022-03-09 PROCEDURE — 96376 TX/PRO/DX INJ SAME DRUG ADON: CPT

## 2022-03-09 RX ORDER — ACETAMINOPHEN 650 MG/1
650 SUPPOSITORY RECTAL EVERY 6 HOURS PRN
Status: DISCONTINUED | OUTPATIENT
Start: 2022-03-09 | End: 2022-03-09 | Stop reason: HOSPADM

## 2022-03-09 RX ORDER — SODIUM CHLORIDE 0.9 % (FLUSH) 0.9 %
5-40 SYRINGE (ML) INJECTION EVERY 12 HOURS SCHEDULED
Status: DISCONTINUED | OUTPATIENT
Start: 2022-03-09 | End: 2022-03-09 | Stop reason: HOSPADM

## 2022-03-09 RX ORDER — SODIUM CHLORIDE 9 MG/ML
25 INJECTION, SOLUTION INTRAVENOUS PRN
Status: DISCONTINUED | OUTPATIENT
Start: 2022-03-09 | End: 2022-03-09 | Stop reason: HOSPADM

## 2022-03-09 RX ORDER — LORAZEPAM 2 MG/ML
2 INJECTION INTRAMUSCULAR
Status: DISCONTINUED | OUTPATIENT
Start: 2022-03-09 | End: 2022-03-09 | Stop reason: HOSPADM

## 2022-03-09 RX ORDER — LORAZEPAM 2 MG/ML
1 INJECTION INTRAMUSCULAR
Status: DISCONTINUED | OUTPATIENT
Start: 2022-03-09 | End: 2022-03-09 | Stop reason: HOSPADM

## 2022-03-09 RX ORDER — LORAZEPAM 2 MG/ML
3 INJECTION INTRAMUSCULAR
Status: DISCONTINUED | OUTPATIENT
Start: 2022-03-09 | End: 2022-03-09 | Stop reason: HOSPADM

## 2022-03-09 RX ORDER — ONDANSETRON 2 MG/ML
4 INJECTION INTRAMUSCULAR; INTRAVENOUS EVERY 6 HOURS PRN
Status: DISCONTINUED | OUTPATIENT
Start: 2022-03-09 | End: 2022-03-09 | Stop reason: HOSPADM

## 2022-03-09 RX ORDER — MULTIVITAMIN WITH IRON
1 TABLET ORAL DAILY
Status: DISCONTINUED | OUTPATIENT
Start: 2022-03-10 | End: 2022-03-09 | Stop reason: HOSPADM

## 2022-03-09 RX ORDER — POLYETHYLENE GLYCOL 3350 17 G/17G
17 POWDER, FOR SOLUTION ORAL DAILY PRN
Status: DISCONTINUED | OUTPATIENT
Start: 2022-03-09 | End: 2022-03-09 | Stop reason: HOSPADM

## 2022-03-09 RX ORDER — ONDANSETRON 4 MG/1
4 TABLET, ORALLY DISINTEGRATING ORAL EVERY 8 HOURS PRN
Status: DISCONTINUED | OUTPATIENT
Start: 2022-03-09 | End: 2022-03-09 | Stop reason: HOSPADM

## 2022-03-09 RX ORDER — LORAZEPAM 1 MG/1
3 TABLET ORAL
Status: DISCONTINUED | OUTPATIENT
Start: 2022-03-09 | End: 2022-03-09 | Stop reason: HOSPADM

## 2022-03-09 RX ORDER — LORAZEPAM 2 MG/ML
4 INJECTION INTRAMUSCULAR
Status: DISCONTINUED | OUTPATIENT
Start: 2022-03-09 | End: 2022-03-09 | Stop reason: HOSPADM

## 2022-03-09 RX ORDER — LORAZEPAM 1 MG/1
1 TABLET ORAL
Status: DISCONTINUED | OUTPATIENT
Start: 2022-03-09 | End: 2022-03-09 | Stop reason: HOSPADM

## 2022-03-09 RX ORDER — LORAZEPAM 1 MG/1
2 TABLET ORAL
Status: DISCONTINUED | OUTPATIENT
Start: 2022-03-09 | End: 2022-03-09 | Stop reason: HOSPADM

## 2022-03-09 RX ORDER — NICOTINE 21 MG/24HR
1 PATCH, TRANSDERMAL 24 HOURS TRANSDERMAL DAILY
Status: DISCONTINUED | OUTPATIENT
Start: 2022-03-09 | End: 2022-03-09 | Stop reason: HOSPADM

## 2022-03-09 RX ORDER — SODIUM CHLORIDE 0.9 % (FLUSH) 0.9 %
5-40 SYRINGE (ML) INJECTION PRN
Status: DISCONTINUED | OUTPATIENT
Start: 2022-03-09 | End: 2022-03-09 | Stop reason: HOSPADM

## 2022-03-09 RX ORDER — LORAZEPAM 1 MG/1
4 TABLET ORAL
Status: DISCONTINUED | OUTPATIENT
Start: 2022-03-09 | End: 2022-03-09 | Stop reason: HOSPADM

## 2022-03-09 RX ORDER — ACETAMINOPHEN 325 MG/1
650 TABLET ORAL EVERY 6 HOURS PRN
Status: DISCONTINUED | OUTPATIENT
Start: 2022-03-09 | End: 2022-03-09 | Stop reason: HOSPADM

## 2022-03-09 RX ADMIN — FOLIC ACID 1 MG: 1 TABLET ORAL at 09:15

## 2022-03-09 RX ADMIN — ONDANSETRON 4 MG: 4 TABLET, ORALLY DISINTEGRATING ORAL at 02:57

## 2022-03-09 RX ADMIN — FOLIC ACID: 5 INJECTION, SOLUTION INTRAMUSCULAR; INTRAVENOUS; SUBCUTANEOUS at 09:24

## 2022-03-09 RX ADMIN — LORAZEPAM 4 MG: 2 INJECTION INTRAMUSCULAR; INTRAVENOUS at 09:24

## 2022-03-09 RX ADMIN — SODIUM CHLORIDE, PRESERVATIVE FREE 10 ML: 5 INJECTION INTRAVENOUS at 09:15

## 2022-03-09 RX ADMIN — ACETAMINOPHEN 650 MG: 325 TABLET ORAL at 05:12

## 2022-03-09 RX ADMIN — SODIUM CHLORIDE, PRESERVATIVE FREE 10 ML: 5 INJECTION INTRAVENOUS at 01:59

## 2022-03-09 RX ADMIN — LORAZEPAM 4 MG: 1 TABLET ORAL at 05:12

## 2022-03-09 RX ADMIN — SODIUM CHLORIDE, PRESERVATIVE FREE 10 ML: 5 INJECTION INTRAVENOUS at 02:58

## 2022-03-09 RX ADMIN — LORAZEPAM 4 MG: 2 INJECTION INTRAMUSCULAR; INTRAVENOUS at 02:57

## 2022-03-09 RX ADMIN — LORAZEPAM 4 MG: 2 INJECTION INTRAMUSCULAR; INTRAVENOUS at 01:59

## 2022-03-09 RX ADMIN — LORAZEPAM 1 MG: 1 TABLET ORAL at 00:51

## 2022-03-09 RX ADMIN — THIAMINE HCL TAB 100 MG 100 MG: 100 TAB at 09:15

## 2022-03-09 ASSESSMENT — PAIN SCALES - GENERAL
PAINLEVEL_OUTOF10: 8
PAINLEVEL_OUTOF10: 8
PAINLEVEL_OUTOF10: 0
PAINLEVEL_OUTOF10: 8
PAINLEVEL_OUTOF10: 0
PAINLEVEL_OUTOF10: 2
PAINLEVEL_OUTOF10: 0
PAINLEVEL_OUTOF10: 3

## 2022-03-09 ASSESSMENT — PAIN DESCRIPTION - DESCRIPTORS
DESCRIPTORS: PRESSURE;HEADACHE
DESCRIPTORS: PRESSURE;HEADACHE
DESCRIPTORS: HEAVINESS;PRESSURE
DESCRIPTORS: ACHING

## 2022-03-09 ASSESSMENT — PAIN DESCRIPTION - LOCATION
LOCATION: GENERALIZED
LOCATION: HEAD

## 2022-03-09 ASSESSMENT — PAIN DESCRIPTION - ONSET: ONSET: PROGRESSIVE

## 2022-03-09 NOTE — PROGRESS NOTES
4 Eyes Skin Assessment     NAME:  Brigitte Chavez  YOB: 1990  MEDICAL RECORD NUMBER:  [id number]    The patient is being assess for  Admission    I agree that 2 RN's have performed a thorough Head to Toe Skin Assessment on the patient. ALL assessment sites listed below have been assessed. Areas assessed by both nurses:    Head, Face, Ears, Shoulders, Back, Chest, Arms, Elbows, Hands, Sacrum. Buttock, Coccyx, Ischium and Legs. Feet and Heels        Does the Patient have a Wound?  No noted wound(s)       Xiang Prevention initiated:  No   Wound Care Orders initiated:  No    Pressure Injury (Stage 3,4, Unstageable, DTI, NWPT, and Complex wounds) if present place consult order under [de-identified] No    New and Established Ostomies if present place consult order under : No      Nurse 1 eSignature: Electronically signed by Judy Becerra RN on 3/8/22 at 11:40 PM EST    **SHARE this note so that the co-signing nurse is able to place an eSignature**    Nurse 2 eSignature: Electronically signed by Dayna Fonseca RN on 3/8/22 at 11:43 PM EST
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
## 2022-03-09 NOTE — PLAN OF CARE
Problem: Discharge Planning:  Goal: Discharged to appropriate level of care  Description: Discharged to appropriate level of care  3/9/2022 1054 by Leonel Fermin RN  Outcome: Ongoing  Note: Pt. Stated his intent to leave [facility]  3/8/2022 2305 by Ted Vora RN  Outcome: Ongoing     Problem: Pain:  Goal: Pain level will decrease  Description: Pain level will decrease  3/9/2022 1054 by Leonel Fermin RN  Outcome: Ongoing  Note: Pt. Is complaining of HA, N/V, ativan given per CIWA protocol  3/8/2022 2305 by Ted Vora RN  Outcome: Ongoing  Goal: Control of acute pain  Description: Control of acute pain  3/9/2022 1054 by Leonel Fermin RN  Outcome: Ongoing  3/8/2022 2305 by Ted Vora RN  Outcome: Ongoing  Goal: Control of chronic pain  Description: Control of chronic pain  3/9/2022 1054 by Leonel Fermin RN  Outcome: Ongoing  3/8/2022 2305 by Ted Vora RN  Outcome: Ongoing     Problem: Falls - Risk of:  Goal: Will remain free from falls  Description: Will remain free from falls  3/9/2022 1054 by Leonel Fermin RN  Outcome: Ongoing  Note: Bed alarm on, pt.  Encouraged to use call light  3/8/2022 2305 by Ted Vora RN  Outcome: Ongoing  Goal: Absence of physical injury  Description: Absence of physical injury  3/9/2022 1054 by Leonel Fermin RN  Outcome: Ongoing  3/8/2022 2305 by Ted Vora RN  Outcome: Ongoing

## 2022-03-09 NOTE — PROGRESS NOTES
Pt. Voiced that he wants to leave MD JIMI notified of pt.s last IV ativan dosage. Per RN manager- Pt. Has to wait at least one hour before leaving after receiving 4mg IV ativan. Pt. instant on leaving, JIMI papers printed.

## 2022-03-09 NOTE — PLAN OF CARE
Pt remains free from falls & injuries; pt remains able to answer questions & uses call lights appropriately. Pt does complain of headache described as pressure, see flowsheets. VSS. Pt stated last drink was about 24 hours ago. He stated \"I drink about 12 drinks a day, beer or wine. I have been drinking since I was 22 with some times of not drinking. \"  Pt is considering if he will discharge home with friends or to rehab. CIWA Assessment  BP: 120/65  Pulse: 94  Nausea and Vomitin  Tactile Disturbances: mild itching, pins and needles, burning or numbness  Tremor: severe, even with arms not extended  Auditory Disturbances: not present  Paroxysmal Sweats: 2  Visual Disturbances: mild sensitivity  Anxiety: moderately anxious, or guarded, so anxiety is inferred  Headache, Fullness in Head: moderately severe  Agitation: 2  Orientation and Clouding of Sensorium: oriented and can do serial additions  CIWA-Ar Total: 25    Pt uses call light appropriately; standard safety precautions in place with video monitoring & seizure precautions.       Problem: Discharge Planning:  Goal: Discharged to appropriate level of care  Description: Discharged to appropriate level of care  Outcome: Ongoing     Problem: Pain:  Goal: Pain level will decrease  Description: Pain level will decrease  Outcome: Ongoing  Goal: Control of acute pain  Description: Control of acute pain  Outcome: Ongoing  Goal: Control of chronic pain  Description: Control of chronic pain  Outcome: Ongoing     Problem: Falls - Risk of:  Goal: Will remain free from falls  Description: Will remain free from falls  Outcome: Ongoing  Goal: Absence of physical injury  Description: Absence of physical injury  Outcome: Ongoing

## 2022-03-09 NOTE — PROGRESS NOTES
Pt. Left AMA, pt. Educated on risks by leaving MD JIMI notified of pt.'s leaving, IV and cardiac monitor removed, pt. Refused this RN to take him to the lobby in a wheelchair.

## 2022-04-17 ENCOUNTER — HOSPITAL ENCOUNTER (EMERGENCY)
Age: 32
Discharge: HOME | End: 2022-04-17
Attending: EMERGENCY MEDICINE
Payer: COMMERCIAL

## 2022-04-17 VITALS
HEART RATE: 62 BPM | OXYGEN SATURATION: 94 % | DIASTOLIC BLOOD PRESSURE: 86 MMHG | RESPIRATION RATE: 18 BRPM | WEIGHT: 137.79 LBS | TEMPERATURE: 98.5 F | BODY MASS INDEX: 20.88 KG/M2 | SYSTOLIC BLOOD PRESSURE: 132 MMHG | HEIGHT: 68 IN

## 2022-04-17 DIAGNOSIS — F10.930 ALCOHOL WITHDRAWAL SYNDROME WITHOUT COMPLICATION (HCC): Primary | ICD-10-CM

## 2022-04-17 DIAGNOSIS — F10.10 ALCOHOL ABUSE: ICD-10-CM

## 2022-04-17 LAB
A/G RATIO: 1.6 (ref 1.1–2.2)
ALBUMIN SERPL-MCNC: 4.6 G/DL (ref 3.4–5)
ALP BLD-CCNC: 70 U/L (ref 40–129)
ALT SERPL-CCNC: 18 U/L (ref 10–40)
ANION GAP SERPL CALCULATED.3IONS-SCNC: 19 MMOL/L (ref 3–16)
AST SERPL-CCNC: 24 U/L (ref 15–37)
BASOPHILS ABSOLUTE: 0 K/UL (ref 0–0.2)
BASOPHILS RELATIVE PERCENT: 0.5 %
BILIRUB SERPL-MCNC: 1 MG/DL (ref 0–1)
BUN BLDV-MCNC: 13 MG/DL (ref 7–20)
CALCIUM SERPL-MCNC: 9.9 MG/DL (ref 8.3–10.6)
CHLORIDE BLD-SCNC: 104 MMOL/L (ref 99–110)
CO2: 18 MMOL/L (ref 21–32)
CREAT SERPL-MCNC: 0.7 MG/DL (ref 0.9–1.3)
EOSINOPHILS ABSOLUTE: 0.2 K/UL (ref 0–0.6)
EOSINOPHILS RELATIVE PERCENT: 2.2 %
ETHANOL: NORMAL MG/DL (ref 0–0.08)
GFR AFRICAN AMERICAN: >60
GFR NON-AFRICAN AMERICAN: >60
GLUCOSE BLD-MCNC: 104 MG/DL (ref 70–99)
HCT VFR BLD CALC: 45.3 % (ref 40.5–52.5)
HEMOGLOBIN: 15.9 G/DL (ref 13.5–17.5)
LIPASE: 39 U/L (ref 13–60)
LYMPHOCYTES ABSOLUTE: 3 K/UL (ref 1–5.1)
LYMPHOCYTES RELATIVE PERCENT: 34.8 %
MCH RBC QN AUTO: 31.7 PG (ref 26–34)
MCHC RBC AUTO-ENTMCNC: 35.1 G/DL (ref 31–36)
MCV RBC AUTO: 90.1 FL (ref 80–100)
MONOCYTES ABSOLUTE: 0.8 K/UL (ref 0–1.3)
MONOCYTES RELATIVE PERCENT: 9 %
NEUTROPHILS ABSOLUTE: 4.7 K/UL (ref 1.7–7.7)
NEUTROPHILS RELATIVE PERCENT: 53.5 %
PDW BLD-RTO: 12.6 % (ref 12.4–15.4)
PLATELET # BLD: 252 K/UL (ref 135–450)
PMV BLD AUTO: 7.7 FL (ref 5–10.5)
POTASSIUM REFLEX MAGNESIUM: 3.7 MMOL/L (ref 3.5–5.1)
RBC # BLD: 5.03 M/UL (ref 4.2–5.9)
SODIUM BLD-SCNC: 141 MMOL/L (ref 136–145)
TOTAL PROTEIN: 7.5 G/DL (ref 6.4–8.2)
WBC # BLD: 8.8 K/UL (ref 4–11)

## 2022-04-17 PROCEDURE — 85025 COMPLETE CBC W/AUTO DIFF WBC: CPT

## 2022-04-17 PROCEDURE — 6370000000 HC RX 637 (ALT 250 FOR IP): Performed by: EMERGENCY MEDICINE

## 2022-04-17 PROCEDURE — 83690 ASSAY OF LIPASE: CPT

## 2022-04-17 PROCEDURE — 82077 ASSAY SPEC XCP UR&BREATH IA: CPT

## 2022-04-17 PROCEDURE — 80053 COMPREHEN METABOLIC PANEL: CPT

## 2022-04-17 PROCEDURE — 99285 EMERGENCY DEPT VISIT HI MDM: CPT

## 2022-04-17 RX ORDER — ONDANSETRON 4 MG/1
4 TABLET, ORALLY DISINTEGRATING ORAL ONCE
Status: COMPLETED | OUTPATIENT
Start: 2022-04-17 | End: 2022-04-17

## 2022-04-17 RX ORDER — GAUZE BANDAGE 2" X 2"
100 BANDAGE TOPICAL ONCE
Status: COMPLETED | OUTPATIENT
Start: 2022-04-17 | End: 2022-04-17

## 2022-04-17 RX ORDER — 0.9 % SODIUM CHLORIDE 0.9 %
1000 INTRAVENOUS SOLUTION INTRAVENOUS ONCE
Status: DISCONTINUED | OUTPATIENT
Start: 2022-04-17 | End: 2022-04-17

## 2022-04-17 RX ORDER — FOLIC ACID 1 MG/1
1 TABLET ORAL ONCE
Status: COMPLETED | OUTPATIENT
Start: 2022-04-17 | End: 2022-04-17

## 2022-04-17 RX ORDER — MULTIVITAMIN WITH IRON
1 TABLET ORAL ONCE
Status: COMPLETED | OUTPATIENT
Start: 2022-04-17 | End: 2022-04-17

## 2022-04-17 RX ADMIN — ONDANSETRON 4 MG: 4 TABLET, ORALLY DISINTEGRATING ORAL at 13:25

## 2022-04-17 RX ADMIN — FOLIC ACID 1 MG: 1 TABLET ORAL at 12:45

## 2022-04-17 RX ADMIN — Medication 100 MG: at 12:45

## 2022-04-17 RX ADMIN — THERA TABS 1 TABLET: TAB at 12:45

## 2022-04-17 ASSESSMENT — PAIN - FUNCTIONAL ASSESSMENT
PAIN_FUNCTIONAL_ASSESSMENT: 0-10
PAIN_FUNCTIONAL_ASSESSMENT: 0-10

## 2022-04-17 ASSESSMENT — PAIN DESCRIPTION - DESCRIPTORS: DESCRIPTORS: ACHING;SHARP;THROBBING

## 2022-04-17 ASSESSMENT — PAIN SCALES - GENERAL
PAINLEVEL_OUTOF10: 7
PAINLEVEL_OUTOF10: 5

## 2022-04-17 ASSESSMENT — PAIN DESCRIPTION - LOCATION: LOCATION: ABDOMEN

## 2022-04-17 ASSESSMENT — PAIN DESCRIPTION - PAIN TYPE: TYPE: ACUTE PAIN

## 2022-04-17 ASSESSMENT — PAIN DESCRIPTION - ORIENTATION: ORIENTATION: LEFT

## 2022-04-17 ASSESSMENT — PAIN DESCRIPTION - FREQUENCY: FREQUENCY: CONTINUOUS

## 2022-04-17 NOTE — ED NOTES
D/C: Order noted for d/c. Pt confirmed d/c paperwork   have correct name. Discharge and education instructions reviewed with patient. Teach-back successful. Pt verbalized understanding and signed d/c papers. Pt denied questions at this time. No acute distress noted. Patient instructed to follow-up as noted - return to emergency department if symptoms worsen. Patient verbalized understanding. Discharged per EDMD with discharge instructions. Pt discharged per self to private vehicle. Patient stable upon departure. Thanked patient for choosing [facility] for care.           Obed Sweeney, MARINA  04/17/22 5121

## 2022-04-17 NOTE — ED TRIAGE NOTES
Pt admits self to ED with chief complaint of stomach pain and anxiety and nausea. Hx: pancreatis. Pt is A&ox4, normal breathing, skin is pink dry and warm. Abdominal pain located LLQ and mid. Describes as throbbing and dull. Denies chest pain, SOB, dizziness.

## 2022-04-17 NOTE — CARE COORDINATION
Per physician and RN, pt was given meds to detox orally. The patient is to go to [facility] [address]. This writer has given pt details on location and left contact information. The plan is for pt to coordinate a ride from his case management at [facility] from his residence in [facility].

## 2022-04-17 NOTE — CARE COORDINATION
At bedside. Pt expresses needs for detox and treatment. Pt has entered 3 prior inpatient facilities. This writer gave the patient the option of choice and pt expresses wants to enter [facility] on Monday. This writer will coordinate services. The pt states he is experiencing withdraw symptoms. This writer will advocate for comfort meds, banana bag, and RE:CIWA if needed.

## 2022-04-17 NOTE — ED NOTES
Unable to establish IV access. Multiple attempts made by several RN's w/o success.     Physician consulted     Gorgecristopher Whitten, RN  04/17/22 [address] [facility], RN  04/17/22 0712

## 2022-04-17 NOTE — ED NOTES

## 2022-04-17 NOTE — ED NOTES
Pt has expressed interest in receiving help with his alcohol drinking. Consulted with .       Karlene Steven RN  04/17/22 0337

## 2022-04-17 NOTE — ED PROVIDER NOTES
[facility]    CHIEF COMPLAINT  Alcohol Problem (Has not had any beers since yesterday, states he typically has 12  beers a day. Has been in withdrawl before and had seizures. Itching skin, denies any auditory or visual hallucinations. At least 3-4 episodes of emesis since this morning. Nausea, abdominal pain. )       HISTORY OF PRESENT ILLNESS  Diana Saravia is a 28 y.o. male who presents to the ED with complaint of alcohol withdrawal.  Patient reports drinking 12 beers daily. States last drink was yesterday. Complains of pruritus. Denies auditory or visual hallucinations. Denies chest pain, shortness of breath. Complains of nausea and vomiting. Complains of epigastric abdominal pain, moderate, no exacerbating or mitigating factors, associated with emesis as above. Of note, patient was discharged from hospital 3/9/2022 after admission for alcohol detox. From Discharge Summary:   \"Patient appears likely to be manipulating system, has been caught being completely calm with no signs of any withdrawal and then when nurse come to evaluate will produce tremors and pretended to be in overt withdrawal in order to get more Ativan, demanded to leave AMA after receiving several doses of Ativan, appears he has done this multiple times on previous visits. \"    Patient has an FYI in his chart for drug-seeking behavior. No other complaints, modifying factors or associated symptoms. I have reviewed the following from the nursing documentation:    Past Medical History:   Diagnosis Date    Alcohol abuse     PATIENT HAS EXTREME MANIPULITIVE & DRUG SEEKING BEHAVIOR. HE POCKETS HIS BENZODIZAPINES.  Anxiety     Drug-seeking behavior     Several times found pocketing medications given in hospital    Herpes genitalis in men     Manipulative behavior     Pancreatitis     Pneumonia     Portal vein thrombosis     pt denied    Seizures (HCC)     PER PATIENT ONLY.  DURING MULTIPLE HOSPITALIZATIONS HE HAS NEVER ONCE    Tobacco abuse      Past Surgical History:   Procedure Laterality Date    ENDOSCOPY, COLON, DIAGNOSTIC  10/28/2013    Endoscopic retrograde cholangiopancreatography with pancreatic stent placement    ENDOSCOPY, COLON, DIAGNOSTIC  03/23/2018    Esophagogastroduodenoscopy with biopsy    ERCP  1/10/14    with stent removal     Family History   Problem Relation Age of Onset    Hypertension Father     High Blood Pressure Father     Alcohol Abuse Father     Depression Mother     High Blood Pressure Paternal Grandfather     Alcohol Abuse Paternal Grandfather     Heart Disease Paternal Grandmother     Anemia Paternal Grandmother     Depression Maternal Aunt     Alcohol Abuse Paternal Aunt     Alcohol Abuse Paternal Uncle      Social History     Socioeconomic History    Marital status: Single     Spouse name: Not on file    Number of children: 1    Years of education: 15    Highest education level: Not on file   Occupational History    Occupation:    Tobacco Use    Smoking status: Current Every Day Smoker     Packs/day: 0.50     Years: 10.00     Pack years: 5.00     Types: Cigarettes     Start date: 11/21/2007    Smokeless tobacco: Never Used   Vaping Use    Vaping Use: Former    Substances: Never   Substance and Sexual Activity    Alcohol use: Yes     Comment: 9-12 beers/day    Drug use: No    Sexual activity: Not Currently   Other Topics Concern    Not on file   Social History Narrative    Not on file     Social Determinants of Health     Financial Resource Strain:     Difficulty of Paying Living Expenses: Not on file   Food Insecurity:     Worried About Running Out of Food in the Last Year: Not on file    Shaun of Food in the Last Year: Not on file   Transportation Needs:     Lack of Transportation (Medical): Not on file    Lack of Transportation (Non-Medical):  Not on file   Physical Activity:     Days of Exercise per Week: Not on file    Minutes of Exercise per Session: Not on file   Stress:     Feeling of Stress : Not on file   Social Connections:     Frequency of Communication with Friends and Family: Not on file    Frequency of Social Gatherings with Friends and Family: Not on file    Attends Judaism Services: Not on file    Active Member of Clubs or Organizations: Not on file    Attends Club or Organization Meetings: Not on file    Marital Status: Not on file   Intimate Partner Violence:     Fear of Current or Ex-Partner: Not on file    Emotionally Abused: Not on file    Physically Abused: Not on file    Sexually Abused: Not on file   Housing Stability:     Unable to Pay for Housing in the Last Year: Not on file    Number of [place] in the Last Year: Not on file    Unstable Housing in the Last Year: Not on file     No current facility-administered medications for this encounter. Current Outpatient Medications   Medication Sig Dispense Refill    ondansetron (ZOFRAN ODT) 8 MG TBDP disintegrating tablet Place 1 tablet under the tongue every 8 hours as needed for Nausea or Vomiting 9 tablet 0     Allergies   Allergen Reactions    Amoxicillin Hives    Haldol [Haloperidol Lactate] Other (See Comments)     Muscle spasms    Phenergan [Promethazine Hcl] Other (See Comments)     Pt believes it caused muscle spasms    Shrimp (Diagnostic) Itching      Itching of throat when eating shrimp. Pt states has had IV contrast for CT's without problem before.  Glucosamine Itching    Shellfish-Derived Products      Itching of throat when eating shrimp. REVIEW OF SYSTEMS  10 systems reviewed, pertinent positives and negatives per HPI, otherwise noted to be negative.     PHYSICAL EXAM  ED Triage Vitals [04/17/22 1142]   Enc Vitals Group      BP (!) 162/105      Pulse 97      Resp 16      Temp 98.1 °F (36.7 °C)      Temp Source Oral      SpO2 100 %      Weight 137 lb 12.6 oz (62.5 kg)      Height 5' 8\" (1.727 m)      Head Circumference       Peak Flow       Pain Score       Pain Loc       Pain Edu? Excl. in [address]? General appearance: Awake and alert. Cooperative. No acute distress. HENT: Normocephalic. Atraumatic. Mucous membranes are moist.  Neck: Supple. Eyes: PERRL. EOMI. Heart/Chest: RRR. No murmurs. Lungs: Respirations unlabored. CTAB. Good air exchange. Speaking comfortably in full sentences. Abdomen: Soft. Non-tender. Non-distended. No rebound or guarding. Musculoskeletal: No extremity edema. No deformity. No tenderness in the extremities. All extremities neurovascularly intact. Skin: Warm and dry. No acute rashes. Neurological: Alert and oriented. CN II-XII intact. Strength 5/5 bilateral upper and lower extremities. Sensation intact to light touch. Gait normal.  Psychiatric: Mood/affect: normal      LABS  I have reviewed all labs for this visit.    Results for orders placed or performed during the hospital encounter of 04/17/22   CBC with Auto Differential   Result Value Ref Range    WBC 8.8 4.0 - 11.0 K/uL    RBC 5.03 4.20 - 5.90 M/uL    Hemoglobin 15.9 13.5 - 17.5 g/dL    Hematocrit 45.3 40.5 - 52.5 %    MCV 90.1 80.0 - 100.0 fL    MCH 31.7 26.0 - 34.0 pg    MCHC 35.1 31.0 - 36.0 g/dL    RDW 12.6 12.4 - 15.4 %    Platelets 944 017 - 876 K/uL    MPV 7.7 5.0 - 10.5 fL    Neutrophils % 53.5 %    Lymphocytes % 34.8 %    Monocytes % 9.0 %    Eosinophils % 2.2 %    Basophils % 0.5 %    Neutrophils Absolute 4.7 1.7 - 7.7 K/uL    Lymphocytes Absolute 3.0 1.0 - 5.1 K/uL    Monocytes Absolute 0.8 0.0 - 1.3 K/uL    Eosinophils Absolute 0.2 0.0 - 0.6 K/uL    Basophils Absolute 0.0 0.0 - 0.2 K/uL   Comprehensive Metabolic Panel w/ Reflex to MG   Result Value Ref Range    Sodium 141 136 - 145 mmol/L    Potassium reflex Magnesium 3.7 3.5 - 5.1 mmol/L    Chloride 104 99 - 110 mmol/L    CO2 18 (L) 21 - 32 mmol/L    Anion Gap 19 (H) 3 - 16    Glucose 104 (H) 70 - 99 mg/dL    BUN 13 7 - 20 mg/dL    CREATININE 0.7 (L) 0.9 - 1.3 mg/dL    GFR Non-African American >60 >60    GFR African American >60 >60    Calcium 9.9 8.3 - 10.6 mg/dL    Total Protein 7.5 6.4 - 8.2 g/dL    Albumin 4.6 3.4 - 5.0 g/dL    Albumin/Globulin Ratio 1.6 1.1 - 2.2    Total Bilirubin 1.0 0.0 - 1.0 mg/dL    Alkaline Phosphatase 70 40 - 129 U/L    ALT 18 10 - 40 U/L    AST 24 15 - 37 U/L   Ethanol   Result Value Ref Range    Ethanol Lvl None Detected mg/dL   Lipase   Result Value Ref Range    Lipase 39.0 13.0 - 60.0 U/L       RADIOLOGY  I have reviewed all radiographic studies for this visit. No orders to display        ED COURSE/MDM  Patient seen and evaluated. Old records reviewed. Labs and imaging reviewed and results discussed with patient/family to extent possible. 81-year-old male with history of alcohol abuse and drug-seeking behavior presents with complaint of alcohol withdrawal.  On arrival patient is slightly hypertensive with otherwise reassuring vital signs. No clinically apparent toxidrome. Initial CIWA 8. Oral rally pack was administered. CBC shows no significant leukocytosis or anemia. Renal panel no significant electrolyte abnormalities or creatinine elevation. LFTs and lipase unremarkable. Ethanol not detected. Serial CIWA scores are stable. No indication for treatment or prescriptions for withdrawal medications. Social work will provide substance abuse resources to the patient. Presentation not consistent with pancreatitis. Usual strict return precautions for new or worsening symptoms communicated.       During the patient's ED course, the patient was given:  Medications   thiamine mononitrate tablet 100 mg (100 mg Oral Given 6/61/93 9855)   folic acid (FOLVITE) tablet 1 mg (1 mg Oral Given 4/17/22 1245)   multivitamin 1 tablet (1 tablet Oral Given 4/17/22 1245)   ondansetron (ZOFRAN-ODT) disintegrating tablet 4 mg (4 mg Oral Given 4/17/22 1325)        All questions were answered and the patient/family expressed understanding and agreement with the plan. PROCEDURES  None    CRITICAL CARE  N/A    CLINICAL IMPRESSION  1. Alcohol withdrawal syndrome without complication ([facility] [address]    2. Alcohol abuse        DISPOSITION   discharge     Jeffry Faustin MD    Note: This chart was created using voice recognition dictation software. Efforts were made by me to ensure accuracy, however some errors may be present due to limitations of this technology and occasionally words are not transcribed correctly.         Jeffry Fuastin MD  04/17/22 9022

## 2022-05-27 NOTE — PROGRESS NOTES
At the beginning of the shift the  noticed the Pt was messing with his IV pump. As I entered the room it was noted that the Pt had unscrewed the tubing from his arm and let it fall in the floor. I asked Pt why he did this, he wouldn't answer my question. CIWA scale assessed, see flow sheet and administration of ordered dose. no

## 2022-06-20 ENCOUNTER — HOSPITAL ENCOUNTER (EMERGENCY)
Age: 32
Discharge: HOME | End: 2022-06-20
Attending: EMERGENCY MEDICINE
Payer: COMMERCIAL

## 2022-06-20 VITALS
HEART RATE: 81 BPM | HEIGHT: 68 IN | BODY MASS INDEX: 21.98 KG/M2 | RESPIRATION RATE: 19 BRPM | DIASTOLIC BLOOD PRESSURE: 89 MMHG | SYSTOLIC BLOOD PRESSURE: 125 MMHG | TEMPERATURE: 97.5 F | OXYGEN SATURATION: 100 % | WEIGHT: 145 LBS

## 2022-06-20 DIAGNOSIS — F10.930 ALCOHOL WITHDRAWAL SYNDROME WITHOUT COMPLICATION (HCC): Primary | ICD-10-CM

## 2022-06-20 LAB
A/G RATIO: 1.9 (ref 1.1–2.2)
ALBUMIN SERPL-MCNC: 5.1 G/DL (ref 3.4–5)
ALP BLD-CCNC: 65 U/L (ref 40–129)
ALT SERPL-CCNC: 20 U/L (ref 10–40)
AMPHETAMINE SCREEN, URINE: NORMAL
AMYLASE: 46 U/L (ref 25–115)
ANION GAP SERPL CALCULATED.3IONS-SCNC: 13 MMOL/L (ref 3–16)
AST SERPL-CCNC: 21 U/L (ref 15–37)
BACTERIA: NORMAL /HPF
BARBITURATE SCREEN URINE: NORMAL
BASOPHILS ABSOLUTE: 0 K/UL (ref 0–0.2)
BASOPHILS RELATIVE PERCENT: 0.3 %
BENZODIAZEPINE SCREEN, URINE: NORMAL
BILIRUB SERPL-MCNC: 0.8 MG/DL (ref 0–1)
BILIRUBIN URINE: NEGATIVE
BLOOD, URINE: ABNORMAL
BUN BLDV-MCNC: 13 MG/DL (ref 7–20)
CALCIUM SERPL-MCNC: 10.1 MG/DL (ref 8.3–10.6)
CANNABINOID SCREEN URINE: NORMAL
CHLORIDE BLD-SCNC: 106 MMOL/L (ref 99–110)
CLARITY: CLEAR
CO2: 20 MMOL/L (ref 21–32)
COCAINE METABOLITE SCREEN URINE: NORMAL
COLOR: YELLOW
CREAT SERPL-MCNC: 0.7 MG/DL (ref 0.9–1.3)
EOSINOPHILS ABSOLUTE: 0.2 K/UL (ref 0–0.6)
EOSINOPHILS RELATIVE PERCENT: 2.9 %
EPITHELIAL CELLS, UA: 0 /HPF (ref 0–5)
ETHANOL: NORMAL MG/DL (ref 0–0.08)
GFR AFRICAN AMERICAN: >60
GFR NON-AFRICAN AMERICAN: >60
GLUCOSE BLD-MCNC: 88 MG/DL (ref 70–99)
GLUCOSE URINE: NEGATIVE MG/DL
HCT VFR BLD CALC: 44.4 % (ref 40.5–52.5)
HEMOGLOBIN: 15.2 G/DL (ref 13.5–17.5)
HYALINE CASTS: 0 /LPF (ref 0–8)
KETONES, URINE: NEGATIVE MG/DL
LEUKOCYTE ESTERASE, URINE: NEGATIVE
LIPASE: 30 U/L (ref 13–60)
LYMPHOCYTES ABSOLUTE: 1.5 K/UL (ref 1–5.1)
LYMPHOCYTES RELATIVE PERCENT: 24 %
Lab: NORMAL
MCH RBC QN AUTO: 31.4 PG (ref 26–34)
MCHC RBC AUTO-ENTMCNC: 34.3 G/DL (ref 31–36)
MCV RBC AUTO: 91.6 FL (ref 80–100)
METHADONE SCREEN, URINE: NORMAL
MICROSCOPIC EXAMINATION: YES
MONOCYTES ABSOLUTE: 0.4 K/UL (ref 0–1.3)
MONOCYTES RELATIVE PERCENT: 6.5 %
NEUTROPHILS ABSOLUTE: 4.2 K/UL (ref 1.7–7.7)
NEUTROPHILS RELATIVE PERCENT: 66.3 %
NITRITE, URINE: NEGATIVE
OPIATE SCREEN URINE: NORMAL
OXYCODONE URINE: NORMAL
PDW BLD-RTO: 12.4 % (ref 12.4–15.4)
PH UA: 6.5
PH UA: 6.5 (ref 5–8)
PHENCYCLIDINE SCREEN URINE: NORMAL
PLATELET # BLD: 235 K/UL (ref 135–450)
PMV BLD AUTO: 8.2 FL (ref 5–10.5)
POTASSIUM REFLEX MAGNESIUM: 3.7 MMOL/L (ref 3.5–5.1)
PROPOXYPHENE SCREEN: NORMAL
PROTEIN UA: NEGATIVE MG/DL
RBC # BLD: 4.85 M/UL (ref 4.2–5.9)
RBC UA: 0 /HPF (ref 0–4)
SODIUM BLD-SCNC: 139 MMOL/L (ref 136–145)
SPECIFIC GRAVITY UA: 1.01 (ref 1–1.03)
TOTAL PROTEIN: 7.8 G/DL (ref 6.4–8.2)
URINE TYPE: ABNORMAL
UROBILINOGEN, URINE: 0.2 E.U./DL
WBC # BLD: 6.4 K/UL (ref 4–11)
WBC UA: 0 /HPF (ref 0–5)

## 2022-06-20 PROCEDURE — 6370000000 HC RX 637 (ALT 250 FOR IP): Performed by: EMERGENCY MEDICINE

## 2022-06-20 PROCEDURE — 99284 EMERGENCY DEPT VISIT MOD MDM: CPT

## 2022-06-20 PROCEDURE — 6360000002 HC RX W HCPCS: Performed by: EMERGENCY MEDICINE

## 2022-06-20 PROCEDURE — 80307 DRUG TEST PRSMV CHEM ANLYZR: CPT

## 2022-06-20 PROCEDURE — 82077 ASSAY SPEC XCP UR&BREATH IA: CPT

## 2022-06-20 PROCEDURE — 81001 URINALYSIS AUTO W/SCOPE: CPT

## 2022-06-20 PROCEDURE — 83690 ASSAY OF LIPASE: CPT

## 2022-06-20 PROCEDURE — 87086 URINE CULTURE/COLONY COUNT: CPT

## 2022-06-20 PROCEDURE — 96374 THER/PROPH/DIAG INJ IV PUSH: CPT

## 2022-06-20 PROCEDURE — 82150 ASSAY OF AMYLASE: CPT

## 2022-06-20 PROCEDURE — 85025 COMPLETE CBC W/AUTO DIFF WBC: CPT

## 2022-06-20 PROCEDURE — 80053 COMPREHEN METABOLIC PANEL: CPT

## 2022-06-20 RX ORDER — DICYCLOMINE HYDROCHLORIDE 10 MG/1
10 CAPSULE ORAL ONCE
Status: COMPLETED | OUTPATIENT
Start: 2022-06-20 | End: 2022-06-20

## 2022-06-20 RX ORDER — MAGNESIUM HYDROXIDE/ALUMINUM HYDROXICE/SIMETHICONE 120; 1200; 1200 MG/30ML; MG/30ML; MG/30ML
SUSPENSION ORAL
Status: DISCONTINUED
Start: 2022-06-20 | End: 2022-06-20 | Stop reason: HOSPADM

## 2022-06-20 RX ORDER — ONDANSETRON 4 MG/1
4 TABLET, ORALLY DISINTEGRATING ORAL ONCE
Status: COMPLETED | OUTPATIENT
Start: 2022-06-20 | End: 2022-06-20

## 2022-06-20 RX ORDER — IBUPROFEN 800 MG/1
800 TABLET ORAL ONCE
Status: COMPLETED | OUTPATIENT
Start: 2022-06-20 | End: 2022-06-20

## 2022-06-20 RX ORDER — CHLORDIAZEPOXIDE HYDROCHLORIDE 25 MG/1
CAPSULE, GELATIN COATED ORAL
Qty: 12 CAPSULE | Refills: 0 | Status: SHIPPED | OUTPATIENT
Start: 2022-06-20 | End: 2022-06-26

## 2022-06-20 RX ORDER — LORAZEPAM 2 MG/ML
1 INJECTION INTRAMUSCULAR ONCE
Status: COMPLETED | OUTPATIENT
Start: 2022-06-20 | End: 2022-06-20

## 2022-06-20 RX ADMIN — IBUPROFEN 800 MG: 800 TABLET, FILM COATED ORAL at 19:24

## 2022-06-20 RX ADMIN — DICYCLOMINE HYDROCHLORIDE 10 MG: 10 CAPSULE ORAL at 19:24

## 2022-06-20 RX ADMIN — LIDOCAINE HYDROCHLORIDE: 20 SOLUTION TOPICAL at 19:23

## 2022-06-20 RX ADMIN — LORAZEPAM 1 MG: 2 INJECTION INTRAMUSCULAR; INTRAVENOUS at 20:52

## 2022-06-20 RX ADMIN — ONDANSETRON 4 MG: 4 TABLET, ORALLY DISINTEGRATING ORAL at 19:24

## 2022-06-20 ASSESSMENT — PAIN DESCRIPTION - LOCATION
LOCATION: ABDOMEN;HEAD
LOCATION: ABDOMEN

## 2022-06-20 ASSESSMENT — PAIN - FUNCTIONAL ASSESSMENT
PAIN_FUNCTIONAL_ASSESSMENT: 0-10
PAIN_FUNCTIONAL_ASSESSMENT: PREVENTS OR INTERFERES SOME ACTIVE ACTIVITIES AND ADLS

## 2022-06-20 ASSESSMENT — PAIN SCALES - GENERAL
PAINLEVEL_OUTOF10: 8
PAINLEVEL_OUTOF10: 8

## 2022-06-20 ASSESSMENT — PAIN DESCRIPTION - DESCRIPTORS
DESCRIPTORS: ACHING
DESCRIPTORS: ACHING;SHARP

## 2022-06-20 ASSESSMENT — PAIN DESCRIPTION - ORIENTATION: ORIENTATION: LEFT;MID

## 2022-06-20 ASSESSMENT — PAIN DESCRIPTION - PAIN TYPE: TYPE: ACUTE PAIN

## 2022-06-20 NOTE — ED PROVIDER NOTES
[address] [facility]        Pt Name: Meka Powell  MRN: [id number]  [place] 1990  Date of evaluation: 6/20/2022  Provider: Destiny Cervantes MD  PCP: No primary care provider on file. This patient was seen and evaluated by the attending physician Destiny Cervantes MD.      Swetha Shorten       Chief Complaint   Patient presents with    Abdominal Pain     arrived per [facility] d/t abd pain and headache that started earlier today;        HISTORY OF PRESENT ILLNESS   (Location/Symptom, Timing/Onset, Context/Setting, Quality, Duration, Modifying Factors, Severity)  Note limiting factors. Meka Powell is a 28 y.o. male brought in today via [facility] with a chief complaint of abdominal pain and headache beginning earlier on today. He does not give much history to work with but does admit to history of pancreatitis. Denies any recent drug or alcohol abuse. From review of records has a longstanding history of polysubstance abuse history of alcohol with rather spurious behavior. Nursing Notes were all reviewed and agreed with or any disagreements were addressed  in the HPI. REVIEW OF SYSTEMS    (2-9 systems for level 4, 10 or more for level 5)     Review of Systems    Positives and Pertinent negatives as per HPI. Except as noted abovein the ROS, all other systems were reviewed and negative. PAST MEDICAL HISTORY     Past Medical History:   Diagnosis Date    Alcohol abuse     PATIENT HAS EXTREME MANIPULITIVE & DRUG SEEKING BEHAVIOR. HE POCKETS HIS BENZODIZAPINES.  Anxiety     Drug-seeking behavior     Several times found pocketing medications given in hospital    Herpes genitalis in men     Manipulative behavior     Pancreatitis     Pneumonia     Portal vein thrombosis     pt denied    Seizures (HCC)     PER PATIENT ONLY.  DURING MULTIPLE HOSPITALIZATIONS HE HAS NEVER ONCE    Tobacco abuse          SURGICAL HISTORY     Past Surgical History:   Procedure Laterality Date    ENDOSCOPY, COLON, DIAGNOSTIC  10/28/2013    Endoscopic retrograde cholangiopancreatography with pancreatic stent placement    ENDOSCOPY, COLON, DIAGNOSTIC  03/23/2018    Esophagogastroduodenoscopy with biopsy    ERCP  1/10/14    with stent removal         CURRENTMEDICATIONS       Previous Medications    ONDANSETRON (ZOFRAN ODT) 8 MG TBDP DISINTEGRATING TABLET    Place 1 tablet under the tongue every 8 hours as needed for Nausea or Vomiting         ALLERGIES     Amoxicillin, Haldol [haloperidol lactate], Phenergan [promethazine hcl], Shrimp (diagnostic), Glucosamine, and Shellfish-derived products    FAMILYHISTORY       Family History   Problem Relation Age of Onset    Hypertension Father     High Blood Pressure Father     Alcohol Abuse Father     Depression Mother     High Blood Pressure Paternal Grandfather     Alcohol Abuse Paternal Grandfather     Heart Disease Paternal Grandmother     Anemia Paternal Grandmother     Depression Maternal Aunt     Alcohol Abuse Paternal Aunt     Alcohol Abuse Paternal Uncle           SOCIAL HISTORY       Social History     Socioeconomic History    Marital status: Single     Spouse name: None    Number of children: 1    Years of education: 15    Highest education level: None   Occupational History    Occupation:    Tobacco Use    Smoking status: Current Every Day Smoker     Packs/day: 0.50     Years: 10.00     Pack years: 5.00     Types: Cigarettes     Start date: 11/21/2007    Smokeless tobacco: Never Used   Vaping Use    Vaping Use: Former    Substances: Never   Substance and Sexual Activity    Alcohol use: Yes     Comment: 9-12 beers/day    Drug use: No    Sexual activity: Not Currently   Other Topics Concern    None   Social History Narrative    None     Social Determinants of Health     Financial Resource Strain:     Difficulty of Paying Living Expenses: Not on file   Food Insecurity:     Worried About [facility] in the Last Year: Not on file    Shaun of Food in the Last Year: Not on file   Transportation Needs:     Lack of Transportation (Medical): Not on file    Lack of Transportation (Non-Medical): Not on file   Physical Activity:     Days of Exercise per Week: Not on file    Minutes of Exercise per Session: Not on file   Stress:     Feeling of Stress : Not on file   Social Connections:     Frequency of Communication with Friends and Family: Not on file    Frequency of Social Gatherings with Friends and Family: Not on file    Attends Oriental orthodox Services: Not on file    Active Member of [address] [place] [address] or Organizations: Not on file    Attends Club or Organization Meetings: Not on file    Marital Status: Not on file   Intimate Partner Violence:     Fear of Current or Ex-Partner: Not on file    Emotionally Abused: Not on file    Physically Abused: Not on file    Sexually Abused: Not on file   Housing Stability:     Unable to Pay for Housing in the Last Year: Not on file    Number of [place] in the Last Year: Not on file    Unstable Housing in the Last Year: Not on file       SCREENINGS    [place] Coma Scale  Eye Opening: Spontaneous  Best Verbal Response: Oriented  Best Motor Response: Obeys commands  [place] Coma Scale Score: 15        PHYSICAL EXAM    (up to 7 for level 4, 8 or more for level 5)     ED Triage Vitals [06/20/22 1809]   BP Temp Temp Source Heart Rate Resp SpO2 Height Weight   133/79 97.3 °F (36.3 °C) Oral 80 18 100 % 5' 8\" (1.727 m) 145 lb (65.8 kg)       Physical Exam     General Appearance:  Alert, cooperative, no distress, appears stated age. Head:  Normocephalic, without obvious abnormality, atraumatic. Eyes:  conjunctiva/corneas clear, EOM's intact. Sclera anicteric. ENT: Mucous membranes moist.   Neck: Supple, symmetrical, trachea midline, no adenopathy. No jugular venous distention. Lungs:   No Respiratory Distress.    Chest Wall:  Atraumatic Heart:  RRR   Abdomen:   Soft, NT, ND   Extremities:  Full range of motion. Pulses: Symmetric x4   Skin:  No rashes or lesions to exposed skin. Neurologic: Alert and oriented X 3. Motor grossly normal.  Speech clear. DIAGNOSTIC RESULTS   LABS:    Labs Reviewed   COMPREHENSIVE METABOLIC PANEL W/ REFLEX TO MG FOR LOW K - Abnormal; Notable for the following components:       Result Value    CO2 20 (*)     CREATININE 0.7 (*)     Albumin 5.1 (*)     All other components within normal limits   CULTURE, URINE   CBC WITH AUTO DIFFERENTIAL   LIPASE   AMYLASE   URINALYSIS   ETHANOL   URINE DRUG SCREEN       All other labs were within normal range or not returned as of this dictation. EKG: All EKG's are interpreted by the Emergency Department Physician in the absence of a cardiologist.  Please see their note for interpretation of EKG. RADIOLOGY:   Non-plain film images such as CT, Ultrasound and MRI are read by the radiologist. Plain radiographic images are visualized andpreliminarily interpreted by the  ED Provider with the below findings:        Interpretation perthe Radiologist below, if available at the time of this note:    No orders to display     No results found.         PROCEDURES   Unless otherwise noted below, none     Procedures    CRITICAL CARE TIME   N/A    CONSULTS:  None      EMERGENCY DEPARTMENT COURSE and DIFFERENTIAL DIAGNOSIS/MDM:   Vitals:    Vitals:    06/20/22 1809 06/20/22 1814   BP: 133/79 133/79   Pulse: 80 80   Resp: 18    Temp: 97.3 °F (36.3 °C)    TempSrc: Oral    SpO2: 100%    Weight: 145 lb (65.8 kg)    Height: 5' 8\" (1.727 m)        Patient was given thefollowing medications:  Medications   aluminum & magnesium hydroxide-simethicone (MAALOX) 880-970-03 MG/5ML suspension (  Not Given 6/20/22 1927)   LORazepam (ATIVAN) injection 1 mg (has no administration in time range)   ondansetron (ZOFRAN-ODT) disintegrating tablet 4 mg (4 mg Oral Given 6/20/22 1924)   dicyclomine (BENTYL) capsule 10 mg (10 mg Oral Given 6/20/22 1924)   aluminum & magnesium hydroxide-simethicone (MAALOX) 30 mL, lidocaine viscous hcl (XYLOCAINE) 5 mL (GI COCKTAIL) ( Oral Given 6/20/22 1923)   ibuprofen (ADVIL;MOTRIN) tablet 800 mg (800 mg Oral Given 6/20/22 1924)       81-year-old male with chief complaint of abdominal pain beginning earlier on today. He does not give me much of history as far as onset duration quality radiation exacerbation alleviation. Clinically benign examination. In for labs per the above. Oral medications as per the above. Work-up is reviewed and is benign. Patient now admits even drinking about a case of beer a day for the past few weeks and is likely in alcohol withdrawal.  This is unsurprising. I will give him some Ativan here x1 and put on a Librium taper. No seizures, mild withdrawal  DC home. Is this patient to be included in the SEP-1 Core Measure? No   Exclusion criteria - the patient is NOT to be included for SEP-1 Core Measure due to: Infection is not suspected     FINAL IMPRESSION      1. Alcohol withdrawal syndrome without complication [facility])          DISPOSITION/PLAN   DISPOSITION        PATIENT REFERREDTO:  [facility] Pre-Services  [phone]          DISCHARGE MEDICATIONS:  New Prescriptions    CHLORDIAZEPOXIDE (LIBRIUM) 25 MG CAPSULE    Take 1 capsule by mouth 3 times daily as needed (ALcohol withdrawal) for 2 days, THEN 1 capsule 2 times daily as needed (ALcohol withdrawal) for 2 days, THEN 1 capsule daily as needed (ALcohol withdrawal) for up to 2 days.        DISCONTINUED MEDICATIONS:  Discontinued Medications    No medications on file              (Please note that portions ofthis note were completed with a voice recognition program.  Efforts were made to edit the dictations but occasionally words are mis-transcribed.)    Raghavendra Plascencia MD (electronically signed)           Raghavendra Plascencia MD  06/20/22 2039

## 2022-06-21 LAB — URINE CULTURE, ROUTINE: NORMAL

## 2022-07-11 ENCOUNTER — HOSPITAL ENCOUNTER (EMERGENCY)
Age: 32
Discharge: HOME | End: 2022-07-11
Attending: EMERGENCY MEDICINE
Payer: COMMERCIAL

## 2022-07-11 ENCOUNTER — APPOINTMENT (OUTPATIENT)
Dept: GENERAL RADIOLOGY | Age: 32
End: 2022-07-11
Payer: COMMERCIAL

## 2022-07-11 VITALS
RESPIRATION RATE: 20 BRPM | SYSTOLIC BLOOD PRESSURE: 142 MMHG | DIASTOLIC BLOOD PRESSURE: 85 MMHG | TEMPERATURE: 97.9 F | WEIGHT: 150.2 LBS | BODY MASS INDEX: 22.84 KG/M2 | OXYGEN SATURATION: 97 % | HEART RATE: 72 BPM

## 2022-07-11 DIAGNOSIS — R51.9 ACUTE NONINTRACTABLE HEADACHE, UNSPECIFIED HEADACHE TYPE: ICD-10-CM

## 2022-07-11 DIAGNOSIS — R42 DIZZINESS: ICD-10-CM

## 2022-07-11 DIAGNOSIS — F10.930 ALCOHOL WITHDRAWAL SYNDROME WITHOUT COMPLICATION (HCC): Primary | ICD-10-CM

## 2022-07-11 DIAGNOSIS — F10.10 ALCOHOL ABUSE, CONTINUOUS: ICD-10-CM

## 2022-07-11 DIAGNOSIS — R11.0 NAUSEA: ICD-10-CM

## 2022-07-11 LAB
A/G RATIO: 2.4 (ref 1.1–2.2)
ACETAMINOPHEN LEVEL: <5 UG/ML (ref 10–30)
ALBUMIN SERPL-MCNC: 5.3 G/DL (ref 3.4–5)
ALP BLD-CCNC: 63 U/L (ref 40–129)
ALT SERPL-CCNC: 23 U/L (ref 10–40)
AMMONIA: 47 UMOL/L (ref 16–60)
AMPHETAMINE SCREEN, URINE: NORMAL
ANION GAP SERPL CALCULATED.3IONS-SCNC: 10 MMOL/L (ref 3–16)
AST SERPL-CCNC: 23 U/L (ref 15–37)
BARBITURATE SCREEN URINE: NORMAL
BASOPHILS ABSOLUTE: 0 K/UL (ref 0–0.2)
BASOPHILS RELATIVE PERCENT: 0.4 %
BENZODIAZEPINE SCREEN, URINE: NORMAL
BILIRUB SERPL-MCNC: 0.4 MG/DL (ref 0–1)
BILIRUBIN URINE: NEGATIVE
BLOOD, URINE: NEGATIVE
BUN BLDV-MCNC: 15 MG/DL (ref 7–20)
CALCIUM SERPL-MCNC: 9.7 MG/DL (ref 8.3–10.6)
CANNABINOID SCREEN URINE: NORMAL
CHLORIDE BLD-SCNC: 106 MMOL/L (ref 99–110)
CLARITY: CLEAR
CO2: 21 MMOL/L (ref 21–32)
COCAINE METABOLITE SCREEN URINE: NORMAL
COLOR: YELLOW
CREAT SERPL-MCNC: 0.7 MG/DL (ref 0.9–1.3)
EKG ATRIAL RATE: 69 BPM
EKG DIAGNOSIS: NORMAL
EKG P AXIS: 63 DEGREES
EKG P-R INTERVAL: 170 MS
EKG Q-T INTERVAL: 374 MS
EKG QRS DURATION: 90 MS
EKG QTC CALCULATION (BAZETT): 400 MS
EKG R AXIS: 90 DEGREES
EKG T AXIS: 63 DEGREES
EKG VENTRICULAR RATE: 69 BPM
EOSINOPHILS ABSOLUTE: 0.2 K/UL (ref 0–0.6)
EOSINOPHILS RELATIVE PERCENT: 3.2 %
ETHANOL: NORMAL MG/DL (ref 0–0.08)
GFR AFRICAN AMERICAN: >60
GFR NON-AFRICAN AMERICAN: >60
GLUCOSE BLD-MCNC: 87 MG/DL (ref 70–99)
GLUCOSE URINE: NEGATIVE MG/DL
HCT VFR BLD CALC: 44.3 % (ref 40.5–52.5)
HEMOGLOBIN: 15.2 G/DL (ref 13.5–17.5)
KETONES, URINE: NEGATIVE MG/DL
LEUKOCYTE ESTERASE, URINE: NEGATIVE
LIPASE: 27 U/L (ref 13–60)
LYMPHOCYTES ABSOLUTE: 2.5 K/UL (ref 1–5.1)
LYMPHOCYTES RELATIVE PERCENT: 33.3 %
Lab: NORMAL
MAGNESIUM: 2.4 MG/DL (ref 1.8–2.4)
MCH RBC QN AUTO: 31.7 PG (ref 26–34)
MCHC RBC AUTO-ENTMCNC: 34.3 G/DL (ref 31–36)
MCV RBC AUTO: 92.3 FL (ref 80–100)
METHADONE SCREEN, URINE: NORMAL
MICROSCOPIC EXAMINATION: NORMAL
MONOCYTES ABSOLUTE: 0.7 K/UL (ref 0–1.3)
MONOCYTES RELATIVE PERCENT: 9.6 %
NEUTROPHILS ABSOLUTE: 4.1 K/UL (ref 1.7–7.7)
NEUTROPHILS RELATIVE PERCENT: 53.5 %
NITRITE, URINE: NEGATIVE
OPIATE SCREEN URINE: NORMAL
OXYCODONE URINE: NORMAL
PDW BLD-RTO: 12.1 % (ref 12.4–15.4)
PH UA: 6
PH UA: 6 (ref 5–8)
PHENCYCLIDINE SCREEN URINE: NORMAL
PLATELET # BLD: 207 K/UL (ref 135–450)
PMV BLD AUTO: 8.3 FL (ref 5–10.5)
POTASSIUM SERPL-SCNC: 3.8 MMOL/L (ref 3.5–5.1)
PRO-BNP: 16 PG/ML (ref 0–124)
PROPOXYPHENE SCREEN: NORMAL
PROTEIN UA: NEGATIVE MG/DL
RBC # BLD: 4.8 M/UL (ref 4.2–5.9)
SALICYLATE, SERUM: <0.3 MG/DL (ref 15–30)
SODIUM BLD-SCNC: 137 MMOL/L (ref 136–145)
SPECIFIC GRAVITY UA: 1.01 (ref 1–1.03)
TOTAL PROTEIN: 7.5 G/DL (ref 6.4–8.2)
TROPONIN: <0.01 NG/ML
URINE REFLEX TO CULTURE: NORMAL
URINE TYPE: NORMAL
UROBILINOGEN, URINE: 0.2 E.U./DL
WBC # BLD: 7.6 K/UL (ref 4–11)

## 2022-07-11 PROCEDURE — 83690 ASSAY OF LIPASE: CPT

## 2022-07-11 PROCEDURE — 99285 EMERGENCY DEPT VISIT HI MDM: CPT

## 2022-07-11 PROCEDURE — 82140 ASSAY OF AMMONIA: CPT

## 2022-07-11 PROCEDURE — 80053 COMPREHEN METABOLIC PANEL: CPT

## 2022-07-11 PROCEDURE — 80307 DRUG TEST PRSMV CHEM ANLYZR: CPT

## 2022-07-11 PROCEDURE — 93010 ELECTROCARDIOGRAM REPORT: CPT | Performed by: INTERNAL MEDICINE

## 2022-07-11 PROCEDURE — 85025 COMPLETE CBC W/AUTO DIFF WBC: CPT

## 2022-07-11 PROCEDURE — 71045 X-RAY EXAM CHEST 1 VIEW: CPT

## 2022-07-11 PROCEDURE — 6370000000 HC RX 637 (ALT 250 FOR IP): Performed by: PHYSICIAN ASSISTANT

## 2022-07-11 PROCEDURE — 80179 DRUG ASSAY SALICYLATE: CPT

## 2022-07-11 PROCEDURE — 83735 ASSAY OF MAGNESIUM: CPT

## 2022-07-11 PROCEDURE — 84484 ASSAY OF TROPONIN QUANT: CPT

## 2022-07-11 PROCEDURE — 83880 ASSAY OF NATRIURETIC PEPTIDE: CPT

## 2022-07-11 PROCEDURE — 93005 ELECTROCARDIOGRAM TRACING: CPT | Performed by: PHYSICIAN ASSISTANT

## 2022-07-11 PROCEDURE — 82077 ASSAY SPEC XCP UR&BREATH IA: CPT

## 2022-07-11 PROCEDURE — 81003 URINALYSIS AUTO W/O SCOPE: CPT

## 2022-07-11 PROCEDURE — 80143 DRUG ASSAY ACETAMINOPHEN: CPT

## 2022-07-11 RX ORDER — ONDANSETRON 4 MG/1
4-8 TABLET, ORALLY DISINTEGRATING ORAL EVERY 12 HOURS PRN
Qty: 12 TABLET | Refills: 0 | Status: SHIPPED | OUTPATIENT
Start: 2022-07-11

## 2022-07-11 RX ORDER — ONDANSETRON 4 MG/1
8 TABLET, ORALLY DISINTEGRATING ORAL ONCE
Status: COMPLETED | OUTPATIENT
Start: 2022-07-11 | End: 2022-07-11

## 2022-07-11 RX ORDER — ONDANSETRON 4 MG/1
4-8 TABLET, ORALLY DISINTEGRATING ORAL EVERY 12 HOURS PRN
Qty: 12 TABLET | Refills: 0 | Status: SHIPPED | OUTPATIENT
Start: 2022-07-11 | End: 2022-07-11 | Stop reason: SDUPTHER

## 2022-07-11 RX ORDER — MECLIZINE HCL 12.5 MG/1
12.5 TABLET ORAL 3 TIMES DAILY PRN
Qty: 15 TABLET | Refills: 0 | Status: SHIPPED | OUTPATIENT
Start: 2022-07-11 | End: 2022-07-11 | Stop reason: SDUPTHER

## 2022-07-11 RX ORDER — HYDROXYZINE PAMOATE 25 MG/1
50 CAPSULE ORAL ONCE
Status: COMPLETED | OUTPATIENT
Start: 2022-07-11 | End: 2022-07-11

## 2022-07-11 RX ORDER — MECLIZINE HCL 12.5 MG/1
12.5 TABLET ORAL 3 TIMES DAILY PRN
Qty: 15 TABLET | Refills: 0 | Status: SHIPPED | OUTPATIENT
Start: 2022-07-11 | End: 2022-07-21

## 2022-07-11 RX ADMIN — HYDROXYZINE PAMOATE 50 MG: 25 CAPSULE ORAL at 09:53

## 2022-07-11 RX ADMIN — ONDANSETRON 8 MG: 4 TABLET, ORALLY DISINTEGRATING ORAL at 09:53

## 2022-07-11 ASSESSMENT — ENCOUNTER SYMPTOMS
DIARRHEA: 0
VOMITING: 0
CONSTIPATION: 0
SHORTNESS OF BREATH: 0
ABDOMINAL PAIN: 0
COLOR CHANGE: 0
BACK PAIN: 0
COUGH: 0
NAUSEA: 1

## 2022-07-11 ASSESSMENT — PAIN - FUNCTIONAL ASSESSMENT: PAIN_FUNCTIONAL_ASSESSMENT: NONE - DENIES PAIN

## 2022-07-11 ASSESSMENT — LIFESTYLE VARIABLES
HOW OFTEN DO YOU HAVE A DRINK CONTAINING ALCOHOL: 4 OR MORE TIMES A WEEK
HOW MANY STANDARD DRINKS CONTAINING ALCOHOL DO YOU HAVE ON A TYPICAL DAY: 10 OR MORE

## 2022-07-11 NOTE — ED PROVIDER NOTES
This patient was seen by the Mid-Level Provider. I have seen and examined the patient, agree with the workup, evaluation, management and diagnosis. Care plan has been discussed. My assessment reveals a 78-year-old male who presents with dizziness. This is a 78-year-old male with a history of drug and alcohol abuse who presents with dizziness. The patient states he last drank around a day ago or 24 hours ago. He states he drinks 12 beers a day. He complains of some lightheadedness and dizziness today. He denies other complaints. He denies any chest pain or shortness of breath. He states he has been here several times for alcohol withdrawal in the past.  Patient states he wants to quit drinking alcohol. Radiology results:    XR CHEST PORTABLE   Final Result   Normal               LABS:    Labs Reviewed   CBC WITH AUTO DIFFERENTIAL - Abnormal; Notable for the following components:       Result Value    RDW 12.1 (*)     All other components within normal limits   COMPREHENSIVE METABOLIC PANEL - Abnormal; Notable for the following components:    CREATININE 0.7 (*)     Albumin 5.3 (*)     Albumin/Globulin Ratio 2.4 (*)     All other components within normal limits   ACETAMINOPHEN LEVEL - Abnormal; Notable for the following components:    Acetaminophen Level <5 (*)     All other components within normal limits   SALICYLATE LEVEL - Abnormal; Notable for the following components:    Salicylate, Serum <3.5 (*)     All other components within normal limits   MAGNESIUM   LIPASE   TROPONIN   BRAIN NATRIURETIC PEPTIDE   ETHANOL   AMMONIA   URINALYSIS WITH REFLEX TO CULTURE   URINE DRUG SCREEN           EKG:    Sinus rhythm at a rate of 69 beats a minute with no acute ST elevations or depressions or pathologic Q waves. Normal axis. Exam:    Well-nourished male in no acute distress. He was neurologic intact with no focal findings. He was not tremulous.   He had a normal neurological exam.  He had no other focal findings. Medical decision makin-year-old male presents with a history of alcohol and drug abuse. The patient does not appear to be in withdrawal.  He is hemodynamically stable. The patient be given some resources for drug and alcohol abuse. He is return for any problems or worsening of symptoms. He was discharged in stable condition. FINAL IMPRESSION:    1. Alcohol withdrawal syndrome without complication ([address]    2. Dizziness    3. Acute nonintractable headache, unspecified headache type    4. Nausea    5.  Alcohol abuse, continuous            Galina Carreno MD  [phone]

## 2022-07-11 NOTE — ED NOTES
Provided resources for inpatient and outpatient drug and alcohol treatment services.       Anthony Ocasio  07/11/22 2738

## 2022-07-11 NOTE — ED PROVIDER NOTES
[facility]        Pt Name: Gabi Wilkinson  MRN: [id number]  [place] 1990  Date of evaluation: 7/11/2022  Provider: Radha Orosco PA-C  PCP: No primary care provider on file. Note Started: 9:45 AM EDT        I have seen and evaluated this patient with my supervising physician Nahomi Wilcox MD.    [address] [place] [address]       Chief Complaint   Patient presents with    Dizziness     c/o light headed and dizziness. Pt drinks heavily \"I think its alochol withdraw\". Drinks 12 beers a day. Last drink over 24 hours ago       HISTORY OF PRESENT ILLNESS   (Location, Timing/Onset, Context/Setting, Quality, Duration, Modifying Factors, Severity, Associated Signs and Symptoms)  Note limiting factors. Chief Complaint: Headache, dizziness, nausea    Gabi Wilkinson is a 28 y.o. male who presents to the emergency department complaining of headache, dizziness and nausea for 2 days. He states that he has not drank any alcohol in the past 24 hours. He typically drinks 12 pack of beer daily. Patient has been seen numerous times in the emergency department for alcohol withdrawal.  He claims that he wants to quit drinking alcohol. He is sitting comfortably in bed and does not appear to be in any acute distress. He was able to ambulate into the emergency department with steady gait. Denies vomiting, diarrhea, constipation, chest pain or abdominal pain. Nursing Notes were all reviewed and agreed with or any disagreements were addressed in the HPI. REVIEW OF SYSTEMS    (2-9 systems for level 4, 10 or more for level 5)     Review of Systems   Constitutional: Negative for chills and fever. HENT: Negative. Eyes: Negative for visual disturbance. Respiratory: Negative for cough and shortness of breath. Cardiovascular: Negative for chest pain. Gastrointestinal: Positive for nausea.  Negative for abdominal pain, constipation, diarrhea and vomiting. Genitourinary: Negative. Musculoskeletal: Negative for back pain, neck pain and neck stiffness. Skin: Negative for color change, pallor, rash and wound. Neurological: Positive for dizziness and headaches. Negative for tremors, seizures, syncope, facial asymmetry, speech difficulty, weakness, light-headedness and numbness. Psychiatric/Behavioral: Negative for confusion. The patient is nervous/anxious. All other systems reviewed and are negative. Positives and Pertinent negatives as per HPI. Except as noted above in the ROS, all other systems were reviewed and negative. PAST MEDICAL HISTORY     Past Medical History:   Diagnosis Date    Alcohol abuse     PATIENT HAS EXTREME MANIPULITIVE & DRUG SEEKING BEHAVIOR. HE POCKETS HIS BENZODIZAPINES.  Anxiety     Drug-seeking behavior     Several times found pocketing medications given in hospital    Herpes genitalis in men     Manipulative behavior     Pancreatitis     Pneumonia     Portal vein thrombosis     pt denied    Seizures (HCC)     PER PATIENT ONLY.  DURING MULTIPLE HOSPITALIZATIONS HE HAS NEVER ONCE    Tobacco abuse          SURGICAL HISTORY     Past Surgical History:   Procedure Laterality Date    ENDOSCOPY, COLON, DIAGNOSTIC  10/28/2013    Endoscopic retrograde cholangiopancreatography with pancreatic stent placement    ENDOSCOPY, COLON, DIAGNOSTIC  03/23/2018    Esophagogastroduodenoscopy with biopsy    ERCP  1/10/14    with stent removal         CURRENTMEDICATIONS       Previous Medications    No medications on file         ALLERGIES     Amoxicillin, Haldol [haloperidol lactate], Phenergan [promethazine hcl], Shrimp (diagnostic), Glucosamine, and Shellfish-derived products    FAMILYHISTORY       Family History   Problem Relation Age of Onset    Hypertension Father     High Blood Pressure Father     Alcohol Abuse Father     Depression Mother     High Blood Pressure Paternal Grandfather     Alcohol Abuse Paternal Grandfather     Heart Disease Paternal Grandmother     Anemia Paternal Grandmother     Depression Maternal Aunt     Alcohol Abuse Paternal Aunt     Alcohol Abuse Paternal Uncle           SOCIAL HISTORY       Social History     Tobacco Use    Smoking status: Current Every Day Smoker     Packs/day: 0.50     Years: 10.00     Pack years: 5.00     Types: Cigarettes     Start date: 11/21/2007    Smokeless tobacco: Never Used   Vaping Use    Vaping Use: Former    Substances: Never   Substance Use Topics    Alcohol use: Yes     Comment: 9-12 beers/day    Drug use: No       SCREENINGS    [employer] Coma Scale  Eye Opening: Spontaneous  Best Verbal Response: Oriented  Best Motor Response: Obeys commands  [employer] Coma Scale Score: 15        PHYSICAL EXAM    (up to 7 for level 4, 8 or more for level 5)     ED Triage Vitals   BP Temp Temp src Pulse Resp SpO2 Height Weight   -- -- -- -- -- -- -- --       Physical Exam  Vitals and nursing note reviewed. Constitutional:       Appearance: Normal appearance. He is well-developed. He is not toxic-appearing or diaphoretic. HENT:      Head: Normocephalic and atraumatic. Jaw: There is normal jaw occlusion. Right Ear: Hearing, tympanic membrane, ear canal and external ear normal.      Left Ear: Hearing, tympanic membrane, ear canal and external ear normal.      Nose: Nose normal.      Right Sinus: No maxillary sinus tenderness or frontal sinus tenderness. Left Sinus: No maxillary sinus tenderness or frontal sinus tenderness. Mouth/Throat:      Lips: Pink. Mouth: Mucous membranes are moist. No oral lesions or angioedema. Dentition: No dental tenderness or dental abscesses. Pharynx: Oropharynx is clear. Uvula midline. Eyes:      General: No scleral icterus. Right eye: No discharge. Left eye: No discharge. Extraocular Movements: Extraocular movements intact.       Conjunctiva/sclera: Conjunctivae normal. components within normal limits   COMPREHENSIVE METABOLIC PANEL - Abnormal; Notable for the following components:    CREATININE 0.7 (*)     Albumin 5.3 (*)     Albumin/Globulin Ratio 2.4 (*)     All other components within normal limits   ACETAMINOPHEN LEVEL - Abnormal; Notable for the following components:    Acetaminophen Level <5 (*)     All other components within normal limits   SALICYLATE LEVEL - Abnormal; Notable for the following components:    Salicylate, Serum <5.9 (*)     All other components within normal limits   MAGNESIUM   LIPASE   TROPONIN   BRAIN NATRIURETIC PEPTIDE   ETHANOL   AMMONIA   URINALYSIS WITH REFLEX TO CULTURE   URINE DRUG SCREEN       When ordered only abnormal lab results are displayed. All other labs were within normal range or not returned as of this dictation. EKG: When ordered, EKG's are interpreted by the Emergency Department Physician in the absence of a cardiologist.  Please see their note for interpretation of EKG. RADIOLOGY:   Non-plain film images such as CT, Ultrasound and MRI are read by the radiologist. Plain radiographic images are visualized and preliminarily interpreted by the ED Provider with the below findings:        Interpretation per the Radiologist below, if available at the time of this note:    XR CHEST PORTABLE   Final Result   Normal           No results found.         PROCEDURES   Unless otherwise noted below, none     Procedures    CRITICAL CARE TIME   n/a    CONSULTS:  None      EMERGENCY DEPARTMENT COURSE and DIFFERENTIAL DIAGNOSIS/MDM:   Vitals:    Vitals:    07/11/22 0951 07/11/22 1058   BP: (!) 142/85 (!) 142/85   Pulse: 72 72   Resp: 20    Temp: 97.9 °F (36.6 °C)    TempSrc: Oral    SpO2: 97%    Weight: 150 lb 3.2 oz (68.1 kg)        Patient was given the following medications:  Medications   ondansetron (ZOFRAN-ODT) disintegrating tablet 8 mg (8 mg Oral Given 7/11/22 0953)   hydrOXYzine pamoate (VISTARIL) capsule 50 mg (50 mg Oral Given 7/11/22 necrotizing fasciitis,testicular torsion, epididymitis, varicocele, hydrocele, orchitis, incarcerated hernia, Ashley gangrene, pyelonephritis, perinephric abscess, kidney stone, urosepsis, fistula, intussusception,  or other concerning pathology. FINAL IMPRESSION      1. Alcohol withdrawal syndrome without complication ([facility] [address]    2. Dizziness    3. Acute nonintractable headache, unspecified headache type    4. Nausea    5. Alcohol abuse, continuous          DISPOSITION/PLAN   DISPOSITION Decision To Discharge 07/11/2022 11:25:39 AM      PATIENT REFERRED TO:  [facility] Pre-Services  [place] [address] Emergency Department  [address] [place] [address]  [phone]    If symptoms worsen      DISCHARGE MEDICATIONS:  New Prescriptions    MECLIZINE (ANTIVERT) 12.5 MG TABLET    Take 1 tablet by mouth 3 times daily as needed for Dizziness    ONDANSETRON (ZOFRAN ODT) 4 MG DISINTEGRATING TABLET    Take 1-2 tablets by mouth every 12 hours as needed for Nausea May Sub regular tablet (non-ODT) if insurance does not cover ODT.        DISCONTINUED MEDICATIONS:  Discontinued Medications    ONDANSETRON (ZOFRAN ODT) 8 MG TBDP DISINTEGRATING TABLET    Place 1 tablet under the tongue every 8 hours as needed for Nausea or Vomiting              (Please note that portions of this note were completed with a voice recognition program.  Efforts were made to edit the dictations but occasionally words are mis-transcribed.)    Yazmin De PA-C (electronically signed)           Yazmin De PA-C  07/11/22 1128

## 2022-09-30 ENCOUNTER — HOSPITAL ENCOUNTER (EMERGENCY)
Age: 32
Discharge: HOME | End: 2022-09-30
Attending: EMERGENCY MEDICINE
Payer: COMMERCIAL

## 2022-09-30 ENCOUNTER — APPOINTMENT (OUTPATIENT)
Dept: GENERAL RADIOLOGY | Age: 32
End: 2022-09-30
Payer: COMMERCIAL

## 2022-09-30 VITALS
HEART RATE: 72 BPM | WEIGHT: 156.31 LBS | HEIGHT: 68 IN | RESPIRATION RATE: 16 BRPM | OXYGEN SATURATION: 97 % | DIASTOLIC BLOOD PRESSURE: 84 MMHG | TEMPERATURE: 97.6 F | SYSTOLIC BLOOD PRESSURE: 113 MMHG | BODY MASS INDEX: 23.69 KG/M2

## 2022-09-30 DIAGNOSIS — F41.1 ANXIETY STATE: Primary | ICD-10-CM

## 2022-09-30 LAB
A/G RATIO: 1.7 (ref 1.1–2.2)
ALBUMIN SERPL-MCNC: 5.5 G/DL (ref 3.4–5)
ALP BLD-CCNC: 73 U/L (ref 40–129)
ALT SERPL-CCNC: 29 U/L (ref 10–40)
ANION GAP SERPL CALCULATED.3IONS-SCNC: 17 MMOL/L (ref 3–16)
AST SERPL-CCNC: 28 U/L (ref 15–37)
BASOPHILS ABSOLUTE: 0 K/UL (ref 0–0.2)
BASOPHILS RELATIVE PERCENT: 0.5 %
BILIRUB SERPL-MCNC: 0.8 MG/DL (ref 0–1)
BUN BLDV-MCNC: 11 MG/DL (ref 7–20)
CALCIUM SERPL-MCNC: 10.6 MG/DL (ref 8.3–10.6)
CHLORIDE BLD-SCNC: 101 MMOL/L (ref 99–110)
CO2: 23 MMOL/L (ref 21–32)
CREAT SERPL-MCNC: 0.8 MG/DL (ref 0.9–1.3)
EKG ATRIAL RATE: 86 BPM
EKG DIAGNOSIS: NORMAL
EKG P AXIS: 60 DEGREES
EKG P-R INTERVAL: 160 MS
EKG Q-T INTERVAL: 362 MS
EKG QRS DURATION: 96 MS
EKG QTC CALCULATION (BAZETT): 433 MS
EKG R AXIS: 87 DEGREES
EKG T AXIS: 62 DEGREES
EKG VENTRICULAR RATE: 86 BPM
EOSINOPHILS ABSOLUTE: 0.1 K/UL (ref 0–0.6)
EOSINOPHILS RELATIVE PERCENT: 0.9 %
ETHANOL: NORMAL MG/DL (ref 0–0.08)
GFR AFRICAN AMERICAN: >60
GFR NON-AFRICAN AMERICAN: >60
GLUCOSE BLD-MCNC: 83 MG/DL (ref 70–99)
HCT VFR BLD CALC: 48.6 % (ref 40.5–52.5)
HEMOGLOBIN: 17.6 G/DL (ref 13.5–17.5)
LIPASE: 32 U/L (ref 13–60)
LYMPHOCYTES ABSOLUTE: 1.8 K/UL (ref 1–5.1)
LYMPHOCYTES RELATIVE PERCENT: 23.6 %
MCH RBC QN AUTO: 32.2 PG (ref 26–34)
MCHC RBC AUTO-ENTMCNC: 36.2 G/DL (ref 31–36)
MCV RBC AUTO: 89 FL (ref 80–100)
MONOCYTES ABSOLUTE: 0.6 K/UL (ref 0–1.3)
MONOCYTES RELATIVE PERCENT: 7.6 %
NEUTROPHILS ABSOLUTE: 5.2 K/UL (ref 1.7–7.7)
NEUTROPHILS RELATIVE PERCENT: 67.4 %
PDW BLD-RTO: 12.2 % (ref 12.4–15.4)
PLATELET # BLD: 243 K/UL (ref 135–450)
PMV BLD AUTO: 8.3 FL (ref 5–10.5)
POTASSIUM SERPL-SCNC: 3.7 MMOL/L (ref 3.5–5.1)
PRO-BNP: 27 PG/ML (ref 0–124)
RBC # BLD: 5.46 M/UL (ref 4.2–5.9)
SODIUM BLD-SCNC: 141 MMOL/L (ref 136–145)
TOTAL PROTEIN: 8.8 G/DL (ref 6.4–8.2)
TROPONIN: <0.01 NG/ML
WBC # BLD: 7.7 K/UL (ref 4–11)

## 2022-09-30 PROCEDURE — 82077 ASSAY SPEC XCP UR&BREATH IA: CPT

## 2022-09-30 PROCEDURE — 84484 ASSAY OF TROPONIN QUANT: CPT

## 2022-09-30 PROCEDURE — 80053 COMPREHEN METABOLIC PANEL: CPT

## 2022-09-30 PROCEDURE — 83690 ASSAY OF LIPASE: CPT

## 2022-09-30 PROCEDURE — 93010 ELECTROCARDIOGRAM REPORT: CPT | Performed by: INTERNAL MEDICINE

## 2022-09-30 PROCEDURE — 93005 ELECTROCARDIOGRAM TRACING: CPT | Performed by: EMERGENCY MEDICINE

## 2022-09-30 PROCEDURE — 85025 COMPLETE CBC W/AUTO DIFF WBC: CPT

## 2022-09-30 PROCEDURE — 99285 EMERGENCY DEPT VISIT HI MDM: CPT

## 2022-09-30 PROCEDURE — 6370000000 HC RX 637 (ALT 250 FOR IP): Performed by: EMERGENCY MEDICINE

## 2022-09-30 PROCEDURE — 71045 X-RAY EXAM CHEST 1 VIEW: CPT

## 2022-09-30 PROCEDURE — 83880 ASSAY OF NATRIURETIC PEPTIDE: CPT

## 2022-09-30 RX ORDER — ONDANSETRON 4 MG/1
4 TABLET, ORALLY DISINTEGRATING ORAL ONCE
Status: COMPLETED | OUTPATIENT
Start: 2022-09-30 | End: 2022-09-30

## 2022-09-30 RX ORDER — HYDROXYZINE PAMOATE 25 MG/1
50 CAPSULE ORAL
Status: DISCONTINUED | OUTPATIENT
Start: 2022-09-30 | End: 2022-09-30 | Stop reason: HOSPADM

## 2022-09-30 RX ORDER — DIAZEPAM 2 MG/1
2 TABLET ORAL
Status: DISCONTINUED | OUTPATIENT
Start: 2022-09-30 | End: 2022-09-30

## 2022-09-30 RX ORDER — DIAZEPAM 5 MG/1
2.5 TABLET ORAL
Status: COMPLETED | OUTPATIENT
Start: 2022-09-30 | End: 2022-09-30

## 2022-09-30 RX ORDER — DIAZEPAM 2 MG/1
2 TABLET ORAL ONCE
Status: DISCONTINUED | OUTPATIENT
Start: 2022-09-30 | End: 2022-09-30

## 2022-09-30 RX ADMIN — ONDANSETRON 4 MG: 4 TABLET, ORALLY DISINTEGRATING ORAL at 15:31

## 2022-09-30 RX ADMIN — DIAZEPAM 2.5 MG: 5 TABLET ORAL at 15:55

## 2022-09-30 ASSESSMENT — PAIN - FUNCTIONAL ASSESSMENT: PAIN_FUNCTIONAL_ASSESSMENT: NONE - DENIES PAIN

## 2022-09-30 NOTE — DISCHARGE INSTRUCTIONS
Please follow up with your PCP for further evaluation and treatment. If persistent or worsening symptoms, or if you have any concerns, return to ED immediately.

## 2022-09-30 NOTE — Clinical Note
Alicia Groves was seen and treated in our emergency department on 9/30/2022. He may return to work on 10/02/2022. If you have any questions or concerns, please don't hesitate to call.       Svetlana Pearson MD

## 2022-09-30 NOTE — ED PROVIDER NOTES
Diego Rojas (States since last night states he has felt jittery,  didn't sleep well last night, keeps stating everything feels tense, denies any pain )       HISTORY OF PRESENT ILLNESS  Sarkis Nicole is a 28 y.o. male with history of alcohol abuse who presents to the emergency department for evaluation of multiple complaints. Patient reports he did not get a good night sleep last night. Says he has been feeling jittery. Says he feels like he cannot catch his breath, has tingling sensation of bilateral hands, feels shaky. Says it feels similar to when he had a panic attack, but he is not for sure. Denies any alcohol use since early this month. Reports having fatigue. No other complaints, modifying factors or associated symptoms. I have reviewed the following from the nursing documentation. Past Medical History:   Diagnosis Date    Alcohol abuse     PATIENT HAS EXTREME MANIPULITIVE & DRUG SEEKING BEHAVIOR. HE POCKETS HIS BENZODIZAPINES. Anxiety     Drug-seeking behavior     Several times found pocketing medications given in hospital    Herpes genitalis in men     Manipulative behavior     Pancreatitis     Pneumonia     Portal vein thrombosis     pt denied    Seizures ([facility] [address]     PER PATIENT ONLY.  DURING MULTIPLE HOSPITALIZATIONS HE HAS NEVER ONCE    Tobacco abuse      Past Surgical History:   Procedure Laterality Date    ENDOSCOPY, COLON, DIAGNOSTIC  10/28/2013    Endoscopic retrograde cholangiopancreatography with pancreatic stent placement    ENDOSCOPY, COLON, DIAGNOSTIC  03/23/2018    Esophagogastroduodenoscopy with biopsy    ERCP  1/10/14    with stent removal     Family History   Problem Relation Age of Onset    Hypertension Father     High Blood Pressure Father     Alcohol Abuse Father     Depression Mother     High Blood Pressure Paternal Grandfather     Alcohol Abuse Paternal Grandfather     Heart Disease Paternal Grandmother Anemia Paternal Grandmother     Depression Maternal Aunt     Alcohol Abuse Paternal Aunt     Alcohol Abuse Paternal Uncle      Social History     Socioeconomic History    Marital status: Single     Spouse name: Not on file    Number of children: 1    Years of education: 12    Highest education level: Not on file   Occupational History    Occupation:    Tobacco Use    Smoking status: Every Day     Packs/day: 0.50     Years: 10.00     Pack years: 5.00     Types: Cigarettes     Start date: 11/21/2007    Smokeless tobacco: Never   Vaping Use    Vaping Use: Former    Substances: Never   Substance and Sexual Activity    Alcohol use: Yes    Drug use: No    Sexual activity: Not Currently   Other Topics Concern    Not on file   Social History Narrative    Not on file     Social Determinants of Health     Financial Resource Strain: Not on file   Food Insecurity: Not on file   Transportation Needs: Not on file   Physical Activity: Not on file   Stress: Not on file   Social Connections: Not on file   Intimate Partner Violence: Not on file   Housing Stability: Not on file     Current Facility-Administered Medications   Medication Dose Route Frequency Provider Last Rate Last Admin    hydrOXYzine pamoate (VISTARIL) capsule 50 mg  50 mg Oral Once PRN Jian Tuttle MD         Current Outpatient Medications   Medication Sig Dispense Refill    ondansetron (ZOFRAN ODT) 4 MG disintegrating tablet Take 1-2 tablets by mouth every 12 hours as needed for Nausea May Sub regular tablet (non-ODT) if insurance does not cover ODT. 12 tablet 0     Allergies   Allergen Reactions    Amoxicillin Hives    Haldol [Haloperidol Lactate] Other (See Comments)     Muscle spasms    Phenergan [Promethazine Hcl] Other (See Comments)     Pt believes it caused muscle spasms    Shrimp (Diagnostic) Itching      Itching of throat when eating shrimp. Pt states has had IV contrast for CT's without problem before.       Glucosamine Itching Shellfish-Derived Products      Itching of throat when eating shrimp. PMH, Surgical Hx, FH, Social Hx reviewed by myself. REVIEW OF SYSTEMS  10 systems reviewed, pertinent positives per HPI otherwise noted to be negative. PHYSICAL EXAM  /84   Pulse 72   Temp 97.6 °F (36.4 °C)   Resp 16   Ht 5' 8\" (1.727 m)   Wt 156 lb 4.9 oz (70.9 kg)   SpO2 97%   BMI 23.77 kg/m²    GENERAL APPEARANCE: Awake and alert. Anxious appearing  HENT: Normocephalic. Atraumatic. EOMI. No facial droop. HEART/CHEST: Tachycardic to 110  LUNGS: Respirations unlabored. Speaking comfortably in full sentences. ABDOMEN: Soft, non-distended abdomen. Non tender to palpation. No guarding. No rebound. EXTREMITIES: No gross deformities. Moving all extremities. No significant lower extremity edema. Negative Homans' sign bilaterally. SKIN: Warm and dry. No acute rashes. NEUROLOGICAL: Alert and oriented. No gross facial drooping. Answering questions appropriately. Moving all extremities. PSYCHIATRIC: Pleasant.   Anxious appearing    LABS  Results for orders placed or performed during the hospital encounter of 09/30/22   Comprehensive Metabolic Panel   Result Value Ref Range    Sodium 141 136 - 145 mmol/L    Potassium 3.7 3.5 - 5.1 mmol/L    Chloride 101 99 - 110 mmol/L    CO2 23 21 - 32 mmol/L    Anion Gap 17 (H) 3 - 16    Glucose 83 70 - 99 mg/dL    BUN 11 7 - 20 mg/dL    Creatinine 0.8 (L) 0.9 - 1.3 mg/dL    GFR Non-African American >60 >60    GFR African American >60 >60    Calcium 10.6 8.3 - 10.6 mg/dL    Total Protein 8.8 (H) 6.4 - 8.2 g/dL    Albumin 5.5 (H) 3.4 - 5.0 g/dL    Albumin/Globulin Ratio 1.7 1.1 - 2.2    Total Bilirubin 0.8 0.0 - 1.0 mg/dL    Alkaline Phosphatase 73 40 - 129 U/L    ALT 29 10 - 40 U/L    AST 28 15 - 37 U/L   CBC with Auto Differential   Result Value Ref Range    WBC 7.7 4.0 - 11.0 K/uL    RBC 5.46 4.20 - 5.90 M/uL    Hemoglobin 17.6 (H) 13.5 - 17.5 g/dL    Hematocrit 48.6 40.5 - 52.5 % MCV 89.0 80.0 - 100.0 fL    MCH 32.2 26.0 - 34.0 pg    MCHC 36.2 (H) 31.0 - 36.0 g/dL    RDW 12.2 (L) 12.4 - 15.4 %    Platelets 884 822 - 442 K/uL    MPV 8.3 5.0 - 10.5 fL    Neutrophils % 67.4 %    Lymphocytes % 23.6 %    Monocytes % 7.6 %    Eosinophils % 0.9 %    Basophils % 0.5 %    Neutrophils Absolute 5.2 1.7 - 7.7 K/uL    Lymphocytes Absolute 1.8 1.0 - 5.1 K/uL    Monocytes Absolute 0.6 0.0 - 1.3 K/uL    Eosinophils Absolute 0.1 0.0 - 0.6 K/uL    Basophils Absolute 0.0 0.0 - 0.2 K/uL   Troponin   Result Value Ref Range    Troponin <0.01 <0.01 ng/mL   Lipase   Result Value Ref Range    Lipase 32.0 13.0 - 60.0 U/L   Ethanol   Result Value Ref Range    Ethanol Lvl None Detected mg/dL   Brain Natriuretic Peptide   Result Value Ref Range    Pro-BNP 27 0 - 124 pg/mL   EKG 12 Lead   Result Value Ref Range    Ventricular Rate 86 BPM    Atrial Rate 86 BPM    P-R Interval 160 ms    QRS Duration 96 ms    Q-T Interval 362 ms    QTc Calculation (Bazett) 433 ms    P Axis 60 degrees    R Axis 87 degrees    T Axis 62 degrees    Diagnosis       Normal sinus rhythmPossible Left atrial enlargementBorderline ECGWhen compared with ECG of 11-JUL-2022 09:50,No significant change was foundConfirmed by EJSS SHEEHAN, Lyric Bennett ([id number]) on 9/30/2022 3:20:29 PM       I have reviewed all labs for this visit. ECG  The Ekg interpreted by me shows  Normal sinus rhythm with a rate of 86  Possible left atrial enlargement  QTc is acceptable  ST Segments: Nonspecific changes    RADIOLOGY  XR CHEST PORTABLE    Result Date: 9/30/2022  EXAMINATION: ONE XRAY VIEW OF THE CHEST 9/30/2022 3:39 pm COMPARISON: 07/11/2022. HISTORY: ORDERING SYSTEM PROVIDED HISTORY: soa. tremors TECHNOLOGIST PROVIDED HISTORY: Reason for exam:->soa. tremors Reason for Exam: tremors FINDINGS: The lungs and costophrenic angles are clear. The cardiomediastinal silhouette and pulmonary vessels appear normal.     No radiographic evidence of an acute cardiopulmonary process. ED COURSE/[facility]  Patient seen and evaluated. At presentation, patient was awake, alert, afebrile, hemodynamically stable, and satting well on room air. Diagnosis includes alcohol withdrawal, panic attack, ACS, arrhythmia, others. EKG shows normal sinus rhythm. He was given hydroxyzine for symptoms. Patient continues to have the same symptoms. He was given a one-time dose of Valium. Creatinine is normal.  Glucose within normal limits. Hemoglobin 17.6. No leukocytosis present. Lipase normal.  Troponin negative. Given the duration of symptoms, serial troponins not indicated at this time. I asked several times and patient denies any recent alcohol use. Given this, low concern for alcohol withdrawal at this time. Chest x-ray shows no acute pathology. On reassessment, patient appears improved. Discussed the results of the work-up. Patient agrees that he believes this was an anxiety attack. Patient denies having any questions or concerns about discharge home. He was asked to follow-up with PCP for further evaluation and treatment. He was discharged home with strict precautions. Is this patient to be included in the SEP-1 Core Measure due to severe sepsis or septic shock? No   Exclusion criteria - the patient is NOT to be included for SEP-1 Core Measure due to:  2+ SIRS criteria are not met     Pt was seen during the COVID 19 pandemic. Appropriate PPE worn by ME during patient encounters. Patient was cared for during a time with constrained hospital bed capacity with nationwide stress on resources and staffing. During the patient's ED course, the patient was given:  Medications   hydrOXYzine pamoate (VISTARIL) capsule 50 mg (0 mg Oral Held 9/30/22 1539)   ondansetron (ZOFRAN-ODT) disintegrating tablet 4 mg (4 mg Oral Given 9/30/22 1531)   diazePAM (VALIUM) tablet 2.5 mg (2.5 mg Oral Given 9/30/22 1555)        CLINICAL IMPRESSION  1.  Anxiety state        Blood pressure 113/84, pulse 72, temperature 97.6 °F (36.4 °C), resp. rate 16, height 5' 8\" (1.727 m), weight 156 lb 4.9 oz (70.9 kg), SpO2 97 %. Stefan Sun was discharged home in stable condition. Patient was given scripts for the following medications. I counseled patient how to take these medications. Discharge Medication List as of 9/30/2022  5:53 PM          Follow-up with:  No follow-up provider specified. DISCLAIMER: This chart was created using Dragon dictation software. Efforts were made by me to ensure accuracy, however some errors may be present due to limitations of this technology and occasionally words are not transcribed correctly.         Kieran Farmer MD  09/30/22 [id number]

## 2023-01-01 NOTE — ED TRIAGE NOTES
Patient to the ED via private car, states \"I am going through alcohol withdrawal, my friend dropped me off. \" Pt visibly agitated, obvious tremors noted, states he has been through ETOH withdrawal before, and states \"I need help, I can't get into rehab on my own. \" Pt placed in bed, seizure precautions and fall precautions in place. Pt instructed to not get OOB without assistance. Pt verbalized understanding at this time. VSS. Unlabored breathing while at rest, skin warm and dry. 2023/immune

## 2023-01-15 NOTE — PROGRESS NOTES
Hospitalist Progress Note      PCP: Roby Maynard MD    Date of Admission: 7/16/2021    Chief Complaint:Alcohol w/d, no ETOH > 24 hours     Hospital Course: 35 y.o. male who presents to [facility] with PMHx alcohol abuse, pneumonia, pancreatitis, portal vein thrombosis, \"seizures associated with alcohol withdrawal.\"     Seen on 7/11 given librium and d/c, took meds for a day or two then said \"screw it \" and started drinking again. Lives at group home, leaves during the day and goes drinking, then comes back to sleep. Has had seizures with ETOH w/d in the past according to the patient. . No alcohol x 24 hours, feeling paranoid, jittery. Denies hallucinations. Denies suicidal or homicidal ideation. Reports he does have family but endorses he does not get along with them very well because they have expectations of him that he just cannot seem to meet.     ED work-up: tachycardic , very anxious requiring Ativan. CO2 20 . Lipase 29. Urine drug screen positive for benzodiazepines. Urinalysis negative for ketones. CBC unremarkable. AST mildly elevated 44 LFT otherwise unremarkable. Chest x-ray negative, CT abd/pelvis chronic pancreatitis pancreatic duct dilated with  stones in main duct near ampulla unchanged from 6/2021 non obstructive bilateral nephrolithiasis     On exam the patient is resting comfortably. Finished banana bag. No evidence of encephalopathy. Blood pressure and heart rate are stable and within normal limits.     Admitted inpateint placed on librium 25mg po TID with CIWA protocol    Subjective: per RN notes feeling bugs crawl c/o anxiety jittery currently he is sleeping lying on rt side, VSS. He awoke to exam and immediately put on nurse call light for more ativan no tremors noted.  When awoke dint know where he was oh am I still in hospital.   All other systems neg        Medications:  Reviewed    Infusion Medications    sodium chloride      sodium chloride Scheduled Medications    diphenhydrAMINE  25 mg Oral Once    lamoTRIgine  100 mg Oral Daily    lamoTRIgine  50 mg Oral Nightly    hydrOXYzine  50 mg Oral QAM    sodium chloride flush  5-40 mL Intravenous 2 times per day    enoxaparin  40 mg Subcutaneous Daily    thiamine  100 mg Oral Daily    multivitamin  1 tablet Oral Daily    folic acid  1 mg Oral Daily    chlordiazePOXIDE  25 mg Oral TID    nicotine  1 patch Transdermal Daily    sodium chloride flush  5-40 mL Intravenous 2 times per day     PRN Meds: melatonin, sodium chloride flush, sodium chloride, ondansetron **OR** ondansetron, polyethylene glycol, acetaminophen **OR** acetaminophen, sodium chloride flush, sodium chloride, LORazepam **OR** LORazepam **OR** LORazepam **OR** LORazepam **OR** LORazepam **OR** LORazepam **OR** LORazepam **OR** LORazepam      Intake/Output Summary (Last 24 hours) at 7/19/2021 1222  Last data filed at 7/19/2021 0814  Gross per 24 hour   Intake 385 ml   Output --   Net 385 ml       Physical Exam Performed:    /77   Pulse 96   Temp 97.6 °F (36.4 °C) (Oral)   Resp 18   Ht 5' 8\" (1.727 m)   Wt 146 lb 13.2 oz (66.6 kg)   SpO2 98%   BMI 22.32 kg/m²     General appearance: No distress,was soundly sleeping on rt side had eaten 100% lunch tray   HEENT: PERRL anicteric sclerae mucosa moist beard  Neck: Supple,  full range of motion. No jugular venous distention. Trachea midline. Respiratory:  Normal  effort. Clear to auscultation,  Cardiovascular: RRRR S1/S2 no murmur felt a little firm across upper abd + bowel sounds. Musculoskeletal: No clubbing, cyanosis or edema no deformity. Skin: Skin color no jaundice no pallor No rashes or lesions. no telangiectasia   Neurologic:  Neurovascularly intact without any focal sensory/motor deficits.  Cranial nerves: II-XII intact, grossly non-focal.no tremors   Psychiatric: Alert and oriented,was sleeping awoke easily to exam initially a little confused \"oh am i still in hospital\" but knew to put on call light immediately to ask nurse for his ativan  Peripheral Pulses: +2 palpable, equal bilaterally       Labs:   Recent Labs     07/16/21 1933 07/18/21 0438 07/19/21  0730   WBC 6.8 7.0 6.6   HGB 16.1 15.1 15.9   HCT 45.1 42.7 44.8    174 167     Recent Labs     07/16/21 1933 07/18/21 0437 07/19/21  0730    138 139   K 3.6 4.0 3.8    104 105   CO2 20* 19* 20*   BUN 11 10 7   CREATININE 0.8* 0.7* 0.7*   CALCIUM 9.6 8.9 8.8   PHOS  --  4.1  --      Recent Labs     07/16/21 1933 07/18/21 0437   AST 44* 31   ALT 38 27   BILITOT 0.8 0.8   ALKPHOS 63 52     No results for input(s): INR in the last 72 hours. No results for input(s): Towana Ball in the last 72 hours. Urinalysis:      Lab Results   Component Value Date    NITRU Negative 07/16/2021    WBCUA 2 11/29/2019    RBCUA 1 11/29/2019    BLOODU Negative 07/16/2021    SPECGRAV 1.023 07/16/2021    GLUCOSEU Negative 07/16/2021       Radiology:  CT ABDOMEN WO CONTRAST Additional Contrast? None   Final Result   Stable findings of chronic pancreatitis. The pancreatic duct remains dilated   with possible stones in the main pancreatic duct in the lower head segment. Nonobstructive bilateral nephrolithiasis. Punctate caliceal stones are noted. No acute inflammatory abnormality is identified. XR CHEST PORTABLE   Final Result   No acute cardiopulmonary disease. Assessment/Plan:    Active Hospital Problems    Diagnosis     Alcohol abuse with withdrawal ([facility] [address] [F10.139]      Alcohol dependence  Acute alcohol withdrawal   Librium 53RB po TID  Folic acid 1mg po daily  Thiamine 100mg po daily  On CIWA w ativan Q1hr discussed with RN he is asking every hour on the hour for the meds. He was sleeping no distress on my arrival.. no need for more atiban until I awoke him.  We will decraese frequency of the prns to Q3hr prn nurses can continue to score him but may be seeking med at this time Reassess tomorrow might change to ativan 2mg IV q4hr prn only if no events overnight no tremors and then start weaning the librium off      Tobacco dependence  --nocotine patch 14mg/day     Nausea with vomiting  --improved. Prn zofran. Ate 100% meal     Chronic pancreatitis  By imaging. He states he is supposed to eat a low fat low grease diet  Imaging showed pancreatic duct stones. . unchanged/stable  Could be addressed outpt with GI if remains sober (can review records see if has ever been referred re this in past )           DVT Prophylaxis: lovenox   Diet: ADULT DIET;  Regular  Code Status: Full Code    PT/OT Eval Status: ambulated with walker and standby [facility] - home     Herminia Gao MD [id number]

## 2023-02-24 ENCOUNTER — HOSPITAL ENCOUNTER (EMERGENCY)
Age: 33
Discharge: HOME | End: 2023-02-24
Attending: EMERGENCY MEDICINE
Payer: COMMERCIAL

## 2023-02-24 ENCOUNTER — APPOINTMENT (OUTPATIENT)
Dept: CT IMAGING | Age: 33
End: 2023-02-24
Payer: COMMERCIAL

## 2023-02-24 ENCOUNTER — APPOINTMENT (OUTPATIENT)
Dept: GENERAL RADIOLOGY | Age: 33
End: 2023-02-24
Payer: COMMERCIAL

## 2023-02-24 VITALS
BODY MASS INDEX: 23.59 KG/M2 | RESPIRATION RATE: 15 BRPM | HEART RATE: 74 BPM | HEIGHT: 68 IN | SYSTOLIC BLOOD PRESSURE: 120 MMHG | OXYGEN SATURATION: 98 % | TEMPERATURE: 96.8 F | DIASTOLIC BLOOD PRESSURE: 85 MMHG | WEIGHT: 155.65 LBS

## 2023-02-24 DIAGNOSIS — J02.9 ACUTE PHARYNGITIS, UNSPECIFIED ETIOLOGY: ICD-10-CM

## 2023-02-24 DIAGNOSIS — R07.9 CHEST PAIN, UNSPECIFIED TYPE: ICD-10-CM

## 2023-02-24 DIAGNOSIS — M54.2 NECK PAIN: ICD-10-CM

## 2023-02-24 DIAGNOSIS — J02.9 SORE THROAT: Primary | ICD-10-CM

## 2023-02-24 LAB
ANION GAP SERPL CALCULATED.3IONS-SCNC: 15 MMOL/L (ref 3–16)
BASOPHILS ABSOLUTE: 0 K/UL (ref 0–0.2)
BASOPHILS RELATIVE PERCENT: 0.4 %
BUN BLDV-MCNC: 14 MG/DL (ref 7–20)
CALCIUM SERPL-MCNC: 9.2 MG/DL (ref 8.3–10.6)
CHLORIDE BLD-SCNC: 105 MMOL/L (ref 99–110)
CO2: 21 MMOL/L (ref 21–32)
CREAT SERPL-MCNC: 0.6 MG/DL (ref 0.9–1.3)
EKG ATRIAL RATE: 73 BPM
EKG DIAGNOSIS: NORMAL
EKG P AXIS: 65 DEGREES
EKG P-R INTERVAL: 178 MS
EKG Q-T INTERVAL: 384 MS
EKG QRS DURATION: 90 MS
EKG QTC CALCULATION (BAZETT): 423 MS
EKG R AXIS: 75 DEGREES
EKG T AXIS: 61 DEGREES
EKG VENTRICULAR RATE: 73 BPM
EOSINOPHILS ABSOLUTE: 0.3 K/UL (ref 0–0.6)
EOSINOPHILS RELATIVE PERCENT: 3.4 %
GFR SERPL CREATININE-BSD FRML MDRD: >60 ML/MIN/{1.73_M2}
GLUCOSE BLD-MCNC: 89 MG/DL (ref 70–99)
HCT VFR BLD CALC: 43.8 % (ref 40.5–52.5)
HEMOGLOBIN: 15.5 G/DL (ref 13.5–17.5)
LYMPHOCYTES ABSOLUTE: 1.4 K/UL (ref 1–5.1)
LYMPHOCYTES RELATIVE PERCENT: 14.2 %
MCH RBC QN AUTO: 31.8 PG (ref 26–34)
MCHC RBC AUTO-ENTMCNC: 35.3 G/DL (ref 31–36)
MCV RBC AUTO: 90.1 FL (ref 80–100)
MONOCYTES ABSOLUTE: 0.8 K/UL (ref 0–1.3)
MONOCYTES RELATIVE PERCENT: 8.6 %
NEUTROPHILS ABSOLUTE: 7.1 K/UL (ref 1.7–7.7)
NEUTROPHILS RELATIVE PERCENT: 73.4 %
PDW BLD-RTO: 11.9 % (ref 12.4–15.4)
PLATELET # BLD: 182 K/UL (ref 135–450)
PLATELET SLIDE REVIEW: ABNORMAL
PMV BLD AUTO: 9.8 FL (ref 5–10.5)
POTASSIUM SERPL-SCNC: 4.4 MMOL/L (ref 3.5–5.1)
RAPID INFLUENZA  B AGN: NEGATIVE
RAPID INFLUENZA A AGN: NEGATIVE
RBC # BLD: 4.86 M/UL (ref 4.2–5.9)
SARS-COV-2, NAAT: NOT DETECTED
SLIDE REVIEW: ABNORMAL
SODIUM BLD-SCNC: 141 MMOL/L (ref 136–145)
WBC # BLD: 9.7 K/UL (ref 4–11)

## 2023-02-24 PROCEDURE — 71046 X-RAY EXAM CHEST 2 VIEWS: CPT

## 2023-02-24 PROCEDURE — 36415 COLL VENOUS BLD VENIPUNCTURE: CPT

## 2023-02-24 PROCEDURE — 6370000000 HC RX 637 (ALT 250 FOR IP): Performed by: EMERGENCY MEDICINE

## 2023-02-24 PROCEDURE — 87635 SARS-COV-2 COVID-19 AMP PRB: CPT

## 2023-02-24 PROCEDURE — 96376 TX/PRO/DX INJ SAME DRUG ADON: CPT

## 2023-02-24 PROCEDURE — 6360000002 HC RX W HCPCS: Performed by: EMERGENCY MEDICINE

## 2023-02-24 PROCEDURE — 80048 BASIC METABOLIC PNL TOTAL CA: CPT

## 2023-02-24 PROCEDURE — 93005 ELECTROCARDIOGRAM TRACING: CPT | Performed by: EMERGENCY MEDICINE

## 2023-02-24 PROCEDURE — 6360000004 HC RX CONTRAST MEDICATION: Performed by: EMERGENCY MEDICINE

## 2023-02-24 PROCEDURE — 93010 ELECTROCARDIOGRAM REPORT: CPT | Performed by: INTERNAL MEDICINE

## 2023-02-24 PROCEDURE — 87804 INFLUENZA ASSAY W/OPTIC: CPT

## 2023-02-24 PROCEDURE — 96375 TX/PRO/DX INJ NEW DRUG ADDON: CPT

## 2023-02-24 PROCEDURE — 99285 EMERGENCY DEPT VISIT HI MDM: CPT

## 2023-02-24 PROCEDURE — 70491 CT SOFT TISSUE NECK W/DYE: CPT

## 2023-02-24 PROCEDURE — 96374 THER/PROPH/DIAG INJ IV PUSH: CPT

## 2023-02-24 PROCEDURE — 85025 COMPLETE CBC W/AUTO DIFF WBC: CPT

## 2023-02-24 RX ORDER — DEXAMETHASONE SODIUM PHOSPHATE 4 MG/ML
4 INJECTION, SOLUTION INTRA-ARTICULAR; INTRALESIONAL; INTRAMUSCULAR; INTRAVENOUS; SOFT TISSUE ONCE
Status: COMPLETED | OUTPATIENT
Start: 2023-02-24 | End: 2023-02-24

## 2023-02-24 RX ORDER — DIPHENHYDRAMINE HYDROCHLORIDE 50 MG/ML
12.5 INJECTION INTRAMUSCULAR; INTRAVENOUS ONCE
Status: COMPLETED | OUTPATIENT
Start: 2023-02-24 | End: 2023-02-24

## 2023-02-24 RX ORDER — CEFDINIR 300 MG/1
600 CAPSULE ORAL ONCE
Status: COMPLETED | OUTPATIENT
Start: 2023-02-24 | End: 2023-02-24

## 2023-02-24 RX ORDER — MORPHINE SULFATE 2 MG/ML
1 INJECTION, SOLUTION INTRAMUSCULAR; INTRAVENOUS ONCE
Status: COMPLETED | OUTPATIENT
Start: 2023-02-24 | End: 2023-02-24

## 2023-02-24 RX ORDER — MORPHINE SULFATE 2 MG/ML
2 INJECTION, SOLUTION INTRAMUSCULAR; INTRAVENOUS ONCE
Status: COMPLETED | OUTPATIENT
Start: 2023-02-24 | End: 2023-02-24

## 2023-02-24 RX ORDER — DROPERIDOL 2.5 MG/ML
0.62 INJECTION, SOLUTION INTRAMUSCULAR; INTRAVENOUS EVERY 6 HOURS PRN
Status: DISCONTINUED | OUTPATIENT
Start: 2023-02-24 | End: 2023-02-24 | Stop reason: HOSPADM

## 2023-02-24 RX ORDER — CEFDINIR 300 MG/1
600 CAPSULE ORAL NIGHTLY
Qty: 12 CAPSULE | Refills: 0 | Status: SHIPPED | OUTPATIENT
Start: 2023-02-24 | End: 2023-03-02

## 2023-02-24 RX ADMIN — DEXAMETHASONE SODIUM PHOSPHATE 4 MG: 4 INJECTION, SOLUTION INTRAMUSCULAR; INTRAVENOUS at 16:35

## 2023-02-24 RX ADMIN — IOPAMIDOL 75 ML: 755 INJECTION, SOLUTION INTRAVENOUS at 18:03

## 2023-02-24 RX ADMIN — MORPHINE SULFATE 1 MG: 2 INJECTION, SOLUTION INTRAMUSCULAR; INTRAVENOUS at 20:39

## 2023-02-24 RX ADMIN — MORPHINE SULFATE 2 MG: 2 INJECTION, SOLUTION INTRAMUSCULAR; INTRAVENOUS at 16:29

## 2023-02-24 RX ADMIN — DROPERIDOL 0.62 MG: 2.5 INJECTION, SOLUTION INTRAMUSCULAR; INTRAVENOUS at 16:31

## 2023-02-24 RX ADMIN — CEFDINIR 600 MG: 300 CAPSULE ORAL at 20:43

## 2023-02-24 RX ADMIN — DIPHENHYDRAMINE HYDROCHLORIDE 12.5 MG: 50 INJECTION, SOLUTION INTRAMUSCULAR; INTRAVENOUS at 16:33

## 2023-02-24 ASSESSMENT — PAIN DESCRIPTION - FREQUENCY: FREQUENCY: CONTINUOUS

## 2023-02-24 ASSESSMENT — PAIN DESCRIPTION - PAIN TYPE: TYPE: ACUTE PAIN

## 2023-02-24 ASSESSMENT — PAIN SCALES - GENERAL
PAINLEVEL_OUTOF10: 8
PAINLEVEL_OUTOF10: 6
PAINLEVEL_OUTOF10: 8

## 2023-02-24 ASSESSMENT — PAIN - FUNCTIONAL ASSESSMENT
PAIN_FUNCTIONAL_ASSESSMENT: 0-10
PAIN_FUNCTIONAL_ASSESSMENT: NONE - DENIES PAIN

## 2023-02-24 ASSESSMENT — PAIN DESCRIPTION - LOCATION: LOCATION: NECK;THROAT;CHEST

## 2023-02-25 NOTE — DISCHARGE INSTRUCTIONS
Your prescription has been sent to [facility]. Avoid NSAID medications (ex: Ibuprofen, Advil, Motrin, Naproxen, Aleve, Aspirin, etc.) for the next 5 days as they can interact with the medicine we gave you in the emergency department today. You can take Acetaminophen (Tylenol) as needed for pain. You can also consider using cough drops and or over-the-counter numbing throat sprays. A representative from the hospital should be contacting you to arrange for a follow up appointment in clinic. If you have any new or worsening issues after going home don't hesitate to return here for reevaluation at any time 24/7!

## 2023-03-05 ASSESSMENT — ENCOUNTER SYMPTOMS
VOICE CHANGE: 0
NAUSEA: 1
ABDOMINAL PAIN: 0
VOMITING: 0
COUGH: 0
SORE THROAT: 1
TROUBLE SWALLOWING: 0
SHORTNESS OF BREATH: 0

## 2023-03-05 NOTE — ED PROVIDER NOTES
[facility]    Name: Ronald Ramirez : 1990 MRN: [id number] Date of Service: 2023    /85   Pulse 74   Temp 96.8 °F (36 °C) (Oral)   Resp 15   Ht 5' 8\" (1.727 m)   Wt 155 lb 10.3 oz (70.6 kg)   SpO2 98%   BMI 23.67 kg/m²     CC: sore throat    HPI: this patient is a 33 y.o. male presenting to the ED from home. Mr. Danisha Hinojosa tells me that for the past few days he has had an increasingly sore throat and he has been experiencing a lot of pain, but not difficulty, with swallowing. More recently he has developed moderate intensity, intermittent, sometimes dull, sometimes aching pain spanning from just below his chin to the top of his chest in the midline. This pain seems to come and go at random without appreciable inciting or alleviating factors. Along with his pain he has become very nauseous and his appetite has been somewhat decreased. His condition did not seem to be improving with time, prompting him to come to this department this afternoon for evaluation. He has not appreciated other changes from his usual state of health and he denies other current complaints.  He has not been using any treatments for these symptoms at home.  _____________________________________________________________________    Past Medical History:   Diagnosis Date    Alcohol use disorder     Alleged drug diversion     Anxiety disorder     Bipolar affective disorder ([facility] [address]     Herpes simplex infection     IVDU (intravenous drug use)     Pancreatitis     [facility]     Tobacco use disorder        Past Surgical History:   Procedure Laterality Date    ENDOSCOPY, COLON, DIAGNOSTIC  10/28/2013    Endoscopic retrograde cholangiopancreatography with pancreatic stent placement    ENDOSCOPY, COLON, DIAGNOSTIC  2018    Esophagogastroduodenoscopy with biopsy    ERCP  01/10/2014       Social History     Tobacco Use    Smoking status: Every Day     Packs/day: 0.50     Years: 10.00 Pack years: 5.00     Types: Cigarettes     Start date: 11/21/2007    Smokeless tobacco: Never   Vaping Use    Vaping Use: Some days   Substance Use Topics    Alcohol use: Yes    Drug use: Yes       Family History   Problem Relation Age of Onset    Hypertension Father     High Blood Pressure Father     Alcohol Abuse Father     Depression Mother     High Blood Pressure Paternal Grandfather     Alcohol Abuse Paternal Grandfather     Heart Disease Paternal Grandmother     Anemia Paternal Grandmother     Depression Maternal Aunt     Alcohol Abuse Paternal Aunt     Alcohol Abuse Paternal Uncle      _____________________________________________________________________    Review of Systems   Constitutional:  Positive for appetite change. Negative for activity change, chills, fatigue and fever. HENT:  Positive for sore throat. Negative for trouble swallowing and voice change. Eyes:  Negative for visual disturbance. Respiratory:  Negative for cough and shortness of breath. Cardiovascular:  Positive for chest pain. Gastrointestinal:  Positive for nausea. Negative for abdominal pain and vomiting. Genitourinary:  Negative for decreased urine volume. Musculoskeletal:  Positive for neck pain. Negative for gait problem and neck stiffness. Skin:  Negative for rash. Neurological:  Negative for weakness, numbness and headaches. Physical Exam  Constitutional:       General: He is awake. He is not in acute distress. Appearance: He is not ill-appearing, toxic-appearing or diaphoretic. HENT:      Head: Normocephalic and atraumatic. Jaw: There is normal jaw occlusion. No trismus. Nose: Nose normal.      Mouth/Throat:      Mouth: Mucous membranes are moist. No oral lesions or angioedema. Dentition: No gingival swelling. Pharynx: Uvula midline. Posterior oropharyngeal erythema present. No pharyngeal swelling or uvula swelling. Tonsils: No tonsillar abscesses.    Eyes: Conjunctiva/sclera: Conjunctivae normal.   Neck:      Thyroid: No thyroid tenderness. Vascular: No JVD. Trachea: Phonation normal.   Cardiovascular:      Rate and Rhythm: Normal rate and regular rhythm. Pulses:           Radial pulses are 2+ on the right side and 2+ on the left side. Heart sounds: Normal heart sounds. No murmur heard. Pulmonary:      Effort: Pulmonary effort is normal. No respiratory distress. Breath sounds: Normal breath sounds. No stridor, decreased air movement or transmitted upper airway sounds. No wheezing or rhonchi. Abdominal:      General: There is no distension. Palpations: Abdomen is soft. Tenderness: There is no abdominal tenderness. There is no guarding or rebound. Musculoskeletal:      Cervical back: Normal range of motion and neck supple. No edema, rigidity or crepitus. No pain with movement or muscular tenderness. Lymphadenopathy:      Cervical: Cervical adenopathy present. Skin:     General: Skin is warm and dry. Coloration: Skin is not cyanotic, mottled or pale. Neurological:      Mental Status: He is alert and oriented to person, place, and time. Cranial Nerves: Cranial nerves 2-12 are intact. No cranial nerve deficit or facial asymmetry. Sensory: Sensation is intact. No sensory deficit. Motor: Motor function is intact. No weakness. Gait: Gait is intact. Gait normal.   Psychiatric:         Mood and Affect: Affect is labile. Speech: Speech normal. Speech is not delayed or slurred.          Behavior: Behavior normal.     _____________________________________________________________________    RESULTS:    Results for orders placed or performed during the hospital encounter of 02/24/23   COVID-19, Rapid    Specimen: Nasopharyngeal Swab   Result Value Ref Range    SARS-CoV-2, NAAT Not Detected Not Detected   Rapid influenza A/B antigens    Specimen: Nasopharyngeal   Result Value Ref Range    Rapid Influenza A Ag Negative Negative    Rapid Influenza B Ag Negative Negative   CBC with Auto Differential   Result Value Ref Range    WBC 9.7 4.0 - 11.0 K/uL    RBC 4.86 4.20 - 5.90 M/uL    Hemoglobin 15.5 13.5 - 17.5 g/dL    Hematocrit 43.8 40.5 - 52.5 %    MCV 90.1 80.0 - 100.0 fL    MCH 31.8 26.0 - 34.0 pg    MCHC 35.3 31.0 - 36.0 g/dL    RDW 11.9 (L) 12.4 - 15.4 %    Platelets 775 993 - 025 K/uL    MPV 9.8 5.0 - 10.5 fL    PLATELET SLIDE REVIEW Clumped     SLIDE REVIEW see below     Neutrophils % 73.4 %    Lymphocytes % 14.2 %    Monocytes % 8.6 %    Eosinophils % 3.4 %    Basophils % 0.4 %    Neutrophils Absolute 7.1 1.7 - 7.7 K/uL    Lymphocytes Absolute 1.4 1.0 - 5.1 K/uL    Monocytes Absolute 0.8 0.0 - 1.3 K/uL    Eosinophils Absolute 0.3 0.0 - 0.6 K/uL    Basophils Absolute 0.0 0.0 - 0.2 K/uL   Basic Metabolic Panel   Result Value Ref Range    Sodium 141 136 - 145 mmol/L    Potassium 4.4 3.5 - 5.1 mmol/L    Chloride 105 99 - 110 mmol/L    CO2 21 21 - 32 mmol/L    Anion Gap 15 3 - 16    Glucose 89 70 - 99 mg/dL    BUN 14 7 - 20 mg/dL    Creatinine 0.6 (L) 0.9 - 1.3 mg/dL    Est, Glom Filt Rate >60 >60    Calcium 9.2 8.3 - 10.6 mg/dL   EKG 12 Lead   Result Value Ref Range    Ventricular Rate 73 BPM    Atrial Rate 73 BPM    P-R Interval 178 ms    QRS Duration 90 ms    Q-T Interval 384 ms    QTc Calculation (Bazett) 423 ms    P Axis 65 degrees    R Axis 75 degrees    T Axis 61 degrees    Diagnosis       Normal sinus rhythm with sinus arrhythmiaNormal ECGWhen compared with ECG of 30-SEP-2022 14:56,No significant change was foundConfirmed by JESS SHEEHAN, Reji Dobson ([id number]) on 2/24/2023 4:28:33 PM     EKG My Reading:  Rate: 73  Rhythm: sinus  ST Segments: T wave flattening AVL only  STEMI: no  QTc: 423 (normal)  Comparison to prior: not substantially changed from 09/2022    CT SOFT TISSUE NECK W CONTRAST   Final Result   No apparent acute abnormality of the soft tissue structures of the neck.          XR CHEST (2 VW)   Final Result No acute cardiopulmonary findings           _____________________________________________________________________    Is this patient to be included in the SEP-1 Core Measure? No (no signs of severe sepsis or shock)  _____________________________________________________________________    MEDICAL DECISION MAKING & PLANS:    I reviewed the patient's recent and/or pertinent records, current medications, triage notes, allergies, and vital signs. Differential Diagnosis:  Infectious pharyngitis  URI, viral illness  Bacterial pneumonia  Deep space HEENT infection  Less likely cardiogenic symptoms    Labs & Studies:  Labs  EKG  CXR  CT neck soft tissues    Treatments:  Medications   dexamethasone (DECADRON) injection 4 mg (4 mg IntraVENous Given 2/24/23 1635)   morphine (PF) injection 2 mg (2 mg IntraVENous Given 2/24/23 1629)   diphenhydrAMINE (BENADRYL) injection 12.5 mg (12.5 mg IntraVENous Given 2/24/23 1633)   iopamidol (ISOVUE-370) 76 % injection 75 mL (75 mLs IntraVENous Given 2/24/23 1803)   morphine (PF) injection 1 mg (1 mg IntraVENous Given 2/24/23 2039)   cefdinir (OMNICEF) capsule 600 mg (600 mg Oral Given 2/24/23 2043)     Discharge Medication List as of 2/24/2023  8:36 PM        START taking these medications    Details   cefdinir (OMNICEF) 300 MG capsule Take 2 capsules by mouth nightly for 6 days, Disp-12 capsule, R-0Normal           Disposition:  Mr. Rodriguez Vaca has remained well-appearing on my evaluation in the ED today. His exam findings are suspicious for uncomplicated pharyngitis but otherwise reassuring. His ED evaluation results are reassuring as well and do not show other acute or emergent findings. Mr. Rodriguez Vaca reports that his presenting symptoms have improved and are well-controlled now. He is tolerating PO medications and fluids without difficulty and he is maintaining SpO2 % without the use of supplemental oxygen.  Discussed exam findings, test results, Dxs, and expected clinical course with him at length. He is requesting to be DC home now. Return precautions reviewed in detail and outpatient follow up advised.    Clinical Impression(s)  1. Sore throat    2. Acute pharyngitis, unspecified etiology    3. Chest pain, unspecified type    4. Neck pain        Medical Decision Making  Problems Addressed:  Acute pharyngitis, unspecified etiology: acute illness or injury  Chest pain, unspecified type: acute illness or injury  Neck pain: acute illness or injury  Sore throat: acute illness or injury    Amount and/or Complexity of Data Reviewed  Labs: ordered. Decision-making details documented in ED Course.  Radiology: ordered.  ECG/medicine tests: ordered and independent interpretation performed. Decision-making details documented in ED Course.    Risk  Prescription drug management.  Parenteral controlled substances.    Provider Signature: MD Darwin Watson MD  03/05/23 [id number]

## 2023-03-20 PROCEDURE — 99283 EMERGENCY DEPT VISIT LOW MDM: CPT

## 2023-03-21 ENCOUNTER — HOSPITAL ENCOUNTER (EMERGENCY)
Age: 33
Discharge: HOME | End: 2023-03-21
Attending: EMERGENCY MEDICINE
Payer: COMMERCIAL

## 2023-03-21 VITALS
OXYGEN SATURATION: 98 % | SYSTOLIC BLOOD PRESSURE: 143 MMHG | WEIGHT: 160.05 LBS | HEART RATE: 85 BPM | DIASTOLIC BLOOD PRESSURE: 84 MMHG | HEIGHT: 68 IN | TEMPERATURE: 97.1 F | RESPIRATION RATE: 20 BRPM | BODY MASS INDEX: 24.26 KG/M2

## 2023-03-21 DIAGNOSIS — F10.939 ALCOHOL WITHDRAWAL SYNDROME WITH COMPLICATION (HCC): ICD-10-CM

## 2023-03-21 DIAGNOSIS — F10.230 ALCOHOL DEPENDENCE WITH UNCOMPLICATED WITHDRAWAL (HCC): ICD-10-CM

## 2023-03-21 DIAGNOSIS — F10.930 ALCOHOL WITHDRAWAL SYNDROME WITHOUT COMPLICATION (HCC): Primary | ICD-10-CM

## 2023-03-21 PROCEDURE — 6370000000 HC RX 637 (ALT 250 FOR IP): Performed by: EMERGENCY MEDICINE

## 2023-03-21 RX ORDER — DIAZEPAM 5 MG/1
5 TABLET ORAL ONCE
Status: COMPLETED | OUTPATIENT
Start: 2023-03-21 | End: 2023-03-21

## 2023-03-21 RX ORDER — ONDANSETRON 4 MG/1
4 TABLET, FILM COATED ORAL EVERY 8 HOURS PRN
Qty: 20 TABLET | Refills: 0 | Status: SHIPPED | OUTPATIENT
Start: 2023-03-21

## 2023-03-21 RX ORDER — DIAZEPAM 5 MG/1
5 TABLET ORAL EVERY 6 HOURS PRN
Qty: 6 TABLET | Refills: 0 | Status: SHIPPED | OUTPATIENT
Start: 2023-03-21 | End: 2023-03-21

## 2023-03-21 RX ORDER — DIAZEPAM 5 MG/1
5 TABLET ORAL EVERY 6 HOURS PRN
Qty: 8 TABLET | Refills: 0 | Status: SHIPPED | OUTPATIENT
Start: 2023-03-21 | End: 2023-03-31

## 2023-03-21 RX ADMIN — DIAZEPAM 5 MG: 5 TABLET ORAL at 01:09

## 2023-03-21 ASSESSMENT — ENCOUNTER SYMPTOMS
BACK PAIN: 0
COUGH: 0
APNEA: 0
PHOTOPHOBIA: 0
ABDOMINAL PAIN: 0
EYE DISCHARGE: 0
VOMITING: 0
EYE ITCHING: 0
DIARRHEA: 0
BLOOD IN STOOL: 0
RECTAL PAIN: 0
EYE PAIN: 0
WHEEZING: 0
STRIDOR: 0
NAUSEA: 0
SHORTNESS OF BREATH: 0
ANAL BLEEDING: 0
FACIAL SWELLING: 0
EYE REDNESS: 0
CHOKING: 0
COLOR CHANGE: 0
ABDOMINAL DISTENTION: 0
CHEST TIGHTNESS: 0
CONSTIPATION: 0

## 2023-03-21 ASSESSMENT — PAIN DESCRIPTION - ORIENTATION: ORIENTATION: MID;UPPER

## 2023-03-21 ASSESSMENT — PAIN - FUNCTIONAL ASSESSMENT: PAIN_FUNCTIONAL_ASSESSMENT: 0-10

## 2023-03-21 ASSESSMENT — PAIN SCALES - GENERAL: PAINLEVEL_OUTOF10: 8

## 2023-03-21 ASSESSMENT — PAIN DESCRIPTION - LOCATION: LOCATION: ABDOMEN

## 2023-03-21 ASSESSMENT — LIFESTYLE VARIABLES
HOW MANY STANDARD DRINKS CONTAINING ALCOHOL DO YOU HAVE ON A TYPICAL DAY: 10 OR MORE
HOW OFTEN DO YOU HAVE A DRINK CONTAINING ALCOHOL: 4 OR MORE TIMES A WEEK

## 2023-03-21 ASSESSMENT — PAIN DESCRIPTION - DESCRIPTORS: DESCRIPTORS: THROBBING

## 2023-03-21 NOTE — ED PROVIDER NOTES
intervention with concern for potential patient decompensation. PROCEDURES:  Unlessotherwise noted below, none    FINAL IMPRESSION      1. Alcohol withdrawal syndrome without complication ([facility] [address]    2. Alcohol withdrawal syndrome with complication ([facility] [address]    3. Alcohol dependence with uncomplicated withdrawal [facility])          DISPOSITION/PLAN   DISPOSITION Decision To Discharge 03/21/2023 01:02:19 AM    PATIENT REFERRED TO:  PCP    DISCHARGE MEDICATIONS:  Discharge Medication List as of 3/21/2023  1:31 AM        START taking these medications    Details   ondansetron (ZOFRAN) 4 MG tablet Take 1 tablet by mouth every 8 hours as needed for Nausea, Disp-20 tablet, R-0Print      diazePAM (VALIUM) 5 MG tablet Take 1 tablet by mouth every 6 hours as needed for Anxiety for up to 10 days.  Max Daily Amount: 20 mg, Disp-8 tablet, R-0Print                (Please note that portions ofthis note were completed with a voice recognition program.  Efforts were made to edit the dictations but occasionally words are mis-transcribed.)    Melanie Petersen MD(electronically signed)  Attending Emergency Physician        Melanie Petersen MD  03/21/23 4616

## 2023-03-21 NOTE — ED TRIAGE NOTES
Pt states that he has been lightheaded and dizzy for the past couple hours. Pt states that he feels he may be experiencing alcohol withdrawal. Pt states that his last drink was yesterday. Pt states that he drinks 12 or more beverages containing alcohol every day. Pt states his depth perception is off. Pt states that he has been feeling jittery and sweaty.  Pt states that he has been experiencing tingling in his arms, legs, and chest.

## 2023-03-21 NOTE — ED NOTES
D/C: Order noted for d/c. Pt confirmed d/c paperwork  have correct name. Discharge and education instructions reviewed with patient. Teach-back successful. Pt verbalized understanding and signed d/c papers. Pt denied questions at this time. No acute distress noted. Patient instructed to follow-up as noted - return to emergency department if symptoms worsen. Patient verbalized understanding. Discharged per EDMD with discharge instructions. Pt discharged to private vehicle. Patient stable upon departure. Thanked patient for choosing [facility]) for care.             Brayden Kulkarni, RN  03/21/23 9698

## 2023-04-08 ENCOUNTER — HOSPITAL ENCOUNTER (INPATIENT)
Age: 33
LOS: 1 days | Discharge: LEFT BEFORE DISCHARGE | DRG: 770 | End: 2023-04-09
Attending: FAMILY MEDICINE | Admitting: FAMILY MEDICINE
Payer: COMMERCIAL

## 2023-04-08 VITALS
HEIGHT: 68 IN | RESPIRATION RATE: 20 BRPM | TEMPERATURE: 97.7 F | BODY MASS INDEX: 24.35 KG/M2 | SYSTOLIC BLOOD PRESSURE: 126 MMHG | OXYGEN SATURATION: 97 % | DIASTOLIC BLOOD PRESSURE: 77 MMHG | HEART RATE: 64 BPM | WEIGHT: 160.7 LBS

## 2023-04-08 DIAGNOSIS — F10.939 ALCOHOL WITHDRAWAL SYNDROME WITH COMPLICATION (HCC): Primary | ICD-10-CM

## 2023-04-08 LAB
ALBUMIN SERPL-MCNC: 4.7 G/DL (ref 3.4–5)
ALP SERPL-CCNC: 86 U/L (ref 40–129)
ALT SERPL-CCNC: 23 U/L (ref 10–40)
AMPHETAMINES UR QL SCN>1000 NG/ML: NORMAL
AMYLASE SERPL-CCNC: 50 U/L (ref 25–115)
ANION GAP SERPL CALCULATED.3IONS-SCNC: 11 MMOL/L (ref 3–16)
AST SERPL-CCNC: 26 U/L (ref 15–37)
BARBITURATES UR QL SCN>200 NG/ML: NORMAL
BASOPHILS # BLD: 0 K/UL (ref 0–0.2)
BASOPHILS NFR BLD: 0.4 %
BENZODIAZ UR QL SCN>200 NG/ML: NORMAL
BILIRUB DIRECT SERPL-MCNC: <0.2 MG/DL (ref 0–0.3)
BILIRUB INDIRECT SERPL-MCNC: NORMAL MG/DL (ref 0–1)
BILIRUB SERPL-MCNC: 1 MG/DL (ref 0–1)
BILIRUB UR QL STRIP.AUTO: NEGATIVE
BUN SERPL-MCNC: 9 MG/DL (ref 7–20)
CALCIUM SERPL-MCNC: 9.2 MG/DL (ref 8.3–10.6)
CANNABINOIDS UR QL SCN>50 NG/ML: NORMAL
CHLORIDE SERPL-SCNC: 105 MMOL/L (ref 99–110)
CLARITY UR: CLEAR
CO2 SERPL-SCNC: 25 MMOL/L (ref 21–32)
COCAINE UR QL SCN: NORMAL
COLOR UR: YELLOW
CREAT SERPL-MCNC: 0.8 MG/DL (ref 0.9–1.3)
DEPRECATED RDW RBC AUTO: 12.6 % (ref 12.4–15.4)
DRUG SCREEN COMMENT UR-IMP: NORMAL
EOSINOPHIL # BLD: 0.1 K/UL (ref 0–0.6)
EOSINOPHIL NFR BLD: 2.2 %
ETHANOLAMINE SERPL-MCNC: NORMAL MG/DL (ref 0–0.08)
FENTANYL SCREEN, URINE: NORMAL
GFR SERPLBLD CREATININE-BSD FMLA CKD-EPI: >60 ML/MIN/{1.73_M2}
GLUCOSE SERPL-MCNC: 98 MG/DL (ref 70–99)
GLUCOSE UR STRIP.AUTO-MCNC: NEGATIVE MG/DL
HCT VFR BLD AUTO: 43.7 % (ref 40.5–52.5)
HGB BLD-MCNC: 14.9 G/DL (ref 13.5–17.5)
HGB UR QL STRIP.AUTO: NEGATIVE
KETONES UR STRIP.AUTO-MCNC: NEGATIVE MG/DL
LEUKOCYTE ESTERASE UR QL STRIP.AUTO: NEGATIVE
LIPASE SERPL-CCNC: 41 U/L (ref 13–60)
LYMPHOCYTES # BLD: 1.4 K/UL (ref 1–5.1)
LYMPHOCYTES NFR BLD: 21.2 %
MAGNESIUM SERPL-MCNC: 1.9 MG/DL (ref 1.8–2.4)
MCH RBC QN AUTO: 30.8 PG (ref 26–34)
MCHC RBC AUTO-ENTMCNC: 34.1 G/DL (ref 31–36)
MCV RBC AUTO: 90.1 FL (ref 80–100)
METHADONE UR QL SCN>300 NG/ML: NORMAL
MONOCYTES # BLD: 0.5 K/UL (ref 0–1.3)
MONOCYTES NFR BLD: 7.6 %
NEUTROPHILS # BLD: 4.4 K/UL (ref 1.7–7.7)
NEUTROPHILS NFR BLD: 68.6 %
NITRITE UR QL STRIP.AUTO: NEGATIVE
OPIATES UR QL SCN>300 NG/ML: NORMAL
OXYCODONE UR QL SCN: NORMAL
PCP UR QL SCN>25 NG/ML: NORMAL
PH UR STRIP.AUTO: 6.5 [PH] (ref 5–8)
PH UR STRIP: 6.5 [PH]
PLATELET # BLD AUTO: 231 K/UL (ref 135–450)
PMV BLD AUTO: 7.8 FL (ref 5–10.5)
POTASSIUM SERPL-SCNC: 4 MMOL/L (ref 3.5–5.1)
PROT SERPL-MCNC: 7.2 G/DL (ref 6.4–8.2)
PROT UR STRIP.AUTO-MCNC: NEGATIVE MG/DL
RBC # BLD AUTO: 4.85 M/UL (ref 4.2–5.9)
SODIUM SERPL-SCNC: 141 MMOL/L (ref 136–145)
SP GR UR STRIP.AUTO: 1.01 (ref 1–1.03)
UA COMPLETE W REFLEX CULTURE PNL UR: NORMAL
UA DIPSTICK W REFLEX MICRO PNL UR: NORMAL
URN SPEC COLLECT METH UR: NORMAL
UROBILINOGEN UR STRIP-ACNC: 0.2 E.U./DL
WBC # BLD AUTO: 6.4 K/UL (ref 4–11)

## 2023-04-08 PROCEDURE — 6370000000 HC RX 637 (ALT 250 FOR IP): Performed by: INTERNAL MEDICINE

## 2023-04-08 PROCEDURE — 80076 HEPATIC FUNCTION PANEL: CPT

## 2023-04-08 PROCEDURE — 80048 BASIC METABOLIC PNL TOTAL CA: CPT

## 2023-04-08 PROCEDURE — 2580000003 HC RX 258: Performed by: PHYSICIAN ASSISTANT

## 2023-04-08 PROCEDURE — HZ2ZZZZ DETOXIFICATION SERVICES FOR SUBSTANCE ABUSE TREATMENT: ICD-10-PCS | Performed by: FAMILY MEDICINE

## 2023-04-08 PROCEDURE — 99285 EMERGENCY DEPT VISIT HI MDM: CPT

## 2023-04-08 PROCEDURE — 82077 ASSAY SPEC XCP UR&BREATH IA: CPT

## 2023-04-08 PROCEDURE — 2500000003 HC RX 250 WO HCPCS: Performed by: PHYSICIAN ASSISTANT

## 2023-04-08 PROCEDURE — 6370000000 HC RX 637 (ALT 250 FOR IP): Performed by: PHYSICIAN ASSISTANT

## 2023-04-08 PROCEDURE — 81003 URINALYSIS AUTO W/O SCOPE: CPT

## 2023-04-08 PROCEDURE — 6360000002 HC RX W HCPCS: Performed by: PHYSICIAN ASSISTANT

## 2023-04-08 PROCEDURE — 80307 DRUG TEST PRSMV CHEM ANLYZR: CPT

## 2023-04-08 PROCEDURE — 96365 THER/PROPH/DIAG IV INF INIT: CPT

## 2023-04-08 PROCEDURE — 83735 ASSAY OF MAGNESIUM: CPT

## 2023-04-08 PROCEDURE — 83690 ASSAY OF LIPASE: CPT

## 2023-04-08 PROCEDURE — 2060000000 HC ICU INTERMEDIATE R&B

## 2023-04-08 PROCEDURE — 96375 TX/PRO/DX INJ NEW DRUG ADDON: CPT

## 2023-04-08 PROCEDURE — 6360000002 HC RX W HCPCS: Performed by: FAMILY MEDICINE

## 2023-04-08 PROCEDURE — 82150 ASSAY OF AMYLASE: CPT

## 2023-04-08 PROCEDURE — 85025 COMPLETE CBC W/AUTO DIFF WBC: CPT

## 2023-04-08 RX ORDER — LORAZEPAM 2 MG/ML
4 INJECTION INTRAMUSCULAR
Status: DISCONTINUED | OUTPATIENT
Start: 2023-04-08 | End: 2023-04-08 | Stop reason: SDUPTHER

## 2023-04-08 RX ORDER — DICYCLOMINE HYDROCHLORIDE 10 MG/1
20 CAPSULE ORAL 3 TIMES DAILY PRN
Status: DISCONTINUED | OUTPATIENT
Start: 2023-04-08 | End: 2023-04-09 | Stop reason: HOSPADM

## 2023-04-08 RX ORDER — LORAZEPAM 2 MG/ML
1 INJECTION INTRAMUSCULAR
Status: DISCONTINUED | OUTPATIENT
Start: 2023-04-08 | End: 2023-04-09 | Stop reason: HOSPADM

## 2023-04-08 RX ORDER — LORAZEPAM 1 MG/1
3 TABLET ORAL
Status: DISCONTINUED | OUTPATIENT
Start: 2023-04-08 | End: 2023-04-08 | Stop reason: SDUPTHER

## 2023-04-08 RX ORDER — SODIUM CHLORIDE 0.9 % (FLUSH) 0.9 %
5-40 SYRINGE (ML) INJECTION EVERY 12 HOURS SCHEDULED
Status: DISCONTINUED | OUTPATIENT
Start: 2023-04-08 | End: 2023-04-09 | Stop reason: HOSPADM

## 2023-04-08 RX ORDER — POLYETHYLENE GLYCOL 3350 17 G/17G
17 POWDER, FOR SOLUTION ORAL DAILY PRN
Status: DISCONTINUED | OUTPATIENT
Start: 2023-04-08 | End: 2023-04-09 | Stop reason: HOSPADM

## 2023-04-08 RX ORDER — SODIUM CHLORIDE 0.9 % (FLUSH) 0.9 %
5-40 SYRINGE (ML) INJECTION PRN
Status: DISCONTINUED | OUTPATIENT
Start: 2023-04-08 | End: 2023-04-09 | Stop reason: HOSPADM

## 2023-04-08 RX ORDER — LORAZEPAM 1 MG/1
2 TABLET ORAL
Status: DISCONTINUED | OUTPATIENT
Start: 2023-04-08 | End: 2023-04-08 | Stop reason: SDUPTHER

## 2023-04-08 RX ORDER — DIPHENHYDRAMINE HYDROCHLORIDE 50 MG/ML
25 INJECTION INTRAMUSCULAR; INTRAVENOUS ONCE
Status: COMPLETED | OUTPATIENT
Start: 2023-04-08 | End: 2023-04-08

## 2023-04-08 RX ORDER — LORAZEPAM 1 MG/1
3 TABLET ORAL
Status: DISCONTINUED | OUTPATIENT
Start: 2023-04-08 | End: 2023-04-09 | Stop reason: HOSPADM

## 2023-04-08 RX ORDER — ENOXAPARIN SODIUM 100 MG/ML
40 INJECTION SUBCUTANEOUS NIGHTLY
Status: DISCONTINUED | OUTPATIENT
Start: 2023-04-08 | End: 2023-04-09 | Stop reason: HOSPADM

## 2023-04-08 RX ORDER — ACETAMINOPHEN 325 MG/1
650 TABLET ORAL EVERY 6 HOURS PRN
Status: DISCONTINUED | OUTPATIENT
Start: 2023-04-08 | End: 2023-04-09 | Stop reason: HOSPADM

## 2023-04-08 RX ORDER — SODIUM CHLORIDE 9 MG/ML
INJECTION, SOLUTION INTRAVENOUS PRN
Status: DISCONTINUED | OUTPATIENT
Start: 2023-04-08 | End: 2023-04-09 | Stop reason: HOSPADM

## 2023-04-08 RX ORDER — LORAZEPAM 1 MG/1
2 TABLET ORAL
Status: DISCONTINUED | OUTPATIENT
Start: 2023-04-08 | End: 2023-04-09 | Stop reason: HOSPADM

## 2023-04-08 RX ORDER — KETOROLAC TROMETHAMINE 30 MG/ML
30 INJECTION, SOLUTION INTRAMUSCULAR; INTRAVENOUS ONCE
Status: COMPLETED | OUTPATIENT
Start: 2023-04-08 | End: 2023-04-08

## 2023-04-08 RX ORDER — LORAZEPAM 2 MG/ML
3 INJECTION INTRAMUSCULAR
Status: DISCONTINUED | OUTPATIENT
Start: 2023-04-08 | End: 2023-04-09 | Stop reason: HOSPADM

## 2023-04-08 RX ORDER — LORAZEPAM 1 MG/1
4 TABLET ORAL
Status: DISCONTINUED | OUTPATIENT
Start: 2023-04-08 | End: 2023-04-08 | Stop reason: SDUPTHER

## 2023-04-08 RX ORDER — ONDANSETRON 4 MG/1
4 TABLET, ORALLY DISINTEGRATING ORAL EVERY 8 HOURS PRN
Status: DISCONTINUED | OUTPATIENT
Start: 2023-04-08 | End: 2023-04-09 | Stop reason: HOSPADM

## 2023-04-08 RX ORDER — ACETAMINOPHEN 650 MG/1
650 SUPPOSITORY RECTAL EVERY 6 HOURS PRN
Status: DISCONTINUED | OUTPATIENT
Start: 2023-04-08 | End: 2023-04-09 | Stop reason: HOSPADM

## 2023-04-08 RX ORDER — GAUZE BANDAGE 2" X 2"
100 BANDAGE TOPICAL DAILY
Status: DISCONTINUED | OUTPATIENT
Start: 2023-04-08 | End: 2023-04-08 | Stop reason: SDUPTHER

## 2023-04-08 RX ORDER — SODIUM CHLORIDE 9 MG/ML
INJECTION, SOLUTION INTRAVENOUS PRN
Status: DISCONTINUED | OUTPATIENT
Start: 2023-04-08 | End: 2023-04-08 | Stop reason: SDUPTHER

## 2023-04-08 RX ORDER — LORAZEPAM 2 MG/ML
3 INJECTION INTRAMUSCULAR
Status: DISCONTINUED | OUTPATIENT
Start: 2023-04-08 | End: 2023-04-08 | Stop reason: SDUPTHER

## 2023-04-08 RX ORDER — METOCLOPRAMIDE HYDROCHLORIDE 5 MG/ML
10 INJECTION INTRAMUSCULAR; INTRAVENOUS ONCE
Status: DISCONTINUED | OUTPATIENT
Start: 2023-04-08 | End: 2023-04-09 | Stop reason: HOSPADM

## 2023-04-08 RX ORDER — LORAZEPAM 2 MG/ML
2 INJECTION INTRAMUSCULAR
Status: DISCONTINUED | OUTPATIENT
Start: 2023-04-08 | End: 2023-04-09 | Stop reason: HOSPADM

## 2023-04-08 RX ORDER — LORAZEPAM 2 MG/ML
2 INJECTION INTRAMUSCULAR
Status: DISCONTINUED | OUTPATIENT
Start: 2023-04-08 | End: 2023-04-08 | Stop reason: SDUPTHER

## 2023-04-08 RX ORDER — PANTOPRAZOLE SODIUM 40 MG/1
40 TABLET, DELAYED RELEASE ORAL
Status: DISCONTINUED | OUTPATIENT
Start: 2023-04-08 | End: 2023-04-09 | Stop reason: HOSPADM

## 2023-04-08 RX ORDER — LORAZEPAM 2 MG/ML
1 INJECTION INTRAMUSCULAR
Status: DISCONTINUED | OUTPATIENT
Start: 2023-04-08 | End: 2023-04-08 | Stop reason: SDUPTHER

## 2023-04-08 RX ORDER — LORAZEPAM 1 MG/1
1 TABLET ORAL
Status: DISCONTINUED | OUTPATIENT
Start: 2023-04-08 | End: 2023-04-08 | Stop reason: SDUPTHER

## 2023-04-08 RX ORDER — SODIUM CHLORIDE 0.9 % (FLUSH) 0.9 %
5-40 SYRINGE (ML) INJECTION PRN
Status: DISCONTINUED | OUTPATIENT
Start: 2023-04-08 | End: 2023-04-08 | Stop reason: SDUPTHER

## 2023-04-08 RX ORDER — GAUZE BANDAGE 2" X 2"
100 BANDAGE TOPICAL DAILY
Status: DISCONTINUED | OUTPATIENT
Start: 2023-04-08 | End: 2023-04-09 | Stop reason: HOSPADM

## 2023-04-08 RX ORDER — ONDANSETRON 2 MG/ML
4 INJECTION INTRAMUSCULAR; INTRAVENOUS EVERY 6 HOURS PRN
Status: DISCONTINUED | OUTPATIENT
Start: 2023-04-08 | End: 2023-04-09 | Stop reason: HOSPADM

## 2023-04-08 RX ORDER — KETOROLAC TROMETHAMINE 30 MG/ML
30 INJECTION, SOLUTION INTRAMUSCULAR; INTRAVENOUS EVERY 6 HOURS PRN
Status: DISCONTINUED | OUTPATIENT
Start: 2023-04-08 | End: 2023-04-09 | Stop reason: HOSPADM

## 2023-04-08 RX ORDER — LORAZEPAM 1 MG/1
4 TABLET ORAL
Status: DISCONTINUED | OUTPATIENT
Start: 2023-04-08 | End: 2023-04-09 | Stop reason: HOSPADM

## 2023-04-08 RX ORDER — SODIUM CHLORIDE 0.9 % (FLUSH) 0.9 %
5-40 SYRINGE (ML) INJECTION EVERY 12 HOURS SCHEDULED
Status: DISCONTINUED | OUTPATIENT
Start: 2023-04-08 | End: 2023-04-08 | Stop reason: SDUPTHER

## 2023-04-08 RX ORDER — LORAZEPAM 2 MG/ML
4 INJECTION INTRAMUSCULAR
Status: DISCONTINUED | OUTPATIENT
Start: 2023-04-08 | End: 2023-04-09 | Stop reason: HOSPADM

## 2023-04-08 RX ORDER — LORAZEPAM 1 MG/1
1 TABLET ORAL
Status: DISCONTINUED | OUTPATIENT
Start: 2023-04-08 | End: 2023-04-09 | Stop reason: HOSPADM

## 2023-04-08 RX ADMIN — DIPHENHYDRAMINE HYDROCHLORIDE 25 MG: 50 INJECTION, SOLUTION INTRAMUSCULAR; INTRAVENOUS at 15:04

## 2023-04-08 RX ADMIN — SODIUM CHLORIDE, PRESERVATIVE FREE 10 ML: 5 INJECTION INTRAVENOUS at 20:25

## 2023-04-08 RX ADMIN — LORAZEPAM 2 MG: 2 INJECTION INTRAMUSCULAR; INTRAVENOUS at 17:14

## 2023-04-08 RX ADMIN — LORAZEPAM 3 MG: 2 INJECTION INTRAMUSCULAR; INTRAVENOUS at 20:24

## 2023-04-08 RX ADMIN — ONDANSETRON 4 MG: 2 INJECTION INTRAMUSCULAR; INTRAVENOUS at 16:04

## 2023-04-08 RX ADMIN — Medication 100 MG: at 15:06

## 2023-04-08 RX ADMIN — LORAZEPAM 2 MG: 2 INJECTION INTRAMUSCULAR; INTRAVENOUS at 16:11

## 2023-04-08 RX ADMIN — DICYCLOMINE HYDROCHLORIDE 20 MG: 10 CAPSULE ORAL at 18:46

## 2023-04-08 RX ADMIN — ENOXAPARIN SODIUM 40 MG: 100 INJECTION SUBCUTANEOUS at 20:25

## 2023-04-08 RX ADMIN — KETOROLAC TROMETHAMINE 30 MG: 30 INJECTION, SOLUTION INTRAMUSCULAR at 15:04

## 2023-04-08 RX ADMIN — LORAZEPAM 3 MG: 2 INJECTION INTRAMUSCULAR; INTRAVENOUS at 15:04

## 2023-04-08 RX ADMIN — FOLIC ACID: 5 INJECTION, SOLUTION INTRAMUSCULAR; INTRAVENOUS; SUBCUTANEOUS at 15:11

## 2023-04-08 ASSESSMENT — PAIN DESCRIPTION - LOCATION
LOCATION: ABDOMEN
LOCATION: ABDOMEN
LOCATION: HEAD

## 2023-04-08 ASSESSMENT — ENCOUNTER SYMPTOMS
COUGH: 0
CHEST TIGHTNESS: 0
SHORTNESS OF BREATH: 0
DIARRHEA: 0
VOMITING: 0
NAUSEA: 0
ABDOMINAL PAIN: 0

## 2023-04-08 ASSESSMENT — PAIN SCALES - GENERAL
PAINLEVEL_OUTOF10: 8

## 2023-04-08 ASSESSMENT — PAIN DESCRIPTION - DESCRIPTORS: DESCRIPTORS: THROBBING

## 2023-04-08 ASSESSMENT — PAIN DESCRIPTION - ORIENTATION: ORIENTATION: MID;UPPER

## 2023-04-08 ASSESSMENT — PAIN - FUNCTIONAL ASSESSMENT: PAIN_FUNCTIONAL_ASSESSMENT: 0-10

## 2023-04-09 PROBLEM — F10.139 ALCOHOL ABUSE WITH WITHDRAWAL, UNSPECIFIED (HCC): Status: ACTIVE | Noted: 2023-04-09

## 2023-04-19 ENCOUNTER — APPOINTMENT (OUTPATIENT)
Dept: ULTRASOUND IMAGING | Age: 33
End: 2023-04-19
Payer: COMMERCIAL

## 2023-04-19 ENCOUNTER — HOSPITAL ENCOUNTER (INPATIENT)
Age: 33
LOS: 1 days | Discharge: LEFT BEFORE DISCHARGE | End: 2023-04-20
Attending: STUDENT IN AN ORGANIZED HEALTH CARE EDUCATION/TRAINING PROGRAM | Admitting: STUDENT IN AN ORGANIZED HEALTH CARE EDUCATION/TRAINING PROGRAM
Payer: COMMERCIAL

## 2023-04-19 ENCOUNTER — APPOINTMENT (OUTPATIENT)
Dept: CT IMAGING | Age: 33
End: 2023-04-19
Payer: COMMERCIAL

## 2023-04-19 VITALS
HEART RATE: 75 BPM | BODY MASS INDEX: 23.39 KG/M2 | OXYGEN SATURATION: 95 % | DIASTOLIC BLOOD PRESSURE: 91 MMHG | TEMPERATURE: 98.2 F | RESPIRATION RATE: 16 BRPM | WEIGHT: 154.32 LBS | HEIGHT: 68 IN | SYSTOLIC BLOOD PRESSURE: 123 MMHG

## 2023-04-19 DIAGNOSIS — K85.90 ACUTE ON CHRONIC PANCREATITIS (HCC): Primary | ICD-10-CM

## 2023-04-19 DIAGNOSIS — K86.1 ACUTE ON CHRONIC PANCREATITIS (HCC): Primary | ICD-10-CM

## 2023-04-19 DIAGNOSIS — Z71.89 GOALS OF CARE, COUNSELING/DISCUSSION: ICD-10-CM

## 2023-04-19 LAB
ALBUMIN SERPL-MCNC: 4.6 G/DL (ref 3.4–5)
ALBUMIN/GLOB SERPL: 1.5 {RATIO} (ref 1.1–2.2)
ALP SERPL-CCNC: 94 U/L (ref 40–129)
ALT SERPL-CCNC: 26 U/L (ref 10–40)
AMPHETAMINES UR QL SCN>1000 NG/ML: ABNORMAL
ANION GAP SERPL CALCULATED.3IONS-SCNC: 16 MMOL/L (ref 3–16)
ANISOCYTOSIS BLD QL SMEAR: ABNORMAL
AST SERPL-CCNC: 31 U/L (ref 15–37)
BACTERIA URNS QL MICRO: NORMAL /HPF
BARBITURATES UR QL SCN>200 NG/ML: ABNORMAL
BASOPHILS # BLD: 0.3 K/UL (ref 0–0.2)
BASOPHILS NFR BLD: 5.2 %
BENZODIAZ UR QL SCN>200 NG/ML: POSITIVE
BILIRUB SERPL-MCNC: 1 MG/DL (ref 0–1)
BILIRUB UR QL STRIP.AUTO: NEGATIVE
BUN SERPL-MCNC: 10 MG/DL (ref 7–20)
CALCIUM SERPL-MCNC: 9.3 MG/DL (ref 8.3–10.6)
CANNABINOIDS UR QL SCN>50 NG/ML: ABNORMAL
CHLORIDE SERPL-SCNC: 99 MMOL/L (ref 99–110)
CLARITY UR: CLEAR
CO2 SERPL-SCNC: 19 MMOL/L (ref 21–32)
COCAINE UR QL SCN: ABNORMAL
COLOR UR: YELLOW
CREAT SERPL-MCNC: 0.8 MG/DL (ref 0.9–1.3)
DEPRECATED RDW RBC AUTO: 12.6 % (ref 12.4–15.4)
DRUG SCREEN COMMENT UR-IMP: ABNORMAL
EOSINOPHIL # BLD: 0.1 K/UL (ref 0–0.6)
EOSINOPHIL NFR BLD: 2.6 %
EPI CELLS #/AREA URNS AUTO: 1 /HPF (ref 0–5)
FENTANYL SCREEN, URINE: ABNORMAL
GFR SERPLBLD CREATININE-BSD FMLA CKD-EPI: >60 ML/MIN/{1.73_M2}
GLUCOSE SERPL-MCNC: 106 MG/DL (ref 70–99)
GLUCOSE UR STRIP.AUTO-MCNC: NEGATIVE MG/DL
HCT VFR BLD AUTO: 46 % (ref 40.5–52.5)
HGB BLD-MCNC: 13.2 G/DL (ref 13.5–17.5)
HGB UR QL STRIP.AUTO: NEGATIVE
HYALINE CASTS #/AREA URNS AUTO: 1 /LPF (ref 0–8)
KETONES UR STRIP.AUTO-MCNC: 15 MG/DL
LEUKOCYTE ESTERASE UR QL STRIP.AUTO: NEGATIVE
LIPASE SERPL-CCNC: 101 U/L (ref 13–60)
LYMPHOCYTES # BLD: 0.8 K/UL (ref 1–5.1)
LYMPHOCYTES NFR BLD: 15.1 %
MCH RBC QN AUTO: 26 PG (ref 26–34)
MCHC RBC AUTO-ENTMCNC: 28.7 G/DL (ref 31–36)
MCV RBC AUTO: 90.5 FL (ref 80–100)
METHADONE UR QL SCN>300 NG/ML: ABNORMAL
MONOCYTES # BLD: 0.4 K/UL (ref 0–1.3)
MONOCYTES NFR BLD: 6.3 %
NEUTROPHILS # BLD: 4 K/UL (ref 1.7–7.7)
NEUTROPHILS NFR BLD: 70.8 %
NITRITE UR QL STRIP.AUTO: NEGATIVE
OPIATES UR QL SCN>300 NG/ML: ABNORMAL
OXYCODONE UR QL SCN: ABNORMAL
PCP UR QL SCN>25 NG/ML: ABNORMAL
PH UR STRIP.AUTO: 7 [PH] (ref 5–8)
PH UR STRIP: 7 [PH]
PLATELET # BLD AUTO: 179 K/UL (ref 135–450)
PLATELET BLD QL SMEAR: ADEQUATE
PMV BLD AUTO: 9.1 FL (ref 5–10.5)
POTASSIUM SERPL-SCNC: 4.6 MMOL/L (ref 3.5–5.1)
PROT SERPL-MCNC: 7.6 G/DL (ref 6.4–8.2)
PROT UR STRIP.AUTO-MCNC: ABNORMAL MG/DL
RBC # BLD AUTO: 5.08 M/UL (ref 4.2–5.9)
RBC CLUMPS #/AREA URNS AUTO: 1 /HPF (ref 0–4)
SLIDE REVIEW: ABNORMAL
SODIUM SERPL-SCNC: 134 MMOL/L (ref 136–145)
SP GR UR STRIP.AUTO: 1.03 (ref 1–1.03)
UA COMPLETE W REFLEX CULTURE PNL UR: ABNORMAL
UA DIPSTICK W REFLEX MICRO PNL UR: YES
URN SPEC COLLECT METH UR: ABNORMAL
UROBILINOGEN UR STRIP-ACNC: 1 E.U./DL
WBC # BLD AUTO: 5.6 K/UL (ref 4–11)
WBC #/AREA URNS AUTO: 1 /HPF (ref 0–5)

## 2023-04-19 PROCEDURE — 83690 ASSAY OF LIPASE: CPT

## 2023-04-19 PROCEDURE — 80053 COMPREHEN METABOLIC PANEL: CPT

## 2023-04-19 PROCEDURE — 6360000004 HC RX CONTRAST MEDICATION: Performed by: NURSE PRACTITIONER

## 2023-04-19 PROCEDURE — 74177 CT ABD & PELVIS W/CONTRAST: CPT

## 2023-04-19 PROCEDURE — 85025 COMPLETE CBC W/AUTO DIFF WBC: CPT

## 2023-04-19 PROCEDURE — 96372 THER/PROPH/DIAG INJ SC/IM: CPT

## 2023-04-19 PROCEDURE — A4216 STERILE WATER/SALINE, 10 ML: HCPCS | Performed by: NURSE PRACTITIONER

## 2023-04-19 PROCEDURE — 96375 TX/PRO/DX INJ NEW DRUG ADDON: CPT

## 2023-04-19 PROCEDURE — 6360000002 HC RX W HCPCS: Performed by: NURSE PRACTITIONER

## 2023-04-19 PROCEDURE — 36415 COLL VENOUS BLD VENIPUNCTURE: CPT

## 2023-04-19 PROCEDURE — 81001 URINALYSIS AUTO W/SCOPE: CPT

## 2023-04-19 PROCEDURE — 2580000003 HC RX 258: Performed by: NURSE PRACTITIONER

## 2023-04-19 PROCEDURE — 99285 EMERGENCY DEPT VISIT HI MDM: CPT

## 2023-04-19 PROCEDURE — 96374 THER/PROPH/DIAG INJ IV PUSH: CPT

## 2023-04-19 PROCEDURE — 2500000003 HC RX 250 WO HCPCS: Performed by: NURSE PRACTITIONER

## 2023-04-19 PROCEDURE — 80307 DRUG TEST PRSMV CHEM ANLYZR: CPT

## 2023-04-19 PROCEDURE — 76705 ECHO EXAM OF ABDOMEN: CPT

## 2023-04-19 PROCEDURE — 1200000000 HC SEMI PRIVATE

## 2023-04-19 RX ORDER — ACETAMINOPHEN 500 MG
1000 TABLET ORAL EVERY 8 HOURS
Status: DISCONTINUED | OUTPATIENT
Start: 2023-04-20 | End: 2023-04-20 | Stop reason: HOSPADM

## 2023-04-19 RX ORDER — CHLORDIAZEPOXIDE HYDROCHLORIDE 5 MG/1
10 CAPSULE, GELATIN COATED ORAL 3 TIMES DAILY
Status: DISCONTINUED | OUTPATIENT
Start: 2023-04-20 | End: 2023-04-20 | Stop reason: HOSPADM

## 2023-04-19 RX ORDER — SODIUM CHLORIDE, SODIUM LACTATE, POTASSIUM CHLORIDE, CALCIUM CHLORIDE 600; 310; 30; 20 MG/100ML; MG/100ML; MG/100ML; MG/100ML
INJECTION, SOLUTION INTRAVENOUS CONTINUOUS
Status: DISCONTINUED | OUTPATIENT
Start: 2023-04-19 | End: 2023-04-20 | Stop reason: HOSPADM

## 2023-04-19 RX ORDER — SODIUM CHLORIDE 0.9 % (FLUSH) 0.9 %
5-40 SYRINGE (ML) INJECTION EVERY 12 HOURS SCHEDULED
Status: DISCONTINUED | OUTPATIENT
Start: 2023-04-20 | End: 2023-04-20 | Stop reason: HOSPADM

## 2023-04-19 RX ORDER — SODIUM CHLORIDE 9 MG/ML
INJECTION, SOLUTION INTRAVENOUS PRN
Status: DISCONTINUED | OUTPATIENT
Start: 2023-04-19 | End: 2023-04-20 | Stop reason: HOSPADM

## 2023-04-19 RX ORDER — ONDANSETRON 2 MG/ML
4 INJECTION INTRAMUSCULAR; INTRAVENOUS ONCE
Status: COMPLETED | OUTPATIENT
Start: 2023-04-19 | End: 2023-04-19

## 2023-04-19 RX ORDER — LIDOCAINE 4 G/G
1 PATCH TOPICAL DAILY
Status: DISCONTINUED | OUTPATIENT
Start: 2023-04-20 | End: 2023-04-20 | Stop reason: HOSPADM

## 2023-04-19 RX ORDER — POLYETHYLENE GLYCOL 3350 17 G/17G
17 POWDER, FOR SOLUTION ORAL DAILY PRN
Status: DISCONTINUED | OUTPATIENT
Start: 2023-04-19 | End: 2023-04-20 | Stop reason: HOSPADM

## 2023-04-19 RX ORDER — KETOROLAC TROMETHAMINE 30 MG/ML
15 INJECTION, SOLUTION INTRAMUSCULAR; INTRAVENOUS EVERY 6 HOURS PRN
Status: DISCONTINUED | OUTPATIENT
Start: 2023-04-19 | End: 2023-04-20 | Stop reason: HOSPADM

## 2023-04-19 RX ORDER — ONDANSETRON 4 MG/1
4 TABLET, ORALLY DISINTEGRATING ORAL EVERY 8 HOURS PRN
Status: DISCONTINUED | OUTPATIENT
Start: 2023-04-19 | End: 2023-04-20 | Stop reason: HOSPADM

## 2023-04-19 RX ORDER — DICYCLOMINE HYDROCHLORIDE 10 MG/ML
20 INJECTION INTRAMUSCULAR ONCE
Status: COMPLETED | OUTPATIENT
Start: 2023-04-19 | End: 2023-04-19

## 2023-04-19 RX ORDER — SODIUM CHLORIDE 0.9 % (FLUSH) 0.9 %
5-40 SYRINGE (ML) INJECTION PRN
Status: DISCONTINUED | OUTPATIENT
Start: 2023-04-19 | End: 2023-04-20 | Stop reason: HOSPADM

## 2023-04-19 RX ORDER — DICYCLOMINE HYDROCHLORIDE 10 MG/ML
20 INJECTION INTRAMUSCULAR 4 TIMES DAILY PRN
Status: DISCONTINUED | OUTPATIENT
Start: 2023-04-19 | End: 2023-04-20 | Stop reason: HOSPADM

## 2023-04-19 RX ORDER — MORPHINE SULFATE 4 MG/ML
4 INJECTION, SOLUTION INTRAMUSCULAR; INTRAVENOUS ONCE
Status: COMPLETED | OUTPATIENT
Start: 2023-04-19 | End: 2023-04-19

## 2023-04-19 RX ORDER — 0.9 % SODIUM CHLORIDE 0.9 %
1000 INTRAVENOUS SOLUTION INTRAVENOUS ONCE
Status: COMPLETED | OUTPATIENT
Start: 2023-04-19 | End: 2023-04-19

## 2023-04-19 RX ORDER — ENOXAPARIN SODIUM 100 MG/ML
40 INJECTION SUBCUTANEOUS DAILY
Status: DISCONTINUED | OUTPATIENT
Start: 2023-04-20 | End: 2023-04-20 | Stop reason: HOSPADM

## 2023-04-19 RX ORDER — ONDANSETRON 2 MG/ML
4 INJECTION INTRAMUSCULAR; INTRAVENOUS EVERY 6 HOURS PRN
Status: DISCONTINUED | OUTPATIENT
Start: 2023-04-19 | End: 2023-04-20 | Stop reason: HOSPADM

## 2023-04-19 RX ADMIN — DICYCLOMINE HYDROCHLORIDE 20 MG: 10 INJECTION, SOLUTION INTRAMUSCULAR at 19:58

## 2023-04-19 RX ADMIN — ONDANSETRON 4 MG: 2 INJECTION INTRAMUSCULAR; INTRAVENOUS at 19:57

## 2023-04-19 RX ADMIN — IOPAMIDOL 75 ML: 755 INJECTION, SOLUTION INTRAVENOUS at 19:18

## 2023-04-19 RX ADMIN — ONDANSETRON 4 MG: 2 INJECTION INTRAMUSCULAR; INTRAVENOUS at 21:55

## 2023-04-19 RX ADMIN — FAMOTIDINE 20 MG: 10 INJECTION, SOLUTION INTRAVENOUS at 22:46

## 2023-04-19 RX ADMIN — MORPHINE SULFATE 4 MG: 4 INJECTION, SOLUTION INTRAMUSCULAR; INTRAVENOUS at 21:42

## 2023-04-19 RX ADMIN — KETOROLAC TROMETHAMINE 15 MG: 30 INJECTION, SOLUTION INTRAMUSCULAR at 22:46

## 2023-04-19 RX ADMIN — SODIUM CHLORIDE 1000 ML: 9 INJECTION, SOLUTION INTRAVENOUS at 19:56

## 2023-04-19 RX ADMIN — SODIUM CHLORIDE, POTASSIUM CHLORIDE, SODIUM LACTATE AND CALCIUM CHLORIDE: 600; 310; 30; 20 INJECTION, SOLUTION INTRAVENOUS at 22:49

## 2023-04-19 ASSESSMENT — PAIN DESCRIPTION - ONSET: ONSET: GRADUAL

## 2023-04-19 ASSESSMENT — LIFESTYLE VARIABLES
HOW OFTEN DO YOU HAVE A DRINK CONTAINING ALCOHOL: 2-4 TIMES A MONTH
HOW OFTEN DO YOU HAVE A DRINK CONTAINING ALCOHOL: 2-4 TIMES A MONTH
HOW MANY STANDARD DRINKS CONTAINING ALCOHOL DO YOU HAVE ON A TYPICAL DAY: 3 OR 4

## 2023-04-19 ASSESSMENT — PAIN SCALES - GENERAL
PAINLEVEL_OUTOF10: 10
PAINLEVEL_OUTOF10: 10
PAINLEVEL_OUTOF10: 5

## 2023-04-19 ASSESSMENT — PAIN DESCRIPTION - PAIN TYPE
TYPE: ACUTE PAIN
TYPE: ACUTE PAIN

## 2023-04-19 ASSESSMENT — PAIN - FUNCTIONAL ASSESSMENT
PAIN_FUNCTIONAL_ASSESSMENT: 0-10
PAIN_FUNCTIONAL_ASSESSMENT: ACTIVITIES ARE NOT PREVENTED

## 2023-04-19 ASSESSMENT — PAIN DESCRIPTION - LOCATION
LOCATION: ABDOMEN
LOCATION: ABDOMEN

## 2023-04-19 ASSESSMENT — PAIN DESCRIPTION - ORIENTATION: ORIENTATION: LEFT;UPPER

## 2023-04-19 ASSESSMENT — PAIN DESCRIPTION - FREQUENCY: FREQUENCY: CONTINUOUS

## 2023-04-19 ASSESSMENT — PAIN DESCRIPTION - DESCRIPTORS: DESCRIPTORS: DISCOMFORT

## 2023-04-19 NOTE — ED TRIAGE NOTES
Pt into ER from home c/c upper Abd pain onset 3 days ago, radiating into back with nausea and vomiting. Pt with poor appetite for last few days. Abd pain worse after eating. Pt with Hx of pancreatitis.

## 2023-04-20 PROCEDURE — 6370000000 HC RX 637 (ALT 250 FOR IP): Performed by: NURSE PRACTITIONER

## 2023-04-20 RX ADMIN — ACETAMINOPHEN 1000 MG: 500 TABLET ORAL at 00:38

## 2023-04-20 ASSESSMENT — ENCOUNTER SYMPTOMS
SORE THROAT: 0
BLOOD IN STOOL: 0
BACK PAIN: 0
DIARRHEA: 0
VOMITING: 1
EYE PAIN: 0
SHORTNESS OF BREATH: 0
COUGH: 0
NAUSEA: 1
ANAL BLEEDING: 0
ABDOMINAL PAIN: 1

## 2023-04-20 ASSESSMENT — PAIN DESCRIPTION - DESCRIPTORS: DESCRIPTORS: ACHING

## 2023-04-20 ASSESSMENT — PAIN DESCRIPTION - LOCATION: LOCATION: ABDOMEN

## 2023-04-20 ASSESSMENT — PAIN SCALES - GENERAL: PAINLEVEL_OUTOF10: 4

## 2023-04-20 ASSESSMENT — PAIN DESCRIPTION - ORIENTATION: ORIENTATION: LEFT;UPPER

## 2023-04-20 NOTE — PROGRESS NOTES
Patient stated he was leaving AMA. Stated he doesn't know when the GI doctor is coming, he wants to eat and wants to sleep. Educated patient on why he is here and the importance of waiting to see GI doctor. NP made aware. Papers signed. Patient left via private vehicle.

## 2023-04-20 NOTE — ED PROVIDER NOTES
with an hemoglobin 13.2, MCHC reduced at 28.7, basos abs elevated 0.3, and a cytosis 1+, otherwise unremarkable  Metabolic panel: Hyponatremic 134, CO2 reduced at 19, hyperglycemic at 106 mg/dL, no renal dysfunction or elevation LFTs  Lipase: Elevated 101.0 but was normal 10 days ago. Concerning for an acute on chronic pancreatitis. Urine reflex to culture: Ketones 15, protein trace, negative for nitrates or leukocyte esterase, otherwise unremarkable  UDS: Benzodiazepine positive otherwise negative UDS  Microscopic urinalysis: No bacteria seen, no elevation in WBCs, hyaline casts, RBCs, inconsistent with UTI  CT abdomen pelvis: As noted above Unifine redemonstration of sequela of chronic pancreatitis as before. Peripancreatic inflammatory changes along the head of the pancreas, suspicious for concurrent acute pancreatitis. Bilateral nonobstructing renal stones without evidence of hydronephrosis seen. Given patient's elevated lipase and these findings patient will be diagnosed with an acute on chronic pancreatitis and will require admission to the hospital for further evaluation treatment. Patient is agreeable with plan for admission  US gallbladder RUQ: As noted above identifies stable findings of chronic pancreatitis. Otherwise unremarkable right upper quadrant ultrasound. Is this patient to be included in the SEP-1 Core Measure due to severe sepsis or septic shock? No   Exclusion criteria - the patient is NOT to be included for SEP-1 Core Measure due to:  2+ SIRS criteria are not met    CONSULTS: (Who and What was discussed)  IP CONSULT TO HOSPITALIST  IP CONSULT TO GI  Discussion with Other Profesionals : Admitting Team -Dr. Cheryl Gtz    Social Determinants : None    Records Reviewed : None    CC/HPI Summary, DDx, ED Course, and Reassessment: On reassessment the patient is resting comfortably on stretcher with eyes open with stable vital signs.   He is agreeable with the plan for admission for pain

## 2023-04-20 NOTE — H&P
test positive for benzodiazepine no other services. Librium 10 mg 3 times daily during hospital course  Social service consulted for chemical dependency and arrangement for a outpatient treatment plan; although given the record review the patient is not compliant with a Resource provided. Most likely it is secondary to alcohol dependence but he also has mental health issues with anxiety, bipolar and the alcohol and substance abuse and use is obviously a means of self-medication. Bipolar, anxiety: On no medications and has no provider      DVT Prophylaxis: Lovenox  Diet: Diet NPO  Code Status: Full Code  PT/OT Eval Status: Independent    Dispo -admit, inpatient       Teresa Talia Min, APRN - CNP    Thank you Ruma Rodrigues APRN - NP for the opportunity to be involved in this patient's care. If you have any questions or concerns please feel free to contact me at [phone]. This dictation was performed with a verbal recognition program (DRAGON) and it was checked for errors. It is possible that there are still dictated errors within this office note. If so, please bring any errors to my attention for an addendum. All efforts were made to ensure that this office note is accurate.

## 2023-04-20 NOTE — PROGRESS NOTES
4 Eyes Admission Assessment      I agree as the admission nurse that 2 RN's have performed a thorough Head to Toe Skin Assessment on the patient. ALL assessment sites listed below have been assessed on admission. Areas assessed by both nurses:   [x]   Head, Face, and Ears  [x]   Shoulders, Back, and Chest  [x]   Arms, Elbows, and Hands   [x]   Coccyx, Sacrum, and Ischum  [x]   Legs, Feet, and Heels                        Does the Patient have Skin Breakdown?   No         Xiang Prevention initiated:  No  Wound Care Orders initiated:  NA      [facility] nurse consulted for Pressure Injury (Stage 3,4, Unstageable, DTI, NWPT, and Complex wounds):  NA       Nurse 1 eSignature: Electronically signed by Radhames Rogers RN on 4/20/2023 at 2:25 AM     **SHARE this note so that the co-signing nurse is able to place an eSignature**     Nurse 2 eSignature: Electronically signed by Carmelo Rolon RN on 4/20/23 at 2:27 AM EDT

## 2023-05-23 ENCOUNTER — OFFICE VISIT (OUTPATIENT)
Dept: FAMILY MEDICINE CLINIC | Age: 33
End: 2023-05-23
Payer: COMMERCIAL

## 2023-05-23 VITALS
SYSTOLIC BLOOD PRESSURE: 132 MMHG | HEIGHT: 68 IN | HEART RATE: 71 BPM | DIASTOLIC BLOOD PRESSURE: 80 MMHG | BODY MASS INDEX: 23.72 KG/M2 | WEIGHT: 156.5 LBS | OXYGEN SATURATION: 99 %

## 2023-05-23 DIAGNOSIS — Z00.00 ANNUAL PHYSICAL EXAM: Primary | ICD-10-CM

## 2023-05-23 PROCEDURE — G8420 CALC BMI NORM PARAMETERS: HCPCS | Performed by: FAMILY MEDICINE

## 2023-05-23 PROCEDURE — 99203 OFFICE O/P NEW LOW 30 MIN: CPT | Performed by: FAMILY MEDICINE

## 2023-05-23 PROCEDURE — G8427 DOCREV CUR MEDS BY ELIG CLIN: HCPCS | Performed by: FAMILY MEDICINE

## 2023-05-23 PROCEDURE — 4004F PT TOBACCO SCREEN RCVD TLK: CPT | Performed by: FAMILY MEDICINE

## 2023-05-23 SDOH — ECONOMIC STABILITY: FOOD INSECURITY: WITHIN THE PAST 12 MONTHS, YOU WORRIED THAT YOUR FOOD WOULD RUN OUT BEFORE YOU GOT MONEY TO BUY MORE.: NEVER TRUE

## 2023-05-23 SDOH — ECONOMIC STABILITY: FOOD INSECURITY: WITHIN THE PAST 12 MONTHS, THE FOOD YOU BOUGHT JUST DIDN'T LAST AND YOU DIDN'T HAVE MONEY TO GET MORE.: NEVER TRUE

## 2023-05-23 SDOH — ECONOMIC STABILITY: HOUSING INSECURITY
IN THE LAST 12 MONTHS, WAS THERE A TIME WHEN YOU DID NOT HAVE A STEADY PLACE TO SLEEP OR SLEPT IN A SHELTER (INCLUDING NOW)?: NO

## 2023-05-23 SDOH — ECONOMIC STABILITY: INCOME INSECURITY: HOW HARD IS IT FOR YOU TO PAY FOR THE VERY BASICS LIKE FOOD, HOUSING, MEDICAL CARE, AND HEATING?: NOT HARD AT ALL

## 2023-05-23 ASSESSMENT — PATIENT HEALTH QUESTIONNAIRE - PHQ9
10. IF YOU CHECKED OFF ANY PROBLEMS, HOW DIFFICULT HAVE THESE PROBLEMS MADE IT FOR YOU TO DO YOUR WORK, TAKE CARE OF THINGS AT HOME, OR GET ALONG WITH OTHER PEOPLE: 0
SUM OF ALL RESPONSES TO PHQ QUESTIONS 1-9: 1
1. LITTLE INTEREST OR PLEASURE IN DOING THINGS: 0
4. FEELING TIRED OR HAVING LITTLE ENERGY: 1
2. FEELING DOWN, DEPRESSED OR HOPELESS: 0
3. TROUBLE FALLING OR STAYING ASLEEP: 0
6. FEELING BAD ABOUT YOURSELF - OR THAT YOU ARE A FAILURE OR HAVE LET YOURSELF OR YOUR FAMILY DOWN: 0
SUM OF ALL RESPONSES TO PHQ9 QUESTIONS 1 & 2: 0
9. THOUGHTS THAT YOU WOULD BE BETTER OFF DEAD, OR OF HURTING YOURSELF: 0
SUM OF ALL RESPONSES TO PHQ QUESTIONS 1-9: 1
5. POOR APPETITE OR OVEREATING: 0
7. TROUBLE CONCENTRATING ON THINGS, SUCH AS READING THE NEWSPAPER OR WATCHING TELEVISION: 0
SUM OF ALL RESPONSES TO PHQ QUESTIONS 1-9: 1
8. MOVING OR SPEAKING SO SLOWLY THAT OTHER PEOPLE COULD HAVE NOTICED. OR THE OPPOSITE, BEING SO FIGETY OR RESTLESS THAT YOU HAVE BEEN MOVING AROUND A LOT MORE THAN USUAL: 0
SUM OF ALL RESPONSES TO PHQ QUESTIONS 1-9: 1

## 2023-05-24 NOTE — PROGRESS NOTES
Types: Cigarettes     Start date: 11/21/2007    Smokeless tobacco: Never   Vaping Use    Vaping Use: Some days   Substance Use Topics    Alcohol use: Yes     Comment: 2-3 on weekends    Drug use: Not Currently     Family History   Problem Relation Age of Onset    Hypertension Father     High Blood Pressure Father     Alcohol Abuse Father     Depression Mother     High Blood Pressure Paternal Grandfather     Alcohol Abuse Paternal Grandfather     Heart Disease Paternal Grandmother     Anemia Paternal Grandmother     Depression Maternal Aunt     Alcohol Abuse Paternal Aunt     Alcohol Abuse Paternal Uncle      Past Surgical History:   Procedure Laterality Date    ENDOSCOPY, COLON, DIAGNOSTIC  10/28/2013    Endoscopic retrograde cholangiopancreatography with pancreatic stent placement    ENDOSCOPY, COLON, DIAGNOSTIC  03/23/2018    Esophagogastroduodenoscopy with biopsy    ERCP  01/10/2014       No current outpatient medications on file. Allergies   Allergen Reactions    Amoxicillin Hives    Haldol [Haloperidol Lactate] Other (See Comments)     Muscle spasms    Phenergan [Promethazine Hcl] Other (See Comments)     Pt believes it caused muscle spasms    Shrimp (Diagnostic) Itching      Itching of throat when eating shrimp. Pt states has had IV contrast for CT's without problem before. Glucosamine Itching    Shellfish-Derived Products      Itching of throat when eating shrimp. Review of Systems:  Review of Systems    Objective:    Physical Exam:  Vitals:    05/23/23 1130   BP: 132/80   Site: Right Upper Arm   Position: Sitting   Cuff Size: Medium Adult   Pulse: 71   SpO2: 99%   Weight: 156 lb 8 oz (71 kg)   Height: 5' 8\" (1.727 m)     Physical Exam  Body mass index is 23.8 kg/m². ASSESSMENT & PLAN:    Rob Pascal is a 33y.o. year old male here for an annual exam. The following is a summary of the plan made at this visit:    1. Annual physical exam  -     Lipid Panel;  Future  -     TSH;

## 2023-08-21 ENCOUNTER — HOSPITAL ENCOUNTER (EMERGENCY)
Age: 33
Discharge: HOME | End: 2023-08-21
Attending: EMERGENCY MEDICINE
Payer: COMMERCIAL

## 2023-08-21 ENCOUNTER — APPOINTMENT (OUTPATIENT)
Dept: CT IMAGING | Age: 33
End: 2023-08-21
Payer: COMMERCIAL

## 2023-08-21 VITALS
TEMPERATURE: 98 F | HEIGHT: 68 IN | BODY MASS INDEX: 22.73 KG/M2 | HEART RATE: 88 BPM | SYSTOLIC BLOOD PRESSURE: 122 MMHG | WEIGHT: 150 LBS | OXYGEN SATURATION: 97 % | RESPIRATION RATE: 18 BRPM | DIASTOLIC BLOOD PRESSURE: 78 MMHG

## 2023-08-21 DIAGNOSIS — S09.90XA CLOSED HEAD INJURY, INITIAL ENCOUNTER: Primary | ICD-10-CM

## 2023-08-21 DIAGNOSIS — S00.03XA HEMATOMA OF SCALP, INITIAL ENCOUNTER: ICD-10-CM

## 2023-08-21 DIAGNOSIS — S00.83XA CONTUSION OF JAW, INITIAL ENCOUNTER: ICD-10-CM

## 2023-08-21 PROCEDURE — 70450 CT HEAD/BRAIN W/O DYE: CPT

## 2023-08-21 PROCEDURE — 99284 EMERGENCY DEPT VISIT MOD MDM: CPT

## 2023-08-21 PROCEDURE — 70486 CT MAXILLOFACIAL W/O DYE: CPT

## 2023-08-21 PROCEDURE — 96372 THER/PROPH/DIAG INJ SC/IM: CPT

## 2023-08-21 PROCEDURE — 6360000002 HC RX W HCPCS: Performed by: EMERGENCY MEDICINE

## 2023-08-21 RX ORDER — NAPROXEN 500 MG/1
500 TABLET ORAL 2 TIMES DAILY WITH MEALS
Qty: 14 TABLET | Refills: 0 | Status: SHIPPED | OUTPATIENT
Start: 2023-08-21 | End: 2023-08-28

## 2023-08-21 RX ORDER — METOCLOPRAMIDE 10 MG/1
10 TABLET ORAL 4 TIMES DAILY
Qty: 120 TABLET | Refills: 0 | Status: SHIPPED | OUTPATIENT
Start: 2023-08-21

## 2023-08-21 RX ORDER — MORPHINE SULFATE 4 MG/ML
4 INJECTION, SOLUTION INTRAMUSCULAR; INTRAVENOUS
Status: COMPLETED | OUTPATIENT
Start: 2023-08-21 | End: 2023-08-21

## 2023-08-21 RX ADMIN — MORPHINE SULFATE 4 MG: 4 INJECTION, SOLUTION INTRAMUSCULAR; INTRAVENOUS at 03:05

## 2023-08-21 ASSESSMENT — LIFESTYLE VARIABLES
HOW OFTEN DO YOU HAVE A DRINK CONTAINING ALCOHOL: 2-4 TIMES A MONTH
HOW MANY STANDARD DRINKS CONTAINING ALCOHOL DO YOU HAVE ON A TYPICAL DAY: 3 OR 4

## 2023-08-21 ASSESSMENT — PAIN DESCRIPTION - PAIN TYPE: TYPE: ACUTE PAIN

## 2023-08-21 ASSESSMENT — PAIN DESCRIPTION - LOCATION: LOCATION: HEAD

## 2023-08-21 ASSESSMENT — PAIN DESCRIPTION - FREQUENCY: FREQUENCY: CONTINUOUS

## 2023-08-21 ASSESSMENT — PAIN - FUNCTIONAL ASSESSMENT: PAIN_FUNCTIONAL_ASSESSMENT: 0-10

## 2023-08-21 ASSESSMENT — PAIN DESCRIPTION - ONSET: ONSET: PROGRESSIVE

## 2023-08-21 ASSESSMENT — PAIN SCALES - GENERAL: PAINLEVEL_OUTOF10: 7
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
## 2023-10-13 NOTE — PLAN OF CARE
Woke up angry -upset- pulled telemetry off- pulled iv out of left hand - bleeding at site pressure dressing applied- cooperative but states he is in horrible pain - states he needs something for pain - states he feels tremorous and shakey and sweaty - states he needs something to help him- message sent to hospitalist [id number] 10/23/2023  Received: Today  Octavia [facility]  Cc: MALIKA [facility] Preauth Sched [facility]  Patient was seen yesterday and Dr. Laz Jaimes updated the surgical procedure and added an additional 2 procedures.     Please add:       Repair of Hammertoe, One [id number]   Excision Stovall's neuroma [id number]     So it will be a total of 5 procedures.  Any questions, please let me know.     Thank you,   Octavia

## 2023-11-24 NOTE — PROGRESS NOTES
Patient was asleep. Will ask if they want a bed linen change and bathing supplies once patient is awake. Will continue to monitor. No

## 2024-08-30 NOTE — ED NOTES
Patient has been erroneously marked as diabetic. Based on the available clinical information, he does not have diabetes and should therefore be excluded from diabetic health maintenance and quality measures for the remainder of the reporting period.    Report called to [facility].  Pt going to room Teresa Will RN  06/03/21 5301

## 2024-12-25 ENCOUNTER — HOSPITAL ENCOUNTER (EMERGENCY)
Age: 34
Discharge: HOME | End: 2024-12-25
Attending: STUDENT IN AN ORGANIZED HEALTH CARE EDUCATION/TRAINING PROGRAM
Payer: COMMERCIAL

## 2024-12-25 VITALS
DIASTOLIC BLOOD PRESSURE: 92 MMHG | RESPIRATION RATE: 17 BRPM | HEIGHT: 68 IN | OXYGEN SATURATION: 96 % | BODY MASS INDEX: 22.73 KG/M2 | SYSTOLIC BLOOD PRESSURE: 132 MMHG | TEMPERATURE: 96.8 F | WEIGHT: 150 LBS | HEART RATE: 80 BPM

## 2024-12-25 DIAGNOSIS — F41.1 ANXIETY STATE: Primary | ICD-10-CM

## 2024-12-25 LAB
ALBUMIN SERPL-MCNC: 4.6 G/DL (ref 3.4–5)
ALBUMIN/GLOB SERPL: 1.6 {RATIO} (ref 1.1–2.2)
ALP SERPL-CCNC: 72 U/L (ref 40–129)
ALT SERPL-CCNC: 22 U/L (ref 10–40)
ANION GAP SERPL CALCULATED.3IONS-SCNC: 13 MMOL/L (ref 3–16)
APAP SERPL-MCNC: <5 UG/ML (ref 10–30)
AST SERPL-CCNC: 24 U/L (ref 15–37)
BASE EXCESS BLDV CALC-SCNC: -1.7 MMOL/L (ref -3–3)
BASOPHILS # BLD: 0 K/UL (ref 0–0.2)
BASOPHILS NFR BLD: 0.6 %
BILIRUB SERPL-MCNC: 0.7 MG/DL (ref 0–1)
BUN SERPL-MCNC: 7 MG/DL (ref 7–20)
CALCIUM SERPL-MCNC: 9.4 MG/DL (ref 8.3–10.6)
CHLORIDE SERPL-SCNC: 106 MMOL/L (ref 99–110)
CO2 BLDV-SCNC: 47 MMOL/L
CO2 SERPL-SCNC: 20 MMOL/L (ref 21–32)
COHGB MFR BLDV: 4.3 % (ref 0–1.5)
CREAT SERPL-MCNC: 0.8 MG/DL (ref 0.9–1.3)
DEPRECATED RDW RBC AUTO: 13.2 % (ref 12.4–15.4)
EOSINOPHIL # BLD: 0.1 K/UL (ref 0–0.6)
EOSINOPHIL NFR BLD: 1.6 %
ETHANOLAMINE SERPL-MCNC: NORMAL MG/DL (ref 0–0.08)
GFR SERPLBLD CREATININE-BSD FMLA CKD-EPI: >90 ML/MIN/{1.73_M2}
GLUCOSE SERPL-MCNC: 87 MG/DL (ref 70–99)
HCO3 BLDV-SCNC: 20.1 MMOL/L (ref 23–29)
HCT VFR BLD AUTO: 41.8 % (ref 40.5–52.5)
HGB BLD-MCNC: 15.2 G/DL (ref 13.5–17.5)
LYMPHOCYTES # BLD: 1.9 K/UL (ref 1–5.1)
LYMPHOCYTES NFR BLD: 24.2 %
MAGNESIUM SERPL-MCNC: 2.27 MG/DL (ref 1.8–2.4)
MCH RBC QN AUTO: 32.6 PG (ref 26–34)
MCHC RBC AUTO-ENTMCNC: 36.3 G/DL (ref 31–36)
MCV RBC AUTO: 89.7 FL (ref 80–100)
METHGB MFR BLDV: 0.7 %
MONOCYTES # BLD: 0.7 K/UL (ref 0–1.3)
MONOCYTES NFR BLD: 9.3 %
NEUTROPHILS # BLD: 4.9 K/UL (ref 1.7–7.7)
NEUTROPHILS NFR BLD: 64.3 %
O2 CT VFR BLDV CALC: 20 VOL %
O2 THERAPY: ABNORMAL
PCO2 BLDV: 26.5 MMHG (ref 40–50)
PH BLDV: 7.49 [PH] (ref 7.35–7.45)
PLATELET # BLD AUTO: 252 K/UL (ref 135–450)
PMV BLD AUTO: 8.3 FL (ref 5–10.5)
PO2 BLDV: 117 MMHG (ref 25–40)
POTASSIUM SERPL-SCNC: 3.3 MMOL/L (ref 3.5–5.1)
PROT SERPL-MCNC: 7.5 G/DL (ref 6.4–8.2)
RBC # BLD AUTO: 4.65 M/UL (ref 4.2–5.9)
SALICYLATES SERPL-MCNC: <0.5 MG/DL (ref 15–30)
SAO2 % BLDV: 99 %
SODIUM SERPL-SCNC: 139 MMOL/L (ref 136–145)
TROPONIN, HIGH SENSITIVITY: <6 NG/L (ref 0–22)
WBC # BLD AUTO: 7.7 K/UL (ref 4–11)

## 2024-12-25 PROCEDURE — 80143 DRUG ASSAY ACETAMINOPHEN: CPT

## 2024-12-25 PROCEDURE — 82803 BLOOD GASES ANY COMBINATION: CPT

## 2024-12-25 PROCEDURE — 83735 ASSAY OF MAGNESIUM: CPT

## 2024-12-25 PROCEDURE — 82077 ASSAY SPEC XCP UR&BREATH IA: CPT

## 2024-12-25 PROCEDURE — 80179 DRUG ASSAY SALICYLATE: CPT

## 2024-12-25 PROCEDURE — 99284 EMERGENCY DEPT VISIT MOD MDM: CPT

## 2024-12-25 PROCEDURE — 6370000000 HC RX 637 (ALT 250 FOR IP): Performed by: STUDENT IN AN ORGANIZED HEALTH CARE EDUCATION/TRAINING PROGRAM

## 2024-12-25 PROCEDURE — 85025 COMPLETE CBC W/AUTO DIFF WBC: CPT

## 2024-12-25 PROCEDURE — 93005 ELECTROCARDIOGRAM TRACING: CPT | Performed by: STUDENT IN AN ORGANIZED HEALTH CARE EDUCATION/TRAINING PROGRAM

## 2024-12-25 PROCEDURE — 84484 ASSAY OF TROPONIN QUANT: CPT

## 2024-12-25 PROCEDURE — 80053 COMPREHEN METABOLIC PANEL: CPT

## 2024-12-25 RX ORDER — HYDROXYZINE PAMOATE 25 MG/1
50 CAPSULE ORAL ONCE
Status: COMPLETED | OUTPATIENT
Start: 2024-12-25 | End: 2024-12-25

## 2024-12-25 RX ORDER — HYDROXYZINE HYDROCHLORIDE 25 MG/1
25 TABLET, FILM COATED ORAL EVERY 4 HOURS PRN
Qty: 30 TABLET | Refills: 0 | Status: SHIPPED | OUTPATIENT
Start: 2024-12-25

## 2024-12-25 RX ORDER — 0.9 % SODIUM CHLORIDE 0.9 %
500 INTRAVENOUS SOLUTION INTRAVENOUS ONCE
Status: DISCONTINUED | OUTPATIENT
Start: 2024-12-25 | End: 2024-12-25 | Stop reason: HOSPADM

## 2024-12-25 RX ORDER — POTASSIUM CHLORIDE 1500 MG/1
20 TABLET, EXTENDED RELEASE ORAL ONCE
Status: COMPLETED | OUTPATIENT
Start: 2024-12-25 | End: 2024-12-25

## 2024-12-25 RX ADMIN — POTASSIUM CHLORIDE 20 MEQ: 1500 TABLET, EXTENDED RELEASE ORAL at 09:28

## 2024-12-25 RX ADMIN — HYDROXYZINE PAMOATE 50 MG: 25 CAPSULE ORAL at 08:09

## 2024-12-25 ASSESSMENT — PAIN SCALES - GENERAL: PAINLEVEL_OUTOF10: 4

## 2024-12-25 ASSESSMENT — LIFESTYLE VARIABLES
HOW OFTEN DO YOU HAVE A DRINK CONTAINING ALCOHOL: NEVER
HOW MANY STANDARD DRINKS CONTAINING ALCOHOL DO YOU HAVE ON A TYPICAL DAY: PATIENT DOES NOT DRINK

## 2024-12-25 ASSESSMENT — PAIN - FUNCTIONAL ASSESSMENT: PAIN_FUNCTIONAL_ASSESSMENT: 0-10

## 2024-12-25 ASSESSMENT — HEART SCORE: ECG: NORMAL

## 2024-12-25 NOTE — ED PROVIDER NOTES
[facility] EMERGENCY DEPARTMENT  EMERGENCY DEPARTMENT ENCOUNTER      Pt Name: Gabriele Darnell  MRN: [id number]  Birthdate 1990  Date of evaluation: 12/25/2024  Provider: Alaina Smith MD    CHIEF COMPLAINT       Chief Complaint   Patient presents with    Chest Pain     Pt came in from home via [facility], pt reports chest pain and SOB with numbness to hands and feet since 2 am. Pt reports they haven't had an episode like in apx 2 years.         HISTORY OF PRESENT ILLNESS   (Location/Symptom, Timing/Onset, Context/Setting, Quality, Duration, Modifying Factors, Severity)  Note limiting factors.   Gabriele Darnell is a 34 y.o. male who presents to the emergency department due to feeling lightheaded and very anxious.  Patient states that his family called EMS today because he was pacing frequently and felt like he was about to pass out.  He states that he has panic attacks and this is a panic attack.  He denies any thoughts of hurting himself or anyone else.  He does admit to multiple stressors and states that he has had a very difficult month.  He has not drank alcohol in several weeks and denies any alcohol withdrawal.  He is not on any medications for psychiatric conditions currently.  He denies any drug use.        Chart reviewed: Patient has a history of bipolar disorder.  He was treated with Librium as an outpatient for alcohol withdrawal November 5 as well as October 15 and September 13 all of this year.  Nursing Notes were reviewed.    REVIEW OF SYSTEMS    (2-9 systems for level 4, 10 or more for level 5)     Review of Systems    Except as noted above the remainder of the review of systems was reviewed and negative.       PAST MEDICAL HISTORY     Past Medical History:   Diagnosis Date    Alcohol use disorder     Alleged drug diversion     Anxiety disorder     Bipolar affective disorder (HCC)     Herpes simplex infection     IVDU (intravenous drug use)     Pancreatitis     Polysubstance abuse

## 2024-12-26 LAB
EKG ATRIAL RATE: 72 BPM
EKG DIAGNOSIS: NORMAL
EKG P AXIS: 5 DEGREES
EKG P-R INTERVAL: 140 MS
EKG Q-T INTERVAL: 360 MS
EKG QRS DURATION: 88 MS
EKG QTC CALCULATION (BAZETT): 396 MS
EKG R AXIS: 86 DEGREES
EKG T AXIS: 64 DEGREES
EKG VENTRICULAR RATE: 73 BPM

## 2024-12-26 PROCEDURE — 93010 ELECTROCARDIOGRAM REPORT: CPT | Performed by: INTERNAL MEDICINE

## 2025-01-23 ENCOUNTER — HOSPITAL ENCOUNTER (EMERGENCY)
Age: 35
Discharge: HOME | End: 2025-01-23
Attending: EMERGENCY MEDICINE
Payer: COMMERCIAL

## 2025-01-23 VITALS
HEART RATE: 86 BPM | OXYGEN SATURATION: 96 % | RESPIRATION RATE: 16 BRPM | TEMPERATURE: 98.4 F | WEIGHT: 159 LBS | SYSTOLIC BLOOD PRESSURE: 131 MMHG | DIASTOLIC BLOOD PRESSURE: 94 MMHG | BODY MASS INDEX: 24.18 KG/M2

## 2025-01-23 DIAGNOSIS — J02.8 ACUTE PHARYNGITIS DUE TO OTHER SPECIFIED ORGANISMS: Primary | ICD-10-CM

## 2025-01-23 PROCEDURE — 99283 EMERGENCY DEPT VISIT LOW MDM: CPT

## 2025-01-23 PROCEDURE — 6360000002 HC RX W HCPCS: Performed by: EMERGENCY MEDICINE

## 2025-01-23 PROCEDURE — 6370000000 HC RX 637 (ALT 250 FOR IP): Performed by: EMERGENCY MEDICINE

## 2025-01-23 RX ORDER — AZITHROMYCIN 250 MG/1
TABLET, FILM COATED ORAL
Qty: 1 PACKET | Refills: 0 | Status: SHIPPED | OUTPATIENT
Start: 2025-01-23 | End: 2025-01-27

## 2025-01-23 RX ORDER — AZITHROMYCIN 250 MG/1
500 TABLET, FILM COATED ORAL ONCE
Status: COMPLETED | OUTPATIENT
Start: 2025-01-23 | End: 2025-01-23

## 2025-01-23 RX ORDER — DEXAMETHASONE 4 MG/1
2 TABLET ORAL ONCE
Status: COMPLETED | OUTPATIENT
Start: 2025-01-23 | End: 2025-01-23

## 2025-01-23 RX ADMIN — AZITHROMYCIN DIHYDRATE 500 MG: 250 TABLET ORAL at 18:04

## 2025-01-23 RX ADMIN — DEXAMETHASONE 2 MG: 4 TABLET ORAL at 18:04

## 2025-01-23 ASSESSMENT — PAIN DESCRIPTION - LOCATION: LOCATION: THROAT

## 2025-01-23 ASSESSMENT — ENCOUNTER SYMPTOMS
ABDOMINAL PAIN: 0
DIARRHEA: 0
SHORTNESS OF BREATH: 0
SORE THROAT: 1
VOMITING: 0
NAUSEA: 0
CHEST TIGHTNESS: 0

## 2025-01-23 ASSESSMENT — PAIN DESCRIPTION - DESCRIPTORS: DESCRIPTORS: ACHING

## 2025-01-23 ASSESSMENT — PAIN - FUNCTIONAL ASSESSMENT: PAIN_FUNCTIONAL_ASSESSMENT: 0-10

## 2025-01-23 ASSESSMENT — PAIN SCALES - GENERAL: PAINLEVEL_OUTOF10: 7

## 2025-01-23 NOTE — ED PROVIDER NOTES
[facility] EMERGENCY DEPARTMENT  EMERGENCY DEPARTMENT ENCOUNTER        Pt Name: Gabriele Darnell  MRN: [id number]  Birthdate 1990  Date of evaluation: 1/23/2025  Provider: ARTURO Maxwell CNP  PCP: Alea Yusuf APRN - NP  Note Started: 6:38 PM EST 1/23/25       I have seen and evaluated this patient with my supervising physician david      CHIEF COMPLAINT       Chief Complaint   Patient presents with    Pharyngitis     Reports sore throat for  few days       HISTORY OF PRESENT ILLNESS: 1 or more Elements     History from : Patient    Limitations to history : None    Gabriele Darnell is a 34 y.o. male who presents to the emergency department with complaint of sore throat.  Multiple sick contacts.  No difficulty swallowing or trismus.  Endorse myalgias, fever, chills.    Denies any lightheadedness, dizziness, visual disturbances.  No chest pain or pressure.  No neck or back pain.  No shortness of breath, cough, or congestion.  No abdominal pain, nausea, vomiting, diarrhea, constipation, or dysuria.  No rash.    Nursing Notes were all reviewed and agreed with or any disagreements were addressed in the HPI.    REVIEW OF SYSTEMS :      Review of Systems   Constitutional:  Positive for chills, fatigue and fever. Negative for activity change.   HENT:  Positive for sore throat.    Respiratory:  Negative for chest tightness and shortness of breath.    Cardiovascular:  Negative for chest pain.   Gastrointestinal:  Negative for abdominal pain, diarrhea, nausea and vomiting.   Genitourinary:  Negative for dysuria.   Musculoskeletal:  Positive for myalgias.   Neurological:  Positive for headaches.   All other systems reviewed and are negative.      Positives and Pertinent negatives as per HPI.     SURGICAL HISTORY     Past Surgical History:   Procedure Laterality Date    ENDOSCOPY, COLON, DIAGNOSTIC  10/28/2013    Endoscopic retrograde cholangiopancreatography with pancreatic stent placement    
Highest education level: Not on file   Occupational History    Occupation:    Tobacco Use    Smoking status: Some Days     Current packs/day: 0.25     Average packs/day: 0.3 packs/day for 17.2 years (4.3 ttl pk-yrs)     Types: Cigarettes     Start date: 11/21/2007    Smokeless tobacco: Never   Vaping Use    Vaping status: Some Days   Substance and Sexual Activity    Alcohol use: Yes     Alcohol/week: 12.0 standard drinks of alcohol     Types: 12 Cans of beer per week    Drug use: Not Currently    Sexual activity: Not Currently   Other Topics Concern    Not on file   Social History Narrative    Not on file     Social Determinants of Health     Financial Resource Strain: Low Risk  (12/14/2023)    Received from [facility]    Overall Financial Resource Strain (CARDIA)     Difficulty of Paying Living Expenses: Not hard at all   Food Insecurity: No Food Insecurity (12/14/2023)    Received from [facility]    Hunger Vital Sign     Worried About Running Out of Food in the Last Year: Never true     Ran Out of Food in the Last Year: Never true   Transportation Needs: No Transportation Needs (12/14/2023)    Received from [facility]    PRAPARE - Transportation     Lack of Transportation (Medical): No     Lack of Transportation (Non-Medical): No   Physical Activity: Inactive (12/14/2023)    Received from [facility]    Exercise Vital Sign     Days of Exercise per Week: 0 days     Minutes of Exercise per Session: 0 min   Stress: No Stress Concern Present (12/14/2023)    Received from [facility]    [place] [employer] - Occupational Stress Questionnaire     Feeling of Stress : Not at all   Social Connections: Unknown (12/14/2023)    Received from [facility]    Social Connection and Isolation Panel [NHANES]     Frequency of Communication with Friends and Family: More than three times a week     Frequency of Social Gatherings with Friends and Family: More than three times a week     Attends Jew Services:

## 2025-02-01 ENCOUNTER — HOSPITAL ENCOUNTER (EMERGENCY)
Age: 35
Discharge: HOME | End: 2025-02-01
Attending: EMERGENCY MEDICINE
Payer: COMMERCIAL

## 2025-02-01 ENCOUNTER — APPOINTMENT (OUTPATIENT)
Dept: GENERAL RADIOLOGY | Age: 35
End: 2025-02-01
Payer: COMMERCIAL

## 2025-02-01 VITALS
BODY MASS INDEX: 23.32 KG/M2 | TEMPERATURE: 97.9 F | HEART RATE: 71 BPM | WEIGHT: 153.88 LBS | HEIGHT: 68 IN | RESPIRATION RATE: 24 BRPM | OXYGEN SATURATION: 98 % | SYSTOLIC BLOOD PRESSURE: 129 MMHG | DIASTOLIC BLOOD PRESSURE: 103 MMHG

## 2025-02-01 DIAGNOSIS — M79.602 LEFT ARM PAIN: Primary | ICD-10-CM

## 2025-02-01 PROCEDURE — 93005 ELECTROCARDIOGRAM TRACING: CPT | Performed by: EMERGENCY MEDICINE

## 2025-02-01 PROCEDURE — 71045 X-RAY EXAM CHEST 1 VIEW: CPT

## 2025-02-01 PROCEDURE — 99284 EMERGENCY DEPT VISIT MOD MDM: CPT

## 2025-02-01 NOTE — ED PROVIDER NOTES
I PERSONALLY SAW THE PATIENT AND PERFORMED A SUBSTANTIVE PORTION OF THE VISIT INCLUDING ALL ASPECTS OF THE MEDICAL DECISION MAKING PROCESS.    [facility] EMERGENCY DEPARTMENT  EMERGENCY DEPARTMENT ENCOUNTER      Pt Name: Gabriele Darnell  MRN: [id number]  Birthdate 1990  Date of evaluation: 2/1/2025  Provider: John Landon MD    CHIEF COMPLAINT       Chief Complaint   Patient presents with    Palpitations    Anxiety    Arm Problem       HISTORY OF PRESENT ILLNESS    Gabriele Darnell is a 34 y.o. male who presents to the emergency department with left arm pain.  Patient notes with left arm pain.  Started spontaneously few days ago.  Worse with movement.  Better with rest.  Thinks he pulled a muscle but wants to make sure is not having a heart attack.  No other associated symptoms.  No rash.  No leg swelling.    Nursing Notes were reviewed. Including nursing noted for FM, Surgical History, Past Medical History, Social History, vitals, and allergies; agree with all.     REVIEW OF SYSTEMS       Review of Systems    Except as noted above the remainder of the review of systems was reviewed and negative.     PAST MEDICAL HISTORY     Past Medical History:   Diagnosis Date    Alcohol use disorder     Alleged drug diversion     Anxiety disorder     Bipolar affective disorder (HCC)     Herpes simplex infection     IVDU (intravenous drug use)     Pancreatitis     Polysubstance abuse (HCC)     Tobacco use disorder        SURGICAL HISTORY       Past Surgical History:   Procedure Laterality Date    ENDOSCOPY, COLON, DIAGNOSTIC  10/28/2013    Endoscopic retrograde cholangiopancreatography with pancreatic stent placement    ENDOSCOPY, COLON, DIAGNOSTIC  03/23/2018    Esophagogastroduodenoscopy with biopsy    ERCP  01/10/2014       CURRENT MEDICATIONS       Previous Medications    HYDROXYZINE HCL (ATARAX) 25 MG TABLET    Take 1 tablet by mouth every 4 hours as needed for Anxiety    LAMOTRIGINE (LAMICTAL PO)    Take by mouth     Transportation     Lack of Transportation (Medical): No     Lack of Transportation (Non-Medical): No   Physical Activity: Inactive (12/14/2023)    Received from [facility]    Exercise Vital Sign     Days of Exercise per Week: 0 days     Minutes of Exercise per Session: 0 min   Stress: No Stress Concern Present (12/14/2023)    Received from [facility]    [place] [employer] - Occupational Stress Questionnaire     Feeling of Stress : Not at all   Social Connections: Unknown (12/14/2023)    Received from [facility]    Social Connection and Isolation Panel [NHANES]     Frequency of Communication with Friends and Family: More than three times a week     Frequency of Social Gatherings with Friends and Family: More than three times a week     Attends Buddhist Services: 1 to 4 times per year     Active Member of Clubs or Organizations: No     Attends Club or Organization Meetings: Never   Housing Stability: Unknown (5/23/2023)    Housing Stability Vital Sign     Unstable Housing in the Last Year: No       PHYSICAL EXAM       ED Triage Vitals   BP Systolic BP Percentile Diastolic BP Percentile Temp Temp Source Pulse Respirations SpO2   02/01/25 0315 -- -- 02/01/25 0316 02/01/25 0316 02/01/25 0316 02/01/25 0316 02/01/25 0316   (!) 149/100   97.9 °F (36.6 °C) Oral 95 20 100 %      Height Weight - Scale         02/01/25 0345 02/01/25 0316         1.727 m (5' 8\") 69.8 kg (153 lb 14.1 oz)             Physical Exam  Vitals and nursing note reviewed.   Constitutional:       General: He is not in acute distress.     Appearance: He is well-developed. He is not diaphoretic.   HENT:      Head: Normocephalic and atraumatic.   Eyes:      General:         Right eye: No discharge.         Left eye: No discharge.      Pupils: Pupils are equal, round, and reactive to light.   Neck:      Thyroid: No thyromegaly.      Trachea: No tracheal deviation.   Cardiovascular:      Rate and Rhythm: Normal rate and regular rhythm.

## 2025-02-01 NOTE — ED NOTES
Goal Outcome Evaluation:  Plan of Care Reviewed With: patient        Progress: no change  Outcome Evaluation: pt is c/o pain 10 out 10 even after receieving his pain medication, he recieved and extra one time dose of SubQ pain medication that he still stated 10 out 10 pain, pt refuses to eat anything, pt is refusing morning labs and being rude with nursing aids demanding more pain medicaiton. educated patient the plan to dishcarge today after being educated on his kangroo pump by the [facility] care nurse when he is ready we are supposed to call her back becuase he would not pay attention to her yesterday when she was, he keep stating there is no way he can go home with this amount of pain, nothing we have down so far takes of his pain level except the IV diluadid he recieved a few days ago he states. pt does not have an active order for any IV dilaudid.          Patient d/c to home at this time. Patient alert, oriented, no s/s distress.    Reviewed AVS with patient including follow up care, patient voiced undestanding.      Patient ambulated to exit independently.

## 2025-02-01 NOTE — ED TRIAGE NOTES
Patient arrives via triage for \"heart fluttering\" that started 3 days ago. The fluttering got worse this evening and patient has been experiencing left arm pain since this evening. Patient has a history of anxiety but he feels like these symptoms are different from his usual anxiety symptoms.

## 2025-02-02 LAB
EKG ATRIAL RATE: 78 BPM
EKG DIAGNOSIS: NORMAL
EKG P AXIS: 69 DEGREES
EKG P-R INTERVAL: 180 MS
EKG Q-T INTERVAL: 374 MS
EKG QRS DURATION: 98 MS
EKG QTC CALCULATION (BAZETT): 426 MS
EKG R AXIS: 87 DEGREES
EKG T AXIS: 61 DEGREES
EKG VENTRICULAR RATE: 78 BPM

## 2025-02-02 PROCEDURE — 93010 ELECTROCARDIOGRAM REPORT: CPT | Performed by: INTERNAL MEDICINE

## 2025-04-21 ENCOUNTER — HOSPITAL ENCOUNTER (EMERGENCY)
Age: 35
Discharge: HOME | End: 2025-04-21
Payer: COMMERCIAL

## 2025-04-21 ENCOUNTER — APPOINTMENT (OUTPATIENT)
Dept: GENERAL RADIOLOGY | Age: 35
End: 2025-04-21
Payer: COMMERCIAL

## 2025-04-21 VITALS
OXYGEN SATURATION: 99 % | RESPIRATION RATE: 16 BRPM | DIASTOLIC BLOOD PRESSURE: 101 MMHG | SYSTOLIC BLOOD PRESSURE: 126 MMHG | BODY MASS INDEX: 23.59 KG/M2 | HEART RATE: 72 BPM | TEMPERATURE: 97.9 F | HEIGHT: 68 IN | WEIGHT: 155.65 LBS

## 2025-04-21 DIAGNOSIS — F41.9 ANXIETY: ICD-10-CM

## 2025-04-21 DIAGNOSIS — M79.641 HAND PAIN, RIGHT: ICD-10-CM

## 2025-04-21 DIAGNOSIS — S52.501A CLOSED FRACTURE OF DISTAL END OF RIGHT RADIUS, UNSPECIFIED FRACTURE MORPHOLOGY, INITIAL ENCOUNTER: Primary | ICD-10-CM

## 2025-04-21 PROCEDURE — 29125 APPL SHORT ARM SPLINT STATIC: CPT

## 2025-04-21 PROCEDURE — 6370000000 HC RX 637 (ALT 250 FOR IP)

## 2025-04-21 PROCEDURE — 99283 EMERGENCY DEPT VISIT LOW MDM: CPT

## 2025-04-21 PROCEDURE — 73130 X-RAY EXAM OF HAND: CPT

## 2025-04-21 RX ORDER — VENLAFAXINE HYDROCHLORIDE 37.5 MG/1
37.5 CAPSULE, EXTENDED RELEASE ORAL DAILY
COMMUNITY
Start: 2025-04-04

## 2025-04-21 RX ORDER — HYDROXYZINE HYDROCHLORIDE 25 MG/1
25 TABLET, FILM COATED ORAL EVERY 4 HOURS PRN
Qty: 15 TABLET | Refills: 0 | Status: SHIPPED | OUTPATIENT
Start: 2025-04-21

## 2025-04-21 RX ORDER — ACETAMINOPHEN 500 MG
500 TABLET ORAL 4 TIMES DAILY PRN
Qty: 360 TABLET | Refills: 1 | Status: SHIPPED | OUTPATIENT
Start: 2025-04-21

## 2025-04-21 RX ORDER — LORAZEPAM 1 MG/1
1 TABLET ORAL
COMMUNITY
Start: 2025-03-31

## 2025-04-21 RX ORDER — OXYCODONE AND ACETAMINOPHEN 5; 325 MG/1; MG/1
1 TABLET ORAL ONCE
Refills: 0 | Status: COMPLETED | OUTPATIENT
Start: 2025-04-21 | End: 2025-04-21

## 2025-04-21 RX ORDER — LORAZEPAM 1 MG/1
1 TABLET ORAL ONCE
Status: COMPLETED | OUTPATIENT
Start: 2025-04-21 | End: 2025-04-21

## 2025-04-21 RX ORDER — ACETAMINOPHEN 500 MG
500 TABLET ORAL ONCE
Status: COMPLETED | OUTPATIENT
Start: 2025-04-21 | End: 2025-04-21

## 2025-04-21 RX ADMIN — LORAZEPAM 1 MG: 1 TABLET ORAL at 20:50

## 2025-04-21 RX ADMIN — OXYCODONE AND ACETAMINOPHEN 1 TABLET: 5; 325 TABLET ORAL at 22:08

## 2025-04-21 RX ADMIN — ACETAMINOPHEN 500 MG: 500 TABLET ORAL at 20:50

## 2025-04-21 ASSESSMENT — PAIN DESCRIPTION - DESCRIPTORS: DESCRIPTORS: THROBBING

## 2025-04-21 ASSESSMENT — PAIN DESCRIPTION - LOCATION
LOCATION: HAND
LOCATION: HAND

## 2025-04-21 ASSESSMENT — PAIN SCALES - GENERAL
PAINLEVEL_OUTOF10: 7

## 2025-04-21 ASSESSMENT — PAIN DESCRIPTION - ORIENTATION
ORIENTATION: RIGHT
ORIENTATION: RIGHT

## 2025-04-21 ASSESSMENT — PAIN - FUNCTIONAL ASSESSMENT: PAIN_FUNCTIONAL_ASSESSMENT: 0-10

## 2025-04-22 ENCOUNTER — TELEPHONE (OUTPATIENT)
Dept: ORTHOPEDIC SURGERY | Age: 35
End: 2025-04-22

## 2025-04-22 NOTE — TELEPHONE ENCOUNTER
Called pt and left vm to schedule an appointment with SHERIE Reno or ARA Monae. Referred from ER.

## 2025-04-22 NOTE — ED NOTES
D/C: Order noted for d/c. Pt confirmed d/c paperwork has correct name. Discharge and education instructions reviewed with patient. Teach-back successful.  Pt verbalized understanding and denied questions at this time. No acute distress noted. Patient instructed to follow-up as noted - return to emergency department if symptoms worsen. Patient verbalized understanding. Discharged per EDMD with discharge instructions. Pt discharged to private vehicle. Patient stable upon departure. Thanked patient for [facility] for care. Provider aware of patient pain at time of discharge.

## 2025-04-22 NOTE — ED NOTES
Pt requested and opened ended return to work not; RN identified that RN is able to write a note for 2 days but pt needs to f/u with PCP and orthopedics for additional work excuses; RN provided return to work note for 4/23/25

## 2025-04-22 NOTE — DISCHARGE INSTRUCTIONS
Please follow-up with Dr. Ulisses Monae as soon as possible for reassessment of head injury  Referral for PCP was provided, please call establish care    Your anxiety medication, hydroxyzine was prescribed, please be aware that it can cause drowsiness.  Return to the ED if experience new or worsening symptoms
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
## 2025-05-23 PROCEDURE — 99283 EMERGENCY DEPT VISIT LOW MDM: CPT

## 2025-05-24 ENCOUNTER — HOSPITAL ENCOUNTER (EMERGENCY)
Age: 35
Discharge: HOME | End: 2025-05-24
Payer: COMMERCIAL

## 2025-05-24 VITALS
TEMPERATURE: 97.7 F | HEART RATE: 72 BPM | WEIGHT: 159.61 LBS | SYSTOLIC BLOOD PRESSURE: 130 MMHG | DIASTOLIC BLOOD PRESSURE: 85 MMHG | RESPIRATION RATE: 18 BRPM | BODY MASS INDEX: 24.19 KG/M2 | HEIGHT: 68 IN | OXYGEN SATURATION: 98 %

## 2025-05-24 DIAGNOSIS — L50.9 URTICARIA: Primary | ICD-10-CM

## 2025-05-24 PROCEDURE — 6370000000 HC RX 637 (ALT 250 FOR IP): Performed by: PHYSICIAN ASSISTANT

## 2025-05-24 RX ORDER — FAMOTIDINE 20 MG/1
20 TABLET, FILM COATED ORAL ONCE
Status: COMPLETED | OUTPATIENT
Start: 2025-05-24 | End: 2025-05-24

## 2025-05-24 RX ORDER — DIPHENHYDRAMINE HCL 25 MG
25 TABLET ORAL ONCE
Status: COMPLETED | OUTPATIENT
Start: 2025-05-24 | End: 2025-05-24

## 2025-05-24 RX ORDER — FAMOTIDINE 20 MG/1
20 TABLET, FILM COATED ORAL 2 TIMES DAILY
Qty: 60 TABLET | Refills: 3 | Status: SHIPPED | OUTPATIENT
Start: 2025-05-24 | End: 2025-05-24

## 2025-05-24 RX ORDER — PREDNISONE 20 MG/1
TABLET ORAL
Qty: 18 TABLET | Refills: 0 | Status: SHIPPED | OUTPATIENT
Start: 2025-05-24 | End: 2025-06-03

## 2025-05-24 RX ORDER — LORAZEPAM 1 MG/1
1 TABLET ORAL ONCE
Status: COMPLETED | OUTPATIENT
Start: 2025-05-24 | End: 2025-05-24

## 2025-05-24 RX ORDER — DIPHENHYDRAMINE HCL 25 MG
25 TABLET ORAL EVERY 6 HOURS PRN
Qty: 30 TABLET | Refills: 0 | Status: SHIPPED | OUTPATIENT
Start: 2025-05-24 | End: 2025-06-23

## 2025-05-24 RX ORDER — PREDNISONE 20 MG/1
60 TABLET ORAL ONCE
Status: COMPLETED | OUTPATIENT
Start: 2025-05-24 | End: 2025-05-24

## 2025-05-24 RX ORDER — PREDNISONE 20 MG/1
TABLET ORAL
Qty: 18 TABLET | Refills: 0 | Status: SHIPPED | OUTPATIENT
Start: 2025-05-24 | End: 2025-05-24

## 2025-05-24 RX ORDER — DIPHENHYDRAMINE HCL 25 MG
25 TABLET ORAL EVERY 6 HOURS PRN
Qty: 30 TABLET | Refills: 0 | Status: SHIPPED | OUTPATIENT
Start: 2025-05-24 | End: 2025-05-24

## 2025-05-24 RX ORDER — FAMOTIDINE 20 MG/1
20 TABLET, FILM COATED ORAL 2 TIMES DAILY
Qty: 60 TABLET | Refills: 3 | Status: SHIPPED | OUTPATIENT
Start: 2025-05-24

## 2025-05-24 RX ADMIN — FAMOTIDINE 20 MG: 20 TABLET, FILM COATED ORAL at 03:00

## 2025-05-24 RX ADMIN — LORAZEPAM 1 MG: 1 TABLET ORAL at 04:10

## 2025-05-24 RX ADMIN — DIPHENHYDRAMINE HCL 25 MG: 25 TABLET ORAL at 03:00

## 2025-05-24 RX ADMIN — PREDNISONE 60 MG: 20 TABLET ORAL at 04:09

## 2025-05-24 ASSESSMENT — ENCOUNTER SYMPTOMS
SORE THROAT: 0
EYE PAIN: 0
SHORTNESS OF BREATH: 0
BACK PAIN: 0
ABDOMINAL PAIN: 0
VOMITING: 0
NAUSEA: 0
COUGH: 0

## 2025-05-24 ASSESSMENT — LIFESTYLE VARIABLES
HOW MANY STANDARD DRINKS CONTAINING ALCOHOL DO YOU HAVE ON A TYPICAL DAY: PATIENT DOES NOT DRINK
HOW OFTEN DO YOU HAVE A DRINK CONTAINING ALCOHOL: NEVER

## 2025-05-24 NOTE — DISCHARGE INSTRUCTIONS
Take prescribed medication as prescribed only   your prescription at the [facility]  Follow-up with dermatology if worsens or no improvement.  Otherwise, follow-up primary care provider as needed  Do not take Benadryl and do any strenuous activity or drive due to the risk of sedation particularly with your other medications.

## 2025-05-24 NOTE — ED TRIAGE NOTES
.Gabriele Darnell is a 34 y.o. male brought himself to the ER for eval of rash that started 2-3 days ago. The patient states that he is unaware what caused the rash but it is spreading all over his body. The patient is alert and oriented with an open and patent airway with a normal respiratory effort.

## 2025-05-24 NOTE — ED PROVIDER NOTES
**ADVANCED PRACTICE PROVIDER, I HAVE EVALUATED THIS PATIENT**        [facility] EMERGENCY DEPARTMENT  EMERGENCY DEPARTMENT ENCOUNTER      Pt Name: Gabriele Darnell  MRN:[id number]  Birthdate 1990  Date of evaluation: 5/23/2025  Provider: Chandan Rebollar PA-C  Note Started: 2:48 AM EDT 5/24/25        Chief Complaint:    Chief Complaint   Patient presents with    Rash     Pt has a rash all over his body that started 2-3 days ago, its the worse on his stomach          Nursing Notes, Past Medical Hx, Past Surgical Hx, Social Hx, Allergies, and Family Hx were all reviewed and agreed with or any disagreements were addressed in the HPI.    HPI: (Location, Duration, Timing, Severity, Quality, Assoc Sx, Context, Modifying factors)    History From: Patient  Limitations to history : None    Social Determinants Significantly Affecting Health : None    Chief Complaint of rash.  Patient stated    Rash last night and is got worse.  The blotches are very itchy on his neck back arms chest legs abdomen.  He denies any new environmental exposure.  Cannot think of anything he may have eaten that caused it.  He denies any swollen tongue no shortness of breath, no fever, no lip swelling.  No difficulty breathing.  No new medications.  No other complaints    This is a  34 y.o. male who presents to the emergency room with the above complaint.    PastMedical/Surgical History:      Diagnosis Date    Alcohol use disorder     Alleged drug diversion     Anxiety disorder     Bipolar affective disorder (HCC)     Herpes simplex infection     IVDU (intravenous drug use)     Pancreatitis     Polysubstance abuse (HCC)     Tobacco use disorder          Procedure Laterality Date    ENDOSCOPY, COLON, DIAGNOSTIC  10/28/2013    Endoscopic retrograde cholangiopancreatography with pancreatic stent placement    ENDOSCOPY, COLON, DIAGNOSTIC  03/23/2018    Esophagogastroduodenoscopy with biopsy    ERCP  01/10/2014       Medications:  Previous

## 2025-06-04 ENCOUNTER — HOSPITAL ENCOUNTER (EMERGENCY)
Age: 35
Discharge: HOME | End: 2025-06-04
Attending: EMERGENCY MEDICINE
Payer: COMMERCIAL

## 2025-06-04 ENCOUNTER — HOSPITAL ENCOUNTER (EMERGENCY)
Age: 35
Discharge: HOME | End: 2025-06-04
Payer: COMMERCIAL

## 2025-06-04 VITALS
DIASTOLIC BLOOD PRESSURE: 84 MMHG | RESPIRATION RATE: 16 BRPM | HEIGHT: 68 IN | HEART RATE: 92 BPM | SYSTOLIC BLOOD PRESSURE: 137 MMHG | OXYGEN SATURATION: 99 % | WEIGHT: 152.56 LBS | BODY MASS INDEX: 23.12 KG/M2 | TEMPERATURE: 97.5 F

## 2025-06-04 VITALS
DIASTOLIC BLOOD PRESSURE: 71 MMHG | WEIGHT: 152.56 LBS | BODY MASS INDEX: 23.12 KG/M2 | RESPIRATION RATE: 16 BRPM | HEART RATE: 61 BPM | HEIGHT: 68 IN | TEMPERATURE: 97.9 F | SYSTOLIC BLOOD PRESSURE: 96 MMHG | OXYGEN SATURATION: 99 %

## 2025-06-04 DIAGNOSIS — L50.9 URTICARIA: Primary | ICD-10-CM

## 2025-06-04 DIAGNOSIS — T78.40XA ALLERGIC REACTION, INITIAL ENCOUNTER: Primary | ICD-10-CM

## 2025-06-04 LAB
ANION GAP SERPL CALCULATED.3IONS-SCNC: 17 MMOL/L (ref 3–16)
BASOPHILS # BLD: 0 K/UL (ref 0–0.2)
BASOPHILS NFR BLD: 0.6 %
BUN SERPL-MCNC: 8 MG/DL (ref 7–20)
CALCIUM SERPL-MCNC: 9.1 MG/DL (ref 8.3–10.6)
CHLORIDE SERPL-SCNC: 107 MMOL/L (ref 99–110)
CO2 SERPL-SCNC: 17 MMOL/L (ref 21–32)
CREAT SERPL-MCNC: 0.8 MG/DL (ref 0.9–1.3)
DEPRECATED RDW RBC AUTO: 12.7 % (ref 12.4–15.4)
EOSINOPHIL # BLD: 0.1 K/UL (ref 0–0.6)
EOSINOPHIL NFR BLD: 1.3 %
GFR SERPLBLD CREATININE-BSD FMLA CKD-EPI: >90 ML/MIN/{1.73_M2}
GLUCOSE SERPL-MCNC: 89 MG/DL (ref 70–99)
HCT VFR BLD AUTO: 44.3 % (ref 40.5–52.5)
HGB BLD-MCNC: 15.8 G/DL (ref 13.5–17.5)
LYMPHOCYTES # BLD: 2 K/UL (ref 1–5.1)
LYMPHOCYTES NFR BLD: 32.2 %
MCH RBC QN AUTO: 32 PG (ref 26–34)
MCHC RBC AUTO-ENTMCNC: 35.6 G/DL (ref 31–36)
MCV RBC AUTO: 89.9 FL (ref 80–100)
MONOCYTES # BLD: 0.7 K/UL (ref 0–1.3)
MONOCYTES NFR BLD: 11.9 %
NEUTROPHILS # BLD: 3.3 K/UL (ref 1.7–7.7)
NEUTROPHILS NFR BLD: 54 %
PLATELET # BLD AUTO: 256 K/UL (ref 135–450)
PMV BLD AUTO: 8.3 FL (ref 5–10.5)
POTASSIUM SERPL-SCNC: 4.2 MMOL/L (ref 3.5–5.1)
RBC # BLD AUTO: 4.93 M/UL (ref 4.2–5.9)
SODIUM SERPL-SCNC: 141 MMOL/L (ref 136–145)
WBC # BLD AUTO: 6.2 K/UL (ref 4–11)

## 2025-06-04 PROCEDURE — 99284 EMERGENCY DEPT VISIT MOD MDM: CPT

## 2025-06-04 PROCEDURE — 6370000000 HC RX 637 (ALT 250 FOR IP): Performed by: NURSE PRACTITIONER

## 2025-06-04 PROCEDURE — 99283 EMERGENCY DEPT VISIT LOW MDM: CPT

## 2025-06-04 PROCEDURE — 2580000003 HC RX 258: Performed by: NURSE PRACTITIONER

## 2025-06-04 PROCEDURE — 6360000002 HC RX W HCPCS: Performed by: NURSE PRACTITIONER

## 2025-06-04 PROCEDURE — 85025 COMPLETE CBC W/AUTO DIFF WBC: CPT

## 2025-06-04 PROCEDURE — 96374 THER/PROPH/DIAG INJ IV PUSH: CPT

## 2025-06-04 PROCEDURE — 96375 TX/PRO/DX INJ NEW DRUG ADDON: CPT

## 2025-06-04 PROCEDURE — 80048 BASIC METABOLIC PNL TOTAL CA: CPT

## 2025-06-04 RX ORDER — METHYLPREDNISOLONE 4 MG/1
TABLET ORAL
Qty: 1 KIT | Refills: 0 | Status: SHIPPED | OUTPATIENT
Start: 2025-06-04 | End: 2025-06-10

## 2025-06-04 RX ORDER — METHYLPREDNISOLONE SODIUM SUCCINATE 125 MG/2ML
125 INJECTION INTRAMUSCULAR; INTRAVENOUS ONCE
Status: COMPLETED | OUTPATIENT
Start: 2025-06-04 | End: 2025-06-04

## 2025-06-04 RX ORDER — PREDNISONE 20 MG/1
60 TABLET ORAL ONCE
Status: COMPLETED | OUTPATIENT
Start: 2025-06-04 | End: 2025-06-04

## 2025-06-04 RX ORDER — BUSPIRONE HYDROCHLORIDE 10 MG/1
10 TABLET ORAL ONCE
Status: DISCONTINUED | OUTPATIENT
Start: 2025-06-04 | End: 2025-06-04

## 2025-06-04 RX ORDER — DIPHENHYDRAMINE HCL 25 MG
50 TABLET ORAL ONCE
Status: COMPLETED | OUTPATIENT
Start: 2025-06-04 | End: 2025-06-04

## 2025-06-04 RX ORDER — LORAZEPAM 1 MG/1
1 TABLET ORAL ONCE
Status: COMPLETED | OUTPATIENT
Start: 2025-06-04 | End: 2025-06-04

## 2025-06-04 RX ORDER — LORAZEPAM 2 MG/ML
1 INJECTION INTRAMUSCULAR ONCE
Status: DISCONTINUED | OUTPATIENT
Start: 2025-06-04 | End: 2025-06-04 | Stop reason: HOSPADM

## 2025-06-04 RX ORDER — FAMOTIDINE 20 MG/1
20 TABLET, FILM COATED ORAL DAILY
Qty: 5 TABLET | Refills: 0 | Status: SHIPPED | OUTPATIENT
Start: 2025-06-04 | End: 2025-06-09

## 2025-06-04 RX ORDER — EPINEPHRINE 0.3 MG/.3ML
0.3 INJECTION SUBCUTANEOUS ONCE
Qty: 1 EACH | Refills: 0 | Status: SHIPPED | OUTPATIENT
Start: 2025-06-04 | End: 2025-06-04

## 2025-06-04 RX ORDER — DEXAMETHASONE SODIUM PHOSPHATE 4 MG/ML
10 INJECTION, SOLUTION INTRA-ARTICULAR; INTRALESIONAL; INTRAMUSCULAR; INTRAVENOUS; SOFT TISSUE ONCE
Status: DISCONTINUED | OUTPATIENT
Start: 2025-06-04 | End: 2025-06-04

## 2025-06-04 RX ORDER — FAMOTIDINE 20 MG/1
40 TABLET, FILM COATED ORAL ONCE
Status: COMPLETED | OUTPATIENT
Start: 2025-06-04 | End: 2025-06-04

## 2025-06-04 RX ORDER — DIPHENHYDRAMINE HCL 25 MG
25 CAPSULE ORAL 2 TIMES DAILY PRN
Qty: 14 CAPSULE | Refills: 0 | Status: SHIPPED | OUTPATIENT
Start: 2025-06-04 | End: 2025-06-06

## 2025-06-04 RX ADMIN — PREDNISONE 60 MG: 20 TABLET ORAL at 21:32

## 2025-06-04 RX ADMIN — DIPHENHYDRAMINE HCL 50 MG: 25 TABLET ORAL at 21:32

## 2025-06-04 RX ADMIN — LORAZEPAM 1 MG: 1 TABLET ORAL at 08:16

## 2025-06-04 RX ADMIN — FAMOTIDINE 20 MG: 10 INJECTION, SOLUTION INTRAVENOUS at 08:17

## 2025-06-04 RX ADMIN — FAMOTIDINE 40 MG: 20 TABLET, FILM COATED ORAL at 21:32

## 2025-06-04 RX ADMIN — METHYLPREDNISOLONE SODIUM SUCCINATE 125 MG: 125 INJECTION, POWDER, LYOPHILIZED, FOR SOLUTION INTRAMUSCULAR; INTRAVENOUS at 08:16

## 2025-06-04 ASSESSMENT — PAIN - FUNCTIONAL ASSESSMENT
PAIN_FUNCTIONAL_ASSESSMENT: 0-10
PAIN_FUNCTIONAL_ASSESSMENT: NONE - DENIES PAIN
PAIN_FUNCTIONAL_ASSESSMENT: 0-10
PAIN_FUNCTIONAL_ASSESSMENT: 0-10

## 2025-06-04 ASSESSMENT — PAIN DESCRIPTION - LOCATION: LOCATION: HEAD

## 2025-06-04 ASSESSMENT — PAIN DESCRIPTION - DESCRIPTORS: DESCRIPTORS: ACHING

## 2025-06-04 ASSESSMENT — PAIN SCALES - GENERAL
PAINLEVEL_OUTOF10: 0
PAINLEVEL_OUTOF10: 0
PAINLEVEL_OUTOF10: 4

## 2025-06-04 NOTE — ED PROVIDER NOTES
[facility] EMERGENCY DEPARTMENT  EMERGENCY DEPARTMENT ENCOUNTER        Pt Name: Gabriele Darnell  MRN: [id number]  Birthdate 1990  Date of evaluation: 6/4/2025  Provider: ARTURO Miller CNP  PCP: Alea Yusuf APRN - NP  Note Started: 6:44 PM EDT 6/4/25       I have seen and evaluated this patient with my supervising physician, Dr. Tadeo.      CHIEF COMPLAINT       Chief Complaint   Patient presents with    Allergic Reaction     Pt to ed via ems from home c/o allergic reaction. Pt states he woke up this morning itchy and noticed hives all over his body. Pt states he is also SOB.EMS gave 50 mg of IM benadryl.        HISTORY OF PRESENT ILLNESS: 1 or more Elements   Patient  History From:   Limitations to history : None    Gabriele Darnell is a 34 y.o. nontoxic, well-appearing male who presents to the emergency department brought by EMS status post he is experiencing AN allergic reaction.  He states that he started with some itching and mild rash on his wrist last evening.  He reports waking this a.m. with rash and hives all over his body.  Accompanying symptoms include nausea, shortness of breath, and feeling anxious.  Denies chest pain, vomiting, lions, dizziness, presyncope, measured fevers, chills, sweats, abdominal pain, diarrhea, throat closing sensation, inability to handle his own saliva/secretions, wheezing, or other symptoms/concerns.  Patient states that he used a new fabric softener approximately 1 month ago and states he had a reaction and may have been reexposed to some of the clothing that he washed though he thought he rewashed everything.  He denies any other new products or foods.    Nursing Notes were all reviewed and agreed with or any disagreements were addressed in the HPI.    REVIEW OF SYSTEMS :      Review of Systems   Constitutional:  Negative for chills, diaphoresis, fatigue and fever.   HENT:  Negative for congestion and sore throat.    Eyes:  Negative for pain  with outpatient follow-up and prescriptions as noted below.  Strict return precautions.  Patient verbalized understanding is agreeable with plan for discharge and follow-up.    Disposition Considerations (tests considered but not done, Admit vs D/C, Shared Decision Making, Pt Expectation of Test or Tx.): Appropriate for outpatient management      I am the Primary Clinician of Record.  FINAL IMPRESSION      1. Allergic reaction, initial encounter          DISPOSITION/PLAN     DISPOSITION Decision to Discharge    PATIENT REFERRED TO:  Alea Yusuf APRN - NP  [address]  [place] [address] [phone]    Call in 1 day      Flo Mcginnis MD  [address] [address] [phone]    Call in 1 day        DISCHARGE MEDICATIONS:  Discharge Medication List as of 6/4/2025  9:56 AM        START taking these medications    Details   EPINEPHrine (EPIPEN 2-CECILIA) 0.3 MG/0.3ML SOAJ injection Inject 0.3 mLs into the muscle once for 1 dose Use as directed for allergic reaction, Disp-1 each, R-0Normal      methylPREDNISolone (MEDROL, CECILIA,) 4 MG tablet Take by mouth., Disp-1 kit, R-0Normal      diphenhydrAMINE (BENADRYL) 25 MG capsule Take 1 capsule by mouth 2 times daily as needed for Itching, Disp-14 capsule, R-0Normal             DISCONTINUED MEDICATIONS:  Discharge Medication List as of 6/4/2025  9:56 AM        STOP taking these medications       diphenhydrAMINE (BENADRYL ALLERGY) 25 MG tablet Comments:   Reason for Stopping:         hydrOXYzine HCl (ATARAX) 25 MG tablet Comments:   Reason for Stopping:                      (Please note that portions of this note were completed with a voice recognition program.  Efforts were made to edit the dictations but occasionally words are mis-transcribed.)    ARTURO Miller CNP (electronically signed)         Andre Scott APRN - CNP  06/06/25 0804

## 2025-06-04 NOTE — DISCHARGE INSTRUCTIONS
Return to the emergency department for new or worsening symptoms including, limited to, developing shortness of breath, throat closing sensation, abdominal pain, diarrhea, expansion of the hives, or other symptoms/concerns.      Medication as prescribed.      Follow-up with your primary care provider for reevaluation next 1 to 2 days.  Call the allergist in the next 2-3 days to schedule an appointment for evaluation

## 2025-06-04 NOTE — ED TRIAGE NOTES
Pt to ed via ems from home c/o allergic reaction. Pt states he woke up this morning itchy and noticed hives all over his body. Pt states he is also SOB.EMS gave 50 mg of IM benadryl.

## 2025-06-04 NOTE — ED NOTES
D/C: Order noted for d/c. Pt confirmed d/c paperwork has correct name. Discharge and education instructions reviewed with patient. Teach-back successful.  Pt verbalized understanding and denied questions at this time. No acute distress noted. Patient instructed to follow-up as noted - return to emergency department if symptoms worsen. Patient verbalized understanding. Discharged per ED NP with discharge instructions. Pt discharged to private vehicle. Patient stable upon departure. Thanked patient for choosing [facility] for care.

## 2025-06-04 NOTE — ED PROVIDER NOTES
[facility] EMERGENCY DEPARTMENT  EMERGENCY DEPARTMENT ENCOUNTER      Pt Name: Gabriele Darnell  MRN: [id number]  Birthdate 1990  Date of evaluation: 6/4/2025  Provider: CHARLEE HIGGINBOTHAM DO    CHIEF COMPLAINT  Chief Complaint   Patient presents with    Allergic Reaction     Pt to ed via ems from home c/o allergic reaction. Pt states he woke up this morning itchy and noticed hives all over his body. Pt states he is also SOB.EMS gave 50 mg of IM benadryl.        I have fully participated in the care of Gabriele Darnell and have had a face-to-face evaluation. I have reviewed and agree with all pertinent clinical information, and midlevel provider's history, and physical exam. I have also reviewed the labs and treatment plan. I have also reviewed and agree with the medications, allergies and past medical history section for this Gabriele Darnell. I agree with the diagnosis, and I concur.    I personally saw the patient and made/approved the management plan and take responsibility for the patient management.      This patient is at risk for a communicable infection.  Therefore, personal protection equipment consisting of a mask was worn for the exam.    Past Medical History:   Diagnosis Date    Alcohol use disorder     Alleged drug diversion     Anxiety disorder     Bipolar affective disorder (HCC)     Herpes simplex infection     IVDU (intravenous drug use)     Pancreatitis     Polysubstance abuse (HCC)     Tobacco use disorder        MDM:  History: Gabriele Darnell is a 34 y.o. male who presents with allergic reaction.  He has had allergic reactions since he was 6 years old.  He states this is the first time he had 2 reactions within 4 weeks apart.  He denies any fevers or chills.  He states he has not used the laundry detergent that he used 4 weeks ago.  He washed all his close so that they were clear of the detergent.  He denies any other medicines.  Does have an allergy to shellfish.  He avoids all the things that he  is allergic to.    Physical Exam: Patient alert and orient x 3.  He is scratching frequently.  Heart is regular rate and rhythm without murmurs clicks or rubs.  Lungs are clear to auscultation equal bilaterally.  Ab soft and nontender.  Skin he has multiple scattered hives from his scalp to his feet.  He is scratching frequently.    Medical decision making: Laboratory work is obtained.  Patient was given Pepcid, Solu-Medrol 125 mg, and lorazepam.  This resulted in patient's rash improved tremendously.  He stated the itching was doing well.  Therefore, patient was discharged with prescriptions of EpiPen, Medrol Dosepak, and diphenhydramine.  He was instructed to return to the emerged part for any problems.  He was given referral to Dr. Mcginnis for allergy testing.  He was instructed return if any problems.    Vitals:    06/04/25 0945   BP: 96/71   Pulse: 61   Resp: 16   Temp:    SpO2: 99%       Lab results  Labs Reviewed   BASIC METABOLIC PANEL W/ REFLEX TO MG FOR LOW K - Abnormal; Notable for the following components:       Result Value    CO2 17 (*)     Anion Gap 17 (*)     Creatinine 0.8 (*)     All other components within normal limits   CBC WITH AUTO DIFFERENTIAL       Radiology results  No orders to display       Diagnostic considerations include but are not limited to:  Anaphylaxis, Angioedema, Allergic dermatitis, Bacterial etiology, Fungal etiology, Urticaria, Erythema Multiforme, Anxiety,  other    CBC-CBC was ordered to rule out anemia, infection, abnormal platelet count, polycythemia, abnormal Red cell pathology, or any other pathology that might be causing the patient's symptoms    CMP-CMP was ordered to rule out electrolyte abnormalities, kidney dysfunction, electrolyte imbalance, abnormal transaminases, liver dysfunction, or any other pathology that might be causing the patient's symptoms.    Urine-urinalysis was ordered to rule out infection, renal failure, dehydration, pregnancy, proteinuria,

## 2025-06-05 ENCOUNTER — HOSPITAL ENCOUNTER (EMERGENCY)
Age: 35
Discharge: HOME | End: 2025-06-05
Attending: EMERGENCY MEDICINE
Payer: COMMERCIAL

## 2025-06-05 VITALS
HEART RATE: 88 BPM | DIASTOLIC BLOOD PRESSURE: 85 MMHG | TEMPERATURE: 98 F | OXYGEN SATURATION: 100 % | HEIGHT: 68 IN | WEIGHT: 156.75 LBS | BODY MASS INDEX: 23.76 KG/M2 | SYSTOLIC BLOOD PRESSURE: 128 MMHG | RESPIRATION RATE: 20 BRPM

## 2025-06-05 DIAGNOSIS — R21 RASH AND OTHER NONSPECIFIC SKIN ERUPTION: Primary | ICD-10-CM

## 2025-06-05 PROCEDURE — 6370000000 HC RX 637 (ALT 250 FOR IP): Performed by: EMERGENCY MEDICINE

## 2025-06-05 PROCEDURE — 6360000002 HC RX W HCPCS: Performed by: EMERGENCY MEDICINE

## 2025-06-05 PROCEDURE — 99283 EMERGENCY DEPT VISIT LOW MDM: CPT

## 2025-06-05 RX ORDER — HYDROXYZINE PAMOATE 25 MG/1
50 CAPSULE ORAL ONCE
Status: COMPLETED | OUTPATIENT
Start: 2025-06-05 | End: 2025-06-05

## 2025-06-05 RX ORDER — FAMOTIDINE 20 MG/1
20 TABLET, FILM COATED ORAL ONCE
Status: COMPLETED | OUTPATIENT
Start: 2025-06-05 | End: 2025-06-05

## 2025-06-05 RX ORDER — DEXAMETHASONE 4 MG/1
4 TABLET ORAL EVERY 12 HOURS SCHEDULED
Status: DISCONTINUED | OUTPATIENT
Start: 2025-06-05 | End: 2025-06-05

## 2025-06-05 RX ORDER — FAMOTIDINE 20 MG/1
20 TABLET, FILM COATED ORAL 2 TIMES DAILY
Qty: 30 TABLET | Refills: 0 | Status: SHIPPED | OUTPATIENT
Start: 2025-06-05

## 2025-06-05 RX ORDER — DEXAMETHASONE 4 MG/1
6 TABLET ORAL ONCE
Status: DISCONTINUED | OUTPATIENT
Start: 2025-06-05 | End: 2025-06-05

## 2025-06-05 RX ORDER — DEXAMETHASONE 4 MG/1
4 TABLET ORAL EVERY 12 HOURS SCHEDULED
Status: COMPLETED | OUTPATIENT
Start: 2025-06-05 | End: 2025-06-05

## 2025-06-05 RX ADMIN — DEXAMETHASONE 4 MG: 4 TABLET ORAL at 03:07

## 2025-06-05 RX ADMIN — HYDROXYZINE PAMOATE 50 MG: 25 CAPSULE ORAL at 03:07

## 2025-06-05 RX ADMIN — FAMOTIDINE 20 MG: 20 TABLET, FILM COATED ORAL at 03:07

## 2025-06-05 NOTE — ED PROVIDER NOTES
EMERGENCY DEPARTMENT ENCOUNTER     [facility] EMERGENCY DEPARTMENT     Pt Name: Gabriele Darnell   MRN: [id number]   Birthdate 1990   Date of evaluation: 6/5/2025   Provider: Zain Dewey MD   PCP: Alea Yusuf APRN - NP   Note Started: 2:51 AM EDT 6/5/25     CHIEF COMPLAINT     Chief Complaint   Patient presents with    Rash        HISTORY OF PRESENT ILLNESS:  History from : Patient        Gabriele Darnell is a 34 y.o. male who presents for evaluation of rash.  Patient reports that he developed a rash a few weeks ago after using a new fabric softener.  He states that he stopped using a fabric softener.  He states of the past 2 days he is redeveloped a rash and thinks that the close that he washed with the family softener and caused the rash.  He is seen multiple times in the emergency department.  I described prednisone which he states he took a few hours prior to presentation.  States that he has not been taking the additional medications did take Benadryl earlier today.  He denies difficulty swallowing or breathing.  Denies fevers.  Denies any additional known allergies.  He reports symptoms have improved with corticosteroids in the past.     Nursing Notes were all reviewed and agreed with or any disagreements were addressed in the HPI.     ROS: Positives and Pertinent negatives as per HPI.    PAST MEDICAL HISTORY     Past medical history:  has a past medical history of Alcohol use disorder, Alleged drug diversion, Anxiety disorder, Bipolar affective disorder (HCC), Herpes simplex infection, IVDU (intravenous drug use), Pancreatitis, Polysubstance abuse (HCC), and Tobacco use disorder.    Past surgical history:  has a past surgical history that includes ERCP (01/10/2014); Endoscopy, colon, diagnostic (10/28/2013); and Endoscopy, colon, diagnostic (03/23/2018).    Med list:   No current facility-administered medications on file prior to encounter.     Current Outpatient Medications on File Prior to

## 2025-06-05 NOTE — ED TRIAGE NOTES
Pt comes to ED from home with c/o of rash. Pt sts it started yesterday after he got home from work. Pt sts this occurred a couple weeks ago and hasn't happened since. Pt sts he tried to remove everything from his home that could have caused the rash. Pt was seen earlier today for he rash and medicated with steroids but the rash came back a few hours later. Pt has rash all over body from head to toe. the patient is unaware of what could have caused this rash.

## 2025-06-05 NOTE — ED PROVIDER NOTES
[facility] EMERGENCY DEPARTMENT  eMERGENCY dEPARTMENT eNCOUnter    Pt Name: @@  MRN: [id number]  [id number]  Date of evaluation: 6/4/2025  Provider: ARTURO Santoyo CNP      Independently seen and evaluated by the advanced practice provider  CHIEF COMPLAINT       Chief Complaint   Patient presents with    Rash     Pt in for all over hives/rash that started yesterday afternoon.  States was seen this morning and given medication.  States has taken meds with no relief.  Feels has gotten worse. Took Benadryl this afternoon.          HISTORY OF PRESENT ILLNESS  (Location/Symptom, Timing/Onset, Context/Setting, Quality, Duration, Modifying Factors, Severity.)   Gabriele Darnell is a 34 y.o. male who presents to the emergency department PMHx: Anxiety, bipolar, history of IVDU, pancreatitis, polysubstance abuse.    Lives at home  CODE STATUS full  Anticoagulation therapy: None  Social determinants: Does have a PCP, no assistive devices    HPI provided by the patient    Patient presented to the emergency department stating that the rash that he experienced earlier today has reoccurred.  And he did not wanted take the steroid that was provided to him in prescription form because it was not the same thing as prednisone to him.  He states it is feeling itchy again and making him feel restless.  Last had Benadryl this afternoon.      Nursing Notes were reviewed and I agree.    REVIEW OF SYSTEMS    (2-9 systems for level 4, 10 or more for level 5)     Review of Systems   Skin:  Positive for rash.   All other systems reviewed and are negative.      Except as noted above the remainder of the review of systems was reviewed and negative.       PAST MEDICAL HISTORY         Diagnosis Date    Alcohol use disorder     Alleged drug diversion     Anxiety disorder     Bipolar affective disorder (HCC)     Herpes simplex infection     IVDU (intravenous drug use)     Pancreatitis     Polysubstance abuse (HCC)

## 2025-06-05 NOTE — ED TRIAGE NOTES
Pt in for all over hives/rash that started yesterday afternoon.   was seen this morning and given medication.   has taken meds with no relief.  Feels has gotten worse. Took Benadryl this afternoon.

## 2025-06-05 NOTE — ED NOTES

## 2025-06-05 NOTE — DISCHARGE INSTRUCTIONS
Continue with the prescriptions that were provided to you earlier today to include the methylprednisolone, Benadryl and Pepcid

## 2025-06-06 ENCOUNTER — HOSPITAL ENCOUNTER (EMERGENCY)
Age: 35
Discharge: HOME | End: 2025-06-06
Attending: STUDENT IN AN ORGANIZED HEALTH CARE EDUCATION/TRAINING PROGRAM
Payer: COMMERCIAL

## 2025-06-06 VITALS
OXYGEN SATURATION: 100 % | HEIGHT: 68 IN | DIASTOLIC BLOOD PRESSURE: 82 MMHG | BODY MASS INDEX: 23.62 KG/M2 | RESPIRATION RATE: 16 BRPM | HEART RATE: 85 BPM | WEIGHT: 155.87 LBS | TEMPERATURE: 98.1 F | SYSTOLIC BLOOD PRESSURE: 132 MMHG

## 2025-06-06 DIAGNOSIS — L50.9 URTICARIA: Primary | ICD-10-CM

## 2025-06-06 PROCEDURE — 99283 EMERGENCY DEPT VISIT LOW MDM: CPT

## 2025-06-06 RX ORDER — HYDROXYZINE HYDROCHLORIDE 25 MG/1
25 TABLET, FILM COATED ORAL EVERY 8 HOURS PRN
Qty: 30 TABLET | Refills: 0 | Status: SHIPPED | OUTPATIENT
Start: 2025-06-06 | End: 2025-06-16

## 2025-06-06 ASSESSMENT — ENCOUNTER SYMPTOMS
VOMITING: 0
DIARRHEA: 0
NAUSEA: 1
EYE PAIN: 0
ANAL BLEEDING: 0
WHEEZING: 0
SORE THROAT: 0
COUGH: 0
SHORTNESS OF BREATH: 1
BACK PAIN: 0
ABDOMINAL PAIN: 0

## 2025-06-06 ASSESSMENT — LIFESTYLE VARIABLES
HOW MANY STANDARD DRINKS CONTAINING ALCOHOL DO YOU HAVE ON A TYPICAL DAY: 1 OR 2
HOW OFTEN DO YOU HAVE A DRINK CONTAINING ALCOHOL: 4 OR MORE TIMES A WEEK

## 2025-06-06 ASSESSMENT — PAIN DESCRIPTION - ONSET: ONSET: ON-GOING

## 2025-06-06 ASSESSMENT — PAIN - FUNCTIONAL ASSESSMENT
PAIN_FUNCTIONAL_ASSESSMENT: ACTIVITIES ARE NOT PREVENTED
PAIN_FUNCTIONAL_ASSESSMENT: 0-10

## 2025-06-06 ASSESSMENT — PAIN DESCRIPTION - ORIENTATION: ORIENTATION: ANTERIOR

## 2025-06-06 ASSESSMENT — PAIN DESCRIPTION - PAIN TYPE: TYPE: ACUTE PAIN

## 2025-06-06 ASSESSMENT — PAIN DESCRIPTION - FREQUENCY: FREQUENCY: INTERMITTENT

## 2025-06-06 ASSESSMENT — PAIN DESCRIPTION - LOCATION: LOCATION: HEAD

## 2025-06-06 ASSESSMENT — PAIN SCALES - GENERAL: PAINLEVEL_OUTOF10: 4

## 2025-06-06 ASSESSMENT — PAIN DESCRIPTION - DESCRIPTORS: DESCRIPTORS: ACHING

## 2025-06-06 NOTE — DISCHARGE INSTRUCTIONS
Please take addressing for anxiety and itching.  Please take Medrol Dosepak.  Please follow-up with primary care.

## 2025-06-06 NOTE — ED NOTES

## 2025-06-06 NOTE — ED TRIAGE NOTES
Pt presents to ED from home with c/o hives and headache. States, \"my throat and mouth feel weird when they flare back up.\" Pt was here on 6/4/25 and prescribed steroids and antihistamines. Hives keep returning despite medications. States he called to schedule an appointment with an allergist/immunologist and, \"they want $300 out of pocket before they'll see me.\" Denies chest pain, SOB, or mouth discomfort. No oral or periorbital swelling noted. Airway open and patent. VSS. No s/s of immediate distress noted a this time. Call light in easy reach and bed in low, locked position.

## 2025-06-07 ENCOUNTER — HOSPITAL ENCOUNTER (EMERGENCY)
Age: 35
Discharge: HOME | End: 2025-06-07
Payer: COMMERCIAL

## 2025-06-07 VITALS
RESPIRATION RATE: 19 BRPM | SYSTOLIC BLOOD PRESSURE: 145 MMHG | OXYGEN SATURATION: 100 % | HEART RATE: 80 BPM | DIASTOLIC BLOOD PRESSURE: 95 MMHG

## 2025-06-07 DIAGNOSIS — F41.1 ANXIETY STATE: Primary | ICD-10-CM

## 2025-06-07 PROCEDURE — 6370000000 HC RX 637 (ALT 250 FOR IP): Performed by: PHYSICIAN ASSISTANT

## 2025-06-07 PROCEDURE — 99283 EMERGENCY DEPT VISIT LOW MDM: CPT

## 2025-06-07 RX ORDER — LORAZEPAM 1 MG/1
1 TABLET ORAL ONCE
Status: COMPLETED | OUTPATIENT
Start: 2025-06-07 | End: 2025-06-07

## 2025-06-07 RX ORDER — LORAZEPAM 1 MG/1
1 TABLET ORAL 3 TIMES DAILY PRN
Qty: 12 TABLET | Refills: 0 | Status: SHIPPED | OUTPATIENT
Start: 2025-06-07 | End: 2025-06-08 | Stop reason: ALTCHOICE

## 2025-06-07 RX ADMIN — LORAZEPAM 1 MG: 1 TABLET ORAL at 12:22

## 2025-06-07 NOTE — ED PROVIDER NOTES
[facility] EMERGENCY DEPARTMENT    CHIEF COMPLAINT  Allergic Reaction (Pt c/o hives since 6/3/25. States, \"my throat and mouth feel weird when they flare back up.\" Pt was here on 6/4/25 and prescribed steroids and antihistamines, but hives keep returning.)       HISTORY OF PRESENT ILLNESS  Gabriele Darnell is a 34 y.o. male presenting to the ED for urticaria.  Patient states urticaria started 3 weeks ago.  Patient denies any inciting event.  Patient denies any changes and body wash, lotions, detergents.  Patient states he has washed his sheets and clothing as well.  Patient denies any shortness of breath, wheezing, abdominal pain, nausea, vomiting, diarrhea.  Patient does have multiple ER visits for this symptom.  Patient was given a referral to allergist/rheumatologist however he did not go to the appointment because they stated there was a $300 charge he would have to make an the visit.  Patient Leanne presents again today for urticaria.  Patient also states he has developed anxiety involving symptoms.  It should be noted that patient was recently prescribed Medrol Dosepak however he did not take steroids.  Patient was also given Benadryl as needed and provided with an EpiPen on prior ER visits in case of severe allergic reaction.    - History obtained from: Patient   - Limitations to history: none    I have reviewed the following from the nursing documentation:    Past Medical History:   Diagnosis Date    Alcohol use disorder     Alleged drug diversion     Anxiety disorder     Bipolar affective disorder (HCC)     Herpes simplex infection     IVDU (intravenous drug use)     Pancreatitis     Polysubstance abuse (HCC)     Tobacco use disorder      Past Surgical History:   Procedure Laterality Date    ENDOSCOPY, COLON, DIAGNOSTIC  10/28/2013    Endoscopic retrograde cholangiopancreatography with pancreatic stent placement    ENDOSCOPY, COLON, DIAGNOSTIC  03/23/2018    Esophagogastroduodenoscopy with biopsy    ERCP  days     Minutes of Exercise per Session: 0 min   Stress: No Stress Concern Present (12/14/2023)    Received from [facility]    [employer] - Occupational Stress Questionnaire     Feeling of Stress : Not at all   Social Connections: Unknown (12/14/2023)    Received from [facility]    Social Connection and Isolation Panel [NHANES]     Frequency of Communication with Friends and Family: More than three times a week     Frequency of Social Gatherings with Friends and Family: More than three times a week     Attends Alevism Services: 1 to 4 times per year     Active Member of Clubs or Organizations: No     Attends Club or Organization Meetings: Never     Marital Status: Not on file   Intimate Partner Violence: Not on file   Housing Stability: Unknown (5/23/2023)    Housing Stability Vital Sign     Unable to Pay for Housing in the Last Year: Not on file     Number of Places Lived in the Last Year: Not on file     Unstable Housing in the Last Year: No     No current facility-administered medications for this encounter.     Current Outpatient Medications   Medication Sig Dispense Refill    hydrOXYzine HCl (ATARAX) 25 MG tablet Take 1 tablet by mouth every 8 hours as needed for Anxiety 30 tablet 0    Cetirizine HCl 10 MG CAPS Take 10 mg by mouth in the morning and at bedtime 30 capsule 0    famotidine (PEPCID) 20 MG tablet Take 1 tablet by mouth 2 times daily 30 tablet 0    EPINEPHrine (EPIPEN 2-CECILIA) 0.3 MG/0.3ML SOAJ injection Inject 0.3 mLs into the muscle once for 1 dose Use as directed for allergic reaction 1 each 0    methylPREDNISolone (MEDROL, CECILIA,) 4 MG tablet Take by mouth. 1 kit 0    famotidine (PEPCID) 20 MG tablet Take 1 tablet by mouth daily for 5 days 5 tablet 0    LORazepam (ATIVAN) 1 MG tablet Take 1 tablet by mouth.      venlafaxine (EFFEXOR XR) 37.5 MG extended release capsule Take 1 capsule by mouth daily      acetaminophen (TYLENOL) 500 MG tablet Take 1 tablet by mouth 4

## 2025-06-07 NOTE — ED PROVIDER NOTES
[facility] EMERGENCY DEPARTMENT  EMERGENCY DEPARTMENT ENCOUNTER      Pt Name: Gabriele Darnell  MRN: [id number]  Birthdate 1990  Date of evaluation: 6/7/2025  Provider: TITO Montesinos  PCP: Alea Yusuf APRN - NP  Note Started: 12:20 PM EDT     The ED Attending Physician was available for consultation but did not see or evaluate this patient.    CHIEF COMPLAINT       Chief Complaint   Patient presents with    Illness     Pt states he hasn't been feeling right for the past couple days.     Anxiety       HISTORY OF PRESENT ILLNESS   (Location, Timing/Onset, Context/Setting, Quality, Duration, Modifying Factors, Severity, Associated Signs and Symptoms)  Note limiting factors.     Gabriele Darnell is a 34 y.o. male who presents with complaints of agitation, restlessness, headache, concern about a bad reaction to medication.  This is his fifth ED visit here in 4 days, and he was previously seen for urticarial rash.  He states he took his last dose steroids yesterday, and he fears the steroid to having a bad effect on him.  He says he cannot sit still, cannot stop arguing with his family, felt he had to leave the house, concerned something is wrong.  He denies any pain.  He says the rash is gone away after taking his prescribed medications.  Denies any swelling or difficulty breathing.  Says in the past he has been treated for anxiety, has recently been involved with [facility].  Past history of alcohol abuse but says he is not drinking anymore.  Denies any drug use recently.  He denies any visual or auditory hallucinations, denies any thoughts of harming himself or others.    Nursing Notes were all reviewed and agreed with or any disagreements were addressed in the HPI.    REVIEW OF SYSTEMS    (2-9 systems for level 4, 10 or more for level 5)     Positives and pertinent negatives as per HPI.     PAST MEDICAL HISTORY     Past Medical History:   Diagnosis Date    Alcohol use  has been on benzodiazepine medication in the past, says this was for withdrawal from alcohol, but it did help with his anxiety.  Given his high state of anxiety now, and likelihood of returning to the emergency department, I think he would benefit from benzodiazepine treatment.  Patient be discharged with prescription for a short course of Ativan with a dose given in the ED, advised strongly to follow-up with [facility] as soon as possible.  The patient verbalized understanding and agreement with this plan of care. The patient was advised to return to the emergency department if symptoms should significantly worsen or if new and concerning symptoms should appear.     I estimate there is LOW risk for SUICIDAL BEHAVIOR, HOMICIDAL BEHAVIOR, PSYCHOSIS, DANGEROUS OR VIOLENT BEHAVIOR, DISORIENTATION, OR MENTAL HEALTH CONDITION REQUIRING HOSPITALIZATION, thus I consider the discharge disposition reasonable.     CRITICAL CARE TIME   None.    FINAL IMPRESSION      1. Anxiety state          DISPOSITION/PLAN   DISPOSITION Decision To Discharge 06/07/2025 12:16:47 PM   DISPOSITION CONDITION Stable           PATIENT REFERRED TO:  [facility] ([facility])  [address] [place] [address]  [phone]  Call   for follow-up care      DISCHARGE MEDICATIONS:  New Prescriptions    LORAZEPAM (ATIVAN) 1 MG TABLET    Take 1 tablet by mouth 3 times daily as needed for Anxiety for up to 30 days. Max Daily Amount: 3 mg       DISCONTINUED MEDICATIONS:  Discontinued Medications    No medications on file            (Please note that portions of this note were completed with a voice recognition program.  Efforts were made to edit the dictations but occasionally words are mis-transcribed.)    TITO Montesinos (electronically signed)        Wade Velez PA  06/07/25 1534

## 2025-06-08 ENCOUNTER — HOSPITAL ENCOUNTER (EMERGENCY)
Age: 35
Discharge: HOME | End: 2025-06-08
Payer: COMMERCIAL

## 2025-06-08 VITALS
SYSTOLIC BLOOD PRESSURE: 152 MMHG | OXYGEN SATURATION: 100 % | HEART RATE: 88 BPM | RESPIRATION RATE: 20 BRPM | WEIGHT: 132.72 LBS | BODY MASS INDEX: 20.11 KG/M2 | TEMPERATURE: 98.1 F | DIASTOLIC BLOOD PRESSURE: 97 MMHG | HEIGHT: 68 IN

## 2025-06-08 DIAGNOSIS — F41.9 ANXIETY DISORDER, UNSPECIFIED TYPE: Primary | ICD-10-CM

## 2025-06-08 PROCEDURE — 99283 EMERGENCY DEPT VISIT LOW MDM: CPT

## 2025-06-08 RX ORDER — HYDROXYZINE PAMOATE 25 MG/1
25 CAPSULE ORAL ONCE
Status: DISCONTINUED | OUTPATIENT
Start: 2025-06-08 | End: 2025-06-08 | Stop reason: HOSPADM

## 2025-06-08 RX ORDER — BUSPIRONE HYDROCHLORIDE 10 MG/1
10 TABLET ORAL 2 TIMES DAILY
Qty: 30 TABLET | Refills: 0 | Status: SHIPPED | OUTPATIENT
Start: 2025-06-08 | End: 2025-06-23

## 2025-06-08 RX ORDER — BUSPIRONE HYDROCHLORIDE 10 MG/1
10 TABLET ORAL ONCE
Status: DISCONTINUED | OUTPATIENT
Start: 2025-06-08 | End: 2025-06-08 | Stop reason: HOSPADM

## 2025-06-08 NOTE — ED NOTES
Patient in hallway and appears upset. This RN to bedside to attempt to de-escalate patient. Patient states he does not want to take his medications. This RN went to update CNP, but CNP states she already knew and was coming to see patient.

## 2025-06-08 NOTE — ED PROVIDER NOTES
DISCHARGE MEDICATIONS:  Current Discharge Medication List        START taking these medications    Details   busPIRone (BUSPAR) 10 MG tablet Take 1 tablet by mouth 2 times daily for 15 days  Qty: 30 tablet, Refills: 0             (Please note that portions of this note werecompleted with a voice recognition program.  Efforts were made to edit the dictations but occasionally words are mis-transcribed.)    ARTURO Santoyo - CNP     This dictation was performed with a verbal recognition program (DRAGON) and it was checked for errors. It is possible that there are still dictated errors within this office note. If so, please bring any errors to my attention for an addendum. All efforts were made to ensure that this office note is accurate.       Indu Min, ARTURO - CNP  06/08/25 9851

## 2025-06-08 NOTE — ED TRIAGE NOTES
Patient was here and was given anxiety medication prescription and states he was not able to pick it up.

## 2025-06-08 NOTE — ED NOTES
D/C: Order noted for d/c. Pt confirmed d/c paperwork has correct name. Discharge and education instructions reviewed with patient. Teach-back successful.  Pt verbalized understanding and denied questions at this time. No acute distress noted. Patient instructed to follow-up as noted - return to emergency department if symptoms worsen. Patient verbalized understanding. Discharged per EDMD with discharge instructions. Pt discharged to private vehicle. Patient stable upon departure. Thanked patient for [facility] for care. Provider aware of patient pain at time of discharge.